# Patient Record
Sex: MALE | Race: WHITE | NOT HISPANIC OR LATINO | Employment: OTHER | URBAN - NONMETROPOLITAN AREA
[De-identification: names, ages, dates, MRNs, and addresses within clinical notes are randomized per-mention and may not be internally consistent; named-entity substitution may affect disease eponyms.]

---

## 2017-01-03 RX ORDER — CLOPIDOGREL BISULFATE 75 MG/1
75 TABLET ORAL DAILY
Qty: 90 TABLET | Refills: 3 | Status: SHIPPED | OUTPATIENT
Start: 2017-01-03 | End: 2017-10-23 | Stop reason: SDUPTHER

## 2017-01-03 RX ORDER — CLOPIDOGREL BISULFATE 75 MG/1
75 TABLET ORAL DAILY
Qty: 90 TABLET | Refills: 3 | Status: SHIPPED | OUTPATIENT
Start: 2017-01-03 | End: 2017-01-03 | Stop reason: SDUPTHER

## 2017-03-21 ENCOUNTER — HOSPITAL ENCOUNTER (OUTPATIENT)
Facility: HOSPITAL | Age: 72
Setting detail: OBSERVATION
Discharge: HOME | End: 2017-03-23
Attending: INTERNAL MEDICINE | Admitting: FAMILY MEDICINE

## 2017-03-21 DIAGNOSIS — N39.0 ACUTE UTI: Primary | ICD-10-CM

## 2017-03-21 DIAGNOSIS — G45.9 TRANSIENT CEREBRAL ISCHEMIA, UNSPECIFIED TYPE: ICD-10-CM

## 2017-03-21 LAB — GLUCOSE BLDC GLUCOMTR-MCNC: 294 MG/DL (ref 70–130)

## 2017-03-21 PROCEDURE — 82962 GLUCOSE BLOOD TEST: CPT

## 2017-03-21 PROCEDURE — 85730 THROMBOPLASTIN TIME PARTIAL: CPT | Performed by: INTERNAL MEDICINE

## 2017-03-21 PROCEDURE — 99285 EMERGENCY DEPT VISIT HI MDM: CPT

## 2017-03-21 PROCEDURE — 80053 COMPREHEN METABOLIC PANEL: CPT | Performed by: INTERNAL MEDICINE

## 2017-03-21 PROCEDURE — 85610 PROTHROMBIN TIME: CPT | Performed by: INTERNAL MEDICINE

## 2017-03-21 PROCEDURE — 85025 COMPLETE CBC W/AUTO DIFF WBC: CPT | Performed by: INTERNAL MEDICINE

## 2017-03-21 RX ORDER — INSULIN GLARGINE 100 [IU]/ML
100 INJECTION, SOLUTION SUBCUTANEOUS NIGHTLY
COMMUNITY
End: 2018-12-17 | Stop reason: SDUPTHER

## 2017-03-21 RX ORDER — CLOPIDOGREL BISULFATE 75 MG/1
75 TABLET ORAL DAILY
COMMUNITY
End: 2019-12-02 | Stop reason: SDUPTHER

## 2017-03-21 RX ORDER — METOPROLOL TARTRATE 50 MG/1
75 TABLET, FILM COATED ORAL 2 TIMES DAILY
COMMUNITY
End: 2019-05-01 | Stop reason: SDUPTHER

## 2017-03-21 RX ORDER — CILOSTAZOL 100 MG/1
100 TABLET ORAL 2 TIMES DAILY
COMMUNITY
End: 2019-12-02 | Stop reason: SDUPTHER

## 2017-03-21 RX ORDER — LANOLIN ALCOHOL/MO/W.PET/CERES
800 CREAM (GRAM) TOPICAL DAILY
COMMUNITY

## 2017-03-21 RX ORDER — ACETAMINOPHEN AND CODEINE PHOSPHATE 300; 30 MG/1; MG/1
1 TABLET ORAL EVERY 4 HOURS PRN
COMMUNITY
End: 2019-01-10

## 2017-03-21 RX ORDER — PENICILLIN V POTASSIUM 500 MG/1
500 TABLET ORAL 4 TIMES DAILY
COMMUNITY
End: 2018-10-24

## 2017-03-21 RX ORDER — ASPIRIN 81 MG/1
81 TABLET, CHEWABLE ORAL DAILY
COMMUNITY
End: 2022-09-22

## 2017-03-22 ENCOUNTER — APPOINTMENT (OUTPATIENT)
Dept: CT IMAGING | Facility: HOSPITAL | Age: 72
End: 2017-03-22

## 2017-03-22 PROBLEM — I10 HYPERTENSION: Status: ACTIVE | Noted: 2017-03-22

## 2017-03-22 PROBLEM — G93.41 METABOLIC ENCEPHALOPATHY: Status: ACTIVE | Noted: 2017-03-22

## 2017-03-22 PROBLEM — E11.9 DIABETES MELLITUS (HCC): Status: ACTIVE | Noted: 2017-03-22

## 2017-03-22 PROBLEM — I25.10 CORONARY ARTERY DISEASE: Status: ACTIVE | Noted: 2017-03-22

## 2017-03-22 PROBLEM — N39.0 UTI (URINARY TRACT INFECTION): Status: ACTIVE | Noted: 2017-03-22

## 2017-03-22 PROBLEM — N39.0 ACUTE UTI: Status: ACTIVE | Noted: 2017-03-22

## 2017-03-22 LAB
ALBUMIN SERPL-MCNC: 3.8 G/DL (ref 3.5–5)
ALBUMIN/GLOB SERPL: 1.1 G/DL (ref 1.1–2.5)
ALP SERPL-CCNC: 74 U/L (ref 24–120)
ALT SERPL W P-5'-P-CCNC: 45 U/L (ref 0–54)
ANION GAP SERPL CALCULATED.3IONS-SCNC: 10 MMOL/L (ref 4–13)
APTT PPP: 30.5 SECONDS (ref 24.1–34.8)
AST SERPL-CCNC: 42 U/L (ref 7–45)
BACTERIA UR QL AUTO: ABNORMAL /HPF
BASOPHILS # BLD AUTO: 0.02 10*3/MM3 (ref 0–0.2)
BASOPHILS NFR BLD AUTO: 0.2 % (ref 0–2)
BILIRUB SERPL-MCNC: 0.7 MG/DL (ref 0.1–1)
BILIRUB UR QL STRIP: NEGATIVE
BUN BLD-MCNC: 20 MG/DL (ref 5–21)
BUN/CREAT SERPL: 15.9 (ref 7–25)
CALCIUM SPEC-SCNC: 9.2 MG/DL (ref 8.4–10.4)
CHLORIDE SERPL-SCNC: 103 MMOL/L (ref 98–110)
CLARITY UR: CLEAR
CO2 SERPL-SCNC: 23 MMOL/L (ref 24–31)
COLOR UR: YELLOW
CREAT BLD-MCNC: 1.26 MG/DL (ref 0.5–1.4)
DEPRECATED RDW RBC AUTO: 46.8 FL (ref 40–54)
EOSINOPHIL # BLD AUTO: 0.03 10*3/MM3 (ref 0–0.7)
EOSINOPHIL NFR BLD AUTO: 0.2 % (ref 0–4)
ERYTHROCYTE [DISTWIDTH] IN BLOOD BY AUTOMATED COUNT: 13.5 % (ref 12–15)
FINE GRAN CASTS URNS QL MICRO: ABNORMAL /LPF
GFR SERPL CREATININE-BSD FRML MDRD: 56 ML/MIN/1.73
GLOBULIN UR ELPH-MCNC: 3.5 GM/DL
GLUCOSE BLD-MCNC: 299 MG/DL (ref 70–100)
GLUCOSE BLDC GLUCOMTR-MCNC: 262 MG/DL (ref 70–130)
GLUCOSE BLDC GLUCOMTR-MCNC: 266 MG/DL (ref 70–130)
GLUCOSE BLDC GLUCOMTR-MCNC: 283 MG/DL (ref 70–130)
GLUCOSE BLDC GLUCOMTR-MCNC: 292 MG/DL (ref 70–130)
GLUCOSE BLDC GLUCOMTR-MCNC: 315 MG/DL (ref 70–130)
GLUCOSE UR STRIP-MCNC: NEGATIVE MG/DL
HCT VFR BLD AUTO: 43.6 % (ref 40–52)
HGB BLD-MCNC: 15 G/DL (ref 14–18)
HGB UR QL STRIP.AUTO: NEGATIVE
HOLD SPECIMEN: NORMAL
HOLD SPECIMEN: NORMAL
HYALINE CASTS UR QL AUTO: ABNORMAL /LPF
IMM GRANULOCYTES # BLD: 0.05 10*3/MM3 (ref 0–0.03)
IMM GRANULOCYTES NFR BLD: 0.4 % (ref 0–5)
INR PPP: 1.04 (ref 0.91–1.09)
KETONES UR QL STRIP: NEGATIVE
LEUKOCYTE ESTERASE UR QL STRIP.AUTO: ABNORMAL
LYMPHOCYTES # BLD AUTO: 0.56 10*3/MM3 (ref 0.72–4.86)
LYMPHOCYTES NFR BLD AUTO: 4.3 % (ref 15–45)
MAGNESIUM SERPL-MCNC: 2 MG/DL (ref 1.4–2.2)
MCH RBC QN AUTO: 32.4 PG (ref 28–32)
MCHC RBC AUTO-ENTMCNC: 34.4 G/DL (ref 33–36)
MCV RBC AUTO: 94.2 FL (ref 82–95)
MONOCYTES # BLD AUTO: 1.36 10*3/MM3 (ref 0.19–1.3)
MONOCYTES NFR BLD AUTO: 10.4 % (ref 4–12)
NEUTROPHILS # BLD AUTO: 0 10*3/MM3 (ref 1.87–8.4)
NEUTROPHILS NFR BLD AUTO: 84.5 % (ref 39–78)
NITRITE UR QL STRIP: NEGATIVE
NRBC BLD MANUAL-RTO: 0 /100 WBC (ref 0–0)
PH UR STRIP.AUTO: 5 [PH] (ref 5–8)
PLATELET # BLD AUTO: 123 10*3/MM3 (ref 130–400)
PMV BLD AUTO: 12.3 FL (ref 6–12)
POTASSIUM BLD-SCNC: 5 MMOL/L (ref 3.5–5.3)
PROT SERPL-MCNC: 7.3 G/DL (ref 6.3–8.7)
PROT UR QL STRIP: NEGATIVE
PROTHROMBIN TIME: 13.9 SECONDS (ref 11.9–14.6)
RBC # BLD AUTO: 4.63 10*6/MM3 (ref 4.8–5.9)
RBC # UR: ABNORMAL /HPF
REF LAB TEST METHOD: ABNORMAL
SODIUM BLD-SCNC: 136 MMOL/L (ref 135–145)
SP GR UR STRIP: 1.02 (ref 1–1.03)
SQUAMOUS #/AREA URNS HPF: ABNORMAL /HPF
UROBILINOGEN UR QL STRIP: ABNORMAL
WBC NRBC COR # BLD: 13.02 10*3/MM3 (ref 4.8–10.8)
WBC UR QL AUTO: ABNORMAL /HPF
WHOLE BLOOD HOLD SPECIMEN: NORMAL
WHOLE BLOOD HOLD SPECIMEN: NORMAL

## 2017-03-22 PROCEDURE — 70450 CT HEAD/BRAIN W/O DYE: CPT

## 2017-03-22 PROCEDURE — 82962 GLUCOSE BLOOD TEST: CPT

## 2017-03-22 PROCEDURE — 94799 UNLISTED PULMONARY SVC/PX: CPT

## 2017-03-22 PROCEDURE — 81001 URINALYSIS AUTO W/SCOPE: CPT | Performed by: INTERNAL MEDICINE

## 2017-03-22 PROCEDURE — 36415 COLL VENOUS BLD VENIPUNCTURE: CPT | Performed by: INTERNAL MEDICINE

## 2017-03-22 PROCEDURE — 96360 HYDRATION IV INFUSION INIT: CPT

## 2017-03-22 PROCEDURE — 96361 HYDRATE IV INFUSION ADD-ON: CPT

## 2017-03-22 PROCEDURE — 83735 ASSAY OF MAGNESIUM: CPT | Performed by: INTERNAL MEDICINE

## 2017-03-22 PROCEDURE — 63710000001 INSULIN LISPRO (HUMAN) PER 5 UNITS: Performed by: INTERNAL MEDICINE

## 2017-03-22 PROCEDURE — G0378 HOSPITAL OBSERVATION PER HR: HCPCS

## 2017-03-22 PROCEDURE — 96372 THER/PROPH/DIAG INJ SC/IM: CPT

## 2017-03-22 PROCEDURE — 25010000002 ENOXAPARIN PER 10 MG: Performed by: FAMILY MEDICINE

## 2017-03-22 PROCEDURE — 94760 N-INVAS EAR/PLS OXIMETRY 1: CPT

## 2017-03-22 PROCEDURE — 87086 URINE CULTURE/COLONY COUNT: CPT | Performed by: INTERNAL MEDICINE

## 2017-03-22 RX ORDER — SODIUM CHLORIDE 9 MG/ML
75 INJECTION, SOLUTION INTRAVENOUS CONTINUOUS
Status: DISCONTINUED | OUTPATIENT
Start: 2017-03-22 | End: 2017-03-23 | Stop reason: HOSPADM

## 2017-03-22 RX ORDER — ACETAMINOPHEN 325 MG/1
650 TABLET ORAL EVERY 4 HOURS PRN
Status: DISCONTINUED | OUTPATIENT
Start: 2017-03-22 | End: 2017-03-22 | Stop reason: SDUPTHER

## 2017-03-22 RX ORDER — HYDROCODONE BITARTRATE AND ACETAMINOPHEN 5; 325 MG/1; MG/1
1 TABLET ORAL EVERY 4 HOURS PRN
Status: DISCONTINUED | OUTPATIENT
Start: 2017-03-22 | End: 2017-03-23 | Stop reason: HOSPADM

## 2017-03-22 RX ORDER — ACETAMINOPHEN 325 MG/1
650 TABLET ORAL EVERY 6 HOURS PRN
Status: DISCONTINUED | OUTPATIENT
Start: 2017-03-22 | End: 2017-03-23 | Stop reason: HOSPADM

## 2017-03-22 RX ORDER — CLOPIDOGREL BISULFATE 75 MG/1
75 TABLET ORAL DAILY
Status: DISCONTINUED | OUTPATIENT
Start: 2017-03-22 | End: 2017-03-23 | Stop reason: HOSPADM

## 2017-03-22 RX ORDER — LEVOFLOXACIN 5 MG/ML
500 INJECTION, SOLUTION INTRAVENOUS EVERY 24 HOURS
Status: DISCONTINUED | OUTPATIENT
Start: 2017-03-22 | End: 2017-03-23 | Stop reason: HOSPADM

## 2017-03-22 RX ORDER — NITROGLYCERIN 0.4 MG/1
0.4 TABLET SUBLINGUAL
Status: DISCONTINUED | OUTPATIENT
Start: 2017-03-22 | End: 2017-03-23 | Stop reason: HOSPADM

## 2017-03-22 RX ORDER — METOPROLOL TARTRATE 50 MG/1
50 TABLET, FILM COATED ORAL 2 TIMES DAILY
Status: DISCONTINUED | OUTPATIENT
Start: 2017-03-22 | End: 2017-03-23 | Stop reason: HOSPADM

## 2017-03-22 RX ORDER — IPRATROPIUM BROMIDE AND ALBUTEROL SULFATE 2.5; .5 MG/3ML; MG/3ML
3 SOLUTION RESPIRATORY (INHALATION) EVERY 4 HOURS PRN
Status: DISCONTINUED | OUTPATIENT
Start: 2017-03-22 | End: 2017-03-23 | Stop reason: HOSPADM

## 2017-03-22 RX ORDER — SODIUM CHLORIDE 0.9 % (FLUSH) 0.9 %
1-10 SYRINGE (ML) INJECTION AS NEEDED
Status: DISCONTINUED | OUTPATIENT
Start: 2017-03-22 | End: 2017-03-23 | Stop reason: HOSPADM

## 2017-03-22 RX ORDER — ONDANSETRON 2 MG/ML
4 INJECTION INTRAMUSCULAR; INTRAVENOUS EVERY 6 HOURS PRN
Status: DISCONTINUED | OUTPATIENT
Start: 2017-03-22 | End: 2017-03-23 | Stop reason: HOSPADM

## 2017-03-22 RX ORDER — ASPIRIN 81 MG/1
81 TABLET, CHEWABLE ORAL DAILY
Status: DISCONTINUED | OUTPATIENT
Start: 2017-03-22 | End: 2017-03-23 | Stop reason: HOSPADM

## 2017-03-22 RX ORDER — LANOLIN ALCOHOL/MO/W.PET/CERES
800 CREAM (GRAM) TOPICAL DAILY
Status: DISCONTINUED | OUTPATIENT
Start: 2017-03-22 | End: 2017-03-23 | Stop reason: HOSPADM

## 2017-03-22 RX ORDER — MAGNESIUM HYDROXIDE/ALUMINUM HYDROXICE/SIMETHICONE 120; 1200; 1200 MG/30ML; MG/30ML; MG/30ML
30 SUSPENSION ORAL EVERY 6 HOURS PRN
Status: DISCONTINUED | OUTPATIENT
Start: 2017-03-22 | End: 2017-03-23 | Stop reason: HOSPADM

## 2017-03-22 RX ORDER — NICOTINE POLACRILEX 4 MG
15 LOZENGE BUCCAL
Status: DISCONTINUED | OUTPATIENT
Start: 2017-03-22 | End: 2017-03-23 | Stop reason: HOSPADM

## 2017-03-22 RX ORDER — DEXTROSE MONOHYDRATE 25 G/50ML
25 INJECTION, SOLUTION INTRAVENOUS
Status: DISCONTINUED | OUTPATIENT
Start: 2017-03-22 | End: 2017-03-23 | Stop reason: HOSPADM

## 2017-03-22 RX ORDER — ONDANSETRON 2 MG/ML
4 INJECTION INTRAMUSCULAR; INTRAVENOUS EVERY 6 HOURS PRN
Status: DISCONTINUED | OUTPATIENT
Start: 2017-03-22 | End: 2017-03-22 | Stop reason: SDUPTHER

## 2017-03-22 RX ORDER — CILOSTAZOL 100 MG/1
100 TABLET ORAL 2 TIMES DAILY
Status: DISCONTINUED | OUTPATIENT
Start: 2017-03-22 | End: 2017-03-23 | Stop reason: HOSPADM

## 2017-03-22 RX ADMIN — ACETAMINOPHEN 800 MCG: 400 TABLET ORAL at 11:39

## 2017-03-22 RX ADMIN — CLOPIDOGREL BISULFATE 75 MG: 75 TABLET, FILM COATED ORAL at 11:39

## 2017-03-22 RX ADMIN — SODIUM CHLORIDE 75 ML/HR: 9 INJECTION, SOLUTION INTRAVENOUS at 11:15

## 2017-03-22 RX ADMIN — ASPIRIN 81 MG 81 MG: 81 TABLET ORAL at 11:44

## 2017-03-22 RX ADMIN — CILOSTAZOL 100 MG: 100 TABLET ORAL at 11:39

## 2017-03-22 RX ADMIN — INSULIN LISPRO 6 UNITS: 100 INJECTION, SOLUTION INTRAVENOUS; SUBCUTANEOUS at 17:23

## 2017-03-22 RX ADMIN — ENOXAPARIN SODIUM 40 MG: 40 INJECTION SUBCUTANEOUS at 11:44

## 2017-03-22 RX ADMIN — INSULIN LISPRO 7 UNITS: 100 INJECTION, SOLUTION INTRAVENOUS; SUBCUTANEOUS at 11:38

## 2017-03-22 RX ADMIN — CILOSTAZOL 100 MG: 100 TABLET ORAL at 17:25

## 2017-03-22 RX ADMIN — INSULIN LISPRO 6 UNITS: 100 INJECTION, SOLUTION INTRAVENOUS; SUBCUTANEOUS at 20:19

## 2017-03-22 RX ADMIN — METOPROLOL TARTRATE 50 MG: 50 TABLET ORAL at 17:25

## 2017-03-22 RX ADMIN — METOPROLOL TARTRATE 50 MG: 50 TABLET ORAL at 11:39

## 2017-03-22 NOTE — ED PROVIDER NOTES
"Subjective   HPI Comments: Patient is a 71-year-old male whose family reports the patient has had increased sleep since Monday (03/20/17) along with decreased intake of liquids and solids since that same time.  Patient reportedly was seen at his primary care provider's office on Tuesday (03/21/17) and was diagnosed with a sinus infection and started on an antibiotic (patient and family does not know the name of the antibiotic).  The patient has had extreme fatigue all day since the beginning of Tuesday (03/21/17) AM.  On Tuesday (03/21/17) night the patient reportedly went to get up out of his chair and fell in the floor at 8:45 PM and appeared \"lethargic\" while sitting in the floor and had extreme weakness to the point that he could not ambulate and also had marked difficulty with speech.  The family is concerned the patient had a TIA. The patient reportedly had a fingerstick blood sugar at that time of 180.      History provided by:  Patient and spouse (Brothers)  History limited by:  Acuity of condition      Review of Systems   Constitutional: Positive for activity change, appetite change and fatigue.   HENT: Negative.    Eyes: Negative.    Respiratory: Negative.    Cardiovascular: Negative.    Gastrointestinal: Negative.    Endocrine: Negative.    Genitourinary: Negative.    Musculoskeletal: Negative.    Skin: Negative.    Allergic/Immunologic: Negative.    Neurological: Positive for speech difficulty and weakness.   Hematological: Negative.    Psychiatric/Behavioral: Negative.    All other systems reviewed and are negative.      Past Medical History:   Diagnosis Date   • Colon cancer    • Coronary artery disease    • Diabetes mellitus    • Hypertension        Allergies   Allergen Reactions   • Cephalexin        Past Surgical History:   Procedure Laterality Date   • COLOSTOMY     • CORONARY ARTERY BYPASS GRAFT         Family History   Problem Relation Age of Onset   • Cancer Mother    • Heart disease Mother    • " "Diabetes Mother    • Cancer Father    • Diabetes Sister    • No Known Problems Brother    • Diabetes Sister    • No Known Problems Brother    • No Known Problems Brother    • No Known Problems Brother        Social History     Social History   • Marital status:      Spouse name: N/A   • Number of children: N/A   • Years of education: N/A     Social History Main Topics   • Smoking status: Current Every Day Smoker     Types: Pipe   • Smokeless tobacco: None   • Alcohol use No   • Drug use: No   • Sexual activity: No     Other Topics Concern   • None     Social History Narrative   • None           Objective   Physical Exam   Constitutional: He is oriented to person, place, and time. He appears well-developed and well-nourished.   HENT:   Head: Normocephalic and atraumatic.   Right Ear: External ear normal.   Left Ear: External ear normal.   Nose: Nose normal.   Mouth/Throat: Oropharynx is clear and moist.   Eyes: Conjunctivae and EOM are normal. Pupils are equal, round, and reactive to light. Right eye exhibits no discharge. Left eye exhibits no discharge.   Neck: Normal range of motion. Neck supple. No tracheal deviation present. No thyromegaly present.   Cardiovascular: Normal rate, regular rhythm, normal heart sounds and intact distal pulses.    No murmur heard.  Pulmonary/Chest: Effort normal and breath sounds normal. No respiratory distress. He exhibits no tenderness.   Abdominal: Soft. He exhibits no distension. There is no tenderness.   Musculoskeletal: Normal range of motion. He exhibits no edema, tenderness or deformity.   Neurological: He is alert and oriented to person, place, and time. No cranial nerve deficit.   Skin: Skin is warm and dry. No erythema. No pallor.   Psychiatric: He has a normal mood and affect. Judgment normal.   Nursing note and vitals reviewed.      Procedures         ED Course  ED Course   Comment By Time   CT head without intravenous contrast:\" No acute findings.\" Per Iesha " Whitney York MD--V rad Ellis Davis MD 03/22 0706      Patient's case was discussed with Dr Belle and he agrees for the hospitalist service to admit the patient.            MDM  Number of Diagnoses or Management Options  Acute UTI: minor  Transient cerebral ischemia, unspecified type: new and requires workup     Amount and/or Complexity of Data Reviewed  Clinical lab tests: ordered and reviewed  Tests in the radiology section of CPT®: ordered and reviewed  Discuss the patient with other providers: yes    Risk of Complications, Morbidity, and/or Mortality  Presenting problems: moderate  Diagnostic procedures: moderate  Management options: low    Patient Progress  Patient progress: stable      Final diagnoses:   Acute UTI   Transient cerebral ischemia, unspecified type            Ellis Davis MD  03/22/17 0180

## 2017-03-22 NOTE — H&P
[facility]  HISTORY AND PHYSICAL    Date of Admission: 3/21/2017  Primary Care Physician: Robin Freedman MD    Subjective     Chief Complaint:   Difficulty walking secondary to weakness, occasional disorientation and slurred speech    History of Present Illness  This 71-year-old white male is admitted with a chief complaint of difficulty walking, disorientation and slurred speech.  The patient states that his symptoms began roughly 3 days prior to arrival.  He saw his family physician yesterday and was treated for an apparent sinus infection.  Despite outpatient treatment the patient's symptoms continued to worsen.  He presented to Psychiatric emergency room for further evaluation.  Workup in the emergency room shows no white blood cell count at 13,000, glucose 299, moderate leukocyte esterase and his UA with 6-12 WBCs noted.  While in the emergency room the patient was given IV fluids and Levaquin IV.  At the time of my interview and examination the patient was back to his typical functional baseline both physically and mentally.  The patient does admit to 3-4 days of difficulty with urination and frequency as well as a feeling of being unable to empty his bladder.        Review of Systems   Constitutional: Positive for fatigue.   HENT: Negative.    Eyes: Negative.    Respiratory: Negative.    Cardiovascular: Negative.    Gastrointestinal: Negative.    Endocrine: Negative.    Genitourinary: Positive for difficulty urinating and frequency.   Musculoskeletal: Positive for gait problem.   Skin: Negative.    Allergic/Immunologic: Negative.    Neurological: Positive for speech difficulty and weakness.   Hematological: Negative.    Psychiatric/Behavioral: Negative.         Otherwise complete ROS reviewed and negative except as mentioned in the HPI.      Past Medical History:     Past Medical History:   Diagnosis Date   • Colon cancer    • Coronary  "artery disease    • Diabetes mellitus    • Hypertension        Past Surgical History:  Past Surgical History:   Procedure Laterality Date   • COLOSTOMY     • CORONARY ARTERY BYPASS GRAFT         Family History:   family history includes Cancer in his father and mother; Diabetes in his mother, sister, and sister; Heart disease in his mother; No Known Problems in his brother, brother, brother, and brother.    Social History:    reports that he has been smoking Pipe.  He does not have any smokeless tobacco history on file. He reports that he does not drink alcohol or use illicit drugs.    Medications:  Prior to Admission medications    Medication Sig Start Date End Date Taking? Authorizing Provider   acetaminophen-codeine (TYLENOL #3) 300-30 MG per tablet Take 1 tablet by mouth Every 4 (Four) Hours As Needed for Moderate Pain (4-6).   Yes Historical Provider, MD   aspirin 81 MG chewable tablet Chew 81 mg Daily.   Yes Historical Provider, MD   cilostazol (PLETAL) 100 MG tablet Take 100 mg by mouth 2 (Two) Times a Day.   Yes Historical Provider, MD   clopidogrel (PLAVIX) 75 MG tablet Take 75 mg by mouth Daily.   Yes Historical Provider, MD   folic acid (FOLVITE) 400 MCG tablet Take 800 mcg by mouth Daily.   Yes Historical Provider, MD   insulin glargine (LANTUS) 100 UNIT/ML injection Inject  under the skin Daily.   Yes Historical Provider, MD   insulin lispro (humaLOG) 100 UNIT/ML injection Inject  under the skin 3 (Three) Times a Day Before Meals.   Yes Historical Provider, MD   metoprolol tartrate (LOPRESSOR) 50 MG tablet Take 50 mg by mouth 2 (Two) Times a Day.   Yes Historical Provider, MD   penicillin v potassium (VEETID) 500 MG tablet Take 500 mg by mouth 4 (Four) Times a Day.   Yes Historical Provider, MD       Allergies:  Allergies   Allergen Reactions   • Cephalexin        Objective     Vital Signs:   /57  Pulse 92  Temp 97.2 °F (36.2 °C)  Resp 20  Ht 72\" (182.9 cm)  Wt 246 lb (112 kg)  SpO2 96%  " BMI 33.36 kg/m2    Physical Exam   Constitutional: He is oriented to person, place, and time. He appears well-developed and well-nourished. No distress.   HENT:   Head: Normocephalic and atraumatic.   Right Ear: External ear normal.   Left Ear: External ear normal.   Nose: Nose normal.   Mouth/Throat: Oropharynx is clear and moist.   Eyes: Conjunctivae and EOM are normal. Pupils are equal, round, and reactive to light. No scleral icterus.   Neck: Normal range of motion. Neck supple. No JVD present. No tracheal deviation present. No thyromegaly present.   Cardiovascular: Normal rate, regular rhythm, normal heart sounds and intact distal pulses.    No murmur heard.  Pulmonary/Chest: Effort normal and breath sounds normal. No respiratory distress. He has no wheezes.   Abdominal: Soft. Bowel sounds are normal. He exhibits no distension and no mass. There is no tenderness.   Musculoskeletal: Normal range of motion. He exhibits no edema or tenderness.   Neurological: He is alert and oriented to person, place, and time. He has normal reflexes. No cranial nerve deficit. Coordination normal.   Skin: Skin is warm and dry. No rash noted.   Psychiatric: He has a normal mood and affect. His behavior is normal. Judgment and thought content normal.           Results Reviewed:  Lab Results (last 24 hours)     Procedure Component Value Units Date/Time    POC Glucose Fingerstick [[id number]]  (Abnormal) Collected:  03/21/17 2227    Specimen:  Blood Updated:  03/21/17 2238     Glucose 294 (H) mg/dL       : [id number] [facility] ID: [id number]       POC Glucose Fingerstick [[id number]]  (Abnormal) Collected:  03/22/17 0136    Specimen:  Blood Updated:  03/22/17 0217     Glucose 266 (H) mg/dL       : [id number] [facility] ID: [id number]       Comprehensive Metabolic Panel [[id number]]  (Abnormal) Collected:  03/21/17 2314    Specimen:  Blood from Arm, Left Updated:  03/22/17 0305     Glucose 299 (H) mg/dL      BUN 20  mg/dL      Creatinine 1.26 mg/dL      Sodium 136 mmol/L      Potassium 5.0 mmol/L      Chloride 103 mmol/L      CO2 23.0 (L) mmol/L      Calcium 9.2 mg/dL      Total Protein 7.3 g/dL      Albumin 3.80 g/dL      ALT (SGPT) 45 U/L      AST (SGOT) 42 U/L      Alkaline Phosphatase 74 U/L      Total Bilirubin 0.7 mg/dL      eGFR Non African Amer 56 (L) mL/min/1.73      Globulin 3.5 gm/dL      A/G Ratio 1.1 g/dL      BUN/Creatinine Ratio 15.9     Anion Gap 10.0 mmol/L     CBC Auto Differential [[id number]]  (Abnormal) Collected:  03/21/17 2314    Specimen:  Blood from Arm, Left Updated:  03/22/17 0332     WBC 13.02 (H) 10*3/mm3      RBC 4.63 (L) 10*6/mm3      Hemoglobin 15.0 g/dL      Hematocrit 43.6 %      MCV 94.2 fL      MCH 32.4 (H) pg      MCHC 34.4 g/dL      RDW 13.5 %      RDW-SD 46.8 fl      MPV 12.3 (H) fL      Platelets 123 (L) 10*3/mm3      Neutrophil % 84.5 (H) %      Lymphocyte % 4.3 (L) %      Monocyte % 10.4 %      Eosinophil % 0.2 %      Basophil % 0.2 %      Immature Grans % 0.4 %      Neutrophils, Absolute 0.00 (L) 10*3/mm3      Lymphocytes, Absolute 0.56 (L) 10*3/mm3      Monocytes, Absolute 1.36 (H) 10*3/mm3      Eosinophils, Absolute 0.03 10*3/mm3      Basophils, Absolute 0.02 10*3/mm3      Immature Grans, Absolute 0.05 (H) 10*3/mm3      nRBC 0.0 /100 WBC     aPTT [[id number]]  (Normal) Collected:  03/21/17 2314    Specimen:  Blood from Arm, Left Updated:  03/22/17 0350     PTT 30.5 seconds     Urine Culture [[id number]] Collected:  03/22/17 0147    Specimen:  Urine from Urine, Clean Catch Updated:  03/22/17 0355    Urinalysis With / Culture If Indicated [[id number]]  (Abnormal) Collected:  03/22/17 0147    Specimen:  Urine from Urine, Clean Catch Updated:  03/22/17 0357     Color, UA Yellow     Appearance, UA Clear     pH, UA 5.0     Specific Gravity, UA 1.018     Glucose, UA Negative     Ketones, UA Negative     Bilirubin, UA Negative     Blood, UA Negative     Protein, UA Negative     Leuk Esterase, UA  Moderate (2+) (A)     Nitrite, UA Negative     Urobilinogen, UA 0.2 E.U./dL    Urinalysis, Microscopic Only [[id number]]  (Abnormal) Collected:  03/22/17 0147    Specimen:  Urine from Urine, Clean Catch Updated:  03/22/17 0357     RBC, UA 0-2 (A) /HPF      WBC, UA 6-12 (A) /HPF      Bacteria, UA Trace (A) /HPF      Squamous Epithelial Cells, UA 0-2 /HPF      Hyaline Casts, UA 7-12 /LPF      Fine Granular Casts, UA 3-6 /LPF      Methodology Automated Microscopy    Protime-INR [[id number]]  (Normal) Collected:  03/21/17 2314    Specimen:  Blood from Arm, Left Updated:  03/22/17 0444     Protime 13.9 Seconds      INR 1.04    POC Glucose Fingerstick [[id number]]  (Abnormal) Collected:  03/22/17 0755    Specimen:  Blood Updated:  03/22/17 0832     Glucose 262 (H) mg/dL       : [id number] Alpesh KimMeter ID: [id number]       Magnesium [[id number]]  (Normal) Collected:  03/22/17 0848    Specimen:  Blood Updated:  03/22/17 0935     Magnesium 2.0 mg/dL           Ct Head Without Contrast    Result Date: 3/22/2017  Narrative: CT HEAD WO CONTRAST- 3/22/2017 5:35 AM CDT  HISTORY: Weakness and fatigue  COMPARISON: None  DOSE LENGTH PRODUCT: 750 mGy cm. Automated exposure control was also utilized to decrease patient radiation dose.  TECHNIQUE: Helical tomographic images of the brain were obtained without the use of contrast. Multiplanar reformatted images were provided for review.  FINDINGS: The midline structures are nondisplaced. There is mild cerebral and cerebellar atrophy, with an associated increase in the prominence of the ventricles and sulci. The basilar cisterns are normal in size and configuration. There is no evidence of intracranial hemorrhage or mass-effect. There is low attenuation in the periventricular white matter, consistent with chronic ischemic change. There are no abnormal extra-axial fluid collections. There is no evidence of tonsillar herniation.  The included orbits and their contents are unremarkable. The  visualized paranasal sinuses, mastoid air cells and middle ear cavities are clear. The visualized osseous structures and overlying soft tissues of the skull and face are intact.      Impression: Mild cerebral and cerebellar atrophy with chronic microvascular disease but no evidence of acute intracranial process.  A preliminary report was provided by [facility]. I agree with the preliminary interpretation.   This report was finalized on 03/22/2017 07:20 by Dr. Yasir Goldberg MD.     I have personally reviewed and interpreted the radiology studies and ECG obtained at time of admission.     Assessment / Plan      Assessment & Plan  Hospital Problem List     * (Principal)UTI (urinary tract infection)    Metabolic encephalopathy    Hypertension    Diabetes mellitus    Coronary artery disease          PLAN:   Admit to the medical surgical floor  Levaquin 500 milligrams IV every 24 hours  Gentle IV fluid hydration with normal saline at 75 mL per hour  Await urine culture results for disposition      Code Status:   DO NOT RESUSCITATE aggressive  Surrogate decision-maker is the patient's wife         I discussed the patients findings and my recommendations with: Patient and his wife who is at bedside    Estimated length of stay: One to 2 days    Abbe Castro DO   03/22/17   10:21 AM

## 2017-03-22 NOTE — PROGRESS NOTES
Continued Stay Note  JODI Frances     Patient Name: Darren Shay  MRN: [id number]  Today's Date: 3/22/2017    Admit Date: 3/21/2017          Discharge Plan       03/22/17 1630    Case Management/Social Work Plan    Additional Comments Attempted to screen pt but was unable. Will try again tomorrow.               Discharge Codes     None            VENTURA Gilbert

## 2017-03-23 VITALS
DIASTOLIC BLOOD PRESSURE: 52 MMHG | OXYGEN SATURATION: 95 % | HEIGHT: 72 IN | TEMPERATURE: 97.1 F | RESPIRATION RATE: 20 BRPM | WEIGHT: 246 LBS | BODY MASS INDEX: 33.32 KG/M2 | SYSTOLIC BLOOD PRESSURE: 157 MMHG | HEART RATE: 90 BPM

## 2017-03-23 LAB
GLUCOSE BLDC GLUCOMTR-MCNC: 262 MG/DL (ref 70–130)
HBA1C MFR BLD: 6.5 %
TSH SERPL DL<=0.05 MIU/L-ACNC: 0.95 MIU/ML (ref 0.47–4.68)

## 2017-03-23 PROCEDURE — 83036 HEMOGLOBIN GLYCOSYLATED A1C: CPT | Performed by: INTERNAL MEDICINE

## 2017-03-23 PROCEDURE — 25010000002 LEVOFLOXACIN PER 250 MG: Performed by: INTERNAL MEDICINE

## 2017-03-23 PROCEDURE — 82962 GLUCOSE BLOOD TEST: CPT

## 2017-03-23 PROCEDURE — G0378 HOSPITAL OBSERVATION PER HR: HCPCS

## 2017-03-23 PROCEDURE — 84443 ASSAY THYROID STIM HORMONE: CPT | Performed by: INTERNAL MEDICINE

## 2017-03-23 RX ORDER — LEVOFLOXACIN 250 MG/1
250 TABLET ORAL DAILY
Qty: 7 TABLET | Refills: 0 | Status: SHIPPED | OUTPATIENT
Start: 2017-03-23 | End: 2018-10-24

## 2017-03-23 RX ADMIN — CILOSTAZOL 100 MG: 100 TABLET ORAL at 08:52

## 2017-03-23 RX ADMIN — INSULIN LISPRO 6 UNITS: 100 INJECTION, SOLUTION INTRAVENOUS; SUBCUTANEOUS at 08:51

## 2017-03-23 RX ADMIN — METOPROLOL TARTRATE 50 MG: 50 TABLET ORAL at 08:51

## 2017-03-23 RX ADMIN — LEVOFLOXACIN 500 MG: 500 INJECTION, SOLUTION INTRAVENOUS at 06:52

## 2017-03-23 RX ADMIN — SODIUM CHLORIDE 75 ML/HR: 9 INJECTION, SOLUTION INTRAVENOUS at 00:07

## 2017-03-23 RX ADMIN — ACETAMINOPHEN 800 MCG: 400 TABLET ORAL at 08:52

## 2017-03-23 RX ADMIN — CLOPIDOGREL BISULFATE 75 MG: 75 TABLET, FILM COATED ORAL at 08:52

## 2017-03-23 RX ADMIN — ASPIRIN 81 MG 81 MG: 81 TABLET ORAL at 08:52

## 2017-03-23 NOTE — PLAN OF CARE
Problem: Patient Care Overview (Adult)  Goal: Plan of Care Review  Outcome: Ongoing (interventions implemented as appropriate)    03/23/17 0328   Coping/Psychosocial Response Interventions   Plan Of Care Reviewed With patient   Patient Care Overview   Progress improving   Outcome Evaluation   Outcome Summary/Follow up Plan pt slep most of the night. no c/o pain or discomfort. Pt. amb to bathroom twice with min assist.         Problem: Diabetes, Type 2 (Adult)  Goal: Signs and Symptoms of Listed Potential Problems Will be Absent or Manageable (Diabetes, Type 2)  Outcome: Ongoing (interventions implemented as appropriate)

## 2017-03-23 NOTE — PROGRESS NOTES
Discharge Planning Assessment  [facility]     Patient Name: Darren Shay  MRN: [id number]  Today's Date: 3/23/2017    Admit Date: 3/21/2017          Discharge Needs Assessment       03/23/17 1010    Living Environment    Lives With (P)  spouse    Provides Primary Care For (P)  no one    Quality Of Family Relationships (P)  supportive    Able to Return to Prior Living Arrangements (P)  yes    Discharge Needs Assessment    Concerns To Be Addressed (P)  denies needs/concerns at this time    Readmission Within The Last 30 Days (P)  no previous admission in last 30 days    Anticipated Changes Related to Illness (P)  none    Equipment Currently Used at Home (P)  none    Equipment Needed After Discharge (P)  none    Transportation Available (P)  car    Discharge Planning Comments (P)  Spoke with patient, his spouse and daughter at bedside. Patient stated that he was independent prior to admission. Patient still drives. Patient denied the need for medical equipment. Patient denied the need for home health. Patient's wife is in good health and is able to help with needs. Patient also has three supportive children. Patient denied discharge needs.             Discharge Plan     None        Discharge Placement     No information found                Demographic Summary     None            Functional Status     None            Psychosocial     None            Abuse/Neglect     None            Legal     None            Substance Abuse     None            Patient Forms     None          Sara Hung

## 2017-03-23 NOTE — DISCHARGE SUMMARY
[facility] Medicine Services  DISCHARGE SUMMARY       Date of Admission: 3/21/2017  Date of Discharge:  3/23/2017  Primary Care Physician: Robin Freedman MD    Discharge Diagnoses:  Patient Active Problem List   Diagnosis   • UTI (urinary tract infection)   • Hypertension   • Diabetes mellitus   • Coronary artery disease   • Metabolic encephalopathy         Presenting Problem/History of Present Illness:  UTI (urinary tract infection) [N39.0]  Acute UTI [N39.0]     Chief Complaint on Day of Discharge:   None    History of Present Illness on Day of Discharge:   The patient is back to his baseline functions.  He has had no difficulty walking and no speech difficulties.    Hospital Course  This 71-year-old white male is admitted with a chief complaint of difficulty walking, disorientation and slurred speech.  The patient states that his symptoms began roughly 3 days prior to arrival.  He saw his family physician yesterday and was treated for an apparent sinus infection.  Despite outpatient treatment the patient's symptoms continued to worsen. He presented to [facility] emergency room for further evaluation. Workup in the emergency room shows no white blood cell count at 13,000, glucose 299, moderate leukocyte esterase and his UA with 6-12 WBCs noted. While in the emergency room the patient was given IV fluids and Levaquin IV.  At the time of my interview and examination the patient was back to his typical functional baseline both physically and mentally. The patient does admit to 3-4 days of difficulty with urination and frequency as well as a feeling of being unable to empty his bladder.    PLAN:   Admit to the medical surgical floor  Levaquin 500 milligrams IV every 24 hours  Gentle IV fluid hydration with normal saline at 75 mL per hour  Await urine culture results for disposition    The patient's symptoms resolved quickly back to baseline once IV fluids and IV  antibiotics were initiated.  Urine culture shows no growth at 24 hours, likely because urine was obtained after antibiotics were initiated in the emergency room.  The patient had no evidence of focal deficit at the time of arrival and takes dual antiplatelet therapy so the likelihood of transient ischemia is extremely low.  No further workup is required.  The patient will follow-up with his family physician next week for reassessment.  He is appropriate for discharge today      Pertinent Test Results:    Specimen:  Blood from Arm, Left Updated:  03/22/17 0305     Glucose 299 (H) mg/dL      BUN 20 mg/dL      Creatinine 1.26 mg/dL      Sodium 136 mmol/L      Potassium 5.0 mmol/L      Chloride 103 mmol/L      CO2 23.0 (L) mmol/L      Calcium 9.2 mg/dL      Total Protein 7.3 g/dL      Albumin 3.80 g/dL      ALT (SGPT) 45 U/L      AST (SGOT) 42 U/L      Alkaline Phosphatase 74 U/L      Total Bilirubin 0.7 mg/dL      eGFR Non African Amer 56 (L) mL/min/1.73      Globulin 3.5 gm/dL      A/G Ratio 1.1 g/dL      BUN/Creatinine Ratio 15.9     Anion Gap 10.0 mmol/L     CBC Auto Differential [[id number]]  (Abnormal) Collected:  03/21/17 2314    Specimen:  Blood from Arm, Left Updated:  03/22/17 0332     WBC 13.02 (H) 10*3/mm3      RBC 4.63 (L) 10*6/mm3      Hemoglobin 15.0 g/dL      Hematocrit 43.6 %      MCV 94.2 fL      MCH 32.4 (H) pg      MCHC 34.4 g/dL      RDW 13.5 %      RDW-SD 46.8 fl      MPV 12.3 (H) fL      Platelets 123 (L) 10*3/mm3      Neutrophil % 84.5 (H) %      Lymphocyte % 4.3 (L) %      Monocyte % 10.4 %      Eosinophil % 0.2 %      Basophil % 0.2 %      Immature Grans % 0.4 %      Neutrophils, Absolute 0.00 (L) 10*3/mm3      Lymphocytes, Absolute 0.56 (L) 10*3/mm3      Monocytes, Absolute 1.36 (H) 10*3/mm3      Eosinophils, Absolute 0.03 10*3/mm3      Basophils, Absolute 0.02 10*3/mm3      Immature Grans, Absolute 0.05 (H) 10*3/mm3      nRBC 0.0 /100 WBC     aPTT [[id number]]  (Normal) Collected:  03/21/17 3568     Specimen:  Blood from Arm, Left Updated:  03/22/17 0350     PTT 30.5 seconds     Urinalysis With / Culture If Indicated [[id number]]  (Abnormal) Collected:  03/22/17 0147    Specimen:  Urine from Urine, Clean Catch Updated:  03/22/17 0357     Color, UA Yellow     Appearance, UA Clear     pH, UA 5.0     Specific Gravity, UA 1.018     Glucose, UA Negative     Ketones, UA Negative     Bilirubin, UA Negative     Blood, UA Negative     Protein, UA Negative     Leuk Esterase, UA Moderate (2+) (A)     Nitrite, UA Negative     Urobilinogen, UA 0.2 E.U./dL    Urinalysis, Microscopic Only [[id number]]  (Abnormal) Collected:  03/22/17 0147    Specimen:  Urine from Urine, Clean Catch Updated:  03/22/17 0357     RBC, UA 0-2 (A) /HPF      WBC, UA 6-12 (A) /HPF      Bacteria, UA Trace (A) /HPF      Squamous Epithelial Cells, UA 0-2 /HPF      Hyaline Casts, UA 7-12 /LPF      Fine Granular Casts, UA 3-6 /LPF      Methodology Automated Microscopy    Protime-INR [[id number]]  (Normal) Collected:  03/21/17 2314    Specimen:  Blood from Arm, Left Updated:  03/22/17 0444     Protime 13.9 Seconds      INR 1.04    POC Glucose Fingerstick [[id number]]  (Abnormal) Collected:  03/22/17 0755    Specimen:  Blood Updated:  03/22/17 0832     Glucose 262 (H) mg/dL       : [id number] Alpesh KimMeter ID: [id number]       Magnesium [[id number]]  (Normal) Collected:  03/22/17 0848    Specimen:  Blood Updated:  03/22/17 0935     Magnesium 2.0 mg/dL     TSH [[id number]]  (Normal) Collected:  03/23/17 0454    Specimen:  Blood Updated:  03/23/17 0611     TSH 0.949 mIU/mL     Urine Culture [[id number]]  (Normal) Collected:  03/22/17 0147    Specimen:  Urine from Urine, Clean Catch Updated:  03/23/17 0645     Urine Culture No growth at 24 hours    Hemoglobin A1c [[id number]] Collected:  03/23/17 0454    Specimen:  Blood Updated:  03/23/17 0817     Hemoglobin A1C 6.5 %          Condition on Discharge:    Stable and back to baseline    Physical Exam on  "Discharge:  /52  Pulse 90  Temp 97.1 °F (36.2 °C)  Resp 20  Ht 72\" (182.9 cm)  Wt 246 lb (112 kg)  SpO2 95%  BMI 33.36 kg/m2  Physical Exam  Constitutional: He is oriented to person, place, and time. He appears well-developed and well-nourished. No distress.   HENT:   Head: Normocephalic and atraumatic.   Right Ear: External ear normal.   Left Ear: External ear normal.   Nose: Nose normal.   Mouth/Throat: Oropharynx is clear and moist.   Eyes: Conjunctivae and EOM are normal. Pupils are equal, round, and reactive to light. No scleral icterus.   Neck: Normal range of motion. Neck supple. No JVD present. No tracheal deviation present. No thyromegaly present.   Cardiovascular: Normal rate, regular rhythm, normal heart sounds and intact distal pulses.   No murmur heard.  Pulmonary/Chest: Effort normal and breath sounds normal. No respiratory distress. He has no wheezes.   Abdominal: Soft. Bowel sounds are normal. He exhibits no distension and no mass. There is no tenderness.   Musculoskeletal: Normal range of motion. He exhibits no edema or tenderness.   Neurological: He is alert and oriented to person, place, and time. He has normal reflexes. No cranial nerve deficit. Coordination normal.   Skin: Skin is warm and dry. No rash noted.   Psychiatric: He has a normal mood and affect. His behavior is normal. Judgment and thought content normal.     Discharge Disposition:  Home or Self Care    Discharge Medications:   Darren Shay   Home Medication Instructions BUCK:[id number]    Printed on:03/23/17 1030   Medication Information                      acetaminophen-codeine (TYLENOL #3) 300-30 MG per tablet  Take 1 tablet by mouth Every 4 (Four) Hours As Needed for Moderate Pain (4-6).             aspirin 81 MG chewable tablet  Chew 81 mg Daily.             cilostazol (PLETAL) 100 MG tablet  Take 100 mg by mouth 2 (Two) Times a Day.             clopidogrel (PLAVIX) 75 MG tablet  Take 75 mg by mouth Daily.       "       folic acid (FOLVITE) 400 MCG tablet  Take 800 mcg by mouth Daily.             insulin glargine (LANTUS) 100 UNIT/ML injection  Inject  under the skin Daily.             insulin lispro (humaLOG) 100 UNIT/ML injection  Inject  under the skin 3 (Three) Times a Day Before Meals.             levoFLOXacin (LEVAQUIN) 250 MG tablet  Take 1 tablet by mouth Daily.             metoprolol tartrate (LOPRESSOR) 50 MG tablet  Take 50 mg by mouth 2 (Two) Times a Day.             penicillin v potassium (VEETID) 500 MG tablet  Take 500 mg by mouth 4 (Four) Times a Day.                 Discharge Diet:   Diet Instructions     Diet: Consistent Carbohydrate; Thin Liquids, No Restrictions       Discharge Diet:  Consistent Carbohydrate   Fluid Consistency:  Thin Liquids, No Restrictions                 Discharge Care Plan / Instructions:   Discharged to home on antibiotics    Activity at Discharge:   Activity Instructions     Activity as Tolerated                     Follow-up Appointments:  Primary care physician in one week       Abbe Castro DO  03/23/17  10:32 AM    Time: Discharge Less than 30 min    Please note that portions of this note may have been completed with a voice recognition program. Efforts were made to edit the dictations, but occasionally words are mistranscribed.

## 2017-03-24 LAB — BACTERIA SPEC AEROBE CULT: NORMAL

## 2017-06-07 ENCOUNTER — APPOINTMENT (OUTPATIENT)
Dept: GENERAL RADIOLOGY | Age: 72
DRG: 271 | End: 2017-06-07
Payer: MEDICARE

## 2017-06-07 ENCOUNTER — HOSPITAL ENCOUNTER (INPATIENT)
Age: 72
LOS: 9 days | Discharge: HOME HEALTH | DRG: 271 | End: 2017-06-16
Attending: EMERGENCY MEDICINE | Admitting: HOSPITALIST
Payer: MEDICARE

## 2017-06-07 DIAGNOSIS — I73.9 PAD (PERIPHERAL ARTERY DISEASE) (HCC): ICD-10-CM

## 2017-06-07 DIAGNOSIS — L03.119 CELLULITIS OF FOOT: ICD-10-CM

## 2017-06-07 DIAGNOSIS — I70.25 ATHEROSCLEROSIS OF NATIVE ARTERIES OF THE EXTREMITIES WITH ULCERATION (HCC): Primary | ICD-10-CM

## 2017-06-07 DIAGNOSIS — I96 GANGRENE OF TOE (HCC): ICD-10-CM

## 2017-06-07 LAB
ALBUMIN SERPL-MCNC: 3.7 G/DL (ref 3.5–5.2)
ALP BLD-CCNC: 59 U/L (ref 40–130)
ALT SERPL-CCNC: 29 U/L (ref 5–41)
ANION GAP SERPL CALCULATED.3IONS-SCNC: 16 MMOL/L (ref 7–19)
AST SERPL-CCNC: 28 U/L (ref 5–40)
BASOPHILS ABSOLUTE: 0.1 K/UL (ref 0–0.2)
BASOPHILS RELATIVE PERCENT: 0.7 % (ref 0–1)
BILIRUB SERPL-MCNC: 0.9 MG/DL (ref 0.2–1.2)
BUN BLDV-MCNC: 14 MG/DL (ref 8–23)
CALCIUM SERPL-MCNC: 9.1 MG/DL (ref 8.8–10.2)
CHLORIDE BLD-SCNC: 96 MMOL/L (ref 98–111)
CO2: 21 MMOL/L (ref 22–29)
CREAT SERPL-MCNC: 1.1 MG/DL (ref 0.5–1.2)
EOSINOPHILS ABSOLUTE: 0.1 K/UL (ref 0–0.6)
EOSINOPHILS RELATIVE PERCENT: 0.6 % (ref 0–5)
GFR NON-AFRICAN AMERICAN: >60
GLUCOSE BLD-MCNC: 162 MG/DL (ref 70–99)
GLUCOSE BLD-MCNC: 193 MG/DL (ref 74–109)
HCT VFR BLD CALC: 42.8 % (ref 42–52)
HEMOGLOBIN: 14.1 G/DL (ref 14–18)
LACTIC ACID: 2 MG/DL (ref 0.5–1.9)
LACTIC ACID: 2.4 MG/DL (ref 0.5–1.9)
LYMPHOCYTES ABSOLUTE: 1.1 K/UL (ref 1.1–4.5)
LYMPHOCYTES RELATIVE PERCENT: 10.1 % (ref 20–40)
MAGNESIUM: 2.1 MG/DL (ref 1.6–2.4)
MCH RBC QN AUTO: 32.7 PG (ref 27–31)
MCHC RBC AUTO-ENTMCNC: 32.9 G/DL (ref 33–37)
MCV RBC AUTO: 99.3 FL (ref 80–94)
MONOCYTES ABSOLUTE: 1.2 K/UL (ref 0–0.9)
MONOCYTES RELATIVE PERCENT: 11.6 % (ref 0–10)
NEUTROPHILS ABSOLUTE: 8.2 K/UL (ref 1.5–7.5)
NEUTROPHILS RELATIVE PERCENT: 76.4 % (ref 50–65)
PDW BLD-RTO: 13 % (ref 11.5–14.5)
PERFORMED ON: ABNORMAL
PLATELET # BLD: 180 K/UL (ref 130–400)
PMV BLD AUTO: 11.2 FL (ref 9.4–12.4)
POTASSIUM SERPL-SCNC: 5 MMOL/L (ref 3.5–5)
RBC # BLD: 4.31 M/UL (ref 4.7–6.1)
SODIUM BLD-SCNC: 133 MMOL/L (ref 136–145)
TOTAL PROTEIN: 7.6 G/DL (ref 6.6–8.7)
WBC # BLD: 10.7 K/UL (ref 4.8–10.8)

## 2017-06-07 PROCEDURE — 83735 ASSAY OF MAGNESIUM: CPT

## 2017-06-07 PROCEDURE — 6360000002 HC RX W HCPCS: Performed by: EMERGENCY MEDICINE

## 2017-06-07 PROCEDURE — 93926 LOWER EXTREMITY STUDY: CPT

## 2017-06-07 PROCEDURE — 1210000000 HC MED SURG R&B

## 2017-06-07 PROCEDURE — 80053 COMPREHEN METABOLIC PANEL: CPT

## 2017-06-07 PROCEDURE — 87040 BLOOD CULTURE FOR BACTERIA: CPT

## 2017-06-07 PROCEDURE — 73660 X-RAY EXAM OF TOE(S): CPT

## 2017-06-07 PROCEDURE — 85025 COMPLETE CBC W/AUTO DIFF WBC: CPT

## 2017-06-07 PROCEDURE — 82948 REAGENT STRIP/BLOOD GLUCOSE: CPT

## 2017-06-07 PROCEDURE — 99285 EMERGENCY DEPT VISIT HI MDM: CPT

## 2017-06-07 PROCEDURE — 99284 EMERGENCY DEPT VISIT MOD MDM: CPT | Performed by: EMERGENCY MEDICINE

## 2017-06-07 PROCEDURE — 36415 COLL VENOUS BLD VENIPUNCTURE: CPT

## 2017-06-07 PROCEDURE — 6370000000 HC RX 637 (ALT 250 FOR IP): Performed by: CLINICAL NURSE SPECIALIST

## 2017-06-07 PROCEDURE — 83605 ASSAY OF LACTIC ACID: CPT

## 2017-06-07 PROCEDURE — 93005 ELECTROCARDIOGRAM TRACING: CPT

## 2017-06-07 PROCEDURE — 93922 UPR/L XTREMITY ART 2 LEVELS: CPT

## 2017-06-07 RX ORDER — DEXTROSE MONOHYDRATE 50 MG/ML
100 INJECTION, SOLUTION INTRAVENOUS PRN
Status: DISCONTINUED | OUTPATIENT
Start: 2017-06-07 | End: 2017-06-16

## 2017-06-07 RX ORDER — INSULIN GLARGINE 100 [IU]/ML
80 INJECTION, SOLUTION SUBCUTANEOUS NIGHTLY
Status: DISCONTINUED | OUTPATIENT
Start: 2017-06-07 | End: 2017-06-16 | Stop reason: HOSPADM

## 2017-06-07 RX ORDER — PANTOPRAZOLE SODIUM 40 MG/10ML
40 INJECTION, POWDER, LYOPHILIZED, FOR SOLUTION INTRAVENOUS DAILY
Status: DISCONTINUED | OUTPATIENT
Start: 2017-06-08 | End: 2017-06-13

## 2017-06-07 RX ORDER — ACETAMINOPHEN 325 MG/1
650 TABLET ORAL EVERY 4 HOURS PRN
Status: DISCONTINUED | OUTPATIENT
Start: 2017-06-07 | End: 2017-06-16 | Stop reason: HOSPADM

## 2017-06-07 RX ORDER — ASPIRIN 81 MG/1
81 TABLET, CHEWABLE ORAL DAILY
Status: DISCONTINUED | OUTPATIENT
Start: 2017-06-08 | End: 2017-06-16 | Stop reason: HOSPADM

## 2017-06-07 RX ORDER — SIMVASTATIN 20 MG
20 TABLET ORAL NIGHTLY
Status: DISCONTINUED | OUTPATIENT
Start: 2017-06-07 | End: 2017-06-16 | Stop reason: HOSPADM

## 2017-06-07 RX ORDER — ONDANSETRON 2 MG/ML
4 INJECTION INTRAMUSCULAR; INTRAVENOUS EVERY 4 HOURS PRN
Status: DISCONTINUED | OUTPATIENT
Start: 2017-06-07 | End: 2017-06-16

## 2017-06-07 RX ORDER — HYDROCODONE BITARTRATE AND ACETAMINOPHEN 5; 325 MG/1; MG/1
1 TABLET ORAL EVERY 4 HOURS PRN
Status: DISCONTINUED | OUTPATIENT
Start: 2017-06-07 | End: 2017-06-16

## 2017-06-07 RX ORDER — SULFAMETHOXAZOLE AND TRIMETHOPRIM 800; 160 MG/1; MG/1
1 TABLET ORAL 2 TIMES DAILY
Status: ON HOLD | COMMUNITY
Start: 2017-06-06 | End: 2017-06-16 | Stop reason: HOSPADM

## 2017-06-07 RX ORDER — CLOPIDOGREL BISULFATE 75 MG/1
75 TABLET ORAL DAILY
Status: DISCONTINUED | OUTPATIENT
Start: 2017-06-08 | End: 2017-06-16 | Stop reason: HOSPADM

## 2017-06-07 RX ORDER — LANOLIN ALCOHOL/MO/W.PET/CERES
800 CREAM (GRAM) TOPICAL DAILY
Status: DISCONTINUED | OUTPATIENT
Start: 2017-06-08 | End: 2017-06-16 | Stop reason: HOSPADM

## 2017-06-07 RX ORDER — CILOSTAZOL 100 MG/1
100 TABLET ORAL
Status: DISCONTINUED | OUTPATIENT
Start: 2017-06-08 | End: 2017-06-16 | Stop reason: HOSPADM

## 2017-06-07 RX ORDER — NICOTINE POLACRILEX 4 MG
15 LOZENGE BUCCAL PRN
Status: DISCONTINUED | OUTPATIENT
Start: 2017-06-07 | End: 2017-06-16

## 2017-06-07 RX ORDER — METOPROLOL TARTRATE 50 MG/1
50 TABLET, FILM COATED ORAL 2 TIMES DAILY
Status: DISCONTINUED | OUTPATIENT
Start: 2017-06-07 | End: 2017-06-10

## 2017-06-07 RX ORDER — DEXTROSE MONOHYDRATE 25 G/50ML
12.5 INJECTION, SOLUTION INTRAVENOUS PRN
Status: DISCONTINUED | OUTPATIENT
Start: 2017-06-07 | End: 2017-06-16

## 2017-06-07 RX ORDER — HYDROCODONE BITARTRATE AND ACETAMINOPHEN 5; 325 MG/1; MG/1
2 TABLET ORAL EVERY 4 HOURS PRN
Status: DISCONTINUED | OUTPATIENT
Start: 2017-06-07 | End: 2017-06-16

## 2017-06-07 RX ADMIN — TAZOBACTAM SODIUM AND PIPERACILLIN SODIUM 3.38 G: 375; 3 INJECTION, SOLUTION INTRAVENOUS at 19:18

## 2017-06-07 RX ADMIN — METOPROLOL TARTRATE 50 MG: 50 TABLET, FILM COATED ORAL at 22:29

## 2017-06-07 RX ADMIN — SIMVASTATIN 20 MG: 20 TABLET, FILM COATED ORAL at 22:29

## 2017-06-07 RX ADMIN — INSULIN GLARGINE 40 UNITS: 100 INJECTION, SOLUTION SUBCUTANEOUS at 22:29

## 2017-06-07 ASSESSMENT — ENCOUNTER SYMPTOMS
EYES NEGATIVE: 1
GASTROINTESTINAL NEGATIVE: 1
RESPIRATORY NEGATIVE: 1

## 2017-06-07 ASSESSMENT — PAIN DESCRIPTION - LOCATION: LOCATION: TOE (COMMENT WHICH ONE)

## 2017-06-07 ASSESSMENT — PAIN SCALES - GENERAL: PAINLEVEL_OUTOF10: 4

## 2017-06-07 ASSESSMENT — PAIN DESCRIPTION - PAIN TYPE: TYPE: ACUTE PAIN

## 2017-06-08 ENCOUNTER — APPOINTMENT (OUTPATIENT)
Dept: GENERAL RADIOLOGY | Age: 72
DRG: 271 | End: 2017-06-08
Payer: MEDICARE

## 2017-06-08 PROBLEM — I96 GANGRENOUS TOE (HCC): Status: ACTIVE | Noted: 2017-06-08

## 2017-06-08 LAB
ANION GAP SERPL CALCULATED.3IONS-SCNC: 14 MMOL/L (ref 7–19)
APTT: 30.2 SEC (ref 26–36.2)
APTT: 48.5 SEC (ref 26–36.2)
APTT: 79.6 SEC (ref 26–36.2)
APTT: 91.5 SEC (ref 26–36.2)
BILIRUBIN URINE: NEGATIVE
BLOOD, URINE: NEGATIVE
BUN BLDV-MCNC: 14 MG/DL (ref 8–23)
CALCIUM SERPL-MCNC: 8.7 MG/DL (ref 8.8–10.2)
CHLORIDE BLD-SCNC: 96 MMOL/L (ref 98–111)
CLARITY: CLEAR
CO2: 21 MMOL/L (ref 22–29)
COLOR: YELLOW
CREAT SERPL-MCNC: 1.1 MG/DL (ref 0.5–1.2)
GFR NON-AFRICAN AMERICAN: >60
GLUCOSE BLD-MCNC: 131 MG/DL (ref 70–99)
GLUCOSE BLD-MCNC: 140 MG/DL (ref 70–99)
GLUCOSE BLD-MCNC: 166 MG/DL (ref 70–99)
GLUCOSE BLD-MCNC: 169 MG/DL (ref 74–109)
GLUCOSE BLD-MCNC: 224 MG/DL (ref 70–99)
GLUCOSE BLD-MCNC: 230 MG/DL (ref 70–99)
GLUCOSE URINE: NEGATIVE MG/DL
HBA1C MFR BLD: 6.4 %
HCT VFR BLD CALC: 40.7 % (ref 42–52)
HEMOGLOBIN: 13.6 G/DL (ref 14–18)
KETONES, URINE: NEGATIVE MG/DL
LEUKOCYTE ESTERASE, URINE: NEGATIVE
MCH RBC QN AUTO: 33.3 PG (ref 27–31)
MCHC RBC AUTO-ENTMCNC: 33.4 G/DL (ref 33–37)
MCV RBC AUTO: 99.5 FL (ref 80–94)
NITRITE, URINE: NEGATIVE
PDW BLD-RTO: 13.1 % (ref 11.5–14.5)
PERFORMED ON: ABNORMAL
PH UA: 6
PHOSPHORUS: 2.8 MG/DL (ref 2.5–4.5)
PLATELET # BLD: 179 K/UL (ref 130–400)
PMV BLD AUTO: 11.2 FL (ref 9.4–12.4)
POTASSIUM SERPL-SCNC: 4.5 MMOL/L (ref 3.5–5)
PROTEIN UA: NEGATIVE MG/DL
RBC # BLD: 4.09 M/UL (ref 4.7–6.1)
SODIUM BLD-SCNC: 131 MMOL/L (ref 136–145)
SPECIFIC GRAVITY UA: 1.02
UROBILINOGEN, URINE: 0.2 E.U./DL
WBC # BLD: 10.6 K/UL (ref 4.8–10.8)

## 2017-06-08 PROCEDURE — 83036 HEMOGLOBIN GLYCOSYLATED A1C: CPT

## 2017-06-08 PROCEDURE — 2580000003 HC RX 258: Performed by: SURGERY

## 2017-06-08 PROCEDURE — 6370000000 HC RX 637 (ALT 250 FOR IP): Performed by: NURSE PRACTITIONER

## 2017-06-08 PROCEDURE — 6370000000 HC RX 637 (ALT 250 FOR IP): Performed by: CLINICAL NURSE SPECIALIST

## 2017-06-08 PROCEDURE — 6360000002 HC RX W HCPCS: Performed by: SURGERY

## 2017-06-08 PROCEDURE — 99233 SBSQ HOSP IP/OBS HIGH 50: CPT | Performed by: HOSPITALIST

## 2017-06-08 PROCEDURE — 99222 1ST HOSP IP/OBS MODERATE 55: CPT | Performed by: SURGERY

## 2017-06-08 PROCEDURE — 82948 REAGENT STRIP/BLOOD GLUCOSE: CPT

## 2017-06-08 PROCEDURE — 80048 BASIC METABOLIC PNL TOTAL CA: CPT

## 2017-06-08 PROCEDURE — C9113 INJ PANTOPRAZOLE SODIUM, VIA: HCPCS | Performed by: CLINICAL NURSE SPECIALIST

## 2017-06-08 PROCEDURE — 1210000000 HC MED SURG R&B

## 2017-06-08 PROCEDURE — 85027 COMPLETE CBC AUTOMATED: CPT

## 2017-06-08 PROCEDURE — 85730 THROMBOPLASTIN TIME PARTIAL: CPT

## 2017-06-08 PROCEDURE — 36415 COLL VENOUS BLD VENIPUNCTURE: CPT

## 2017-06-08 PROCEDURE — 81003 URINALYSIS AUTO W/O SCOPE: CPT

## 2017-06-08 PROCEDURE — 84100 ASSAY OF PHOSPHORUS: CPT

## 2017-06-08 PROCEDURE — 71020 XR CHEST STANDARD TWO VW: CPT

## 2017-06-08 PROCEDURE — 6360000002 HC RX W HCPCS: Performed by: CLINICAL NURSE SPECIALIST

## 2017-06-08 RX ORDER — LEVOFLOXACIN 5 MG/ML
500 INJECTION, SOLUTION INTRAVENOUS EVERY 24 HOURS
Status: DISCONTINUED | OUTPATIENT
Start: 2017-06-08 | End: 2017-06-13

## 2017-06-08 RX ORDER — CHLORHEXIDINE GLUCONATE 4 G/100ML
SOLUTION TOPICAL ONCE
Status: DISCONTINUED | OUTPATIENT
Start: 2017-06-08 | End: 2017-06-08 | Stop reason: DRUGHIGH

## 2017-06-08 RX ORDER — HEPARIN SODIUM 1000 [USP'U]/ML
30 INJECTION, SOLUTION INTRAVENOUS; SUBCUTANEOUS PRN
Status: DISCONTINUED | OUTPATIENT
Start: 2017-06-08 | End: 2017-06-10

## 2017-06-08 RX ORDER — HEPARIN SODIUM 1000 [USP'U]/ML
60 INJECTION, SOLUTION INTRAVENOUS; SUBCUTANEOUS PRN
Status: DISCONTINUED | OUTPATIENT
Start: 2017-06-08 | End: 2017-06-10

## 2017-06-08 RX ORDER — HEPARIN SODIUM 10000 [USP'U]/100ML
12 INJECTION, SOLUTION INTRAVENOUS CONTINUOUS
Status: DISCONTINUED | OUTPATIENT
Start: 2017-06-08 | End: 2017-06-10

## 2017-06-08 RX ORDER — CHLORHEXIDINE GLUCONATE 4 G/100ML
SOLUTION TOPICAL DAILY PRN
Status: DISCONTINUED | OUTPATIENT
Start: 2017-06-08 | End: 2017-06-16

## 2017-06-08 RX ORDER — HEPARIN SODIUM 1000 [USP'U]/ML
60 INJECTION, SOLUTION INTRAVENOUS; SUBCUTANEOUS ONCE
Status: COMPLETED | OUTPATIENT
Start: 2017-06-08 | End: 2017-06-08

## 2017-06-08 RX ADMIN — INSULIN GLARGINE 40 UNITS: 100 INJECTION, SOLUTION SUBCUTANEOUS at 20:50

## 2017-06-08 RX ADMIN — FOLIC ACID TAB 400 MCG 800 MCG: 400 TAB at 09:00

## 2017-06-08 RX ADMIN — SIMVASTATIN 20 MG: 20 TABLET, FILM COATED ORAL at 20:50

## 2017-06-08 RX ADMIN — VANCOMYCIN HYDROCHLORIDE 1500 MG: 1 INJECTION, POWDER, LYOPHILIZED, FOR SOLUTION INTRAVENOUS at 22:12

## 2017-06-08 RX ADMIN — CILOSTAZOL 100 MG: 100 TABLET ORAL at 17:10

## 2017-06-08 RX ADMIN — INSULIN LISPRO 20 UNITS: 100 INJECTION, SOLUTION INTRAVENOUS; SUBCUTANEOUS at 17:10

## 2017-06-08 RX ADMIN — HEPARIN SODIUM AND DEXTROSE 13 UNITS/KG/HR: 10000; 5 INJECTION INTRAVENOUS at 17:12

## 2017-06-08 RX ADMIN — CLOPIDOGREL BISULFATE 75 MG: 75 TABLET ORAL at 09:00

## 2017-06-08 RX ADMIN — HEPARIN SODIUM 3000 UNITS: 1000 INJECTION, SOLUTION INTRAVENOUS; SUBCUTANEOUS at 17:14

## 2017-06-08 RX ADMIN — CILOSTAZOL 100 MG: 100 TABLET ORAL at 06:10

## 2017-06-08 RX ADMIN — METOPROLOL TARTRATE 50 MG: 50 TABLET, FILM COATED ORAL at 20:50

## 2017-06-08 RX ADMIN — HEPARIN SODIUM 5900 UNITS: 1000 INJECTION, SOLUTION INTRAVENOUS; SUBCUTANEOUS at 09:50

## 2017-06-08 RX ADMIN — INSULIN LISPRO 4 UNITS: 100 INJECTION, SOLUTION INTRAVENOUS; SUBCUTANEOUS at 17:15

## 2017-06-08 RX ADMIN — ACETAMINOPHEN 650 MG: 325 TABLET, FILM COATED ORAL at 17:47

## 2017-06-08 RX ADMIN — HEPARIN SODIUM AND DEXTROSE 12 UNITS/KG/HR: 10000; 5 INJECTION INTRAVENOUS at 09:51

## 2017-06-08 RX ADMIN — LEVOFLOXACIN 500 MG: 5 INJECTION, SOLUTION INTRAVENOUS at 09:46

## 2017-06-08 RX ADMIN — ASPIRIN 81 MG CHEWABLE TABLET 81 MG: 81 TABLET CHEWABLE at 09:00

## 2017-06-08 RX ADMIN — PANTOPRAZOLE SODIUM 40 MG: 40 INJECTION, POWDER, FOR SOLUTION INTRAVENOUS at 09:00

## 2017-06-08 RX ADMIN — VANCOMYCIN HYDROCHLORIDE 1500 MG: 1 INJECTION, POWDER, LYOPHILIZED, FOR SOLUTION INTRAVENOUS at 11:39

## 2017-06-08 RX ADMIN — METOPROLOL TARTRATE 50 MG: 50 TABLET, FILM COATED ORAL at 09:47

## 2017-06-08 ASSESSMENT — PAIN SCALES - GENERAL: PAINLEVEL_OUTOF10: 0

## 2017-06-09 ENCOUNTER — APPOINTMENT (OUTPATIENT)
Dept: INTERVENTIONAL RADIOLOGY/VASCULAR | Age: 72
DRG: 271 | End: 2017-06-09
Payer: MEDICARE

## 2017-06-09 LAB
ANION GAP SERPL CALCULATED.3IONS-SCNC: 14 MMOL/L (ref 7–19)
APTT: 70 SEC (ref 26–36.2)
BUN BLDV-MCNC: 16 MG/DL (ref 8–23)
CALCIUM SERPL-MCNC: 8.3 MG/DL (ref 8.8–10.2)
CHLORIDE BLD-SCNC: 103 MMOL/L (ref 98–111)
CO2: 18 MMOL/L (ref 22–29)
CREAT SERPL-MCNC: 1.1 MG/DL (ref 0.5–1.2)
GFR NON-AFRICAN AMERICAN: >60
GLUCOSE BLD-MCNC: 190 MG/DL (ref 70–99)
GLUCOSE BLD-MCNC: 210 MG/DL (ref 70–99)
GLUCOSE BLD-MCNC: 223 MG/DL (ref 70–99)
GLUCOSE BLD-MCNC: 226 MG/DL (ref 74–109)
HCT VFR BLD CALC: 41.8 % (ref 42–52)
HEMOGLOBIN: 13.9 G/DL (ref 14–18)
MCH RBC QN AUTO: 32.9 PG (ref 27–31)
MCHC RBC AUTO-ENTMCNC: 33.3 G/DL (ref 33–37)
MCV RBC AUTO: 99.1 FL (ref 80–94)
PDW BLD-RTO: 12.8 % (ref 11.5–14.5)
PERFORMED ON: ABNORMAL
PLATELET # BLD: 185 K/UL (ref 130–400)
PMV BLD AUTO: 11.3 FL (ref 9.4–12.4)
POTASSIUM SERPL-SCNC: 4.7 MMOL/L (ref 3.5–5)
RBC # BLD: 4.22 M/UL (ref 4.7–6.1)
SODIUM BLD-SCNC: 135 MMOL/L (ref 136–145)
VANCOMYCIN TROUGH: 12.4 UG/ML (ref 10–20)
WBC # BLD: 11.3 K/UL (ref 4.8–10.8)

## 2017-06-09 PROCEDURE — 6370000000 HC RX 637 (ALT 250 FOR IP): Performed by: CLINICAL NURSE SPECIALIST

## 2017-06-09 PROCEDURE — B41DYZZ FLUOROSCOPY OF AORTA AND BILATERAL LOWER EXTREMITY ARTERIES USING OTHER CONTRAST: ICD-10-PCS | Performed by: SURGERY

## 2017-06-09 PROCEDURE — 75716 ARTERY X-RAYS ARMS/LEGS: CPT | Performed by: SURGERY

## 2017-06-09 PROCEDURE — 6360000004 HC RX CONTRAST MEDICATION: Performed by: SURGERY

## 2017-06-09 PROCEDURE — 85027 COMPLETE CBC AUTOMATED: CPT

## 2017-06-09 PROCEDURE — 04CN3ZZ EXTIRPATION OF MATTER FROM LEFT POPLITEAL ARTERY, PERCUTANEOUS APPROACH: ICD-10-PCS | Performed by: SURGERY

## 2017-06-09 PROCEDURE — C9113 INJ PANTOPRAZOLE SODIUM, VIA: HCPCS | Performed by: CLINICAL NURSE SPECIALIST

## 2017-06-09 PROCEDURE — 2500000003 HC RX 250 WO HCPCS: Performed by: SURGERY

## 2017-06-09 PROCEDURE — 75625 CONTRAST EXAM ABDOMINL AORTA: CPT | Performed by: SURGERY

## 2017-06-09 PROCEDURE — 80048 BASIC METABOLIC PNL TOTAL CA: CPT

## 2017-06-09 PROCEDURE — 85730 THROMBOPLASTIN TIME PARTIAL: CPT

## 2017-06-09 PROCEDURE — 36415 COLL VENOUS BLD VENIPUNCTURE: CPT

## 2017-06-09 PROCEDURE — 6360000002 HC RX W HCPCS: Performed by: SURGERY

## 2017-06-09 PROCEDURE — 37229 PR REVSC OPN/PRQ TIB/PERO W/ATHRC/ANGIOP SM VSL: CPT | Performed by: SURGERY

## 2017-06-09 PROCEDURE — 04CL3ZZ EXTIRPATION OF MATTER FROM LEFT FEMORAL ARTERY, PERCUTANEOUS APPROACH: ICD-10-PCS | Performed by: SURGERY

## 2017-06-09 PROCEDURE — 047N3Z1 DILATION OF LEFT POPLITEAL ARTERY USING DRUG-COATED BALLOON, PERCUTANEOUS APPROACH: ICD-10-PCS | Performed by: SURGERY

## 2017-06-09 PROCEDURE — 1210000000 HC MED SURG R&B

## 2017-06-09 PROCEDURE — 37225 PR REVSC OPN/PRQ FEM/POP W/ATHRC/ANGIOP SM VSL: CPT | Performed by: SURGERY

## 2017-06-09 PROCEDURE — 37224 HC PLASTY UNI FEMPOP: CPT | Performed by: SURGERY

## 2017-06-09 PROCEDURE — 047L3Z1 DILATION OF LEFT FEMORAL ARTERY USING DRUG-COATED BALLOON, PERCUTANEOUS APPROACH: ICD-10-PCS | Performed by: SURGERY

## 2017-06-09 PROCEDURE — 2580000003 HC RX 258: Performed by: SURGERY

## 2017-06-09 PROCEDURE — 82948 REAGENT STRIP/BLOOD GLUCOSE: CPT

## 2017-06-09 PROCEDURE — 37228 HC PLASTY UNI TIB/PER INITIAL: CPT | Performed by: SURGERY

## 2017-06-09 PROCEDURE — 99232 SBSQ HOSP IP/OBS MODERATE 35: CPT | Performed by: NURSE PRACTITIONER

## 2017-06-09 PROCEDURE — 80202 ASSAY OF VANCOMYCIN: CPT

## 2017-06-09 PROCEDURE — 6360000002 HC RX W HCPCS: Performed by: CLINICAL NURSE SPECIALIST

## 2017-06-09 PROCEDURE — C1769 GUIDE WIRE: HCPCS

## 2017-06-09 PROCEDURE — 37225 HC ATHERECTOMY FEM/POP: CPT | Performed by: SURGERY

## 2017-06-09 PROCEDURE — 047S3ZZ DILATION OF LEFT POSTERIOR TIBIAL ARTERY, PERCUTANEOUS APPROACH: ICD-10-PCS | Performed by: SURGERY

## 2017-06-09 PROCEDURE — 047U3ZZ DILATION OF LEFT PERONEAL ARTERY, PERCUTANEOUS APPROACH: ICD-10-PCS | Performed by: SURGERY

## 2017-06-09 PROCEDURE — 37229 HC ATHERECTOMY TIB/PER: CPT | Performed by: SURGERY

## 2017-06-09 PROCEDURE — 04CS3ZZ EXTIRPATION OF MATTER FROM LEFT POSTERIOR TIBIAL ARTERY, PERCUTANEOUS APPROACH: ICD-10-PCS | Performed by: SURGERY

## 2017-06-09 PROCEDURE — 6370000000 HC RX 637 (ALT 250 FOR IP): Performed by: NURSE PRACTITIONER

## 2017-06-09 RX ORDER — FENTANYL CITRATE 50 UG/ML
INJECTION, SOLUTION INTRAMUSCULAR; INTRAVENOUS
Status: COMPLETED | OUTPATIENT
Start: 2017-06-09 | End: 2017-06-09

## 2017-06-09 RX ORDER — HYDROCODONE BITARTRATE AND ACETAMINOPHEN 5; 325 MG/1; MG/1
1 TABLET ORAL EVERY 4 HOURS PRN
Status: DISCONTINUED | OUTPATIENT
Start: 2017-06-09 | End: 2017-06-16 | Stop reason: HOSPADM

## 2017-06-09 RX ORDER — MIDAZOLAM HYDROCHLORIDE 1 MG/ML
INJECTION INTRAMUSCULAR; INTRAVENOUS
Status: COMPLETED | OUTPATIENT
Start: 2017-06-09 | End: 2017-06-09

## 2017-06-09 RX ORDER — LIDOCAINE HYDROCHLORIDE 20 MG/ML
INJECTION, SOLUTION INFILTRATION; PERINEURAL
Status: COMPLETED | OUTPATIENT
Start: 2017-06-09 | End: 2017-06-09

## 2017-06-09 RX ORDER — HEPARIN SODIUM 5000 [USP'U]/ML
INJECTION, SOLUTION INTRAVENOUS; SUBCUTANEOUS
Status: COMPLETED | OUTPATIENT
Start: 2017-06-09 | End: 2017-06-09

## 2017-06-09 RX ORDER — HYDROCODONE BITARTRATE AND ACETAMINOPHEN 5; 325 MG/1; MG/1
2 TABLET ORAL EVERY 4 HOURS PRN
Status: DISCONTINUED | OUTPATIENT
Start: 2017-06-09 | End: 2017-06-16 | Stop reason: HOSPADM

## 2017-06-09 RX ORDER — NITROGLYCERIN 20 MG/100ML
INJECTION INTRAVENOUS CONTINUOUS PRN
Status: COMPLETED | OUTPATIENT
Start: 2017-06-09 | End: 2017-06-09

## 2017-06-09 RX ORDER — IODIXANOL 320 MG/ML
INJECTION, SOLUTION INTRAVASCULAR
Status: COMPLETED | OUTPATIENT
Start: 2017-06-09 | End: 2017-06-09

## 2017-06-09 RX ORDER — SODIUM CHLORIDE 9 MG/ML
INJECTION, SOLUTION INTRAVENOUS CONTINUOUS
Status: DISCONTINUED | OUTPATIENT
Start: 2017-06-09 | End: 2017-06-16

## 2017-06-09 RX ORDER — VERAPAMIL HYDROCHLORIDE 2.5 MG/ML
INJECTION, SOLUTION INTRAVENOUS
Status: COMPLETED | OUTPATIENT
Start: 2017-06-09 | End: 2017-06-09

## 2017-06-09 RX ADMIN — SIMVASTATIN 20 MG: 20 TABLET, FILM COATED ORAL at 20:15

## 2017-06-09 RX ADMIN — INSULIN GLARGINE 80 UNITS: 100 INJECTION, SOLUTION SUBCUTANEOUS at 20:15

## 2017-06-09 RX ADMIN — MIDAZOLAM HYDROCHLORIDE 0.5 MG: 1 INJECTION, SOLUTION INTRAMUSCULAR; INTRAVENOUS at 13:50

## 2017-06-09 RX ADMIN — METOPROLOL TARTRATE 50 MG: 50 TABLET, FILM COATED ORAL at 10:15

## 2017-06-09 RX ADMIN — LEVOFLOXACIN 500 MG: 5 INJECTION, SOLUTION INTRAVENOUS at 10:16

## 2017-06-09 RX ADMIN — NITROGLYCERIN 5000 MCG: 20 INJECTION INTRAVENOUS at 13:18

## 2017-06-09 RX ADMIN — MIDAZOLAM HYDROCHLORIDE 1 MG: 1 INJECTION, SOLUTION INTRAMUSCULAR; INTRAVENOUS at 14:31

## 2017-06-09 RX ADMIN — INSULIN LISPRO 4 UNITS: 100 INJECTION, SOLUTION INTRAVENOUS; SUBCUTANEOUS at 17:40

## 2017-06-09 RX ADMIN — FENTANYL CITRATE 25 MCG: 50 INJECTION INTRAMUSCULAR; INTRAVENOUS at 13:52

## 2017-06-09 RX ADMIN — INSULIN LISPRO 20 UNITS: 100 INJECTION, SOLUTION INTRAVENOUS; SUBCUTANEOUS at 11:49

## 2017-06-09 RX ADMIN — HEPARIN SODIUM 2000 UNITS: 5000 INJECTION, SOLUTION INTRAVENOUS; SUBCUTANEOUS at 13:52

## 2017-06-09 RX ADMIN — VANCOMYCIN HYDROCHLORIDE 1500 MG: 1 INJECTION, POWDER, LYOPHILIZED, FOR SOLUTION INTRAVENOUS at 11:48

## 2017-06-09 RX ADMIN — VERAPAMIL HYDROCHLORIDE 5 MG: 2.5 INJECTION, SOLUTION INTRAVENOUS at 13:18

## 2017-06-09 RX ADMIN — IODIXANOL 165 ML: 320 INJECTION, SOLUTION INTRAVASCULAR at 14:53

## 2017-06-09 RX ADMIN — CILOSTAZOL 100 MG: 100 TABLET ORAL at 05:31

## 2017-06-09 RX ADMIN — FENTANYL CITRATE 25 MCG: 50 INJECTION INTRAMUSCULAR; INTRAVENOUS at 12:46

## 2017-06-09 RX ADMIN — CLOPIDOGREL BISULFATE 75 MG: 75 TABLET ORAL at 10:15

## 2017-06-09 RX ADMIN — VANCOMYCIN HYDROCHLORIDE 1500 MG: 1 INJECTION, POWDER, LYOPHILIZED, FOR SOLUTION INTRAVENOUS at 23:17

## 2017-06-09 RX ADMIN — FENTANYL CITRATE 25 MCG: 50 INJECTION INTRAMUSCULAR; INTRAVENOUS at 13:19

## 2017-06-09 RX ADMIN — ASPIRIN 81 MG CHEWABLE TABLET 81 MG: 81 TABLET CHEWABLE at 10:15

## 2017-06-09 RX ADMIN — PANTOPRAZOLE SODIUM 40 MG: 40 INJECTION, POWDER, FOR SOLUTION INTRAVENOUS at 10:15

## 2017-06-09 RX ADMIN — SODIUM CHLORIDE: 9 INJECTION, SOLUTION INTRAVENOUS at 17:04

## 2017-06-09 RX ADMIN — INSULIN LISPRO 20 UNITS: 100 INJECTION, SOLUTION INTRAVENOUS; SUBCUTANEOUS at 17:40

## 2017-06-09 RX ADMIN — HEPARIN SODIUM 3000 UNITS: 5000 INJECTION, SOLUTION INTRAVENOUS; SUBCUTANEOUS at 12:57

## 2017-06-09 RX ADMIN — HEPARIN SODIUM 5000 UNITS: 5000 INJECTION, SOLUTION INTRAVENOUS; SUBCUTANEOUS at 12:47

## 2017-06-09 RX ADMIN — MIDAZOLAM HYDROCHLORIDE 0.5 MG: 1 INJECTION, SOLUTION INTRAMUSCULAR; INTRAVENOUS at 13:19

## 2017-06-09 RX ADMIN — FENTANYL CITRATE 25 MCG: 50 INJECTION INTRAMUSCULAR; INTRAVENOUS at 13:50

## 2017-06-09 RX ADMIN — FENTANYL CITRATE 50 MCG: 50 INJECTION INTRAMUSCULAR; INTRAVENOUS at 14:26

## 2017-06-09 RX ADMIN — CILOSTAZOL 100 MG: 100 TABLET ORAL at 17:40

## 2017-06-09 RX ADMIN — MIDAZOLAM HYDROCHLORIDE 1 MG: 1 INJECTION, SOLUTION INTRAMUSCULAR; INTRAVENOUS at 12:46

## 2017-06-09 RX ADMIN — METOPROLOL TARTRATE 50 MG: 50 TABLET, FILM COATED ORAL at 20:15

## 2017-06-09 RX ADMIN — HEPARIN SODIUM AND DEXTROSE 12.97 UNITS/KG/HR: 10000; 5 INJECTION INTRAVENOUS at 05:31

## 2017-06-09 RX ADMIN — FENTANYL CITRATE 50 MCG: 50 INJECTION INTRAMUSCULAR; INTRAVENOUS at 14:29

## 2017-06-09 RX ADMIN — FOLIC ACID TAB 400 MCG 800 MCG: 400 TAB at 10:15

## 2017-06-09 RX ADMIN — LIDOCAINE HYDROCHLORIDE 10 ML: 20 INJECTION, SOLUTION INFILTRATION; PERINEURAL at 12:47

## 2017-06-10 LAB
ANION GAP SERPL CALCULATED.3IONS-SCNC: 13 MMOL/L (ref 7–19)
APTT: 55.1 SEC (ref 26–36.2)
APTT: 60.2 SEC (ref 26–36.2)
BUN BLDV-MCNC: 15 MG/DL (ref 8–23)
CALCIUM SERPL-MCNC: 8.2 MG/DL (ref 8.8–10.2)
CHLORIDE BLD-SCNC: 99 MMOL/L (ref 98–111)
CO2: 20 MMOL/L (ref 22–29)
CREAT SERPL-MCNC: 1.2 MG/DL (ref 0.5–1.2)
GFR NON-AFRICAN AMERICAN: 59
GLUCOSE BLD-MCNC: 147 MG/DL (ref 70–99)
GLUCOSE BLD-MCNC: 184 MG/DL (ref 70–99)
GLUCOSE BLD-MCNC: 210 MG/DL (ref 70–99)
GLUCOSE BLD-MCNC: 221 MG/DL (ref 70–99)
GLUCOSE BLD-MCNC: 312 MG/DL (ref 74–109)
PERFORMED ON: ABNORMAL
POTASSIUM SERPL-SCNC: 4.6 MMOL/L (ref 3.5–5)
SODIUM BLD-SCNC: 132 MMOL/L (ref 136–145)

## 2017-06-10 PROCEDURE — 6370000000 HC RX 637 (ALT 250 FOR IP): Performed by: HOSPITALIST

## 2017-06-10 PROCEDURE — 93922 UPR/L XTREMITY ART 2 LEVELS: CPT

## 2017-06-10 PROCEDURE — 6370000000 HC RX 637 (ALT 250 FOR IP): Performed by: NURSE PRACTITIONER

## 2017-06-10 PROCEDURE — 6370000000 HC RX 637 (ALT 250 FOR IP): Performed by: CLINICAL NURSE SPECIALIST

## 2017-06-10 PROCEDURE — 82948 REAGENT STRIP/BLOOD GLUCOSE: CPT

## 2017-06-10 PROCEDURE — 2580000003 HC RX 258: Performed by: SURGERY

## 2017-06-10 PROCEDURE — 1210000000 HC MED SURG R&B

## 2017-06-10 PROCEDURE — 6360000002 HC RX W HCPCS: Performed by: SURGERY

## 2017-06-10 PROCEDURE — 99231 SBSQ HOSP IP/OBS SF/LOW 25: CPT | Performed by: SURGERY

## 2017-06-10 PROCEDURE — 99232 SBSQ HOSP IP/OBS MODERATE 35: CPT | Performed by: HOSPITALIST

## 2017-06-10 PROCEDURE — 36415 COLL VENOUS BLD VENIPUNCTURE: CPT

## 2017-06-10 PROCEDURE — 6370000000 HC RX 637 (ALT 250 FOR IP): Performed by: SURGERY

## 2017-06-10 PROCEDURE — 6360000002 HC RX W HCPCS: Performed by: CLINICAL NURSE SPECIALIST

## 2017-06-10 PROCEDURE — 80048 BASIC METABOLIC PNL TOTAL CA: CPT

## 2017-06-10 PROCEDURE — C9113 INJ PANTOPRAZOLE SODIUM, VIA: HCPCS | Performed by: CLINICAL NURSE SPECIALIST

## 2017-06-10 PROCEDURE — 85730 THROMBOPLASTIN TIME PARTIAL: CPT

## 2017-06-10 RX ADMIN — SIMVASTATIN 20 MG: 20 TABLET, FILM COATED ORAL at 20:59

## 2017-06-10 RX ADMIN — SODIUM CHLORIDE: 9 INJECTION, SOLUTION INTRAVENOUS at 20:59

## 2017-06-10 RX ADMIN — INSULIN LISPRO 20 UNITS: 100 INJECTION, SOLUTION INTRAVENOUS; SUBCUTANEOUS at 08:30

## 2017-06-10 RX ADMIN — CLOPIDOGREL BISULFATE 75 MG: 75 TABLET ORAL at 08:40

## 2017-06-10 RX ADMIN — SODIUM CHLORIDE: 9 INJECTION, SOLUTION INTRAVENOUS at 11:30

## 2017-06-10 RX ADMIN — HEPARIN SODIUM AND DEXTROSE 12 UNITS/KG/HR: 10000; 5 INJECTION INTRAVENOUS at 05:04

## 2017-06-10 RX ADMIN — VANCOMYCIN HYDROCHLORIDE 1500 MG: 1 INJECTION, POWDER, LYOPHILIZED, FOR SOLUTION INTRAVENOUS at 14:30

## 2017-06-10 RX ADMIN — FOLIC ACID TAB 400 MCG 800 MCG: 400 TAB at 08:39

## 2017-06-10 RX ADMIN — METOPROLOL TARTRATE 75 MG: 50 TABLET ORAL at 20:59

## 2017-06-10 RX ADMIN — ASPIRIN 81 MG CHEWABLE TABLET 81 MG: 81 TABLET CHEWABLE at 08:39

## 2017-06-10 RX ADMIN — CILOSTAZOL 100 MG: 100 TABLET ORAL at 05:52

## 2017-06-10 RX ADMIN — INSULIN GLARGINE 80 UNITS: 100 INJECTION, SOLUTION SUBCUTANEOUS at 20:59

## 2017-06-10 RX ADMIN — INSULIN LISPRO 4 UNITS: 100 INJECTION, SOLUTION INTRAVENOUS; SUBCUTANEOUS at 12:30

## 2017-06-10 RX ADMIN — INSULIN LISPRO 20 UNITS: 100 INJECTION, SOLUTION INTRAVENOUS; SUBCUTANEOUS at 17:30

## 2017-06-10 RX ADMIN — INSULIN LISPRO 2 UNITS: 100 INJECTION, SOLUTION INTRAVENOUS; SUBCUTANEOUS at 17:30

## 2017-06-10 RX ADMIN — METOPROLOL TARTRATE 75 MG: 50 TABLET ORAL at 08:52

## 2017-06-10 RX ADMIN — LEVOFLOXACIN 500 MG: 5 INJECTION, SOLUTION INTRAVENOUS at 09:39

## 2017-06-10 RX ADMIN — INSULIN LISPRO 20 UNITS: 100 INJECTION, SOLUTION INTRAVENOUS; SUBCUTANEOUS at 12:30

## 2017-06-10 RX ADMIN — ENOXAPARIN SODIUM 40 MG: 40 INJECTION SUBCUTANEOUS at 08:40

## 2017-06-10 RX ADMIN — PANTOPRAZOLE SODIUM 40 MG: 40 INJECTION, POWDER, FOR SOLUTION INTRAVENOUS at 08:39

## 2017-06-10 RX ADMIN — INSULIN LISPRO 4 UNITS: 100 INJECTION, SOLUTION INTRAVENOUS; SUBCUTANEOUS at 08:30

## 2017-06-10 RX ADMIN — HYDROCODONE BITARTRATE AND ACETAMINOPHEN 2 TABLET: 5; 325 TABLET ORAL at 21:06

## 2017-06-10 RX ADMIN — CILOSTAZOL 100 MG: 100 TABLET ORAL at 16:25

## 2017-06-10 ASSESSMENT — PAIN SCALES - GENERAL
PAINLEVEL_OUTOF10: 9
PAINLEVEL_OUTOF10: 1

## 2017-06-11 LAB
ANION GAP SERPL CALCULATED.3IONS-SCNC: 14 MMOL/L (ref 7–19)
BUN BLDV-MCNC: 14 MG/DL (ref 8–23)
CALCIUM SERPL-MCNC: 8.1 MG/DL (ref 8.8–10.2)
CHLORIDE BLD-SCNC: 101 MMOL/L (ref 98–111)
CO2: 18 MMOL/L (ref 22–29)
CREAT SERPL-MCNC: 1.2 MG/DL (ref 0.5–1.2)
GFR NON-AFRICAN AMERICAN: 59
GLUCOSE BLD-MCNC: 204 MG/DL (ref 70–99)
GLUCOSE BLD-MCNC: 242 MG/DL (ref 74–109)
GLUCOSE BLD-MCNC: 256 MG/DL (ref 70–99)
GLUCOSE BLD-MCNC: 270 MG/DL (ref 70–99)
GLUCOSE BLD-MCNC: 281 MG/DL (ref 70–99)
PERFORMED ON: ABNORMAL
POTASSIUM SERPL-SCNC: 4.3 MMOL/L (ref 3.5–5)
SODIUM BLD-SCNC: 133 MMOL/L (ref 136–145)

## 2017-06-11 PROCEDURE — 36415 COLL VENOUS BLD VENIPUNCTURE: CPT

## 2017-06-11 PROCEDURE — 6360000002 HC RX W HCPCS: Performed by: SURGERY

## 2017-06-11 PROCEDURE — 6360000002 HC RX W HCPCS: Performed by: CLINICAL NURSE SPECIALIST

## 2017-06-11 PROCEDURE — 80048 BASIC METABOLIC PNL TOTAL CA: CPT

## 2017-06-11 PROCEDURE — 99232 SBSQ HOSP IP/OBS MODERATE 35: CPT | Performed by: HOSPITALIST

## 2017-06-11 PROCEDURE — 1210000000 HC MED SURG R&B

## 2017-06-11 PROCEDURE — 6370000000 HC RX 637 (ALT 250 FOR IP): Performed by: HOSPITALIST

## 2017-06-11 PROCEDURE — 82948 REAGENT STRIP/BLOOD GLUCOSE: CPT

## 2017-06-11 PROCEDURE — C9113 INJ PANTOPRAZOLE SODIUM, VIA: HCPCS | Performed by: CLINICAL NURSE SPECIALIST

## 2017-06-11 PROCEDURE — 2580000003 HC RX 258: Performed by: SURGERY

## 2017-06-11 PROCEDURE — 6370000000 HC RX 637 (ALT 250 FOR IP): Performed by: CLINICAL NURSE SPECIALIST

## 2017-06-11 PROCEDURE — 6370000000 HC RX 637 (ALT 250 FOR IP): Performed by: NURSE PRACTITIONER

## 2017-06-11 RX ADMIN — ENOXAPARIN SODIUM 40 MG: 40 INJECTION SUBCUTANEOUS at 08:30

## 2017-06-11 RX ADMIN — INSULIN GLARGINE 80 UNITS: 100 INJECTION, SOLUTION SUBCUTANEOUS at 21:47

## 2017-06-11 RX ADMIN — INSULIN LISPRO 4 UNITS: 100 INJECTION, SOLUTION INTRAVENOUS; SUBCUTANEOUS at 08:16

## 2017-06-11 RX ADMIN — PANTOPRAZOLE SODIUM 40 MG: 40 INJECTION, POWDER, FOR SOLUTION INTRAVENOUS at 08:14

## 2017-06-11 RX ADMIN — INSULIN LISPRO 6 UNITS: 100 INJECTION, SOLUTION INTRAVENOUS; SUBCUTANEOUS at 17:35

## 2017-06-11 RX ADMIN — CLOPIDOGREL BISULFATE 75 MG: 75 TABLET ORAL at 08:15

## 2017-06-11 RX ADMIN — FOLIC ACID TAB 400 MCG 800 MCG: 400 TAB at 08:14

## 2017-06-11 RX ADMIN — METOPROLOL TARTRATE 75 MG: 50 TABLET ORAL at 21:46

## 2017-06-11 RX ADMIN — INSULIN LISPRO 20 UNITS: 100 INJECTION, SOLUTION INTRAVENOUS; SUBCUTANEOUS at 08:24

## 2017-06-11 RX ADMIN — CILOSTAZOL 100 MG: 100 TABLET ORAL at 15:53

## 2017-06-11 RX ADMIN — INSULIN LISPRO 3 UNITS: 100 INJECTION, SOLUTION INTRAVENOUS; SUBCUTANEOUS at 21:47

## 2017-06-11 RX ADMIN — METOPROLOL TARTRATE 75 MG: 50 TABLET ORAL at 08:15

## 2017-06-11 RX ADMIN — VANCOMYCIN HYDROCHLORIDE 1500 MG: 1 INJECTION, POWDER, LYOPHILIZED, FOR SOLUTION INTRAVENOUS at 13:50

## 2017-06-11 RX ADMIN — CILOSTAZOL 100 MG: 100 TABLET ORAL at 05:35

## 2017-06-11 RX ADMIN — INSULIN LISPRO 20 UNITS: 100 INJECTION, SOLUTION INTRAVENOUS; SUBCUTANEOUS at 12:30

## 2017-06-11 RX ADMIN — LEVOFLOXACIN 500 MG: 5 INJECTION, SOLUTION INTRAVENOUS at 09:44

## 2017-06-11 RX ADMIN — INSULIN LISPRO 20 UNITS: 100 INJECTION, SOLUTION INTRAVENOUS; SUBCUTANEOUS at 17:35

## 2017-06-11 RX ADMIN — ASPIRIN 81 MG CHEWABLE TABLET 81 MG: 81 TABLET CHEWABLE at 08:15

## 2017-06-11 RX ADMIN — SIMVASTATIN 20 MG: 20 TABLET, FILM COATED ORAL at 21:46

## 2017-06-11 RX ADMIN — HYDROCODONE BITARTRATE AND ACETAMINOPHEN 2 TABLET: 5; 325 TABLET ORAL at 21:45

## 2017-06-11 RX ADMIN — INSULIN LISPRO 6 UNITS: 100 INJECTION, SOLUTION INTRAVENOUS; SUBCUTANEOUS at 12:30

## 2017-06-11 RX ADMIN — VANCOMYCIN HYDROCHLORIDE 1500 MG: 1 INJECTION, POWDER, LYOPHILIZED, FOR SOLUTION INTRAVENOUS at 02:00

## 2017-06-11 ASSESSMENT — PAIN SCALES - GENERAL: PAINLEVEL_OUTOF10: 8

## 2017-06-12 ENCOUNTER — ANESTHESIA EVENT (OUTPATIENT)
Dept: OPERATING ROOM | Age: 72
DRG: 271 | End: 2017-06-12
Payer: MEDICARE

## 2017-06-12 ENCOUNTER — ANESTHESIA (OUTPATIENT)
Dept: OPERATING ROOM | Age: 72
DRG: 271 | End: 2017-06-12
Payer: MEDICARE

## 2017-06-12 VITALS
DIASTOLIC BLOOD PRESSURE: 36 MMHG | SYSTOLIC BLOOD PRESSURE: 88 MMHG | OXYGEN SATURATION: 99 % | RESPIRATION RATE: 7 BRPM

## 2017-06-12 LAB
ANION GAP SERPL CALCULATED.3IONS-SCNC: 13 MMOL/L (ref 7–19)
BLOOD CULTURE, ROUTINE: NORMAL
BUN BLDV-MCNC: 13 MG/DL (ref 8–23)
CALCIUM SERPL-MCNC: 8.3 MG/DL (ref 8.8–10.2)
CHLORIDE BLD-SCNC: 100 MMOL/L (ref 98–111)
CO2: 20 MMOL/L (ref 22–29)
CREAT SERPL-MCNC: 1.2 MG/DL (ref 0.5–1.2)
CULTURE, BLOOD 2: NORMAL
GFR NON-AFRICAN AMERICAN: 59
GLUCOSE BLD-MCNC: 202 MG/DL (ref 70–99)
GLUCOSE BLD-MCNC: 263 MG/DL (ref 74–109)
GLUCOSE BLD-MCNC: 268 MG/DL (ref 70–99)
GLUCOSE BLD-MCNC: 276 MG/DL (ref 70–99)
PERFORMED ON: ABNORMAL
POTASSIUM SERPL-SCNC: 4.1 MMOL/L (ref 3.5–5)
SODIUM BLD-SCNC: 133 MMOL/L (ref 136–145)

## 2017-06-12 PROCEDURE — 6360000002 HC RX W HCPCS: Performed by: NURSE ANESTHETIST, CERTIFIED REGISTERED

## 2017-06-12 PROCEDURE — 36415 COLL VENOUS BLD VENIPUNCTURE: CPT

## 2017-06-12 PROCEDURE — 2580000003 HC RX 258: Performed by: ANESTHESIOLOGY

## 2017-06-12 PROCEDURE — 7100000000 HC PACU RECOVERY - FIRST 15 MIN: Performed by: SURGERY

## 2017-06-12 PROCEDURE — 6360000002 HC RX W HCPCS: Performed by: SURGERY

## 2017-06-12 PROCEDURE — 7100000001 HC PACU RECOVERY - ADDTL 15 MIN: Performed by: SURGERY

## 2017-06-12 PROCEDURE — 99232 SBSQ HOSP IP/OBS MODERATE 35: CPT | Performed by: HOSPITALIST

## 2017-06-12 PROCEDURE — 6360000002 HC RX W HCPCS: Performed by: CLINICAL NURSE SPECIALIST

## 2017-06-12 PROCEDURE — 82948 REAGENT STRIP/BLOOD GLUCOSE: CPT

## 2017-06-12 PROCEDURE — 28810 AMPUTATION TOE & METATARSAL: CPT | Performed by: SURGERY

## 2017-06-12 PROCEDURE — 3700000001 HC ADD 15 MINUTES (ANESTHESIA): Performed by: SURGERY

## 2017-06-12 PROCEDURE — 87205 SMEAR GRAM STAIN: CPT

## 2017-06-12 PROCEDURE — 6360000002 HC RX W HCPCS: Performed by: ANESTHESIOLOGY

## 2017-06-12 PROCEDURE — 88305 TISSUE EXAM BY PATHOLOGIST: CPT

## 2017-06-12 PROCEDURE — 3700000000 HC ANESTHESIA ATTENDED CARE: Performed by: SURGERY

## 2017-06-12 PROCEDURE — 2580000003 HC RX 258: Performed by: SURGERY

## 2017-06-12 PROCEDURE — 80048 BASIC METABOLIC PNL TOTAL CA: CPT

## 2017-06-12 PROCEDURE — 0Y6Q0Z0 DETACHMENT AT LEFT 1ST TOE, COMPLETE, OPEN APPROACH: ICD-10-PCS | Performed by: SURGERY

## 2017-06-12 PROCEDURE — 6370000000 HC RX 637 (ALT 250 FOR IP): Performed by: SURGERY

## 2017-06-12 PROCEDURE — 2500000003 HC RX 250 WO HCPCS: Performed by: NURSE ANESTHETIST, CERTIFIED REGISTERED

## 2017-06-12 PROCEDURE — 6370000000 HC RX 637 (ALT 250 FOR IP): Performed by: CLINICAL NURSE SPECIALIST

## 2017-06-12 PROCEDURE — 6370000000 HC RX 637 (ALT 250 FOR IP): Performed by: HOSPITALIST

## 2017-06-12 PROCEDURE — C9113 INJ PANTOPRAZOLE SODIUM, VIA: HCPCS | Performed by: CLINICAL NURSE SPECIALIST

## 2017-06-12 PROCEDURE — 2720000001 HC MISC SURG SUPPLY STERILE $51-500: Performed by: SURGERY

## 2017-06-12 PROCEDURE — 3600000002 HC SURGERY LEVEL 2 BASE: Performed by: SURGERY

## 2017-06-12 PROCEDURE — 1210000000 HC MED SURG R&B

## 2017-06-12 PROCEDURE — 3600000012 HC SURGERY LEVEL 2 ADDTL 15MIN: Performed by: SURGERY

## 2017-06-12 PROCEDURE — 87070 CULTURE OTHR SPECIMN AEROBIC: CPT

## 2017-06-12 RX ORDER — LIDOCAINE HYDROCHLORIDE 10 MG/ML
1 INJECTION, SOLUTION EPIDURAL; INFILTRATION; INTRACAUDAL; PERINEURAL
Status: DISCONTINUED | OUTPATIENT
Start: 2017-06-12 | End: 2017-06-12 | Stop reason: HOSPADM

## 2017-06-12 RX ORDER — ONDANSETRON 2 MG/ML
4 INJECTION INTRAMUSCULAR; INTRAVENOUS
Status: DISCONTINUED | OUTPATIENT
Start: 2017-06-12 | End: 2017-06-12 | Stop reason: HOSPADM

## 2017-06-12 RX ORDER — SODIUM CHLORIDE 0.9 % (FLUSH) 0.9 %
10 SYRINGE (ML) INJECTION EVERY 12 HOURS SCHEDULED
Status: DISCONTINUED | OUTPATIENT
Start: 2017-06-12 | End: 2017-06-12 | Stop reason: HOSPADM

## 2017-06-12 RX ORDER — PROMETHAZINE HYDROCHLORIDE 25 MG/ML
6.25 INJECTION, SOLUTION INTRAMUSCULAR; INTRAVENOUS
Status: DISCONTINUED | OUTPATIENT
Start: 2017-06-12 | End: 2017-06-12 | Stop reason: HOSPADM

## 2017-06-12 RX ORDER — MORPHINE SULFATE 4 MG/ML
2 INJECTION, SOLUTION INTRAMUSCULAR; INTRAVENOUS EVERY 5 MIN PRN
Status: DISCONTINUED | OUTPATIENT
Start: 2017-06-12 | End: 2017-06-12 | Stop reason: HOSPADM

## 2017-06-12 RX ORDER — HYDRALAZINE HYDROCHLORIDE 20 MG/ML
5 INJECTION INTRAMUSCULAR; INTRAVENOUS EVERY 10 MIN PRN
Status: DISCONTINUED | OUTPATIENT
Start: 2017-06-12 | End: 2017-06-12 | Stop reason: HOSPADM

## 2017-06-12 RX ORDER — LABETALOL HYDROCHLORIDE 5 MG/ML
5 INJECTION, SOLUTION INTRAVENOUS EVERY 10 MIN PRN
Status: DISCONTINUED | OUTPATIENT
Start: 2017-06-12 | End: 2017-06-12 | Stop reason: HOSPADM

## 2017-06-12 RX ORDER — SODIUM CHLORIDE 0.9 % (FLUSH) 0.9 %
10 SYRINGE (ML) INJECTION PRN
Status: DISCONTINUED | OUTPATIENT
Start: 2017-06-12 | End: 2017-06-12 | Stop reason: HOSPADM

## 2017-06-12 RX ORDER — DIPHENHYDRAMINE HYDROCHLORIDE 50 MG/ML
12.5 INJECTION INTRAMUSCULAR; INTRAVENOUS
Status: DISCONTINUED | OUTPATIENT
Start: 2017-06-12 | End: 2017-06-12 | Stop reason: HOSPADM

## 2017-06-12 RX ORDER — SODIUM HYPOCHLORITE 1.25 MG/ML
SOLUTION TOPICAL DAILY
Status: DISCONTINUED | OUTPATIENT
Start: 2017-06-12 | End: 2017-06-16 | Stop reason: HOSPADM

## 2017-06-12 RX ORDER — PROPOFOL 10 MG/ML
INJECTION, EMULSION INTRAVENOUS PRN
Status: DISCONTINUED | OUTPATIENT
Start: 2017-06-12 | End: 2017-06-12 | Stop reason: SDUPTHER

## 2017-06-12 RX ORDER — FENTANYL CITRATE 50 UG/ML
INJECTION, SOLUTION INTRAMUSCULAR; INTRAVENOUS PRN
Status: DISCONTINUED | OUTPATIENT
Start: 2017-06-12 | End: 2017-06-12 | Stop reason: SDUPTHER

## 2017-06-12 RX ORDER — MEPERIDINE HYDROCHLORIDE 50 MG/ML
12.5 INJECTION INTRAMUSCULAR; INTRAVENOUS; SUBCUTANEOUS EVERY 5 MIN PRN
Status: DISCONTINUED | OUTPATIENT
Start: 2017-06-12 | End: 2017-06-12 | Stop reason: HOSPADM

## 2017-06-12 RX ORDER — ONDANSETRON 2 MG/ML
INJECTION INTRAMUSCULAR; INTRAVENOUS PRN
Status: DISCONTINUED | OUTPATIENT
Start: 2017-06-12 | End: 2017-06-12 | Stop reason: SDUPTHER

## 2017-06-12 RX ORDER — MIDAZOLAM HYDROCHLORIDE 1 MG/ML
2 INJECTION INTRAMUSCULAR; INTRAVENOUS
Status: COMPLETED | OUTPATIENT
Start: 2017-06-12 | End: 2017-06-12

## 2017-06-12 RX ORDER — MORPHINE SULFATE 4 MG/ML
1 INJECTION, SOLUTION INTRAMUSCULAR; INTRAVENOUS EVERY 5 MIN PRN
Status: DISCONTINUED | OUTPATIENT
Start: 2017-06-12 | End: 2017-06-12 | Stop reason: HOSPADM

## 2017-06-12 RX ORDER — LIDOCAINE HYDROCHLORIDE 10 MG/ML
INJECTION, SOLUTION EPIDURAL; INFILTRATION; INTRACAUDAL; PERINEURAL PRN
Status: DISCONTINUED | OUTPATIENT
Start: 2017-06-12 | End: 2017-06-12 | Stop reason: SDUPTHER

## 2017-06-12 RX ORDER — FENTANYL CITRATE 50 UG/ML
25 INJECTION, SOLUTION INTRAMUSCULAR; INTRAVENOUS
Status: DISCONTINUED | OUTPATIENT
Start: 2017-06-12 | End: 2017-06-12 | Stop reason: HOSPADM

## 2017-06-12 RX ORDER — SODIUM CHLORIDE, SODIUM LACTATE, POTASSIUM CHLORIDE, CALCIUM CHLORIDE 600; 310; 30; 20 MG/100ML; MG/100ML; MG/100ML; MG/100ML
INJECTION, SOLUTION INTRAVENOUS CONTINUOUS
Status: DISCONTINUED | OUTPATIENT
Start: 2017-06-12 | End: 2017-06-12

## 2017-06-12 RX ORDER — ENALAPRILAT 2.5 MG/2ML
1.25 INJECTION INTRAVENOUS
Status: DISCONTINUED | OUTPATIENT
Start: 2017-06-12 | End: 2017-06-12 | Stop reason: HOSPADM

## 2017-06-12 RX ORDER — FENTANYL CITRATE 50 UG/ML
50 INJECTION, SOLUTION INTRAMUSCULAR; INTRAVENOUS
Status: DISCONTINUED | OUTPATIENT
Start: 2017-06-12 | End: 2017-06-12 | Stop reason: HOSPADM

## 2017-06-12 RX ADMIN — LEVOFLOXACIN 500 MG: 5 INJECTION, SOLUTION INTRAVENOUS at 09:24

## 2017-06-12 RX ADMIN — FOLIC ACID TAB 400 MCG 800 MCG: 400 TAB at 09:10

## 2017-06-12 RX ADMIN — ASPIRIN 81 MG CHEWABLE TABLET 81 MG: 81 TABLET CHEWABLE at 09:10

## 2017-06-12 RX ADMIN — PANTOPRAZOLE SODIUM 40 MG: 40 INJECTION, POWDER, FOR SOLUTION INTRAVENOUS at 09:10

## 2017-06-12 RX ADMIN — METOPROLOL TARTRATE 75 MG: 50 TABLET ORAL at 09:10

## 2017-06-12 RX ADMIN — ONDANSETRON HYDROCHLORIDE 4 MG: 2 INJECTION, SOLUTION INTRAVENOUS at 15:00

## 2017-06-12 RX ADMIN — SODIUM CHLORIDE: 9 INJECTION, SOLUTION INTRAVENOUS at 01:18

## 2017-06-12 RX ADMIN — SIMVASTATIN 20 MG: 20 TABLET, FILM COATED ORAL at 21:56

## 2017-06-12 RX ADMIN — SODIUM CHLORIDE, SODIUM LACTATE, POTASSIUM CHLORIDE, AND CALCIUM CHLORIDE: 600; 310; 30; 20 INJECTION, SOLUTION INTRAVENOUS at 14:48

## 2017-06-12 RX ADMIN — VANCOMYCIN HYDROCHLORIDE 1500 MG: 1 INJECTION, POWDER, LYOPHILIZED, FOR SOLUTION INTRAVENOUS at 14:08

## 2017-06-12 RX ADMIN — INSULIN LISPRO 4 UNITS: 100 INJECTION, SOLUTION INTRAVENOUS; SUBCUTANEOUS at 09:11

## 2017-06-12 RX ADMIN — MIDAZOLAM HYDROCHLORIDE 2 MG: 1 INJECTION, SOLUTION INTRAMUSCULAR; INTRAVENOUS at 14:48

## 2017-06-12 RX ADMIN — FENTANYL CITRATE 50 MCG: 50 INJECTION INTRAMUSCULAR; INTRAVENOUS at 14:53

## 2017-06-12 RX ADMIN — PROPOFOL 200 MG: 10 INJECTION, EMULSION INTRAVENOUS at 14:53

## 2017-06-12 RX ADMIN — LIDOCAINE HYDROCHLORIDE 5 ML: 10 INJECTION, SOLUTION EPIDURAL; INFILTRATION; INTRACAUDAL; PERINEURAL at 14:53

## 2017-06-12 RX ADMIN — CILOSTAZOL 100 MG: 100 TABLET ORAL at 06:11

## 2017-06-12 RX ADMIN — INSULIN LISPRO 3 UNITS: 100 INJECTION, SOLUTION INTRAVENOUS; SUBCUTANEOUS at 21:59

## 2017-06-12 RX ADMIN — INSULIN GLARGINE 80 UNITS: 100 INJECTION, SOLUTION SUBCUTANEOUS at 22:01

## 2017-06-12 RX ADMIN — CLOPIDOGREL BISULFATE 75 MG: 75 TABLET ORAL at 09:10

## 2017-06-12 RX ADMIN — FENTANYL CITRATE 50 MCG: 50 INJECTION INTRAMUSCULAR; INTRAVENOUS at 15:15

## 2017-06-12 RX ADMIN — VANCOMYCIN HYDROCHLORIDE 1500 MG: 1 INJECTION, POWDER, LYOPHILIZED, FOR SOLUTION INTRAVENOUS at 15:08

## 2017-06-12 RX ADMIN — METOPROLOL TARTRATE 75 MG: 50 TABLET ORAL at 21:56

## 2017-06-12 RX ADMIN — HYDROCODONE BITARTRATE AND ACETAMINOPHEN 2 TABLET: 5; 325 TABLET ORAL at 21:56

## 2017-06-12 RX ADMIN — VANCOMYCIN HYDROCHLORIDE 1500 MG: 1 INJECTION, POWDER, LYOPHILIZED, FOR SOLUTION INTRAVENOUS at 01:55

## 2017-06-12 ASSESSMENT — PAIN SCALES - GENERAL
PAINLEVEL_OUTOF10: 0
PAINLEVEL_OUTOF10: 0
PAINLEVEL_OUTOF10: 2
PAINLEVEL_OUTOF10: 0

## 2017-06-13 LAB
ANION GAP SERPL CALCULATED.3IONS-SCNC: 13 MMOL/L (ref 7–19)
BUN BLDV-MCNC: 17 MG/DL (ref 8–23)
CALCIUM SERPL-MCNC: 8.4 MG/DL (ref 8.8–10.2)
CHLORIDE BLD-SCNC: 100 MMOL/L (ref 98–111)
CO2: 22 MMOL/L (ref 22–29)
CREAT SERPL-MCNC: 1.8 MG/DL (ref 0.5–1.2)
GFR NON-AFRICAN AMERICAN: 37
GLUCOSE BLD-MCNC: 198 MG/DL (ref 70–99)
GLUCOSE BLD-MCNC: 205 MG/DL (ref 70–99)
GLUCOSE BLD-MCNC: 225 MG/DL (ref 70–99)
GLUCOSE BLD-MCNC: 263 MG/DL (ref 74–109)
GLUCOSE BLD-MCNC: 279 MG/DL (ref 70–99)
PERFORMED ON: ABNORMAL
POTASSIUM SERPL-SCNC: 4.3 MMOL/L (ref 3.5–5)
SODIUM BLD-SCNC: 135 MMOL/L (ref 136–145)
VANCOMYCIN TROUGH: 26.9 UG/ML (ref 10–20)

## 2017-06-13 PROCEDURE — 2580000003 HC RX 258: Performed by: SURGERY

## 2017-06-13 PROCEDURE — 99024 POSTOP FOLLOW-UP VISIT: CPT | Performed by: SURGERY

## 2017-06-13 PROCEDURE — 80202 ASSAY OF VANCOMYCIN: CPT

## 2017-06-13 PROCEDURE — 6360000002 HC RX W HCPCS: Performed by: SURGERY

## 2017-06-13 PROCEDURE — 82948 REAGENT STRIP/BLOOD GLUCOSE: CPT

## 2017-06-13 PROCEDURE — 80048 BASIC METABOLIC PNL TOTAL CA: CPT

## 2017-06-13 PROCEDURE — 6370000000 HC RX 637 (ALT 250 FOR IP): Performed by: HOSPITALIST

## 2017-06-13 PROCEDURE — 99232 SBSQ HOSP IP/OBS MODERATE 35: CPT | Performed by: HOSPITALIST

## 2017-06-13 PROCEDURE — 36415 COLL VENOUS BLD VENIPUNCTURE: CPT

## 2017-06-13 PROCEDURE — 1210000000 HC MED SURG R&B

## 2017-06-13 PROCEDURE — 6370000000 HC RX 637 (ALT 250 FOR IP): Performed by: CLINICAL NURSE SPECIALIST

## 2017-06-13 PROCEDURE — 6370000000 HC RX 637 (ALT 250 FOR IP): Performed by: SURGERY

## 2017-06-13 RX ORDER — LEVOFLOXACIN 5 MG/ML
250 INJECTION, SOLUTION INTRAVENOUS EVERY 24 HOURS
Status: DISCONTINUED | OUTPATIENT
Start: 2017-06-14 | End: 2017-06-16

## 2017-06-13 RX ORDER — PANTOPRAZOLE SODIUM 40 MG/1
40 TABLET, DELAYED RELEASE ORAL
Status: DISCONTINUED | OUTPATIENT
Start: 2017-06-13 | End: 2017-06-16 | Stop reason: HOSPADM

## 2017-06-13 RX ORDER — PROPOFOL 10 MG/ML
INJECTION, EMULSION INTRAVENOUS PRN
Status: DISCONTINUED | OUTPATIENT
Start: 2017-06-12 | End: 2017-06-13 | Stop reason: SDUPTHER

## 2017-06-13 RX ADMIN — CLOPIDOGREL BISULFATE 75 MG: 75 TABLET ORAL at 09:27

## 2017-06-13 RX ADMIN — INSULIN LISPRO 4 UNITS: 100 INJECTION, SOLUTION INTRAVENOUS; SUBCUTANEOUS at 17:07

## 2017-06-13 RX ADMIN — FOLIC ACID TAB 400 MCG 800 MCG: 400 TAB at 09:40

## 2017-06-13 RX ADMIN — ASPIRIN 81 MG CHEWABLE TABLET 81 MG: 81 TABLET CHEWABLE at 09:27

## 2017-06-13 RX ADMIN — INSULIN GLARGINE 80 UNITS: 100 INJECTION, SOLUTION SUBCUTANEOUS at 21:42

## 2017-06-13 RX ADMIN — SODIUM CHLORIDE: 9 INJECTION, SOLUTION INTRAVENOUS at 01:57

## 2017-06-13 RX ADMIN — SIMVASTATIN 20 MG: 20 TABLET, FILM COATED ORAL at 21:26

## 2017-06-13 RX ADMIN — INSULIN LISPRO 22 UNITS: 100 INJECTION, SOLUTION INTRAVENOUS; SUBCUTANEOUS at 17:06

## 2017-06-13 RX ADMIN — INSULIN LISPRO 22 UNITS: 100 INJECTION, SOLUTION INTRAVENOUS; SUBCUTANEOUS at 12:28

## 2017-06-13 RX ADMIN — VANCOMYCIN HYDROCHLORIDE 1500 MG: 1 INJECTION, POWDER, LYOPHILIZED, FOR SOLUTION INTRAVENOUS at 01:57

## 2017-06-13 RX ADMIN — HYDROCODONE BITARTRATE AND ACETAMINOPHEN 2 TABLET: 5; 325 TABLET ORAL at 21:26

## 2017-06-13 RX ADMIN — ENOXAPARIN SODIUM 40 MG: 40 INJECTION SUBCUTANEOUS at 09:27

## 2017-06-13 RX ADMIN — PANTOPRAZOLE SODIUM 40 MG: 40 TABLET, DELAYED RELEASE ORAL at 06:02

## 2017-06-13 RX ADMIN — METOPROLOL TARTRATE 75 MG: 50 TABLET ORAL at 21:40

## 2017-06-13 RX ADMIN — CILOSTAZOL 100 MG: 100 TABLET ORAL at 17:06

## 2017-06-13 RX ADMIN — CILOSTAZOL 100 MG: 100 TABLET ORAL at 06:02

## 2017-06-13 RX ADMIN — INSULIN LISPRO 22 UNITS: 100 INJECTION, SOLUTION INTRAVENOUS; SUBCUTANEOUS at 07:10

## 2017-06-13 RX ADMIN — LEVOFLOXACIN 500 MG: 5 INJECTION, SOLUTION INTRAVENOUS at 09:27

## 2017-06-13 RX ADMIN — HYDROCODONE BITARTRATE AND ACETAMINOPHEN 2 TABLET: 5; 325 TABLET ORAL at 01:59

## 2017-06-13 RX ADMIN — INSULIN LISPRO 6 UNITS: 100 INJECTION, SOLUTION INTRAVENOUS; SUBCUTANEOUS at 12:29

## 2017-06-13 ASSESSMENT — PAIN SCALES - GENERAL
PAINLEVEL_OUTOF10: 2
PAINLEVEL_OUTOF10: 2

## 2017-06-14 LAB
ANION GAP SERPL CALCULATED.3IONS-SCNC: 15 MMOL/L (ref 7–19)
BUN BLDV-MCNC: 19 MG/DL (ref 8–23)
CALCIUM SERPL-MCNC: 8.3 MG/DL (ref 8.8–10.2)
CHLORIDE BLD-SCNC: 102 MMOL/L (ref 98–111)
CO2: 20 MMOL/L (ref 22–29)
CREAT SERPL-MCNC: 2 MG/DL (ref 0.5–1.2)
GFR NON-AFRICAN AMERICAN: 33
GLUCOSE BLD-MCNC: 137 MG/DL (ref 70–99)
GLUCOSE BLD-MCNC: 197 MG/DL (ref 74–109)
GLUCOSE BLD-MCNC: 207 MG/DL (ref 70–99)
GLUCOSE BLD-MCNC: 262 MG/DL (ref 70–99)
GLUCOSE BLD-MCNC: 311 MG/DL (ref 70–99)
PERFORMED ON: ABNORMAL
POTASSIUM SERPL-SCNC: 4.4 MMOL/L (ref 3.5–5)
SODIUM BLD-SCNC: 137 MMOL/L (ref 136–145)
VANCOMYCIN RANDOM: 22.1 UG/ML

## 2017-06-14 PROCEDURE — 6370000000 HC RX 637 (ALT 250 FOR IP): Performed by: CLINICAL NURSE SPECIALIST

## 2017-06-14 PROCEDURE — 6370000000 HC RX 637 (ALT 250 FOR IP): Performed by: SURGERY

## 2017-06-14 PROCEDURE — 36415 COLL VENOUS BLD VENIPUNCTURE: CPT

## 2017-06-14 PROCEDURE — 80048 BASIC METABOLIC PNL TOTAL CA: CPT

## 2017-06-14 PROCEDURE — 82948 REAGENT STRIP/BLOOD GLUCOSE: CPT

## 2017-06-14 PROCEDURE — 99232 SBSQ HOSP IP/OBS MODERATE 35: CPT | Performed by: INTERNAL MEDICINE

## 2017-06-14 PROCEDURE — 6360000002 HC RX W HCPCS: Performed by: SURGERY

## 2017-06-14 PROCEDURE — 1210000000 HC MED SURG R&B

## 2017-06-14 PROCEDURE — 6370000000 HC RX 637 (ALT 250 FOR IP): Performed by: HOSPITALIST

## 2017-06-14 PROCEDURE — 2580000003 HC RX 258: Performed by: SURGERY

## 2017-06-14 PROCEDURE — 80202 ASSAY OF VANCOMYCIN: CPT

## 2017-06-14 RX ADMIN — ENOXAPARIN SODIUM 40 MG: 40 INJECTION SUBCUTANEOUS at 10:40

## 2017-06-14 RX ADMIN — CLOPIDOGREL BISULFATE 75 MG: 75 TABLET ORAL at 10:40

## 2017-06-14 RX ADMIN — HYDROCODONE BITARTRATE AND ACETAMINOPHEN 2 TABLET: 5; 325 TABLET ORAL at 20:34

## 2017-06-14 RX ADMIN — INSULIN LISPRO 8 UNITS: 100 INJECTION, SOLUTION INTRAVENOUS; SUBCUTANEOUS at 12:31

## 2017-06-14 RX ADMIN — CILOSTAZOL 100 MG: 100 TABLET ORAL at 17:11

## 2017-06-14 RX ADMIN — INSULIN LISPRO 22 UNITS: 100 INJECTION, SOLUTION INTRAVENOUS; SUBCUTANEOUS at 06:11

## 2017-06-14 RX ADMIN — METOPROLOL TARTRATE 75 MG: 50 TABLET ORAL at 10:40

## 2017-06-14 RX ADMIN — ASPIRIN 81 MG CHEWABLE TABLET 81 MG: 81 TABLET CHEWABLE at 10:40

## 2017-06-14 RX ADMIN — PANTOPRAZOLE SODIUM 40 MG: 40 TABLET, DELAYED RELEASE ORAL at 06:09

## 2017-06-14 RX ADMIN — INSULIN LISPRO 22 UNITS: 100 INJECTION, SOLUTION INTRAVENOUS; SUBCUTANEOUS at 17:11

## 2017-06-14 RX ADMIN — FOLIC ACID TAB 400 MCG 800 MCG: 400 TAB at 10:40

## 2017-06-14 RX ADMIN — SIMVASTATIN 20 MG: 20 TABLET, FILM COATED ORAL at 20:34

## 2017-06-14 RX ADMIN — INSULIN LISPRO 22 UNITS: 100 INJECTION, SOLUTION INTRAVENOUS; SUBCUTANEOUS at 12:30

## 2017-06-14 RX ADMIN — SODIUM CHLORIDE: 9 INJECTION, SOLUTION INTRAVENOUS at 22:15

## 2017-06-14 RX ADMIN — INSULIN GLARGINE 80 UNITS: 100 INJECTION, SOLUTION SUBCUTANEOUS at 20:33

## 2017-06-14 RX ADMIN — LEVOFLOXACIN 250 MG: 5 INJECTION, SOLUTION INTRAVENOUS at 10:44

## 2017-06-14 RX ADMIN — CILOSTAZOL 100 MG: 100 TABLET ORAL at 06:09

## 2017-06-14 RX ADMIN — INSULIN LISPRO 6 UNITS: 100 INJECTION, SOLUTION INTRAVENOUS; SUBCUTANEOUS at 17:11

## 2017-06-14 RX ADMIN — METOPROLOL TARTRATE 75 MG: 50 TABLET ORAL at 20:34

## 2017-06-14 ASSESSMENT — PAIN SCALES - GENERAL: PAINLEVEL_OUTOF10: 2

## 2017-06-15 LAB
ANION GAP SERPL CALCULATED.3IONS-SCNC: 15 MMOL/L (ref 7–19)
BUN BLDV-MCNC: 20 MG/DL (ref 8–23)
CALCIUM SERPL-MCNC: 8.7 MG/DL (ref 8.8–10.2)
CHLORIDE BLD-SCNC: 105 MMOL/L (ref 98–111)
CO2: 21 MMOL/L (ref 22–29)
CREAT SERPL-MCNC: 1.9 MG/DL (ref 0.5–1.2)
GFR NON-AFRICAN AMERICAN: 35
GLUCOSE BLD-MCNC: 117 MG/DL (ref 70–99)
GLUCOSE BLD-MCNC: 128 MG/DL (ref 70–99)
GLUCOSE BLD-MCNC: 201 MG/DL (ref 70–99)
GLUCOSE BLD-MCNC: 263 MG/DL (ref 70–99)
GLUCOSE BLD-MCNC: 75 MG/DL (ref 70–99)
GLUCOSE BLD-MCNC: 85 MG/DL (ref 74–109)
PERFORMED ON: ABNORMAL
PERFORMED ON: NORMAL
POTASSIUM SERPL-SCNC: 4.1 MMOL/L (ref 3.5–5)
SODIUM BLD-SCNC: 141 MMOL/L (ref 136–145)
VANCOMYCIN RANDOM: 13.2 UG/ML

## 2017-06-15 PROCEDURE — 6360000002 HC RX W HCPCS: Performed by: SURGERY

## 2017-06-15 PROCEDURE — 99232 SBSQ HOSP IP/OBS MODERATE 35: CPT | Performed by: INTERNAL MEDICINE

## 2017-06-15 PROCEDURE — 6370000000 HC RX 637 (ALT 250 FOR IP): Performed by: HOSPITALIST

## 2017-06-15 PROCEDURE — 1210000000 HC MED SURG R&B

## 2017-06-15 PROCEDURE — 82948 REAGENT STRIP/BLOOD GLUCOSE: CPT

## 2017-06-15 PROCEDURE — 36415 COLL VENOUS BLD VENIPUNCTURE: CPT

## 2017-06-15 PROCEDURE — 6370000000 HC RX 637 (ALT 250 FOR IP): Performed by: CLINICAL NURSE SPECIALIST

## 2017-06-15 PROCEDURE — 80048 BASIC METABOLIC PNL TOTAL CA: CPT

## 2017-06-15 PROCEDURE — 80202 ASSAY OF VANCOMYCIN: CPT

## 2017-06-15 RX ORDER — VANCOMYCIN HYDROCHLORIDE 1 G/200ML
1000 INJECTION, SOLUTION INTRAVENOUS ONCE
Status: COMPLETED | OUTPATIENT
Start: 2017-06-15 | End: 2017-06-15

## 2017-06-15 RX ADMIN — VANCOMYCIN HYDROCHLORIDE 1000 MG: 1 INJECTION, SOLUTION INTRAVENOUS at 09:20

## 2017-06-15 RX ADMIN — CILOSTAZOL 100 MG: 100 TABLET ORAL at 06:16

## 2017-06-15 RX ADMIN — INSULIN LISPRO 22 UNITS: 100 INJECTION, SOLUTION INTRAVENOUS; SUBCUTANEOUS at 12:25

## 2017-06-15 RX ADMIN — PANTOPRAZOLE SODIUM 40 MG: 40 TABLET, DELAYED RELEASE ORAL at 06:16

## 2017-06-15 RX ADMIN — ENOXAPARIN SODIUM 40 MG: 40 INJECTION SUBCUTANEOUS at 09:20

## 2017-06-15 RX ADMIN — FOLIC ACID TAB 400 MCG 800 MCG: 400 TAB at 09:20

## 2017-06-15 RX ADMIN — LEVOFLOXACIN 250 MG: 5 INJECTION, SOLUTION INTRAVENOUS at 10:48

## 2017-06-15 RX ADMIN — METOPROLOL TARTRATE 75 MG: 50 TABLET ORAL at 09:25

## 2017-06-15 RX ADMIN — ASPIRIN 81 MG CHEWABLE TABLET 81 MG: 81 TABLET CHEWABLE at 09:20

## 2017-06-15 RX ADMIN — CILOSTAZOL 100 MG: 100 TABLET ORAL at 17:36

## 2017-06-15 RX ADMIN — CLOPIDOGREL BISULFATE 75 MG: 75 TABLET ORAL at 09:20

## 2017-06-15 RX ADMIN — HYDROCODONE BITARTRATE AND ACETAMINOPHEN 2 TABLET: 5; 325 TABLET ORAL at 22:23

## 2017-06-15 RX ADMIN — INSULIN GLARGINE 80 UNITS: 100 INJECTION, SOLUTION SUBCUTANEOUS at 22:25

## 2017-06-15 RX ADMIN — SIMVASTATIN 20 MG: 20 TABLET, FILM COATED ORAL at 22:23

## 2017-06-15 RX ADMIN — METOPROLOL TARTRATE 75 MG: 50 TABLET ORAL at 22:23

## 2017-06-15 ASSESSMENT — PAIN SCALES - GENERAL: PAINLEVEL_OUTOF10: 8

## 2017-06-16 VITALS
HEIGHT: 72 IN | TEMPERATURE: 98.1 F | WEIGHT: 217.6 LBS | RESPIRATION RATE: 16 BRPM | BODY MASS INDEX: 29.47 KG/M2 | HEART RATE: 78 BPM | DIASTOLIC BLOOD PRESSURE: 63 MMHG | OXYGEN SATURATION: 95 % | SYSTOLIC BLOOD PRESSURE: 152 MMHG

## 2017-06-16 LAB
ANAEROBIC CULTURE: NORMAL
ANION GAP SERPL CALCULATED.3IONS-SCNC: 14 MMOL/L (ref 7–19)
BUN BLDV-MCNC: 22 MG/DL (ref 8–23)
CALCIUM SERPL-MCNC: 9.3 MG/DL (ref 8.8–10.2)
CHLORIDE BLD-SCNC: 103 MMOL/L (ref 98–111)
CO2: 24 MMOL/L (ref 22–29)
CREAT SERPL-MCNC: 1.9 MG/DL (ref 0.5–1.2)
CULTURE SURGICAL: NORMAL
GFR NON-AFRICAN AMERICAN: 35
GLUCOSE BLD-MCNC: 147 MG/DL (ref 74–109)
GLUCOSE BLD-MCNC: 333 MG/DL (ref 70–99)
GLUCOSE BLD-MCNC: 81 MG/DL (ref 70–99)
GRAM STAIN RESULT: NORMAL
PERFORMED ON: ABNORMAL
PERFORMED ON: NORMAL
POTASSIUM SERPL-SCNC: 3.9 MMOL/L (ref 3.5–5)
SODIUM BLD-SCNC: 141 MMOL/L (ref 136–145)
VANCOMYCIN RANDOM: 13.2 UG/ML

## 2017-06-16 PROCEDURE — 82948 REAGENT STRIP/BLOOD GLUCOSE: CPT

## 2017-06-16 PROCEDURE — 80202 ASSAY OF VANCOMYCIN: CPT

## 2017-06-16 PROCEDURE — 6370000000 HC RX 637 (ALT 250 FOR IP): Performed by: HOSPITALIST

## 2017-06-16 PROCEDURE — 6360000002 HC RX W HCPCS: Performed by: SURGERY

## 2017-06-16 PROCEDURE — 99238 HOSP IP/OBS DSCHRG MGMT 30/<: CPT | Performed by: INTERNAL MEDICINE

## 2017-06-16 PROCEDURE — 36415 COLL VENOUS BLD VENIPUNCTURE: CPT

## 2017-06-16 PROCEDURE — 6370000000 HC RX 637 (ALT 250 FOR IP): Performed by: CLINICAL NURSE SPECIALIST

## 2017-06-16 PROCEDURE — 2580000003 HC RX 258: Performed by: SURGERY

## 2017-06-16 PROCEDURE — 80048 BASIC METABOLIC PNL TOTAL CA: CPT

## 2017-06-16 RX ORDER — METOPROLOL TARTRATE 75 MG/1
75 TABLET, FILM COATED ORAL 2 TIMES DAILY
Qty: 60 TABLET | Refills: 1 | Status: ON HOLD | OUTPATIENT
Start: 2017-06-16 | End: 2022-01-01 | Stop reason: HOSPADM

## 2017-06-16 RX ORDER — AMOXICILLIN AND CLAVULANATE POTASSIUM 875; 125 MG/1; MG/1
1 TABLET, FILM COATED ORAL 2 TIMES DAILY
Qty: 20 TABLET | Refills: 0 | Status: SHIPPED | OUTPATIENT
Start: 2017-06-16 | End: 2017-06-26

## 2017-06-16 RX ORDER — HYDROCODONE BITARTRATE AND ACETAMINOPHEN 5; 325 MG/1; MG/1
1 TABLET ORAL EVERY 6 HOURS PRN
Qty: 20 TABLET | Refills: 0 | Status: SHIPPED | OUTPATIENT
Start: 2017-06-16 | End: 2017-06-21

## 2017-06-16 RX ORDER — AMOXICILLIN AND CLAVULANATE POTASSIUM 875; 125 MG/1; MG/1
1 TABLET, FILM COATED ORAL 2 TIMES DAILY
Qty: 20 TABLET | Refills: 0 | Status: SHIPPED | OUTPATIENT
Start: 2017-06-16 | End: 2017-06-16

## 2017-06-16 RX ADMIN — ENOXAPARIN SODIUM 40 MG: 40 INJECTION SUBCUTANEOUS at 10:34

## 2017-06-16 RX ADMIN — METOPROLOL TARTRATE 75 MG: 50 TABLET ORAL at 10:34

## 2017-06-16 RX ADMIN — CILOSTAZOL 100 MG: 100 TABLET ORAL at 06:07

## 2017-06-16 RX ADMIN — INSULIN LISPRO 22 UNITS: 100 INJECTION, SOLUTION INTRAVENOUS; SUBCUTANEOUS at 11:07

## 2017-06-16 RX ADMIN — FOLIC ACID TAB 400 MCG 800 MCG: 400 TAB at 10:34

## 2017-06-16 RX ADMIN — PANTOPRAZOLE SODIUM 40 MG: 40 TABLET, DELAYED RELEASE ORAL at 06:07

## 2017-06-16 RX ADMIN — CLOPIDOGREL BISULFATE 75 MG: 75 TABLET ORAL at 10:34

## 2017-06-16 RX ADMIN — ASPIRIN 81 MG CHEWABLE TABLET 81 MG: 81 TABLET CHEWABLE at 10:34

## 2017-06-16 RX ADMIN — VANCOMYCIN HYDROCHLORIDE 1250 MG: 1 INJECTION, POWDER, LYOPHILIZED, FOR SOLUTION INTRAVENOUS at 06:26

## 2017-06-19 LAB
EKG P AXIS: 34 DEGREES
EKG P-R INTERVAL: 198 MS
EKG Q-T INTERVAL: 316 MS
EKG QRS DURATION: 82 MS
EKG QTC CALCULATION (BAZETT): 391 MS
EKG T AXIS: 81 DEGREES

## 2017-07-06 ENCOUNTER — OFFICE VISIT (OUTPATIENT)
Dept: VASCULAR SURGERY | Age: 72
End: 2017-07-06

## 2017-07-06 VITALS
OXYGEN SATURATION: 93 % | HEART RATE: 94 BPM | TEMPERATURE: 97.8 F | RESPIRATION RATE: 18 BRPM | DIASTOLIC BLOOD PRESSURE: 78 MMHG | SYSTOLIC BLOOD PRESSURE: 132 MMHG

## 2017-07-06 DIAGNOSIS — I96 GANGRENE OF TOE (HCC): Primary | ICD-10-CM

## 2017-07-06 DIAGNOSIS — I70.25 ATHEROSCLEROSIS OF NATIVE ARTERIES OF THE EXTREMITIES WITH ULCERATION (HCC): Chronic | ICD-10-CM

## 2017-07-06 PROCEDURE — 99024 POSTOP FOLLOW-UP VISIT: CPT | Performed by: NURSE PRACTITIONER

## 2017-07-06 RX ORDER — HYDROCODONE BITARTRATE AND ACETAMINOPHEN 5; 325 MG/1; MG/1
1 TABLET ORAL EVERY 6 HOURS PRN
COMMUNITY
End: 2017-10-24

## 2017-08-07 ENCOUNTER — TELEPHONE (OUTPATIENT)
Dept: VASCULAR SURGERY | Age: 72
End: 2017-08-07

## 2017-10-24 ENCOUNTER — OFFICE VISIT (OUTPATIENT)
Dept: VASCULAR SURGERY | Age: 72
End: 2017-10-24
Payer: MEDICARE

## 2017-10-24 ENCOUNTER — HOSPITAL ENCOUNTER (OUTPATIENT)
Dept: VASCULAR LAB | Age: 72
Discharge: HOME | End: 2017-10-24
Payer: MEDICARE

## 2017-10-24 VITALS
RESPIRATION RATE: 18 BRPM | TEMPERATURE: 97.2 F | HEART RATE: 90 BPM | SYSTOLIC BLOOD PRESSURE: 146 MMHG | DIASTOLIC BLOOD PRESSURE: 68 MMHG

## 2017-10-24 DIAGNOSIS — I70.25 ATHEROSCLEROSIS OF NATIVE ARTERIES OF THE EXTREMITIES WITH ULCERATION (HCC): Chronic | ICD-10-CM

## 2017-10-24 DIAGNOSIS — I73.9 PVD (PERIPHERAL VASCULAR DISEASE) (HCC): Primary | ICD-10-CM

## 2017-10-24 PROCEDURE — 99213 OFFICE O/P EST LOW 20 MIN: CPT | Performed by: PHYSICIAN ASSISTANT

## 2017-10-24 PROCEDURE — G8419 CALC BMI OUT NRM PARAM NOF/U: HCPCS | Performed by: PHYSICIAN ASSISTANT

## 2017-10-24 PROCEDURE — 4040F PNEUMOC VAC/ADMIN/RCVD: CPT | Performed by: PHYSICIAN ASSISTANT

## 2017-10-24 PROCEDURE — G8427 DOCREV CUR MEDS BY ELIG CLIN: HCPCS | Performed by: PHYSICIAN ASSISTANT

## 2017-10-24 PROCEDURE — 93922 UPR/L XTREMITY ART 2 LEVELS: CPT

## 2017-10-24 PROCEDURE — G8598 ASA/ANTIPLAT THER USED: HCPCS | Performed by: PHYSICIAN ASSISTANT

## 2017-10-24 PROCEDURE — 3017F COLORECTAL CA SCREEN DOC REV: CPT | Performed by: PHYSICIAN ASSISTANT

## 2017-10-24 PROCEDURE — 1123F ACP DISCUSS/DSCN MKR DOCD: CPT | Performed by: PHYSICIAN ASSISTANT

## 2017-10-24 PROCEDURE — G8484 FLU IMMUNIZE NO ADMIN: HCPCS | Performed by: PHYSICIAN ASSISTANT

## 2017-10-24 PROCEDURE — 1036F TOBACCO NON-USER: CPT | Performed by: PHYSICIAN ASSISTANT

## 2017-10-24 PROCEDURE — 93926 LOWER EXTREMITY STUDY: CPT

## 2017-10-24 NOTE — PROGRESS NOTES
Patient Care Team:  Carli Woo MD as PCP - General (Family Medicine)  Erika Troy MD (Family Medicine)      History and Physical    Mr. Marylee Georgia has prior history of peripheral vascular disease of the lower extremities and DM. He has had this for 1 - 5 years. Current medication include pletal, statin, asa, Plavix daily. Gucci Heredia reports that he was just release from [facility] for diabetic  Wound right foot and left great toe amp site and small ulcer on dorsal left 2nd toe. He denies any IRP or  claudication at present. Shabnam Lunsford is a 74 y.o. 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, with long-term current use of insulin ([address]     Gangrene of toe ([facility] 06/08/2017    Atherosclerosis of native arteries of the extremities with ulceration ([facility] 08/25/2014    Diabetic polyneuropathy associated with type 2 diabetes mellitus ([facility]     Coronary artery disease involving native coronary artery without angina pectoris     Erectile dysfunction     Hypercholesterolemia     Essential hypertension      Current Outpatient Prescriptions   Medication Sig Dispense Refill    clopidogrel (PLAVIX) 75 MG tablet TAKE 1 TABLET EVERY DAY 90 tablet 3    Metoprolol Tartrate 75 MG TABS Take 75 mg by mouth 2 times daily 60 tablet 1    cilostazol (PLETAL) 100 MG tablet TAKE 1 TABLET TWICE DAILY 180 tablet 5    aspirin 81 MG tablet Take 81 mg by mouth daily.  simvastatin (ZOCOR) 20 MG tablet Take 20 mg by mouth nightly.  folic acid (FOLVITE) 250 MCG tablet Take 800 mcg by mouth daily.       insulin lispro (HUMALOG) 100 UNIT/ML injection vial Inject into the skin 3 times daily (before meals) 30 units before each meal      insulin glargine (LANTUS) 100 UNIT/ML injection vial Inject 80 Units into the skin nightly        No current facility-administered medications for this visit. Allergies: Keflex [cephalexin]  Past Medical History:   Diagnosis Date    Atherosclerosis of native arteries of the extremities with ulceration(440[address]23) 8/25/2014    CAD (coronary artery disease)     Cataracts, bilateral     Diabetes mellitus ([address]     Diabetic neuropathy ([facility]     ED (erectile dysfunction)     HTN (hypertension)     Hypercholesterolemia     Ulcerative colitis ([facility]      Past Surgical History:   Procedure Laterality Date    CORONARY ARTERY BYPASS GRAFT      ILEOSTOMY OR JEJUNOSTOMY      TOE AMPUTATION Left 6/12/2017    SJS. Left great toe ray amputation through the metatarsal level.  VASCULAR SURGERY  8/26/14 S    Percutaneous cannulation, left common femoral artery, with 5-[place] glidesheath. Suprarenal abdominal aortogram with bilateral iliofemoral arteriogra. Bilateral lower extremity arteriogram.    VASCULAR SURGERY  09/08/14 S    Right femoral to tibioperoneal trunk bypass with in situ right greater saphenous vein. Right common femoral endarterectomy with vascular patch repair. Right tibioperoneal trunk vein patch and endarterectomy. Completion right lower exteremity arteriograms    VASCULAR SURGERY  1/12/2015 [facility]    Percutaneous cannulation left common femoral artery with 5-[place] and later 6-[place] pinnacle [place] sheath. Suprarenal abdominal aortogram with bilateral iliofemoral arteriograms. Bilateral lower extremity arteriograms. Coil embolization right greater saphenous vein bypass branch with 8 mm x 5 cm coil and seven 5 mm x 5 cm coils.  VASCULAR SURGERY  1/12/2015 S     Right posterior tibial artery balloon angioplasty 2.5 mm x 100 mm vascutrak balloon. Right peroneal artery balloon angioplasty with 2.5 x 100 mm vascutrak balloon. Completion right lower extremity arteriograms.  VASCULAR SURGERY  06/17/2017    S. Percutaneous cannulation right common femoral artery with ultrasound guidance and 5 [place] and have been reviewed with the patient including:  Family history, tobacco abuse in all forms, elevated cholesterol, hyperlipidemia, and diabetes. Lower extremity arterial study:   Right fem - pop bypass patient  Right CHELSEA 1.11, Left CHELSEA 1.14. Individual films reviewed: Yes. Test results were reviewed with the patient. Assessment    1. PVD (peripheral vascular disease) (HCC)          Plan    Continue ASA EC 81 mg daily  Continue Plavix 75 mg daily  Continue Pletal - risks factors associated with drug discussed including black box warning for possible sudden cardiac death  Strongly encourage he  continue statin therapy  Good skin care/checks daily. Apply Silvadene daily to left 2nd toe.  I instructed him to call [facility] if any deterioration in wounds  Recommend smoking cessation  Discussed walking plan    Orders Placed This Encounter   Procedures    VL CHELSEA BILATERAL LIMITED 1-2 LEVELS     Standing Status:   Future     Standing Expiration Date:   10/24/2018     Scheduling Instructions:      6 months *      30 minute time slot     Order Specific Question:   Reason for exam:     Answer:   pvd    VL DUP LOWER EXTREMITY ARTERIES RIGHT     For velocity with chelsea's     Standing Status:   Future     Standing Expiration Date:   10/24/2018     Scheduling Instructions:      6 months *      30 minute time slot     Order Specific Question:   Reason for exam:     Answer:   right fem pop bypass insitu     Return in about 6 months (around 4/24/2018) for [address] [place] [address] with CHELSEA's. or sooner if claudication worsens, patient develops wound or IRP

## 2018-01-18 ENCOUNTER — HOSPITAL ENCOUNTER (OUTPATIENT)
Dept: VASCULAR LAB | Age: 73
Discharge: HOME | End: 2018-01-18
Payer: MEDICARE

## 2018-01-18 ENCOUNTER — OFFICE VISIT (OUTPATIENT)
Dept: VASCULAR SURGERY | Age: 73
End: 2018-01-18
Payer: MEDICARE

## 2018-01-18 VITALS
TEMPERATURE: 96.6 F | HEART RATE: 92 BPM | RESPIRATION RATE: 18 BRPM | DIASTOLIC BLOOD PRESSURE: 69 MMHG | SYSTOLIC BLOOD PRESSURE: 142 MMHG

## 2018-01-18 DIAGNOSIS — I73.9 PVD (PERIPHERAL VASCULAR DISEASE) (HCC): ICD-10-CM

## 2018-01-18 DIAGNOSIS — S91.109A OPEN WOUND OF TOE WITH COMPLICATION, INITIAL ENCOUNTER: Primary | ICD-10-CM

## 2018-01-18 PROCEDURE — G8421 BMI NOT CALCULATED: HCPCS | Performed by: PHYSICIAN ASSISTANT

## 2018-01-18 PROCEDURE — 4040F PNEUMOC VAC/ADMIN/RCVD: CPT | Performed by: PHYSICIAN ASSISTANT

## 2018-01-18 PROCEDURE — G8598 ASA/ANTIPLAT THER USED: HCPCS | Performed by: PHYSICIAN ASSISTANT

## 2018-01-18 PROCEDURE — 3017F COLORECTAL CA SCREEN DOC REV: CPT | Performed by: PHYSICIAN ASSISTANT

## 2018-01-18 PROCEDURE — 99213 OFFICE O/P EST LOW 20 MIN: CPT | Performed by: PHYSICIAN ASSISTANT

## 2018-01-18 PROCEDURE — 1123F ACP DISCUSS/DSCN MKR DOCD: CPT | Performed by: PHYSICIAN ASSISTANT

## 2018-01-18 PROCEDURE — 93922 UPR/L XTREMITY ART 2 LEVELS: CPT

## 2018-01-18 PROCEDURE — 93926 LOWER EXTREMITY STUDY: CPT

## 2018-01-18 PROCEDURE — 1036F TOBACCO NON-USER: CPT | Performed by: PHYSICIAN ASSISTANT

## 2018-01-18 PROCEDURE — G8484 FLU IMMUNIZE NO ADMIN: HCPCS | Performed by: PHYSICIAN ASSISTANT

## 2018-01-18 PROCEDURE — G8427 DOCREV CUR MEDS BY ELIG CLIN: HCPCS | Performed by: PHYSICIAN ASSISTANT

## 2018-01-19 ENCOUNTER — PREP FOR PROCEDURE (OUTPATIENT)
Dept: VASCULAR SURGERY | Age: 73
End: 2018-01-19

## 2018-01-19 DIAGNOSIS — L08.9 WOUND INFECTION: Primary | ICD-10-CM

## 2018-01-19 DIAGNOSIS — L03.818 CELLULITIS OF OTHER SPECIFIED SITE: ICD-10-CM

## 2018-01-19 DIAGNOSIS — T14.8XXA WOUND INFECTION: Primary | ICD-10-CM

## 2018-01-19 RX ORDER — SODIUM CHLORIDE 0.9 % (FLUSH) 0.9 %
10 SYRINGE (ML) INJECTION PRN
Status: CANCELLED | OUTPATIENT
Start: 2018-01-19

## 2018-01-19 RX ORDER — SODIUM CHLORIDE 0.9 % (FLUSH) 0.9 %
10 SYRINGE (ML) INJECTION EVERY 12 HOURS SCHEDULED
Status: CANCELLED | OUTPATIENT
Start: 2018-01-19

## 2018-01-19 NOTE — PROGRESS NOTES
Patient Care Team:  Dany Edwards MD as PCP - General (Family Medicine)  Chidi Chaparro MD (Family Medicine)      History and Physical    Mr. Mitch Martínez is a 75 yo male with a history of PVD, with non healing wounds of bilateral feet requiring a RLE revascularization and multiple LE endovascular interventions. He underwent a left great toe ray amputation on 6/12/17 for a non healing gangrenous wound despite all efforts. He presents today for evaluation of non healing wounds on the left 3rd toe. He has been going to [facility] s/p left great toe amp for local wound care with vac therapy. The left great toe amp site healed. He reports that since he was last seen in our office he has developed 2 wounds on the left 3rd toe. He is currently on ASA, Pletal, Plavix and a statin daily. He denies any fever/chills. He reports that he finished up an antibiotic approximately 1 week ago.      Linda Dumont is a 74 y.o. 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, with long-term current use of insulin ([facility])     Gangrene of toe ([facility] 06/08/2017    Atherosclerosis of native arteries of the extremities with ulceration ([facility] 08/25/2014    Diabetic polyneuropathy associated with type 2 diabetes mellitus ([facility]     Coronary artery disease involving native coronary artery without angina pectoris     Erectile dysfunction     Hypercholesterolemia     Essential hypertension      Current Outpatient Prescriptions   Medication Sig Dispense Refill    clopidogrel (PLAVIX) 75 MG tablet TAKE 1 TABLET EVERY DAY 90 tablet 3    Metoprolol Tartrate 75 MG TABS Take 75 mg by mouth 2 times daily 60 tablet 1    cilostazol (PLETAL) 100 MG tablet TAKE 1 TABLET TWICE DAILY 180 tablet 5    aspirin 81 MG tablet palpable mass. Genitourinary  deferred. Musculoskeletal  ROM appears normal.  No significant edema. Neurologic  alert and oriented X 3. Physiologic. Skin  warm, dry, and intact. No rash, erythema, or pallor. Psychiatric  mood, affect, and behavior appear normal.  Judgment and thought processes appear normal.    Risk factors for atherosclerosis of all vascular beds have been reviewed with the patient including:  Family history, tobacco abuse in all forms, elevated cholesterol, hyperlipidemia, and diabetes. Assessment    1. Open wound of toe with complication, initial encounter          Plan    Continue  ASA EC 81 mg daily  Continue  Plavix 75 mg daily  Continue Pletal 100 mg BID  Recommend continue statin therapy   We will send him today for a A'scan with CHELSEA's to assess arterial blood flow. Discussed with Dr. Deatrice Lombard. Further recommendation following results of arterial A'scan     Addendum:   Impression        Left lower extremity arterial duplex exam shows diffuse atherosclerosis,    however, no focal stenosis are seen. LE Duplex Measurements       +---------------++-----+-----+----+-----------++----+-----+----+-----------+   !               ! ! Right!     !Left!           !!    !     !    !           !   +---------------++-----+-----+----+-----------++----+-----+----+-----------+   ! Location       !!PSV  !Ratio! EDV ! Wave Desc. !!PSV ! Ratio! EDV ! Wave Desc. !   +---------------++-----+-----+----+-----------++----+-----+----+-----------+   ! Common Femoral !!     !     !    !           !!255 !     !    !           !   +---------------++-----+-----+----+-----------++----+-----+----+-----------+   ! Prox PFA       !!     !     !    !           !!195 !     !    !           !   +---------------++-----+-----+----+-----------++----+-----+----+-----------+   ! Prox SFA       !!     !     !    !           !!225 !     !    !           ! +---------------++-----+-----+----+-----------++----+-----+----+-----------+   ! Mid SFA        !!     !     !    !           !!136 !0.6  !    !           !   +---------------++-----+-----+----+-----------++----+-----+----+-----------+   ! Dist SFA       !!     !     !    !           !!92  !0.68 !    !           !   +---------------++-----+-----+----+-----------++----+-----+----+-----------+   ! Prox Popliteal !!     !     !    !           !!134 !1.46 !12. 6!           !   +---------------++-----+-----+----+-----------++----+-----+----+-----------+   ! Dist Popliteal !!     !     !    !           !!163 !1.22 !    !           !   +---------------++-----+-----+----+-----------++----+-----+----+-----------+   ! Mid PTA        !!     !     !    !           !!92.6!0.57 !19. 3!           !   +---------------++-----+-----+----+-----------++----+-----+----+-----------+   ! Mid MELI        !!     !     !    !           !!38.9!0.24 !    !           !   +---------------++-----+-----+----+-----------++----+-----+----+-----------+   ! Mid Peroneal   !!     !     !    !           !!43.2!0.27 !8.25!           !   +---------------++-----+-----+----+-----------++----+-----+----+-----------+   CHELSEA:Right: 1.22. Left: 1.1. Options have been discussed with the patient including continued medical management vs. proceeding with a left TMA. Patient has opted to proceed with left TMA. Risks have been discussed with the patient including but not limited to MI, death, CVA, bleed, nerve injury, and infection.       Hold Plavix 5 days  Betadine and dry gauze to to open wounds left toes  Proceed with left TMA

## 2018-01-19 NOTE — H&P
Patient Care Team:  Keily Hummel MD as PCP - General (Family Medicine)  Rocael Richards MD (Family Medicine)      History and Physical    Mr. Isaac Lennox is a 75 yo male with a history of PVD and DM , with non healing wounds of bilateral feet requiring a RLE revascularization and multiple LE endovascular interventions in the past.  He underwent a left great toe ray amputation on 6/12/17 for a non healing gangrenous wound despite all efforts. He presents today for evaluation of non healing wounds on the left 3rd toe. He has been going to [facility] s/p left great toe amp for local wound care with vac therapy. The left great toe amp site healed. He reports that since he was last seen in our office he has developed 2 wounds on the left 3rd toe. He is currently on ASA, Pletal, Plavix and a statin daily. He denies any fever/chills. He reports that he finished up an antibiotic approximately 1 week ago.      Ivy Jackson is a 74 y.o. 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, with long-term current use of insulin ([facility])     Gangrene of toe ([facility] [address] 06/08/2017    Atherosclerosis of native arteries of the extremities with ulceration ([facility] [address] 08/25/2014    Diabetic polyneuropathy associated with type 2 diabetes mellitus ([facility] [address]     Coronary artery disease involving native coronary artery without angina pectoris     Erectile dysfunction     Hypercholesterolemia     Essential hypertension      Current Outpatient Prescriptions   Medication Sig Dispense Refill    clopidogrel (PLAVIX) 75 MG tablet TAKE 1 TABLET EVERY DAY 90 tablet 3    Metoprolol Tartrate 75 MG TABS Take 75 mg by mouth 2 times daily 60 tablet 1    cilostazol (PLETAL) 100 MG tablet TAKE 1 TABLET TWICE DAILY 180 tablet 5    +---------------++-----+-----+----+-----------++----+-----+----+-----------+   ! Mid SFA        !!     !     !    !           !!136 !0.6  !    !           !   +---------------++-----+-----+----+-----------++----+-----+----+-----------+   ! Dist SFA       !!     !     !    !           !!92  !0.68 !    !           !   +---------------++-----+-----+----+-----------++----+-----+----+-----------+   ! Prox Popliteal !!     !     !    !           !!134 !1.46 !12. 6!           !   +---------------++-----+-----+----+-----------++----+-----+----+-----------+   ! Dist Popliteal !!     !     !    !           !!163 !1.22 !    !           !   +---------------++-----+-----+----+-----------++----+-----+----+-----------+   ! Mid PTA        !!     !     !    !           !!92.6!0.57 !19. 3!           !   +---------------++-----+-----+----+-----------++----+-----+----+-----------+   ! Mid MELI        !!     !     !    !           !!38.9!0.24 !    !           !   +---------------++-----+-----+----+-----------++----+-----+----+-----------+   ! Mid Peroneal   !!     !     !    !           !!43.2!0.27 !8.25!           !   +---------------++-----+-----+----+-----------++----+-----+----+-----------+   CHELSEA:Right: 1.22. Left: 1.1. Options have been discussed with the patient including continued medical management vs. proceeding with a left TMA. Patient has opted to proceed with left TMA. Risks have been discussed with the patient including but not limited to MI, death, CVA, bleed, nerve injury, and infection.       Hold Plavix 5 days  Betadine and dry gauze to to open wounds left toes  Proceed with left TMA

## 2018-01-23 ENCOUNTER — HOSPITAL ENCOUNTER (OUTPATIENT)
Dept: PREADMISSION TESTING | Age: 73
Discharge: HOME | End: 2018-01-23
Payer: MEDICARE

## 2018-01-23 ENCOUNTER — HOSPITAL ENCOUNTER (OUTPATIENT)
Dept: GENERAL RADIOLOGY | Age: 73
Discharge: HOME | End: 2018-01-23
Payer: MEDICARE

## 2018-01-23 LAB
ANION GAP SERPL CALCULATED.3IONS-SCNC: 13 MMOL/L (ref 7–19)
APTT: 29.1 SEC (ref 26–36.2)
BASOPHILS ABSOLUTE: 0.1 K/UL (ref 0–0.2)
BASOPHILS RELATIVE PERCENT: 0.9 % (ref 0–1)
BUN BLDV-MCNC: 18 MG/DL (ref 8–23)
CALCIUM SERPL-MCNC: 10.2 MG/DL (ref 8.8–10.2)
CHLORIDE BLD-SCNC: 103 MMOL/L (ref 98–111)
CO2: 24 MMOL/L (ref 22–29)
CREAT SERPL-MCNC: 1.2 MG/DL (ref 0.5–1.2)
EKG P AXIS: -12 DEGREES
EKG P-R INTERVAL: 248 MS
EKG Q-T INTERVAL: 398 MS
EKG QRS DURATION: 78 MS
EKG QTC CALCULATION (BAZETT): 408 MS
EKG T AXIS: 87 DEGREES
EOSINOPHILS ABSOLUTE: 0.2 K/UL (ref 0–0.6)
EOSINOPHILS RELATIVE PERCENT: 1.4 % (ref 0–5)
GFR NON-AFRICAN AMERICAN: 59
GLUCOSE BLD-MCNC: 129 MG/DL (ref 74–109)
HCT VFR BLD CALC: 48.8 % (ref 42–52)
HEMOGLOBIN: 15.5 G/DL (ref 14–18)
INR BLD: 1.06 (ref 0.88–1.18)
LYMPHOCYTES ABSOLUTE: 2.5 K/UL (ref 1.1–4.5)
LYMPHOCYTES RELATIVE PERCENT: 19.3 % (ref 20–40)
MCH RBC QN AUTO: 32.2 PG (ref 27–31)
MCHC RBC AUTO-ENTMCNC: 31.8 G/DL (ref 33–37)
MCV RBC AUTO: 101.2 FL (ref 80–94)
MONOCYTES ABSOLUTE: 1 K/UL (ref 0–0.9)
MONOCYTES RELATIVE PERCENT: 7.9 % (ref 0–10)
NEUTROPHILS ABSOLUTE: 8.8 K/UL (ref 1.5–7.5)
NEUTROPHILS RELATIVE PERCENT: 69.2 % (ref 50–65)
PDW BLD-RTO: 12.8 % (ref 11.5–14.5)
PLATELET # BLD: 236 K/UL (ref 130–400)
PMV BLD AUTO: 11.2 FL (ref 9.4–12.4)
POTASSIUM SERPL-SCNC: 4.9 MMOL/L (ref 3.5–5)
PROTHROMBIN TIME: 13.7 SEC (ref 12–14.6)
RBC # BLD: 4.82 M/UL (ref 4.7–6.1)
SODIUM BLD-SCNC: 140 MMOL/L (ref 136–145)
WBC # BLD: 12.7 K/UL (ref 4.8–10.8)

## 2018-01-23 PROCEDURE — 85610 PROTHROMBIN TIME: CPT

## 2018-01-23 PROCEDURE — 85730 THROMBOPLASTIN TIME PARTIAL: CPT

## 2018-01-23 PROCEDURE — 71046 X-RAY EXAM CHEST 2 VIEWS: CPT

## 2018-01-23 PROCEDURE — 85025 COMPLETE CBC W/AUTO DIFF WBC: CPT

## 2018-01-23 PROCEDURE — 80048 BASIC METABOLIC PNL TOTAL CA: CPT

## 2018-01-23 PROCEDURE — 87070 CULTURE OTHR SPECIMN AEROBIC: CPT

## 2018-01-23 PROCEDURE — 93005 ELECTROCARDIOGRAM TRACING: CPT

## 2018-01-24 LAB — MRSA CULTURE ONLY: NORMAL

## 2018-01-30 ENCOUNTER — ANESTHESIA EVENT (OUTPATIENT)
Dept: OPERATING ROOM | Age: 73
End: 2018-01-30
Payer: MEDICARE

## 2018-01-30 ENCOUNTER — HOSPITAL ENCOUNTER (OUTPATIENT)
Age: 73
Setting detail: OUTPATIENT SURGERY
Discharge: HOME | End: 2018-01-30
Attending: SURGERY | Admitting: SURGERY
Payer: MEDICARE

## 2018-01-30 ENCOUNTER — ANESTHESIA (OUTPATIENT)
Dept: OPERATING ROOM | Age: 73
End: 2018-01-30
Payer: MEDICARE

## 2018-01-30 VITALS
RESPIRATION RATE: 13 BRPM | OXYGEN SATURATION: 98 % | DIASTOLIC BLOOD PRESSURE: 48 MMHG | TEMPERATURE: 97.2 F | SYSTOLIC BLOOD PRESSURE: 120 MMHG

## 2018-01-30 VITALS
HEART RATE: 48 BPM | HEIGHT: 72 IN | RESPIRATION RATE: 18 BRPM | DIASTOLIC BLOOD PRESSURE: 55 MMHG | WEIGHT: 215 LBS | BODY MASS INDEX: 29.12 KG/M2 | OXYGEN SATURATION: 93 % | SYSTOLIC BLOOD PRESSURE: 155 MMHG | TEMPERATURE: 97.2 F

## 2018-01-30 LAB
GLUCOSE BLD-MCNC: 103 MG/DL (ref 70–99)
PERFORMED ON: ABNORMAL

## 2018-01-30 PROCEDURE — 3700000001 HC ADD 15 MINUTES (ANESTHESIA): Performed by: SURGERY

## 2018-01-30 PROCEDURE — 3700000000 HC ANESTHESIA ATTENDED CARE: Performed by: SURGERY

## 2018-01-30 PROCEDURE — 82948 REAGENT STRIP/BLOOD GLUCOSE: CPT

## 2018-01-30 PROCEDURE — 7100000011 HC PHASE II RECOVERY - ADDTL 15 MIN: Performed by: SURGERY

## 2018-01-30 PROCEDURE — 6360000002 HC RX W HCPCS: Performed by: NURSE ANESTHETIST, CERTIFIED REGISTERED

## 2018-01-30 PROCEDURE — 6360000002 HC RX W HCPCS: Performed by: PHYSICIAN ASSISTANT

## 2018-01-30 PROCEDURE — 7100000000 HC PACU RECOVERY - FIRST 15 MIN: Performed by: SURGERY

## 2018-01-30 PROCEDURE — 2500000003 HC RX 250 WO HCPCS: Performed by: NURSE ANESTHETIST, CERTIFIED REGISTERED

## 2018-01-30 PROCEDURE — 7100000001 HC PACU RECOVERY - ADDTL 15 MIN: Performed by: SURGERY

## 2018-01-30 PROCEDURE — 88305 TISSUE EXAM BY PATHOLOGIST: CPT

## 2018-01-30 PROCEDURE — 2720000001 HC MISC SURG SUPPLY STERILE $51-500: Performed by: SURGERY

## 2018-01-30 PROCEDURE — 2580000003 HC RX 258: Performed by: PHYSICIAN ASSISTANT

## 2018-01-30 PROCEDURE — 28805 AMPUTATION THRU METATARSAL: CPT | Performed by: SURGERY

## 2018-01-30 PROCEDURE — 3600000003 HC SURGERY LEVEL 3 BASE: Performed by: SURGERY

## 2018-01-30 PROCEDURE — 3600000013 HC SURGERY LEVEL 3 ADDTL 15MIN: Performed by: SURGERY

## 2018-01-30 PROCEDURE — 7100000010 HC PHASE II RECOVERY - FIRST 15 MIN: Performed by: SURGERY

## 2018-01-30 PROCEDURE — 2580000003 HC RX 258: Performed by: NURSE ANESTHETIST, CERTIFIED REGISTERED

## 2018-01-30 RX ORDER — HYDROCODONE BITARTRATE AND ACETAMINOPHEN 5; 325 MG/1; MG/1
2 TABLET ORAL EVERY 4 HOURS PRN
Status: DISCONTINUED | OUTPATIENT
Start: 2018-01-30 | End: 2018-01-30 | Stop reason: HOSPADM

## 2018-01-30 RX ORDER — MEPERIDINE HYDROCHLORIDE 50 MG/ML
12.5 INJECTION INTRAMUSCULAR; INTRAVENOUS; SUBCUTANEOUS EVERY 5 MIN PRN
Status: DISCONTINUED | OUTPATIENT
Start: 2018-01-30 | End: 2018-01-30 | Stop reason: HOSPADM

## 2018-01-30 RX ORDER — PROMETHAZINE HYDROCHLORIDE 25 MG/ML
6.25 INJECTION, SOLUTION INTRAMUSCULAR; INTRAVENOUS
Status: DISCONTINUED | OUTPATIENT
Start: 2018-01-30 | End: 2018-01-30 | Stop reason: HOSPADM

## 2018-01-30 RX ORDER — ACETAMINOPHEN 325 MG/1
650 TABLET ORAL EVERY 4 HOURS PRN
Status: DISCONTINUED | OUTPATIENT
Start: 2018-01-30 | End: 2018-01-30 | Stop reason: HOSPADM

## 2018-01-30 RX ORDER — HYDROCODONE BITARTRATE AND ACETAMINOPHEN 5; 325 MG/1; MG/1
1 TABLET ORAL EVERY 6 HOURS PRN
Qty: 20 TABLET | Refills: 0 | Status: SHIPPED | OUTPATIENT
Start: 2018-01-30 | End: 2018-02-04

## 2018-01-30 RX ORDER — ONDANSETRON 2 MG/ML
4 INJECTION INTRAMUSCULAR; INTRAVENOUS EVERY 8 HOURS PRN
Status: DISCONTINUED | OUTPATIENT
Start: 2018-01-30 | End: 2018-01-30 | Stop reason: HOSPADM

## 2018-01-30 RX ORDER — LIDOCAINE HYDROCHLORIDE 10 MG/ML
INJECTION, SOLUTION INFILTRATION; PERINEURAL PRN
Status: DISCONTINUED | OUTPATIENT
Start: 2018-01-30 | End: 2018-01-30 | Stop reason: SDUPTHER

## 2018-01-30 RX ORDER — DIPHENHYDRAMINE HYDROCHLORIDE 50 MG/ML
12.5 INJECTION INTRAMUSCULAR; INTRAVENOUS
Status: DISCONTINUED | OUTPATIENT
Start: 2018-01-30 | End: 2018-01-30 | Stop reason: HOSPADM

## 2018-01-30 RX ORDER — MORPHINE SULFATE 4 MG/ML
2 INJECTION, SOLUTION INTRAMUSCULAR; INTRAVENOUS EVERY 5 MIN PRN
Status: DISCONTINUED | OUTPATIENT
Start: 2018-01-30 | End: 2018-01-30 | Stop reason: HOSPADM

## 2018-01-30 RX ORDER — ENALAPRILAT 2.5 MG/2ML
1.25 INJECTION INTRAVENOUS
Status: DISCONTINUED | OUTPATIENT
Start: 2018-01-30 | End: 2018-01-30 | Stop reason: HOSPADM

## 2018-01-30 RX ORDER — METOCLOPRAMIDE HYDROCHLORIDE 5 MG/ML
10 INJECTION INTRAMUSCULAR; INTRAVENOUS
Status: DISCONTINUED | OUTPATIENT
Start: 2018-01-30 | End: 2018-01-30 | Stop reason: HOSPADM

## 2018-01-30 RX ORDER — SODIUM CHLORIDE, SODIUM LACTATE, POTASSIUM CHLORIDE, CALCIUM CHLORIDE 600; 310; 30; 20 MG/100ML; MG/100ML; MG/100ML; MG/100ML
INJECTION, SOLUTION INTRAVENOUS CONTINUOUS PRN
Status: DISCONTINUED | OUTPATIENT
Start: 2018-01-30 | End: 2018-01-30 | Stop reason: SDUPTHER

## 2018-01-30 RX ORDER — HYDROMORPHONE HCL 110MG/55ML
0.5 PATIENT CONTROLLED ANALGESIA SYRINGE INTRAVENOUS EVERY 5 MIN PRN
Status: DISCONTINUED | OUTPATIENT
Start: 2018-01-30 | End: 2018-01-30 | Stop reason: HOSPADM

## 2018-01-30 RX ORDER — SODIUM CHLORIDE 0.9 % (FLUSH) 0.9 %
10 SYRINGE (ML) INJECTION EVERY 12 HOURS SCHEDULED
Status: DISCONTINUED | OUTPATIENT
Start: 2018-01-30 | End: 2018-01-30 | Stop reason: HOSPADM

## 2018-01-30 RX ORDER — HYDRALAZINE HYDROCHLORIDE 20 MG/ML
5 INJECTION INTRAMUSCULAR; INTRAVENOUS EVERY 10 MIN PRN
Status: DISCONTINUED | OUTPATIENT
Start: 2018-01-30 | End: 2018-01-30 | Stop reason: HOSPADM

## 2018-01-30 RX ORDER — HYDROCODONE BITARTRATE AND ACETAMINOPHEN 5; 325 MG/1; MG/1
1 TABLET ORAL EVERY 4 HOURS PRN
Status: DISCONTINUED | OUTPATIENT
Start: 2018-01-30 | End: 2018-01-30 | Stop reason: HOSPADM

## 2018-01-30 RX ORDER — FENTANYL CITRATE 50 UG/ML
INJECTION, SOLUTION INTRAMUSCULAR; INTRAVENOUS PRN
Status: DISCONTINUED | OUTPATIENT
Start: 2018-01-30 | End: 2018-01-30 | Stop reason: SDUPTHER

## 2018-01-30 RX ORDER — DEXAMETHASONE SODIUM PHOSPHATE 10 MG/ML
INJECTION INTRAMUSCULAR; INTRAVENOUS PRN
Status: DISCONTINUED | OUTPATIENT
Start: 2018-01-30 | End: 2018-01-30 | Stop reason: SDUPTHER

## 2018-01-30 RX ORDER — LABETALOL HYDROCHLORIDE 5 MG/ML
5 INJECTION, SOLUTION INTRAVENOUS EVERY 10 MIN PRN
Status: DISCONTINUED | OUTPATIENT
Start: 2018-01-30 | End: 2018-01-30 | Stop reason: HOSPADM

## 2018-01-30 RX ORDER — HYDROMORPHONE HCL 110MG/55ML
0.25 PATIENT CONTROLLED ANALGESIA SYRINGE INTRAVENOUS EVERY 5 MIN PRN
Status: DISCONTINUED | OUTPATIENT
Start: 2018-01-30 | End: 2018-01-30 | Stop reason: HOSPADM

## 2018-01-30 RX ORDER — MORPHINE SULFATE 4 MG/ML
4 INJECTION, SOLUTION INTRAMUSCULAR; INTRAVENOUS EVERY 5 MIN PRN
Status: DISCONTINUED | OUTPATIENT
Start: 2018-01-30 | End: 2018-01-30 | Stop reason: HOSPADM

## 2018-01-30 RX ORDER — ONDANSETRON 2 MG/ML
INJECTION INTRAMUSCULAR; INTRAVENOUS PRN
Status: DISCONTINUED | OUTPATIENT
Start: 2018-01-30 | End: 2018-01-30

## 2018-01-30 RX ORDER — MIDAZOLAM HYDROCHLORIDE 1 MG/ML
INJECTION INTRAMUSCULAR; INTRAVENOUS PRN
Status: DISCONTINUED | OUTPATIENT
Start: 2018-01-30 | End: 2018-01-30 | Stop reason: SDUPTHER

## 2018-01-30 RX ORDER — PROPOFOL 10 MG/ML
INJECTION, EMULSION INTRAVENOUS PRN
Status: DISCONTINUED | OUTPATIENT
Start: 2018-01-30 | End: 2018-01-30 | Stop reason: SDUPTHER

## 2018-01-30 RX ORDER — SODIUM CHLORIDE 0.9 % (FLUSH) 0.9 %
10 SYRINGE (ML) INJECTION PRN
Status: DISCONTINUED | OUTPATIENT
Start: 2018-01-30 | End: 2018-01-30 | Stop reason: HOSPADM

## 2018-01-30 RX ADMIN — VANCOMYCIN HYDROCHLORIDE 1500 MG: 10 INJECTION, POWDER, LYOPHILIZED, FOR SOLUTION INTRAVENOUS at 12:11

## 2018-01-30 RX ADMIN — PROPOFOL 150 MG: 10 INJECTION, EMULSION INTRAVENOUS at 13:00

## 2018-01-30 RX ADMIN — DEXAMETHASONE SODIUM PHOSPHATE 5 MG: 10 INJECTION INTRAMUSCULAR; INTRAVENOUS at 13:08

## 2018-01-30 RX ADMIN — MIDAZOLAM HYDROCHLORIDE 2 MG: 1 INJECTION, SOLUTION INTRAMUSCULAR; INTRAVENOUS at 12:51

## 2018-01-30 RX ADMIN — ONDANSETRON HYDROCHLORIDE 4 MG: 2 SOLUTION INTRAMUSCULAR; INTRAVENOUS at 13:08

## 2018-01-30 RX ADMIN — LIDOCAINE HYDROCHLORIDE 50 MG: 10 INJECTION, SOLUTION INFILTRATION; PERINEURAL at 13:00

## 2018-01-30 RX ADMIN — FENTANYL CITRATE 25 MCG: 50 INJECTION, SOLUTION INTRAMUSCULAR; INTRAVENOUS at 13:14

## 2018-01-30 RX ADMIN — SODIUM CHLORIDE, SODIUM LACTATE, POTASSIUM CHLORIDE, AND CALCIUM CHLORIDE: 600; 310; 30; 20 INJECTION, SOLUTION INTRAVENOUS at 12:35

## 2018-01-30 ASSESSMENT — PAIN - FUNCTIONAL ASSESSMENT: PAIN_FUNCTIONAL_ASSESSMENT: 0-10

## 2018-01-30 ASSESSMENT — PAIN SCALES - GENERAL
PAINLEVEL_OUTOF10: 0
PAINLEVEL_OUTOF10: 0

## 2018-01-30 ASSESSMENT — PAIN DESCRIPTION - PAIN TYPE: TYPE: SURGICAL PAIN

## 2018-01-30 NOTE — PROGRESS NOTES
Problem: Venous Thromboembolism (VTW)/Deep Vein Thrombosis (DVT) Prevention:  Goal: Patient will participate in Venous Thrombosis (VTE)/Deep Vein Thrombosis (DVT)Prevention Measures  Lovenox given as a preventative measure.     Problem: Mobility  Goal: Risk for activity intolerance will decrease  Pt frequently wheelchair and staying mobile.        Spoke with Misti Lozano requesting vanco start time.

## 2018-01-30 NOTE — H&P (VIEW-ONLY)
Patient Care Team:  Keily Hummel MD as PCP - General (Family Medicine)  Rocael Richards MD (Family Medicine)      History and Physical    Mr. Isaac Lennox is a 73 yo male with a history of PVD and DM , with non healing wounds of bilateral feet requiring a RLE revascularization and multiple LE endovascular interventions in the past.  He underwent a left great toe ray amputation on 6/12/17 for a non healing gangrenous wound despite all efforts. He presents today for evaluation of non healing wounds on the left 3rd toe. He has been going to [facility] s/p left great toe amp for local wound care with vac therapy. The left great toe amp site healed. He reports that since he was last seen in our office he has developed 2 wounds on the left 3rd toe. He is currently on ASA, Pletal, Plavix and a statin daily. He denies any fever/chills. He reports that he finished up an antibiotic approximately 1 week ago.      Ivy Jackson is a 74 y.o. male with the following history reviewed and recorded in [facility]:  Patient Active Problem List    Diagnosis Date Noted    Open wound of toe with complication 37/51/4275    Cellulitis of foot     Type 2 diabetes mellitus with diabetic peripheral angiopathy and gangrene, with long-term current use of insulin ([facility])     Gangrene of toe ([facility] [address] 06/08/2017    Atherosclerosis of native arteries of the extremities with ulceration ([facility] [address] 08/25/2014    Diabetic polyneuropathy associated with type 2 diabetes mellitus ([facility] [address]     Coronary artery disease involving native coronary artery without angina pectoris     Erectile dysfunction     Hypercholesterolemia     Essential hypertension      Current Outpatient Prescriptions   Medication Sig Dispense Refill    clopidogrel (PLAVIX) 75 MG tablet TAKE 1 TABLET EVERY DAY 90 tablet 3    Metoprolol Tartrate 75 MG TABS Take 75 mg by mouth 2 times daily 60 tablet 1    cilostazol (PLETAL) 100 MG tablet TAKE 1 TABLET TWICE DAILY 180 tablet 5    with 8 mm x 5 cm coil and seven 5 mm x 5 cm coils.  VASCULAR SURGERY  1/12/2015 [facility]     Right posterior tibial artery balloon angioplasty 2.5 mm x 100 mm vascutrak balloon. Right peroneal artery balloon angioplasty with 2.5 x 100 mm vascutrak balloon. Completion right lower extremity arteriograms.  VASCULAR SURGERY  06/17/2017    [facility]. Percutaneous cannulation right common femoral artery with ultrasound guidance and 5 Burkinan and later 6 Burkinan sheath placement. Suprarenal abdominal aorta gram with bilateral lower extremity arteriogram.Left superficial femoral/popliteal/tibial peroneal trunk/posterior tibial artery atherectomy with csi 1.25 solid crown and 2.0 solid crown. Left tibial peroneal trunk and posterior tibial artery     VASCULAR SURGERY  06/17/2017    CONT.balloon angioplasty with 3mm x 100 mmand 3.5mm x 300mm vascutrak balloon. Left popliteal artery balloon angioplasty with 5 mmx 100mm lutonix drug coated balloon. #6 left superficial femoral artery balloon angioplasty with 3.6 [id number] mm lutonix grug coated balloons. Completion left lower extremity arteriograms. Mynx closure right common femoral artery puncture site. Family History   Problem Relation Age of Onset    Cancer Mother     Cancer Father      Social History   Substance Use Topics    Smoking status: Former Smoker     Types: Pipe    Smokeless tobacco: Never Used      Comment: pipe on occassion    Alcohol use No         Review of Systems    Constitutional  no significant activity change, appetite change, or unexpected weight change. No fever or chills. No diaphoresis or significant fatigue. HENT  no significant rhinorrhea or epistaxis. No tinnitus or significant hearing loss. Eyes  no sudden vision change or amaurosis. Respiratory  no significant shortness of breath, wheezing, or stridor. No apnea, cough, or chest tightness associated with shortness of breath. Cardiovascular  no chest pain, syncope, or significant dizziness.   No distension or palpable mass. Genitourinary  deferred. Musculoskeletal  ROM appears normal.  No significant edema. Neurologic  alert and oriented X 3. Physiologic. Skin  warm, dry, and intact. No rash, erythema, or pallor. Psychiatric  mood, affect, and behavior appear normal.  Judgment and thought processes appear normal.    Risk factors for atherosclerosis of all vascular beds have been reviewed with the patient including:  Family history, tobacco abuse in all forms, elevated cholesterol, hyperlipidemia, and diabetes. Assessment    1. Open wound of toe with complication, initial encounter    2. Wound infection  3. Cellulitis      Plan    Continue  ASA EC 81 mg daily  Continue  Plavix 75 mg daily  Continue Pletal 100 mg BID  Recommend continue statin therapy   We will send him today for a A'scan with CHELSEA's to assess arterial blood flow. Discussed with Dr. Vani Barnett. Further recommendation following results of arterial A'scan     Addendum:   Impression        Left lower extremity arterial duplex exam shows diffuse atherosclerosis,    however, no focal stenosis are seen. LE Duplex Measurements       +---------------++-----+-----+----+-----------++----+-----+----+-----------+   !               ! ! Right!     !Left!           !!    !     !    !           !   +---------------++-----+-----+----+-----------++----+-----+----+-----------+   ! Location       !!PSV  !Ratio! EDV ! Wave Desc. !!PSV ! Ratio! EDV ! Wave Desc. !   +---------------++-----+-----+----+-----------++----+-----+----+-----------+   ! Common Femoral !!     !     !    !           !!255 !     !    !           !   +---------------++-----+-----+----+-----------++----+-----+----+-----------+   ! Prox PFA       !!     !     !    !           !!195 !     !    !           !   +---------------++-----+-----+----+-----------++----+-----+----+-----------+   ! Prox SFA       !!     !     !    !           !!225 !     !    !           !

## 2018-01-31 ENCOUNTER — TELEPHONE (OUTPATIENT)
Dept: VASCULAR SURGERY | Age: 73
End: 2018-01-31

## 2018-01-31 NOTE — TELEPHONE ENCOUNTER
Patient called in to schedule his post op appt and stated that it needed to be on 02/02/2018. This is on a Friday and [facility] can not schedule on this date. Please call the patient with the date and time of his post op appt.   Thank you

## 2018-02-02 ENCOUNTER — OFFICE VISIT (OUTPATIENT)
Dept: VASCULAR SURGERY | Age: 73
End: 2018-02-02

## 2018-02-02 VITALS
OXYGEN SATURATION: 92 % | RESPIRATION RATE: 18 BRPM | TEMPERATURE: 98.3 F | SYSTOLIC BLOOD PRESSURE: 120 MMHG | HEART RATE: 53 BPM | DIASTOLIC BLOOD PRESSURE: 68 MMHG

## 2018-02-02 DIAGNOSIS — Z09 FOLLOW UP: Primary | ICD-10-CM

## 2018-02-02 PROCEDURE — 99024 POSTOP FOLLOW-UP VISIT: CPT | Performed by: PHYSICIAN ASSISTANT

## 2018-02-02 NOTE — PROGRESS NOTES
Patient Care Team:  Leelee Guzman MD as PCP - General (Family Medicine)  Sanchez Aparicio MD (Family Medicine)    Mr. Aruna Roland is a 74 y.o. male who presents for post op evaluation. Patient had a left TMA on 1/30/18 {VASCULAR OR This was performed by Dr. Gerard Mariee. Post op symptoms reported increasing swelling. Post op pain is minimal.  He denies any fever/chills. On evaluation today, incision skin edges are slightly , sutures intact. No discoloration of skin edges. There is swelling noted left foot. No bleeding noted at this time. Today we left all sutures intact. Foot warm and well perfused. Today we replaced the unna boot. We have recommended continued clopidogrel 75 mg po qd daily. He is to continue wearing his offload sandal and elevating his left leg above the level of his heart as much as possible. We will follow up with him in 3 days for a unna boot change or sooner if he develops fever/chills or wound deterioration. This should bring you up to date on [facility] [place]  As always we want to thank you for allowing us to participate in the care of your patients.     Sincerely,    Bobby Souza PA-C

## 2018-02-05 ENCOUNTER — OFFICE VISIT (OUTPATIENT)
Dept: VASCULAR SURGERY | Age: 73
End: 2018-02-05

## 2018-02-05 VITALS
HEART RATE: 64 BPM | SYSTOLIC BLOOD PRESSURE: 132 MMHG | DIASTOLIC BLOOD PRESSURE: 65 MMHG | RESPIRATION RATE: 18 BRPM | TEMPERATURE: 96 F

## 2018-02-05 DIAGNOSIS — I70.25 ATHEROSCLEROSIS OF NATIVE ARTERIES OF THE EXTREMITIES WITH ULCERATION (HCC): Primary | Chronic | ICD-10-CM

## 2018-02-05 PROCEDURE — 99024 POSTOP FOLLOW-UP VISIT: CPT | Performed by: NURSE PRACTITIONER

## 2018-02-06 NOTE — PROGRESS NOTES
Patient Care Team:  Lance Mcarthur MD as PCP - General (Family Medicine)  Winston Carbajal MD (Family Medicine)    Mr. Carla Padilla is a 74 y.o. male who presents for post op evaluation. Patient had a left TMA on 1/30/18. This was performed by Dr. Twyla Malhotra. Post op symptoms reported increasing swelling. Post op pain is minimal.  He denies any fever/chills. On evaluation today, incision skin edges are still slightly , sutures intact. No discoloration of skin edges. There is swelling noted left foot. Scant  bleeding when dressing removed. Today we left all sutures intact. Foot warm and well perfused. Today we replaced the unna boot. We have recommended continued clopidogrel 75 mg po qd daily. He is to continue wearing his offload sandal and elevating his left leg above the level of his heart as much as possible. We will follow up with him in 4 days for a unna boot change or sooner if he develops fever/chills or wound deterioration. This should bring you up to date on Francois Pace  As always we want to thank you for allowing us to participate in the care of your patients.     Sincerely,    SHAHNAZ Jacques

## 2018-02-09 ENCOUNTER — OFFICE VISIT (OUTPATIENT)
Dept: VASCULAR SURGERY | Age: 73
End: 2018-02-09

## 2018-02-09 VITALS
DIASTOLIC BLOOD PRESSURE: 64 MMHG | TEMPERATURE: 96.4 F | RESPIRATION RATE: 18 BRPM | HEART RATE: 69 BPM | SYSTOLIC BLOOD PRESSURE: 132 MMHG

## 2018-02-09 DIAGNOSIS — Z09 FOLLOW UP: Primary | ICD-10-CM

## 2018-02-09 PROCEDURE — 99024 POSTOP FOLLOW-UP VISIT: CPT | Performed by: PHYSICIAN ASSISTANT

## 2018-02-12 ENCOUNTER — OFFICE VISIT (OUTPATIENT)
Dept: VASCULAR SURGERY | Age: 73
End: 2018-02-12

## 2018-02-12 VITALS
SYSTOLIC BLOOD PRESSURE: 126 MMHG | DIASTOLIC BLOOD PRESSURE: 64 MMHG | HEART RATE: 60 BPM | TEMPERATURE: 96.7 F | RESPIRATION RATE: 18 BRPM

## 2018-02-12 DIAGNOSIS — I70.25 ATHEROSCLEROSIS OF NATIVE ARTERIES OF THE EXTREMITIES WITH ULCERATION (HCC): Primary | Chronic | ICD-10-CM

## 2018-02-12 PROCEDURE — 99024 POSTOP FOLLOW-UP VISIT: CPT | Performed by: NURSE PRACTITIONER

## 2018-02-12 RX ORDER — BACITRACIN ZINC AND POLYMYXIN B SULFATE 500; 1000 [USP'U]/G; [USP'U]/G
OINTMENT TOPICAL
Qty: 1 TUBE | Refills: 1 | Status: SHIPPED | OUTPATIENT
Start: 2018-02-12 | End: 2018-02-19

## 2018-02-19 ENCOUNTER — OFFICE VISIT (OUTPATIENT)
Dept: VASCULAR SURGERY | Age: 73
End: 2018-02-19

## 2018-02-19 VITALS
RESPIRATION RATE: 18 BRPM | SYSTOLIC BLOOD PRESSURE: 130 MMHG | TEMPERATURE: 96 F | DIASTOLIC BLOOD PRESSURE: 76 MMHG | HEART RATE: 68 BPM

## 2018-02-19 DIAGNOSIS — Z09 FOLLOW UP: Primary | ICD-10-CM

## 2018-02-19 PROCEDURE — 99024 POSTOP FOLLOW-UP VISIT: CPT | Performed by: PHYSICIAN ASSISTANT

## 2018-02-19 RX ORDER — GABAPENTIN 300 MG/1
300 CAPSULE ORAL 2 TIMES DAILY
Status: ON HOLD | COMMUNITY
End: 2022-01-01

## 2018-02-19 NOTE — PROGRESS NOTES
Patient Care Team:  Elaina Owens MD as PCP - General (Family Medicine)  Isidra Fisher MD (Family Medicine)    Mr. Bob Tarango is a 74 y.o. male who presents for post op evaluation. Patient had a left TMA on 1/30/18. This was performed by Dr. Jairo Haile. He reports decreased beeding and swelling. He is having neuropathy type pain intermittently. He denies any fever/chills. He reports that he is using the santly BID. On evaluation today, incision skin edges are still , sutures intact. Lateral edge is macerated Today we removed all  Sutures. Foot warm and well perfused. DP pulse palpable. Swelling has greatly decreased. Today we recommended soap and water wash twice daily. He is to apply Santyl to two different areas along the incision line. (lateral edges). We have recommended he continue Plavix every other day. He is to continue wearing his offload sandal and elevating his left leg above the level of his heart as much as possible. We will follow up with him in 1 week or sooner if he develops fever/chills or wound deterioration. This should bring you up to date on [place]  As always we want to thank you for allowing us to participate in the care of your patients.     Sincerely,    Vani Teran PA-C

## 2018-02-27 ENCOUNTER — OFFICE VISIT (OUTPATIENT)
Dept: VASCULAR SURGERY | Age: 73
End: 2018-02-27

## 2018-02-27 VITALS
SYSTOLIC BLOOD PRESSURE: 123 MMHG | RESPIRATION RATE: 18 BRPM | DIASTOLIC BLOOD PRESSURE: 60 MMHG | HEART RATE: 66 BPM | TEMPERATURE: 97.3 F

## 2018-02-27 DIAGNOSIS — Z09 FOLLOW UP: Primary | ICD-10-CM

## 2018-02-27 PROCEDURE — 99024 POSTOP FOLLOW-UP VISIT: CPT | Performed by: PHYSICIAN ASSISTANT

## 2018-02-27 RX ORDER — DOXYCYCLINE 100 MG/1
100 TABLET ORAL 2 TIMES DAILY
Qty: 20 TABLET | Refills: 0 | Status: SHIPPED | OUTPATIENT
Start: 2018-02-27 | End: 2018-03-09

## 2018-02-27 NOTE — PROGRESS NOTES
Patient Care Team:  Tierra Benito MD as PCP - General (Family Medicine)  Randy Jc MD (Family Medicine)    Mr. Gisele Tam is a 74 y.o. male who presents for post op evaluation. Patient had a left TMA on 1/30/18. This was performed by Dr. Casper Bravo. He reports increased  Swelling since his last appt. He reports a low grade fever yesterday and did not feel goo. He reports no other fever and feels a little better today. He reports that he is using the santly BID. On evaluation today, incision skin edges are still . Wound bed pink with scattered slough noted. Lateral edge appear less macerated. He has moderate swelling of left foot. He has some chronic appearing erythema noted on the left TMA  Site. He has was appears to be a purpuric area on the dorsal foot underlying where the strap of the off load sandal lies. Foot warm and well perfused. DP pulse palpable. We will reapply the unna boot. We have recommended he continue Plavix every other day. He is to continue wearing his offload sandal and elevating his left leg above the level of his heart as much as possible. We will follow up with him  On Friday for a wound check and possible unna boot change. sooner if he develops fever/chills or wound deterioration. This should bring you up to date on [facility]  As always we want to thank you for allowing us to participate in the care of your patients.     Sincerely,    Nydia Pitt PA-C

## 2018-03-02 ENCOUNTER — OFFICE VISIT (OUTPATIENT)
Dept: VASCULAR SURGERY | Age: 73
End: 2018-03-02

## 2018-03-02 VITALS
DIASTOLIC BLOOD PRESSURE: 65 MMHG | RESPIRATION RATE: 18 BRPM | SYSTOLIC BLOOD PRESSURE: 138 MMHG | HEART RATE: 64 BPM | TEMPERATURE: 96.8 F

## 2018-03-02 DIAGNOSIS — Z09 FOLLOW UP: Primary | ICD-10-CM

## 2018-03-02 PROCEDURE — 99024 POSTOP FOLLOW-UP VISIT: CPT | Performed by: PHYSICIAN ASSISTANT

## 2018-03-02 NOTE — PROGRESS NOTES
Patient Care Team:  Ave Griffith MD as PCP - General (Family Medicine)  Jj Martinez MD (Family Medicine)    Mr. Fly Fajardo is a 74 y.o. male who presents for post op evaluation. Patient had a left TMA on 1/30/18. This was performed by Dr. Maggie Rutherford. He comes in today for a wound check and unna boot change. On evaluation today, incision skin edges are still . Wound bed pink with scattered slough noted. Lateral edge appear less macerated. He has less swelling of left foot. He has some chronic appearing erythema noted on the left TMA  Site. He has was appears to be a purpuric area on the dorsal foot underlying where the strap of the off load sandal lies. Foot warm and well perfused. DP and PT pulses with 2+ doppler signals. We will reapply the unna boot. We have recommended he continue Plavix every other day. He is to continue the Doxycycline 100 mg BID until finished. He is to continue wearing his offload sandal and elevating his left leg above the level of his heart as much as possible. We will follow up with him on Monday for a wound check and possible unna boot change. sooner if he develops fever/chills or wound deterioration. This should bring you up to date on Domenico Resendiz  As always we want to thank you for allowing us to participate in the care of your patients.     Sincerely,    Otf Ritter PA-C

## 2018-03-05 ENCOUNTER — OFFICE VISIT (OUTPATIENT)
Dept: VASCULAR SURGERY | Age: 73
End: 2018-03-05

## 2018-03-05 VITALS
TEMPERATURE: 96.6 F | RESPIRATION RATE: 18 BRPM | DIASTOLIC BLOOD PRESSURE: 70 MMHG | OXYGEN SATURATION: 95 % | SYSTOLIC BLOOD PRESSURE: 112 MMHG | HEART RATE: 52 BPM

## 2018-03-05 DIAGNOSIS — Z09 FOLLOW UP: Primary | ICD-10-CM

## 2018-03-05 PROCEDURE — 99024 POSTOP FOLLOW-UP VISIT: CPT | Performed by: PHYSICIAN ASSISTANT

## 2018-03-05 RX ORDER — LEVOFLOXACIN 500 MG/1
500 TABLET, FILM COATED ORAL DAILY
Qty: 10 TABLET | Refills: 0 | Status: SHIPPED | OUTPATIENT
Start: 2018-03-05 | End: 2018-03-15

## 2018-03-05 RX ORDER — LEVOFLOXACIN 500 MG/1
500 TABLET, FILM COATED ORAL DAILY
Qty: 10 TABLET | Refills: 0 | Status: SHIPPED | OUTPATIENT
Start: 2018-03-05 | End: 2018-03-05 | Stop reason: CLARIF

## 2018-03-12 ENCOUNTER — OFFICE VISIT (OUTPATIENT)
Dept: VASCULAR SURGERY | Age: 73
End: 2018-03-12

## 2018-03-12 VITALS
SYSTOLIC BLOOD PRESSURE: 125 MMHG | TEMPERATURE: 96.3 F | RESPIRATION RATE: 18 BRPM | HEART RATE: 64 BPM | DIASTOLIC BLOOD PRESSURE: 66 MMHG

## 2018-03-12 DIAGNOSIS — Z09 FOLLOW UP: Primary | ICD-10-CM

## 2018-03-12 PROCEDURE — 99024 POSTOP FOLLOW-UP VISIT: CPT | Performed by: PHYSICIAN ASSISTANT

## 2018-03-12 NOTE — PROGRESS NOTES
Patient Care Team:  Delia Gutierrez MD as PCP - General (Family Medicine)  Leopold Knoll, MD (Family Medicine)    Mr. Jyoti Huang is a 74 y.o. male who presents for post op evaluation. Patient had a left TMA on 1/30/18. This was performed by Dr. Christiane Agee. He comes in today for a wound check and unna boot change. On evaluation today, incision skin edges are still . Wound bed pink with scattered slough noted. Lateral edge appear less macerated. He has some thick callus skin along the plantar aspect of the incision line. He has less swelling of left foot. He has some chronic appearing erythema noted on the left TMA Site. Foot warm and well perfused. DP and PT pulses with 2+ doppler signals. (evaluated by Dr. Christiane Agee)    He is to continue wearing his offload sandal and elevating his left leg above the level of his heart as much as possible. We will reapply unna boot. He is to follow up in 1 week for a wound check or sooner if he develops fever/chills or wound deterioration. This should bring you up to date on Jo-Ann Rexdennis  As always we want to thank you for allowing us to participate in the care of your patients.     Sincerely,    Marisol Arthur PA-C

## 2018-03-19 ENCOUNTER — OFFICE VISIT (OUTPATIENT)
Dept: VASCULAR SURGERY | Age: 73
End: 2018-03-19

## 2018-03-19 VITALS
RESPIRATION RATE: 18 BRPM | HEART RATE: 66 BPM | SYSTOLIC BLOOD PRESSURE: 102 MMHG | BODY MASS INDEX: 28.85 KG/M2 | TEMPERATURE: 97.7 F | DIASTOLIC BLOOD PRESSURE: 64 MMHG | HEIGHT: 72 IN | WEIGHT: 213 LBS

## 2018-03-19 DIAGNOSIS — I70.25 ATHEROSCLEROSIS OF NATIVE ARTERIES OF THE EXTREMITIES WITH ULCERATION (HCC): Primary | Chronic | ICD-10-CM

## 2018-03-19 PROCEDURE — 99024 POSTOP FOLLOW-UP VISIT: CPT | Performed by: NURSE PRACTITIONER

## 2018-03-26 ENCOUNTER — OFFICE VISIT (OUTPATIENT)
Dept: VASCULAR SURGERY | Age: 73
End: 2018-03-26

## 2018-03-26 VITALS
SYSTOLIC BLOOD PRESSURE: 141 MMHG | TEMPERATURE: 96.5 F | RESPIRATION RATE: 18 BRPM | HEART RATE: 81 BPM | DIASTOLIC BLOOD PRESSURE: 73 MMHG

## 2018-03-26 DIAGNOSIS — S91.109D OPEN WOUND OF TOE WITH COMPLICATION, SUBSEQUENT ENCOUNTER: Primary | ICD-10-CM

## 2018-03-26 PROCEDURE — 99024 POSTOP FOLLOW-UP VISIT: CPT | Performed by: NURSE PRACTITIONER

## 2018-04-09 ENCOUNTER — OFFICE VISIT (OUTPATIENT)
Dept: VASCULAR SURGERY | Age: 73
End: 2018-04-09

## 2018-04-09 VITALS
RESPIRATION RATE: 18 BRPM | TEMPERATURE: 97.1 F | HEART RATE: 92 BPM | SYSTOLIC BLOOD PRESSURE: 142 MMHG | DIASTOLIC BLOOD PRESSURE: 61 MMHG

## 2018-04-09 DIAGNOSIS — I70.25 ATHEROSCLEROSIS OF NATIVE ARTERIES OF THE EXTREMITIES WITH ULCERATION (HCC): Primary | Chronic | ICD-10-CM

## 2018-04-09 PROCEDURE — 99024 POSTOP FOLLOW-UP VISIT: CPT | Performed by: NURSE PRACTITIONER

## 2018-04-23 ENCOUNTER — OFFICE VISIT (OUTPATIENT)
Dept: VASCULAR SURGERY | Age: 73
End: 2018-04-23

## 2018-04-23 VITALS
SYSTOLIC BLOOD PRESSURE: 124 MMHG | WEIGHT: 215 LBS | TEMPERATURE: 97 F | HEART RATE: 76 BPM | HEIGHT: 72 IN | BODY MASS INDEX: 29.12 KG/M2 | DIASTOLIC BLOOD PRESSURE: 72 MMHG

## 2018-04-23 DIAGNOSIS — I70.25 ATHEROSCLEROSIS OF NATIVE ARTERIES OF THE EXTREMITIES WITH ULCERATION (HCC): Primary | Chronic | ICD-10-CM

## 2018-04-23 PROCEDURE — 99024 POSTOP FOLLOW-UP VISIT: CPT | Performed by: NURSE PRACTITIONER

## 2018-05-14 ENCOUNTER — OFFICE VISIT (OUTPATIENT)
Dept: VASCULAR SURGERY | Age: 73
End: 2018-05-14
Payer: MEDICARE

## 2018-05-14 VITALS
DIASTOLIC BLOOD PRESSURE: 67 MMHG | RESPIRATION RATE: 18 BRPM | TEMPERATURE: 96.6 F | SYSTOLIC BLOOD PRESSURE: 116 MMHG | HEART RATE: 85 BPM

## 2018-05-14 DIAGNOSIS — Z09 FOLLOW UP: Primary | ICD-10-CM

## 2018-05-14 PROCEDURE — 1123F ACP DISCUSS/DSCN MKR DOCD: CPT | Performed by: PHYSICIAN ASSISTANT

## 2018-05-14 PROCEDURE — 4040F PNEUMOC VAC/ADMIN/RCVD: CPT | Performed by: PHYSICIAN ASSISTANT

## 2018-05-14 PROCEDURE — 1036F TOBACCO NON-USER: CPT | Performed by: PHYSICIAN ASSISTANT

## 2018-05-14 PROCEDURE — G8427 DOCREV CUR MEDS BY ELIG CLIN: HCPCS | Performed by: PHYSICIAN ASSISTANT

## 2018-05-14 PROCEDURE — G8419 CALC BMI OUT NRM PARAM NOF/U: HCPCS | Performed by: PHYSICIAN ASSISTANT

## 2018-05-14 PROCEDURE — 3017F COLORECTAL CA SCREEN DOC REV: CPT | Performed by: PHYSICIAN ASSISTANT

## 2018-05-14 PROCEDURE — G8598 ASA/ANTIPLAT THER USED: HCPCS | Performed by: PHYSICIAN ASSISTANT

## 2018-05-14 PROCEDURE — 99213 OFFICE O/P EST LOW 20 MIN: CPT | Performed by: PHYSICIAN ASSISTANT

## 2018-06-11 ENCOUNTER — OFFICE VISIT (OUTPATIENT)
Dept: VASCULAR SURGERY | Age: 73
End: 2018-06-11

## 2018-06-11 VITALS
RESPIRATION RATE: 18 BRPM | DIASTOLIC BLOOD PRESSURE: 76 MMHG | TEMPERATURE: 97.5 F | HEART RATE: 88 BPM | SYSTOLIC BLOOD PRESSURE: 118 MMHG | OXYGEN SATURATION: 96 %

## 2018-06-11 DIAGNOSIS — I70.213 ATHEROSCLEROSIS OF NATIVE ARTERY OF BOTH LOWER EXTREMITIES WITH INTERMITTENT CLAUDICATION (HCC): Primary | ICD-10-CM

## 2018-06-11 PROCEDURE — 99024 POSTOP FOLLOW-UP VISIT: CPT | Performed by: NURSE PRACTITIONER

## 2018-08-06 ENCOUNTER — HOSPITAL ENCOUNTER (OUTPATIENT)
Dept: VASCULAR LAB | Age: 73
Discharge: HOME | End: 2018-08-06
Payer: MEDICARE

## 2018-08-06 ENCOUNTER — OFFICE VISIT (OUTPATIENT)
Dept: VASCULAR SURGERY | Age: 73
End: 2018-08-06
Payer: MEDICARE

## 2018-08-06 VITALS — SYSTOLIC BLOOD PRESSURE: 136 MMHG | TEMPERATURE: 95.4 F | DIASTOLIC BLOOD PRESSURE: 65 MMHG | HEART RATE: 73 BPM

## 2018-08-06 DIAGNOSIS — I70.25 ATHEROSCLEROSIS OF NATIVE ARTERIES OF THE EXTREMITIES WITH ULCERATION (HCC): Primary | Chronic | ICD-10-CM

## 2018-08-06 DIAGNOSIS — I70.213 ATHEROSCLEROSIS OF NATIVE ARTERY OF BOTH LOWER EXTREMITIES WITH INTERMITTENT CLAUDICATION (HCC): ICD-10-CM

## 2018-08-06 PROCEDURE — G8427 DOCREV CUR MEDS BY ELIG CLIN: HCPCS | Performed by: NURSE PRACTITIONER

## 2018-08-06 PROCEDURE — 1101F PT FALLS ASSESS-DOCD LE1/YR: CPT | Performed by: NURSE PRACTITIONER

## 2018-08-06 PROCEDURE — G8419 CALC BMI OUT NRM PARAM NOF/U: HCPCS | Performed by: NURSE PRACTITIONER

## 2018-08-06 PROCEDURE — 1123F ACP DISCUSS/DSCN MKR DOCD: CPT | Performed by: NURSE PRACTITIONER

## 2018-08-06 PROCEDURE — 4040F PNEUMOC VAC/ADMIN/RCVD: CPT | Performed by: NURSE PRACTITIONER

## 2018-08-06 PROCEDURE — 93922 UPR/L XTREMITY ART 2 LEVELS: CPT

## 2018-08-06 PROCEDURE — 93926 LOWER EXTREMITY STUDY: CPT

## 2018-08-06 PROCEDURE — 3017F COLORECTAL CA SCREEN DOC REV: CPT | Performed by: NURSE PRACTITIONER

## 2018-08-06 PROCEDURE — 99212 OFFICE O/P EST SF 10 MIN: CPT | Performed by: NURSE PRACTITIONER

## 2018-08-06 PROCEDURE — G8598 ASA/ANTIPLAT THER USED: HCPCS | Performed by: NURSE PRACTITIONER

## 2018-08-06 PROCEDURE — 1036F TOBACCO NON-USER: CPT | Performed by: NURSE PRACTITIONER

## 2018-08-06 NOTE — PROGRESS NOTES
Patient Care Team:  Hanny Eng MD as PCP - General (Family Medicine)  Nahid Jensen MD (Family Medicine)      Subjective    Constantino Nick has a history of peripheral vascular disease of the lower extremities. He has had this for 1 - 5 years. Current treatment includes clopidogrel 75 mg po qd, ASA EC daily. Constantino Nick has chronic wound of his right foot. His left transmet is healed. Recently, he reports no claudication unless significant distance. Samy Rey is a 74 y.o. 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)     Gangrene of toe ([facility] [address] 06/08/2017    Atherosclerosis of native arteries of the extremities with ulceration ([facility] 08/25/2014    Diabetic polyneuropathy associated with type 2 diabetes mellitus ([facility] [address]     Coronary artery disease involving native coronary artery without angina pectoris     Erectile dysfunction     Hypercholesterolemia     Essential hypertension      Current Outpatient Prescriptions   Medication Sig Dispense Refill    gabapentin (NEURONTIN) 300 MG capsule Take 300 mg by mouth 2 times daily.  clopidogrel (PLAVIX) 75 MG tablet TAKE 1 TABLET EVERY DAY 90 tablet 3    Metoprolol Tartrate 75 MG TABS Take 75 mg by mouth 2 times daily 60 tablet 1    cilostazol (PLETAL) 100 MG tablet TAKE 1 TABLET TWICE DAILY 180 tablet 5    aspirin 81 MG tablet Take 81 mg by mouth daily.  simvastatin (ZOCOR) 20 MG tablet Take 20 mg by mouth nightly.  folic acid (FOLVITE) 451 MCG tablet Take 800 mcg by mouth daily.       insulin lispro (HUMALOG) 100 UNIT/ML injection vial Inject into the skin 3 times daily (before meals) 30 units before each meal      insulin glargine (LANTUS) 100 UNIT/ML injection vial Inject 80 Units into the skin nightly        No current facility-administered medications for this visit. Allergies: Keflex [cephalexin]  Past Medical History:   Diagnosis Date    Atherosclerosis of native arteries of the extremities with ulceration(440.23) 8/25/2014    CAD (coronary artery disease)     sees dr at [facility] (dr. Amelia Sánchez)    Cataracts, bilateral     Diabetes mellitus ([address]     Diabetic neuropathy ([facility] [address]     ED (erectile dysfunction)     HTN (hypertension)     Hypercholesterolemia     Open wound of right foot     states this is not the operative foot    Ulcerative colitis ([address]      Past Surgical History:   Procedure Laterality Date    CHOLECYSTECTOMY      CORONARY ARTERY BYPASS GRAFT  2003    EYE SURGERY      [email or web address]. cat    ILEOSTOMY OR JEJUNOSTOMY  1995    due to surgery from ulcerative colitis    [place] AMPUTATION FOOT,TRANSMETATARSAL Left 1/30/2018    TRANSMET AMPUTATION performed by Anh North MD at [address] [facility] Left 6/12/2017    [facility]. Left great toe ray amputation through the metatarsal level.  VASCULAR SURGERY  8/26/14 [facility]    Percutaneous cannulation, left common femoral artery, with 5-Tristanian glidesheath. Suprarenal abdominal aortogram with bilateral iliofemoral arteriogra. Bilateral lower extremity arteriogram.    VASCULAR SURGERY  09/08/14 [facility]    Right femoral to tibioperoneal trunk bypass with in situ right greater saphenous vein. Right common femoral endarterectomy with vascular patch repair. Right tibioperoneal trunk vein patch and endarterectomy. Completion right lower exteremity arteriograms    VASCULAR SURGERY  1/12/2015 [facility]    Percutaneous cannulation left common femoral artery with 5-Tristanian and later 6-Tristanian pinnacle destination sheath. Suprarenal abdominal aortogram with bilateral iliofemoral arteriograms. Bilateral lower extremity arteriograms. Coil embolization right greater saphenous vein bypass branch with 8 mm x 5 cm coil deferred. Musculoskeletal - ROM appears normal.  No significant edema. Neurologic - alert and oriented X 3. Physiologic. Skin - warm, dry, and intact. No rash, erythema, or pallor. Psychiatric - mood, affect, and behavior appear normal.  Judgment and thought processes appear normal.    Risk factors for atherosclerosis of all vascular beds have been reviewed with the patient including:  Family history, tobacco abuse in all forms, elevated cholesterol, hyperlipidemia, and diabetes. Lower extremity arterial study: Right CHELSEA 1.17, Left CHELSEA 1.29. Individual films reviewed: Yes. Test results were reviewed with the patient. Disease process is stable      Risk factors for atherosclerosis of all vascular beds have been reviewed with the patient including:  Family history, tobacco abuse in all forms, elevated cholesterol, hyperlipidemia, and diabetes. Options have been discussed with the patient including continued medical management. Patient has opted to proceed with continued medical management. Assessment    1.  Atherosclerosis of native arteries of the extremities with ulceration (HCC)          Plan    Start/Continue ASA EC 81 mg daily  Plavix 75 mg po daily  Education about caludication causes and treatment discussed  Call with any new wound development or progressive claudication  Walking program  Leg elevation  Good moisturization  Good skin care  Follow up in 6 months with arterial study  Recommend no smoking  Strongly encourage statin therapy

## 2018-10-24 ENCOUNTER — OFFICE VISIT (OUTPATIENT)
Dept: FAMILY MEDICINE CLINIC | Facility: CLINIC | Age: 73
End: 2018-10-24

## 2018-10-24 VITALS
OXYGEN SATURATION: 96 % | HEART RATE: 88 BPM | TEMPERATURE: 97.8 F | HEIGHT: 72 IN | SYSTOLIC BLOOD PRESSURE: 158 MMHG | DIASTOLIC BLOOD PRESSURE: 80 MMHG | WEIGHT: 215 LBS | RESPIRATION RATE: 18 BRPM | BODY MASS INDEX: 29.12 KG/M2

## 2018-10-24 DIAGNOSIS — I10 ESSENTIAL HYPERTENSION: ICD-10-CM

## 2018-10-24 DIAGNOSIS — Z79.4 TYPE 2 DIABETES MELLITUS WITH MICROALBUMINURIA, WITH LONG-TERM CURRENT USE OF INSULIN (HCC): ICD-10-CM

## 2018-10-24 DIAGNOSIS — R80.9 TYPE 2 DIABETES MELLITUS WITH MICROALBUMINURIA, WITH LONG-TERM CURRENT USE OF INSULIN (HCC): ICD-10-CM

## 2018-10-24 DIAGNOSIS — N49.2 SCROTAL ABSCESS: Primary | ICD-10-CM

## 2018-10-24 DIAGNOSIS — E11.29 TYPE 2 DIABETES MELLITUS WITH MICROALBUMINURIA, WITH LONG-TERM CURRENT USE OF INSULIN (HCC): ICD-10-CM

## 2018-10-24 PROCEDURE — 99214 OFFICE O/P EST MOD 30 MIN: CPT | Performed by: FAMILY MEDICINE

## 2018-10-24 RX ORDER — GABAPENTIN 300 MG/1
300 CAPSULE ORAL AS NEEDED
COMMUNITY
End: 2020-08-31 | Stop reason: SDUPTHER

## 2018-10-24 RX ORDER — SIMVASTATIN 20 MG
20 TABLET ORAL NIGHTLY
COMMUNITY
End: 2019-05-01 | Stop reason: SDUPTHER

## 2018-10-24 RX ORDER — AMOXICILLIN AND CLAVULANATE POTASSIUM 250; 125 MG/1; MG/1
2 TABLET, FILM COATED ORAL 3 TIMES DAILY
Qty: 42 TABLET | Refills: 0 | Status: SHIPPED | OUTPATIENT
Start: 2018-10-24 | End: 2019-01-04

## 2018-10-24 NOTE — PATIENT INSTRUCTIONS
Skin Abscess  A skin abscess is an infected area on or under your skin that contains pus and other material. An abscess can happen almost anywhere on your body. Some abscesses break open (rupture) on their own. Most continue to get worse unless they are treated. The infection can spread deeper into the body and into your blood, which can make you feel sick. Treatment usually involves draining the abscess.  Follow these instructions at home:  Abscess Care  · If you have an abscess that has not drained, place a warm, clean, wet washcloth over the abscess several times a day. Do this as told by your doctor.  · Follow instructions from your doctor about how to take care of your abscess. Make sure you:  ? Cover the abscess with a bandage (dressing).  ? Change your bandage or gauze as told by your doctor.  ? Wash your hands with soap and water before you change the bandage or gauze. If you cannot use soap and water, use hand .  · Check your abscess every day for signs that the infection is getting worse. Check for:  ? More redness, swelling, or pain.  ? More fluid or blood.  ? Warmth.  ? More pus or a bad smell.  Medicines    · Take over-the-counter and prescription medicines only as told by your doctor.  · If you were prescribed an antibiotic medicine, take it as told by your doctor. Do not stop taking the antibiotic even if you start to feel better.  General instructions  · To avoid spreading the infection:  ? Do not share personal care items, towels, or hot tubs with others.  ? Avoid making skin-to-skin contact with other people.  · Keep all follow-up visits as told by your doctor. This is important.  Contact a doctor if:  · You have more redness, swelling, or pain around your abscess.  · You have more fluid or blood coming from your abscess.  · Your abscess feels warm when you touch it.  · You have more pus or a bad smell coming from your abscess.  · You have a fever.  · Your muscles ache.  · You have  chills.  · You feel sick.  Get help right away if:  · You have very bad (severe) pain.  · You see red streaks on your skin spreading away from the abscess.  This information is not intended to replace advice given to you by your health care provider. Make sure you discuss any questions you have with your health care provider.  Document Released: 06/05/2009 Document Revised: 08/13/2017 Document Reviewed: 10/26/2016  [employer] Interactive Patient Education © 2018 [employer]

## 2018-10-26 NOTE — PROGRESS NOTES
Subjective   Darren Shay is a 73 y.o. male.     Hypertension   This is a chronic problem. The current episode started more than 1 year ago. The problem is unchanged. The problem is controlled. Associated symptoms include peripheral edema. Pertinent negatives include no blurred vision, chest pain, orthopnea or shortness of breath. There are no associated agents to hypertension. Risk factors for coronary artery disease include diabetes mellitus, dyslipidemia, family history, male gender, obesity and sedentary lifestyle. Prescription medication: current treatments reviewed. The current treatment provides significant improvement. There are no compliance problems.  Hypertensive end-organ damage includes kidney disease and PVD. Identifiable causes of hypertension include chronic renal disease.   Diabetes   He presents for his follow-up diabetic visit. He has type 2 diabetes mellitus. MedicAlert identification noted. The initial diagnosis of diabetes was made 10 years ago. His disease course has been stable. There are no hypoglycemic associated symptoms. Associated symptoms include foot ulcerations. Pertinent negatives for diabetes include no blurred vision and no chest pain. There are no hypoglycemic complications. Symptoms are stable. Diabetic complications include nephropathy, peripheral neuropathy and PVD. Risk factors for coronary artery disease include diabetes mellitus, dyslipidemia, male sex, obesity, sedentary lifestyle and family history. Current diabetic treatment includes insulin injections. He is compliant with treatment all of the time. His weight is stable. He is following a diabetic and low fat/cholesterol diet. Meal planning includes calorie counting. He participates in exercise three times a week. There is no change in his home blood glucose trend. An ACE inhibitor/angiotensin II receptor blocker is being taken. He sees a podiatrist.Eye exam is current.   Cyst   This is a new problem. The current  episode started in the past 7 days. The problem occurs constantly. The problem has been gradually worsening. Pertinent negatives include no chest pain or fever. Nothing aggravates the symptoms. He has tried heat, acetaminophen and position changes for the symptoms. The treatment provided mild relief.        The following portions of the patient's history were reviewed and updated as appropriate: allergies, current medications, past family history, past medical history, past social history, past surgical history and problem list.    Review of Systems   Constitutional: Positive for activity change. Negative for fever.   Eyes: Negative for blurred vision.   Respiratory: Negative for shortness of breath.    Cardiovascular: Negative for chest pain and orthopnea.   Genitourinary: Positive for scrotal swelling. Negative for dysuria and frequency.   Skin: Negative for dry skin.       Objective   Physical Exam   Constitutional: He is oriented to person, place, and time. He appears well-developed and well-nourished.   HENT:   Head: Normocephalic and atraumatic.   Nose: Nose normal.   Mouth/Throat: Oropharynx is clear and moist.   Neck: Normal range of motion. Neck supple.   Cardiovascular: Normal rate, regular rhythm, normal heart sounds and intact distal pulses.    Pulmonary/Chest: Effort normal and breath sounds normal.   Abdominal: Soft.   Genitourinary: Cremasteric reflex is present. Right testis shows swelling. Left testis shows swelling.   Genitourinary Comments: Scrotal abscess posterior, less than 1 cm diameter, with surrounding erythema and fluctuance   Musculoskeletal: Normal range of motion.   Neurological: He is alert and oriented to person, place, and time.   Skin: Skin is warm and dry. Capillary refill takes less than 2 seconds.   Psychiatric: He has a normal mood and affect.   Nursing note and vitals reviewed.      Procedures    Patient's Body mass index is 29.16 kg/m². BMI is within normal parameters. No  follow-up required.      Assessment/Plan   Darren was seen today for hypertension, diabetes and cyst.    Diagnoses and all orders for this visit:    Scrotal abscess    Type 2 diabetes mellitus with microalbuminuria, with long-term current use of insulin (CMS/[facility])    Essential hypertension    Other orders  -     amoxicillin-clavulanate (AUGMENTIN) 250-125 MG per tablet; Take 2 tablets by mouth 3 (Three) Times a Day.

## 2018-11-05 ENCOUNTER — OFFICE VISIT (OUTPATIENT)
Dept: FAMILY MEDICINE CLINIC | Facility: CLINIC | Age: 73
End: 2018-11-05

## 2018-11-05 VITALS
SYSTOLIC BLOOD PRESSURE: 122 MMHG | DIASTOLIC BLOOD PRESSURE: 70 MMHG | BODY MASS INDEX: 29.53 KG/M2 | OXYGEN SATURATION: 96 % | TEMPERATURE: 98 F | HEART RATE: 87 BPM | WEIGHT: 218 LBS | HEIGHT: 72 IN

## 2018-11-05 DIAGNOSIS — N49.2 SCROTAL INFECTION: Primary | ICD-10-CM

## 2018-11-05 DIAGNOSIS — E11.8 DIABETIC FOOT (HCC): ICD-10-CM

## 2018-11-05 PROCEDURE — 99214 OFFICE O/P EST MOD 30 MIN: CPT | Performed by: FAMILY MEDICINE

## 2018-11-05 NOTE — PATIENT INSTRUCTIONS
Scrotal Swelling  Scrotal swelling may occur on one or both sides of the scrotum. Pain may also occur with swelling. Possible causes of scrotal swelling include:  · Injury.  · Infection.  · An ingrown hair or abrasion in the area.  · Repeated rubbing from tight-fitting underwear.  · Poor hygiene.  · A weakened area in the muscles around the groin (hernia). A hernia can allow abdominal contents to push into the scrotum.  · Fluid around the testicle (hydrocele).  · Enlarged vein around the testicle (varicocele).  · Certain medical treatments or existing conditions.  · A recent genital surgery or procedure.  · The spermatic cord becomes twisted in the scrotum, which cuts off blood supply (testicular torsion).  · Testicular cancer.    Follow these instructions at home:  Once the cause of your scrotal swelling has been determined, you may be asked to monitor your scrotum for any changes. The following actions may help to alleviate any discomfort you are experiencing:  · Rest and limit activity until the swelling goes away. Lying down is the preferred position.  · Put ice on the scrotum:  ? Put ice in a plastic bag.  ? Place a towel between your skin and the bag.  ? Leave the ice on for 20 minutes, 2-3 times a day for 1-2 days.  · Place a rolled towel under the testicles for support.  · Wear loose-fitting clothing or an athletic support cup for comfort.  · Take all medicines as directed by your health care provider.  · Perform a monthly self-exam of the scrotum and penis. Feel for changes. Ask your health care provider how to perform a monthly self-exam if you are unsure.    Contact a health care provider if:  · You have a sudden (acute) onset of pain that is persistent and not improving.  · You notice a heavy feeling or fluid in the scrotum.  · You have pain or burning while urinating.  · You have blood in the urine or semen.  · You feel a lump around the testicle.  · You notice that one testicle is larger than the other  (slight variation is normal).  · You have a persistent dull ache or pain in the groin or scrotum.  Get help right away if:  · The pain does not go away or becomes severe.  · You have a fever or shaking chills.  · You have pain or vomiting that cannot be controlled.  · You notice significant redness or swelling of one or both sides of the scrotum.  · You experience redness spreading upward from your scrotum to your abdomen or downward from your scrotum to your thighs.  This information is not intended to replace advice given to you by your health care provider. Make sure you discuss any questions you have with your health care provider.  Document Released: 01/20/2012 Document Revised: 07/07/2017 Document Reviewed: 05/22/2014  [employer]vier Interactive Patient Education © 2018 [employer]

## 2018-11-06 PROBLEM — E11.8 DIABETIC FOOT (HCC): Status: ACTIVE | Noted: 2018-11-06

## 2018-11-06 PROBLEM — N49.2 SCROTAL INFECTION: Status: ACTIVE | Noted: 2018-11-06

## 2018-11-07 NOTE — PROGRESS NOTES
Subjective   Darren Shay is a 73 y.o. male here today for recheck testicular nodule (10 day f/u).     Testicle Pain   The patient's primary symptoms include scrotal swelling and testicular pain. This is a new problem. The current episode started 1 to 4 weeks ago. The problem occurs constantly. The problem has been rapidly improving. The patient is experiencing no pain. Pertinent negatives include no abdominal pain, chest pain, coughing, diarrhea, dysuria, fever, frequency, rash or shortness of breath. Affected testicle: right scrotal area  The color of the testicles is normal. Nothing aggravates the symptoms. He has tried antibiotics for the symptoms. The treatment provided significant relief. His sexual activity is non-contributory to the current illness.        The following portions of the patient's history were reviewed and updated as appropriate: history reviewed: Social history , Past Medical History, Allergies, Current Medications, Active Problem List and Health Maintenance.    Review of Systems   Constitutional: Negative for activity change, appetite change, fatigue and fever.   Respiratory: Negative for cough and shortness of breath.    Cardiovascular: Negative for chest pain.   Gastrointestinal: Negative for abdominal pain and diarrhea.   Genitourinary: Positive for scrotal swelling and testicular pain. Negative for dysuria and frequency.   Skin: Negative for rash.       Objective   Physical Exam   Constitutional: He is oriented to person, place, and time. He appears well-developed and well-nourished.   HENT:   Head: Normocephalic.   Neck: Normal range of motion. Neck supple.   Cardiovascular: Normal rate and regular rhythm.    Pulmonary/Chest: Effort normal and breath sounds normal. No respiratory distress.   Abdominal: Soft. There is no tenderness.   Genitourinary: Right testis shows swelling (posterior cyst structure very improved, almost resolved.).   Musculoskeletal: Normal range of motion.    Neurological: He is alert and oriented to person, place, and time.   Skin: Skin is warm and dry.   Nursing note and vitals reviewed.      Procedures    Assessment/Plan   Problem List Items Addressed This Visit     Scrotal infection - Primary    Diabetic foot (CMS/HCC)    Relevant Orders    Ambulatory Referral to Podiatry        Continue current treatment - monitor for diabetes problems.

## 2018-11-30 ENCOUNTER — APPOINTMENT (OUTPATIENT)
Dept: LAB | Facility: HOSPITAL | Age: 73
End: 2018-11-30
Attending: PODIATRIST

## 2018-11-30 ENCOUNTER — TRANSCRIBE ORDERS (OUTPATIENT)
Dept: LAB | Facility: HOSPITAL | Age: 73
End: 2018-11-30

## 2018-11-30 ENCOUNTER — TRANSCRIBE ORDERS (OUTPATIENT)
Dept: ADMINISTRATIVE | Facility: HOSPITAL | Age: 73
End: 2018-11-30

## 2018-11-30 DIAGNOSIS — L97.511: ICD-10-CM

## 2018-11-30 DIAGNOSIS — M79.671 RIGHT FOOT PAIN: Primary | ICD-10-CM

## 2018-11-30 DIAGNOSIS — E11.610 DIABETIC NEUROGENIC ARTHROPATHY (HCC): ICD-10-CM

## 2018-11-30 DIAGNOSIS — L97.511 RIGHT FOOT ULCER, LIMITED TO BREAKDOWN OF SKIN (HCC): ICD-10-CM

## 2018-11-30 DIAGNOSIS — E11.610 TYPE 2 DIABETES MELLITUS WITH DIABETIC NEUROPATHIC ARTHROPATHY, UNSPECIFIED WHETHER LONG TERM INSULIN USE (HCC): Primary | ICD-10-CM

## 2018-11-30 LAB
BASOPHILS # BLD AUTO: 0.08 10*3/MM3 (ref 0–0.2)
BASOPHILS NFR BLD AUTO: 0.9 % (ref 0–2)
CRP SERPL-MCNC: 1.09 MG/DL (ref 0–0.99)
DEPRECATED RDW RBC AUTO: 47 FL (ref 40–54)
EOSINOPHIL # BLD AUTO: 0.14 10*3/MM3 (ref 0–0.7)
EOSINOPHIL NFR BLD AUTO: 1.6 % (ref 0–4)
ERYTHROCYTE [DISTWIDTH] IN BLOOD BY AUTOMATED COUNT: 13.1 % (ref 12–15)
ERYTHROCYTE [SEDIMENTATION RATE] IN BLOOD: 7 MM/HR (ref 0–15)
HBA1C MFR BLD: 6.8 %
HCT VFR BLD AUTO: 45.3 % (ref 40–52)
HGB BLD-MCNC: 15 G/DL (ref 14–18)
IMM GRANULOCYTES # BLD: 0.06 10*3/MM3 (ref 0–0.03)
IMM GRANULOCYTES NFR BLD: 0.7 % (ref 0–5)
LYMPHOCYTES # BLD AUTO: 1.45 10*3/MM3 (ref 0.72–4.86)
LYMPHOCYTES NFR BLD AUTO: 16.7 % (ref 15–45)
MCH RBC QN AUTO: 32.4 PG (ref 28–32)
MCHC RBC AUTO-ENTMCNC: 33.1 G/DL (ref 33–36)
MCV RBC AUTO: 97.8 FL (ref 82–95)
MONOCYTES # BLD AUTO: 0.72 10*3/MM3 (ref 0.19–1.3)
MONOCYTES NFR BLD AUTO: 8.3 % (ref 4–12)
NEUTROPHILS # BLD AUTO: 6.25 10*3/MM3 (ref 1.87–8.4)
NEUTROPHILS NFR BLD AUTO: 71.8 % (ref 39–78)
NRBC BLD MANUAL-RTO: 0 /100 WBC (ref 0–0)
PLATELET # BLD AUTO: 145 10*3/MM3 (ref 130–400)
PMV BLD AUTO: 12.1 FL (ref 6–12)
RBC # BLD AUTO: 4.63 10*6/MM3 (ref 4.8–5.9)
WBC NRBC COR # BLD: 8.7 10*3/MM3 (ref 4.8–10.8)

## 2018-11-30 PROCEDURE — 86140 C-REACTIVE PROTEIN: CPT | Performed by: PODIATRIST

## 2018-11-30 PROCEDURE — 85025 COMPLETE CBC W/AUTO DIFF WBC: CPT | Performed by: PODIATRIST

## 2018-11-30 PROCEDURE — 85651 RBC SED RATE NONAUTOMATED: CPT | Performed by: PODIATRIST

## 2018-11-30 PROCEDURE — 83036 HEMOGLOBIN GLYCOSYLATED A1C: CPT | Performed by: PODIATRIST

## 2018-11-30 PROCEDURE — 36415 COLL VENOUS BLD VENIPUNCTURE: CPT

## 2018-12-05 ENCOUNTER — HOSPITAL ENCOUNTER (OUTPATIENT)
Dept: MRI IMAGING | Facility: HOSPITAL | Age: 73
Discharge: HOME | End: 2018-12-05
Attending: PODIATRIST | Admitting: PODIATRIST

## 2018-12-05 DIAGNOSIS — M79.671 RIGHT FOOT PAIN: ICD-10-CM

## 2018-12-05 DIAGNOSIS — L97.511 RIGHT FOOT ULCER, LIMITED TO BREAKDOWN OF SKIN (HCC): ICD-10-CM

## 2018-12-05 DIAGNOSIS — E11.610 DIABETIC NEUROGENIC ARTHROPATHY (HCC): ICD-10-CM

## 2018-12-05 LAB — CREAT BLDA-MCNC: 1.4 MG/DL (ref 0.6–1.3)

## 2018-12-05 PROCEDURE — 0 GADOBENATE DIMEGLUMINE 529 MG/ML SOLUTION: Performed by: PODIATRIST

## 2018-12-05 PROCEDURE — A9577 INJ MULTIHANCE: HCPCS | Performed by: PODIATRIST

## 2018-12-05 PROCEDURE — 73720 MRI LWR EXTREMITY W/O&W/DYE: CPT

## 2018-12-05 PROCEDURE — 82565 ASSAY OF CREATININE: CPT

## 2018-12-05 RX ADMIN — GADOBENATE DIMEGLUMINE 20 ML: 529 INJECTION, SOLUTION INTRAVENOUS at 12:05

## 2018-12-18 RX ORDER — INSULIN GLARGINE 100 [IU]/ML
INJECTION, SOLUTION SUBCUTANEOUS
Qty: 70 ML | Refills: 1 | Status: SHIPPED | OUTPATIENT
Start: 2018-12-18 | End: 2019-04-29 | Stop reason: SDUPTHER

## 2018-12-19 DIAGNOSIS — I70.25 ATHEROSCLEROSIS OF NATIVE ARTERIES OF THE EXTREMITIES WITH ULCERATION (HCC): ICD-10-CM

## 2018-12-19 RX ORDER — CILOSTAZOL 100 MG/1
TABLET ORAL
Qty: 180 TABLET | Refills: 5 | Status: ON HOLD | OUTPATIENT
Start: 2018-12-19 | End: 2022-01-01 | Stop reason: HOSPADM

## 2019-01-04 ENCOUNTER — OFFICE VISIT (OUTPATIENT)
Dept: FAMILY MEDICINE CLINIC | Facility: CLINIC | Age: 74
End: 2019-01-04

## 2019-01-04 VITALS
TEMPERATURE: 98.2 F | DIASTOLIC BLOOD PRESSURE: 80 MMHG | BODY MASS INDEX: 29.39 KG/M2 | WEIGHT: 217 LBS | OXYGEN SATURATION: 95 % | HEIGHT: 72 IN | SYSTOLIC BLOOD PRESSURE: 163 MMHG | RESPIRATION RATE: 20 BRPM | HEART RATE: 86 BPM

## 2019-01-04 DIAGNOSIS — H61.23 BILATERAL HEARING LOSS DUE TO CERUMEN IMPACTION: ICD-10-CM

## 2019-01-04 DIAGNOSIS — E11.59 TYPE 2 DIABETES MELLITUS WITH OTHER CIRCULATORY COMPLICATION, UNSPECIFIED WHETHER LONG TERM INSULIN USE (HCC): ICD-10-CM

## 2019-01-04 DIAGNOSIS — R05.9 COUGH: ICD-10-CM

## 2019-01-04 DIAGNOSIS — J02.9 ACUTE PHARYNGITIS, UNSPECIFIED ETIOLOGY: Primary | ICD-10-CM

## 2019-01-04 PROCEDURE — 99213 OFFICE O/P EST LOW 20 MIN: CPT | Performed by: NURSE PRACTITIONER

## 2019-01-04 RX ORDER — AMOXICILLIN 500 MG/1
500 CAPSULE ORAL 3 TIMES DAILY
Qty: 21 CAPSULE | Refills: 0 | Status: SHIPPED | OUTPATIENT
Start: 2019-01-04 | End: 2019-01-11

## 2019-01-04 NOTE — PROGRESS NOTES
Chief Complaint   Patient presents with   • Sore Throat     x2-3 days   • Ear Fullness     x1 week   • Cough     x2-3 days   • Fall     pt had a fall last night 1/3/19, called ambulance to help get him out of the floor        Subjective   Darren Shay is a 73 y.o.  male who presents today for complaints of not feeling well (URI symptoms) and a fall last night.    HPI:  He states that he has not been feeling well for the past 3 days.  He has had a sore throat, ear fullness and a cough.  He has a productive cough with thick yellow mucus.  Cough is worse when lying down.  He does remember getting up to go to the bathroom last night and he lost his balance vs tripped over a walker.  His wife had to call the ambulance as she was unable to get him up.  He has no complaints of injuries related to the fall.    Darren Shay  has a past medical history of Colon cancer (CMS/[facility]), Coronary artery disease, Diabetes mellitus (CMS/[facility]), Diabetic foot ulcers (CMS/[facility]), and Hypertension.    Allergies   Allergen Reactions   • Cephalexin Hives       Current Outpatient Medications:   •  acetaminophen-codeine (TYLENOL #3) 300-30 MG per tablet, Take 1 tablet by mouth Every 4 (Four) Hours As Needed for Moderate Pain (4-6)., Disp: , Rfl:   •  amoxicillin-clavulanate (AUGMENTIN) 250-125 MG per tablet, Take 2 tablets by mouth 3 (Three) Times a Day., Disp: 42 tablet, Rfl: 0  •  aspirin 81 MG chewable tablet, Chew 81 mg Daily., Disp: , Rfl:   •  cilostazol (PLETAL) 100 MG tablet, Take 100 mg by mouth 2 (Two) Times a Day., Disp: , Rfl:   •  clopidogrel (PLAVIX) 75 MG tablet, Take 75 mg by mouth Daily., Disp: , Rfl:   •  folic acid (FOLVITE) 400 MCG tablet, Take 800 mcg by mouth Daily., Disp: , Rfl:   •  gabapentin (NEURONTIN) 300 MG capsule, Take 300 mg by mouth., Disp: , Rfl:   •  LANTUS 100 UNIT/ML injection, INJECT 80 UNITS SUBCUTANEOUSLY AT BEDTIME, Disp: 70 mL, Rfl: 1  •  metoprolol tartrate (LOPRESSOR) 50 MG tablet, Take 50  mg by mouth 2 (Two) Times a Day., Disp: , Rfl:   •  NOVOLOG 100 UNIT/ML injection, Inject 4 Units under the skin into the appropriate area as directed 3 (Three) Times a Day With Meals., Disp: , Rfl:   •  simvastatin (ZOCOR) 20 MG tablet, Take 20 mg by mouth Every Night., Disp: , Rfl:   Past Medical History:   Diagnosis Date   • Colon cancer (CMS/HCC)    • Coronary artery disease    • Diabetes mellitus (CMS/HCC)    • Diabetic foot ulcers (CMS/HCC)    • Hypertension      Past Surgical History:   Procedure Laterality Date   • CHOLECYSTECTOMY     • COLOSTOMY     • CORONARY ARTERY BYPASS GRAFT  1993    x5   • TOE AMPUTATION       Social History     Socioeconomic History   • Marital status:      Spouse name: Not on file   • Number of children: Not on file   • Years of education: Not on file   • Highest education level: Not on file   Tobacco Use   • Smoking status: Current Some Day Smoker     Types: Pipe   • Smokeless tobacco: Never Used   Substance and Sexual Activity   • Alcohol use: No   • Drug use: No   • Sexual activity: No     Family History   Problem Relation Age of Onset   • Cancer Mother    • Heart disease Mother    • Diabetes Mother    • Cancer Father    • Diabetes Sister    • No Known Problems Brother    • Diabetes Sister    • No Known Problems Brother    • No Known Problems Brother    • No Known Problems Brother        Family history, surgical history, past medical history, Allergies and med's reviewed with patient today and updated in [employer] EMR.     ROS:  Review of Systems   Constitutional: Positive for chills.   HENT: Positive for congestion, ear pain, sinus pressure, sinus pain and sore throat.    Eyes: Negative.    Respiratory: Positive for cough.    Cardiovascular: Negative.    Gastrointestinal: Negative.    Endocrine: Negative.    Genitourinary: Negative.    Musculoskeletal: Negative.    Skin:        Has an ulcer, scheduled for debridement   Allergic/Immunologic: Negative.    Neurological: Positive  "for numbness.   Hematological: Negative.    Psychiatric/Behavioral: Negative.        OBJECTIVE:  Vitals:    01/04/19 1508   BP: 163/80   BP Location: Left arm   Patient Position: Sitting   Cuff Size: Large Adult   Pulse: 86   Resp: 20   Temp: 98.2 °F (36.8 °C)   TempSrc: Temporal   SpO2: 95%   Weight: 98.4 kg (217 lb)   Height: 182.9 cm (72\")     Physical Exam   Constitutional: He is oriented to person, place, and time. He appears well-developed and well-nourished.   HENT:   Head: Normocephalic and atraumatic.   Cerumen impaction bilateral ears  Pharynx erythematous and swollen   Eyes: Conjunctivae and EOM are normal. Pupils are equal, round, and reactive to light.   Neck: Normal range of motion. Neck supple.   Cardiovascular: Normal rate, regular rhythm and normal heart sounds.   Pulmonary/Chest: Effort normal and breath sounds normal.   Musculoskeletal: Normal range of motion.   Neurological: He is alert and oriented to person, place, and time.   Skin: Skin is warm and dry.   Psychiatric: He has a normal mood and affect. His behavior is normal. Judgment and thought content normal.   Nursing note and vitals reviewed.      ASSESSMENT/ PLAN:    Darren was seen today for sore throat, ear fullness, cough and fall.    Diagnoses and all orders for this visit:    Acute pharyngitis, unspecified etiology  -     amoxicillin (AMOXIL) 500 MG capsule; Take 1 capsule by mouth 3 (Three) Times a Day for 7 days.    Cough    Bilateral hearing loss due to cerumen impaction    Type 2 diabetes mellitus with other circulatory complication, unspecified whether long term insulin use (CMS/[facility])    Ear irrigation attempted with no return of cerumen.  OTC Debrox recommended with follow up as needed.    Orders Placed Today:     New Medications Ordered This Visit   Medications   • amoxicillin (AMOXIL) 500 MG capsule     Sig: Take 1 capsule by mouth 3 (Three) Times a Day for 7 days.     Dispense:  21 capsule     Refill:  0        Management Plan: "     An After Visit Summary was printed and given to the patient at discharge.    Follow-up: Return in about 3 months (around 4/4/2019) for Next scheduled follow up.    Pat Kearns, APRN 1/4/2019 3:36 PM  This note was electronically signed.

## 2019-01-07 ENCOUNTER — TELEPHONE (OUTPATIENT)
Dept: FAMILY MEDICINE CLINIC | Facility: CLINIC | Age: 74
End: 2019-01-07

## 2019-01-07 DIAGNOSIS — H65.23 BILATERAL CHRONIC SEROUS OTITIS MEDIA: Primary | ICD-10-CM

## 2019-01-07 DIAGNOSIS — I70.213 ATHEROSCLEROSIS OF NATIVE ARTERY OF BOTH LOWER EXTREMITIES WITH INTERMITTENT CLAUDICATION (HCC): ICD-10-CM

## 2019-01-07 NOTE — TELEPHONE ENCOUNTER
PT WIFE ( BERNICE )  IS CALLING TO SEE IF WE CAN GET BRYAN AND APPT REFERRAL WITH AN ENT, SOMEBODY WITHIIN THE [facility], [place], [facility].

## 2019-01-09 PROBLEM — I70.213 ATHEROSCLEROSIS OF NATIVE ARTERY OF BOTH LOWER EXTREMITIES WITH INTERMITTENT CLAUDICATION (HCC): Status: ACTIVE | Noted: 2019-01-09

## 2019-01-09 NOTE — TELEPHONE ENCOUNTER
Pt's wife called stating she has an office that she would like the referral to go to.    Please call her at [phone].    Thanks.

## 2019-01-10 ENCOUNTER — APPOINTMENT (OUTPATIENT)
Dept: PREADMISSION TESTING | Facility: HOSPITAL | Age: 74
End: 2019-01-10

## 2019-01-10 VITALS
DIASTOLIC BLOOD PRESSURE: 69 MMHG | BODY MASS INDEX: 29.63 KG/M2 | WEIGHT: 211.64 LBS | OXYGEN SATURATION: 95 % | SYSTOLIC BLOOD PRESSURE: 172 MMHG | HEIGHT: 71 IN | RESPIRATION RATE: 20 BRPM | HEART RATE: 97 BPM

## 2019-01-10 LAB
ANION GAP SERPL CALCULATED.3IONS-SCNC: 12 MMOL/L (ref 4–13)
BASOPHILS # BLD AUTO: 0.11 10*3/MM3 (ref 0–0.2)
BASOPHILS NFR BLD AUTO: 1.1 % (ref 0–2)
BUN BLD-MCNC: 22 MG/DL (ref 5–21)
BUN/CREAT SERPL: 14.2 (ref 7–25)
CALCIUM SPEC-SCNC: 9.6 MG/DL (ref 8.4–10.4)
CHLORIDE SERPL-SCNC: 103 MMOL/L (ref 98–110)
CO2 SERPL-SCNC: 26 MMOL/L (ref 24–31)
CREAT BLD-MCNC: 1.55 MG/DL (ref 0.5–1.4)
DEPRECATED RDW RBC AUTO: 43.8 FL (ref 40–54)
EOSINOPHIL # BLD AUTO: 0.16 10*3/MM3 (ref 0–0.7)
EOSINOPHIL NFR BLD AUTO: 1.6 % (ref 0–4)
ERYTHROCYTE [DISTWIDTH] IN BLOOD BY AUTOMATED COUNT: 12.6 % (ref 12–15)
GFR SERPL CREATININE-BSD FRML MDRD: 44 ML/MIN/1.73
GLUCOSE BLD-MCNC: 63 MG/DL (ref 70–100)
HCT VFR BLD AUTO: 45.6 % (ref 40–52)
HGB BLD-MCNC: 15.2 G/DL (ref 14–18)
IMM GRANULOCYTES # BLD AUTO: 0.32 10*3/MM3 (ref 0–0.03)
IMM GRANULOCYTES NFR BLD AUTO: 3.2 % (ref 0–5)
LYMPHOCYTES # BLD AUTO: 2.07 10*3/MM3 (ref 0.72–4.86)
LYMPHOCYTES NFR BLD AUTO: 20.4 % (ref 15–45)
MCH RBC QN AUTO: 31.5 PG (ref 28–32)
MCHC RBC AUTO-ENTMCNC: 33.3 G/DL (ref 33–36)
MCV RBC AUTO: 94.4 FL (ref 82–95)
MONOCYTES # BLD AUTO: 1.02 10*3/MM3 (ref 0.19–1.3)
MONOCYTES NFR BLD AUTO: 10 % (ref 4–12)
NEUTROPHILS # BLD AUTO: 6.47 10*3/MM3 (ref 1.87–8.4)
NEUTROPHILS NFR BLD AUTO: 63.7 % (ref 39–78)
NRBC BLD AUTO-RTO: 0 /100 WBC (ref 0–0)
PLATELET # BLD AUTO: 231 10*3/MM3 (ref 130–400)
PMV BLD AUTO: 11.5 FL (ref 6–12)
POTASSIUM BLD-SCNC: 4.3 MMOL/L (ref 3.5–5.3)
RBC # BLD AUTO: 4.83 10*6/MM3 (ref 4.8–5.9)
SODIUM BLD-SCNC: 141 MMOL/L (ref 135–145)
WBC NRBC COR # BLD: 10.15 10*3/MM3 (ref 4.8–10.8)

## 2019-01-10 PROCEDURE — 93010 ELECTROCARDIOGRAM REPORT: CPT | Performed by: INTERNAL MEDICINE

## 2019-01-10 PROCEDURE — 93005 ELECTROCARDIOGRAM TRACING: CPT

## 2019-01-10 PROCEDURE — 36415 COLL VENOUS BLD VENIPUNCTURE: CPT

## 2019-01-10 PROCEDURE — 85025 COMPLETE CBC W/AUTO DIFF WBC: CPT | Performed by: PODIATRIST

## 2019-01-10 PROCEDURE — 80048 BASIC METABOLIC PNL TOTAL CA: CPT | Performed by: PODIATRIST

## 2019-01-10 NOTE — DISCHARGE INSTRUCTIONS
DAY OF SURGERY INSTRUCTIONS        YOUR SURGEON: ***sofia drake     PROCEDURE: ***partail removal of bone   DATE OF SURGERY: ***1/17/2019    ARRIVAL TIME: AS DIRECTED BY OFFICE    YOU MAY TAKE THE FOLLOWING MEDICATION(S) THE MORNING OF SURGERY WITH A SIP OF WATER: ***lopressor                MANAGING PAIN AFTER SURGERY    We know you are probably wondering what your pain will be like after surgery.  Following surgery it is unrealistic to expect you will not have pain.   Pain is how our bodies let us know that something is wrong or cautions us to be careful.  That said, our goal is to make your pain tolerable.    Methods we may use to treat your pain include (oral or IV medications, PCAs, epidurals, nerve blocks, etc.)   While some procedures require IV pain medications for a short time after surgery, transitioning to pain medications by mouth allows for better management of pain.   Your nurse will encourage you to take oral pain medications whenever possible.  IV medications work almost immediately, but only last a short while.  Taking medications by mouth allows for a more constant level of medication in your blood stream for a longer period of time.      Once your pain is out of control it is harder to get back under control.  It is important you are aware when your next dose of pain medication is due.  If you are admitted, your nurse may write the time of your next dose on the white board in your room to help you remember.      We are interested in your pain and encourage you to inform us about aggravating factors during your visit.   Many times a simple repositioning every few hours can make a big difference.    If your physician says it is okay, do not let your pain prevent you from getting out of bed. Be sure to call your nurse for assistance prior to getting up so you do not fall.      Before surgery, please decide your tolerable pain goal.  These faces help describe the pain ratings we use on a 0-10  scale.   Be prepared to tell us your goal and whether or not you take pain or anxiety medications at home.          BEFORE YOU COME TO THE HOSPITAL  (Pre-op instructions)  • Do not eat, drink, smoke or chew gum after midnight the night before surgery.  This also includes no mints.  • Morning of surgery take only the medicines you have been instructed with a sip of water unless otherwise instructed  by your physician.  • Do not shave, wear makeup or dark nail polish.  • Remove all jewelry including rings.  • Leave anything you consider valuable at home.  • Leave your suitcase in the car until after your surgery.  • Bring the following with you if applicable:  o Picture ID and insurance, Medicare or Medicaid cards  o Co-pay/deductible required by insurance (cash, check, credit card)  o Copy of advance directive, living will or power-of- documents if not brought to PAT  o CPAP or BIPAP mask and tubing  o Relaxation aids (MP3 player, book, magazine)  • On the day of surgery check in at registration located at the main entrance of the hospital.       Outpatient Surgery Guidelines, Adult  Outpatient procedures are those for which the person having the procedure is allowed to go home the same day as the procedure. Various procedures are done on an outpatient basis. You should follow some general guidelines if you will be having an outpatient procedure.  LET YOUR HEALTH CARE PROVIDER KNOW ABOUT:  · Any allergies you have.  · All medicines you are taking, including vitamins, herbs, eye drops, creams, and over-the-counter medicines.  · Previous problems you or members of your family have had with the use of anesthetics.  · Any blood disorders you have.  · Previous surgeries you have had.  · Medical conditions you have.  RISKS AND COMPLICATIONS  Your health care provider will discuss possible risks and complications with you before surgery. Common risks and complications include:    · Problems due to the use of  anesthetics.  · Blood loss and replacement (does not apply to minor surgical procedures).  · Temporary increase in pain due to surgery.  · Uncorrected pain or problems that the surgery was meant to correct.  · Infection.  · New damage.  BEFORE THE PROCEDURE  · Ask your health care provider about changing or stopping your regular medicines. You may need to stop taking certain medicines in the days or weeks before the procedure.  · Stop smoking at least 2 weeks before surgery. This lowers your risk for complications during and after surgery. Ask your health care provider for help with this if needed.  · Eat your usual meals and a light supper the day before surgery. Continue fluid intake. Do not drink alcohol.  · Do not eat or drink after midnight the night before your surgery.   · Arrange for someone to take you home and to stay with you for 24 hours after the procedure. Medicine given for your procedure may affect your ability to drive or to care for yourself.  · Call your health care provider's office if you develop an illness or problem that may prevent you from safely having your procedure.  AFTER THE PROCEDURE  After surgery, you will be taken to a recovery area, where your progress will be monitored. If there are no complications, you will be allowed to go home when you are awake, stable, and taking fluids well. You may have numbness around the surgical site. Healing will take some time. You will have tenderness at the surgical site and may have some swelling and bruising. You may also have some nausea.  HOME CARE INSTRUCTIONS  · Do not drive for 24 hours, or as directed by your health care provider. Do not drive while taking prescription pain medicines.  · Do not drink alcohol for 24 hours.  · Do not make important decisions or sign legal documents for 24 hours.  · You may resume a normal diet and activities as directed.  · Do not lift anything heavier than 10 pounds (4.5 kg) or play contact sports until your  health care provider says it is okay.  · Change your bandages (dressings) as directed.  · Only take over-the-counter or prescription medicines as directed by your health care provider.  · Follow up with your health care provider as directed.  SEEK MEDICAL CARE IF:  · You have increased bleeding (more than a small spot) from the surgical site.  · You have redness, swelling, or increasing pain in the wound.  · You see pus coming from the wound.  · You have a fever.  · You notice a bad smell coming from the wound or dressing.  · You feel lightheaded or faint.  · You develop a rash.  · You have trouble breathing.  · You develop allergies.  MAKE SURE YOU:  · Understand these instructions.  · Will watch your condition.  · Will get help right away if you are not doing well or get worse.     This information is not intended to replace advice given to you by your health care provider. Make sure you discuss any questions you have with your health care provider.     Document Released: 09/12/2002 Document Revised: 05/03/2016 Document Reviewed: 05/22/2014  [employer] Interactive Patient Education ©2016 [employer]       Fall Prevention in Hospitals, Adult  As a hospital patient, your condition and the treatments you receive can increase your risk for falls. Some additional risk factors for falls in a hospital include:  · Being in an unfamiliar environment.  · Being on bed rest.  · Your surgery.  · Taking certain medicines.  · Your tubing requirements, such as intravenous (IV) therapy or catheters.  It is important that you learn how to decrease fall risks while at the hospital. Below are important tips that can help prevent falls.  SAFETY TIPS FOR PREVENTING FALLS  Talk about your risk of falling.  · Ask your health care provider why you are at risk for falling. Is it your medicine, illness, tubing placement, or something else?  · Make a plan with your health care provider to keep you safe from falls.  · Ask your health care  provider or pharmacist about side effects of your medicines. Some medicines can make you dizzy or affect your coordination.  Ask for help.  · Ask for help before getting out of bed. You may need to press your call button.  · Ask for assistance in getting safely to the toilet.  · Ask for a walker or cane to be put at your bedside. Ask that most of the side rails on your bed be placed up before your health care provider leaves the room.  · Ask family or friends to sit with you.  · Ask for things that are out of your reach, such as your glasses, hearing aids, telephone, bedside table, or call button.  Follow these tips to avoid falling:  · Stay lying or seated, rather than standing, while waiting for help.  · Wear rubber-soled slippers or shoes whenever you walk in the hospital.  · Avoid quick, sudden movements.  ¨ Change positions slowly.  ¨ Sit on the side of your bed before standing.  ¨ Stand up slowly and wait before you start to walk.  · Let your health care provider know if there is a spill on the floor.  · Pay careful attention to the medical equipment, electrical cords, and tubes around you.  · When you need help, use your call button by your bed or in the bathroom. Wait for one of your health care providers to help you.  · If you feel dizzy or unsure of your footing, return to bed and wait for assistance.  · Avoid being distracted by the TV, telephone, or another person in your room.  · Do not lean or support yourself on rolling objects, such as IV poles or bedside tables.     This information is not intended to replace advice given to you by your health care provider. Make sure you discuss any questions you have with your health care provider.     Document Released: 12/15/2001 Document Revised: 01/08/2016 Document Reviewed: 08/25/2013  [employer] Interactive Patient Education ©2016 [employer]       Surgical Site Infections FAQs  What is a Surgical Site Infection (SSI)?  A surgical site infection is an  infection that occurs after surgery in the part of the body where the surgery took place. Most patients who have surgery do not develop an infection. However, infections develop in about 1 to 3 out of every 100 patients who have surgery.  Some of the common symptoms of a surgical site infection are:  · Redness and pain around the area where you had surgery  · Drainage of cloudy fluid from your surgical wound  · Fever  Can SSIs be treated?  Yes. Most surgical site infections can be treated with antibiotics. The antibiotic given to you depends on the bacteria (germs) causing the infection. Sometimes patients with SSIs also need another surgery to treat the infection.  What are some of the things that hospitals are doing to prevent SSIs?  To prevent SSIs, doctors, nurses, and other healthcare providers:  · Clean their hands and arms up to their elbows with an antiseptic agent just before the surgery.  · Clean their hands with soap and water or an alcohol-based hand rub before and after caring for each patient.  · May remove some of your hair immediately before your surgery using electric clippers if the hair is in the same area where the procedure will occur. They should not shave you with a razor.  · Wear special hair covers, masks, gowns, and gloves during surgery to keep the surgery area clean.  · Give you antibiotics before your surgery starts. In most cases, you should get antibiotics within 60 minutes before the surgery starts and the antibiotics should be stopped within 24 hours after surgery.  · Clean the skin at the site of your surgery with a special soap that kills germs.  What can I do to help prevent SSIs?  Before your surgery:  · Tell your doctor about other medical problems you may have. Health problems such as allergies, diabetes, and obesity could affect your surgery and your treatment.  · Quit smoking. Patients who smoke get more infections. Talk to your doctor about how you can quit before your  surgery.  · Do not shave near where you will have surgery. Shaving with a razor can irritate your skin and make it easier to develop an infection.  At the time of your surgery:  · Speak up if someone tries to shave you with a razor before surgery. Ask why you need to be shaved and talk with your surgeon if you have any concerns.  · Ask if you will get antibiotics before surgery.  After your surgery:  · Make sure that your healthcare providers clean their hands before examining you, either with soap and water or an alcohol-based hand rub.  · If you do not see your providers clean their hands, please ask them to do so.  · Family and friends who visit you should not touch the surgical wound or dressings.  · Family and friends should clean their hands with soap and water or an alcohol-based hand rub before and after visiting you. If you do not see them clean their hands, ask them to clean their hands.  What do I need to do when I go home from the hospital?  · Before you go home, your doctor or nurse should explain everything you need to know about taking care of your wound. Make sure you understand how to care for your wound before you leave the hospital.  · Always clean your hands before and after caring for your wound.  · Before you go home, make sure you know who to contact if you have questions or problems after you get home.  · If you have any symptoms of an infection, such as redness and pain at the surgery site, drainage, or fever, call your doctor immediately.  If you have additional questions, please ask your doctor or nurse.  Developed and co-sponsored by The Society for Healthcare Epidemiology of [place] (SHEA); Infectious Diseases Society of [place] (IDSA); American Hospital Association; Association for Professionals in Infection Control and Epidemiology (APIC); Centers for Disease Control and Prevention (CDC); and The Joint Commission.     This information is not intended to replace advice given to you by  your health care provider. Make sure you discuss any questions you have with your health care provider.     Document Released: 12/23/2014 Document Revised: 01/08/2016 Document Reviewed: 03/02/2016  [employer] Interactive Patient Education ©2016 [employer]       [facility]  CHG 4% Patient Instruction Sheet    Preparing the Skin Before Surgery  Preparing or “prepping” skin before surgery can reduce the risk of infection at the surgical site. To make the process easier,[facility] has chosen 4% Chlorhexidine Gluconate (CHG) antiseptic solution.   The steps below outline the prepping process and should be carefully followed.                                                                                                                                                      Prep the skin at the following time(s):                                                      We recommend you shower the night before surgery, and again the morning of surgery with the 4% CHG antiseptic solution using half of the bottle and a cloth each time.  Dress in clean clothes/sleepwear after showering.  See instructions below for application.          Do not apply any lotions or moisturizers.       Do not shave the area to be prepped for at least 2 days prior to surgery.    Clipping the hair may be done immediately prior to your surgery at the hospital    if needed.    Directions:  Thoroughly rinse your body with water.  Apply 4% CHG to a cloth and wash skin gently, paying special attention to the operative site.  Rinse again thoroughly.  Once you have begun using this product do not apply anything else to your skin. If itching or redness persists, rinse affected areas and discontinue use.    When using this product:  • Keep out of eyes, ears, and mouth.  • If solution should contact these areas, rinse out promptly and thoroughly with water.  • For external use only.  • Do not use in genital area, on your face or  head.      PATIENT/FAMILY/RESPONSIBLE PARTY VERBALIZES UNDERSTANDING OF ABOVE EDUCATION.  COPY OF PAIN SCALE GIVEN AND REVIEWED WITH VERBALIZED UNDERSTANDING.

## 2019-01-17 ENCOUNTER — APPOINTMENT (OUTPATIENT)
Dept: GENERAL RADIOLOGY | Facility: HOSPITAL | Age: 74
End: 2019-01-17

## 2019-01-17 ENCOUNTER — ANESTHESIA (OUTPATIENT)
Dept: PERIOP | Facility: HOSPITAL | Age: 74
End: 2019-01-17

## 2019-01-17 ENCOUNTER — HOSPITAL ENCOUNTER (OUTPATIENT)
Facility: HOSPITAL | Age: 74
Setting detail: HOSPITAL OUTPATIENT SURGERY
Discharge: HOME | End: 2019-01-17
Attending: PODIATRIST | Admitting: PODIATRIST

## 2019-01-17 ENCOUNTER — ANESTHESIA EVENT (OUTPATIENT)
Dept: PERIOP | Facility: HOSPITAL | Age: 74
End: 2019-01-17

## 2019-01-17 VITALS
OXYGEN SATURATION: 97 % | HEART RATE: 69 BPM | DIASTOLIC BLOOD PRESSURE: 60 MMHG | SYSTOLIC BLOOD PRESSURE: 140 MMHG | TEMPERATURE: 97.6 F | RESPIRATION RATE: 16 BRPM

## 2019-01-17 DIAGNOSIS — E11.610 CHARCOT FOOT DUE TO DIABETES MELLITUS (HCC): ICD-10-CM

## 2019-01-17 LAB
GLUCOSE BLDC GLUCOMTR-MCNC: 115 MG/DL (ref 70–130)
GLUCOSE BLDC GLUCOMTR-MCNC: 130 MG/DL (ref 70–130)

## 2019-01-17 PROCEDURE — 82962 GLUCOSE BLOOD TEST: CPT

## 2019-01-17 PROCEDURE — 88304 TISSUE EXAM BY PATHOLOGIST: CPT | Performed by: PODIATRIST

## 2019-01-17 PROCEDURE — 25010000002 PROPOFOL 10 MG/ML EMULSION: Performed by: NURSE ANESTHETIST, CERTIFIED REGISTERED

## 2019-01-17 PROCEDURE — 25010000002 ROPIVACAINE PER 1 MG: Performed by: PODIATRIST

## 2019-01-17 PROCEDURE — 25010000002 FENTANYL CITRATE (PF) 100 MCG/2ML SOLUTION: Performed by: NURSE ANESTHETIST, CERTIFIED REGISTERED

## 2019-01-17 PROCEDURE — 25010000002 SUCCINYLCHOLINE PER 20 MG: Performed by: NURSE ANESTHETIST, CERTIFIED REGISTERED

## 2019-01-17 PROCEDURE — 25010000002 ONDANSETRON PER 1 MG: Performed by: NURSE ANESTHETIST, CERTIFIED REGISTERED

## 2019-01-17 PROCEDURE — 25010000002 DEXAMETHASONE PER 1 MG: Performed by: NURSE ANESTHETIST, CERTIFIED REGISTERED

## 2019-01-17 PROCEDURE — 25010000002 MIDAZOLAM PER 1 MG: Performed by: ANESTHESIOLOGY

## 2019-01-17 RX ORDER — MIDAZOLAM HYDROCHLORIDE 1 MG/ML
2 INJECTION INTRAMUSCULAR; INTRAVENOUS
Status: DISCONTINUED | OUTPATIENT
Start: 2019-01-17 | End: 2019-01-17 | Stop reason: HOSPADM

## 2019-01-17 RX ORDER — ROPIVACAINE HYDROCHLORIDE 5 MG/ML
INJECTION, SOLUTION EPIDURAL; INFILTRATION; PERINEURAL AS NEEDED
Status: DISCONTINUED | OUTPATIENT
Start: 2019-01-17 | End: 2019-01-17 | Stop reason: HOSPADM

## 2019-01-17 RX ORDER — DEXAMETHASONE SODIUM PHOSPHATE 4 MG/ML
INJECTION, SOLUTION INTRA-ARTICULAR; INTRALESIONAL; INTRAMUSCULAR; INTRAVENOUS; SOFT TISSUE AS NEEDED
Status: DISCONTINUED | OUTPATIENT
Start: 2019-01-17 | End: 2019-01-17 | Stop reason: SURG

## 2019-01-17 RX ORDER — ROCURONIUM BROMIDE 10 MG/ML
INJECTION, SOLUTION INTRAVENOUS AS NEEDED
Status: DISCONTINUED | OUTPATIENT
Start: 2019-01-17 | End: 2019-01-17 | Stop reason: SURG

## 2019-01-17 RX ORDER — LIDOCAINE HYDROCHLORIDE 20 MG/ML
INJECTION, SOLUTION INFILTRATION; PERINEURAL AS NEEDED
Status: DISCONTINUED | OUTPATIENT
Start: 2019-01-17 | End: 2019-01-17 | Stop reason: SURG

## 2019-01-17 RX ORDER — SODIUM CHLORIDE 0.9 % (FLUSH) 0.9 %
3 SYRINGE (ML) INJECTION EVERY 12 HOURS SCHEDULED
Status: DISCONTINUED | OUTPATIENT
Start: 2019-01-17 | End: 2019-01-17 | Stop reason: HOSPADM

## 2019-01-17 RX ORDER — FENTANYL CITRATE 50 UG/ML
25 INJECTION, SOLUTION INTRAMUSCULAR; INTRAVENOUS AS NEEDED
Status: CANCELLED | OUTPATIENT
Start: 2019-01-17

## 2019-01-17 RX ORDER — IPRATROPIUM BROMIDE AND ALBUTEROL SULFATE 2.5; .5 MG/3ML; MG/3ML
3 SOLUTION RESPIRATORY (INHALATION) ONCE AS NEEDED
Status: CANCELLED | OUTPATIENT
Start: 2019-01-17

## 2019-01-17 RX ORDER — FLUMAZENIL 0.1 MG/ML
0.2 INJECTION INTRAVENOUS AS NEEDED
Status: CANCELLED | OUTPATIENT
Start: 2019-01-17

## 2019-01-17 RX ORDER — METOCLOPRAMIDE HYDROCHLORIDE 5 MG/ML
5 INJECTION INTRAMUSCULAR; INTRAVENOUS
Status: CANCELLED | OUTPATIENT
Start: 2019-01-17

## 2019-01-17 RX ORDER — MORPHINE SULFATE 2 MG/ML
2 INJECTION, SOLUTION INTRAMUSCULAR; INTRAVENOUS
Status: CANCELLED | OUTPATIENT
Start: 2019-01-17 | End: 2019-01-18

## 2019-01-17 RX ORDER — SODIUM CHLORIDE, SODIUM LACTATE, POTASSIUM CHLORIDE, CALCIUM CHLORIDE 600; 310; 30; 20 MG/100ML; MG/100ML; MG/100ML; MG/100ML
1000 INJECTION, SOLUTION INTRAVENOUS CONTINUOUS
Status: DISCONTINUED | OUTPATIENT
Start: 2019-01-17 | End: 2019-01-17 | Stop reason: HOSPADM

## 2019-01-17 RX ORDER — MIDAZOLAM HYDROCHLORIDE 1 MG/ML
1 INJECTION INTRAMUSCULAR; INTRAVENOUS
Status: DISCONTINUED | OUTPATIENT
Start: 2019-01-17 | End: 2019-01-17 | Stop reason: HOSPADM

## 2019-01-17 RX ORDER — SODIUM CHLORIDE 0.9 % (FLUSH) 0.9 %
3 SYRINGE (ML) INJECTION AS NEEDED
Status: DISCONTINUED | OUTPATIENT
Start: 2019-01-17 | End: 2019-01-17 | Stop reason: HOSPADM

## 2019-01-17 RX ORDER — LABETALOL HYDROCHLORIDE 5 MG/ML
5 INJECTION, SOLUTION INTRAVENOUS
Status: CANCELLED | OUTPATIENT
Start: 2019-01-17

## 2019-01-17 RX ORDER — FENTANYL CITRATE 50 UG/ML
INJECTION, SOLUTION INTRAMUSCULAR; INTRAVENOUS AS NEEDED
Status: DISCONTINUED | OUTPATIENT
Start: 2019-01-17 | End: 2019-01-17 | Stop reason: SURG

## 2019-01-17 RX ORDER — SODIUM CHLORIDE 0.9 % (FLUSH) 0.9 %
1-10 SYRINGE (ML) INJECTION AS NEEDED
Status: DISCONTINUED | OUTPATIENT
Start: 2019-01-17 | End: 2019-01-17 | Stop reason: HOSPADM

## 2019-01-17 RX ORDER — OXYCODONE AND ACETAMINOPHEN 10; 325 MG/1; MG/1
1 TABLET ORAL ONCE AS NEEDED
Status: CANCELLED | OUTPATIENT
Start: 2019-01-17

## 2019-01-17 RX ORDER — PROPOFOL 10 MG/ML
VIAL (ML) INTRAVENOUS AS NEEDED
Status: DISCONTINUED | OUTPATIENT
Start: 2019-01-17 | End: 2019-01-17 | Stop reason: SURG

## 2019-01-17 RX ORDER — HYDRALAZINE HYDROCHLORIDE 20 MG/ML
5 INJECTION INTRAMUSCULAR; INTRAVENOUS
Status: CANCELLED | OUTPATIENT
Start: 2019-01-17

## 2019-01-17 RX ORDER — SODIUM CHLORIDE, SODIUM LACTATE, POTASSIUM CHLORIDE, CALCIUM CHLORIDE 600; 310; 30; 20 MG/100ML; MG/100ML; MG/100ML; MG/100ML
100 INJECTION, SOLUTION INTRAVENOUS CONTINUOUS
Status: DISCONTINUED | OUTPATIENT
Start: 2019-01-17 | End: 2019-01-17 | Stop reason: HOSPADM

## 2019-01-17 RX ORDER — NALOXONE HCL 0.4 MG/ML
0.04 VIAL (ML) INJECTION AS NEEDED
Status: CANCELLED | OUTPATIENT
Start: 2019-01-17

## 2019-01-17 RX ORDER — CLINDAMYCIN PHOSPHATE 600 MG/50ML
INJECTION INTRAVENOUS AS NEEDED
Status: DISCONTINUED | OUTPATIENT
Start: 2019-01-17 | End: 2019-01-17 | Stop reason: SURG

## 2019-01-17 RX ORDER — ACETAMINOPHEN 500 MG
1000 TABLET ORAL ONCE
Status: COMPLETED | OUTPATIENT
Start: 2019-01-17 | End: 2019-01-17

## 2019-01-17 RX ORDER — ONDANSETRON 2 MG/ML
4 INJECTION INTRAMUSCULAR; INTRAVENOUS AS NEEDED
Status: CANCELLED | OUTPATIENT
Start: 2019-01-17 | End: 2019-01-17

## 2019-01-17 RX ORDER — SUCCINYLCHOLINE CHLORIDE 20 MG/ML
INJECTION INTRAMUSCULAR; INTRAVENOUS AS NEEDED
Status: DISCONTINUED | OUTPATIENT
Start: 2019-01-17 | End: 2019-01-17 | Stop reason: SURG

## 2019-01-17 RX ORDER — ONDANSETRON 2 MG/ML
INJECTION INTRAMUSCULAR; INTRAVENOUS AS NEEDED
Status: DISCONTINUED | OUTPATIENT
Start: 2019-01-17 | End: 2019-01-17 | Stop reason: SURG

## 2019-01-17 RX ORDER — HYDROCODONE BITARTRATE AND ACETAMINOPHEN 7.5; 325 MG/1; MG/1
1 TABLET ORAL EVERY 6 HOURS PRN
Qty: 30 TABLET | Refills: 0 | Status: SHIPPED | OUTPATIENT
Start: 2019-01-17 | End: 2019-03-06

## 2019-01-17 RX ORDER — MAGNESIUM HYDROXIDE 1200 MG/15ML
LIQUID ORAL AS NEEDED
Status: DISCONTINUED | OUTPATIENT
Start: 2019-01-17 | End: 2019-01-17 | Stop reason: HOSPADM

## 2019-01-17 RX ORDER — MEPERIDINE HYDROCHLORIDE 25 MG/ML
12.5 INJECTION INTRAMUSCULAR; INTRAVENOUS; SUBCUTANEOUS
Status: CANCELLED | OUTPATIENT
Start: 2019-01-17 | End: 2019-01-18

## 2019-01-17 RX ORDER — LIDOCAINE HYDROCHLORIDE 40 MG/ML
SOLUTION TOPICAL AS NEEDED
Status: DISCONTINUED | OUTPATIENT
Start: 2019-01-17 | End: 2019-01-17 | Stop reason: SURG

## 2019-01-17 RX ADMIN — DEXAMETHASONE SODIUM PHOSPHATE 4 MG: 4 INJECTION, SOLUTION INTRAMUSCULAR; INTRAVENOUS at 11:11

## 2019-01-17 RX ADMIN — SUCCINYLCHOLINE CHLORIDE 120 MG: 20 INJECTION, SOLUTION INTRAMUSCULAR; INTRAVENOUS at 10:40

## 2019-01-17 RX ADMIN — LIDOCAINE HYDROCHLORIDE 40 MG: 20 INJECTION, SOLUTION INFILTRATION; PERINEURAL at 10:40

## 2019-01-17 RX ADMIN — LIDOCAINE HYDROCHLORIDE 1 EACH: 40 SOLUTION TOPICAL at 10:40

## 2019-01-17 RX ADMIN — FENTANYL CITRATE 50 MCG: 50 INJECTION, SOLUTION INTRAMUSCULAR; INTRAVENOUS at 10:40

## 2019-01-17 RX ADMIN — PROPOFOL 200 MG: 10 INJECTION, EMULSION INTRAVENOUS at 10:40

## 2019-01-17 RX ADMIN — ACETAMINOPHEN 1000 MG: 500 TABLET, FILM COATED ORAL at 09:52

## 2019-01-17 RX ADMIN — FENTANYL CITRATE 50 MCG: 50 INJECTION, SOLUTION INTRAMUSCULAR; INTRAVENOUS at 10:38

## 2019-01-17 RX ADMIN — SODIUM CHLORIDE, POTASSIUM CHLORIDE, SODIUM LACTATE AND CALCIUM CHLORIDE 1000 ML: 600; 310; 30; 20 INJECTION, SOLUTION INTRAVENOUS at 08:15

## 2019-01-17 RX ADMIN — LIDOCAINE HYDROCHLORIDE 0.5 ML: 10 INJECTION, SOLUTION EPIDURAL; INFILTRATION; INTRACAUDAL; PERINEURAL at 08:15

## 2019-01-17 RX ADMIN — ROCURONIUM BROMIDE 5 MG: 10 INJECTION INTRAVENOUS at 10:40

## 2019-01-17 RX ADMIN — ONDANSETRON HYDROCHLORIDE 4 MG: 2 SOLUTION INTRAMUSCULAR; INTRAVENOUS at 11:11

## 2019-01-17 RX ADMIN — MIDAZOLAM HYDROCHLORIDE 2 MG: 1 INJECTION, SOLUTION INTRAMUSCULAR; INTRAVENOUS at 09:52

## 2019-01-17 RX ADMIN — CLINDAMYCIN PHOSPHATE 600 MG: 12 INJECTION, SOLUTION INTRAVENOUS at 11:05

## 2019-01-17 NOTE — ANESTHESIA PREPROCEDURE EVALUATION
Anesthesia Evaluation     Patient summary reviewed   no history of anesthetic complications:  NPO Solid Status: > 8 hours  NPO Liquid Status: > 8 hours           Airway   Mallampati: III  TM distance: >3 FB  Neck ROM: full  Dental      Comment: Molar crowns    Pulmonary    (+) a smoker (pipe) Current Abstained day of surgery,   (-) asthma, sleep apnea  Cardiovascular   Exercise tolerance: good (4-7 METS)    ECG reviewed  PT is on anticoagulation therapy  Patient on routine beta blocker and Beta blocker given within 24 hours of surgery    (+) hypertension, CAD, CABG (2004, 5V), dysrhythmias PAC, hyperlipidemia,     ROS comment: Last plavix 1/11    Neuro/Psych  (-) seizures, TIA, CVA  GI/Hepatic/Renal/Endo    (+)   renal disease CRI, diabetes mellitus using insulin,   (-) liver disease    ROS Comment: S/P ileostomy for ulcerative colitis    Musculoskeletal     Abdominal    Substance History      OB/GYN          Other                        Anesthesia Plan    ASA 3     general     intravenous induction   Anesthetic plan, all risks, benefits, and alternatives have been provided, discussed and informed consent has been obtained with: patient.

## 2019-01-17 NOTE — ANESTHESIA PROCEDURE NOTES
Airway  Urgency: elective    Airway not difficult    General Information and Staff    Patient location during procedure: OR  CRNA: Dylon Garcia CRNA    Indications and Patient Condition  Indications for airway management: airway protection    Preoxygenated: yes  MILS not maintained throughout  Mask difficulty assessment: 1 - vent by mask    Final Airway Details  Final airway type: endotracheal airway      Successful airway: ETT  Cuffed: yes   Successful intubation technique: direct laryngoscopy  Facilitating devices/methods: intubating stylet  Endotracheal tube insertion site: oral  Blade: [place]  Blade size: 2  ETT size (mm): 8.0  Cormack-Lehane Classification: grade I - full view of glottis  Placement verified by: chest auscultation and capnometry   Measured from: lips  ETT to lips (cm): 22  Number of attempts at approach: 1

## 2019-01-17 NOTE — H&P
Patient Care Team:  Robin Freedman MD as PCP - General  Robin Freedman MD as PCP - Family Medicine  Maty Toscano APRN as Nurse Practitioner (Hospitalist)  Robin Freedman MD as Referring Physician (Family Medicine)  Ash Quiroz MD as Consulting Physician (Otolaryngology)    Chief complaint 73-year-old male with Charcot neuroarthropathy right lower extremity.  Distal chronic ulceration plantar aspect right foot.  Has had workup for infection including MRI and does not have any evidence of bone infection.  Has been utilizing his Crow walker.  We have discussed Charcot reconstruction as well as just simple exostectomy and and removal of the ulceration she presents for today.    Subjective         Review of Systems   Constitutional: Negative for activity change, appetite change, chills, diaphoresis, fatigue, fever and unexpected weight change.   HENT: Negative for mouth sores and sore throat.    Eyes: Negative for visual disturbance.   Respiratory: Negative for apnea, cough, choking, chest tightness, shortness of breath, wheezing and stridor.    Cardiovascular: Negative for chest pain, palpitations and leg swelling.   Gastrointestinal: Negative for abdominal pain, blood in stool, constipation, diarrhea, nausea and vomiting.   Endocrine: Negative for cold intolerance, heat intolerance, polydipsia, polyphagia and polyuria.   Genitourinary: Negative for difficulty urinating.   Musculoskeletal: Negative for arthralgias, back pain, gait problem, joint swelling, myalgias, neck pain and neck stiffness.   Skin: Negative for color change, pallor, rash and wound.   Allergic/Immunologic: Negative for environmental allergies, food allergies and immunocompromised state.   Neurological: Negative for dizziness, weakness, light-headedness, numbness and headaches.   Hematological: Does not bruise/bleed easily.   Psychiatric/Behavioral: Negative for agitation, behavioral problems, confusion,  hallucinations, self-injury and sleep disturbance. The patient is not nervous/anxious.             Objective      Vital Signs  Temp:  [97.2 °F (36.2 °C)] 97.2 °F (36.2 °C)  Heart Rate:  [61-68] 68  Resp:  [16-18] 18  BP: (126)/(50) 126/50    Physical Exam   Constitutional: He appears well-developed and well-nourished.   HENT:   Head: Normocephalic and atraumatic.   Eyes: EOM are normal. Pupils are equal, round, and reactive to light.   Neck: Normal range of motion. Neck supple.   Cardiovascular: Intact distal pulses.   Pulmonary/Chest: Effort normal. No respiratory distress.   Abdominal: Soft. He exhibits no distension. There is no tenderness.   Neurological: A sensory deficit is present.   Skin: Capillary refill takes less than 2 seconds.   Psychiatric: He has a normal mood and affect. His behavior is normal. Judgment and thought content normal.     Ulcer plantar aspect right foot which is granular no evidence of infection does not probe to bone.  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  We did discuss a partial removal of bone with excision of the ulceration plantar aspect right foot.  He understands need to be completely nonweightbearing following this procedure.  I advised him that this type of procedure can lead to destabilization worsening deformity of his foot with time.  Seemed understand that completely.  Agree to proceed with surgical intervention.    Florin Kearns DPM  01/17/19  10:24 AM

## 2019-01-17 NOTE — OP NOTE
FOOT OPEN REDUCTION INTERNAL FIXATION  Procedure Note    Darren Shay  1/17/2019    Pre-op Diagnosis:   CHARCOT, EXOSTOSIS, ULCER - RIGHT FOOT  * Charcot foot due to diabetes mellitus (CMS/HCC) [E11.610]     * Exostosis of right foot [M89.8X7]     * Ulcer of foot due to diabetes mellitus (CMS/HCC) [E11.621, L97.509]  Post-op Diagnosis:     Post-Op Diagnosis Codes:     * Charcot foot due to diabetes mellitus (CMS/HCC) [E11.610]     * Exostosis of right foot [M89.8X7]     * Ulcer of foot due to diabetes mellitus (CMS/HCC) [E11.621, L97.509]    Procedure/CPT® Codes:      Procedure(s):  1)  PARTIAL REMOVAL OF BONE MEDIAL CUNEIFORM AND 1ST METATARSAL - RIGHT   2)  Right FOOT EXCISION OF ULCERATION 10 by 12 mm    Surgeon(s):  Florin Kearns DPM    Anesthesia: General    Staff:   Circulator: Jayde Palencia RN; Ag Serrano RN  Scrub Person: Daniel Barrera; Jada Antunez; Vero Peter    Indications for procedure:  Charcot Deformity right foot with chronic ulceration    Procedure details:  The patient was brought into the operating room and placed under general anesthesia.  An ankle block was performed with 20 mL 0.5% Marcaine plain.  Right leg prepped and draped.    Procedure #1 excision of ulceration plantar aspect right foot 10 x 12 mm.    Was directed to the plantar aspect patient's right foot 10 x 12 granular ulceration was identified.  2 converging semi-elliptical incisions were placed surrounding this taking a wedge excision of skin measuring approximately 40 x 14 mm.  This was deepened to the level of fascia and excised.  It was sent to pathology for gross histologic examination.  The ulceration did not probe to bone.    Procedure #2 partial removal of bone first metatarsal and medial cuneiform right foot    A linear periosteal and capsular incision was made.  Plantar aspect of the first metatarsophalangeal joint was exposed.  A sagittal saw and curved osteotome and mallet were utilized to  resect bone from the base of the first metatarsal and medial cuneiform.  It was removed and the operative site in total.  It was further remodel utilizing hand rasp.  Wound was then irrigated.  Capsular and fascial tissues plantarly closed with 0 Vicryl.  Subcutaneous case tissues closed with 2-0 Vicryl.  Skin is reported utilizing 3-0 nylon.  A well-padded compression bandage and posterior splint was applied.    Estimated Blood Loss: none    Specimens:                ID Type Source Tests Collected by Time   A : RIGHT FOOT ULCER Tissue Foot, Right TISSUE PATHOLOGY EXAM Florin Kearns DPM 1/17/2019 1059         Drains:   Ileostomy Continent (Abdominal pouch) RUQ (Active)   Stomal Appliance 2 piece 1/17/2019  9:46 AM   Peristomal Assessment Clean 1/17/2019  9:46 AM       Implants: Nothing was implanted during the procedure     Complications: none    Follow up:   Nonweightbearing.  Return to clinic in 1 week for follow-up.    Florin Kearns DPM     Date: 1/17/2019  Time: 11:15 AM

## 2019-01-17 NOTE — DISCHARGE INSTRUCTIONS
YOUR NEXT PAIN MEDICATION IS DUE AT______________         General Anesthesia, Adult, Care After  Refer to this sheet in the next few weeks. These instructions provide you with information on caring for yourself after your procedure. Your health care provider may also give you more specific instructions. Your treatment has been planned according to current medical practices, but problems sometimes occur. Call your health care provider if you have any problems or questions after your procedure.  WHAT TO EXPECT AFTER THE PROCEDURE  After the procedure, it is typical to experience:  · Sleepiness.  · Nausea and vomiting.  HOME CARE INSTRUCTIONS  · For the first 24 hours after general anesthesia:  ¨ Have a responsible person with you.  ¨ Do not drive a car. If you are alone, do not take public transportation.  ¨ Do not drink alcohol.  ¨ Do not take medicine that has not been prescribed by your health care provider.  ¨ Do not sign important papers or make important decisions.  ¨ You may resume a normal diet and activities as directed by your health care provider.  · Change bandages (dressings) as directed.  · If you have questions or problems that seem related to general anesthesia, call the hospital and ask for the anesthetist or anesthesiologist on call.  SEEK MEDICAL CARE IF:  · You have nausea and vomiting that continue the day after anesthesia.  · You develop a rash.  SEEK IMMEDIATE MEDICAL CARE IF:    · You have difficulty breathing.  · You have chest pain.  · You have any allergic problems.     This information is not intended to replace advice given to you by your health care provider. Make sure you discuss any questions you have with your health care provider.     Document Released: 03/26/2002 Document Revised: 01/08/2016 Document Reviewed: 07/03/2014  [employer] Interactive Patient Education ©2016 [employer]    CALL YOUR PHYSICIAN IF YOU EXPERIENCE  INCREASED PAIN NOT HELPED BY YOUR PAIN MEDICATION.    .                                               Fall Prevention in the Home      Falls can cause injuries. They can happen to people of all ages. There are many things you can do to make your home safe and to help prevent falls.    WHAT CAN I DO ON THE OUTSIDE OF MY HOME?  · Regularly fix the edges of walkways and driveways and fix any cracks.  · Remove anything that might make you trip as you walk through a door, such as a raised step or threshold.  · Trim any bushes or trees on the path to your home.  · Use bright outdoor lighting.  · Clear any walking paths of anything that might make someone trip, such as rocks or tools.  · Regularly check to see if handrails are loose or broken. Make sure that both sides of any steps have handrails.  · Any raised decks and porches should have guardrails on the edges.  · Have any leaves, snow, or ice cleared regularly.  · Use sand or salt on walking paths during winter.  · Clean up any spills in your garage right away. This includes oil or grease spills.  WHAT CAN I DO IN THE BATHROOM?    · Use night lights.  · Install grab bars by the toilet and in the tub and shower. Do not use towel bars as grab bars.  · Use non-skid mats or decals in the tub or shower.  · If you need to sit down in the shower, use a plastic, non-slip stool.  · Keep the floor dry. Clean up any water that spills on the floor as soon as it happens.  · Remove soap buildup in the tub or shower regularly.  · Attach bath mats securely with double-sided non-slip rug tape.  · Do not have throw rugs and other things on the floor that can make you trip.  WHAT CAN I DO IN THE BEDROOM?  · Use night lights.  · Make sure that you have a light by your bed that is easy to reach.  · Do not use any sheets or blankets that are too big for your bed. They should not hang down onto the floor.  · Have a firm chair that has side arms. You can use this for support while you get dressed.  · Do not have throw rugs and other things on the floor  that can make you trip.  WHAT CAN I DO IN THE KITCHEN?  · Clean up any spills right away.  · Avoid walking on wet floors.  · Keep items that you use a lot in easy-to-reach places.  · If you need to reach something above you, use a strong step stool that has a grab bar.  · Keep electrical cords out of the way.  · Do not use floor polish or wax that makes floors slippery. If you must use wax, use non-skid floor wax.  · Do not have throw rugs and other things on the floor that can make you trip.  WHAT CAN I DO WITH MY STAIRS?  · Do not leave any items on the stairs.  · Make sure that there are handrails on both sides of the stairs and use them. Fix handrails that are broken or loose. Make sure that handrails are as long as the stairways.  · Check any carpeting to make sure that it is firmly attached to the stairs. Fix any carpet that is loose or worn.  · Avoid having throw rugs at the top or bottom of the stairs. If you do have throw rugs, attach them to the floor with carpet tape.  · Make sure that you have a light switch at the top of the stairs and the bottom of the stairs. If you do not have them, ask someone to add them for you.  WHAT ELSE CAN I DO TO HELP PREVENT FALLS?  · Wear shoes that:  ¨ Do not have high heels.  ¨ Have rubber bottoms.  ¨ Are comfortable and fit you well.  ¨ Are closed at the toe. Do not wear sandals.  · If you use a stepladder:  ¨ Make sure that it is fully opened. Do not climb a closed stepladder.  ¨ Make sure that both sides of the stepladder are locked into place.  ¨ Ask someone to hold it for you, if possible.  · Clearly abigail and make sure that you can see:  ¨ Any grab bars or handrails.  ¨ First and last steps.  ¨ Where the edge of each step is.  · Use tools that help you move around (mobility aids) if they are needed. These include:  ¨ Canes.  ¨ Walkers.  ¨ Scooters.  ¨ Crutches.  · Turn on the lights when you go into a dark area. Replace any light bulbs as soon as they burn  out.  · Set up your furniture so you have a clear path. Avoid moving your furniture around.  · If any of your floors are uneven, fix them.  · If there are any pets around you, be aware of where they are.  · Review your medicines with your doctor. Some medicines can make you feel dizzy. This can increase your chance of falling.  Ask your doctor what other things that you can do to help prevent falls.     This information is not intended to replace advice given to you by your health care provider. Make sure you discuss any questions you have with your health care provider.     Document Released: 10/14/2010 Document Revised: 05/03/2016 Document Reviewed: 01/22/2016  [employer] Interactive Patient Education ©2016 [employer]     PATIENT/FAMILY/RESPONSIBLE PARTY VERBALIZES UNDERSTANDING OF ABOVE EDUCATION.  COPY OF PAIN SCALE GIVEN AND REVIEWED WITH VERBALIZED UNDERSTANDING.What to expect after a Nerve Block  Nerve blocks administered to block pain affect many types of nerves, including those nerves that control movement, pain, and normal sensation.  Following a nerve block, you may notice some bruising at the site where the block was given.  You may experience sensations such as:  numbness of the affected area or limb, tingling, heaviness (that is the limb feels heavy to you), weakness or inability to move the affected arm or leg, or a feeling as if your arm or leg has “fallen asleep”.    A nerve block can last from 9-18 hours depending on the medications used.  Usually the weakness wears off first followed by the tingling and heaviness.  As the block wears off, you may begin to notice pain; however, this sequence of events may occur in any order.  Typically, you will be able to move your limb before you will feel it.  Once a nerve block begins to wear off, the effects are usually completely gone within 60 minutes.    If you experience continued side effects that you believe are block related for longer than 48 hours,  please call your healthcare provider.  Please see block-specific instructions below.    Instructions for any Block involving the leg/foot:  If you have had a leg/foot block, you should not bear weight on the affected leg until the block has worn off.  After the block has worn off, weight bearing should be as directed by your surgeon.  You may be sent home with crutches.  You are at high risk for falling because of the anesthetic effects on your leg.  Please use caution when standing or trying to move or walk.  Have someone assist you until your leg and foot function have returned to normal.    Protection of a “blocked” limb  • After a nerve block, you cannot feel pain, pressure, or extremes of temperature in the affected limb.  And because of this, your blocked limb is at more risk for injury.  For example, it is possible to burn your limb on an extremely hot surface without feeling it.  • When resting, it is important to reposition your limb periodically to avoid prolonged pressure on it.  This may require the use of pillows and padding.  • While sleeping, you should avoid rolling onto the affected limb or putting too much pressure on it.  • If you have a cast or tight dressing, check the color of your toes of the affected limb.  Call your surgeon if they look discolored (that is, dusky, dark colored)  • Use caution in cold weather.  Cover your limb appropriately to protect it from the cold.  Pain Management  Your surgeon will give you a prescription for pain medication.  Begin taking this before the nerve block wears off.  Bear in mind that sometimes the block can wear off in the middle of the night.

## 2019-01-17 NOTE — ANESTHESIA POSTPROCEDURE EVALUATION
Patient: Darren Shay    Procedure Summary     Date:  01/17/19 Room / Location:  [facility] OR [address] [place] [address] [facility]    Anesthesia Start:  1037 Anesthesia Stop:  1125    Procedure:  PARTIAL REMOVAL OF BONE MEDIAL CUNEIFORM AND 1ST METATARSAL - RIGHT FOOT EXCISION OF ULCERATION (Right Foot) Diagnosis:       Charcot foot due to diabetes mellitus (CMS/HCC)      Exostosis of right foot      Ulcer of foot due to diabetes mellitus (CMS/HCC)      (CHARCOT, EXOSTOSIS, ULCER - RIGHT FOOT)    Surgeon:  Florin Kearns DPM Provider:  Dylon Garcia CRNA    Anesthesia Type:  general ASA Status:  3          Anesthesia Type: general  Last vitals  BP   117/57 (01/17/19 1155)   Temp   97.6 °F (36.4 °C) (01/17/19 1155)   Pulse   72 (01/17/19 1155)   Resp   15 (01/17/19 1155)     SpO2   96 % (01/17/19 1155)     Post Anesthesia Care and Evaluation    Patient location during evaluation: PACU  Patient participation: complete - patient participated  Level of consciousness: awake and alert  Pain management: adequate  Airway patency: patent  Anesthetic complications: No anesthetic complications  PONV Status: none  Cardiovascular status: acceptable and hemodynamically stable  Respiratory status: acceptable  Hydration status: acceptable    Comments: Blood pressure 117/57, pulse 72, temperature 97.6 °F (36.4 °C), temperature source Tympanic, resp. rate 15, SpO2 96 %.    Patient discharged from PACU based upon Remigio score. Please see RN notes for further details

## 2019-01-18 LAB
CYTO UR: NORMAL
LAB AP CASE REPORT: NORMAL
PATH REPORT.FINAL DX SPEC: NORMAL
PATH REPORT.GROSS SPEC: NORMAL

## 2019-02-12 ENCOUNTER — PROCEDURE VISIT (OUTPATIENT)
Dept: OTOLARYNGOLOGY | Facility: CLINIC | Age: 74
End: 2019-02-12

## 2019-02-12 ENCOUNTER — OFFICE VISIT (OUTPATIENT)
Dept: OTOLARYNGOLOGY | Facility: CLINIC | Age: 74
End: 2019-02-12

## 2019-02-12 VITALS
RESPIRATION RATE: 20 BRPM | TEMPERATURE: 98 F | HEIGHT: 72 IN | DIASTOLIC BLOOD PRESSURE: 76 MMHG | WEIGHT: 220 LBS | BODY MASS INDEX: 29.8 KG/M2 | HEART RATE: 78 BPM | SYSTOLIC BLOOD PRESSURE: 135 MMHG

## 2019-02-12 DIAGNOSIS — H90.3 SNHL (SENSORY-NEURAL HEARING LOSS), ASYMMETRICAL: Primary | ICD-10-CM

## 2019-02-12 DIAGNOSIS — H90.3 SNHL (SENSORY-NEURAL HEARING LOSS), ASYMMETRICAL: ICD-10-CM

## 2019-02-12 DIAGNOSIS — H69.82 DYSFUNCTION OF LEFT EUSTACHIAN TUBE: ICD-10-CM

## 2019-02-12 DIAGNOSIS — H69.82 DYSFUNCTION OF LEFT EUSTACHIAN TUBE: Primary | ICD-10-CM

## 2019-02-12 PROCEDURE — 99214 OFFICE O/P EST MOD 30 MIN: CPT | Performed by: NURSE PRACTITIONER

## 2019-02-12 RX ORDER — FLUTICASONE PROPIONATE 50 MCG
2 SPRAY, SUSPENSION (ML) NASAL DAILY
Qty: 1 BOTTLE | Refills: 6 | Status: SHIPPED | OUTPATIENT
Start: 2019-02-12 | End: 2019-03-14

## 2019-02-12 RX ORDER — AZELASTINE 1 MG/ML
2 SPRAY, METERED NASAL 2 TIMES DAILY
Qty: 30 ML | Refills: 6 | Status: SHIPPED | OUTPATIENT
Start: 2019-02-12 | End: 2019-03-14

## 2019-02-12 NOTE — PROGRESS NOTES
PRIMARY CARE PROVIDER: Robin Freedman MD  REFERRING PROVIDER: No ref. provider found    Chief Complaint   Patient presents with   • Ear Problem     Hearing Loss        Subjective   History of Present Illness:  Darren Shay is a  73 y.o. male who complains of decreased hearing. The symptoms are localized to the left> right  ears. The patient has had worsening symptoms. The symptoms have been present for the last several years, but have worsened within the last 1 month.  There have been no identified factors that aggravate the symptoms. There have been no factors that have improved the symptoms. He states he has always had better hearing in his right ear compared to his left. 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 He denies otalgia, otorrhea, dizziness, or vertigo.       Review of Systems:  Review of Systems   Constitutional: Positive for appetite change. Negative for chills and fever.   HENT: Positive for hearing loss. Negative for congestion, ear discharge, ear pain, rhinorrhea, sinus pressure, sinus pain, tinnitus, trouble swallowing and voice change.    Respiratory: Negative for cough and shortness of breath.    Cardiovascular: Negative for chest pain.   Gastrointestinal: Negative for diarrhea, nausea and vomiting.   Hematological: Bruises/bleeds easily.       Past History:  Past Medical History:   Diagnosis Date   • Cataracts, bilateral    • Colon cancer (CMS/HCC)    • Coronary artery disease    • Diabetes mellitus (CMS/HCC)    • Diabetic foot ulcers (CMS/HCC)    • Elevated cholesterol    • Hypertension    • Ileostomy present (CMS/HCC)    • Neuropathy    • UC (ulcerative colitis confined to rectum) (CMS/HCC)      Past Surgical History:   Procedure Laterality Date   • CHOLECYSTECTOMY     • COLOSTOMY     • CORONARY ARTERY BYPASS GRAFT  1993    x5   • ORIF FOOT FRACTURE Right 1/17/2019    Procedure: PARTIAL REMOVAL OF BONE MEDIAL CUNEIFORM AND 1ST METATARSAL - RIGHT  FOOT EXCISION OF ULCERATION;  Surgeon: Florin Kearns DPM;  Location:  [facility] OR;  Service: Podiatry   • TOE AMPUTATION      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 at      Family History   Problem Relation Age of Onset   • Cancer Mother    • Heart disease Mother    • Diabetes Mother    • Cancer Father    • Diabetes Sister    • No Known Problems Brother    • Diabetes Sister    • No Known Problems Brother    • No Known Problems Brother    • No Known Problems Brother      Social History     Tobacco Use   • Smoking status: Current Some Day Smoker     Types: Pipe   • Smokeless tobacco: Never Used   • Tobacco comment: quit smoking cigarettes 15 years ago smokes a pipe ocassionally    Substance Use Topics   • Alcohol use: No   • Drug use: No     Allergies:  Cephalexin    Current Outpatient Medications:   •  aspirin 81 MG chewable tablet, Chew 81 mg Daily., Disp: , Rfl:   •  cilostazol (PLETAL) 100 MG tablet, Take 100 mg by mouth 2 (Two) Times a Day., Disp: , Rfl:   •  clopidogrel (PLAVIX) 75 MG tablet, Take 75 mg by mouth Daily., Disp: , Rfl:   •  folic acid (FOLVITE) 400 MCG tablet, Take 800 mcg by mouth Daily., Disp: , Rfl:   •  gabapentin (NEURONTIN) 300 MG capsule, Take 300 mg by mouth As Needed., Disp: , Rfl:   •  HYDROcodone-acetaminophen (NORCO) 7.5-325 MG per tablet, Take 1 tablet by mouth Every 6 (Six) Hours As Needed for Moderate Pain ., Disp: 30 tablet, Rfl: 0  •  LANTUS 100 UNIT/ML injection, INJECT 80 UNITS SUBCUTANEOUSLY AT BEDTIME, Disp: 70 mL, Rfl: 1  •  metoprolol tartrate (LOPRESSOR) 50 MG tablet, Take 75 mg by mouth 2 (Two) Times a Day., Disp: , Rfl:   •  NOVOLOG 100 UNIT/ML injection, Inject 4 Units under the skin into the appropriate area as directed 3 (Three) Times a Day With Meals., Disp: , Rfl:   •  simvastatin (ZOCOR) 20 MG tablet, Take 20 mg by mouth Every Night., Disp: , Rfl:   •  azelastine (ASTELIN) 0.1 % nasal spray, 2 sprays into the nostril(s) as directed by provider 2 (Two) Times a Day for 30 days. Use in  "each nostril as directed, Disp: 30 mL, Rfl: 6  •  fluticasone (FLONASE) 50 MCG/ACT nasal spray, 2 sprays into the nostril(s) as directed by provider Daily for 30 days. Administer 2 sprays in each nostril for each dose., Disp: 1 bottle, Rfl: 6      Objective     Vital Signs:    /76   Pulse 78   Temp 98 °F (36.7 °C)   Resp 20   Ht 182.9 cm (72\")   Wt 99.8 kg (220 lb)   BMI 29.84 kg/m²     Physical Exam:  Physical Exam  CONSTITUTIONAL: well nourished, well-developed, alert, oriented, in no acute distress   COMMUNICATION AND VOICE: able to communicate normally, normal voice quality  HEAD: normocephalic, no lesions, atraumatic, no tenderness, no masses   FACE: appearance normal, no lesions, no tenderness, no deformities, facial motion symmetric  SALIVARY GLANDS: parotid glands with no tenderness, no swelling, no masses, submandibular glands with normal size, nontender  EYES: ocular motility normal, eyelids normal, orbits normal, no proptosis, conjunctiva normal , pupils equal, round   EARS:  Hearing: response to conversational voice normal bilaterally   External Ears: auricles without lesions  Otoscopic: right tympanic membrane appearance normal, no lesions, no perforation, normal mobility, no fluid; left tympanic membrane is intact but dull in appearance; minimal cerumen to bilateral EACs  NOSE:  External Nose: structure normal, no tenderness on palpation, no nasal discharge, no lesions, no evidence of trauma, nostrils patent   Intranasal Exam: nasal mucosa normal, vestibule within normal limits, inferior turbinate normal, nasal septum midline   ORAL:  Lips: upper and lower lips without lesion   Teeth: dentition within normal limits for age   Gums: gingivae healthy   Oral Mucosa: oral mucosa normal, no mucosal lesions   Floor of Mouth: Warthin’s duct patent, mucosa normal  Tongue: lingual mucosa normal without lesions, normal tongue mobility   Palate: soft and hard palates with normal mucosa and " structure  Oropharynx: oropharyngeal mucosa normal  NECK: neck appearance normal, no masses or tenderness  LYMPH NODES: no lymphadenopathy  CHEST/RESPIRATORY: respiratory effort normal, normal breath sounds   CARDIOVASCULAR: rate and rhythm normal, extremities without cyanosis or edema    NEUROLOGIC/PSYCHIATRIC: oriented to time, place and person, mood normal, affect appropriate, CN II-XII intact grossly    Results Review:       Assessment   Assessment:  1. Dysfunction of left eustachian tube    2. SNHL (sensory-neural hearing loss), asymmetrical    3. BMI 29.0-29.9,adult        Plan   Plan:    New Medications Ordered This Visit   Medications   • fluticasone (FLONASE) 50 MCG/ACT nasal spray     Si sprays into the nostril(s) as directed by provider Daily for 30 days. Administer 2 sprays in each nostril for each dose.     Dispense:  1 bottle     Refill:  6   • azelastine (ASTELIN) 0.1 % nasal spray     Si sprays into the nostril(s) as directed by provider 2 (Two) Times a Day for 30 days. Use in each nostril as directed     Dispense:  30 mL     Refill:  6     Start nasal sprays. The proper use of nasal inhalers was discussed including the need for regular use and build up of drug levels before full effects.     Discussed imaging due to asymmetry of SNHL- we will hold off on this for now, but will closely monitor.     QUALITY MEASURES    Body Mass Index Screening and Follow-Up Plan  Body mass index is 29.84 kg/m².  Patient's Body mass index is 29.84 kg/m². BMI is above normal parameters. Recommendations include: referral to primary care.    Tobacco Use: Screening and Cessation Intervention  Social History    Tobacco Use      Smoking status: Current Some Day Smoker        Types: Pipe      Smokeless tobacco: Never Used      Tobacco comment: quit smoking cigarettes 15 years ago smokes a pipe ocassionally     Smoking cessation information given in after visit summary.      Return in about 6 weeks (around 3/26/2019)  for When Dr. Quiroz is in office.    My findings and recommendations were discussed and questions were answered.     Evelyn Marshall APRN

## 2019-02-22 ENCOUNTER — TELEPHONE (OUTPATIENT)
Dept: FAMILY MEDICINE CLINIC | Facility: CLINIC | Age: 74
End: 2019-02-22

## 2019-02-22 NOTE — TELEPHONE ENCOUNTER
PT called regarding a medication question. PT had a dentist visit yesterday and had a tooth extracted. Dentist advised that he disc Plavix for 4-5 days. PT wanted to be sure this was ok with his physician. Please call to discuss.

## 2019-02-25 NOTE — TELEPHONE ENCOUNTER
Left message for patient to call the office for MD's instructions if no one has previously contacted him.

## 2019-03-06 ENCOUNTER — OFFICE VISIT (OUTPATIENT)
Dept: FAMILY MEDICINE CLINIC | Facility: CLINIC | Age: 74
End: 2019-03-06

## 2019-03-06 VITALS
WEIGHT: 218 LBS | SYSTOLIC BLOOD PRESSURE: 130 MMHG | HEART RATE: 81 BPM | OXYGEN SATURATION: 95 % | TEMPERATURE: 95.1 F | RESPIRATION RATE: 18 BRPM | DIASTOLIC BLOOD PRESSURE: 90 MMHG | BODY MASS INDEX: 29.53 KG/M2 | HEIGHT: 72 IN

## 2019-03-06 DIAGNOSIS — E11.8 DIABETIC FOOT (HCC): Primary | ICD-10-CM

## 2019-03-06 DIAGNOSIS — E78.2 MIXED HYPERLIPIDEMIA: ICD-10-CM

## 2019-03-06 DIAGNOSIS — I25.10 CORONARY ARTERY DISEASE INVOLVING NATIVE CORONARY ARTERY OF NATIVE HEART WITHOUT ANGINA PECTORIS: ICD-10-CM

## 2019-03-06 DIAGNOSIS — I70.213 ATHEROSCLEROSIS OF NATIVE ARTERY OF BOTH LOWER EXTREMITIES WITH INTERMITTENT CLAUDICATION (HCC): ICD-10-CM

## 2019-03-06 PROBLEM — E78.00 ELEVATED CHOLESTEROL: Status: ACTIVE | Noted: 2019-03-06

## 2019-03-06 PROCEDURE — 99214 OFFICE O/P EST MOD 30 MIN: CPT | Performed by: FAMILY MEDICINE

## 2019-03-06 NOTE — PROGRESS NOTES
OFFICE VISIT NOTE:    Darren Shay is a 73 y.o. male who presents today for Diabetic foot exam (Foot exam for diabetic shoes.).     Diabetes   He presents for his follow-up diabetic visit. He has type 2 diabetes mellitus. MedicAlert identification noted. His disease course has been stable. There are no hypoglycemic associated symptoms. There are no diabetic associated symptoms. Pertinent negatives for diabetes include no chest pain, no fatigue and no weight loss. There are no hypoglycemic complications. Symptoms are stable. Diabetic complications include nephropathy, peripheral neuropathy and PVD. Risk factors for coronary artery disease include diabetes mellitus, dyslipidemia, family history, male sex, obesity, sedentary lifestyle and stress. Current diabetic treatment includes diet and insulin injections. He is compliant with treatment most of the time. His weight is stable. He is following a diabetic and low fat/cholesterol diet. Meal planning includes avoidance of concentrated sweets. He has had a previous visit with a dietitian. He participates in exercise every other day. There is no change in his home blood glucose trend. An ACE inhibitor/angiotensin II receptor blocker is not being taken. He sees a podiatrist (exam done here).Eye exam is current.        Past medical/surgical history, Family history, Social history, Allergies and Medications have been reviewed with the patient today and are updated in [facility] EMR. See below.    Past Medical History:   Diagnosis Date   • Cataracts, bilateral    • Colon cancer (CMS/HCC)    • Coronary artery disease    • Diabetes mellitus (CMS/HCC)    • Diabetic foot ulcers (CMS/HCC)    • Hypertension    • Ileostomy present (CMS/HCC)    • Neuropathy    • UC (ulcerative colitis confined to rectum) (CMS/HCC)      Past Surgical History:   Procedure Laterality Date   • CHOLECYSTECTOMY     • COLOSTOMY     • CORONARY ARTERY BYPASS GRAFT  1993    x5   • ORIF FOOT FRACTURE Right  "1/17/2019    Procedure: PARTIAL REMOVAL OF BONE MEDIAL CUNEIFORM AND 1ST METATARSAL - RIGHT FOOT EXCISION OF ULCERATION;  Surgeon: Florin Kearsn DPM;  Location: [facility] OR;  Service: Podiatry   • TOE AMPUTATION      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 at      Family History   Problem Relation Age of Onset   • Cancer Mother    • Heart disease Mother    • Diabetes Mother    • Cancer Father    • Diabetes Sister    • No Known Problems Brother    • Diabetes Sister    • No Known Problems Brother    • No Known Problems Brother    • No Known Problems Brother      Social History     Tobacco Use   • Smoking status: Current Some Day Smoker     Types: Pipe   • Smokeless tobacco: Never Used   • Tobacco comment: quit smoking cigarettes 15 years ago smokes a pipe ocassionally    Substance Use Topics   • Alcohol use: No   • Drug use: No       Allergies:  Cephalexin      Review of Systems:    Review of Systems   Constitutional: Negative for activity change, appetite change, fatigue, fever, unexpected weight gain and unexpected weight loss.   Respiratory: Negative for shortness of breath.    Cardiovascular: Negative for chest pain.   Gastrointestinal: Negative for abdominal pain.   Genitourinary: Negative for difficulty urinating.   Skin: Negative for rash.   Neurological: Negative for syncope and headache.         Physical Examination:  Vital Signs:  /90 (BP Location: Left arm, Patient Position: Sitting, Cuff Size: Adult)   Pulse 81   Temp 95.1 °F (35.1 °C) (Tympanic)   Resp 18   Ht 182.9 cm (72\")   Wt 98.9 kg (218 lb)   SpO2 95%   BMI 29.57 kg/m²   Physical Exam   Constitutional: He is oriented to person, place, and time. He appears well-developed and well-nourished. No distress.   HENT:   Head: Normocephalic and atraumatic.   Mouth/Throat: Oropharynx is clear and moist.   Eyes: Conjunctivae and EOM are normal.   Neck: Normal range of motion. Neck supple. No JVD present.   Cardiovascular: Normal rate, regular rhythm, normal heart " sounds and intact distal pulses.   Pulmonary/Chest: Effort normal and breath sounds normal. No respiratory distress.   Abdominal: Soft. He exhibits no distension.   Musculoskeletal: Normal range of motion. He exhibits deformity (Toes removed from left foot. DM foot exam revealed decreased pulses in B feet, but barely palpable. Mildly cool feet bilaterally - sensation almost absent in both feet to above ankles. No ulcerations or significant callouses). He exhibits no edema.   Neurological: He is alert and oriented to person, place, and time. No cranial nerve deficit.   Skin: Skin is warm and dry. Capillary refill takes less than 2 seconds. No rash noted.   Psychiatric: He has a normal mood and affect. His behavior is normal.   Nursing note and vitals reviewed.      Procedures      ASSESSMENT/ PLAN:    Darren was seen today for diabetic foot exam.    Diagnoses and all orders for this visit:    Diabetic foot (CMS/[facility])  -     Miscellaneous DME  -     CBC (No Diff); Future  -     Comprehensive Metabolic Panel; Future  -     Hemoglobin A1c; Future  -     T4; Future  -     TSH; Future  -     Lipid Panel; Future    Atherosclerosis of native artery of both lower extremities with intermittent claudication (CMS/[facility])  -     CBC (No Diff); Future  -     Comprehensive Metabolic Panel; Future  -     Hemoglobin A1c; Future  -     T4; Future  -     TSH; Future  -     Lipid Panel; Future    Coronary artery disease involving native coronary artery of native heart without angina pectoris  -     CBC (No Diff); Future  -     Comprehensive Metabolic Panel; Future  -     Hemoglobin A1c; Future  -     T4; Future  -     TSH; Future  -     Lipid Panel; Future    Mixed hyperlipidemia  -     CBC (No Diff); Future  -     Comprehensive Metabolic Panel; Future  -     Hemoglobin A1c; Future  -     T4; Future  -     TSH; Future  -     Lipid Panel; Future        Specific Patient Instructions:    MEDICATION Instructions: Encouraged patient to continue  routine medicines as prescribed and maintain compliance. Patient instructed to report any adverse side effects or reactions to medicines promptly to the office. Patient instructed to make us aware of any OTC or herbal meds or supplement use.  DIET Recommendations: Patient instructed and provided information on the following nutrition and DIET(s): Diabetic (NCS, low carb, diet exchanges). Low Fat, low Cholesterol. Necessity for adequate daily intake of fluids/water.  EXERCISE Instructions: Discussed with patient the need for routine aerobic activity for cardiovascular fitness, 3 times a week for about 30 minutes.    SMOKING Recommendations: N/A  HEALTH MAINTENANCE:  Counseling provided to patient/family about routine health maintenance and ANNUAL physicals/labs.  MISCELLANEOUS Instructions: N/A      Medications at completion of visit:      Current Outpatient Medications:   •  aspirin 81 MG chewable tablet, Chew 81 mg Daily., Disp: , Rfl:   •  azelastine (ASTELIN) 0.1 % nasal spray, 2 sprays into the nostril(s) as directed by provider 2 (Two) Times a Day for 30 days. Use in each nostril as directed, Disp: 30 mL, Rfl: 6  •  cilostazol (PLETAL) 100 MG tablet, Take 100 mg by mouth 2 (Two) Times a Day., Disp: , Rfl:   •  clopidogrel (PLAVIX) 75 MG tablet, Take 75 mg by mouth Daily., Disp: , Rfl:   •  fluticasone (FLONASE) 50 MCG/ACT nasal spray, 2 sprays into the nostril(s) as directed by provider Daily for 30 days. Administer 2 sprays in each nostril for each dose., Disp: 1 bottle, Rfl: 6  •  folic acid (FOLVITE) 400 MCG tablet, Take 800 mcg by mouth Daily., Disp: , Rfl:   •  gabapentin (NEURONTIN) 300 MG capsule, Take 300 mg by mouth As Needed., Disp: , Rfl:   •  LANTUS 100 UNIT/ML injection, INJECT 80 UNITS SUBCUTANEOUSLY AT BEDTIME, Disp: 70 mL, Rfl: 1  •  metoprolol tartrate (LOPRESSOR) 50 MG tablet, Take 75 mg by mouth 2 (Two) Times a Day., Disp: , Rfl:   •  NOVOLOG 100 UNIT/ML injection, Inject 4 Units under the  skin into the appropriate area as directed 3 (Three) Times a Day With Meals., Disp: , Rfl:   •  simvastatin (ZOCOR) 20 MG tablet, Take 20 mg by mouth Every Night., Disp: , Rfl:     FOLLOW-UP:    Return in about 3 months (around 6/6/2019) for Recheck.    I discussed the patients findings and my recommendations with patient.  An After Visit Summary (AVS) was printed and given to the patient at discharge.    Robin Freedman MD, FAAFP  3/6/2019

## 2019-03-12 ENCOUNTER — RESULTS ENCOUNTER (OUTPATIENT)
Dept: FAMILY MEDICINE CLINIC | Facility: CLINIC | Age: 74
End: 2019-03-12

## 2019-03-12 DIAGNOSIS — E78.2 MIXED HYPERLIPIDEMIA: ICD-10-CM

## 2019-03-12 DIAGNOSIS — E11.8 DIABETIC FOOT (HCC): ICD-10-CM

## 2019-03-12 DIAGNOSIS — I25.10 CORONARY ARTERY DISEASE INVOLVING NATIVE CORONARY ARTERY OF NATIVE HEART WITHOUT ANGINA PECTORIS: ICD-10-CM

## 2019-03-12 DIAGNOSIS — I70.213 ATHEROSCLEROSIS OF NATIVE ARTERY OF BOTH LOWER EXTREMITIES WITH INTERMITTENT CLAUDICATION (HCC): ICD-10-CM

## 2019-03-14 ENCOUNTER — CLINICAL SUPPORT (OUTPATIENT)
Dept: FAMILY MEDICINE CLINIC | Facility: CLINIC | Age: 74
End: 2019-03-14

## 2019-03-15 NOTE — PROGRESS NOTES
Jim Garcia MD       Audiologic Testing    Audiologic testing performed was reviewed with the following results:  There is a type a tympanogram on the right and a type B normal volume on the left.  There is a moderate to severe sensorineural loss on the right and a moderate to profound primarily sensorineural loss on the left    Impression  Bilateral hearing loss with findings suggestive of middle ear dysfunction on the left    Clinical correlation necessary    Jim Garcia MD  3/14/2019  9:23 PM

## 2019-03-21 ENCOUNTER — OFFICE VISIT (OUTPATIENT)
Dept: OTOLARYNGOLOGY | Facility: CLINIC | Age: 74
End: 2019-03-21

## 2019-03-21 ENCOUNTER — PROCEDURE VISIT (OUTPATIENT)
Dept: OTOLARYNGOLOGY | Facility: CLINIC | Age: 74
End: 2019-03-21

## 2019-03-21 VITALS
HEIGHT: 72 IN | WEIGHT: 219 LBS | TEMPERATURE: 97.9 F | BODY MASS INDEX: 29.66 KG/M2 | HEART RATE: 84 BPM | DIASTOLIC BLOOD PRESSURE: 61 MMHG | SYSTOLIC BLOOD PRESSURE: 144 MMHG

## 2019-03-21 DIAGNOSIS — H90.3 SNHL (SENSORY-NEURAL HEARING LOSS), ASYMMETRICAL: ICD-10-CM

## 2019-03-21 DIAGNOSIS — H69.82 DYSFUNCTION OF LEFT EUSTACHIAN TUBE: Primary | ICD-10-CM

## 2019-03-21 DIAGNOSIS — R42 DIZZINESS: Primary | ICD-10-CM

## 2019-03-21 DIAGNOSIS — H61.23 EXCESSIVE CERUMEN IN BOTH EAR CANALS: ICD-10-CM

## 2019-03-21 PROCEDURE — 99213 OFFICE O/P EST LOW 20 MIN: CPT | Performed by: NURSE PRACTITIONER

## 2019-03-21 NOTE — PROGRESS NOTES
PRIMARY CARE PROVIDER: Robin Freedman MD  REFERRING PROVIDER: No ref. provider found    No chief complaint on file.      Subjective   History of Present Illness:  Darren Shay is a  73 y.o. male who complains of decreased hearing. The symptoms are localized to the left> right  ears. The patient has had worsening symptoms. The symptoms have been present for the last several years, but have worsened within the last 1 month.  There have been no identified factors that aggravate the symptoms. There have been no factors that have improved the symptoms. He states he has always had better hearing in his right ear compared to his left. 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 He denies otalgia, otorrhea, dizziness, or vertigo.       Review of Systems:  Review of Systems   Constitutional: Positive for appetite change. Negative for chills and fever.   HENT: Positive for hearing loss. Negative for congestion, ear discharge, ear pain, rhinorrhea, sinus pressure, sinus pain, tinnitus, trouble swallowing and voice change.    Respiratory: Negative for cough and shortness of breath.    Cardiovascular: Negative for chest pain.   Gastrointestinal: Negative for diarrhea, nausea and vomiting.   Hematological: Bruises/bleeds easily.       Past History:  Past Medical History:   Diagnosis Date   • Cataracts, bilateral    • Colon cancer (CMS/HCC)    • Coronary artery disease    • Diabetes mellitus (CMS/HCC)    • Diabetic foot ulcers (CMS/HCC)    • Hypertension    • Ileostomy present (CMS/HCC)    • Neuropathy    • UC (ulcerative colitis confined to rectum) (CMS/HCC)      Past Surgical History:   Procedure Laterality Date   • CHOLECYSTECTOMY     • COLOSTOMY     • CORONARY ARTERY BYPASS GRAFT  1993    x5   • ORIF FOOT FRACTURE Right 1/17/2019    Procedure: PARTIAL REMOVAL OF BONE MEDIAL CUNEIFORM AND 1ST METATARSAL - RIGHT FOOT EXCISION OF ULCERATION;  Surgeon: Florin Kearns DPM;  Location:   [facility] OR;  Service: Podiatry   • TOE AMPUTATION      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 at      Family History   Problem Relation Age of Onset   • Cancer Mother    • Heart disease Mother    • Diabetes Mother    • Cancer Father    • Diabetes Sister    • No Known Problems Brother    • Diabetes Sister    • No Known Problems Brother    • No Known Problems Brother    • No Known Problems Brother      Social History     Tobacco Use   • Smoking status: Current Some Day Smoker     Types: Pipe   • Smokeless tobacco: Never Used   • Tobacco comment: quit smoking cigarettes 15 years ago smokes a pipe ocassionally    Substance Use Topics   • Alcohol use: No   • Drug use: No     Allergies:  Cephalexin    Current Outpatient Medications:   •  aspirin 81 MG chewable tablet, Chew 81 mg Daily., Disp: , Rfl:   •  cilostazol (PLETAL) 100 MG tablet, Take 100 mg by mouth 2 (Two) Times a Day., Disp: , Rfl:   •  clopidogrel (PLAVIX) 75 MG tablet, Take 75 mg by mouth Daily., Disp: , Rfl:   •  folic acid (FOLVITE) 400 MCG tablet, Take 800 mcg by mouth Daily., Disp: , Rfl:   •  gabapentin (NEURONTIN) 300 MG capsule, Take 300 mg by mouth As Needed., Disp: , Rfl:   •  LANTUS 100 UNIT/ML injection, INJECT 80 UNITS SUBCUTANEOUSLY AT BEDTIME, Disp: 70 mL, Rfl: 1  •  metoprolol tartrate (LOPRESSOR) 50 MG tablet, Take 75 mg by mouth 2 (Two) Times a Day., Disp: , Rfl:   •  NOVOLOG 100 UNIT/ML injection, Inject 4 Units under the skin into the appropriate area as directed 3 (Three) Times a Day With Meals., Disp: , Rfl:   •  simvastatin (ZOCOR) 20 MG tablet, Take 20 mg by mouth Every Night., Disp: , Rfl:       Objective     Vital Signs:    There were no vitals taken for this visit.    Physical Exam:  Physical Exam  CONSTITUTIONAL: well nourished, well-developed, alert, oriented, in no acute distress   COMMUNICATION AND VOICE: able to communicate normally, normal voice quality  HEAD: normocephalic, no lesions, atraumatic, no tenderness, no masses   FACE: appearance  normal, no lesions, no tenderness, no deformities, facial motion symmetric  SALIVARY GLANDS: parotid glands with no tenderness, no swelling, no masses, submandibular glands with normal size, nontender  EYES: ocular motility normal, eyelids normal, orbits normal, no proptosis, conjunctiva normal , pupils equal, round   EARS:  Hearing: response to conversational voice normal bilaterally   External Ears: auricles without lesions  Otoscopic: right tympanic membrane appearance normal, no lesions, no perforation, normal mobility, no fluid; left tympanic membrane is intact but dull in appearance; minimal cerumen to bilateral EACs  NOSE:  External Nose: structure normal, no tenderness on palpation, no nasal discharge, no lesions, no evidence of trauma, nostrils patent   Intranasal Exam: nasal mucosa normal, vestibule within normal limits, inferior turbinate normal, nasal septum midline   ORAL:  Lips: upper and lower lips without lesion   Teeth: dentition within normal limits for age   Gums: gingivae healthy   Oral Mucosa: oral mucosa normal, no mucosal lesions   Floor of Mouth: Warthin’s duct patent, mucosa normal  Tongue: lingual mucosa normal without lesions, normal tongue mobility   Palate: soft and hard palates with normal mucosa and structure  Oropharynx: oropharyngeal mucosa normal  NECK: neck appearance normal, no masses or tenderness  LYMPH NODES: no lymphadenopathy  CHEST/RESPIRATORY: respiratory effort normal, normal breath sounds   CARDIOVASCULAR: rate and rhythm normal, extremities without cyanosis or edema    NEUROLOGIC/PSYCHIATRIC: oriented to time, place and person, mood normal, affect appropriate, CN II-XII intact grossly    Results Review:       Assessment   Assessment:  No diagnosis found.    Plan   Plan:    No orders of the defined types were placed in this encounter.    Start nasal sprays. The proper use of nasal inhalers was discussed including the need for regular use and build up of drug levels before  full effects.     Discussed imaging due to asymmetry of SNHL- we will hold off on this for now, but will closely monitor.     QUALITY MEASURES    Body Mass Index Screening and Follow-Up Plan  There is no height or weight on file to calculate BMI.  Patient's There is no height or weight on file to calculate BMI. BMI is above normal parameters. Recommendations include: referral to primary care.    Tobacco Use: Screening and Cessation Intervention  Social History    Tobacco Use      Smoking status: Current Some Day Smoker        Types: Pipe      Smokeless tobacco: Never Used      Tobacco comment: quit smoking cigarettes 15 years ago smokes a pipe ocassionally     Smoking cessation information given in after visit summary.      No Follow-up on file.    My findings and recommendations were discussed and questions were answered.     Evelyn Marshall, APRN

## 2019-04-08 PROBLEM — H90.3 SNHL (SENSORY-NEURAL HEARING LOSS), ASYMMETRICAL: Status: ACTIVE | Noted: 2019-04-08

## 2019-04-30 RX ORDER — INSULIN GLARGINE 100 [IU]/ML
INJECTION, SOLUTION SUBCUTANEOUS
Qty: 70 ML | Refills: 1 | Status: SHIPPED | OUTPATIENT
Start: 2019-04-30 | End: 2019-09-13 | Stop reason: SDUPTHER

## 2019-05-01 RX ORDER — SIMVASTATIN 20 MG
TABLET ORAL
Qty: 90 TABLET | Refills: 3 | Status: SHIPPED | OUTPATIENT
Start: 2019-05-01 | End: 2020-02-11

## 2019-05-01 RX ORDER — METOPROLOL TARTRATE 50 MG/1
TABLET, FILM COATED ORAL
Qty: 180 TABLET | Refills: 3 | Status: SHIPPED | OUTPATIENT
Start: 2019-05-01 | End: 2020-02-11

## 2019-05-06 DIAGNOSIS — E11.9 TYPE 2 DIABETES MELLITUS WITHOUT COMPLICATION, UNSPECIFIED WHETHER LONG TERM INSULIN USE (HCC): Primary | ICD-10-CM

## 2019-05-06 RX ORDER — SYRINGE-NEEDLE,INSULIN,0.5 ML 27GX1/2"
1 SYRINGE, EMPTY DISPOSABLE MISCELLANEOUS 3 TIMES DAILY
Qty: 100 EACH | Refills: 5 | Status: SHIPPED | OUTPATIENT
Start: 2019-05-06 | End: 2019-09-18 | Stop reason: SDUPTHER

## 2019-05-06 RX ORDER — SYRINGE-NEEDLE,INSULIN,0.5 ML 27GX1/2"
1 SYRINGE, EMPTY DISPOSABLE MISCELLANEOUS 3 TIMES DAILY
Qty: 100 EACH | Refills: 0 | Status: SHIPPED | OUTPATIENT
Start: 2019-05-06 | End: 2019-05-06 | Stop reason: SDUPTHER

## 2019-05-06 NOTE — TELEPHONE ENCOUNTER
"Pt was in office. Requested 90 day supply of syringes. Box Pt brought to counter stated \"BP SYR 1 ML 31G X 5/6, 90 count. He would like these sent to [facility]. Pt can be reached at [phone]  "

## 2019-06-24 ENCOUNTER — HOSPITAL ENCOUNTER (EMERGENCY)
Facility: HOSPITAL | Age: 74
Discharge: HOME | End: 2019-06-24
Attending: EMERGENCY MEDICINE | Admitting: EMERGENCY MEDICINE

## 2019-06-24 ENCOUNTER — OFFICE VISIT (OUTPATIENT)
Dept: FAMILY MEDICINE CLINIC | Facility: CLINIC | Age: 74
End: 2019-06-24

## 2019-06-24 VITALS
WEIGHT: 208.9 LBS | HEART RATE: 56 BPM | TEMPERATURE: 98.5 F | DIASTOLIC BLOOD PRESSURE: 56 MMHG | HEIGHT: 72 IN | OXYGEN SATURATION: 94 % | BODY MASS INDEX: 28.3 KG/M2 | SYSTOLIC BLOOD PRESSURE: 124 MMHG

## 2019-06-24 VITALS
DIASTOLIC BLOOD PRESSURE: 78 MMHG | SYSTOLIC BLOOD PRESSURE: 142 MMHG | RESPIRATION RATE: 18 BRPM | BODY MASS INDEX: 28.44 KG/M2 | HEIGHT: 72 IN | TEMPERATURE: 97.7 F | HEART RATE: 70 BPM | WEIGHT: 210 LBS | OXYGEN SATURATION: 100 %

## 2019-06-24 DIAGNOSIS — E86.0 DEHYDRATION: Primary | ICD-10-CM

## 2019-06-24 DIAGNOSIS — E11.42 DIABETIC POLYNEUROPATHY ASSOCIATED WITH TYPE 2 DIABETES MELLITUS (HCC): ICD-10-CM

## 2019-06-24 DIAGNOSIS — I70.213 ATHEROSCLEROSIS OF NATIVE ARTERY OF BOTH LOWER EXTREMITIES WITH INTERMITTENT CLAUDICATION (HCC): ICD-10-CM

## 2019-06-24 DIAGNOSIS — I10 ESSENTIAL HYPERTENSION: ICD-10-CM

## 2019-06-24 DIAGNOSIS — R25.2 MUSCLE CRAMPS: Primary | ICD-10-CM

## 2019-06-24 DIAGNOSIS — Z79.899 LONG-TERM USE OF HIGH-RISK MEDICATION: ICD-10-CM

## 2019-06-24 DIAGNOSIS — E11.52 TYPE 2 DIABETES MELLITUS WITH DIABETIC PERIPHERAL ANGIOPATHY AND GANGRENE, WITH LONG-TERM CURRENT USE OF INSULIN (HCC): ICD-10-CM

## 2019-06-24 DIAGNOSIS — R25.2 MUSCLE CRAMPS: ICD-10-CM

## 2019-06-24 DIAGNOSIS — Z79.4 TYPE 2 DIABETES MELLITUS WITH DIABETIC PERIPHERAL ANGIOPATHY AND GANGRENE, WITH LONG-TERM CURRENT USE OF INSULIN (HCC): ICD-10-CM

## 2019-06-24 PROBLEM — S91.109A OPEN WOUND OF TOE WITH COMPLICATION: Status: ACTIVE | Noted: 2018-01-18

## 2019-06-24 PROBLEM — N52.9 ERECTILE DYSFUNCTION: Status: ACTIVE | Noted: 2019-06-24

## 2019-06-24 PROBLEM — I96 GANGRENE OF TOE (HCC): Status: ACTIVE | Noted: 2017-06-08

## 2019-06-24 PROBLEM — L08.9 WOUND INFECTION: Status: ACTIVE | Noted: 2018-01-19

## 2019-06-24 PROBLEM — T14.8XXA WOUND INFECTION: Status: ACTIVE | Noted: 2018-01-19

## 2019-06-24 LAB
ALBUMIN SERPL-MCNC: 4.1 G/DL (ref 3.5–5)
ALBUMIN/GLOB SERPL: 1.1 G/DL (ref 1.1–2.5)
ALP SERPL-CCNC: 94 U/L (ref 24–120)
ALT SERPL W P-5'-P-CCNC: 39 U/L (ref 0–54)
ANION GAP SERPL CALCULATED.3IONS-SCNC: 10 MMOL/L (ref 4–13)
AST SERPL-CCNC: 39 U/L (ref 7–45)
BASOPHILS # BLD AUTO: 0.08 10*3/MM3 (ref 0–0.2)
BASOPHILS NFR BLD AUTO: 0.6 % (ref 0–2)
BILIRUB SERPL-MCNC: 1 MG/DL (ref 0.1–1)
BUN BLD-MCNC: 25 MG/DL (ref 5–21)
BUN/CREAT SERPL: 18.5 (ref 7–25)
CALCIUM SPEC-SCNC: 9.3 MG/DL (ref 8.4–10.4)
CHLORIDE SERPL-SCNC: 103 MMOL/L (ref 98–110)
CK SERPL-CCNC: 129 U/L (ref 0–203)
CO2 SERPL-SCNC: 21 MMOL/L (ref 24–31)
CREAT BLD-MCNC: 1.35 MG/DL (ref 0.5–1.4)
DEPRECATED RDW RBC AUTO: 44 FL (ref 40–54)
EOSINOPHIL # BLD AUTO: 0.1 10*3/MM3 (ref 0–0.7)
EOSINOPHIL NFR BLD AUTO: 0.8 % (ref 0–4)
ERYTHROCYTE [DISTWIDTH] IN BLOOD BY AUTOMATED COUNT: 13.1 % (ref 12–15)
GFR SERPL CREATININE-BSD FRML MDRD: 52 ML/MIN/1.73
GLOBULIN UR ELPH-MCNC: 3.6 GM/DL
GLUCOSE BLD-MCNC: 181 MG/DL (ref 70–100)
HCT VFR BLD AUTO: 45.9 % (ref 40–52)
HGB BLD-MCNC: 16.2 G/DL (ref 14–18)
HOLD SPECIMEN: NORMAL
HOLD SPECIMEN: NORMAL
IMM GRANULOCYTES # BLD AUTO: 0.11 10*3/MM3 (ref 0–0.05)
IMM GRANULOCYTES NFR BLD AUTO: 0.8 % (ref 0–5)
LYMPHOCYTES # BLD AUTO: 1.95 10*3/MM3 (ref 0.72–4.86)
LYMPHOCYTES NFR BLD AUTO: 15 % (ref 15–45)
MAGNESIUM SERPL-MCNC: 2 MG/DL (ref 1.4–2.2)
MCH RBC QN AUTO: 32.5 PG (ref 28–32)
MCHC RBC AUTO-ENTMCNC: 35.3 G/DL (ref 33–36)
MCV RBC AUTO: 92 FL (ref 82–95)
MONOCYTES # BLD AUTO: 1.66 10*3/MM3 (ref 0.19–1.3)
MONOCYTES NFR BLD AUTO: 12.7 % (ref 4–12)
NEUTROPHILS # BLD AUTO: 9.12 10*3/MM3 (ref 1.87–8.4)
NEUTROPHILS NFR BLD AUTO: 70.1 % (ref 39–78)
NRBC BLD AUTO-RTO: 0 /100 WBC (ref 0–0.2)
PLATELET # BLD AUTO: 187 10*3/MM3 (ref 130–400)
PMV BLD AUTO: 12.6 FL (ref 6–12)
POTASSIUM BLD-SCNC: 4.5 MMOL/L (ref 3.5–5.3)
PROT SERPL-MCNC: 7.7 G/DL (ref 6.3–8.7)
RBC # BLD AUTO: 4.99 10*6/MM3 (ref 4.8–5.9)
SODIUM BLD-SCNC: 134 MMOL/L (ref 135–145)
WBC NRBC COR # BLD: 13.02 10*3/MM3 (ref 4.8–10.8)
WHOLE BLOOD HOLD SPECIMEN: NORMAL
WHOLE BLOOD HOLD SPECIMEN: NORMAL

## 2019-06-24 PROCEDURE — 85025 COMPLETE CBC W/AUTO DIFF WBC: CPT | Performed by: EMERGENCY MEDICINE

## 2019-06-24 PROCEDURE — 82550 ASSAY OF CK (CPK): CPT | Performed by: EMERGENCY MEDICINE

## 2019-06-24 PROCEDURE — 83735 ASSAY OF MAGNESIUM: CPT | Performed by: EMERGENCY MEDICINE

## 2019-06-24 PROCEDURE — 80053 COMPREHEN METABOLIC PANEL: CPT | Performed by: EMERGENCY MEDICINE

## 2019-06-24 PROCEDURE — 99283 EMERGENCY DEPT VISIT LOW MDM: CPT

## 2019-06-24 PROCEDURE — 99214 OFFICE O/P EST MOD 30 MIN: CPT | Performed by: FAMILY MEDICINE

## 2019-06-24 RX ORDER — SODIUM CHLORIDE 0.9 % (FLUSH) 0.9 %
10 SYRINGE (ML) INJECTION AS NEEDED
Status: DISCONTINUED | OUTPATIENT
Start: 2019-06-24 | End: 2019-06-24 | Stop reason: HOSPADM

## 2019-06-24 RX ADMIN — SODIUM CHLORIDE 1000 ML: 9 INJECTION, SOLUTION INTRAVENOUS at 18:04

## 2019-06-24 NOTE — PATIENT INSTRUCTIONS
Suspect Essential HTN.Good BP control is encouraged with Goal BP based on JNC 8 guidelines 2014 <140/90 for patients with known cardiac disease and diabetes. (FRANCINE. 2014:322 (5):507-520. doi:10.1001/francine.2013.64615): general population <60 yr old goal BP <140/90 and for those >60 <150/90.  For patients of all ages with Diabetes, CKD, Known CAD <140/90. Recommended to the patient to obtain electronic home BP machine with upper arm blood pressure cuff and to check regularly as instructed.  Keep BP log and bring to subsequent visits. Stable, at goal.  a. LABS: routine for hypertension recommended and ordered if necessary.  b. Recommend if you do not have a home BP machine to obtain an electronic machine with arm blood pressure cuff.      c. Monitor BP over the next week and keep log to bring back to office. Discussed medication therapy however pt wants to try to control with diet exercise. .  Your provider  has recommended self-monitoring of your blood pressure.  If you do not have a blood pressure cuff you may purchase one from the local pharmacy.  You may ask the pharmacist which brand and model they recommend.  Obtain your blood pressure measurement at least 2x per week.  You should also check your blood pressure if you experience any symptoms of blurred visit, dizziness or headache.  Please record all blood pressure measurements and bring them to next office visit.  If you have any questions about the accuracy of your blood pressure machine please bring it in to the office and our staff will be happy to check accuracy.   d. Encouraged to eat a low sodium heart healthy diet  e. Offered handout on HTN educational topics.  These were provided if patient requested these today.  f. MEDS: as listed in today's visit.  g. Risks/benefits of current and new medications discussed with the patient and or family today.  The patient/family are aware and accept that if there any side effects they should call or return to clinic  as soon as possible.  Appropriate F/U discussed for topics addressed today. All questions were answered to the  satisfactory state of patient/family.  Should symptoms fail to improve or worsen they agree to call or return to clinic or to go to the ER. Education handouts were offered on any new Rx if requested.  Discussed the importance of following up with any needed screening tests/labs/specialist appointments and any requested follow-up recommended by me today.  Importance of maintaining follow-up discussed and patient accepts that missed appointments can delay diagnosis and potentially lead to worsening of conditions.    Diabetes: Insulin non dependent. Nephropathy, Retinopathy, Neuropahty status discussed.  Discussed goals of Diabetes today.  Goal Hgb A1C <7.0 for most patient.  Good BP control is encouraged with Goal BP based on JNC 8 guidelines 2014 <140/90.  Discussed role of Ace-I and ARB with DM.  DM imparts risk equivalence for CAD based on ATP III.  Current guidelines support moderate intensity statin with goal of 30-50% reduction in LDL unless 10 yr risk ASCVD >7.5 then high intensity should be used. Close monitoring of Lipid levels encouraged. Recommend once yearly eye evaluation by optometry or ophthalmology.  Good foot health discussed and foot exam completed.  Recommended toe health, wear good shoes, cut nails straight across and tend calluses if present. Take medications as encouraged.  Monitor blood sugars as encouraged and bring log to future meetings. Weight needs to be monitored. Monitor portions and caloric intake.  Pneumovax frequency discussed.   a. Labs: CMP, Microalbumin, A1C  b. Encouraged pt to bring glucose logs to each appointment  c. Encouraged self foot exams, and yearly eye exams.  d. Encouraged to lose weight/please see information provided  e. Recommend regular exercise   f. Medications as listed in today's visit        Dehydration, Adult  Dehydration is a condition in which there  is not enough fluid or water in the body. This happens when you lose more fluids than you take in. Important organs, such as the kidneys, brain, and heart, cannot function without a proper amount of fluids. Any loss of fluids from the body can lead to dehydration.  Dehydration can range from mild to severe. This condition should be treated right away to prevent it from becoming severe.  What are the causes?  This condition may be caused by:  · Vomiting.  · Diarrhea.  · Excessive sweating, such as from heat exposure or exercise.  · Not drinking enough fluid, especially:  ? When ill.  ? While doing activity that requires a lot of energy.  · Excessive urination.  · Fever.  · Infection.  · Certain medicines, such as medicines that cause the body to lose excess fluid (diuretics).  · Inability to access safe drinking water.  · Reduced physical ability to get adequate water and food.    What increases the risk?  This condition is more likely to develop in people:  · Who have a poorly controlled long-term (chronic) illness, such as diabetes, heart disease, or kidney disease.  · Who are age 65 or older.  · Who are disabled.  · Who live in a place with high altitude.  · Who play endurance sports.    What are the signs or symptoms?  Symptoms of mild dehydration may include:  · Thirst.  · Dry lips.  · Slightly dry mouth.  · Dry, warm skin.  · Dizziness.  Symptoms of moderate dehydration may include:  · Very dry mouth.  · Muscle cramps.  · Dark urine. Urine may be the color of tea.  · Decreased urine production.  · Decreased tear production.  · Heartbeat that is irregular or faster than normal (palpitations).  · Headache.  · Light-headedness, especially when you stand up from a sitting position.  · Fainting (syncope).  Symptoms of severe dehydration may include:  · Changes in skin, such as:  ? Cold and clammy skin.  ? Blotchy (mottled) or pale skin.  ? Skin that does not quickly return to normal after being lightly pinched and  released (poor skin turgor).  · Changes in body fluids, such as:  ? Extreme thirst.  ? No tear production.  ? Inability to sweat when body temperature is high, such as in hot weather.  ? Very little urine production.  · Changes in vital signs, such as:  ? Weak pulse.  ? Pulse that is more than 100 beats a minute when sitting still.  ? Rapid breathing.  ? Low blood pressure.  · Other changes, such as:  ? Sunken eyes.  ? Cold hands and feet.  ? Confusion.  ? Lack of energy (lethargy).  ? Difficulty waking up from sleep.  ? Short-term weight loss.  ? Unconsciousness.  How is this diagnosed?  This condition is diagnosed based on your symptoms and a physical exam. Blood and urine tests may be done to help confirm the diagnosis.  How is this treated?  Treatment for this condition depends on the severity. Mild or moderate dehydration can often be treated at home. Treatment should be started right away. Do not wait until dehydration becomes severe. Severe dehydration is an emergency and it needs to be treated in a hospital.  Treatment for mild dehydration may include:  · Drinking more fluids.  · Replacing salts and minerals in your blood (electrolytes) that you may have lost.  Treatment for moderate dehydration may include:  · Drinking an oral rehydration solution (ORS). This is a drink that helps you replace fluids and electrolytes (rehydrate). It can be found at pharmacies and retail stores.  Treatment for severe dehydration may include:  · Receiving fluids through an IV tube.  · Receiving an electrolyte solution through a feeding tube that is passed through your nose and into your stomach (nasogastric tube, or NG tube).  · Correcting any abnormalities in electrolytes.  · Treating the underlying cause of dehydration.  Follow these instructions at home:  · If directed by your health care provider, drink an ORS:  ? Make an ORS by following instructions on the package.  ? Start by drinking small amounts, about ½ cup (120 mL)  every 5-10 minutes.  ? Slowly increase how much you drink until you have taken the amount recommended by your health care provider.  · Drink enough clear fluid to keep your urine clear or pale yellow. If you were told to drink an ORS, finish the ORS first, then start slowly drinking other clear fluids. Drink fluids such as:  ? Water. Do not drink only water. Doing that can lead to having too little salt (sodium) in the body (hyponatremia).  ? Ice chips.  ? Fruit juice that you have added water to (diluted fruit juice).  ? Low-calorie sports drinks.  · Avoid:  ? Alcohol.  ? Drinks that contain a lot of sugar. These include high-calorie sports drinks, fruit juice that is not diluted, and soda.  ? Caffeine.  ? Foods that are greasy or contain a lot of fat or sugar.  · Take over-the-counter and prescription medicines only as told by your health care provider.  · Do not take sodium tablets. This can lead to having too much sodium in the body (hypernatremia).  · Eat foods that contain a healthy balance of electrolytes, such as bananas, oranges, potatoes, tomatoes, and spinach.  · Keep all follow-up visits as told by your health care provider. This is important.  Contact a health care provider if:  · You have abdominal pain that:  ? Gets worse.  ? Stays in one area (localizes).  · You have a rash.  · You have a stiff neck.  · You are more irritable than usual.  · You are sleepier or more difficult to wake up than usual.  · You feel weak or dizzy.  · You feel very thirsty.  · You have urinated only a small amount of very dark urine over 6-8 hours.  Get help right away if:  · You have symptoms of severe dehydration.  · You cannot drink fluids without vomiting.  · Your symptoms get worse with treatment.  · You have a fever.  · You have a severe headache.  · You have vomiting or diarrhea that:  ? Gets worse.  ? Does not go away.  · You have blood or green matter (bile) in your vomit.  · You have blood in your stool. This may  cause stool to look black and tarry.  · You have not urinated in 6-8 hours.  · You faint.  · Your heart rate while sitting still is over 100 beats a minute.  · You have trouble breathing.  This information is not intended to replace advice given to you by your health care provider. Make sure you discuss any questions you have with your health care provider.  Document Released: 12/18/2006 Document Revised: 07/14/2017 Document Reviewed: 02/10/2017  [employer] Interactive Patient Education © 2019 [employer]

## 2019-06-24 NOTE — PROGRESS NOTES
OFFICE VISIT NOTE:    Darren Shay is a 73 y.o. male who presents today for Muscle Pain (generalized) and Back Pain (low back x 3 mths ).     Weight is down about 11 pounds since March 2019. Not drinking much. Fatigued and decreased endurance.     Offered IV fluids or observation admission - he declined for now.      Muscle Pain   This is a chronic problem. The current episode started more than 1 month ago. The problem occurs constantly. The problem has been waxing and waning since onset. The context of the pain is unknown. The pain is present in the lower back, left lower leg and right lower leg. The pain is medium. The symptoms are aggravated by any movement and exercise. Associated symptoms include stiffness. Pertinent negatives include no abdominal pain, chest pain, constipation, diarrhea, fatigue, fever, rash or shortness of breath. Past treatments include acetaminophen and rest. The treatment provided moderate relief. There is no swelling present. He has been behaving normally.   Back Pain   This is a chronic problem. The current episode started more than 1 year ago. The problem occurs constantly. The problem is unchanged. The pain is present in the lumbar spine and sacro-iliac. The quality of the pain is described as cramping and aching. The pain does not radiate. The pain is moderate. The pain is the same all the time. The symptoms are aggravated by twisting, standing and position. Stiffness is present all day. Pertinent negatives include no abdominal pain, chest pain, fever or weight loss. He has tried analgesics, bed rest and home exercises for the symptoms. The treatment provided moderate relief.        Past medical/surgical history, Family history, Social history, Allergies and Medications have been reviewed with the patient today and are updated in [facility] EMR. See below.    Past Medical History:   Diagnosis Date   • Cataracts, bilateral    • Colon cancer (CMS/HCC)    • Coronary artery disease    •  "Diabetes mellitus (CMS/HCC)    • Diabetic foot ulcers (CMS/HCC)    • Hypertension    • Ileostomy present (CMS/HCC)    • Neuropathy    • UC (ulcerative colitis confined to rectum) (CMS/HCC)      Past Surgical History:   Procedure Laterality Date   • CHOLECYSTECTOMY     • COLOSTOMY     • CORONARY ARTERY BYPASS GRAFT  1993    x5   • ORIF FOOT FRACTURE Right 1/17/2019    Procedure: PARTIAL REMOVAL OF BONE MEDIAL CUNEIFORM AND 1ST METATARSAL - RIGHT FOOT EXCISION OF ULCERATION;  Surgeon: Florin Kearns DPM;  Location: [facility] OR;  Service: Podiatry   • TOE AMPUTATION      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 at      Family History   Problem Relation Age of Onset   • Cancer Mother    • Heart disease Mother    • Diabetes Mother    • Cancer Father    • Diabetes Sister    • No Known Problems Brother    • Diabetes Sister    • No Known Problems Brother    • No Known Problems Brother    • No Known Problems Brother      Social History     Tobacco Use   • Smoking status: Current Some Day Smoker     Types: Pipe   • Smokeless tobacco: Never Used   • Tobacco comment: quit smoking cigarettes 15 years ago smokes a pipe ocassionally    Substance Use Topics   • Alcohol use: No   • Drug use: No       Allergies:  Cephalexin    Current Meds:    Current Outpatient Medications:   •  aspirin 81 MG chewable tablet, Chew 81 mg Daily., Disp: , Rfl:   •  cilostazol (PLETAL) 100 MG tablet, Take 100 mg by mouth 2 (Two) Times a Day., Disp: , Rfl:   •  clopidogrel (PLAVIX) 75 MG tablet, Take 75 mg by mouth Daily., Disp: , Rfl:   •  folic acid (FOLVITE) 400 MCG tablet, Take 800 mcg by mouth Daily., Disp: , Rfl:   •  gabapentin (NEURONTIN) 300 MG capsule, Take 300 mg by mouth As Needed., Disp: , Rfl:   •  Insulin Syringe-Needle U-100 (ADVOCATE INSULIN SYRINGE) 31G X 5/16\" 1 ML misc, 1 each 3 (Three) Times a Day., Disp: 100 each, Rfl: 5  •  LANTUS 100 UNIT/ML injection, INJECT 80 UNITS SUBCUTANEOUSLY AT BEDTIME, Disp: 70 mL, Rfl: 1  •  metoprolol tartrate (LOPRESSOR) " "50 MG tablet, TAKE 1 TABLET TWICE DAILY, Disp: 180 tablet, Rfl: 3  •  NOVOLOG 100 UNIT/ML injection, Inject 4 Units under the skin into the appropriate area as directed 3 (Three) Times a Day With Meals., Disp: , Rfl:   •  simvastatin (ZOCOR) 20 MG tablet, TAKE 1 TABLET AT BEDTIME, Disp: 90 tablet, Rfl: 3    Review of Systems:  Review of Systems   Constitutional: Negative for activity change, appetite change, fatigue, fever, unexpected weight gain and unexpected weight loss.   Respiratory: Negative for shortness of breath.    Cardiovascular: Negative for chest pain.   Gastrointestinal: Negative for abdominal pain, constipation and diarrhea.   Genitourinary: Negative for difficulty urinating.   Musculoskeletal: Positive for back pain and stiffness.   Skin: Negative for rash.   Neurological: Negative for syncope and headache.       Physical Examination:  Vital Signs:  /56 (BP Location: Left arm, Patient Position: Sitting, Cuff Size: Adult)   Pulse 56   Temp 98.5 °F (36.9 °C) (Tympanic)   Ht 182.9 cm (72\")   Wt 94.8 kg (208 lb 14.4 oz)   SpO2 94%   BMI 28.33 kg/m²   Physical Exam   Constitutional: He is oriented to person, place, and time. He appears well-developed and well-nourished. No distress.   HENT:   Head: Normocephalic and atraumatic.   Mouth/Throat: Oropharynx is clear and moist.   Neck: Normal range of motion. Neck supple. No JVD present.   Cardiovascular: Normal rate, regular rhythm, normal heart sounds and intact distal pulses.   Pulmonary/Chest: Effort normal and breath sounds normal. No respiratory distress.   Abdominal: Soft. He exhibits no distension. There is no tenderness.   Musculoskeletal: Normal range of motion. He exhibits deformity (foot amputations as before). He exhibits no edema.   Neurological: He is alert and oriented to person, place, and time. No cranial nerve deficit.   Skin: Skin is warm and dry. Capillary refill takes less than 2 seconds. No rash noted.   Psychiatric: He has " a normal mood and affect. His behavior is normal.   Nursing note and vitals reviewed.      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 Patient instructed to report any adverse side effects or reactions to medicines promptly to the office. Patient instructed to make us aware of any OTC or herbal meds or supplement use.  DIET Recommendations: Patient instructed and provided information on the following nutrition and DIET(s): Diabetic (NCS, low carb, diet exchanges). Necessity for adequate daily intake of fluids/water.  EXERCISE Instructions: Discussed with patient the need for routine aerobic activity for cardiovascular fitness, 3 times a week for about 30 minutes.    SMOKING Recommendations: N/A  HEALTH MAINTENANCE:  N/A  MISCELLANEOUS Instructions: N/A      Medications ordered or changed this visit:  No orders of the defined types were placed in this encounter.       FOLLOW-UP:  Return in about 3 months (around 9/24/2019) for Recheck.    I discussed the patients findings and my recommendations with patient.  An After Visit Summary (AVS) was printed and given to the patient at discharge.      Robin Freedman MD,  FAA  6/26/2019

## 2019-06-25 ENCOUNTER — TELEPHONE (OUTPATIENT)
Dept: FAMILY MEDICINE CLINIC | Facility: CLINIC | Age: 74
End: 2019-06-25

## 2019-07-01 ENCOUNTER — OFFICE VISIT (OUTPATIENT)
Dept: FAMILY MEDICINE CLINIC | Facility: CLINIC | Age: 74
End: 2019-07-01

## 2019-07-01 ENCOUNTER — TELEPHONE (OUTPATIENT)
Dept: FAMILY MEDICINE CLINIC | Facility: CLINIC | Age: 74
End: 2019-07-01

## 2019-07-01 VITALS
WEIGHT: 214 LBS | SYSTOLIC BLOOD PRESSURE: 128 MMHG | DIASTOLIC BLOOD PRESSURE: 68 MMHG | OXYGEN SATURATION: 95 % | TEMPERATURE: 98.3 F | BODY MASS INDEX: 28.99 KG/M2 | HEART RATE: 85 BPM | HEIGHT: 72 IN

## 2019-07-01 DIAGNOSIS — E11.52 TYPE 2 DIABETES MELLITUS WITH DIABETIC PERIPHERAL ANGIOPATHY AND GANGRENE, WITH LONG-TERM CURRENT USE OF INSULIN (HCC): ICD-10-CM

## 2019-07-01 DIAGNOSIS — Z79.4 TYPE 2 DIABETES MELLITUS WITH DIABETIC PERIPHERAL ANGIOPATHY AND GANGRENE, WITH LONG-TERM CURRENT USE OF INSULIN (HCC): Primary | ICD-10-CM

## 2019-07-01 DIAGNOSIS — Z79.4 TYPE 2 DIABETES MELLITUS WITH DIABETIC PERIPHERAL ANGIOPATHY AND GANGRENE, WITH LONG-TERM CURRENT USE OF INSULIN (HCC): ICD-10-CM

## 2019-07-01 DIAGNOSIS — N50.89 SCROTAL MASS: Primary | ICD-10-CM

## 2019-07-01 DIAGNOSIS — E11.52 TYPE 2 DIABETES MELLITUS WITH DIABETIC PERIPHERAL ANGIOPATHY AND GANGRENE, WITH LONG-TERM CURRENT USE OF INSULIN (HCC): Primary | ICD-10-CM

## 2019-07-01 PROCEDURE — 99213 OFFICE O/P EST LOW 20 MIN: CPT | Performed by: FAMILY MEDICINE

## 2019-07-01 RX ORDER — DOXYCYCLINE HYCLATE 100 MG/1
100 TABLET, DELAYED RELEASE ORAL 2 TIMES DAILY
Qty: 20 TABLET | Refills: 0 | Status: SHIPPED | OUTPATIENT
Start: 2019-07-01 | End: 2019-07-10 | Stop reason: SDUPTHER

## 2019-07-01 NOTE — TELEPHONE ENCOUNTER
Pt was here this am 9oclock appt , he is at [facility]'s pharmacy to  antibiotic Rx, they do not have anything on file as of yet today.   He will  Rx this afternoon, He is wanting to place a reminder call for it to be sent in today.

## 2019-07-01 NOTE — PROGRESS NOTES
OFFICE VISIT NOTE:    Darren Shay is a 73 y.o. male who presents today for scrotal mass.     Has had this months before and had antibiotics and it went way down - no recent trauma to it - noted to be much worse over the weekend and tender when sitting - more swollen now.     Testicle Pain   The patient's primary symptoms include scrotal swelling. The patient's pertinent negatives include no testicular pain. This is a recurrent problem. The current episode started in the past 7 days. The problem occurs constantly. The problem has been gradually worsening. The pain is medium. Pertinent negatives include no abdominal pain, chest pain, dysuria, fever, hematuria, rash, shortness of breath, urgency or urinary retention. The symptoms are aggravated by tactile pressure and activity. He has tried rest for the symptoms. The treatment provided mild relief. He is not sexually active. Partner with STD symptoms: N/A.        Past medical/surgical history, Family history, Social history, Allergies and Medications have been reviewed with the patient today and are updated in [facility] EMR. See below.    Past Medical History:   Diagnosis Date   • Cataracts, bilateral    • Colon cancer (CMS/HCC)    • Coronary artery disease    • Diabetes mellitus (CMS/HCC)    • Diabetic foot ulcers (CMS/HCC)    • Hypertension    • Ileostomy present (CMS/HCC)    • Neuropathy    • UC (ulcerative colitis confined to rectum) (CMS/HCC)      Past Surgical History:   Procedure Laterality Date   • CHOLECYSTECTOMY     • COLOSTOMY     • CORONARY ARTERY BYPASS GRAFT  1993    x5   • ORIF FOOT FRACTURE Right 1/17/2019    Procedure: PARTIAL REMOVAL OF BONE MEDIAL CUNEIFORM AND 1ST METATARSAL - RIGHT FOOT EXCISION OF ULCERATION;  Surgeon: Florin Kearns DPM;  Location: [facility] OR;  Service: Podiatry   • TOE AMPUTATION      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 at      Family History   Problem Relation Age of Onset   • Cancer Mother    • Heart disease Mother    • Diabetes Mother    •  "Cancer Father    • Diabetes Sister    • No Known Problems Brother    • Diabetes Sister    • No Known Problems Brother    • No Known Problems Brother    • No Known Problems Brother      Social History     Tobacco Use   • Smoking status: Current Some Day Smoker     Types: Pipe   • Smokeless tobacco: Never Used   Substance Use Topics   • Alcohol use: No     Frequency: Never     Binge frequency: Never   • Drug use: No       Allergies:  Cephalexin    Current Meds:    Current Outpatient Medications:   •  aspirin 81 MG chewable tablet, Chew 81 mg Daily., Disp: , Rfl:   •  cilostazol (PLETAL) 100 MG tablet, Take 100 mg by mouth 2 (Two) Times a Day., Disp: , Rfl:   •  clopidogrel (PLAVIX) 75 MG tablet, Take 75 mg by mouth Daily., Disp: , Rfl:   •  folic acid (FOLVITE) 400 MCG tablet, Take 800 mcg by mouth Daily., Disp: , Rfl:   •  gabapentin (NEURONTIN) 300 MG capsule, Take 300 mg by mouth As Needed., Disp: , Rfl:   •  Insulin Syringe-Needle U-100 (ADVOCATE INSULIN SYRINGE) 31G X 5/16\" 1 ML misc, 1 each 3 (Three) Times a Day., Disp: 100 each, Rfl: 5  •  LANTUS 100 UNIT/ML injection, INJECT 80 UNITS SUBCUTANEOUSLY AT BEDTIME, Disp: 70 mL, Rfl: 1  •  metoprolol tartrate (LOPRESSOR) 50 MG tablet, TAKE 1 TABLET TWICE DAILY, Disp: 180 tablet, Rfl: 3  •  NOVOLOG 100 UNIT/ML injection, Inject 4 Units under the skin into the appropriate area as directed 3 (Three) Times a Day With Meals., Disp: , Rfl:   •  simvastatin (ZOCOR) 20 MG tablet, TAKE 1 TABLET AT BEDTIME, Disp: 90 tablet, Rfl: 3  •  doxycycline (DORYX) 100 MG enteric coated tablet, Take 1 tablet by mouth 2 (Two) Times a Day for 10 days., Disp: 20 tablet, Rfl: 0    Review of Systems:  Review of Systems   Constitutional: Negative for activity change, appetite change, fatigue, fever, unexpected weight gain and unexpected weight loss.   Respiratory: Negative for shortness of breath.    Cardiovascular: Negative for chest pain.   Gastrointestinal: Negative for abdominal pain. " "  Genitourinary: Positive for scrotal swelling. Negative for difficulty urinating, dysuria, testicular pain and urgency.   Skin: Negative for rash.   Neurological: Negative for syncope and headache.       Physical Examination:  Vital Signs:  /68 (BP Location: Left arm, Patient Position: Sitting, Cuff Size: Adult)   Pulse 85   Temp 98.3 °F (36.8 °C) (Tympanic)   Ht 182.9 cm (72\")   Wt 97.1 kg (214 lb)   SpO2 95%   BMI 29.02 kg/m²   Physical Exam   Constitutional: He is oriented to person, place, and time. He appears well-developed and well-nourished. No distress.   HENT:   Head: Normocephalic and atraumatic.   Mouth/Throat: Oropharynx is clear and moist.   Neck: Normal range of motion. Neck supple. No JVD present.   Genitourinary: Cremasteric reflex is present. Right testis shows mass (posterior), swelling and tenderness.   Musculoskeletal: Normal range of motion. He exhibits no edema.   Neurological: He is alert and oriented to person, place, and time. No cranial nerve deficit.   Skin: Skin is warm and dry. Capillary refill takes less than 2 seconds. No rash noted.   Psychiatric: He has a normal mood and affect. His behavior is normal.   Nursing note and vitals reviewed.      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           Specific Patient Instructions:  MEDICATION Instructions: Encouraged patient to continue routine medicines as prescribed and maintain compliance. Patient instructed to report any adverse side " effects or reactions to medicines promptly to the office. Patient instructed to make us aware of any OTC or herbal meds or supplement use.  DIET Recommendations: No new recommendations regarding diet/restrictions.  EXERCISE Instructions: No new recommendations.    SMOKING Recommendations: N/A  HEALTH MAINTENANCE:  N/A  MISCELLANEOUS Instructions: N/A      Medications ordered or changed this visit:  No orders of the defined types were placed in this encounter.       FOLLOW-UP:  Return if symptoms worsen or fail to improve, for Recheck.    I discussed the patients findings and my recommendations with patient.  An After Visit Summary (AVS) was printed and given to the patient at discharge.      Robin Freedman MD, FAAFP  7/1/2019

## 2019-07-01 NOTE — PATIENT INSTRUCTIONS
Scrotal Swelling  Scrotal swelling refers to a condition in which the sac of skin that contains the testes (scrotum) is enlarged or swollen. Many things can cause the scrotum to enlarge or swell, including:  · Fluid around the testicle (hydrocele).  · A weakened area in the muscles around the groin (hernia).  · An enlarged vein around the testicle (varicocele).  · An injury.  · An infection.  · Certain medical treatments.  · Certain medical conditions, such as congestive heart failure.  · A recent genital surgery or procedure.  · A twisting of the spermatic cord that cuts off blood supply (testicular torsion).  · Testicular cancer.    Scrotal swelling can happen along with scrotal pain.  Follow these instructions at home:  · Until the swelling goes away:  ? Rest. The best position to rest in is to lie down.  ? Limit activity.  · Put ice on the scrotum:  ? Put ice in a plastic bag.  ? Place a towel between your skin and the bag.  ? Leave the ice on for 20 minutes, 2-3 times a day for 1-2 days.  · Place a rolled towel under your testicles for support.  · Wear loose-fitting clothing or an athletic support cup for comfort.  · Take over-the-counter and prescription medicines only as told by your health care provider.  · Perform a monthly self-exam of the scrotum and penis. Feel for changes. Ask your health care provider how to perform a monthly self-exam if you are unsure.  Contact a health care provider if:  · You have a sudden pain that is persistent and does not improve.  · You have a heavy feeling or notice fluid in the scrotum.  · You have pain or burning while urinating.  · You have blood in your urine or semen.  · You feel a lump around the testicle.  · You notice that one testicle is larger than the other. Keep in mind that a small difference in size is normal.  · You have a persistent dull ache or pain in your groin or scrotum.  Get help right away if:  · The pain does not go away.  · The pain becomes  severe.  · You have a fever or chills.  · You have pain or vomiting that cannot be controlled.  · One or both sides of the scrotum are very red and swollen.  · There is redness spreading upward from your scrotum to your abdomen or downward from your scrotum to your thighs.  Summary  · Scrotal swelling refers to a condition in which the sac of skin that contains the testes (scrotum) is enlarged.  · Many things can cause the scrotum to swell, including hydrocele, a hernia, and a varicocele.  · Limiting activity and icing the scrotum may help reduce swelling and pain.  · Contact your health care provider if you develop scrotal pain that is sudden and persistent, or if you have pain while urinating. Do this also if you feel a lump around the testicle or notice blood in your urine or semen.  · Get help right away for uncontrolled pain or vomiting, for very red and swollen scrotum, or for fever or chills.  This information is not intended to replace advice given to you by your health care provider. Make sure you discuss any questions you have with your health care provider.  Document Released: 01/20/2012 Document Revised: 03/05/2018 Document Reviewed: 03/05/2018  [employer] Interactive Patient Education © 2019 [employer]

## 2019-07-02 RX ORDER — LANCETS
EACH MISCELLANEOUS
Qty: 200 EACH | Refills: 5 | Status: SHIPPED | OUTPATIENT
Start: 2019-07-02 | End: 2020-01-29

## 2019-07-03 NOTE — TELEPHONE ENCOUNTER
Disregard. Pt daughter is on verbal release, however no items were checked at top of form as to what we can discuss.

## 2019-07-03 NOTE — TELEPHONE ENCOUNTER
Unless patients daughter has Power of  designated as a health care surrogate or the patient has specified what information can be communication on a verbal release then we can only release information to the patient at this time.

## 2019-07-10 ENCOUNTER — OFFICE VISIT (OUTPATIENT)
Dept: FAMILY MEDICINE CLINIC | Facility: CLINIC | Age: 74
End: 2019-07-10

## 2019-07-10 VITALS
TEMPERATURE: 97.8 F | BODY MASS INDEX: 28.53 KG/M2 | WEIGHT: 210.6 LBS | HEIGHT: 72 IN | DIASTOLIC BLOOD PRESSURE: 62 MMHG | HEART RATE: 80 BPM | SYSTOLIC BLOOD PRESSURE: 125 MMHG | OXYGEN SATURATION: 98 %

## 2019-07-10 DIAGNOSIS — N50.89 SCROTAL MASS: ICD-10-CM

## 2019-07-10 DIAGNOSIS — N49.2 SCROTAL INFECTION: Primary | ICD-10-CM

## 2019-07-10 DIAGNOSIS — E11.52 TYPE 2 DIABETES MELLITUS WITH DIABETIC PERIPHERAL ANGIOPATHY AND GANGRENE, WITH LONG-TERM CURRENT USE OF INSULIN (HCC): ICD-10-CM

## 2019-07-10 DIAGNOSIS — Z79.4 TYPE 2 DIABETES MELLITUS WITH DIABETIC PERIPHERAL ANGIOPATHY AND GANGRENE, WITH LONG-TERM CURRENT USE OF INSULIN (HCC): ICD-10-CM

## 2019-07-10 PROCEDURE — 99213 OFFICE O/P EST LOW 20 MIN: CPT | Performed by: FAMILY MEDICINE

## 2019-07-10 RX ORDER — DOXYCYCLINE HYCLATE 100 MG/1
100 TABLET, DELAYED RELEASE ORAL 2 TIMES DAILY
Qty: 20 TABLET | Refills: 0 | Status: SHIPPED | OUTPATIENT
Start: 2019-07-10 | End: 2019-07-20

## 2019-07-10 NOTE — PATIENT INSTRUCTIONS
Skin Abscess  A skin abscess is an infected area on or under your skin that contains a collection of pus and other material. An abscess may also be called a furuncle, carbuncle, or boil. An abscess can occur in or on almost any part of your body.  Some abscesses break open (rupture) on their own. Most continue to get worse unless they are treated. The infection can spread deeper into the body and eventually into your blood, which can make you feel ill. Treatment usually involves draining the abscess.  What are the causes?  An abscess occurs when germs, often bacteria, pass through your skin and cause an infection. This may be caused by:  · A scrape or cut on your skin.  · A puncture wound through your skin, including a needle injection.  · Blocked oil or sweat glands.  · Blocked and infected hair follicles.  · A cyst that forms beneath your skin (sebaceous cyst) and becomes infected.    What increases the risk?  This condition is more likely to develop in people who:  · Have a weak body defense system (immune system).  · Have diabetes.  · Have dry and irritated skin.  · Get frequent injections or use illegal IV drugs.  · Have a foreign body in a wound, such as a splinter.  · Have problems with their lymph system or veins.    What are the signs or symptoms?  An abscess may start as a painful, firm bump under the skin. Over time, the abscess may get larger or become softer. Pus may appear at the top of the abscess, causing pressure and pain. It may eventually break through the skin and drain. Other symptoms include:  · Redness.  · Warmth.  · Swelling.  · Tenderness.  · A sore on the skin.    How is this diagnosed?  This condition is diagnosed based on your medical history and a physical exam. A sample of pus may be taken from the abscess to find out what is causing the infection and what antibiotics can be used to treat it. You also may have:  · Blood tests to look for signs of infection or spread of an infection to  your blood.  · Imaging studies such as ultrasound, CT scan, or MRI if the abscess is deep.    How is this treated?  Small abscesses that drain on their own may not need treatment. Treatment for an abscess that does not rupture on its own may include:  · Warm compresses applied to the area several times per day.  · Incision and drainage. Your health care provider will make an incision to open the abscess and will remove pus and any foreign body or dead tissue. The incision area may be packed with gauze to keep it open for a few days while it heals.  · Antibiotic medicines to treat infection. For a severe abscess, you may first get antibiotics through an IV and then change to oral antibiotics.    Follow these instructions at home:  Abscess Care  · If you have an abscess that has not drained, place a warm, clean, wet washcloth over the abscess several times a day. Do this as told by your health care provider.  · Follow instructions from your health care provider about how to take care of your abscess. Make sure you:  ? Cover the abscess with a bandage (dressing).  ? Change your dressing or gauze as told by your health care provider.  ? Wash your hands with soap and water before you change the dressing or gauze. If soap and water are not available, use hand .  · Check your abscess every day for signs of a worsening infection. Check for:  ? More redness, swelling, or pain.  ? More fluid or blood.  ? Warmth.  ? More pus or a bad smell.  Medicines  · Take over-the-counter and prescription medicines only as told by your health care provider.  · If you were prescribed an antibiotic medicine, take it as told by your health care provider. Do not stop taking the antibiotic even if you start to feel better.  General instructions  · To avoid spreading the infection:  ? Do not share personal care items, towels, or hot tubs with others.  ? Avoid making skin contact with other people.  · Keep all follow-up visits as told by  your health care provider. This is important.  Contact a health care provider if:  · You have more redness, swelling, or pain around your abscess.  · You have more fluid or blood coming from your abscess.  · Your abscess feels warm to the touch.  · You have more pus or a bad smell coming from your abscess.  · You have a fever.  · You have muscle aches.  · You have chills or a general ill feeling.  Get help right away if:  · You have severe pain.  · You see red streaks on your skin spreading away from the abscess.  This information is not intended to replace advice given to you by your health care provider. Make sure you discuss any questions you have with your health care provider.  Document Released: 09/27/2006 Document Revised: 08/13/2017 Document Reviewed: 10/26/2016  [employer] Interactive Patient Education © 2019 [employer]

## 2019-07-10 NOTE — PROGRESS NOTES
OFFICE VISIT NOTE:    Darren Shay is a 73 y.o. male who presents today for scrotal mass (recheck, bloody drainage).     Scrotal area did open up over the weekend and drained. Needs a general surgeon to finish the drainage and I&D of the area.          Past medical/surgical history, Family history, Social history, Allergies and Medications have been reviewed with the patient today and are updated in [facility] EMR. See below.    Past Medical History:   Diagnosis Date   • Cataracts, bilateral    • Colon cancer (CMS/HCC)    • Coronary artery disease    • Diabetes mellitus (CMS/HCC)    • Diabetic foot ulcers (CMS/HCC)    • Hypertension    • Ileostomy present (CMS/HCC)    • Neuropathy    • UC (ulcerative colitis confined to rectum) (CMS/HCC)      Past Surgical History:   Procedure Laterality Date   • CHOLECYSTECTOMY     • COLOSTOMY     • CORONARY ARTERY BYPASS GRAFT  1993    x5   • ORIF FOOT FRACTURE Right 1/17/2019    Procedure: PARTIAL REMOVAL OF BONE MEDIAL CUNEIFORM AND 1ST METATARSAL - RIGHT FOOT EXCISION OF ULCERATION;  Surgeon: Florin Kearns DPM;  Location: [facility] OR;  Service: Podiatry   • TOE AMPUTATION      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 at      Family History   Problem Relation Age of Onset   • Cancer Mother    • Heart disease Mother    • Diabetes Mother    • Cancer Father    • Diabetes Sister    • No Known Problems Brother    • Diabetes Sister    • No Known Problems Brother    • No Known Problems Brother    • No Known Problems Brother      Social History     Tobacco Use   • Smoking status: Current Some Day Smoker     Types: Pipe   • Smokeless tobacco: Never Used   Substance Use Topics   • Alcohol use: No     Frequency: Never     Binge frequency: Never   • Drug use: No       Allergies:  Cephalexin    Current Meds:    Current Outpatient Medications:   •  ACCU-CHEK SOFTCLIX LANCETS lancets, TEST FOUR TIMES DAILY, Disp: 200 each, Rfl: 5  •  aspirin 81 MG chewable tablet, Chew 81 mg Daily., Disp: , Rfl:   •  cilostazol  "(PLETAL) 100 MG tablet, Take 100 mg by mouth 2 (Two) Times a Day., Disp: , Rfl:   •  clopidogrel (PLAVIX) 75 MG tablet, Take 75 mg by mouth Daily., Disp: , Rfl:   •  doxycycline (DORYX) 100 MG enteric coated tablet, Take 1 tablet by mouth 2 (Two) Times a Day for 10 days., Disp: 20 tablet, Rfl: 0  •  folic acid (FOLVITE) 400 MCG tablet, Take 800 mcg by mouth Daily., Disp: , Rfl:   •  gabapentin (NEURONTIN) 300 MG capsule, Take 300 mg by mouth As Needed., Disp: , Rfl:   •  Insulin Syringe-Needle U-100 (ADVOCATE INSULIN SYRINGE) 31G X 5/16\" 1 ML misc, 1 each 3 (Three) Times a Day., Disp: 100 each, Rfl: 5  •  LANTUS 100 UNIT/ML injection, INJECT 80 UNITS SUBCUTANEOUSLY AT BEDTIME, Disp: 70 mL, Rfl: 1  •  metoprolol tartrate (LOPRESSOR) 50 MG tablet, TAKE 1 TABLET TWICE DAILY, Disp: 180 tablet, Rfl: 3  •  NOVOLOG 100 UNIT/ML injection, Inject 4 Units under the skin into the appropriate area as directed 3 (Three) Times a Day With Meals., Disp: , Rfl:   •  simvastatin (ZOCOR) 20 MG tablet, TAKE 1 TABLET AT BEDTIME, Disp: 90 tablet, Rfl: 3    Review of Systems:  Review of Systems   Constitutional: Negative for activity change, appetite change, fatigue, fever, unexpected weight gain and unexpected weight loss.   Respiratory: Negative for shortness of breath.    Cardiovascular: Negative for chest pain.   Gastrointestinal: Negative for abdominal pain.   Genitourinary: Negative for difficulty urinating.   Skin: Negative for rash.   Neurological: Negative for syncope and headache.       Physical Examination:  Vital Signs:  /62 (BP Location: Left arm, Patient Position: Sitting, Cuff Size: Adult)   Pulse 80   Temp 97.8 °F (36.6 °C) (Tympanic)   Ht 182.9 cm (72\")   Wt 95.5 kg (210 lb 9.6 oz)   SpO2 98%   BMI 28.56 kg/m²   Physical Exam   Constitutional: He is oriented to person, place, and time. He appears well-developed and well-nourished. No distress.   HENT:   Head: Normocephalic and atraumatic.   Mouth/Throat: " Oropharynx is clear and moist.   Cardiovascular: Normal rate, regular rhythm and normal heart sounds.   Pulmonary/Chest: Effort normal and breath sounds normal. No respiratory distress.   Genitourinary:   Genitourinary Comments: Scrotal area superior posterior right of midline with organized ?hematoma and small opening to the external region with serous drainage today, but less swollen and less indurated than last visit   Musculoskeletal: Normal range of motion. He exhibits no edema.   Neurological: He is alert and oriented to person, place, and time. No cranial nerve deficit.   Skin: Skin is warm and dry. Capillary refill takes less than 2 seconds. No rash noted.   Psychiatric: He has a normal mood and affect. His behavior is normal.   Nursing note and vitals reviewed.      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 Patient instructed to report any adverse side effects or reactions to medicines promptly to the office. Patient instructed to make us aware of any OTC or herbal meds or supplement use.  DIET Recommendations: No new recommendations regarding diet/restrictions.  EXERCISE Instructions: No new recommendations.    SMOKING Recommendations: N/A  HEALTH MAINTENANCE:  N/A  MISCELLANEOUS Instructions: N/A      Medications ordered or changed this visit:  New Medications Ordered This Visit   Medications   • doxycycline (DORYX) 100 MG enteric coated tablet     Sig: Take 1 tablet by mouth 2 (Two) Times a Day for 10 days.     Dispense:   20 tablet     Refill:  0        FOLLOW-UP:  Return if symptoms worsen or fail to improve, for Recheck.    I discussed the patients findings and my recommendations with patient.  An After Visit Summary (AVS) was printed and given to the patient at discharge.      Robin Freedman MD, FAAFP  7/10/2019

## 2019-07-12 ENCOUNTER — TELEPHONE (OUTPATIENT)
Dept: FAMILY MEDICINE CLINIC | Facility: CLINIC | Age: 74
End: 2019-07-12

## 2019-07-12 NOTE — TELEPHONE ENCOUNTER
Pt wife called regarding referral. Pt had appt in office on 7/10/19 and was under the impression a referral was to be placed after visit. She requests phone call.

## 2019-07-17 DIAGNOSIS — N49.2 SCROTAL INFECTION: Primary | ICD-10-CM

## 2019-07-18 ENCOUNTER — OFFICE VISIT (OUTPATIENT)
Dept: UROLOGY | Facility: CLINIC | Age: 74
End: 2019-07-18

## 2019-07-18 ENCOUNTER — HOSPITAL ENCOUNTER (OUTPATIENT)
Dept: CT IMAGING | Facility: HOSPITAL | Age: 74
Discharge: HOME | End: 2019-07-18
Admitting: UROLOGY

## 2019-07-18 VITALS — BODY MASS INDEX: 28.44 KG/M2 | TEMPERATURE: 98 F | HEIGHT: 72 IN | WEIGHT: 210 LBS

## 2019-07-18 DIAGNOSIS — N50.89 SCROTAL MASS: Primary | ICD-10-CM

## 2019-07-18 DIAGNOSIS — N49.2 SCROTAL INFECTION: ICD-10-CM

## 2019-07-18 LAB
BILIRUB BLD-MCNC: NEGATIVE MG/DL
CLARITY, POC: CLEAR
COLOR UR: YELLOW
CREAT BLDA-MCNC: 1.2 MG/DL (ref 0.6–1.3)
GLUCOSE UR STRIP-MCNC: NEGATIVE MG/DL
KETONES UR QL: NEGATIVE
LEUKOCYTE EST, POC: ABNORMAL
NITRITE UR-MCNC: NEGATIVE MG/ML
PH UR: 5.5 [PH] (ref 5–8)
PROT UR STRIP-MCNC: NEGATIVE MG/DL
RBC # UR STRIP: ABNORMAL /UL
SP GR UR: 1.01 (ref 1–1.03)
UROBILINOGEN UR QL: NORMAL

## 2019-07-18 PROCEDURE — 25010000002 IOPAMIDOL 61 % SOLUTION: Performed by: UROLOGY

## 2019-07-18 PROCEDURE — 99204 OFFICE O/P NEW MOD 45 MIN: CPT | Performed by: UROLOGY

## 2019-07-18 PROCEDURE — 72193 CT PELVIS W/DYE: CPT

## 2019-07-18 PROCEDURE — 81003 URINALYSIS AUTO W/O SCOPE: CPT | Performed by: UROLOGY

## 2019-07-18 PROCEDURE — 82565 ASSAY OF CREATININE: CPT

## 2019-07-18 RX ORDER — AMOXICILLIN 500 MG/1
CAPSULE ORAL
COMMUNITY
Start: 2019-07-15 | End: 2019-07-18

## 2019-07-18 RX ORDER — SULFAMETHOXAZOLE AND TRIMETHOPRIM 800; 160 MG/1; MG/1
1 TABLET ORAL 2 TIMES DAILY
Qty: 14 TABLET | Refills: 0 | Status: SHIPPED | OUTPATIENT
Start: 2019-07-18 | End: 2019-07-25

## 2019-07-18 RX ADMIN — IOPAMIDOL 150 ML: 612 INJECTION, SOLUTION INTRAVENOUS at 13:41

## 2019-07-18 NOTE — PROGRESS NOTES
Mr. Shay is 73 y.o. male    Chief Complaint   Patient presents with   • sctotal pain       History of Present Illness  Patient is here today for evaluation of scrotal pain and scrotal mass.  This happened approximately several weeks ago.  Described as being located on the right aspect of his scrotum underneath.  He describes this as approximately golf ball size mass in his right scrotum which was painful.  No fevers chills nausea vomiting.  He was seen by his primary care physician, and concern for abscesses.  Patient states that this ruptured about a week ago and since then his pain has resolved, and he is noticed that the mass is no longer present.      The following portions of the patient's history were reviewed and updated as appropriate: allergies, current medications, past family history, past medical history, past social history, past surgical history and problem list.    Review of Systems   Constitutional: Negative for appetite change, chills, fever and unexpected weight change.   HENT: Negative for congestion, ear pain, facial swelling, hearing loss, nosebleeds, trouble swallowing and voice change.    Eyes: Negative for photophobia, pain, discharge and visual disturbance.   Respiratory: Negative for cough, choking, chest tightness and shortness of breath.    Cardiovascular: Negative for chest pain and palpitations.   Gastrointestinal: Negative for abdominal distention, abdominal pain, blood in stool, constipation, diarrhea, nausea and vomiting.   Endocrine: Negative for cold intolerance, heat intolerance and polydipsia.   Genitourinary: Positive for scrotal swelling (right) and testicular pain (right). Negative for decreased urine volume, difficulty urinating, discharge, dysuria, enuresis, flank pain, frequency, genital sores, hematuria, penile pain, penile swelling and urgency.   Musculoskeletal: Negative for arthralgias, joint swelling, neck pain and neck stiffness.   Skin: Negative for pallor and  "rash.   Allergic/Immunologic: Negative for immunocompromised state.   Neurological: Negative for dizziness, tremors, seizures, syncope, light-headedness and headaches.   Hematological: Negative for adenopathy. Does not bruise/bleed easily.   Psychiatric/Behavioral: Negative for agitation, confusion, dysphoric mood, hallucinations, self-injury and suicidal ideas.       I have reviewed the review of systems      Current Outpatient Medications:   •  ACCU-CHEK SOFTCLIX LANCETS lancets, TEST FOUR TIMES DAILY, Disp: 200 each, Rfl: 5  •  aspirin 81 MG chewable tablet, Chew 81 mg Daily., Disp: , Rfl:   •  cilostazol (PLETAL) 100 MG tablet, Take 100 mg by mouth 2 (Two) Times a Day., Disp: , Rfl:   •  clopidogrel (PLAVIX) 75 MG tablet, Take 75 mg by mouth Daily., Disp: , Rfl:   •  doxycycline (DORYX) 100 MG enteric coated tablet, Take 1 tablet by mouth 2 (Two) Times a Day for 10 days., Disp: 20 tablet, Rfl: 0  •  folic acid (FOLVITE) 400 MCG tablet, Take 800 mcg by mouth Daily., Disp: , Rfl:   •  gabapentin (NEURONTIN) 300 MG capsule, Take 300 mg by mouth As Needed., Disp: , Rfl:   •  Insulin Syringe-Needle U-100 (ADVOCATE INSULIN SYRINGE) 31G X 5/16\" 1 ML misc, 1 each 3 (Three) Times a Day., Disp: 100 each, Rfl: 5  •  LANTUS 100 UNIT/ML injection, INJECT 80 UNITS SUBCUTANEOUSLY AT BEDTIME, Disp: 70 mL, Rfl: 1  •  metoprolol tartrate (LOPRESSOR) 50 MG tablet, TAKE 1 TABLET TWICE DAILY, Disp: 180 tablet, Rfl: 3  •  NOVOLOG 100 UNIT/ML injection, Inject 4 Units under the skin into the appropriate area as directed 3 (Three) Times a Day With Meals., Disp: , Rfl:   •  simvastatin (ZOCOR) 20 MG tablet, TAKE 1 TABLET AT BEDTIME, Disp: 90 tablet, Rfl: 3  •  sulfamethoxazole-trimethoprim (BACTRIM DS) 800-160 MG per tablet, Take 1 tablet by mouth 2 (Two) Times a Day for 7 days., Disp: 14 tablet, Rfl: 0  No current facility-administered medications for this visit.     Past Medical History:   Diagnosis Date   • Cataracts, bilateral    • " "Colon cancer (CMS/HCC)    • Coronary artery disease    • Diabetes mellitus (CMS/HCC)    • Diabetic foot ulcers (CMS/HCC)    • Hypertension    • Ileostomy present (CMS/HCC)    • Neuropathy    • UC (ulcerative colitis confined to rectum) (CMS/[facility])        Past Surgical History:   Procedure Laterality Date   • CHOLECYSTECTOMY     • COLOSTOMY     • CORONARY ARTERY BYPASS GRAFT  1993    x5   • ORIF FOOT FRACTURE Right 1/17/2019    Procedure: PARTIAL REMOVAL OF BONE MEDIAL CUNEIFORM AND 1ST METATARSAL - RIGHT FOOT EXCISION OF ULCERATION;  Surgeon: Florin Kearns DPM;  Location: [facility] OR;  Service: Podiatry   • TOE AMPUTATION      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 at        Social History     Socioeconomic History   • Marital status:      Spouse name: Not on file   • Number of children: Not on file   • Years of education: Not on file   • Highest education level: Not on file   Tobacco Use   • Smoking status: Current Some Day Smoker     Types: Pipe   • Smokeless tobacco: Never Used   Substance and Sexual Activity   • Alcohol use: No     Frequency: Never     Binge frequency: Never   • Drug use: No   • Sexual activity: No       Family History   Problem Relation Age of Onset   • Cancer Mother    • Heart disease Mother    • Diabetes Mother    • Cancer Father    • Diabetes Sister    • No Known Problems Brother    • Diabetes Sister    • No Known Problems Brother    • No Known Problems Brother    • No Known Problems Brother        Objective    Temp 98 °F (36.7 °C)   Ht 182.9 cm (72\")   Wt 95.3 kg (210 lb)   BMI 28.48 kg/m²     Physical Exam  Constitutional: Well nourished, Well developed; No apparent distress.  His vital signs are reviewed  Psychiatric: Appropriate affect; Alert and oriented  Eyes: Unremarkable  Musculoskeletal: Normal gait and station  GI: Abdomen is soft, non-tender  Respiratory: No distress; Unlabored movement; No accessory musculature needed with symmetric movements  Skin: No pallor or diaphoresis  ; Penis and " testicles are normal; there is induration of the right aspect of the hemiscrotum in the posterior aspect.  No fluctuance erythema or crepitus.        Hospital Outpatient Visit on 07/18/2019   Component Date Value Ref Range Status   • Creatinine 07/18/2019 1.20  0.60 - 1.30 mg/dL Final    Serial Number: [id number]:  [id number]       Results for orders placed or performed in visit on 07/18/19   POC Urinalysis Dipstick, Multipro   Result Value Ref Range    Color Yellow Yellow, Straw, Dark Yellow, Suzan    Clarity, UA Clear Clear    Glucose, UA Negative Negative, 1000 mg/dL (3+) mg/dL    Bilirubin Negative Negative    Ketones, UA Negative Negative    Specific Gravity  1.015 1.005 - 1.030    Blood, UA Trace (A) Negative    pH, Urine 5.5 5.0 - 8.0    Protein, POC Negative Negative mg/dL    Urobilinogen, UA Normal Normal    Nitrite, UA Negative Negative    Leukocytes Trace (A) Negative     Patient's Body mass index is 28.48 kg/m². BMI is above normal parameters. Recommendations include: educational material.      Assessment and Plan    Darren was seen today for sctotal pain.    Diagnoses and all orders for this visit:    Scrotal mass  -     POC Urinalysis Dipstick, Multipro  -     sulfamethoxazole-trimethoprim (BACTRIM DS) 800-160 MG per tablet; Take 1 tablet by mouth 2 (Two) Times a Day for 7 days.    I have independently reviewed his CT scan.  This shows induration of the right sided aspect of the hemiscrotum with no fluid pockets.    I think this is likely a scrotal abscess which has ruptured on its own.  At this point there is no fluctuance or drainable fluid collection.  He is having no fevers there is no induration or erythema.  I will start him on Bactrim for 7 days.  I will see him back next week.  He understands that if this increases in size or begins to have fevers he will call me or go to the emergency room.  At this point, I think monitoring will be the best option

## 2019-07-24 ENCOUNTER — OFFICE VISIT (OUTPATIENT)
Dept: UROLOGY | Facility: CLINIC | Age: 74
End: 2019-07-24

## 2019-07-24 VITALS — TEMPERATURE: 98 F | HEIGHT: 72 IN | BODY MASS INDEX: 28.44 KG/M2 | WEIGHT: 210 LBS

## 2019-07-24 DIAGNOSIS — N50.89 SCROTAL MASS: Primary | ICD-10-CM

## 2019-07-24 PROCEDURE — 99212 OFFICE O/P EST SF 10 MIN: CPT | Performed by: UROLOGY

## 2019-07-24 NOTE — PATIENT INSTRUCTIONS

## 2019-07-24 NOTE — PROGRESS NOTES
"Mr. Shay is 73 y.o. male    Chief Complaint   Patient presents with   • Scrotal mass       History of Present Illness  Patient is here for follow-up for a right-sided scrotal mass.  This appeared to be a likely abscess which spontaneously ruptured.  He denies any fevers chills nausea vomiting.  Denies any induration.  Feels like this is improving.      The following portions of the patient's history were reviewed and updated as appropriate: allergies, current medications, past family history, past medical history, past social history, past surgical history and problem list.    Review of Systems   Constitutional: Negative for chills and fever.   Gastrointestinal: Negative for abdominal pain, anal bleeding and blood in stool.   Genitourinary: Negative for decreased urine volume, difficulty urinating, discharge, dysuria, enuresis, flank pain, frequency, genital sores, hematuria, penile pain, penile swelling, scrotal swelling, testicular pain and urgency.       I have reviewed the review of systems      Current Outpatient Medications:   •  ACCU-CHEK SOFTCLIX LANCETS lancets, TEST FOUR TIMES DAILY, Disp: 200 each, Rfl: 5  •  aspirin 81 MG chewable tablet, Chew 81 mg Daily., Disp: , Rfl:   •  cilostazol (PLETAL) 100 MG tablet, Take 100 mg by mouth 2 (Two) Times a Day., Disp: , Rfl:   •  clopidogrel (PLAVIX) 75 MG tablet, Take 75 mg by mouth Daily., Disp: , Rfl:   •  folic acid (FOLVITE) 400 MCG tablet, Take 800 mcg by mouth Daily., Disp: , Rfl:   •  gabapentin (NEURONTIN) 300 MG capsule, Take 300 mg by mouth As Needed., Disp: , Rfl:   •  Insulin Syringe-Needle U-100 (ADVOCATE INSULIN SYRINGE) 31G X 5/16\" 1 ML misc, 1 each 3 (Three) Times a Day., Disp: 100 each, Rfl: 5  •  LANTUS 100 UNIT/ML injection, INJECT 80 UNITS SUBCUTANEOUSLY AT BEDTIME, Disp: 70 mL, Rfl: 1  •  metoprolol tartrate (LOPRESSOR) 50 MG tablet, TAKE 1 TABLET TWICE DAILY, Disp: 180 tablet, Rfl: 3  •  NOVOLOG 100 UNIT/ML injection, Inject 4 Units under " "the skin into the appropriate area as directed 3 (Three) Times a Day With Meals., Disp: , Rfl:   •  simvastatin (ZOCOR) 20 MG tablet, TAKE 1 TABLET AT BEDTIME, Disp: 90 tablet, Rfl: 3  •  sulfamethoxazole-trimethoprim (BACTRIM DS) 800-160 MG per tablet, Take 1 tablet by mouth 2 (Two) Times a Day for 7 days., Disp: 14 tablet, Rfl: 0    Past Medical History:   Diagnosis Date   • Cataracts, bilateral    • Colon cancer (CMS/HCC)    • Coronary artery disease    • Diabetes mellitus (CMS/HCC)    • Diabetic foot ulcers (CMS/HCC)    • Hypertension    • Ileostomy present (CMS/HCC)    • Neuropathy    • UC (ulcerative colitis confined to rectum) (CMS/HCC)        Past Surgical History:   Procedure Laterality Date   • CHOLECYSTECTOMY     • COLOSTOMY     • CORONARY ARTERY BYPASS GRAFT  1993    x5   • ORIF FOOT FRACTURE Right 1/17/2019    Procedure: PARTIAL REMOVAL OF BONE MEDIAL CUNEIFORM AND 1ST METATARSAL - RIGHT FOOT EXCISION OF ULCERATION;  Surgeon: Florin Kearns DPM;  Location: [facility] OR;  Service: Podiatry   • TOE AMPUTATION      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 at        Social History     Socioeconomic History   • Marital status:      Spouse name: Not on file   • Number of children: Not on file   • Years of education: Not on file   • Highest education level: Not on file   Tobacco Use   • Smoking status: Current Some Day Smoker     Types: Pipe   • Smokeless tobacco: Never Used   Substance and Sexual Activity   • Alcohol use: No     Frequency: Never     Binge frequency: Never   • Drug use: No   • Sexual activity: No       Family History   Problem Relation Age of Onset   • Cancer Mother    • Heart disease Mother    • Diabetes Mother    • Cancer Father    • Diabetes Sister    • No Known Problems Brother    • Diabetes Sister    • No Known Problems Brother    • No Known Problems Brother    • No Known Problems Brother        Objective    Temp 98 °F (36.7 °C)   Ht 182.9 cm (72\")   Wt 95.3 kg (210 lb)   BMI 28.48 kg/m²     Physical " Exam  Very mild induration in the right aspect of the scrotum without erythema.  This is improved.  No fluctuance  Hospital Outpatient Visit on 07/18/2019   Component Date Value Ref Range Status   • Creatinine 07/18/2019 1.20  0.60 - 1.30 mg/dL Final    Serial Number: [id number]:  [id number]       Results for orders placed or performed during the hospital encounter of 07/18/19   POC Creatinine   Result Value Ref Range    Creatinine 1.20 0.60 - 1.30 mg/dL     Patient's Body mass index is 28.48 kg/m². BMI is above normal parameters. Recommendations include: educational material.    Assessment and Plan    Darren was seen today for scrotal mass.    Diagnoses and all orders for this visit:    Scrotal mass    On exam today, the induration in the right side of the scrotum has improved.  There is no fluctuance.  I think he likely had a superficial scrotal abscess which spontaneously ruptured and is healing on its own.  I do not think this needs intervention at this point.  I would like to follow-up in the office with a nurse practitioner in 4 weeks for an exam.  He does understand that if he notices that this area is getting bigger or recurring or if he is having fevers or chills he needs to come back to our office immediately.

## 2019-08-21 ENCOUNTER — OFFICE VISIT (OUTPATIENT)
Dept: UROLOGY | Facility: CLINIC | Age: 74
End: 2019-08-21

## 2019-08-21 VITALS
HEIGHT: 72 IN | HEART RATE: 85 BPM | OXYGEN SATURATION: 100 % | BODY MASS INDEX: 28.44 KG/M2 | RESPIRATION RATE: 16 BRPM | TEMPERATURE: 98.6 F | WEIGHT: 210 LBS

## 2019-08-21 DIAGNOSIS — N49.2 SCROTAL INFECTION: Primary | ICD-10-CM

## 2019-08-21 LAB
BILIRUB BLD-MCNC: NEGATIVE MG/DL
CLARITY, POC: CLEAR
COLOR UR: YELLOW
GLUCOSE UR STRIP-MCNC: NEGATIVE MG/DL
KETONES UR QL: NEGATIVE
LEUKOCYTE EST, POC: ABNORMAL
NITRITE UR-MCNC: NEGATIVE MG/ML
PH UR: 6 [PH] (ref 5–8)
PROT UR STRIP-MCNC: NEGATIVE MG/DL
RBC # UR STRIP: ABNORMAL /UL
SP GR UR: 1.02 (ref 1–1.03)
UROBILINOGEN UR QL: NORMAL

## 2019-08-21 PROCEDURE — 81003 URINALYSIS AUTO W/O SCOPE: CPT | Performed by: NURSE PRACTITIONER

## 2019-08-21 PROCEDURE — 99024 POSTOP FOLLOW-UP VISIT: CPT | Performed by: NURSE PRACTITIONER

## 2019-08-21 NOTE — PROGRESS NOTES
"Mr. Shay is 74 y.o. male    Chief Complaint   Patient presents with   • Scrotal Mass     4 wk follow up.  State he believes it to be healed.       History of Present Illness  Patient presents for follow-up of right sided scrotal mass.  On previous imaging this appeared to be an abscess which had spontaneously ruptured.  No intervention was needed at this time.  Patient is currently asymptomatic.  Denies any testicular pain or swelling, fevers, chills, nausea, vomiting, hematuria, acute urinary symptoms.    The following portions of the patient's history were reviewed and updated as appropriate: allergies, current medications, past family history, past medical history, past social history, past surgical history and problem list.    Review of Systems   Constitutional: Negative for chills and fever.   Gastrointestinal: Negative for abdominal distention, abdominal pain, nausea and vomiting.   Genitourinary: Negative for difficulty urinating, flank pain, frequency, hematuria and urgency.         Current Outpatient Medications:   •  ACCU-CHEK SOFTCLIX LANCETS lancets, TEST FOUR TIMES DAILY, Disp: 200 each, Rfl: 5  •  aspirin 81 MG chewable tablet, Chew 81 mg Daily., Disp: , Rfl:   •  cilostazol (PLETAL) 100 MG tablet, Take 100 mg by mouth 2 (Two) Times a Day., Disp: , Rfl:   •  clopidogrel (PLAVIX) 75 MG tablet, Take 75 mg by mouth Daily., Disp: , Rfl:   •  folic acid (FOLVITE) 400 MCG tablet, Take 800 mcg by mouth Daily., Disp: , Rfl:   •  gabapentin (NEURONTIN) 300 MG capsule, Take 300 mg by mouth As Needed., Disp: , Rfl:   •  Insulin Syringe-Needle U-100 (ADVOCATE INSULIN SYRINGE) 31G X 5/16\" 1 ML misc, 1 each 3 (Three) Times a Day., Disp: 100 each, Rfl: 5  •  LANTUS 100 UNIT/ML injection, INJECT 80 UNITS SUBCUTANEOUSLY AT BEDTIME, Disp: 70 mL, Rfl: 1  •  metoprolol tartrate (LOPRESSOR) 50 MG tablet, TAKE 1 TABLET TWICE DAILY, Disp: 180 tablet, Rfl: 3  •  NOVOLOG 100 UNIT/ML injection, Inject 4 Units under the skin " "into the appropriate area as directed 3 (Three) Times a Day With Meals., Disp: , Rfl:   •  simvastatin (ZOCOR) 20 MG tablet, TAKE 1 TABLET AT BEDTIME, Disp: 90 tablet, Rfl: 3    Past Medical History:   Diagnosis Date   • Cataracts, bilateral    • Colon cancer (CMS/HCC)    • Coronary artery disease    • Diabetes mellitus (CMS/HCC)    • Diabetic foot ulcers (CMS/HCC)    • Hypertension    • Ileostomy present (CMS/HCC)    • Neuropathy    • UC (ulcerative colitis confined to rectum) (CMS/HCC)        Past Surgical History:   Procedure Laterality Date   • CHOLECYSTECTOMY     • COLOSTOMY     • CORONARY ARTERY BYPASS GRAFT  1993    x5   • ORIF FOOT FRACTURE Right 1/17/2019    Procedure: PARTIAL REMOVAL OF BONE MEDIAL CUNEIFORM AND 1ST METATARSAL - RIGHT FOOT EXCISION OF ULCERATION;  Surgeon: Florin Kearns DPM;  Location: [facility] OR;  Service: Podiatry   • TOE AMPUTATION      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 at        Social History     Socioeconomic History   • Marital status:      Spouse name: Not on file   • Number of children: Not on file   • Years of education: Not on file   • Highest education level: Not on file   Tobacco Use   • Smoking status: Current Some Day Smoker     Types: Pipe   • Smokeless tobacco: Never Used   Substance and Sexual Activity   • Alcohol use: No     Frequency: Never     Binge frequency: Never   • Drug use: No   • Sexual activity: No       Family History   Problem Relation Age of Onset   • Cancer Mother    • Heart disease Mother    • Diabetes Mother    • Cancer Father    • Diabetes Sister    • No Known Problems Brother    • Diabetes Sister    • No Known Problems Brother    • No Known Problems Brother    • No Known Problems Brother        Objective    Pulse 85   Temp 98.6 °F (37 °C)   Resp 16   Ht 182.9 cm (72\")   Wt 95.3 kg (210 lb)   SpO2 100%   BMI 28.48 kg/m²     Physical Exam   Constitutional: He is oriented to person, place, and time. He appears well-developed and well-nourished.   HENT: "   Head: Normocephalic.   Pulmonary/Chest: Effort normal. No respiratory distress.   Abdominal: He exhibits no distension.   Genitourinary: Right testis shows no mass, no swelling and no tenderness. Left testis shows no mass, no swelling and no tenderness. No penile tenderness.   Genitourinary Comments: No swelling or induration noted on scrotum.  No erythema.  Healing ecchymosis on right suprapubic area.  Patient reports this has been present for at least 1 week.   Musculoskeletal: He exhibits no edema.   Neurological: He is alert and oriented to person, place, and time.   Skin: Skin is warm and dry.   Psychiatric: He has a normal mood and affect. His behavior is normal.   Vitals reviewed.    Patient's Body mass index is 28.48 kg/m². BMI is above normal parameters. Recommendations include: educational material.      Hospital Outpatient Visit on 07/18/2019   Component Date Value Ref Range Status   • Creatinine 07/18/2019 1.20  0.60 - 1.30 mg/dL Final    Serial Number: [id number]:  [id number]       Results for orders placed or performed in visit on 08/21/19   POC Urinalysis Dipstick, Multipro   Result Value Ref Range    Color Yellow Yellow, Straw, Dark Yellow, Suzan    Clarity, UA Clear Clear    Glucose, UA Negative Negative, 1000 mg/dL (3+) mg/dL    Bilirubin Negative Negative    Ketones, UA Negative Negative    Specific Gravity  1.020 1.005 - 1.030    Blood, UA Trace (A) Negative    pH, Urine 6.0 5.0 - 8.0    Protein, POC Negative Negative mg/dL    Urobilinogen, UA Normal Normal    Nitrite, UA Negative Negative    Leukocytes Small (1+) (A) Negative        Assessment/Plan   Assessment and Plan    Darren was seen today for scrotal mass.    Diagnoses and all orders for this visit:    Scrotal infection  -     POC Urinalysis Dipstick, Multipro      Patient presents for follow-up of right-sided scrotal abscess.  He is currently asymptomatic and his physical exam is negative.  He will return as needed.

## 2019-08-21 NOTE — PATIENT INSTRUCTIONS
"BMI for Adults    Body mass index (BMI) is a number that is calculated from a person's weight and height. BMI may help to estimate how much of a person's weight is composed of fat. BMI can help identify those who may be at higher risk for certain medical problems.  How is BMI used with adults?  BMI is used as a screening tool to identify possible weight problems. It is used to check whether a person is obese, overweight, healthy weight, or underweight.  How is BMI calculated?  BMI measures your weight and compares it to your height. This can be done either in English (U[place] or metric measurements. Note that charts are available to help you find your BMI quickly and easily without having to do these calculations yourself.  To calculate your BMI in English (U.S[place] measurements, your health care provider will:  1. Measure your weight in pounds (lb).  2. Multiply the number of pounds by 703.  ? For example, for a person who weighs 180 lb, multiply that number by 703, which equals 126,540.  3. Measure your height in inches (in). Then multiply that number by itself to get a measurement called \"inches squared.\"  ? For example, for a person who is 70 in tall, the \"inches squared\" measurement is 70 in x 70 in, which equals 4900 inches squared.  4. Divide the total from Step 2 (number of lb x 703) by the total from Step 3 (inches squared): 126,540 ÷ 4900 = 25.8. This is your BMI.  To calculate your BMI in metric measurements, your health care provider will:  1. Measure your weight in kilograms (kg).  2. Measure your height in meters (m). Then multiply that number by itself to get a measurement called \"meters squared.\"  ? For example, for a person who is 1.75 m tall, the \"meters squared\" measurement is 1.75 m x 1.75 m, which is equal to 3.1 meters squared.  3. Divide the number of kilograms (your weight) by the meters squared number. In this example: 70 ÷ 3.1 = 22.6. This is your BMI.  How is BMI interpreted?  To interpret your " results, your health care provider will use BMI charts to identify whether you are underweight, normal weight, overweight, or obese. The following guidelines will be used:  · Underweight: BMI less than 18.5.  · Normal weight: BMI between 18.5 and 24.9.  · Overweight: BMI between 25 and 29.9.  · Obese: BMI of 30 and above.  Please note:  · Weight includes both fat and muscle, so someone with a muscular build, such as an athlete, may have a BMI that is higher than 24.9. In cases like these, BMI is not an accurate measure of body fat.  · To determine if excess body fat is the cause of a BMI of 25 or higher, further assessments may need to be done by a health care provider.  · BMI is usually interpreted in the same way for men and women.  Why is BMI a useful tool?  BMI is useful in two ways:  · Identifying a weight problem that may be related to a medical condition, or that may increase the risk for medical problems.  · Promoting lifestyle and diet changes in order to reach a healthy weight.  Summary  · Body mass index (BMI) is a number that is calculated from a person's weight and height.  · BMI may help to estimate how much of a person's weight is composed of fat. BMI can help identify those who may be at higher risk for certain medical problems.  · BMI can be measured using English measurements or metric measurements.  · To interpret your results, your health care provider will use BMI charts to identify whether you are underweight, normal weight, overweight, or obese.  This information is not intended to replace advice given to you by your health care provider. Make sure you discuss any questions you have with your health care provider.  Document Released: 08/29/2005 Document Revised: 10/31/2018 Document Reviewed: 10/31/2018  [employer] Interactive Patient Education © 2019 [employer]  Health Risks of Smoking  Smoking cigarettes is very bad for your health. Tobacco smoke has over 200 known poisons in it. It contains  the poisonous gases nitrogen oxide and carbon monoxide. There are over 60 chemicals in tobacco smoke that cause cancer.  Smoking is difficult to quit because a chemical in tobacco, called nicotine, causes addiction or dependence. When you smoke and inhale, nicotine is absorbed rapidly into the bloodstream through your lungs. Both inhaled and non-inhaled nicotine may be addictive.  What are the risks of cigarette smoke?  Cigarette smokers have an increased risk of many serious medical problems, including:  · Lung cancer.  · Lung disease, such as pneumonia, bronchitis, and emphysema.  · Chest pain (angina) and heart attack because the heart is not getting enough oxygen.  · Heart disease and peripheral blood vessel disease.  · High blood pressure (hypertension).  · Stroke.  · Oral cancer, including cancer of the lip, mouth, or voice box.  · Bladder cancer.  · Pancreatic cancer.  · Cervical cancer.  · Pregnancy complications, including premature birth.  · Stillbirths and smaller  babies, birth defects, and genetic damage to sperm.  · Early menopause.  · Lower estrogen level for women.  · Infertility.  · Facial wrinkles.  · Blindness.  · Increased risk of broken bones (fractures).  · Senile dementia.  · Stomach ulcers and internal bleeding.  · Delayed wound healing and increased risk of complications during surgery.  · Even smoking lightly shortens your life expectancy by several years.    Because of secondhand smoke exposure, children of smokers have an increased risk of the following:  · Sudden infant death syndrome (SIDS).  · Respiratory infections.  · Lung cancer.  · Heart disease.  · Ear infections.    What are the benefits of quitting?  There are many health benefits of quitting smoking. Here are some of them:  · Within days of quitting smoking, your risk of having a heart attack decreases, your blood flow improves, and your lung capacity improves. Blood pressure, pulse rate, and breathing patterns start  returning to normal soon after quitting.  · Within months, your lungs may clear up completely.  · Quitting for 10 years reduces your risk of developing lung cancer and heart disease to almost that of a nonsmoker.  · People who quit may see an improvement in their overall quality of life.    How do I quit smoking?  Smoking is an addiction with both physical and psychological effects, and longtime habits can be hard to change. Your health care provider can recommend:  · Programs and community resources, which may include group support, education, or talk therapy.  · Prescription medicines to help reduce cravings.  · Nicotine replacement products, such as patches, gum, and nasal sprays. Use these products only as directed. Do not replace cigarette smoking with electronic cigarettes, which are commonly called e-cigarettes. The safety of e-cigarettes is not known, and some may contain harmful chemicals.  · A combination of two or more of these methods.    Where to find more information  · American Lung Association: www.lung.org  · American Cancer Society: www.cancer.org  Summary  · Smoking cigarettes is very bad for your health. Cigarette smokers have an increased risk of many serious medical problems, including several cancers, heart disease, and stroke.  · Smoking is an addiction with both physical and psychological effects, and longtime habits can be hard to change.  · By stopping right away, you can greatly reduce the risk of medical problems for you and your family.  · To help you quit smoking, your health care provider can recommend programs, community resources, prescription medicines, and nicotine replacement products such as patches, gum, and nasal sprays.  This information is not intended to replace advice given to you by your health care provider. Make sure you discuss any questions you have with your health care provider.  Document Released: 01/25/2006 Document Revised: 12/22/2017 Document Reviewed:  12/22/2017  [employer] Interactive Patient Education © 2019 [employer]

## 2019-09-03 RX ORDER — BLOOD SUGAR DIAGNOSTIC
STRIP MISCELLANEOUS
Qty: 200 EACH | Refills: 3 | Status: SHIPPED | OUTPATIENT
Start: 2019-09-03 | End: 2020-01-29

## 2019-09-13 DIAGNOSIS — Z79.4 TYPE 2 DIABETES MELLITUS WITH DIABETIC PERIPHERAL ANGIOPATHY AND GANGRENE, WITH LONG-TERM CURRENT USE OF INSULIN (HCC): Primary | ICD-10-CM

## 2019-09-13 DIAGNOSIS — E11.52 TYPE 2 DIABETES MELLITUS WITH DIABETIC PERIPHERAL ANGIOPATHY AND GANGRENE, WITH LONG-TERM CURRENT USE OF INSULIN (HCC): Primary | ICD-10-CM

## 2019-09-13 RX ORDER — INSULIN GLARGINE 100 [IU]/ML
INJECTION, SOLUTION SUBCUTANEOUS
Qty: 7200 UNITS | Refills: 1 | Status: SHIPPED | OUTPATIENT
Start: 2019-09-13 | End: 2020-03-02 | Stop reason: SDUPTHER

## 2019-09-16 RX ORDER — INSULIN GLARGINE 100 [IU]/ML
INJECTION, SOLUTION SUBCUTANEOUS
Qty: 70 ML | Refills: 1 | Status: SHIPPED | OUTPATIENT
Start: 2019-09-16 | End: 2020-01-27

## 2019-09-18 DIAGNOSIS — E11.9 TYPE 2 DIABETES MELLITUS WITHOUT COMPLICATION, UNSPECIFIED WHETHER LONG TERM INSULIN USE (HCC): ICD-10-CM

## 2019-09-19 RX ORDER — PEN NEEDLE, DIABETIC 29 G X1/2"
NEEDLE, DISPOSABLE MISCELLANEOUS
Qty: 270 EACH | Refills: 5 | Status: SHIPPED | OUTPATIENT
Start: 2019-09-19 | End: 2020-09-24

## 2019-11-25 ENCOUNTER — CLINICAL SUPPORT (OUTPATIENT)
Dept: FAMILY MEDICINE CLINIC | Facility: CLINIC | Age: 74
End: 2019-11-25

## 2019-11-25 DIAGNOSIS — R53.83 FATIGUE, UNSPECIFIED TYPE: ICD-10-CM

## 2019-11-25 DIAGNOSIS — I10 ESSENTIAL HYPERTENSION: ICD-10-CM

## 2019-11-25 DIAGNOSIS — E11.9 TYPE 2 DIABETES MELLITUS WITHOUT COMPLICATION, UNSPECIFIED WHETHER LONG TERM INSULIN USE (HCC): ICD-10-CM

## 2019-11-25 DIAGNOSIS — Z12.5 SPECIAL SCREENING FOR MALIGNANT NEOPLASM OF PROSTATE: Primary | ICD-10-CM

## 2019-11-25 DIAGNOSIS — Z00.00 ENCOUNTER FOR MEDICARE ANNUAL WELLNESS EXAM: ICD-10-CM

## 2019-11-25 DIAGNOSIS — E78.2 MIXED HYPERLIPIDEMIA: ICD-10-CM

## 2019-11-26 LAB
ALBUMIN SERPL-MCNC: 4.2 G/DL (ref 3.5–4.8)
ALBUMIN/GLOB SERPL: 1.6 {RATIO} (ref 1.2–2.2)
ALP SERPL-CCNC: 76 IU/L (ref 39–117)
ALT SERPL-CCNC: 39 IU/L (ref 0–44)
AST SERPL-CCNC: 28 IU/L (ref 0–40)
BASOPHILS # BLD AUTO: 0.1 X10E3/UL (ref 0–0.2)
BASOPHILS NFR BLD AUTO: 1 %
BILIRUB SERPL-MCNC: 0.6 MG/DL (ref 0–1.2)
BUN SERPL-MCNC: 15 MG/DL (ref 8–27)
BUN/CREAT SERPL: 14 (ref 10–24)
CALCIUM SERPL-MCNC: 9.3 MG/DL (ref 8.6–10.2)
CHLORIDE SERPL-SCNC: 106 MMOL/L (ref 96–106)
CHOLEST SERPL-MCNC: 92 MG/DL (ref 100–199)
CO2 SERPL-SCNC: 17 MMOL/L (ref 20–29)
CREAT SERPL-MCNC: 1.11 MG/DL (ref 0.76–1.27)
EOSINOPHIL # BLD AUTO: 0.2 X10E3/UL (ref 0–0.4)
EOSINOPHIL NFR BLD AUTO: 2 %
ERYTHROCYTE [DISTWIDTH] IN BLOOD BY AUTOMATED COUNT: 13.7 % (ref 12.3–15.4)
GLOBULIN SER CALC-MCNC: 2.6 G/DL (ref 1.5–4.5)
GLUCOSE SERPL-MCNC: 136 MG/DL (ref 65–99)
HBA1C MFR BLD: 6.9 % (ref 4.8–5.6)
HCT VFR BLD AUTO: 45 % (ref 37.5–51)
HDLC SERPL-MCNC: 24 MG/DL
HGB BLD-MCNC: 15.2 G/DL (ref 13–17.7)
IMM GRANULOCYTES # BLD AUTO: 0.1 X10E3/UL (ref 0–0.1)
IMM GRANULOCYTES NFR BLD AUTO: 1 %
LDLC SERPL CALC-MCNC: 48 MG/DL (ref 0–99)
LDLC/HDLC SERPL: 2 RATIO (ref 0–3.6)
LYMPHOCYTES # BLD AUTO: 2 X10E3/UL (ref 0.7–3.1)
LYMPHOCYTES NFR BLD AUTO: 22 %
MCH RBC QN AUTO: 32.8 PG (ref 26.6–33)
MCHC RBC AUTO-ENTMCNC: 33.8 G/DL (ref 31.5–35.7)
MCV RBC AUTO: 97 FL (ref 79–97)
MONOCYTES # BLD AUTO: 0.9 X10E3/UL (ref 0.1–0.9)
MONOCYTES NFR BLD AUTO: 10 %
NEUTROPHILS # BLD AUTO: 5.7 X10E3/UL (ref 1.4–7)
NEUTROPHILS NFR BLD AUTO: 64 %
PLATELET # BLD AUTO: 158 X10E3/UL (ref 150–450)
POTASSIUM SERPL-SCNC: 4.9 MMOL/L (ref 3.5–5.2)
PROT SERPL-MCNC: 6.8 G/DL (ref 6–8.5)
PSA SERPL-MCNC: 0.7 NG/ML (ref 0–4)
RBC # BLD AUTO: 4.64 X10E6/UL (ref 4.14–5.8)
SODIUM SERPL-SCNC: 140 MMOL/L (ref 134–144)
T4 SERPL-MCNC: 7.8 UG/DL (ref 4.5–12)
TRIGL SERPL-MCNC: 98 MG/DL (ref 0–149)
TSH SERPL DL<=0.005 MIU/L-ACNC: 0.93 UIU/ML (ref 0.45–4.5)
VLDLC SERPL CALC-MCNC: 20 MG/DL (ref 5–40)
WBC # BLD AUTO: 8.9 X10E3/UL (ref 3.4–10.8)

## 2019-12-02 ENCOUNTER — OFFICE VISIT (OUTPATIENT)
Dept: FAMILY MEDICINE CLINIC | Facility: CLINIC | Age: 74
End: 2019-12-02

## 2019-12-02 VITALS
OXYGEN SATURATION: 95 % | HEART RATE: 69 BPM | BODY MASS INDEX: 30.6 KG/M2 | DIASTOLIC BLOOD PRESSURE: 60 MMHG | WEIGHT: 218.6 LBS | HEIGHT: 71 IN | SYSTOLIC BLOOD PRESSURE: 132 MMHG

## 2019-12-02 DIAGNOSIS — Z00.00 MEDICARE ANNUAL WELLNESS VISIT, SUBSEQUENT: Primary | ICD-10-CM

## 2019-12-02 DIAGNOSIS — E11.52 TYPE 2 DIABETES MELLITUS WITH DIABETIC PERIPHERAL ANGIOPATHY AND GANGRENE, WITH LONG-TERM CURRENT USE OF INSULIN (HCC): ICD-10-CM

## 2019-12-02 DIAGNOSIS — E11.42 DIABETIC POLYNEUROPATHY ASSOCIATED WITH TYPE 2 DIABETES MELLITUS (HCC): ICD-10-CM

## 2019-12-02 DIAGNOSIS — I25.10 CORONARY ARTERY DISEASE INVOLVING NATIVE CORONARY ARTERY OF NATIVE HEART WITHOUT ANGINA PECTORIS: ICD-10-CM

## 2019-12-02 DIAGNOSIS — I70.213 ATHEROSCLEROSIS OF NATIVE ARTERY OF BOTH LOWER EXTREMITIES WITH INTERMITTENT CLAUDICATION (HCC): ICD-10-CM

## 2019-12-02 DIAGNOSIS — I73.9 PAD (PERIPHERAL ARTERY DISEASE) (HCC): ICD-10-CM

## 2019-12-02 DIAGNOSIS — I10 ESSENTIAL HYPERTENSION: ICD-10-CM

## 2019-12-02 DIAGNOSIS — H90.3 SNHL (SENSORY-NEURAL HEARING LOSS), ASYMMETRICAL: ICD-10-CM

## 2019-12-02 DIAGNOSIS — E78.2 MIXED HYPERLIPIDEMIA: ICD-10-CM

## 2019-12-02 DIAGNOSIS — Z79.4 TYPE 2 DIABETES MELLITUS WITH DIABETIC PERIPHERAL ANGIOPATHY AND GANGRENE, WITH LONG-TERM CURRENT USE OF INSULIN (HCC): ICD-10-CM

## 2019-12-02 PROCEDURE — G0439 PPPS, SUBSEQ VISIT: HCPCS | Performed by: FAMILY MEDICINE

## 2019-12-02 PROCEDURE — 96160 PT-FOCUSED HLTH RISK ASSMT: CPT | Performed by: FAMILY MEDICINE

## 2019-12-02 RX ORDER — CILOSTAZOL 100 MG/1
100 TABLET ORAL 2 TIMES DAILY
Qty: 180 TABLET | Refills: 3 | Status: SHIPPED | OUTPATIENT
Start: 2019-12-02 | End: 2019-12-23 | Stop reason: SDUPTHER

## 2019-12-02 RX ORDER — CLOPIDOGREL BISULFATE 75 MG/1
75 TABLET ORAL DAILY
Qty: 90 TABLET | Refills: 3 | Status: SHIPPED | OUTPATIENT
Start: 2019-12-02 | End: 2020-08-28

## 2019-12-02 NOTE — PROGRESS NOTES
The ABCs of the Annual Wellness Visit  Subsequent Medicare Wellness Visit    Chief Complaint   Patient presents with   • Medicare Wellness-subsequent       Subjective   History of Present Illness:  Darren Shay is a 74 y.o. male who presents for a Subsequent Medicare Wellness Visit.    HEALTH RISK ASSESSMENT    Recent Hospitalizations:  No hospitalization(s) within the last year.   Labs reviewed - all good - A1c 6.9%.  Gets winded with some exertion, but no chest pain - also with some left flank pain.   May want to go back to cardiologist in [facility] for follow-up but may consider a cardiologist closer to home if more symptoms develop.     Current Medical Providers:  Patient Care Team:  Robin Freedman MD as PCP - General  Robin Freedman MD as PCP - Family Medicine  Maty Toscano APRN as Nurse Practitioner (Hospitalist)  Robin Freedman MD as Referring Physician (Family Medicine)  Ash Quiroz MD as Consulting Physician (Otolaryngology)    Smoking Status:  Social History     Tobacco Use   Smoking Status Current Some Day Smoker   • Types: Pipe   Smokeless Tobacco Never Used       Alcohol Consumption:  Social History     Substance and Sexual Activity   Alcohol Use No   • Frequency: Never   • Binge frequency: Never       Depression Screen:   PHQ-2/PHQ-9 Depression Screening 12/2/2019   Little interest or pleasure in doing things 1   Feeling down, depressed, or hopeless 1   Trouble falling or staying asleep, or sleeping too much 0   Feeling tired or having little energy 0   Poor appetite or overeating 0   Feeling bad about yourself - or that you are a failure or have let yourself or your family down 0   Trouble concentrating on things, such as reading the newspaper or watching television 0   Moving or speaking so slowly that other people could have noticed. Or the opposite - being so fidgety or restless that you have been moving around a lot more than usual 0   Thoughts that you would be  better off dead, or of hurting yourself in some way 0   Total Score 2   If you checked off any problems, how difficult have these problems made it for you to do your work, take care of things at home, or get along with other people? Not difficult at all       Fall Risk Screen:  ARELI Fall Risk Assessment has not been completed.    Health Habits and Functional and Cognitive Screening:  Functional & Cognitive Status 12/2/2019   Do you have difficulty preparing food and eating? No   Do you have difficulty bathing yourself, getting dressed or grooming yourself? No   Do you have difficulty using the toilet? No   Do you have difficulty moving around from place to place? No   Do you have trouble with steps or getting out of a bed or a chair? No   Current Diet Well Balanced Diet   Dental Exam Up to date   Eye Exam Up to date   Exercise (times per week) 0 times per week   Current Exercise Activities Include None   Do you need help using the phone?  No   Are you deaf or do you have serious difficulty hearing?  No   Do you need help with transportation? No   Do you need help shopping? No   Do you need help preparing meals?  No   Do you need help with housework?  No   Do you need help with laundry? No   Do you need help taking your medications? No   Do you need help managing money? No   Do you ever drive or ride in a car without wearing a seat belt? No   Have you felt unusual stress, anger or loneliness in the last month? No   Who do you live with? Spouse   If you need help, do you have trouble finding someone available to you? No   Have you been bothered in the last four weeks by sexual problems? No   Do you have difficulty concentrating, remembering or making decisions? No         Does the patient have evidence of cognitive impairment? No    Asprin use counseling:Taking ASA appropriately as indicated    Age-appropriate Screening Schedule:  Refer to the list below for future screening recommendations based on patient's age,  "sex and/or medical conditions. Orders for these recommended tests are listed in the plan section. The patient has been provided with a written plan.    Health Maintenance   Topic Date Due   • PNEUMOCOCCAL VACCINES (65+ LOW/MEDIUM RISK) (1 of 2 - PCV13) 12/20/2019 (Originally 8/3/2008)   • URINE MICROALBUMIN  12/20/2019 (Originally 1945)   • DIABETIC EYE EXAM  01/20/2020 (Originally 10/24/2018)   • TDAP/TD VACCINES (1 - Tdap) 06/24/2020 (Originally 8/3/1964)   • ZOSTER VACCINE (1 of 2) 07/01/2020 (Originally 8/3/1993)   • HEMOGLOBIN A1C  05/25/2020   • LIPID PANEL  11/25/2020   • INFLUENZA VACCINE  Completed          The following portions of the patient's history were reviewed and updated as appropriate: allergies, current medications, past family history, past medical history, past social history, past surgical history and problem list.    Outpatient Medications Prior to Visit   Medication Sig Dispense Refill   • ACCU-CHEK SHAYY PLUS test strip TEST THREE TIMES DAILY 200 each 3   • ACCU-CHEK SOFTCLIX LANCETS lancets TEST FOUR TIMES DAILY 200 each 5   • aspirin 81 MG chewable tablet Chew 81 mg Daily.     • BD INSULIN SYRINGE U/F 31G X 5/16\" 1 ML misc USE THREE TIMES DAILY AS DIRECTED  270 each 5   • folic acid (FOLVITE) 400 MCG tablet Take 800 mcg by mouth Daily.     • gabapentin (NEURONTIN) 300 MG capsule Take 300 mg by mouth As Needed.     • insulin glargine (LANTUS) 100 UNIT/ML injection Inject 80 units subcutaneously at bedtime 7200 Units 1   • LANTUS 100 UNIT/ML injection INJECT 80 UNITS SUBCUTANEOUSLY AT BEDTIME 70 mL 1   • metoprolol tartrate (LOPRESSOR) 50 MG tablet TAKE 1 TABLET TWICE DAILY 180 tablet 3   • NOVOLOG 100 UNIT/ML injection INJECT BELOW THE SKIN FOUR TIMES A DAY PER SLIDING SCALE 100 mL 5   • simvastatin (ZOCOR) 20 MG tablet TAKE 1 TABLET AT BEDTIME 90 tablet 3   • cilostazol (PLETAL) 100 MG tablet Take 100 mg by mouth 2 (Two) Times a Day.     • clopidogrel (PLAVIX) 75 MG tablet Take 75 mg " "by mouth Daily.       No facility-administered medications prior to visit.        Patient Active Problem List   Diagnosis   • UTI (urinary tract infection)   • Hypertension   • Diabetes mellitus (CMS/HCC)   • Coronary artery disease   • Metabolic encephalopathy   • Scrotal infection   • Diabetic foot (CMS/HCC)   • Atherosclerosis of native artery of both lower extremities with intermittent claudication (CMS/HCC)   • BMI 29.0-29.9,adult   • Mixed hyperlipidemia   • SNHL (sensory-neural hearing loss), asymmetrical   • Diabetic polyneuropathy associated with type 2 diabetes mellitus (CMS/HCC)   • Erectile dysfunction   • Gangrene of toe (CMS/HCC)   • Open wound of toe with complication   • 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   Respiratory: Negative for cough and shortness of breath.    Cardiovascular: Negative for chest pain.   Gastrointestinal: Negative for abdominal pain and blood in stool.   Genitourinary: Negative for difficulty urinating.   Skin: Negative for rash.   Neurological: Negative for syncope and headaches.       Compared to one year ago, the patient feels his physical health is the same.  Compared to one year ago, the patient feels his mental health is the same.    Reviewed chart for potential of high risk medication in the elderly: yes  Reviewed chart for potential of harmful drug interactions in the elderly:yes    Objective         Vitals:    12/02/19 0925   BP: 132/60   BP Location: Left arm   Patient Position: Sitting   Cuff Size: Adult   Pulse: 69   SpO2: 95%   Weight: 99.2 kg (218 lb 9.6 oz)   Height: 180.3 cm (71\")   PainSc: 0-No pain       Body mass index " is 30.49 kg/m².  Discussed the patient's BMI with him. The BMI is above average; BMI management plan is completed.    Physical Exam   Constitutional: He is oriented to person, place, and time. He appears well-developed and well-nourished. No distress.   HENT:   Head: Normocephalic and atraumatic.   Mouth/Throat: Oropharynx is clear and moist.   Eyes: Conjunctivae and EOM are normal.   Neck: Normal range of motion. Neck supple. No JVD present.   Cardiovascular: Normal rate, regular rhythm, normal heart sounds and intact distal pulses.   Pulmonary/Chest: Effort normal and breath sounds normal. No respiratory distress.   Abdominal: Soft. Bowel sounds are normal. He exhibits no distension. There is no tenderness.   Musculoskeletal: Normal range of motion. He exhibits no edema.   Neurological: He is alert and oriented to person, place, and time. No cranial nerve deficit.   Skin: Skin is warm and dry. Capillary refill takes less than 2 seconds. No rash noted.   Psychiatric: He has a normal mood and affect. His behavior is normal.   Nursing note and vitals reviewed.      Lab Results   Component Value Date     (H) 11/25/2019    CHLPL 92 (L) 11/25/2019    TRIG 98 11/25/2019    HDL 24 (L) 11/25/2019    LDL 48 11/25/2019    VLDL 20 11/25/2019    HGBA1C 6.9 (H) 11/25/2019        Assessment/Plan   Medicare Risks and Personalized Health Plan  CMS Preventative Services Quick Reference  Cardiovascular risk  Diabetic Lab Screening   Obesity/Overweight     The above risks/problems have been discussed with the patient.  Pertinent information has been shared with the patient in the After Visit Summary.  Follow up plans and orders are seen below in the Assessment/Plan Section.    Diagnoses and all orders for this visit:    1. Medicare annual wellness visit, subsequent (Primary)    2. 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 Mixed  hyperlipidemia    4. Atherosclerosis of native artery of both lower extremities with intermittent claudication (CMS/HCC)    5. Diabetic polyneuropathy associated with type 2 diabetes mellitus (CMS/[facility])    6. Coronary artery disease involving native coronary artery of native heart without angina pectoris  -     clopidogrel (PLAVIX) 75 MG tablet; Take 1 tablet by mouth Daily.  Dispense: 90 tablet; Refill: 3    7. Essential hypertension    8. SNHL (sensory-neural hearing loss), asymmetrical    9. PAD (peripheral artery disease) (CMS/[facility])  -     clopidogrel (PLAVIX) 75 MG tablet; Take 1 tablet by mouth Daily.  Dispense: 90 tablet; Refill: 3  -     cilostazol (PLETAL) 100 MG tablet; Take 1 tablet by mouth 2 (Two) Times a Day.  Dispense: 180 tablet; Refill: 3      Follow Up:  Return in about 3 months (around 3/2/2020) for Recheck.     An After Visit Summary and PPPS were given to the patient.

## 2019-12-02 NOTE — PATIENT INSTRUCTIONS
Diabetes: Insulin non dependent. Nephropathy, Retinopathy, Neuropahty status discussed.  Discussed goals of Diabetes today.  Goal Hgb A1C <7.0 for most patient.  Good BP control is encouraged with Goal BP based on JNC 8 guidelines 2014 <140/90.  Discussed role of Ace-I and ARB with DM.  DM imparts risk equivalence for CAD based on ATP III.  Current guidelines support moderate intensity statin with goal of 30-50% reduction in LDL unless 10 yr risk ASCVD >7.5 then high intensity should be used. Close monitoring of Lipid levels encouraged. Recommend once yearly eye evaluation by optometry or ophthalmology.  Good foot health discussed and foot exam completed.  Recommended toe health, wear good shoes, cut nails straight across and tend calluses if present. Take medications as encouraged.  Monitor blood sugars as encouraged and bring log to future meetings. Weight needs to be monitored. Monitor portions and caloric intake.  Pneumovax frequency discussed.   a. Labs: CMP, Microalbumin, A1C  b. Encouraged pt to bring glucose logs to each appointment  c. Encouraged self foot exams, and yearly eye exams.  d. Encouraged to lose weight/please see information provided  e. Recommend regular exercise   f. Medications as listed in today's visit      Suspect Essential HTN.Good BP control is encouraged with Goal BP based on JNC 8 guidelines 2014 <140/90 for patients with known cardiac disease and diabetes. (FRANCINE. 2014:322 (5):507-520. doi:10.1001/francine.2013.56512): general population <60 yr old goal BP <140/90 and for those >60 <150/90.  For patients of all ages with Diabetes, CKD, Known CAD <140/90. Recommended to the patient to obtain electronic home BP machine with upper arm blood pressure cuff and to check regularly as instructed.  Keep BP log and bring to subsequent visits. Stable, at goal.  a. LABS: routine for hypertension recommended and ordered if necessary.  b. Recommend if you do not have a home BP machine to obtain an  electronic machine with arm blood pressure cuff.      c. Monitor BP over the next week and keep log to bring back to office. Discussed medication therapy however pt wants to try to control with diet exercise. .  Your provider  has recommended self-monitoring of your blood pressure.  If you do not have a blood pressure cuff you may purchase one from the local pharmacy.  You may ask the pharmacist which brand and model they recommend.  Obtain your blood pressure measurement at least 2x per week.  You should also check your blood pressure if you experience any symptoms of blurred visit, dizziness or headache.  Please record all blood pressure measurements and bring them to next office visit.  If you have any questions about the accuracy of your blood pressure machine please bring it in to the office and our staff will be happy to check accuracy.   d. Encouraged to eat a low sodium heart healthy diet  e. Offered handout on HTN educational topics.  These were provided if patient requested these today.  f. MEDS: as listed in today's visit.  g. Risks/benefits of current and new medications discussed with the patient and or family today.  The patient/family are aware and accept that if there any side effects they should call or return to clinic as soon as possible.  Appropriate F/U discussed for topics addressed today. All questions were answered to the  satisfactory state of patient/family.  Should symptoms fail to improve or worsen they agree to call or return to clinic or to go to the ER. Education handouts were offered on any new Rx if requested.  Discussed the importance of following up with any needed screening tests/labs/specialist appointments and any requested follow-up recommended by me today.  Importance of maintaining follow-up discussed and patient accepts that missed appointments can delay diagnosis and potentially lead to worsening of conditions.

## 2019-12-23 ENCOUNTER — TELEPHONE (OUTPATIENT)
Dept: FAMILY MEDICINE CLINIC | Facility: CLINIC | Age: 74
End: 2019-12-23

## 2019-12-23 DIAGNOSIS — I73.9 PAD (PERIPHERAL ARTERY DISEASE) (HCC): ICD-10-CM

## 2019-12-23 RX ORDER — CILOSTAZOL 100 MG/1
100 TABLET ORAL 2 TIMES DAILY
Qty: 180 TABLET | Refills: 3 | Status: SHIPPED | OUTPATIENT
Start: 2019-12-23 | End: 2020-08-13 | Stop reason: SDUPTHER

## 2019-12-23 NOTE — TELEPHONE ENCOUNTER
Patient called and stated pharmacy called and stated they have not received script for Cilostazol 100mg BID

## 2020-01-27 DIAGNOSIS — Z79.4 TYPE 2 DIABETES MELLITUS WITH DIABETIC PERIPHERAL ANGIOPATHY AND GANGRENE, WITH LONG-TERM CURRENT USE OF INSULIN (HCC): ICD-10-CM

## 2020-01-27 DIAGNOSIS — E11.52 TYPE 2 DIABETES MELLITUS WITH DIABETIC PERIPHERAL ANGIOPATHY AND GANGRENE, WITH LONG-TERM CURRENT USE OF INSULIN (HCC): ICD-10-CM

## 2020-01-27 RX ORDER — INSULIN GLARGINE 100 [IU]/ML
INJECTION, SOLUTION SUBCUTANEOUS
Qty: 70 ML | Refills: 1 | Status: SHIPPED | OUTPATIENT
Start: 2020-01-27 | End: 2020-06-01

## 2020-01-29 RX ORDER — LANCETS
EACH MISCELLANEOUS
Qty: 400 EACH | Refills: 2 | Status: SHIPPED | OUTPATIENT
Start: 2020-01-29 | End: 2020-02-05 | Stop reason: SDUPTHER

## 2020-01-29 RX ORDER — BLOOD SUGAR DIAGNOSTIC
STRIP MISCELLANEOUS
Qty: 300 EACH | Refills: 1 | Status: SHIPPED | OUTPATIENT
Start: 2020-01-29 | End: 2020-02-05 | Stop reason: SDUPTHER

## 2020-02-05 DIAGNOSIS — E11.52 TYPE 2 DIABETES MELLITUS WITH DIABETIC PERIPHERAL ANGIOPATHY AND GANGRENE, WITH LONG-TERM CURRENT USE OF INSULIN (HCC): Primary | ICD-10-CM

## 2020-02-05 DIAGNOSIS — Z79.4 TYPE 2 DIABETES MELLITUS WITH DIABETIC PERIPHERAL ANGIOPATHY AND GANGRENE, WITH LONG-TERM CURRENT USE OF INSULIN (HCC): Primary | ICD-10-CM

## 2020-02-11 RX ORDER — METOPROLOL TARTRATE 50 MG/1
TABLET, FILM COATED ORAL
Qty: 180 TABLET | Refills: 1 | Status: SHIPPED | OUTPATIENT
Start: 2020-02-11 | End: 2020-06-19

## 2020-02-11 RX ORDER — SIMVASTATIN 20 MG
TABLET ORAL
Qty: 90 TABLET | Refills: 1 | Status: SHIPPED | OUTPATIENT
Start: 2020-02-11 | End: 2020-06-19

## 2020-02-17 ENCOUNTER — CLINICAL SUPPORT (OUTPATIENT)
Dept: FAMILY MEDICINE CLINIC | Facility: CLINIC | Age: 75
End: 2020-02-17

## 2020-02-17 DIAGNOSIS — Z79.4 TYPE 2 DIABETES MELLITUS WITH DIABETIC PERIPHERAL ANGIOPATHY AND GANGRENE, WITH LONG-TERM CURRENT USE OF INSULIN (HCC): Primary | ICD-10-CM

## 2020-02-17 DIAGNOSIS — E11.52 TYPE 2 DIABETES MELLITUS WITH DIABETIC PERIPHERAL ANGIOPATHY AND GANGRENE, WITH LONG-TERM CURRENT USE OF INSULIN (HCC): Primary | ICD-10-CM

## 2020-02-17 DIAGNOSIS — E78.2 MIXED HYPERLIPIDEMIA: ICD-10-CM

## 2020-02-17 DIAGNOSIS — I10 ESSENTIAL HYPERTENSION: ICD-10-CM

## 2020-02-18 LAB
ALBUMIN SERPL-MCNC: 4 G/DL (ref 3.7–4.7)
ALBUMIN/GLOB SERPL: 1.5 {RATIO} (ref 1.2–2.2)
ALP SERPL-CCNC: 81 IU/L (ref 39–117)
ALT SERPL-CCNC: 41 IU/L (ref 0–44)
AST SERPL-CCNC: 33 IU/L (ref 0–40)
BILIRUB SERPL-MCNC: 0.7 MG/DL (ref 0–1.2)
BUN SERPL-MCNC: 14 MG/DL (ref 8–27)
BUN/CREAT SERPL: 12 (ref 10–24)
CALCIUM SERPL-MCNC: 10.6 MG/DL (ref 8.6–10.2)
CHLORIDE SERPL-SCNC: 106 MMOL/L (ref 96–106)
CHOLEST SERPL-MCNC: 100 MG/DL (ref 100–199)
CO2 SERPL-SCNC: 20 MMOL/L (ref 20–29)
CREAT SERPL-MCNC: 1.2 MG/DL (ref 0.76–1.27)
GLOBULIN SER CALC-MCNC: 2.7 G/DL (ref 1.5–4.5)
GLUCOSE SERPL-MCNC: 149 MG/DL (ref 65–99)
HBA1C MFR BLD: 7 % (ref 4.8–5.6)
HDLC SERPL-MCNC: 25 MG/DL
LDLC SERPL CALC-MCNC: 58 MG/DL (ref 0–99)
LDLC/HDLC SERPL: 2.3 RATIO (ref 0–3.6)
POTASSIUM SERPL-SCNC: 4.2 MMOL/L (ref 3.5–5.2)
PROT SERPL-MCNC: 6.7 G/DL (ref 6–8.5)
SODIUM SERPL-SCNC: 140 MMOL/L (ref 134–144)
TRIGL SERPL-MCNC: 87 MG/DL (ref 0–149)
VLDLC SERPL CALC-MCNC: 17 MG/DL (ref 5–40)

## 2020-03-02 ENCOUNTER — OFFICE VISIT (OUTPATIENT)
Dept: FAMILY MEDICINE CLINIC | Facility: CLINIC | Age: 75
End: 2020-03-02

## 2020-03-02 VITALS
OXYGEN SATURATION: 98 % | TEMPERATURE: 98.2 F | WEIGHT: 218 LBS | DIASTOLIC BLOOD PRESSURE: 54 MMHG | HEIGHT: 71 IN | HEART RATE: 85 BPM | SYSTOLIC BLOOD PRESSURE: 122 MMHG | BODY MASS INDEX: 30.52 KG/M2

## 2020-03-02 DIAGNOSIS — Z79.4 TYPE 2 DIABETES MELLITUS WITH DIABETIC PERIPHERAL ANGIOPATHY AND GANGRENE, WITH LONG-TERM CURRENT USE OF INSULIN (HCC): ICD-10-CM

## 2020-03-02 DIAGNOSIS — J98.01 BRONCHOSPASM: Primary | ICD-10-CM

## 2020-03-02 DIAGNOSIS — E11.42 DIABETIC POLYNEUROPATHY ASSOCIATED WITH TYPE 2 DIABETES MELLITUS (HCC): ICD-10-CM

## 2020-03-02 DIAGNOSIS — E11.52 TYPE 2 DIABETES MELLITUS WITH DIABETIC PERIPHERAL ANGIOPATHY AND GANGRENE, WITH LONG-TERM CURRENT USE OF INSULIN (HCC): ICD-10-CM

## 2020-03-02 DIAGNOSIS — I25.10 CORONARY ARTERY DISEASE INVOLVING NATIVE CORONARY ARTERY OF NATIVE HEART WITHOUT ANGINA PECTORIS: ICD-10-CM

## 2020-03-02 DIAGNOSIS — E78.2 MIXED HYPERLIPIDEMIA: ICD-10-CM

## 2020-03-02 PROCEDURE — 99214 OFFICE O/P EST MOD 30 MIN: CPT | Performed by: FAMILY MEDICINE

## 2020-03-02 RX ORDER — ALBUTEROL SULFATE 90 UG/1
2 AEROSOL, METERED RESPIRATORY (INHALATION) EVERY 4 HOURS PRN
Qty: 18 G | Refills: 5 | Status: SHIPPED | OUTPATIENT
Start: 2020-03-02 | End: 2021-09-03

## 2020-03-02 NOTE — PATIENT INSTRUCTIONS
Advance Directive    Advance directives are legal documents that let you make choices ahead of time about your health care and medical treatment in case you become unable to communicate for yourself. Advance directives are a way for you to communicate your wishes to family, friends, and health care providers. This can help convey your decisions about end-of-life care if you become unable to communicate.  Discussing and writing advance directives should happen over time rather than all at once. Advance directives can be changed depending on your situation and what you want, even after you have signed the advance directives.  If you do not have an advance directive, some states assign family decision makers to act on your behalf based on how closely you are related to them. Each state has its own laws regarding advance directives. You may want to check with your health care provider, , or state representative about the laws in your state. There are different types of advance directives, such as:  · Medical power of .  · Living will.  · Do not resuscitate (DNR) or do not attempt resuscitation (DNAR) order.  Health care proxy and medical power of   A health care proxy, also called a health care agent, is a person who is appointed to make medical decisions for you in cases in which you are unable to make the decisions yourself. Generally, people choose someone they know well and trust to represent their preferences. Make sure to ask this person for an agreement to act as your proxy. A proxy may have to exercise judgment in the event of a medical decision for which your wishes are not known.  A medical power of  is a legal document that names your health care proxy. Depending on the laws in your state, after the document is written, it may also need to be:  · Signed.  · Notarized.  · Dated.  · Copied.  · Witnessed.  · Incorporated into your medical record.  You may also want to appoint  someone to manage your financial affairs in a situation in which you are unable to do so. This is called a durable power of  for finances. It is a separate legal document from the durable power of  for health care. You may choose the same person or someone different from your health care proxy to act as your agent in financial matters.  If you do not appoint a proxy, or if there is a concern that the proxy is not acting in your best interests, a court-appointed guardian may be designated to act on your behalf.  Living will  A living will is a set of instructions documenting your wishes about medical care when you cannot express them yourself. Health care providers should keep a copy of your living will in your medical record. You may want to give a copy to family members or friends. To alert caregivers in case of an emergency, you can place a card in your wallet to let them know that you have a living will and where they can find it. A living will is used if you become:  · Terminally ill.  · Incapacitated.  · Unable to communicate or make decisions.  Items to consider in your living will include:  · The use or non-use of life-sustaining equipment, such as dialysis machines and breathing machines (ventilators).  · A DNR or DNAR order, which is the instruction not to use cardiopulmonary resuscitation (CPR) if breathing or heartbeat stops.  · The use or non-use of tube feeding.  · Withholding of food and fluids.  · Comfort (palliative) care when the goal becomes comfort rather than a cure.  · Organ and tissue donation.  A living will does not give instructions for distributing your money and property if you should pass away. It is recommended that you seek the advice of a  when writing a will. Decisions about taxes, beneficiaries, and asset distribution will be legally binding. This process can relieve your family and friends of any concerns surrounding disputes or questions that may come up about  the distribution of your assets.  DNR or DNAR  A DNR or DNAR order is a request not to have CPR in the event that your heart stops beating or you stop breathing. If a DNR or DNAR order has not been made and shared, a health care provider will try to help any patient whose heart has stopped or who has stopped breathing. If you plan to have surgery, talk with your health care provider about how your DNR or DNAR order will be followed if problems occur.  Summary  · Advance directives are the legal documents that allow you to make choices ahead of time about your health care and medical treatment in case you become unable to communicate for yourself.  · The process of discussing and writing advance directives should happen over time. You can change the advance directives, even after you have signed them.  · Advance directives include DNR or DNAR orders, living stone, and designating an agent as your medical power of .  This information is not intended to replace advice given to you by your health care provider. Make sure you discuss any questions you have with your health care provider.  Document Released: 03/26/2009 Document Revised: 11/06/2017 Document Reviewed: 11/06/2017  [employer] Interactive Patient Education © 2020 [employer]    Diabetes: Insulin non dependent. Nephropathy, Retinopathy, Neuropahty status discussed.  Discussed goals of Diabetes today.  Goal Hgb A1C <7.0 for most patient.  Good BP control is encouraged with Goal BP based on JNC 8 guidelines 2014 <140/90.  Discussed role of Ace-I and ARB with DM.  DM imparts risk equivalence for CAD based on ATP III.  Current guidelines support moderate intensity statin with goal of 30-50% reduction in LDL unless 10 yr risk ASCVD >7.5 then high intensity should be used. Close monitoring of Lipid levels encouraged. Recommend once yearly eye evaluation by optometry or ophthalmology.  Good foot health discussed and foot exam completed.  Recommended toe  health, wear good shoes, cut nails straight across and tend calluses if present. Take medications as encouraged.  Monitor blood sugars as encouraged and bring log to future meetings. Weight needs to be monitored. Monitor portions and caloric intake.  Pneumovax frequency discussed.   a. Labs: CMP, Microalbumin, A1C  b. Encouraged pt to bring glucose logs to each appointment  c. Encouraged self foot exams, and yearly eye exams.  d. Encouraged to lose weight/please see information provided  e. Recommend regular exercise   f. Medications as listed in today's visit        Shortness of Breath, Adult  Shortness of breath means you have trouble breathing. Shortness of breath could be a sign of a medical problem.  Follow these instructions at home:    · Watch for any changes in your symptoms.  · Do not use any products that contain nicotine or tobacco, such as cigarettes, e-cigarettes, and chewing tobacco.  · Do not smoke. Smoking can cause shortness of breath. If you need help to quit smoking, ask your doctor.  · Avoid things that can make it harder to breathe, such as:  ? Mold.  ? Dust.  ? Air pollution.  ? Chemical smells.  ? Things that can cause allergy symptoms (allergens), if you have allergies.  · Keep your living space clean. Use products that help remove mold and dust.  · Rest as needed. Slowly return to your normal activities.  · Take over-the-counter and prescription medicines only as told by your doctor. This includes oxygen therapy and inhaled medicines.  · Keep all follow-up visits as told by your doctor. This is important.  Contact a doctor if:  · Your condition does not get better as soon as expected.  · You have a hard time doing your normal activities, even after you rest.  · You have new symptoms.  Get help right away if:  · Your shortness of breath gets worse.  · You have trouble breathing when you are resting.  · You feel light-headed or you pass out (faint).  · You have a cough that is not helped by  medicines.  · You cough up blood.  · You have pain with breathing.  · You have pain in your chest, arms, shoulders, or belly (abdomen).  · You have a fever.  · You cannot walk up stairs.  · You cannot exercise the way you normally do.  These symptoms may represent a serious problem that is an emergency. Do not wait to see if the symptoms will go away. Get medical help right away. Call your local emergency services (911 in the [place] Do not drive yourself to the hospital.  Summary  · Shortness of breath is when you have trouble breathing enough air. It can be a sign of a medical problem.  · Avoid things that make it hard for you to breathe, such as smoking, pollution, mold, and dust.  · Watch for any changes in your symptoms. Contact your doctor if you do not get better or you get worse.  This information is not intended to replace advice given to you by your health care provider. Make sure you discuss any questions you have with your health care provider.  Document Released: 06/05/2009 Document Revised: 05/20/2019 Document Reviewed: 05/20/2019  [employer] Interactive Patient Education © 2020 [employer]

## 2020-03-02 NOTE — PROGRESS NOTES
OFFICE VISIT NOTE:    Darren Shay is a 74 y.o. male who presents today for Shortness of Breath (f/u).     Occasionally some dyspnea on exertion - mainly to mailbox and back - saw Dr Ortiz in [facility] in Dec 2019 and felt not related to the heart! Ria deconditioning. He feels like he is wheezing some.     Having some increased dyspnea lately, and some left flank pain with it. Went to [facility] recently with cardiologist, and felt the heart is fine after workup.       Shortness of Breath   This is a chronic problem. The current episode started more than 1 month ago. The problem occurs daily. The problem has been waxing and waning. Pertinent negatives include no abdominal pain, chest pain, fever, hemoptysis, rash or sputum production. The symptoms are aggravated by any activity, lying flat, URIs and weather changes. He has tried body position changes, cool air, OTC cough suppressants and rest for the symptoms. The treatment provided moderate relief.        Past medical/surgical history, Family history, Social history, Allergies and Medications have been reviewed with the patient today and are updated in [facility] EMR. See below.    Past Medical History:   Diagnosis Date   • Cataracts, bilateral    • Colon cancer (CMS/HCC)    • Coronary artery disease    • Diabetes mellitus (CMS/HCC)    • Diabetic foot ulcers (CMS/HCC)    • Hypertension    • Ileostomy present (CMS/HCC)    • Neuropathy    • UC (ulcerative colitis confined to rectum) (CMS/HCC)      Past Surgical History:   Procedure Laterality Date   • CHOLECYSTECTOMY     • COLOSTOMY     • CORONARY ARTERY BYPASS GRAFT  1993    x5   • ORIF FOOT FRACTURE Right 1/17/2019    Procedure: PARTIAL REMOVAL OF BONE MEDIAL CUNEIFORM AND 1ST METATARSAL - RIGHT FOOT EXCISION OF ULCERATION;  Surgeon: Florin Kearns DPM;  Location: [facility] OR;  Service: Podiatry   • TOE AMPUTATION      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 at      Family History   Problem Relation Age of Onset   • Cancer Mother    •  "Heart disease Mother    • Diabetes Mother    • Cancer Father    • Diabetes Sister    • No Known Problems Brother    • Diabetes Sister    • No Known Problems Brother    • No Known Problems Brother    • No Known Problems Brother      Social History     Tobacco Use   • Smoking status: Current Some Day Smoker     Types: Pipe   • Smokeless tobacco: Never Used   Substance Use Topics   • Alcohol use: No     Frequency: Never     Binge frequency: Never   • Drug use: No       ALLERGIES:  Cephalexin    CURRENT MEDS:    Current Outpatient Medications:   •  aspirin 81 MG chewable tablet, Chew 81 mg Daily., Disp: , Rfl:   •  BD INSULIN SYRINGE U/F 31G X 5/16\" 1 ML misc, USE THREE TIMES DAILY AS DIRECTED , Disp: 270 each, Rfl: 5  •  cilostazol (PLETAL) 100 MG tablet, Take 1 tablet by mouth 2 (Two) Times a Day., Disp: 180 tablet, Rfl: 3  •  clopidogrel (PLAVIX) 75 MG tablet, Take 1 tablet by mouth Daily., Disp: 90 tablet, Rfl: 3  •  folic acid (FOLVITE) 400 MCG tablet, Take 800 mcg by mouth Daily., Disp: , Rfl:   •  gabapentin (NEURONTIN) 300 MG capsule, Take 300 mg by mouth As Needed., Disp: , Rfl:   •  glucose blood (ACCU-CHEK SHAYY PLUS) test strip, 1 each by Other route 3 (Three) Times a Day. Test sugar THREE times daily, Disp: 300 each, Rfl: 1  •  Insulin Glargine, 2 Unit Dial, (TOUJEO MAX SOLOSTAR) 300 UNIT/ML solution pen-injector injection, Inject 80 Units under the skin into the appropriate area as directed every night at bedtime., Disp: 3 pen, Rfl: 0  •  LANTUS 100 UNIT/ML injection, INJECT 80 UNITS SUBCUTANEOUSLY AT BEDTIME, Disp: 70 mL, Rfl: 1  •  metoprolol tartrate (LOPRESSOR) 50 MG tablet, TAKE 1 TABLET TWICE DAILY, Disp: 180 tablet, Rfl: 1  •  NOVOLOG 100 UNIT/ML injection, INJECT BELOW THE SKIN FOUR TIMES A DAY PER SLIDING SCALE, Disp: 100 mL, Rfl: 5  •  simvastatin (ZOCOR) 20 MG tablet, TAKE 1 TABLET AT BEDTIME, Disp: 90 tablet, Rfl: 1  •  albuterol sulfate  (90 Base) MCG/ACT inhaler, Inhale 2 puffs Every 4 " "(Four) Hours As Needed for Wheezing or Shortness of Air., Disp: 18 g, Rfl: 5    REVIEW OF SYSTEMS:  Review of Systems   Constitutional: Negative for activity change, appetite change, fatigue, fever, unexpected weight gain and unexpected weight loss.   Respiratory: Positive for shortness of breath. Negative for hemoptysis and sputum production.    Cardiovascular: Negative for chest pain.   Gastrointestinal: Negative for abdominal pain.   Genitourinary: Negative for difficulty urinating.   Skin: Negative for rash.   Neurological: Negative for syncope and headache.       PHYSICAL EXAMINATION:  Vital Signs:  /54 (BP Location: Left arm, Patient Position: Sitting, Cuff Size: Adult)   Pulse 85   Temp 98.2 °F (36.8 °C) (Temporal)   Ht 180.3 cm (71\")   Wt 98.9 kg (218 lb)   SpO2 98%   BMI 30.40 kg/m²   Vitals:    03/02/20 0939   Patient Position: Sitting       Physical Exam   Constitutional: He is oriented to person, place, and time. He appears well-developed and well-nourished. No distress.   HENT:   Head: Normocephalic and atraumatic.   Mouth/Throat: Oropharynx is clear and moist.   Neck: Normal range of motion. Neck supple. No JVD present.   Cardiovascular: Normal rate, regular rhythm, normal heart sounds and intact distal pulses.   Pulmonary/Chest: Effort normal. No respiratory distress. He has wheezes (rare bilateral).   Abdominal: Soft. He exhibits no distension. There is no tenderness.   Musculoskeletal: Normal range of motion. He exhibits no edema.   Neurological: He is alert and oriented to person, place, and time. No cranial nerve deficit.   Skin: Skin is warm and dry. Capillary refill takes less than 2 seconds. No rash noted.   Psychiatric: He has a normal mood and affect. His behavior is normal.   Nursing note and vitals reviewed.      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 Patient instructed to report any adverse side effects or reactions to medicines promptly to the office. Patient instructed to make us aware of any OTC or herbal meds or supplement use.    DIET Recommendations: Patient instructed and provided information on the following nutrition and diet recommendations: Calorie restriction for weight reduction and maintenance. Necessity for adequate daily intake of fluids/water. Also, diet information provided in AVS for specifics.    EXERCISE Instructions: Discussed with patient the need for routine aerobic activity for cardiovascular fitness, 3 times a week for about 30 minutes. Daily exercise for increased fitness and weight reduction goals.    SMOKING Recommendations: Counseled patient and encouraged them on smoking and tobacco cessation if applicable. Discussed the benefits to all body systems with smoking/tobacco cessation, including decreased cardiac/lung/stroke/cancer risk. Encouraged no vaping use.    HEALTH MAINTENANCE:  Counseling provided to patient/family about routine health maintenance and ANNUAL physicals/labs. Counseling on recommended Vaccinations appropriate for age needed.        Patient's Body mass index is 30.4 kg/m². BMI is above normal parameters. Recommendations include: exercise counseling and nutrition counseling.      Medications or Orders placed this visit:  No orders of the defined types were placed in this  encounter.      Medications DISCONTINUED this visit:  Medications Discontinued During This Encounter   Medication Reason   • insulin glargine (LANTUS) 100 UNIT/ML injection Duplicate order       FOLLOW-UP:  Return in about 3 months (around 6/2/2020) for Recheck.    I discussed the patients findings and my recommendations with patient.  An After Visit Summary (AVS) was printed and given to the patient at discharge.      Robin Freedman MD, FAAFP  3/3/2020

## 2020-05-26 ENCOUNTER — CLINICAL SUPPORT (OUTPATIENT)
Dept: FAMILY MEDICINE CLINIC | Facility: CLINIC | Age: 75
End: 2020-05-26

## 2020-05-26 DIAGNOSIS — E78.2 MIXED HYPERLIPIDEMIA: ICD-10-CM

## 2020-05-26 DIAGNOSIS — E11.52 TYPE 2 DIABETES MELLITUS WITH DIABETIC PERIPHERAL ANGIOPATHY AND GANGRENE, WITH LONG-TERM CURRENT USE OF INSULIN (HCC): ICD-10-CM

## 2020-05-26 DIAGNOSIS — I10 ESSENTIAL HYPERTENSION: Primary | ICD-10-CM

## 2020-05-26 DIAGNOSIS — Z79.4 TYPE 2 DIABETES MELLITUS WITH DIABETIC PERIPHERAL ANGIOPATHY AND GANGRENE, WITH LONG-TERM CURRENT USE OF INSULIN (HCC): ICD-10-CM

## 2020-05-27 LAB
ALBUMIN SERPL-MCNC: 3.7 G/DL (ref 3.7–4.7)
ALBUMIN/GLOB SERPL: 1.2 {RATIO} (ref 1.2–2.2)
ALP SERPL-CCNC: 108 IU/L (ref 39–117)
ALT SERPL-CCNC: 29 IU/L (ref 0–44)
AST SERPL-CCNC: 24 IU/L (ref 0–40)
BILIRUB SERPL-MCNC: 0.4 MG/DL (ref 0–1.2)
BUN SERPL-MCNC: 18 MG/DL (ref 8–27)
BUN/CREAT SERPL: 16 (ref 10–24)
CALCIUM SERPL-MCNC: 9.2 MG/DL (ref 8.6–10.2)
CHLORIDE SERPL-SCNC: 108 MMOL/L (ref 96–106)
CHOLEST SERPL-MCNC: 81 MG/DL (ref 100–199)
CO2 SERPL-SCNC: 17 MMOL/L (ref 20–29)
CREAT SERPL-MCNC: 1.16 MG/DL (ref 0.76–1.27)
GLOBULIN SER CALC-MCNC: 3.1 G/DL (ref 1.5–4.5)
GLUCOSE SERPL-MCNC: 240 MG/DL (ref 65–99)
HBA1C MFR BLD: 6.6 % (ref 4.8–5.6)
HDLC SERPL-MCNC: 26 MG/DL
LDLC SERPL CALC-MCNC: 40 MG/DL (ref 0–99)
LDLC/HDLC SERPL: 1.5 RATIO (ref 0–3.6)
POTASSIUM SERPL-SCNC: 4.7 MMOL/L (ref 3.5–5.2)
PROT SERPL-MCNC: 6.8 G/DL (ref 6–8.5)
SODIUM SERPL-SCNC: 138 MMOL/L (ref 134–144)
TRIGL SERPL-MCNC: 77 MG/DL (ref 0–149)
VLDLC SERPL CALC-MCNC: 15 MG/DL (ref 5–40)

## 2020-06-01 ENCOUNTER — OFFICE VISIT (OUTPATIENT)
Dept: FAMILY MEDICINE CLINIC | Facility: CLINIC | Age: 75
End: 2020-06-01

## 2020-06-01 VITALS
DIASTOLIC BLOOD PRESSURE: 74 MMHG | HEART RATE: 96 BPM | SYSTOLIC BLOOD PRESSURE: 148 MMHG | TEMPERATURE: 96.4 F | OXYGEN SATURATION: 95 % | BODY MASS INDEX: 30.21 KG/M2 | WEIGHT: 215.8 LBS | HEIGHT: 71 IN

## 2020-06-01 DIAGNOSIS — E11.42 DIABETIC POLYNEUROPATHY ASSOCIATED WITH TYPE 2 DIABETES MELLITUS (HCC): Primary | ICD-10-CM

## 2020-06-01 DIAGNOSIS — I25.10 CORONARY ARTERY DISEASE INVOLVING NATIVE CORONARY ARTERY OF NATIVE HEART WITHOUT ANGINA PECTORIS: ICD-10-CM

## 2020-06-01 DIAGNOSIS — Z79.4 TYPE 2 DIABETES MELLITUS WITH MICROALBUMINURIA, WITH LONG-TERM CURRENT USE OF INSULIN (HCC): ICD-10-CM

## 2020-06-01 DIAGNOSIS — I10 ESSENTIAL HYPERTENSION: ICD-10-CM

## 2020-06-01 DIAGNOSIS — E11.29 TYPE 2 DIABETES MELLITUS WITH MICROALBUMINURIA, WITH LONG-TERM CURRENT USE OF INSULIN (HCC): ICD-10-CM

## 2020-06-01 DIAGNOSIS — R80.9 TYPE 2 DIABETES MELLITUS WITH MICROALBUMINURIA, WITH LONG-TERM CURRENT USE OF INSULIN (HCC): ICD-10-CM

## 2020-06-01 DIAGNOSIS — E78.2 MIXED HYPERLIPIDEMIA: ICD-10-CM

## 2020-06-01 PROCEDURE — 99214 OFFICE O/P EST MOD 30 MIN: CPT | Performed by: FAMILY MEDICINE

## 2020-06-01 RX ORDER — INSULIN GLARGINE 100 [IU]/ML
INJECTION, SOLUTION SUBCUTANEOUS
Qty: 70 ML | Refills: 1 | Status: SHIPPED | OUTPATIENT
Start: 2020-06-01 | End: 2020-12-04 | Stop reason: SDUPTHER

## 2020-06-01 NOTE — PATIENT INSTRUCTIONS
Diabetes: Insulin non dependent. Nephropathy, Retinopathy, Neuropahty status discussed.  Discussed goals of Diabetes today.  Goal Hgb A1C <7.0 for most patient.  Good BP control is encouraged with Goal BP based on JNC 8 guidelines 2014 <140/90.  Discussed role of Ace-I and ARB with DM.  DM imparts risk equivalence for CAD based on ATP III.  Current guidelines support moderate intensity statin with goal of 30-50% reduction in LDL unless 10 yr risk ASCVD >7.5 then high intensity should be used. Close monitoring of Lipid levels encouraged. Recommend once yearly eye evaluation by optometry or ophthalmology.  Good foot health discussed and foot exam completed.  Recommended toe health, wear good shoes, cut nails straight across and tend calluses if present. Take medications as encouraged.  Monitor blood sugars as encouraged and bring log to future meetings. Weight needs to be monitored. Monitor portions and caloric intake.  Pneumovax frequency discussed.   a. Labs: CMP, Microalbumin, A1C  b. Encouraged pt to bring glucose logs to each appointment  c. Encouraged self foot exams, and yearly eye exams.  d. Encouraged to lose weight/please see information provided  e. Recommend regular exercise   f. Medications as listed in today's visit      Suspect Essential HTN.Good BP control is encouraged with Goal BP based on JNC 8 guidelines 2014 <140/90 for patients with known cardiac disease and diabetes. (FRANCINE. 2014:322 (5):507-520. doi:10.1001/francine.2013.59786): general population <60 yr old goal BP <140/90 and for those >60 <150/90.  For patients of all ages with Diabetes, CKD, Known CAD <140/90. Recommended to the patient to obtain electronic home BP machine with upper arm blood pressure cuff and to check regularly as instructed.  Keep BP log and bring to subsequent visits. Stable, at goal.  a. LABS: routine for hypertension recommended and ordered if necessary.  b. Recommend if you do not have a home BP machine to obtain an  electronic machine with arm blood pressure cuff.      c. Monitor BP over the next week and keep log to bring back to office. Discussed medication therapy however pt wants to try to control with diet exercise. .  Your provider  has recommended self-monitoring of your blood pressure.  If you do not have a blood pressure cuff you may purchase one from the local pharmacy.  You may ask the pharmacist which brand and model they recommend.  Obtain your blood pressure measurement at least 2x per week.  You should also check your blood pressure if you experience any symptoms of blurred visit, dizziness or headache.  Please record all blood pressure measurements and bring them to next office visit.  If you have any questions about the accuracy of your blood pressure machine please bring it in to the office and our staff will be happy to check accuracy.   d. Encouraged to eat a low sodium heart healthy diet  e. Offered handout on HTN educational topics.  These were provided if patient requested these today.  f. MEDS: as listed in today's visit.  g. Risks/benefits of current and new medications discussed with the patient and or family today.  The patient/family are aware and accept that if there any side effects they should call or return to clinic as soon as possible.  Appropriate F/U discussed for topics addressed today. All questions were answered to the  satisfactory state of patient/family.  Should symptoms fail to improve or worsen they agree to call or return to clinic or to go to the ER. Education handouts were offered on any new Rx if requested.  Discussed the importance of following up with any needed screening tests/labs/specialist appointments and any requested follow-up recommended by me today.  Importance of maintaining follow-up discussed and patient accepts that missed appointments can delay diagnosis and potentially lead to worsening of conditions.      Fat and Cholesterol Restricted Eating Plan  Getting too  "much fat and cholesterol in your diet may cause health problems. Choosing the right foods helps keep your fat and cholesterol at normal levels. This can keep you from getting certain diseases.  Your doctor may recommend an eating plan that includes:  · Total fat: ______% or less of total calories a day.  · Saturated fat: ______% or less of total calories a day.  · Cholesterol: less than _________mg a day.  · Fiber: ______g a day.  What are tips for following this plan?  Meal planning  · At meals, divide your plate into four equal parts:  ? Fill one-half of your plate with vegetables and green salads.  ? Fill one-fourth of your plate with whole grains.  ? Fill one-fourth of your plate with low-fat (lean) protein foods.  · Eat fish that is high in omega-3 fats at least two times a week. This includes mackerel, tuna, sardines, and salmon.  · Eat foods that are high in fiber, such as whole grains, beans, apples, broccoli, carrots, peas, and barley.  General tips    · Work with your doctor to lose weight if you need to.  · Avoid:  ? Foods with added sugar.  ? Fried foods.  ? Foods with partially hydrogenated oils.  · Limit alcohol intake to no more than 1 drink a day for nonpregnant women and 2 drinks a day for men. One drink equals 12 oz of beer, 5 oz of wine, or 1½ oz of hard liquor.  Reading food labels  · Check food labels for:  ? Trans fats.  ? Partially hydrogenated oils.  ? Saturated fat (g) in each serving.  ? Cholesterol (mg) in each serving.  ? Fiber (g) in each serving.  · Choose foods with healthy fats, such as:  ? Monounsaturated fats.  ? Polyunsaturated fats.  ? Omega-3 fats.  · Choose grain products that have whole grains. Look for the word \"whole\" as the first word in the ingredient list.  Cooking  · Cook foods using low-fat methods. These include baking, boiling, grilling, and broiling.  · Eat more home-cooked foods. Eat at restaurants and buffets less often.  · Avoid cooking using saturated fats, such " as butter, cream, palm oil, palm kernel oil, and coconut oil.  Recommended foods    Fruits  · All fresh, canned (in natural juice), or frozen fruits.  Vegetables  · Fresh or frozen vegetables (raw, steamed, roasted, or grilled). Green salads.  Grains  · Whole grains, such as whole wheat or whole grain breads, crackers, cereals, and pasta. Unsweetened oatmeal, bulgur, barley, quinoa, or brown rice. Corn or whole wheat flour tortillas.  Meats and other protein foods  · Ground beef (85% or leaner), grass-fed beef, or beef trimmed of fat. Skinless chicken or turkey. Ground chicken or turkey. Pork trimmed of fat. All fish and seafood. Egg whites. Dried beans, peas, or lentils. Unsalted nuts or seeds. Unsalted canned beans. Nut butters without added sugar or oil.  Dairy  · Low-fat or nonfat dairy products, such as skim or 1% milk, 2% or reduced-fat cheeses, low-fat and fat-free ricotta or cottage cheese, or plain low-fat and nonfat yogurt.  Fats and oils  · Tub margarine without trans fats. Light or reduced-fat mayonnaise and salad dressings. Avocado. Olive, canola, sesame, or safflower oils.  The items listed above may not be a complete list of foods and beverages you can eat. Contact a dietitian for more information.  Foods to avoid  Fruits  · Canned fruit in heavy syrup. Fruit in cream or butter sauce. Fried fruit.  Vegetables  · Vegetables cooked in cheese, cream, or butter sauce. Fried vegetables.  Grains  · White bread. White pasta. White rice. Cornbread. Bagels, pastries, and croissants. Crackers and snack foods that contain trans fat and hydrogenated oils.  Meats and other protein foods  · Fatty cuts of meat. Ribs, chicken wings, malin, sausage, bologna, salami, chitterlings, fatback, hot dogs, bratwurst, and packaged lunch meats. Liver and organ meats. Whole eggs and egg yolks. Chicken and turkey with skin. Fried meat.  Dairy  · Whole or 2% milk, cream, half-and-half, and cream cheese. Whole milk cheeses.  Whole-fat or sweetened yogurt. Full-fat cheeses. Nondairy creamers and whipped toppings. Processed cheese, cheese spreads, and cheese curds.  Beverages  · Alcohol. Sugar-sweetened drinks such as sodas, lemonade, and fruit drinks.  Fats and oils  · Butter, stick margarine, lard, shortening, ghee, or malin fat. Coconut, palm kernel, and palm oils.  Sweets and desserts  · Corn syrup, sugars, honey, and molasses. Candy. Jam and jelly. Syrup. Sweetened cereals. Cookies, pies, cakes, donuts, muffins, and ice cream.  The items listed above may not be a complete list of foods and beverages you should avoid. Contact a dietitian for more information.  Summary  · Choosing the right foods helps keep your fat and cholesterol at normal levels. This can keep you from getting certain diseases.  · At meals, fill one-half of your plate with vegetables and green salads.  · Eat high-fiber foods, like whole grains, beans, apples, carrots, peas, and barley.  · Limit added sugar, saturated fats, alcohol, and fried foods.  This information is not intended to replace advice given to you by your health care provider. Make sure you discuss any questions you have with your health care provider.  Document Released: 06/18/2013 Document Revised: 08/21/2019 Document Reviewed: 09/04/2018  Elsevier Patient Education © 2020 Elsevier Inc.      Abdominal Pain, Adult  Abdominal pain can be caused by many things. Often, abdominal pain is not serious and it gets better with no treatment or by being treated at home. However, sometimes abdominal pain is serious. Your health care provider will do a medical history and a physical exam to try to determine the cause of your abdominal pain.  Follow these instructions at home:  · Take over-the-counter and prescription medicines only as told by your health care provider. Do not take a laxative unless told by your health care provider.  · Drink enough fluid to keep your urine clear or pale yellow.  · Watch your  condition for any changes.  · Keep all follow-up visits as told by your health care provider. This is important.  Contact a health care provider if:  · Your abdominal pain changes or gets worse.  · You are not hungry or you lose weight without trying.  · You are constipated or have diarrhea for more than 2-3 days.  · You have pain when you urinate or have a bowel movement.  · Your abdominal pain wakes you up at night.  · Your pain gets worse with meals, after eating, or with certain foods.  · You are throwing up and cannot keep anything down.  · You have a fever.  Get help right away if:  · Your pain does not go away as soon as your health care provider told you to expect.  · You cannot stop throwing up.  · Your pain is only in areas of the abdomen, such as the right side or the left lower portion of the abdomen.  · You have bloody or black stools, or stools that look like tar.  · You have severe pain, cramping, or bloating in your abdomen.  · You have signs of dehydration, such as:  ? Dark urine, very little urine, or no urine.  ? Cracked lips.  ? Dry mouth.  ? Sunken eyes.  ? Sleepiness.  ? Weakness.  This information is not intended to replace advice given to you by your health care provider. Make sure you discuss any questions you have with your health care provider.  Document Released: 09/27/2006 Document Revised: 07/07/2017 Document Reviewed: 05/31/2017  [employer] Interactive Patient Education © 2020 [employer]

## 2020-06-12 DIAGNOSIS — E11.52 TYPE 2 DIABETES MELLITUS WITH DIABETIC PERIPHERAL ANGIOPATHY AND GANGRENE, WITH LONG-TERM CURRENT USE OF INSULIN (HCC): ICD-10-CM

## 2020-06-12 DIAGNOSIS — Z79.4 TYPE 2 DIABETES MELLITUS WITH DIABETIC PERIPHERAL ANGIOPATHY AND GANGRENE, WITH LONG-TERM CURRENT USE OF INSULIN (HCC): ICD-10-CM

## 2020-06-15 RX ORDER — BLOOD SUGAR DIAGNOSTIC
STRIP MISCELLANEOUS
Qty: 100 EACH | Refills: 6 | Status: SHIPPED | OUTPATIENT
Start: 2020-06-15 | End: 2020-11-05

## 2020-06-19 RX ORDER — METOPROLOL TARTRATE 50 MG/1
TABLET, FILM COATED ORAL
Qty: 180 TABLET | Refills: 1 | Status: SHIPPED | OUTPATIENT
Start: 2020-06-19 | End: 2021-02-03

## 2020-06-19 RX ORDER — SIMVASTATIN 20 MG
TABLET ORAL
Qty: 90 TABLET | Refills: 1 | Status: SHIPPED | OUTPATIENT
Start: 2020-06-19 | End: 2021-03-09

## 2020-08-12 ENCOUNTER — TELEPHONE (OUTPATIENT)
Dept: FAMILY MEDICINE CLINIC | Facility: CLINIC | Age: 75
End: 2020-08-12

## 2020-08-12 DIAGNOSIS — Z79.4 TYPE 2 DIABETES MELLITUS WITH DIABETIC PERIPHERAL ANGIOPATHY AND GANGRENE, WITH LONG-TERM CURRENT USE OF INSULIN (HCC): ICD-10-CM

## 2020-08-12 DIAGNOSIS — E78.2 MIXED HYPERLIPIDEMIA: Primary | ICD-10-CM

## 2020-08-12 DIAGNOSIS — E11.52 TYPE 2 DIABETES MELLITUS WITH DIABETIC PERIPHERAL ANGIOPATHY AND GANGRENE, WITH LONG-TERM CURRENT USE OF INSULIN (HCC): ICD-10-CM

## 2020-08-12 NOTE — TELEPHONE ENCOUNTER
Called and advised patient he would need labs prior to appt on 8/31/20. He voiced understanding and stated he would be in around 8/17/20 to complete.     CMP, A1C and LIPID have been ordered.

## 2020-08-12 NOTE — TELEPHONE ENCOUNTER
Patient called and stated that he has an appointment with Dr. Freedman on 8/31/2020. He is wondering if he needs to come in for lab work or not.    He can be contacted best at [phone] to clarify and confirm.

## 2020-08-13 DIAGNOSIS — I73.9 PAD (PERIPHERAL ARTERY DISEASE) (HCC): ICD-10-CM

## 2020-08-13 DIAGNOSIS — I25.10 CORONARY ARTERY DISEASE INVOLVING NATIVE CORONARY ARTERY OF NATIVE HEART WITHOUT ANGINA PECTORIS: ICD-10-CM

## 2020-08-13 RX ORDER — CILOSTAZOL 100 MG/1
100 TABLET ORAL 2 TIMES DAILY
Qty: 180 TABLET | Refills: 3 | Status: SHIPPED | OUTPATIENT
Start: 2020-08-13 | End: 2021-05-27

## 2020-08-13 NOTE — TELEPHONE ENCOUNTER
Caller: Darren Shay    Relationship: Self    Best call back number: [phone]    Medication needed:   Requested Prescriptions     Pending Prescriptions Disp Refills   • cilostazol (PLETAL) 100 MG tablet 180 tablet 3     Sig: Take 1 tablet by mouth 2 (Two) Times a Day.       What is the patient's preferred pharmacy: [facility] - [facility] [address] [address] - [phone]  - [phone] FX

## 2020-08-17 ENCOUNTER — RESULTS ENCOUNTER (OUTPATIENT)
Dept: FAMILY MEDICINE CLINIC | Facility: CLINIC | Age: 75
End: 2020-08-17

## 2020-08-17 DIAGNOSIS — Z79.4 TYPE 2 DIABETES MELLITUS WITH DIABETIC PERIPHERAL ANGIOPATHY AND GANGRENE, WITH LONG-TERM CURRENT USE OF INSULIN (HCC): ICD-10-CM

## 2020-08-17 DIAGNOSIS — E78.2 MIXED HYPERLIPIDEMIA: ICD-10-CM

## 2020-08-17 DIAGNOSIS — E11.52 TYPE 2 DIABETES MELLITUS WITH DIABETIC PERIPHERAL ANGIOPATHY AND GANGRENE, WITH LONG-TERM CURRENT USE OF INSULIN (HCC): ICD-10-CM

## 2020-08-20 ENCOUNTER — CLINICAL SUPPORT (OUTPATIENT)
Dept: FAMILY MEDICINE CLINIC | Facility: CLINIC | Age: 75
End: 2020-08-20

## 2020-08-21 LAB
ALBUMIN SERPL-MCNC: 4 G/DL (ref 3.7–4.7)
ALBUMIN/GLOB SERPL: 1.5 {RATIO} (ref 1.2–2.2)
ALP SERPL-CCNC: 66 IU/L (ref 39–117)
ALT SERPL-CCNC: 36 IU/L (ref 0–44)
AST SERPL-CCNC: 24 IU/L (ref 0–40)
BILIRUB SERPL-MCNC: 0.7 MG/DL (ref 0–1.2)
BUN SERPL-MCNC: 14 MG/DL (ref 8–27)
BUN/CREAT SERPL: 12 (ref 10–24)
CALCIUM SERPL-MCNC: 9.3 MG/DL (ref 8.6–10.2)
CHLORIDE SERPL-SCNC: 106 MMOL/L (ref 96–106)
CHOLEST SERPL-MCNC: 102 MG/DL (ref 100–199)
CO2 SERPL-SCNC: 23 MMOL/L (ref 20–29)
CREAT SERPL-MCNC: 1.17 MG/DL (ref 0.76–1.27)
GLOBULIN SER CALC-MCNC: 2.7 G/DL (ref 1.5–4.5)
GLUCOSE SERPL-MCNC: 111 MG/DL (ref 65–99)
HBA1C MFR BLD: 6.9 % (ref 4.8–5.6)
HDLC SERPL-MCNC: 25 MG/DL
LDLC SERPL CALC-MCNC: 58 MG/DL (ref 0–99)
LDLC/HDLC SERPL: 2.3 RATIO (ref 0–3.6)
POTASSIUM SERPL-SCNC: 4.2 MMOL/L (ref 3.5–5.2)
PROT SERPL-MCNC: 6.7 G/DL (ref 6–8.5)
SODIUM SERPL-SCNC: 142 MMOL/L (ref 134–144)
TRIGL SERPL-MCNC: 97 MG/DL (ref 0–149)
VLDLC SERPL CALC-MCNC: 19 MG/DL (ref 5–40)

## 2020-08-28 DIAGNOSIS — I73.9 PAD (PERIPHERAL ARTERY DISEASE) (HCC): ICD-10-CM

## 2020-08-28 DIAGNOSIS — I25.10 CORONARY ARTERY DISEASE INVOLVING NATIVE CORONARY ARTERY OF NATIVE HEART WITHOUT ANGINA PECTORIS: ICD-10-CM

## 2020-08-30 RX ORDER — CLOPIDOGREL BISULFATE 75 MG/1
TABLET ORAL
Qty: 30 TABLET | Refills: 0 | Status: SHIPPED | OUTPATIENT
Start: 2020-08-30 | End: 2022-09-22

## 2020-08-31 ENCOUNTER — OFFICE VISIT (OUTPATIENT)
Dept: FAMILY MEDICINE CLINIC | Facility: CLINIC | Age: 75
End: 2020-08-31

## 2020-08-31 VITALS
WEIGHT: 210.6 LBS | BODY MASS INDEX: 29.48 KG/M2 | SYSTOLIC BLOOD PRESSURE: 128 MMHG | TEMPERATURE: 97.6 F | DIASTOLIC BLOOD PRESSURE: 72 MMHG | OXYGEN SATURATION: 96 % | HEIGHT: 71 IN | HEART RATE: 77 BPM

## 2020-08-31 DIAGNOSIS — Z79.4 TYPE 2 DIABETES MELLITUS WITH DIABETIC PERIPHERAL ANGIOPATHY AND GANGRENE, WITH LONG-TERM CURRENT USE OF INSULIN (HCC): Primary | ICD-10-CM

## 2020-08-31 DIAGNOSIS — E11.52 TYPE 2 DIABETES MELLITUS WITH DIABETIC PERIPHERAL ANGIOPATHY AND GANGRENE, WITH LONG-TERM CURRENT USE OF INSULIN (HCC): Primary | ICD-10-CM

## 2020-08-31 DIAGNOSIS — I25.10 CORONARY ARTERY DISEASE INVOLVING NATIVE CORONARY ARTERY OF NATIVE HEART WITHOUT ANGINA PECTORIS: ICD-10-CM

## 2020-08-31 DIAGNOSIS — E11.42 DIABETIC POLYNEUROPATHY ASSOCIATED WITH TYPE 2 DIABETES MELLITUS (HCC): ICD-10-CM

## 2020-08-31 DIAGNOSIS — I10 ESSENTIAL HYPERTENSION: ICD-10-CM

## 2020-08-31 PROCEDURE — 99214 OFFICE O/P EST MOD 30 MIN: CPT | Performed by: FAMILY MEDICINE

## 2020-08-31 RX ORDER — GABAPENTIN 300 MG/1
300 CAPSULE ORAL 2 TIMES DAILY
Qty: 180 CAPSULE | Refills: 1 | Status: SHIPPED | OUTPATIENT
Start: 2020-08-31 | End: 2021-03-03 | Stop reason: SDUPTHER

## 2020-08-31 NOTE — PROGRESS NOTES
OFFICE VISIT NOTE:    Darren Shay is a 75 y.o. male who presents today for Diabetes (f/u) and Peripheral Neuropathy (3 mth recheck).     Labs reviewed - A1c 6.9% - a bit higher but still controlled. Takes the gabapentin (mostly once a day, but does need an additional one sometimes.     Patient presents for follow-up of long term use of high risk medication (narcotics, sedatives, stimulants or other controlled medications). The patient has read and signed the [facility] Controlled Substance Contract - copy in chart and updated annually. A recent Chuck was reviewed and is present in the chart, updated annually and as needed. UDS has been reviewed and is up to date, and performed at least annually and PRN discretion of provider. No aberrant behavioral issues are noted, and no significant side effects/complications are present. The patient is aware of the potential for addiction and dependence of these medications and accepts responsibility for proper med use. Patient is aware of the PRN use of these medications (unless otherwise prescribed), and not to be used routinely.     Diabetes   He presents for his follow-up diabetic visit. He has type 2 diabetes mellitus. His disease course has been stable. There are no hypoglycemic associated symptoms. There are no diabetic associated symptoms. Pertinent negatives for diabetes include no chest pain, no fatigue and no weight loss. There are no hypoglycemic complications. Symptoms are stable. Diabetic complications include peripheral neuropathy. Current diabetic treatment includes diet. He is compliant with treatment all of the time. His weight is stable. He is following a diabetic and low fat/cholesterol diet. Meal planning includes avoidance of concentrated sweets. He participates in exercise three times a week. There is no change in his home blood glucose trend. He does not see a podiatrist.Eye exam is current.   Peripheral Neuropathy   This is a chronic problem. The  current episode started more than 1 year ago. The problem occurs constantly. The problem has been unchanged. Associated symptoms include coughing (rarely some white phlegm). Pertinent negatives include no abdominal pain, chest pain, fatigue, fever or rash. The symptoms are aggravated by stress, standing and walking. He has tried position changes and rest for the symptoms. The treatment provided moderate relief.        Past medical/surgical history, Family history, Social history, Allergies and Medications have been reviewed with the patient today and are updated in [facility] EMR. See below.  Past Medical History:   Diagnosis Date   • Cataracts, bilateral    • Colon cancer (CMS/HCC)    • Coronary artery disease    • Diabetes mellitus (CMS/HCC)    • Diabetic foot ulcers (CMS/HCC)    • Hypertension    • Ileostomy present (CMS/HCC)    • Neuropathy    • UC (ulcerative colitis confined to rectum) (CMS/[facility])      Past Surgical History:   Procedure Laterality Date   • CHOLECYSTECTOMY     • COLOSTOMY     • CORONARY ARTERY BYPASS GRAFT  1993    x5   • ORIF FOOT FRACTURE Right 1/17/2019    Procedure: PARTIAL REMOVAL OF BONE MEDIAL CUNEIFORM AND 1ST METATARSAL - RIGHT FOOT EXCISION OF ULCERATION;  Surgeon: Florin Kearns DPM;  Location: [facility] OR;  Service: Podiatry   • TOE AMPUTATION      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 at      Family History   Problem Relation Age of Onset   • Cancer Mother    • Heart disease Mother    • Diabetes Mother    • Cancer Father    • Diabetes Sister    • No Known Problems Brother    • Diabetes Sister    • No Known Problems Brother    • No Known Problems Brother    • No Known Problems Brother      Social History     Tobacco Use   • Smoking status: Current Some Day Smoker     Types: Pipe   • Smokeless tobacco: Never Used   Substance Use Topics   • Alcohol use: No     Frequency: Never     Binge frequency: Never   • Drug use: No       ALLERGIES:  Cephalexin    CURRENT MEDS:    Current Outpatient Medications:   •   "ACCU-CHEK SHAYY PLUS test strip, TEST BLOOD SUGAR THREE TIMES DAILY, Disp: 100 each, Rfl: 6  •  albuterol sulfate  (90 Base) MCG/ACT inhaler, Inhale 2 puffs Every 4 (Four) Hours As Needed for Wheezing or Shortness of Air., Disp: 18 g, Rfl: 5  •  aspirin 81 MG chewable tablet, Chew 81 mg Daily., Disp: , Rfl:   •  BD INSULIN SYRINGE U/F 31G X 5/16\" 1 ML misc, USE THREE TIMES DAILY AS DIRECTED , Disp: 270 each, Rfl: 5  •  cilostazol (PLETAL) 100 MG tablet, Take 1 tablet by mouth 2 (Two) Times a Day., Disp: 180 tablet, Rfl: 3  •  clopidogrel (PLAVIX) 75 MG tablet, TAKE 1 TABLET EVERY DAY, Disp: 30 tablet, Rfl: 0  •  folic acid (FOLVITE) 400 MCG tablet, Take 800 mcg by mouth Daily., Disp: , Rfl:   •  gabapentin (NEURONTIN) 300 MG capsule, Take 1 capsule by mouth 2 (two) times a day., Disp: 180 capsule, Rfl: 1  •  Insulin Glargine, 2 Unit Dial, (TOUJEO MAX SOLOSTAR) 300 UNIT/ML solution pen-injector injection, Inject 80 Units under the skin into the appropriate area as directed every night at bedtime., Disp: 3 pen, Rfl: 0  •  LANTUS 100 UNIT/ML injection, INJECT 80 UNITS SUBCUTANEOUSLY AT BEDTIME, Disp: 70 mL, Rfl: 1  •  metoprolol tartrate (LOPRESSOR) 50 MG tablet, TAKE 1 TABLET TWICE DAILY, Disp: 180 tablet, Rfl: 1  •  NOVOLOG 100 UNIT/ML injection, INJECT BELOW THE SKIN FOUR TIMES A DAY PER SLIDING SCALE, Disp: 100 mL, Rfl: 5  •  simvastatin (ZOCOR) 20 MG tablet, TAKE 1 TABLET AT BEDTIME, Disp: 90 tablet, Rfl: 1    REVIEW OF SYSTEMS:  Review of Systems   Constitutional: Negative for activity change, appetite change, fatigue, fever, unexpected weight gain and unexpected weight loss.   Respiratory: Positive for cough (rarely some white phlegm). Negative for shortness of breath.    Cardiovascular: Negative for chest pain.   Gastrointestinal: Negative for abdominal pain.   Genitourinary: Negative for difficulty urinating.   Skin: Negative for rash.   Neurological: Negative for syncope and headache.       PHYSICAL " "EXAMINATION:  Vital Signs:  /72 (BP Location: Left arm, Patient Position: Sitting, Cuff Size: Adult)   Pulse 77   Temp 97.6 °F (36.4 °C) (Infrared)   Ht 180.3 cm (71\")   Wt 95.5 kg (210 lb 9.6 oz)   SpO2 96%   BMI 29.37 kg/m²   Vitals:    08/31/20 1019   Patient Position: Sitting       Physical Exam   Constitutional: He is oriented to person, place, and time. He appears well-developed and well-nourished. No distress.   HENT:   Head: Normocephalic and atraumatic.   Mouth/Throat: Oropharynx is clear and moist.   Neck: Normal range of motion. Neck supple. No JVD present.   Cardiovascular: Normal rate, regular rhythm, normal heart sounds and intact distal pulses.   Pulmonary/Chest: Effort normal and breath sounds normal. No respiratory distress.   Abdominal: Soft. He exhibits no distension. There is no tenderness.   Musculoskeletal: Normal range of motion. He exhibits no edema.   Neurological: He is alert and oriented to person, place, and time. A sensory deficit (DPN as before) is present. No cranial nerve deficit.   Skin: Skin is warm and dry. Capillary refill takes less than 2 seconds. No rash noted.   Psychiatric: He has a normal mood and affect. His behavior is normal.   Nursing note and vitals reviewed.      Procedures    ASSESSMENT/ PLAN:  Problem List Items Addressed This Visit        Cardiovascular and Mediastinum    Hypertension    Coronary artery disease    Type 2 diabetes mellitus with diabetic peripheral angiopathy and gangrene, with long-term current use of insulin (CMS/[facility]) - Primary    Relevant Medications    gabapentin (NEURONTIN) 300 MG capsule       Endocrine    Diabetic polyneuropathy associated with type 2 diabetes mellitus (CMS/HCC)    Relevant Medications    gabapentin (NEURONTIN) 300 MG capsule       Other    BMI 29.0-29.9,adult            Specific Patient Instructions:  MEDICATION Instructions: Encouraged patient to continue routine medicines as prescribed and maintain compliance. " Patient instructed to report any adverse side effects or reactions to medicines promptly to the office. Patient instructed to make us aware of any OTC or herbal meds or supplement use.    DIET Recommendations: Patient instructed and provided information on the following nutrition and diet recommendations: Calorie restriction for weight reduction and maintenance. Necessity for adequate daily intake of fluids/water. Also, diet information provided in AVS for specifics.    EXERCISE Instructions: Discussed with patient the need for routine aerobic activity for cardiovascular fitness, 3 times a week for about 30 minutes. Daily exercise for increased fitness and weight reduction goals.    SMOKING Recommendations: Counseled patient and encouraged them on smoking and tobacco cessation if applicable. Discussed the benefits to all body systems with smoking/tobacco cessation, including decreased cardiac/lung/stroke/cancer risk. Encouraged no vaping use.    HEALTH MAINTENANCE:  Counseling provided to patient/family about routine health maintenance and ANNUAL physicals/labs. Counseling on recommended Vaccinations appropriate for age needed.        Patient's Body mass index is 29.37 kg/m². BMI is above normal parameters. Recommendations include: exercise counseling and nutrition counseling.      Medications or Orders placed this visit:  No orders of the defined types were placed in this encounter.      Medications DISCONTINUED this visit:  Medications Discontinued During This Encounter   Medication Reason   • gabapentin (NEURONTIN) 300 MG capsule Reorder       FOLLOW-UP:  Return in about 3 months (around 11/30/2020) for Recheck, Next scheduled follow up.    I discussed the patients findings and my recommendations with patient.  An After Visit Summary (AVS) was printed and given to the patient at discharge.        Robin Freedman MD, FAAFP  8/31/2020

## 2020-09-24 DIAGNOSIS — E11.9 TYPE 2 DIABETES MELLITUS WITHOUT COMPLICATION, UNSPECIFIED WHETHER LONG TERM INSULIN USE (HCC): ICD-10-CM

## 2020-09-24 RX ORDER — SYRINGE AND NEEDLE,INSULIN,1ML 31 GX5/16"
SYRINGE, EMPTY DISPOSABLE MISCELLANEOUS
Qty: 300 EACH | Refills: 5 | Status: ON HOLD | OUTPATIENT
Start: 2020-09-24 | End: 2022-09-22

## 2020-11-05 DIAGNOSIS — E11.52 TYPE 2 DIABETES MELLITUS WITH DIABETIC PERIPHERAL ANGIOPATHY AND GANGRENE, WITH LONG-TERM CURRENT USE OF INSULIN (HCC): ICD-10-CM

## 2020-11-05 DIAGNOSIS — Z79.4 TYPE 2 DIABETES MELLITUS WITH DIABETIC PERIPHERAL ANGIOPATHY AND GANGRENE, WITH LONG-TERM CURRENT USE OF INSULIN (HCC): ICD-10-CM

## 2020-11-05 RX ORDER — BLOOD SUGAR DIAGNOSTIC
STRIP MISCELLANEOUS
Qty: 400 EACH | Refills: 3 | Status: SHIPPED | OUTPATIENT
Start: 2020-11-05 | End: 2021-10-25

## 2020-11-18 DIAGNOSIS — Z79.4 TYPE 2 DIABETES MELLITUS WITH DIABETIC PERIPHERAL ANGIOPATHY AND GANGRENE, WITH LONG-TERM CURRENT USE OF INSULIN (HCC): ICD-10-CM

## 2020-11-18 DIAGNOSIS — E11.52 TYPE 2 DIABETES MELLITUS WITH DIABETIC PERIPHERAL ANGIOPATHY AND GANGRENE, WITH LONG-TERM CURRENT USE OF INSULIN (HCC): ICD-10-CM

## 2020-11-18 RX ORDER — INSULIN GLARGINE 300 U/ML
80 INJECTION, SOLUTION SUBCUTANEOUS
Qty: 3 ML | Refills: 3 | Status: SHIPPED | OUTPATIENT
Start: 2020-11-18 | End: 2020-12-04 | Stop reason: ALTCHOICE

## 2020-11-18 NOTE — TELEPHONE ENCOUNTER
Needs refill on    LANTUS 100 UNIT/ML injection [[id number]] (Order [id number]    [facility] - [place], [place] - [address] - [phone]  - [phone] FX

## 2020-11-19 RX ORDER — INSULIN GLARGINE 100 [IU]/ML
INJECTION, SOLUTION SUBCUTANEOUS
Qty: 70 ML | Refills: 1 | OUTPATIENT
Start: 2020-11-19

## 2020-11-30 ENCOUNTER — CLINICAL SUPPORT (OUTPATIENT)
Dept: FAMILY MEDICINE CLINIC | Facility: CLINIC | Age: 75
End: 2020-11-30

## 2020-11-30 DIAGNOSIS — Z12.5 SPECIAL SCREENING FOR MALIGNANT NEOPLASM OF PROSTATE: ICD-10-CM

## 2020-11-30 DIAGNOSIS — Z79.4 TYPE 2 DIABETES MELLITUS WITH DIABETIC PERIPHERAL ANGIOPATHY AND GANGRENE, WITH LONG-TERM CURRENT USE OF INSULIN (HCC): Primary | ICD-10-CM

## 2020-11-30 DIAGNOSIS — R53.83 FATIGUE, UNSPECIFIED TYPE: ICD-10-CM

## 2020-11-30 DIAGNOSIS — Z00.00 ENCOUNTER FOR MEDICARE ANNUAL WELLNESS EXAM: ICD-10-CM

## 2020-11-30 DIAGNOSIS — E11.52 TYPE 2 DIABETES MELLITUS WITH DIABETIC PERIPHERAL ANGIOPATHY AND GANGRENE, WITH LONG-TERM CURRENT USE OF INSULIN (HCC): Primary | ICD-10-CM

## 2020-11-30 DIAGNOSIS — E78.2 MIXED HYPERLIPIDEMIA: ICD-10-CM

## 2020-11-30 DIAGNOSIS — I10 ESSENTIAL HYPERTENSION: ICD-10-CM

## 2020-12-01 LAB
ALBUMIN SERPL-MCNC: 3.8 G/DL (ref 3.7–4.7)
ALBUMIN/GLOB SERPL: 1.2 {RATIO} (ref 1.2–2.2)
ALP SERPL-CCNC: 83 IU/L (ref 39–117)
ALT SERPL-CCNC: 28 IU/L (ref 0–44)
AST SERPL-CCNC: 19 IU/L (ref 0–40)
BASOPHILS # BLD AUTO: 0.1 X10E3/UL (ref 0–0.2)
BASOPHILS NFR BLD AUTO: 1 %
BILIRUB SERPL-MCNC: 0.9 MG/DL (ref 0–1.2)
BUN SERPL-MCNC: 15 MG/DL (ref 8–27)
BUN/CREAT SERPL: 13 (ref 10–24)
CALCIUM SERPL-MCNC: 9.3 MG/DL (ref 8.6–10.2)
CHLORIDE SERPL-SCNC: 104 MMOL/L (ref 96–106)
CHOLEST SERPL-MCNC: 101 MG/DL (ref 100–199)
CO2 SERPL-SCNC: 20 MMOL/L (ref 20–29)
CREAT SERPL-MCNC: 1.13 MG/DL (ref 0.76–1.27)
EOSINOPHIL # BLD AUTO: 0.2 X10E3/UL (ref 0–0.4)
EOSINOPHIL NFR BLD AUTO: 2 %
ERYTHROCYTE [DISTWIDTH] IN BLOOD BY AUTOMATED COUNT: 12.1 % (ref 11.6–15.4)
GLOBULIN SER CALC-MCNC: 3.1 G/DL (ref 1.5–4.5)
GLUCOSE SERPL-MCNC: 178 MG/DL (ref 65–99)
HBA1C MFR BLD: 6.8 % (ref 4.8–5.6)
HCT VFR BLD AUTO: 46.2 % (ref 37.5–51)
HCV AB S/CO SERPL IA: 0.3 S/CO RATIO (ref 0–0.9)
HDLC SERPL-MCNC: 28 MG/DL
HGB BLD-MCNC: 15.6 G/DL (ref 13–17.7)
IMM GRANULOCYTES # BLD AUTO: 0.1 X10E3/UL (ref 0–0.1)
IMM GRANULOCYTES NFR BLD AUTO: 1 %
LDLC SERPL CALC-MCNC: 56 MG/DL (ref 0–99)
LDLC/HDLC SERPL: 2 RATIO (ref 0–3.6)
LYMPHOCYTES # BLD AUTO: 1.5 X10E3/UL (ref 0.7–3.1)
LYMPHOCYTES NFR BLD AUTO: 17 %
MCH RBC QN AUTO: 32.6 PG (ref 26.6–33)
MCHC RBC AUTO-ENTMCNC: 33.8 G/DL (ref 31.5–35.7)
MCV RBC AUTO: 97 FL (ref 79–97)
MICROALBUMIN UR-MCNC: 26.7 UG/ML
MONOCYTES # BLD AUTO: 0.7 X10E3/UL (ref 0.1–0.9)
MONOCYTES NFR BLD AUTO: 8 %
NEUTROPHILS # BLD AUTO: 6.2 X10E3/UL (ref 1.4–7)
NEUTROPHILS NFR BLD AUTO: 71 %
PLATELET # BLD AUTO: 187 X10E3/UL (ref 150–450)
POTASSIUM SERPL-SCNC: 4.4 MMOL/L (ref 3.5–5.2)
PROT SERPL-MCNC: 6.9 G/DL (ref 6–8.5)
PSA SERPL-MCNC: 0.7 NG/ML (ref 0–4)
RBC # BLD AUTO: 4.79 X10E6/UL (ref 4.14–5.8)
SODIUM SERPL-SCNC: 140 MMOL/L (ref 134–144)
T4 SERPL-MCNC: 8.3 UG/DL (ref 4.5–12)
TRIGL SERPL-MCNC: 85 MG/DL (ref 0–149)
TSH SERPL DL<=0.005 MIU/L-ACNC: 1.12 UIU/ML (ref 0.45–4.5)
VLDLC SERPL CALC-MCNC: 17 MG/DL (ref 5–40)
WBC # BLD AUTO: 8.8 X10E3/UL (ref 3.4–10.8)

## 2020-12-04 ENCOUNTER — OFFICE VISIT (OUTPATIENT)
Dept: FAMILY MEDICINE CLINIC | Facility: CLINIC | Age: 75
End: 2020-12-04

## 2020-12-04 VITALS
HEART RATE: 73 BPM | OXYGEN SATURATION: 99 % | TEMPERATURE: 97.7 F | DIASTOLIC BLOOD PRESSURE: 69 MMHG | WEIGHT: 205.6 LBS | BODY MASS INDEX: 28.78 KG/M2 | HEIGHT: 71 IN | SYSTOLIC BLOOD PRESSURE: 156 MMHG

## 2020-12-04 DIAGNOSIS — Z79.4 TYPE 2 DIABETES MELLITUS WITH DIABETIC PERIPHERAL ANGIOPATHY AND GANGRENE, WITH LONG-TERM CURRENT USE OF INSULIN (HCC): ICD-10-CM

## 2020-12-04 DIAGNOSIS — E11.52 TYPE 2 DIABETES MELLITUS WITH DIABETIC PERIPHERAL ANGIOPATHY AND GANGRENE, WITH LONG-TERM CURRENT USE OF INSULIN (HCC): ICD-10-CM

## 2020-12-04 DIAGNOSIS — K29.00 ACUTE SUPERFICIAL GASTRITIS WITHOUT HEMORRHAGE: ICD-10-CM

## 2020-12-04 DIAGNOSIS — I10 ESSENTIAL HYPERTENSION: ICD-10-CM

## 2020-12-04 DIAGNOSIS — Z00.00 MEDICARE ANNUAL WELLNESS VISIT, SUBSEQUENT: Primary | ICD-10-CM

## 2020-12-04 DIAGNOSIS — E11.42 DIABETIC POLYNEUROPATHY ASSOCIATED WITH TYPE 2 DIABETES MELLITUS (HCC): ICD-10-CM

## 2020-12-04 DIAGNOSIS — I25.10 CORONARY ARTERY DISEASE INVOLVING NATIVE CORONARY ARTERY OF NATIVE HEART WITHOUT ANGINA PECTORIS: ICD-10-CM

## 2020-12-04 PROCEDURE — 96160 PT-FOCUSED HLTH RISK ASSMT: CPT | Performed by: FAMILY MEDICINE

## 2020-12-04 PROCEDURE — 1170F FXNL STATUS ASSESSED: CPT | Performed by: FAMILY MEDICINE

## 2020-12-04 PROCEDURE — 1159F MED LIST DOCD IN RCRD: CPT | Performed by: FAMILY MEDICINE

## 2020-12-04 PROCEDURE — G0439 PPPS, SUBSEQ VISIT: HCPCS | Performed by: FAMILY MEDICINE

## 2020-12-04 RX ORDER — INSULIN GLARGINE 100 [IU]/ML
82 INJECTION, SOLUTION SUBCUTANEOUS NIGHTLY
Qty: 100 ML | Refills: 2 | Status: SHIPPED | OUTPATIENT
Start: 2020-12-04 | End: 2021-08-20

## 2020-12-04 RX ORDER — PANTOPRAZOLE SODIUM 40 MG/1
40 TABLET, DELAYED RELEASE ORAL DAILY
Qty: 30 TABLET | Refills: 1 | Status: SHIPPED | OUTPATIENT
Start: 2020-12-04 | End: 2021-02-02 | Stop reason: SDUPTHER

## 2020-12-04 NOTE — PATIENT INSTRUCTIONS
Diabetes: Insulin non dependent. Nephropathy, Retinopathy, Neuropahty status discussed.  Discussed goals of Diabetes today.  Goal Hgb A1C <7.0 for most patient.  Good BP control is encouraged with Goal BP based on JNC 8 guidelines 2014 <140/90.  Discussed role of Ace-I and ARB with DM.  DM imparts risk equivalence for CAD based on ATP III.  Current guidelines support moderate intensity statin with goal of 30-50% reduction in LDL unless 10 yr risk ASCVD >7.5 then high intensity should be used. Close monitoring of Lipid levels encouraged. Recommend once yearly eye evaluation by optometry or ophthalmology.  Good foot health discussed and foot exam completed.  Recommended toe health, wear good shoes, cut nails straight across and tend calluses if present. Take medications as encouraged.  Monitor blood sugars as encouraged and bring log to future meetings. Weight needs to be monitored. Monitor portions and caloric intake.  Pneumovax frequency discussed.   a. Labs: CMP, Microalbumin, A1C  b. Encouraged pt to bring glucose logs to each appointment  c. Encouraged self foot exams, and yearly eye exams.  d. Encouraged to lose weight/please see information provided  e. Recommend regular exercise   f. Medications as listed in today's visit      Suspect Essential HTN.Good BP control is encouraged with Goal BP based on JNC 8 guidelines 2014 <140/90 for patients with known cardiac disease and diabetes. (FRANCINE. 2014:322 (5):507-520. doi:10.1001/francine.2013.69483): general population <60 yr old goal BP <140/90 and for those >60 <150/90.  For patients of all ages with Diabetes, CKD, Known CAD <140/90. Recommended to the patient to obtain electronic home BP machine with upper arm blood pressure cuff and to check regularly as instructed.  Keep BP log and bring to subsequent visits. Stable, at goal.  a. LABS: routine for hypertension recommended and ordered if necessary.  b. Recommend if you do not have a home BP machine to obtain an  electronic machine with arm blood pressure cuff.      c. Monitor BP over the next week and keep log to bring back to office. Discussed medication therapy however pt wants to try to control with diet exercise. .  Your provider  has recommended self-monitoring of your blood pressure.  If you do not have a blood pressure cuff you may purchase one from the local pharmacy.  You may ask the pharmacist which brand and model they recommend.  Obtain your blood pressure measurement at least 2x per week.  You should also check your blood pressure if you experience any symptoms of blurred visit, dizziness or headache.  Please record all blood pressure measurements and bring them to next office visit.  If you have any questions about the accuracy of your blood pressure machine please bring it in to the office and our staff will be happy to check accuracy.   d. Encouraged to eat a low sodium heart healthy diet  e. Offered handout on HTN educational topics.  These were provided if patient requested these today.  f. MEDS: as listed in today's visit.  g. Risks/benefits of current and new medications discussed with the patient and or family today.  The patient/family are aware and accept that if there any side effects they should call or return to clinic as soon as possible.  Appropriate F/U discussed for topics addressed today. All questions were answered to the  satisfactory state of patient/family.  Should symptoms fail to improve or worsen they agree to call or return to clinic or to go to the ER. Education handouts were offered on any new Rx if requested.  Discussed the importance of following up with any needed screening tests/labs/specialist appointments and any requested follow-up recommended by me today.  Importance of maintaining follow-up discussed and patient accepts that missed appointments can delay diagnosis and potentially lead to worsening of conditions.

## 2020-12-04 NOTE — PROGRESS NOTES
The ABCs of the Annual Wellness Visit  Subsequent Medicare Wellness Visit    Chief Complaint   Patient presents with   • Medicare Wellness-subsequent     3 mo fu       Subjective   History of Present Illness:  Darren Shay is a 75 y.o. male who presents for a Subsequent Medicare Wellness Visit.    HEALTH RISK ASSESSMENT    Recent Hospitalizations:  No hospitalization(s) within the last year.  Labs great - A1c 6.8% 4 days ago. Insurance changed his long-acting insulin to Tuojeo instead of Lantus. Got the pens without needles.     Current Medical Providers:  Patient Care Team:  Robin Freedman MD as PCP - General  Robin Freedman MD as PCP - Family Medicine  Maty Toscano APRN as Nurse Practitioner (Hospitalist)  Robin Freedman MD as Referring Physician (Family Medicine)  Ash Quiroz MD as Consulting Physician (Otolaryngology)    Smoking Status:  Social History     Tobacco Use   Smoking Status Current Some Day Smoker   • Years: 10.00   • Types: Pipe   Smokeless Tobacco Never Used       Alcohol Consumption:  Social History     Substance and Sexual Activity   Alcohol Use No   • Frequency: Never   • Binge frequency: Never       Depression Screen:   PHQ-2/PHQ-9 Depression Screening 12/4/2020   Little interest or pleasure in doing things 1   Feeling down, depressed, or hopeless 1   Trouble falling or staying asleep, or sleeping too much 1   Feeling tired or having little energy 3   Poor appetite or overeating 0   Feeling bad about yourself - or that you are a failure or have let yourself or your family down 0   Trouble concentrating on things, such as reading the newspaper or watching television 0   Moving or speaking so slowly that other people could have noticed. Or the opposite - being so fidgety or restless that you have been moving around a lot more than usual 0   Thoughts that you would be better off dead, or of hurting yourself in some way 1   Total Score 7   If you checked off any  problems, how difficult have these problems made it for you to do your work, take care of things at home, or get along with other people? Not difficult at all       Fall Risk Screen:  ARELI Fall Risk Assessment was completed, and patient is at HIGH risk for falls. Assessment completed on:12/4/2020    Health Habits and Functional and Cognitive Screening:  Functional & Cognitive Status 12/2/2019   Do you have difficulty preparing food and eating? No   Do you have difficulty bathing yourself, getting dressed or grooming yourself? No   Do you have difficulty using the toilet? No   Do you have difficulty moving around from place to place? No   Do you have trouble with steps or getting out of a bed or a chair? No   Current Diet Well Balanced Diet   Dental Exam Up to date   Eye Exam Up to date   Exercise (times per week) 0 times per week   Current Exercise Activities Include None   Do you need help using the phone?  No   Are you deaf or do you have serious difficulty hearing?  No   Do you need help with transportation? No   Do you need help shopping? No   Do you need help preparing meals?  No   Do you need help with housework?  No   Do you need help with laundry? No   Do you need help taking your medications? No   Do you need help managing money? No   Do you ever drive or ride in a car without wearing a seat belt? No   Have you felt unusual stress, anger or loneliness in the last month? No   Who do you live with? Spouse   If you need help, do you have trouble finding someone available to you? No   Have you been bothered in the last four weeks by sexual problems? No   Do you have difficulty concentrating, remembering or making decisions? No         Does the patient have evidence of cognitive impairment? No    Asprin use counseling:Taking ASA appropriately as indicated    Age-appropriate Screening Schedule:  Refer to the list below for future screening recommendations based on patient's age, sex and/or medical conditions.  "Orders for these recommended tests are listed in the plan section. The patient has been provided with a written plan.    Health Maintenance   Topic Date Due   • TDAP/TD VACCINES (1 - Tdap) [birth year]/1962   • DIABETIC EYE EXAM  01/22/2021 (Originally 10/24/2018)   • ZOSTER VACCINE (1 of 2) 08/31/2021 (Originally 8/3/1993)   • HEMOGLOBIN A1C  05/30/2021   • LIPID PANEL  11/30/2021   • URINE MICROALBUMIN  11/30/2021   • INFLUENZA VACCINE  Completed          The following portions of the patient's history were reviewed and updated as appropriate: allergies, current medications, past family history, past medical history, past social history, past surgical history and problem list.    Outpatient Medications Prior to Visit   Medication Sig Dispense Refill   • Accu-Chek Jessica Plus test strip TEST BLOOD SUGAR THREE TIMES DAILY 400 each 3   • albuterol sulfate  (90 Base) MCG/ACT inhaler Inhale 2 puffs Every 4 (Four) Hours As Needed for Wheezing or Shortness of Air. 18 g 5   • aspirin 81 MG chewable tablet Chew 81 mg Daily.     • cilostazol (PLETAL) 100 MG tablet Take 1 tablet by mouth 2 (Two) Times a Day. 180 tablet 3   • clopidogrel (PLAVIX) 75 MG tablet TAKE 1 TABLET EVERY DAY 30 tablet 0   • Droplet Insulin Syringe 31G X 5/16\" 1 ML misc USE THREE TIMES DAILY AS DIRECTED 300 each 5   • folic acid (FOLVITE) 400 MCG tablet Take 800 mcg by mouth Daily.     • gabapentin (NEURONTIN) 300 MG capsule Take 1 capsule by mouth 2 (two) times a day. 180 capsule 1   • insulin aspart (novoLOG) 100 UNIT/ML injection INJECT BELOW THE SKIN FOUR TIMES A DAY PER SLIDING SCALE. DISCARD OPENED VIAL AFTER 28 DAYS 100 mL 5   • metoprolol tartrate (LOPRESSOR) 50 MG tablet TAKE 1 TABLET TWICE DAILY 180 tablet 1   • simvastatin (ZOCOR) 20 MG tablet TAKE 1 TABLET AT BEDTIME 90 tablet 1   • Insulin Glargine, 2 Unit Dial, (Toujeo Max SoloStar) 300 UNIT/ML solution pen-injector injection Inject 80 Units under the skin into the appropriate area as " directed every night at bedtime. 3 mL 3   • LANTUS 100 UNIT/ML injection INJECT 80 UNITS SUBCUTANEOUSLY AT BEDTIME 70 mL 1     No facility-administered medications prior to visit.        Patient Active Problem List   Diagnosis   • UTI (urinary tract infection)   • Hypertension   • Diabetes mellitus (CMS/HCC)   • Coronary artery disease   • Metabolic encephalopathy   • Scrotal infection   • Diabetic foot (CMS/HCC)   • Atherosclerosis of native artery of both lower extremities with intermittent claudication (CMS/HCC)   • BMI 29.0-29.9,adult   • Mixed hyperlipidemia   • SNHL (sensory-neural hearing loss), asymmetrical   • Diabetic polyneuropathy associated with type 2 diabetes mellitus (CMS/HCC)   • Erectile dysfunction   • Gangrene of toe (CMS/HCC)   • Open wound of toe with complication   • 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 Patient does not have an advance directive, information provided.    Review of Systems   Constitutional: Negative for activity change, appetite change, fatigue and unexpected weight change.   Respiratory: Negative for cough and shortness of breath.    Cardiovascular: Negative for chest pain.   Gastrointestinal: Negative for abdominal pain and blood in stool.   Genitourinary: Negative for difficulty urinating.   Skin: Negative for rash.   Neurological: Negative for syncope and headaches.       Compared to one year ago, the patient feels his physical health is the same.  Compared to one year ago, the patient feels his mental health is the same.    Reviewed chart for potential of high risk medication in the elderly: yes  Reviewed chart for potential of harmful drug interactions in the elderly:yes    Objective         Vitals:    12/04/20 0948   BP: 156/69   BP Location: Left arm   Patient Position:  "Sitting   Cuff Size: Adult   Pulse: 73   Temp: 97.7 °F (36.5 °C)   TempSrc: Infrared   SpO2: 99%   Weight: 93.3 kg (205 lb 9.6 oz)   Height: 180.3 cm (71\")       Body mass index is 28.68 kg/m².  Discussed the patient's BMI with him. The BMI is above average; BMI management plan is completed.    Physical Exam  Vitals signs and nursing note reviewed.   Constitutional:       General: He is not in acute distress.     Appearance: He is well-developed.   HENT:      Head: Normocephalic and atraumatic.      Mouth/Throat:      Mouth: Mucous membranes are moist.      Pharynx: Oropharynx is clear.   Eyes:      Extraocular Movements: Extraocular movements intact.      Pupils: Pupils are equal, round, and reactive to light.   Neck:      Musculoskeletal: Normal range of motion and neck supple.      Vascular: No JVD.   Cardiovascular:      Rate and Rhythm: Normal rate and regular rhythm.      Pulses: Normal pulses.      Heart sounds: Normal heart sounds.   Pulmonary:      Effort: Pulmonary effort is normal. No respiratory distress.      Breath sounds: Normal breath sounds.   Abdominal:      General: Bowel sounds are normal. There is no distension.      Palpations: Abdomen is soft.      Tenderness: There is no abdominal tenderness.   Musculoskeletal: Normal range of motion.   Skin:     General: Skin is warm and dry.      Capillary Refill: Capillary refill takes less than 2 seconds.      Findings: No rash.   Neurological:      General: No focal deficit present.      Mental Status: He is alert and oriented to person, place, and time.      Cranial Nerves: No cranial nerve deficit.   Psychiatric:         Mood and Affect: Mood normal.         Behavior: Behavior normal.         Lab Results   Component Value Date     (H) 11/30/2020    CHLPL 101 11/30/2020    TRIG 85 11/30/2020    HDL 28 (L) 11/30/2020    LDL 56 11/30/2020    VLDL 17 11/30/2020    HGBA1C 6.8 (H) 11/30/2020        Assessment/Plan   Medicare Risks and Personalized " Health Plan  CMS Preventative Services Quick Reference  Advance Directive Discussion  Cardiovascular risk  Chronic Pain   Colon Cancer Screening  Diabetic Lab Screening   Inactivity/Sedentary  Obesity/Overweight   Polypharmacy  Prostate Cancer Screening     The above risks/problems have been discussed with the patient.  Pertinent information has been shared with the patient in the After Visit Summary.  Follow up plans and orders are seen below in the Assessment/Plan Section.    Diagnoses and all orders for this visit:    1. Medicare annual wellness visit, subsequent (Primary)    2. 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  Dispense: 100 mL; Refill: 2    3. Essential hypertension    4. Coronary artery disease involving native coronary artery of native heart without angina pectoris    5. Diabetic polyneuropathy associated with type 2 diabetes mellitus (CMS/[facility])    6. Acute superficial gastritis without hemorrhage  -     pantoprazole (Protonix) 40 MG EC tablet; Take 1 tablet by mouth Daily.  Dispense: 30 tablet; Refill: 1      Follow Up:  Return in about 3 months (around 3/4/2021) for Recheck, Next scheduled follow up.     An After Visit Summary and PPPS were given to the patient.

## 2021-02-02 DIAGNOSIS — K29.00 ACUTE SUPERFICIAL GASTRITIS WITHOUT HEMORRHAGE: ICD-10-CM

## 2021-02-02 RX ORDER — PANTOPRAZOLE SODIUM 40 MG/1
40 TABLET, DELAYED RELEASE ORAL DAILY
Qty: 30 TABLET | Refills: 2 | Status: SHIPPED | OUTPATIENT
Start: 2021-02-02 | End: 2021-05-03 | Stop reason: SDUPTHER

## 2021-02-02 NOTE — TELEPHONE ENCOUNTER
Caller: Darren Shay    Relationship: Self    Best call back number:[phone]    Medication needed:   Requested Prescriptions     Pending Prescriptions Disp Refills   • pantoprazole (Protonix) 40 MG EC tablet 30 tablet 1     Sig: Take 1 tablet by mouth Daily.       When do you need the refill by: TODAY      Does the patient have less than a 3 day supply:  [x] Yes  [] No    What is the patient's preferred pharmacy: [facility] [address] [phone] [facility] [phone]

## 2021-02-03 RX ORDER — METOPROLOL TARTRATE 50 MG/1
TABLET, FILM COATED ORAL
Qty: 180 TABLET | Refills: 1 | Status: SHIPPED | OUTPATIENT
Start: 2021-02-03 | End: 2021-06-10

## 2021-02-22 ENCOUNTER — CLINICAL SUPPORT (OUTPATIENT)
Dept: FAMILY MEDICINE CLINIC | Facility: CLINIC | Age: 76
End: 2021-02-22

## 2021-02-22 DIAGNOSIS — Z79.4 TYPE 2 DIABETES MELLITUS WITH DIABETIC PERIPHERAL ANGIOPATHY AND GANGRENE, WITH LONG-TERM CURRENT USE OF INSULIN (HCC): Primary | ICD-10-CM

## 2021-02-22 DIAGNOSIS — E11.52 TYPE 2 DIABETES MELLITUS WITH DIABETIC PERIPHERAL ANGIOPATHY AND GANGRENE, WITH LONG-TERM CURRENT USE OF INSULIN (HCC): Primary | ICD-10-CM

## 2021-02-23 LAB
ALBUMIN SERPL-MCNC: 3.9 G/DL (ref 3.7–4.7)
ALBUMIN/GLOB SERPL: 1.3 {RATIO} (ref 1.2–2.2)
ALP SERPL-CCNC: 76 IU/L (ref 39–117)
ALT SERPL-CCNC: 29 IU/L (ref 0–44)
AST SERPL-CCNC: 23 IU/L (ref 0–40)
BILIRUB SERPL-MCNC: 0.8 MG/DL (ref 0–1.2)
BUN SERPL-MCNC: 12 MG/DL (ref 8–27)
BUN/CREAT SERPL: 11 (ref 10–24)
CALCIUM SERPL-MCNC: 9.4 MG/DL (ref 8.6–10.2)
CHLORIDE SERPL-SCNC: 107 MMOL/L (ref 96–106)
CO2 SERPL-SCNC: 22 MMOL/L (ref 20–29)
CREAT SERPL-MCNC: 1.08 MG/DL (ref 0.76–1.27)
GLOBULIN SER CALC-MCNC: 2.9 G/DL (ref 1.5–4.5)
GLUCOSE SERPL-MCNC: 100 MG/DL (ref 65–99)
HBA1C MFR BLD: 6.8 % (ref 4.8–5.6)
POTASSIUM SERPL-SCNC: 4.5 MMOL/L (ref 3.5–5.2)
PROT SERPL-MCNC: 6.8 G/DL (ref 6–8.5)
SODIUM SERPL-SCNC: 141 MMOL/L (ref 134–144)

## 2021-03-03 ENCOUNTER — OFFICE VISIT (OUTPATIENT)
Dept: FAMILY MEDICINE CLINIC | Facility: CLINIC | Age: 76
End: 2021-03-03

## 2021-03-03 VITALS
HEIGHT: 71 IN | DIASTOLIC BLOOD PRESSURE: 69 MMHG | OXYGEN SATURATION: 96 % | WEIGHT: 210.8 LBS | TEMPERATURE: 98 F | SYSTOLIC BLOOD PRESSURE: 143 MMHG | HEART RATE: 76 BPM | BODY MASS INDEX: 29.51 KG/M2

## 2021-03-03 DIAGNOSIS — E78.2 MIXED HYPERLIPIDEMIA: ICD-10-CM

## 2021-03-03 DIAGNOSIS — I10 ESSENTIAL HYPERTENSION: ICD-10-CM

## 2021-03-03 DIAGNOSIS — Z79.4 TYPE 2 DIABETES MELLITUS WITH DIABETIC PERIPHERAL ANGIOPATHY AND GANGRENE, WITH LONG-TERM CURRENT USE OF INSULIN (HCC): Primary | ICD-10-CM

## 2021-03-03 DIAGNOSIS — E11.52 TYPE 2 DIABETES MELLITUS WITH DIABETIC PERIPHERAL ANGIOPATHY AND GANGRENE, WITH LONG-TERM CURRENT USE OF INSULIN (HCC): Primary | ICD-10-CM

## 2021-03-03 DIAGNOSIS — Z79.899 ENCOUNTER FOR LONG-TERM (CURRENT) USE OF HIGH-RISK MEDICATION: ICD-10-CM

## 2021-03-03 DIAGNOSIS — E11.42 DIABETIC POLYNEUROPATHY ASSOCIATED WITH TYPE 2 DIABETES MELLITUS (HCC): ICD-10-CM

## 2021-03-03 PROCEDURE — 99214 OFFICE O/P EST MOD 30 MIN: CPT | Performed by: FAMILY MEDICINE

## 2021-03-03 RX ORDER — GABAPENTIN 300 MG/1
300 CAPSULE ORAL 2 TIMES DAILY
Qty: 180 CAPSULE | Refills: 1 | Status: SHIPPED | OUTPATIENT
Start: 2021-03-03 | End: 2022-09-27 | Stop reason: HOSPADM

## 2021-03-03 NOTE — PATIENT INSTRUCTIONS
Diabetes: Insulin non dependent. Nephropathy, Retinopathy, Neuropahty status discussed.  Discussed goals of Diabetes today.  Goal Hgb A1C <7.0 for most patient.  Good BP control is encouraged with Goal BP based on JNC 8 guidelines 2014 <140/90.  Discussed role of Ace-I and ARB with DM.  DM imparts risk equivalence for CAD based on ATP III.  Current guidelines support moderate intensity statin with goal of 30-50% reduction in LDL unless 10 yr risk ASCVD >7.5 then high intensity should be used. Close monitoring of Lipid levels encouraged. Recommend once yearly eye evaluation by optometry or ophthalmology.  Good foot health discussed and foot exam completed.  Recommended toe health, wear good shoes, cut nails straight across and tend calluses if present. Take medications as encouraged.  Monitor blood sugars as encouraged and bring log to future meetings. Weight needs to be monitored. Monitor portions and caloric intake.  Pneumovax frequency discussed.   a. Labs: CMP, Microalbumin, A1C  b. Encouraged pt to bring glucose logs to each appointment  c. Encouraged self foot exams, and yearly eye exams.  d. Encouraged to lose weight/please see information provided  e. Recommend regular exercise   f. Medications as listed in today's visit        Peripheral Neuropathy  Peripheral neuropathy is a type of nerve damage. It affects nerves that carry signals between the spinal cord and the arms, legs, and the rest of the body (peripheral nerves). It does not affect nerves in the spinal cord or brain. In peripheral neuropathy, one nerve or a group of nerves may be damaged. Peripheral neuropathy is a broad category that includes many specific nerve disorders, like diabetic neuropathy, hereditary neuropathy, and carpal tunnel syndrome.  What are the causes?  This condition may be caused by:  · Diabetes. This is the most common cause of peripheral neuropathy.  · Nerve injury.  · Pressure or stress on a nerve that lasts a long  time.  · Lack (deficiency) of B vitamins. This can result from alcoholism, poor diet, or a restricted diet.  · Infections.  · Autoimmune diseases, such as rheumatoid arthritis and systemic lupus erythematosus.  · Nerve diseases that are passed from parent to child (inherited).  · Some medicines, such as cancer medicines (chemotherapy).  · Poisonous (toxic) substances, such as lead and mercury.  · Too little blood flowing to the legs.  · Kidney disease.  · Thyroid disease.  In some cases, the cause of this condition is not known.  What are the signs or symptoms?  Symptoms of this condition depend on which of your nerves is damaged. Common symptoms include:  · Loss of feeling (numbness) in the feet, hands, or both.  · Tingling in the feet, hands, or both.  · Burning pain.  · Very sensitive skin.  · Weakness.  · Not being able to move a part of the body (paralysis).  · Muscle twitching.  · Clumsiness or poor coordination.  · Loss of balance.  · Not being able to control your bladder.  · Feeling dizzy.  · Sexual problems.  How is this diagnosed?  Diagnosing and finding the cause of peripheral neuropathy can be difficult. Your health care provider will take your medical history and do a physical exam. A neurological exam will also be done. This involves checking things that are affected by your brain, spinal cord, and nerves (nervous system). For example, your health care provider will check your reflexes, how you move, and what you can feel.  You may have other tests, such as:  · Blood tests.  · Electromyogram (EMG) and nerve conduction tests. These tests check nerve function and how well the nerves are controlling the muscles.  · Imaging tests, such as CT scans or MRI to rule out other causes of your symptoms.  · Removing a small piece of nerve to be examined in a lab (nerve biopsy). This is rare.  · Removing and examining a small amount of the fluid that surrounds the brain and spinal cord (lumbar puncture). This is  rare.  How is this treated?  Treatment for this condition may involve:  · Treating the underlying cause of the neuropathy, such as diabetes, kidney disease, or vitamin deficiencies.  · Stopping medicines that can cause neuropathy, such as chemotherapy.  · Medicine to relieve pain. Medicines may include:  ? Prescription or over-the-counter pain medicine.  ? Antiseizure medicine.  ? Antidepressants.  ? Pain-relieving patches that are applied to painful areas of skin.  · Surgery to relieve pressure on a nerve or to destroy a nerve that is causing pain.  · Physical therapy to help improve movement and balance.  · Devices to help you move around (assistive devices).  Follow these instructions at home:  Medicines  · Take over-the-counter and prescription medicines only as told by your health care provider. Do not take any other medicines without first asking your health care provider.  · Do not drive or use heavy machinery while taking prescription pain medicine.  Lifestyle    · Do not use any products that contain nicotine or tobacco, such as cigarettes and e-cigarettes. Smoking keeps blood from reaching damaged nerves. If you need help quitting, ask your health care provider.  · Avoid or limit alcohol. Too much alcohol can cause a vitamin B deficiency, and vitamin B is needed for healthy nerves.  · Eat a healthy diet. This includes:  ? Eating foods that are high in fiber, such as fresh fruits and vegetables, whole grains, and beans.  ? Limiting foods that are high in fat and processed sugars, such as fried or sweet foods.  General instructions    · If you have diabetes, work closely with your health care provider to keep your blood sugar under control.  · If you have numbness in your feet:  ? Check every day for signs of injury or infection. Watch for redness, warmth, and swelling.  ? Wear padded socks and comfortable shoes. These help protect your feet.  · Develop a good support system. Living with peripheral  neuropathy can be stressful. Consider talking with a mental health specialist or joining a support group.  · Use assistive devices and attend physical therapy as told by your health care provider. This may include using a walker or a cane.  · Keep all follow-up visits as told by your health care provider. This is important.  Contact a health care provider if:  · You have new signs or symptoms of peripheral neuropathy.  · You are struggling emotionally from dealing with peripheral neuropathy.  · Your pain is not well-controlled.  Get help right away if:  · You have an injury or infection that is not healing normally.  · You develop new weakness in an arm or leg.  · You fall frequently.  Summary  · Peripheral neuropathy is when the nerves in the arms, or legs are damaged, resulting in numbness, weakness, or pain.  · There are many causes of peripheral neuropathy, including diabetes, pinched nerves, vitamin deficiencies, autoimmune disease, and hereditary conditions.  · Diagnosing and finding the cause of peripheral neuropathy can be difficult. Your health care provider will take your medical history, do a physical exam, and do tests, including blood tests and nerve function tests.  · Treatment involves treating the underlying cause of the neuropathy and taking medicines to help control pain. Physical therapy and assistive devices may also help.  This information is not intended to replace advice given to you by your health care provider. Make sure you discuss any questions you have with your health care provider.  Document Revised: 11/30/2018 Document Reviewed: 02/26/2018  Elsevier Patient Education © 2020 [employer]

## 2021-03-03 NOTE — PROGRESS NOTES
OFFICE VISIT NOTE:    Darren Shay is a 75 y.o. male who presents today for Diabetes (3 month diabetic check up) and Peripheral Neuropathy (3 month med ov for Gabapentin).     Reports sugar this am was 239. Labs on 2/22/21 were great! A1c 6.8%!    Also, has some wax in both ears, and is having some trouble now with being able to hear now.     Diabetes  He presents for his follow-up diabetic visit. He has type 2 diabetes mellitus. His disease course has been stable. There are no hypoglycemic associated symptoms. There are no diabetic associated symptoms. Pertinent negatives for diabetes include no chest pain, no fatigue and no weight loss. There are no hypoglycemic complications. Symptoms are stable. Diabetic complications include peripheral neuropathy. Current diabetic treatment includes diet. He is compliant with treatment all of the time. His weight is stable. He is following a diabetic and low fat/cholesterol diet. Meal planning includes avoidance of concentrated sweets. He participates in exercise three times a week. There is no change in his home blood glucose trend. He does not see a podiatrist.Eye exam is current.   Peripheral Neuropathy  This is a chronic problem. The current episode started more than 1 year ago. The problem occurs intermittently. The problem has been waxing and waning. Pertinent negatives include no abdominal pain, chest pain, fatigue, fever or rash. The symptoms are aggravated by stress. He has tried rest for the symptoms. The treatment provided significant relief.        Past medical/surgical history, Family history, Social history, Allergies and Medications have been reviewed with the patient today and are updated in [facility] EMR. See below.  Past Medical History:   Diagnosis Date   • Cataracts, bilateral    • Colon cancer (CMS/HCC)    • Coronary artery disease    • Diabetes mellitus (CMS/HCC)    • Diabetic foot ulcers (CMS/HCC)    • Hypertension    • Ileostomy present (CMS/HCC)    •  "Neuropathy    • UC (ulcerative colitis confined to rectum) (CMS/HCC)      Past Surgical History:   Procedure Laterality Date   • CHOLECYSTECTOMY     • COLOSTOMY     • CORONARY ARTERY BYPASS GRAFT  1993    x5   • ORIF FOOT FRACTURE Right 1/17/2019    Procedure: PARTIAL REMOVAL OF BONE MEDIAL CUNEIFORM AND 1ST METATARSAL - RIGHT FOOT EXCISION OF ULCERATION;  Surgeon: Florin Kearns DPM;  Location: [facility] OR;  Service: Podiatry   • TOE AMPUTATION      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 at      Family History   Problem Relation Age of Onset   • Cancer Mother    • Heart disease Mother    • Diabetes Mother    • Cancer Father    • Diabetes Sister    • No Known Problems Brother    • Diabetes Sister    • No Known Problems Brother    • No Known Problems Brother    • No Known Problems Brother      Social History     Tobacco Use   • Smoking status: Current Some Day Smoker     Years: 10.00     Types: Pipe   • Smokeless tobacco: Never Used   Substance Use Topics   • Alcohol use: No     Frequency: Never     Binge frequency: Never   • Drug use: No       ALLERGIES:  Cephalexin    CURRENT MEDS:    Current Outpatient Medications:   •  Accu-Chek Jessica Plus test strip, TEST BLOOD SUGAR THREE TIMES DAILY, Disp: 400 each, Rfl: 3  •  albuterol sulfate  (90 Base) MCG/ACT inhaler, Inhale 2 puffs Every 4 (Four) Hours As Needed for Wheezing or Shortness of Air., Disp: 18 g, Rfl: 5  •  aspirin 81 MG chewable tablet, Chew 81 mg Daily., Disp: , Rfl:   •  cilostazol (PLETAL) 100 MG tablet, Take 1 tablet by mouth 2 (Two) Times a Day., Disp: 180 tablet, Rfl: 3  •  clopidogrel (PLAVIX) 75 MG tablet, TAKE 1 TABLET EVERY DAY, Disp: 30 tablet, Rfl: 0  •  Droplet Insulin Syringe 31G X 5/16\" 1 ML misc, USE THREE TIMES DAILY AS DIRECTED, Disp: 300 each, Rfl: 5  •  folic acid (FOLVITE) 400 MCG tablet, Take 800 mcg by mouth Daily., Disp: , Rfl:   •  gabapentin (NEURONTIN) 300 MG capsule, Take 1 capsule by mouth 2 (two) times a day., Disp: 180 capsule, Rfl: 1  •  " "insulin aspart (novoLOG) 100 UNIT/ML injection, Inject 4 times a day under skin per sliding scale orders., Disp: 150 mL, Rfl: 5  •  insulin glargine (Lantus) 100 UNIT/ML injection, Inject 82 Units under the skin into the appropriate area as directed Every Night., Disp: 100 mL, Rfl: 2  •  metoprolol tartrate (LOPRESSOR) 50 MG tablet, TAKE 1 TABLET TWICE DAILY, Disp: 180 tablet, Rfl: 1  •  pantoprazole (Protonix) 40 MG EC tablet, Take 1 tablet by mouth Daily., Disp: 30 tablet, Rfl: 2  •  simvastatin (ZOCOR) 20 MG tablet, TAKE 1 TABLET AT BEDTIME, Disp: 90 tablet, Rfl: 1    REVIEW OF SYSTEMS:  Review of Systems   Constitutional: Negative for activity change, appetite change, fatigue, fever, unexpected weight gain and unexpected weight loss.   Respiratory: Negative for shortness of breath.    Cardiovascular: Negative for chest pain.   Gastrointestinal: Negative for abdominal pain.   Genitourinary: Negative for difficulty urinating.   Skin: Negative for rash.   Neurological: Negative for syncope and headache.       PHYSICAL EXAMINATION:  Vital Signs:  /69 (BP Location: Right arm, Patient Position: Sitting, Cuff Size: Adult)   Pulse 76   Temp 98 °F (36.7 °C) (Infrared)   Ht 180.3 cm (70.98\")   Wt 95.6 kg (210 lb 12.8 oz)   SpO2 96%   BMI 29.41 kg/m²   Vitals:    03/03/21 1006   Patient Position: Sitting       Physical Exam  Vitals signs and nursing note reviewed.   Constitutional:       General: He is not in acute distress.     Appearance: He is well-developed.   HENT:      Head: Normocephalic and atraumatic.      Right Ear: There is impacted cerumen (more here than left).      Left Ear: There is no impacted cerumen (mild wax on inferior aspect).      Mouth/Throat:      Mouth: Mucous membranes are moist.      Pharynx: Oropharynx is clear.   Eyes:      Extraocular Movements: Extraocular movements intact.      Pupils: Pupils are equal, round, and reactive to light.   Neck:      Musculoskeletal: Normal range of " motion and neck supple.      Vascular: No JVD.   Cardiovascular:      Rate and Rhythm: Normal rate and regular rhythm.      Pulses: Normal pulses.      Heart sounds: Normal heart sounds.   Pulmonary:      Effort: Pulmonary effort is normal. No respiratory distress.      Breath sounds: Normal breath sounds.   Abdominal:      General: Bowel sounds are normal. There is no distension.      Palpations: Abdomen is soft.      Tenderness: There is no abdominal tenderness.   Musculoskeletal: Normal range of motion.   Skin:     General: Skin is warm and dry.      Capillary Refill: Capillary refill takes less than 2 seconds.      Findings: No rash.   Neurological:      General: No focal deficit present.      Mental Status: He is alert and oriented to person, place, and time.      Cranial Nerves: No cranial nerve deficit.   Psychiatric:         Mood and Affect: Mood normal.         Behavior: Behavior normal.         Procedures    ASSESSMENT/ PLAN:  Problem List Items Addressed This Visit        Cardiac and Vasculature    Hypertension    Mixed hyperlipidemia       Endocrine and Metabolic    Diabetes mellitus (CMS/HCC) - Primary    Relevant Medications    insulin aspart (novoLOG) 100 UNIT/ML injection    gabapentin (NEURONTIN) 300 MG capsule       Neuro    Diabetic polyneuropathy associated with type 2 diabetes mellitus (CMS/HCC)    Relevant Medications    insulin aspart (novoLOG) 100 UNIT/ML injection    gabapentin (NEURONTIN) 300 MG capsule      Other Visit Diagnoses     Encounter for long-term (current) use of high-risk medication        Relevant Orders    Compliance Drug Analysis, Ur - Urine, Clean Catch            Specific Patient Instructions:  MEDICATION Instructions: Encouraged patient to continue routine medicines as prescribed and maintain compliance. Patient instructed to report any adverse side effects or reactions to medicines promptly to the office. Patient instructed to make us aware of any OTC or herbal meds or  supplement use.    DIET Recommendations: Patient instructed and provided information on the following nutrition and diet recommendations: Calorie restriction for weight reduction and maintenance. Necessity for adequate daily intake of fluids/water. Also, diet information provided in AVS for specifics.    EXERCISE Instructions: Discussed with patient the need for routine aerobic activity for cardiovascular fitness, 3 times a week for about 30 minutes. Daily exercise for increased fitness and weight reduction goals.    SMOKING Recommendations: Counseled patient and encouraged them on smoking and tobacco cessation if applicable. Discussed the benefits to all body systems with smoking/tobacco cessation, including decreased cardiac/lung/stroke/cancer risk. Encouraged no vaping use.    HEALTH MAINTENANCE:  Counseling provided to patient/family about routine health maintenance and ANNUAL physicals/labs. Counseling on recommended Vaccinations appropriate for age needed.        Patient's Body mass index is 29.41 kg/m². BMI is above normal parameters. Recommendations include: exercise counseling and nutrition counseling.      Medications or Orders placed this visit:  Orders Placed This Encounter   Procedures   • Compliance Drug Analysis, Ur - Urine, Clean Catch       Medications DISCONTINUED this visit:  Medications Discontinued During This Encounter   Medication Reason   • insulin aspart (novoLOG) 100 UNIT/ML injection Reorder   • gabapentin (NEURONTIN) 300 MG capsule Reorder       FOLLOW-UP:  Return in about 3 months (around 6/3/2021) for Recheck, Next scheduled follow up.    I discussed the patients findings and my recommendations with patient.  An After Visit Summary (AVS) was printed and given to the patient at discharge.        Robin Freedman MD, FAAFP  3/3/2021

## 2021-03-08 ENCOUNTER — TELEPHONE (OUTPATIENT)
Dept: FAMILY MEDICINE CLINIC | Facility: CLINIC | Age: 76
End: 2021-03-08

## 2021-03-08 NOTE — TELEPHONE ENCOUNTER
PATIENT CALLING IN STATES INSURANCE CALLED AND SAID THEY NEEDED A PRIOR AUTHORIZATION FOR MEDICATION AND NEEDS TO HAVE FORM THAT WAS SENT TO OFFICE FAXED TO INSURANCE     PROVIDED fax [phone]

## 2021-03-09 RX ORDER — SIMVASTATIN 20 MG
TABLET ORAL
Qty: 90 TABLET | Refills: 1 | Status: SHIPPED | OUTPATIENT
Start: 2021-03-09 | End: 2021-07-15

## 2021-03-10 LAB — DRUGS UR: NORMAL

## 2021-03-11 ENCOUNTER — TELEPHONE (OUTPATIENT)
Dept: FAMILY MEDICINE CLINIC | Facility: CLINIC | Age: 76
End: 2021-03-11

## 2021-03-11 NOTE — TELEPHONE ENCOUNTER
Spoke with patient, he was trying to refill insulin to soon. He stated he just picked up script this am. No further action needed at this time.

## 2021-03-11 NOTE — TELEPHONE ENCOUNTER
PATIENTS PHARMACY CALLS       REQUESTING CALL BACK FOR CLARIFICATION ABOUT HIS INSULIN VIALS       GO[employer] CONTACT NUMBER   [phone]

## 2021-05-03 DIAGNOSIS — K29.00 ACUTE SUPERFICIAL GASTRITIS WITHOUT HEMORRHAGE: ICD-10-CM

## 2021-05-03 RX ORDER — PANTOPRAZOLE SODIUM 40 MG/1
40 TABLET, DELAYED RELEASE ORAL DAILY
Qty: 90 TABLET | Refills: 0 | Status: SHIPPED | OUTPATIENT
Start: 2021-05-03 | End: 2021-06-29

## 2021-05-03 NOTE — TELEPHONE ENCOUNTER
Caller: Darren Shay    Relationship: Self    Best call back number: [phone]    Medication needed:   Requested Prescriptions     Pending Prescriptions Disp Refills   • pantoprazole (Protonix) 40 MG EC tablet 30 tablet 2     Sig: Take 1 tablet by mouth Daily.       When do you need the refill by:5/3/21        Does the patient have less than a 3 day supply:  [] Yes  [x] No    What is the patient's preferred pharmacy: [facility] - [facility] [address] - [phone]  - [phone]

## 2021-05-14 ENCOUNTER — HOSPITAL ENCOUNTER (EMERGENCY)
Facility: HOSPITAL | Age: 76
Discharge: HOME | End: 2021-05-14
Admitting: EMERGENCY MEDICINE

## 2021-05-14 ENCOUNTER — APPOINTMENT (OUTPATIENT)
Dept: CT IMAGING | Facility: HOSPITAL | Age: 76
End: 2021-05-14

## 2021-05-14 ENCOUNTER — APPOINTMENT (OUTPATIENT)
Dept: GENERAL RADIOLOGY | Facility: HOSPITAL | Age: 76
End: 2021-05-14

## 2021-05-14 VITALS
SYSTOLIC BLOOD PRESSURE: 129 MMHG | WEIGHT: 215 LBS | BODY MASS INDEX: 30.1 KG/M2 | HEIGHT: 71 IN | TEMPERATURE: 98.3 F | HEART RATE: 77 BPM | RESPIRATION RATE: 18 BRPM | OXYGEN SATURATION: 94 % | DIASTOLIC BLOOD PRESSURE: 58 MMHG

## 2021-05-14 DIAGNOSIS — S09.90XA CLOSED HEAD INJURY, INITIAL ENCOUNTER: ICD-10-CM
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
## 2021-05-14 DIAGNOSIS — S33.5XXA LUMBAR SPRAIN, INITIAL ENCOUNTER: ICD-10-CM

## 2021-05-14 PROCEDURE — 96372 THER/PROPH/DIAG INJ SC/IM: CPT

## 2021-05-14 PROCEDURE — 72110 X-RAY EXAM L-2 SPINE 4/>VWS: CPT

## 2021-05-14 PROCEDURE — 99284 EMERGENCY DEPT VISIT MOD MDM: CPT

## 2021-05-14 PROCEDURE — 25010000002 MORPHINE PER 10 MG: Performed by: NURSE PRACTITIONER

## 2021-05-14 PROCEDURE — 72125 CT NECK SPINE W/O DYE: CPT

## 2021-05-14 PROCEDURE — 70450 CT HEAD/BRAIN W/O DYE: CPT

## 2021-05-14 PROCEDURE — 63710000001 ONDANSETRON ODT 4 MG TABLET DISPERSIBLE: Performed by: NURSE PRACTITIONER

## 2021-05-14 PROCEDURE — 72072 X-RAY EXAM THORAC SPINE 3VWS: CPT

## 2021-05-14 PROCEDURE — 71045 X-RAY EXAM CHEST 1 VIEW: CPT

## 2021-05-14 PROCEDURE — 25010000003 HYDROMORPHONE 1 MG/ML SOLUTION: Performed by: NURSE PRACTITIONER

## 2021-05-14 RX ORDER — ONDANSETRON 4 MG/1
4 TABLET, ORALLY DISINTEGRATING ORAL ONCE
Status: COMPLETED | OUTPATIENT
Start: 2021-05-14 | End: 2021-05-14

## 2021-05-14 RX ORDER — HYDROCODONE BITARTRATE AND ACETAMINOPHEN 5; 325 MG/1; MG/1
1 TABLET ORAL EVERY 4 HOURS PRN
Qty: 15 TABLET | Refills: 0 | Status: SHIPPED | OUTPATIENT
Start: 2021-05-14 | End: 2021-06-04

## 2021-05-14 RX ADMIN — MORPHINE SULFATE 4 MG: 4 INJECTION, SOLUTION INTRAMUSCULAR; INTRAVENOUS at 21:19

## 2021-05-14 RX ADMIN — ONDANSETRON 4 MG: 4 TABLET, ORALLY DISINTEGRATING ORAL at 22:55

## 2021-05-14 RX ADMIN — ONDANSETRON 4 MG: 4 TABLET, ORALLY DISINTEGRATING ORAL at 21:18

## 2021-05-14 RX ADMIN — HYDROMORPHONE HYDROCHLORIDE 1 MG: 1 INJECTION, SOLUTION INTRAMUSCULAR; INTRAVENOUS; SUBCUTANEOUS at 22:55

## 2021-05-15 NOTE — DISCHARGE INSTRUCTIONS
Warm moist heat to the area; avoid any heavy lifting; f/u with pcp for re-evaluation    Medications sent to the [facility]

## 2021-05-15 NOTE — ED PROVIDER NOTES
Subjective   Patient is a 75-year-old male presents emergency department with chief complaints of low back pain secondary to a fall. He states he was working outside in his garden and as he was walking up the incline he had stopped and bent over to  a basket. He states when he bent over he lost his footing and caused him to fall backwards. He reports hitting his back on the ground. He presents with middle and low back pain secondary to a fall. He has had no loss of bladder control. Patient has an ileostomy. He denies any saddle anesthesia. He admits to hitting his head without any loss of consciousness or vomiting. However patient is on a blood thinner therefore this will need to be further evaluated. Due to symptoms described he came to the ER for evaluation and treatment.          Review of Systems   Constitutional: Negative.  Negative for fever.   HENT: Negative.  Negative for congestion.    Eyes: Negative.    Respiratory: Negative.  Negative for cough and shortness of breath.    Cardiovascular: Negative.  Negative for chest pain.   Gastrointestinal: Negative.  Negative for abdominal pain and vomiting.   Genitourinary: Negative.    Musculoskeletal: Positive for back pain.   Skin: Negative.  Negative for wound.   All other systems reviewed and are negative.      Past Medical History:   Diagnosis Date   • Cataracts, bilateral    • Colon cancer (CMS/HCC)    • Coronary artery disease    • Diabetes mellitus (CMS/HCC)    • Diabetic foot ulcers (CMS/HCC)    • Hypertension    • Ileostomy present (CMS/HCC)    • Neuropathy    • UC (ulcerative colitis confined to rectum) (CMS/HCC)        Allergies   Allergen Reactions   • Cephalexin Hives       Past Surgical History:   Procedure Laterality Date   • CHOLECYSTECTOMY     • COLOSTOMY     • CORONARY ARTERY BYPASS GRAFT  1993    x5   • ORIF FOOT FRACTURE Right 1/17/2019    Procedure: PARTIAL REMOVAL OF BONE MEDIAL CUNEIFORM AND 1ST METATARSAL - RIGHT FOOT EXCISION OF  ULCERATION;  Surgeon: Florin Kearns DPM;  Location:  [facility] OR;  Service: Podiatry   • TOE AMPUTATION      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 at        Family History   Problem Relation Age of Onset   • Cancer Mother    • Heart disease Mother    • Diabetes Mother    • Cancer Father    • Diabetes Sister    • No Known Problems Brother    • Diabetes Sister    • No Known Problems Brother    • No Known Problems Brother    • No Known Problems Brother        Social History     Socioeconomic History   • Marital status:      Spouse name: Not on file   • Number of children: Not on file   • Years of education: Not on file   • Highest education level: Not on file   Tobacco Use   • Smoking status: Current Some Day Smoker     Years: 10.00     Types: Pipe   • Smokeless tobacco: Never Used   Substance and Sexual Activity   • Alcohol use: No   • Drug use: No   • Sexual activity: Not Currently     Partners: Female           Objective   Physical Exam  Vitals and nursing note reviewed.   Constitutional:       General: He is not in acute distress.     Appearance: He is well-developed. He is not diaphoretic.   HENT:      Head: Atraumatic.      Right Ear: External ear normal.      Left Ear: External ear normal.      Nose: Nose normal.      Mouth/Throat:      Mouth: Mucous membranes are moist.      Pharynx: Oropharynx is clear.   Eyes:      General: No scleral icterus.     Conjunctiva/sclera: Conjunctivae normal.      Pupils: Pupils are equal, round, and reactive to light.   Neck:      Thyroid: No thyromegaly.      Vascular: No JVD.   Cardiovascular:      Rate and Rhythm: Normal rate and regular rhythm.      Heart sounds: Normal heart sounds. No murmur heard.     Pulmonary:      Effort: Pulmonary effort is normal. No respiratory distress.      Breath sounds: Normal breath sounds. No wheezing or rales.   Chest:      Chest wall: No tenderness.   Abdominal:      General: Bowel sounds are normal. There is no distension.      Palpations: Abdomen is  soft. There is no mass.      Tenderness: There is no abdominal tenderness. There is no guarding or rebound.   Musculoskeletal:         General: Normal range of motion.      Cervical back: Normal range of motion and neck supple.      Comments: Pain to palpation to the thoracic and lumbar spine without step-off or vertebral point tenderness identified, patient is neurovascularly intact, range of motion intact, sensory intact   Lymphadenopathy:      Cervical: No cervical adenopathy.   Skin:     General: Skin is warm and dry.      Coloration: Skin is not pale.      Findings: No erythema or rash.   Neurological:      Mental Status: He is alert and oriented to person, place, and time.      Cranial Nerves: No cranial nerve deficit.      Coordination: Coordination normal.      Deep Tendon Reflexes: Reflexes are normal and symmetric.   Psychiatric:         Mood and Affect: Mood normal.         Behavior: Behavior normal.         Thought Content: Thought content normal.         Judgment: Judgment normal.         Procedures           ED Course  ED Course as of May 14 2333   Fri May 14, 2021   2233 Patient reports feeling better on reexam however he is questioning whether or not he had a punctured lung. We will go ahead and order a chest x-ray to ascertain that there is no abnormality. We will go ahead and order another pain shot as he continues to have some discomfort. Otherwise x-rays are negative for acute finding.    [TW]      ED Course User Index  [TW] Yaa Kearney APRN                                           MDM  Number of Diagnoses or Management Options  Closed head injury, initial encounter: new and requires workup  Fall, initial encounter: new and requires workup  Lumbar sprain, initial encounter: new and requires workup     Amount and/or Complexity of Data Reviewed  Tests in the radiology section of CPT®: ordered and reviewed  Discuss the patient with other providers: yes  Independent visualization of images,  tracings, or specimens: yes    Risk of Complications, Morbidity, and/or Mortality  Presenting problems: moderate  Diagnostic procedures: moderate  Management options: moderate    Patient Progress  Patient progress: improved      Final diagnoses:   Fall, initial encounter   Lumbar sprain, initial encounter   Closed head injury, initial encounter       ED Disposition  ED Disposition     ED Disposition Condition Comment    Discharge Good           No follow-up provider specified.       Medication List      New Prescriptions    HYDROcodone-acetaminophen 5-325 MG per tablet  Commonly known as: NORCO  Take 1 tablet by mouth Every 4 (Four) Hours As Needed for Moderate Pain .           Where to Get Your Medications      These medications were sent to [email or web address] - [place], [place] - [address] - [phone]  - [phone]   [address] [facility] [address]    Phone: [phone]   · HYDROcodone-acetaminophen 5-325 MG per tablet          Yaa Kearney, APRN  05/14/21 4717

## 2021-05-17 ENCOUNTER — EPISODE CHANGES (OUTPATIENT)
Dept: CASE MANAGEMENT | Facility: OTHER | Age: 76
End: 2021-05-17

## 2021-05-18 ENCOUNTER — PATIENT OUTREACH (OUTPATIENT)
Dept: CASE MANAGEMENT | Facility: OTHER | Age: 76
End: 2021-05-18

## 2021-05-18 NOTE — OUTREACH NOTE
"Patient Outreach Note    Carmen MEJIASO patient seen at [facility] ED 5-14-21 after a fall. ACM spoke briefly with patients spouse. He is c/o some pain but spouse believes it is related to hoeing the garden yesterday;no bowel or bladder issues. He was prescribed Norco but does not like to take \"them make him feel drunk\". Spouse reports he is using Tylenol and heating pad for symptoms. Discussed PCP follow up scheduled for 6-4-21. Spouse denied food, medication, or transportation insecurities. Discussed contacting [employer] customer service to see if life alert is a covered benefit on his plan. Spouse appreciative of the outreach and information.  AC provided contact information for questions/needs.     Radha Alexandra RN  Ambulatory     5/18/2021, 12:05 CDT      "

## 2021-05-26 DIAGNOSIS — I73.9 PAD (PERIPHERAL ARTERY DISEASE) (HCC): ICD-10-CM

## 2021-05-27 RX ORDER — CILOSTAZOL 100 MG/1
TABLET ORAL
Qty: 180 TABLET | Refills: 3 | Status: SHIPPED | OUTPATIENT
Start: 2021-05-27 | End: 2022-03-21

## 2021-05-27 NOTE — TELEPHONE ENCOUNTER
Patient last seen 3/3/21 for DM with Dr. Freedman. Follow up 6/4/21.  
Benefits, risks, and possible complications of procedure explained to patient/caregiver who verbalized understanding and gave verbal consent.

## 2021-06-04 ENCOUNTER — OFFICE VISIT (OUTPATIENT)
Dept: FAMILY MEDICINE CLINIC | Facility: CLINIC | Age: 76
End: 2021-06-04

## 2021-06-04 VITALS
DIASTOLIC BLOOD PRESSURE: 68 MMHG | TEMPERATURE: 98 F | OXYGEN SATURATION: 95 % | HEIGHT: 72 IN | WEIGHT: 216 LBS | HEART RATE: 76 BPM | BODY MASS INDEX: 29.26 KG/M2 | SYSTOLIC BLOOD PRESSURE: 118 MMHG

## 2021-06-04 DIAGNOSIS — Z79.4 TYPE 2 DIABETES MELLITUS WITH DIABETIC PERIPHERAL ANGIOPATHY AND GANGRENE, WITH LONG-TERM CURRENT USE OF INSULIN (HCC): Primary | ICD-10-CM

## 2021-06-04 DIAGNOSIS — E11.42 DIABETIC POLYNEUROPATHY ASSOCIATED WITH TYPE 2 DIABETES MELLITUS (HCC): ICD-10-CM

## 2021-06-04 DIAGNOSIS — R80.9 TYPE 2 DIABETES MELLITUS WITH MICROALBUMINURIA, WITH LONG-TERM CURRENT USE OF INSULIN (HCC): ICD-10-CM

## 2021-06-04 DIAGNOSIS — I10 ESSENTIAL HYPERTENSION: ICD-10-CM

## 2021-06-04 DIAGNOSIS — Z79.4 TYPE 2 DIABETES MELLITUS WITH MICROALBUMINURIA, WITH LONG-TERM CURRENT USE OF INSULIN (HCC): ICD-10-CM

## 2021-06-04 DIAGNOSIS — E11.29 TYPE 2 DIABETES MELLITUS WITH MICROALBUMINURIA, WITH LONG-TERM CURRENT USE OF INSULIN (HCC): ICD-10-CM

## 2021-06-04 DIAGNOSIS — E11.52 TYPE 2 DIABETES MELLITUS WITH DIABETIC PERIPHERAL ANGIOPATHY AND GANGRENE, WITH LONG-TERM CURRENT USE OF INSULIN (HCC): Primary | ICD-10-CM

## 2021-06-04 DIAGNOSIS — I25.10 CORONARY ARTERY DISEASE INVOLVING NATIVE CORONARY ARTERY OF NATIVE HEART WITHOUT ANGINA PECTORIS: ICD-10-CM

## 2021-06-04 LAB — HBA1C MFR BLD: 6.4 %

## 2021-06-04 PROCEDURE — 99214 OFFICE O/P EST MOD 30 MIN: CPT | Performed by: FAMILY MEDICINE

## 2021-06-04 PROCEDURE — 3044F HG A1C LEVEL LT 7.0%: CPT | Performed by: FAMILY MEDICINE

## 2021-06-04 PROCEDURE — 83036 HEMOGLOBIN GLYCOSYLATED A1C: CPT | Performed by: FAMILY MEDICINE

## 2021-06-04 NOTE — PROGRESS NOTES
OFFICE VISIT NOTE:    Darren Shay is a 75 y.o. male who presents today for Diabetes, Peripheral Neuropathy (SARIKA gabapentin), and Hypertension.     A1C 6.4% TODAY - BEST HE HAS BEEN.     Needs letter for jury duty.     Patient presents for follow-up of long term use of high risk medication (narcotics, sedatives, stimulants or other controlled medications). The patient has read and signed the [facility] Controlled Substance Contract - copy in chart and updated annually. A recent Chuck was reviewed and is present in the chart, updated annually and as needed. UDS has been reviewed and is up to date, and performed at least annually and PRN discretion of provider. No aberrant behavioral issues are noted, and no significant side effects/complications are present. The patient is aware of the potential for addiction and dependence of these medications and accepts responsibility for proper med use. Patient is aware of the PRN use of these medications (unless otherwise prescribed), and not to be used routinely.     Diabetes  He presents for his follow-up diabetic visit. He has type 2 diabetes mellitus. His disease course has been stable. There are no hypoglycemic associated symptoms. Pertinent negatives for hypoglycemia include no headaches. There are no diabetic associated symptoms. Pertinent negatives for diabetes include no chest pain, no fatigue and no weight loss. There are no hypoglycemic complications. Symptoms are stable. Diabetic complications include peripheral neuropathy. Current diabetic treatment includes diet. He is compliant with treatment all of the time. His weight is stable. He is following a diabetic and low fat/cholesterol diet. Meal planning includes avoidance of concentrated sweets. He participates in exercise intermittently. There is no change in his home blood glucose trend. He does not see a podiatrist.Eye exam is current.   Peripheral Neuropathy  This is a chronic problem. The current episode  started more than 1 year ago. The problem occurs constantly. The problem has been unchanged. Pertinent negatives include no abdominal pain, chest pain, fatigue, fever, headaches or rash. The symptoms are aggravated by standing and walking. He has tried rest for the symptoms. The treatment provided significant relief.   Hypertension  This is a chronic problem. The current episode started more than 1 year ago. The problem is unchanged. The problem is controlled. Pertinent negatives include no chest pain, headaches, orthopnea, palpitations, peripheral edema, PND or shortness of breath. There are no associated agents to hypertension. The current treatment provides significant improvement. There are no compliance problems.         Past medical/surgical history, Family history, Social history, Allergies and Medications have been reviewed with the patient today and are updated in [facility]. See below.  Past Medical History:   Diagnosis Date   • Cataracts, bilateral    • Colon cancer (CMS/HCC)    • Coronary artery disease    • Diabetes mellitus (CMS/HCC)    • Diabetic foot ulcers (CMS/HCC)    • Hypertension    • Ileostomy present (CMS/HCC)    • Neuropathy    • UC (ulcerative colitis confined to rectum) (CMS/HCC)      Past Surgical History:   Procedure Laterality Date   • CHOLECYSTECTOMY     • COLOSTOMY     • CORONARY ARTERY BYPASS GRAFT  1993    x5   • ORIF FOOT FRACTURE Right 1/17/2019    Procedure: PARTIAL REMOVAL OF BONE MEDIAL CUNEIFORM AND 1ST METATARSAL - RIGHT FOOT EXCISION OF ULCERATION;  Surgeon: Florin Kearns DPM;  Location: [facility] OR;  Service: Podiatry   • TOE AMPUTATION      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 at      Family History   Problem Relation Age of Onset   • Cancer Mother    • Heart disease Mother    • Diabetes Mother    • Cancer Father    • Diabetes Sister    • No Known Problems Brother    • Diabetes Sister    • No Known Problems Brother    • No Known Problems Brother    • No Known Problems Brother      Social  "History     Tobacco Use   • Smoking status: Current Some Day Smoker     Years: 10.00     Types: Pipe   • Smokeless tobacco: Never Used   Substance Use Topics   • Alcohol use: No   • Drug use: No       ALLERGIES:  Cephalexin    CURRENT MEDS:    Current Outpatient Medications:   •  Accu-Chek Jessica Plus test strip, TEST BLOOD SUGAR THREE TIMES DAILY, Disp: 400 each, Rfl: 3  •  albuterol sulfate  (90 Base) MCG/ACT inhaler, Inhale 2 puffs Every 4 (Four) Hours As Needed for Wheezing or Shortness of Air., Disp: 18 g, Rfl: 5  •  aspirin 81 MG chewable tablet, Chew 81 mg Daily., Disp: , Rfl:   •  cilostazol (PLETAL) 100 MG tablet, TAKE 1 TABLET TWICE DAILY, Disp: 180 tablet, Rfl: 3  •  clopidogrel (PLAVIX) 75 MG tablet, TAKE 1 TABLET EVERY DAY, Disp: 30 tablet, Rfl: 0  •  Droplet Insulin Syringe 31G X 5/16\" 1 ML misc, USE THREE TIMES DAILY AS DIRECTED, Disp: 300 each, Rfl: 5  •  folic acid (FOLVITE) 400 MCG tablet, Take 800 mcg by mouth Daily., Disp: , Rfl:   •  gabapentin (NEURONTIN) 300 MG capsule, Take 1 capsule by mouth 2 (two) times a day., Disp: 180 capsule, Rfl: 1  •  insulin glargine (Lantus) 100 UNIT/ML injection, Inject 82 Units under the skin into the appropriate area as directed Every Night., Disp: 100 mL, Rfl: 2  •  pantoprazole (Protonix) 40 MG EC tablet, Take 1 tablet by mouth Daily., Disp: 90 tablet, Rfl: 0  •  simvastatin (ZOCOR) 20 MG tablet, TAKE 1 TABLET AT BEDTIME, Disp: 90 tablet, Rfl: 1  •  insulin aspart (novoLOG) 100 UNIT/ML injection, Inject 30 units 4 times daily plus additional units per sliding scale., Disp: 300 mL, Rfl: 2  •  metoprolol tartrate (LOPRESSOR) 50 MG tablet, TAKE 1 TABLET TWICE DAILY, Disp: 180 tablet, Rfl: 1    REVIEW OF SYSTEMS:  Review of Systems   Constitutional: Negative for activity change, appetite change, fatigue, fever, unexpected weight gain and unexpected weight loss.   Respiratory: Negative for shortness of breath.    Cardiovascular: Negative for chest pain, " "palpitations, orthopnea and PND.   Gastrointestinal: Negative for abdominal pain.   Genitourinary: Negative for difficulty urinating.   Skin: Negative for rash.   Neurological: Negative for syncope and headache.       PHYSICAL EXAMINATION:  Vital Signs:  /68 (BP Location: Left arm, Patient Position: Sitting, Cuff Size: Large Adult)   Pulse 76   Temp 98 °F (36.7 °C)   Ht 182.9 cm (72\") Comment: PT REPORTED  Wt 98 kg (216 lb)   SpO2 95%   BMI 29.29 kg/m²   Vitals:    06/04/21 0936   Patient Position: Sitting       Physical Exam  Vitals and nursing note reviewed.   Constitutional:       General: He is not in acute distress.     Appearance: He is well-developed.   HENT:      Head: Normocephalic and atraumatic.      Mouth/Throat:      Mouth: Mucous membranes are moist.      Pharynx: Oropharynx is clear.   Eyes:      Extraocular Movements: Extraocular movements intact.      Pupils: Pupils are equal, round, and reactive to light.   Neck:      Vascular: No JVD.   Cardiovascular:      Rate and Rhythm: Normal rate and regular rhythm.      Pulses: Normal pulses.      Heart sounds: Normal heart sounds.   Pulmonary:      Effort: Pulmonary effort is normal. No respiratory distress.      Breath sounds: Normal breath sounds.   Abdominal:      General: Bowel sounds are normal. There is no distension.      Palpations: Abdomen is soft.      Tenderness: There is no abdominal tenderness.   Musculoskeletal:         General: Normal range of motion.      Cervical back: Normal range of motion and neck supple.   Skin:     General: Skin is warm and dry.      Capillary Refill: Capillary refill takes less than 2 seconds.      Findings: No rash.   Neurological:      General: No focal deficit present.      Mental Status: He is alert and oriented to person, place, and time.      Cranial Nerves: No cranial nerve deficit.   Psychiatric:         Mood and Affect: Mood normal.         Behavior: Behavior normal. "         Procedures    ASSESSMENT/ PLAN:  Problem List Items Addressed This Visit        Cardiac and Vasculature    Hypertension    Coronary artery disease       Endocrine and Metabolic    Diabetes mellitus (CMS/[facility])    Relevant Orders    POC Glycosylated Hemoglobin (Hb A1C) (Completed)    BMI 29.0-29.9,adult    Type 2 diabetes mellitus with diabetic peripheral angiopathy and gangrene, with long-term current use of insulin (CMS/[facility]) - Primary    Relevant Orders    POC Glycosylated Hemoglobin (Hb A1C) (Completed)       Neuro    Diabetic polyneuropathy associated with type 2 diabetes mellitus (CMS/[facility])            Specific Patient Instructions:  MEDICATION Instructions: Encouraged patient to continue routine medicines as prescribed and maintain compliance. Patient instructed to report any adverse side effects or reactions to medicines promptly to the office. Patient instructed to make us aware of any OTC or herbal meds or supplement use.    DIET Recommendations: Patient instructed and provided information on the following nutrition and diet recommendations: Calorie restriction for weight reduction and maintenance. Necessity for adequate daily intake of fluids/water. Also, diet information provided in AVS for specifics.    EXERCISE Instructions: Discussed with patient the need for routine aerobic activity for cardiovascular fitness, 3 times a week for about 30 minutes. Daily exercise for increased fitness and weight reduction goals.    SMOKING Recommendations: Counseled patient and encouraged them on smoking and tobacco cessation if applicable. Discussed the benefits to all body systems with smoking/tobacco cessation, including decreased cardiac/lung/stroke/cancer risk. Encouraged no vaping use.    HEALTH MAINTENANCE:  Counseling provided to patient/family about routine health maintenance and ANNUAL physicals/labs. Counseling on recommended Vaccinations appropriate for age needed.        Patient's Body mass index is  29.29 kg/m². indicating that he is overweight (BMI 25-29.9). Obesity-related health conditions include the following: hypertension, diabetes mellitus and dyslipidemias. Obesity is unchanged. BMI is is above average; BMI management plan is completed. We discussed portion control and increasing exercise..      Medications or Orders placed this visit:  Orders Placed This Encounter   Procedures   • POC Glycosylated Hemoglobin (Hb A1C)     Order Specific Question:   Release to patient     Answer:   Immediate       Medications DISCONTINUED this visit:  Medications Discontinued During This Encounter   Medication Reason   • HYDROcodone-acetaminophen (NORCO) 5-325 MG per tablet *Therapy completed   • insulin aspart (novoLOG) 100 UNIT/ML injection Reorder       FOLLOW-UP:  Return in about 3 months (around 9/4/2021) for Recheck, Next scheduled follow up.    I discussed the patients findings and my recommendations with patient.  An After Visit Summary (AVS) was printed and given to the patient at discharge.        Robin Freedman MD, FAAFP  6/4/2021

## 2021-06-04 NOTE — PATIENT INSTRUCTIONS
Diabetes: Insulin non dependent. Nephropathy, Retinopathy, Neuropahty status discussed.  Discussed goals of Diabetes today.  Goal Hgb A1C <7.0 for most patient.  Good BP control is encouraged with Goal BP based on JNC 8 guidelines 2014 <140/90.  Discussed role of Ace-I and ARB with DM.  DM imparts risk equivalence for CAD based on ATP III.  Current guidelines support moderate intensity statin with goal of 30-50% reduction in LDL unless 10 yr risk ASCVD >7.5 then high intensity should be used. Close monitoring of Lipid levels encouraged. Recommend once yearly eye evaluation by optometry or ophthalmology.  Good foot health discussed and foot exam completed.  Recommended toe health, wear good shoes, cut nails straight across and tend calluses if present. Take medications as encouraged.  Monitor blood sugars as encouraged and bring log to future meetings. Weight needs to be monitored. Monitor portions and caloric intake.  Pneumovax frequency discussed.   a. Labs: CMP, Microalbumin, A1C  b. Encouraged pt to bring glucose logs to each appointment  c. Encouraged self foot exams, and yearly eye exams.  d. Encouraged to lose weight/please see information provided  e. Recommend regular exercise   f. Medications as listed in today's visit        Peripheral Neuropathy  Peripheral neuropathy is a type of nerve damage. It affects nerves that carry signals between the spinal cord and the arms, legs, and the rest of the body (peripheral nerves). It does not affect nerves in the spinal cord or brain. In peripheral neuropathy, one nerve or a group of nerves may be damaged. Peripheral neuropathy is a broad category that includes many specific nerve disorders, like diabetic neuropathy, hereditary neuropathy, and carpal tunnel syndrome.  What are the causes?  This condition may be caused by:  · Diabetes. This is the most common cause of peripheral neuropathy.  · Nerve injury.  · Pressure or stress on a nerve that lasts a long  time.  · Lack (deficiency) of B vitamins. This can result from alcoholism, poor diet, or a restricted diet.  · Infections.  · Autoimmune diseases, such as rheumatoid arthritis and systemic lupus erythematosus.  · Nerve diseases that are passed from parent to child (inherited).  · Some medicines, such as cancer medicines (chemotherapy).  · Poisonous (toxic) substances, such as lead and mercury.  · Too little blood flowing to the legs.  · Kidney disease.  · Thyroid disease.  In some cases, the cause of this condition is not known.  What are the signs or symptoms?  Symptoms of this condition depend on which of your nerves is damaged. Common symptoms include:  · Loss of feeling (numbness) in the feet, hands, or both.  · Tingling in the feet, hands, or both.  · Burning pain.  · Very sensitive skin.  · Weakness.  · Not being able to move a part of the body (paralysis).  · Muscle twitching.  · Clumsiness or poor coordination.  · Loss of balance.  · Not being able to control your bladder.  · Feeling dizzy.  · Sexual problems.  How is this diagnosed?  Diagnosing and finding the cause of peripheral neuropathy can be difficult. Your health care provider will take your medical history and do a physical exam. A neurological exam will also be done. This involves checking things that are affected by your brain, spinal cord, and nerves (nervous system). For example, your health care provider will check your reflexes, how you move, and what you can feel.  You may have other tests, such as:  · Blood tests.  · Electromyogram (EMG) and nerve conduction tests. These tests check nerve function and how well the nerves are controlling the muscles.  · Imaging tests, such as CT scans or MRI to rule out other causes of your symptoms.  · Removing a small piece of nerve to be examined in a lab (nerve biopsy). This is rare.  · Removing and examining a small amount of the fluid that surrounds the brain and spinal cord (lumbar puncture). This is  rare.  How is this treated?  Treatment for this condition may involve:  · Treating the underlying cause of the neuropathy, such as diabetes, kidney disease, or vitamin deficiencies.  · Stopping medicines that can cause neuropathy, such as chemotherapy.  · Medicine to relieve pain. Medicines may include:  ? Prescription or over-the-counter pain medicine.  ? Antiseizure medicine.  ? Antidepressants.  ? Pain-relieving patches that are applied to painful areas of skin.  · Surgery to relieve pressure on a nerve or to destroy a nerve that is causing pain.  · Physical therapy to help improve movement and balance.  · Devices to help you move around (assistive devices).  Follow these instructions at home:  Medicines  · Take over-the-counter and prescription medicines only as told by your health care provider. Do not take any other medicines without first asking your health care provider.  · Do not drive or use heavy machinery while taking prescription pain medicine.  Lifestyle    · Do not use any products that contain nicotine or tobacco, such as cigarettes and e-cigarettes. Smoking keeps blood from reaching damaged nerves. If you need help quitting, ask your health care provider.  · Avoid or limit alcohol. Too much alcohol can cause a vitamin B deficiency, and vitamin B is needed for healthy nerves.  · Eat a healthy diet. This includes:  ? Eating foods that are high in fiber, such as fresh fruits and vegetables, whole grains, and beans.  ? Limiting foods that are high in fat and processed sugars, such as fried or sweet foods.  General instructions    · If you have diabetes, work closely with your health care provider to keep your blood sugar under control.  · If you have numbness in your feet:  ? Check every day for signs of injury or infection. Watch for redness, warmth, and swelling.  ? Wear padded socks and comfortable shoes. These help protect your feet.  · Develop a good support system. Living with peripheral  neuropathy can be stressful. Consider talking with a mental health specialist or joining a support group.  · Use assistive devices and attend physical therapy as told by your health care provider. This may include using a walker or a cane.  · Keep all follow-up visits as told by your health care provider. This is important.  Contact a health care provider if:  · You have new signs or symptoms of peripheral neuropathy.  · You are struggling emotionally from dealing with peripheral neuropathy.  · Your pain is not well-controlled.  Get help right away if:  · You have an injury or infection that is not healing normally.  · You develop new weakness in an arm or leg.  · You fall frequently.  Summary  · Peripheral neuropathy is when the nerves in the arms, or legs are damaged, resulting in numbness, weakness, or pain.  · There are many causes of peripheral neuropathy, including diabetes, pinched nerves, vitamin deficiencies, autoimmune disease, and hereditary conditions.  · Diagnosing and finding the cause of peripheral neuropathy can be difficult. Your health care provider will take your medical history, do a physical exam, and do tests, including blood tests and nerve function tests.  · Treatment involves treating the underlying cause of the neuropathy and taking medicines to help control pain. Physical therapy and assistive devices may also help.  This information is not intended to replace advice given to you by your health care provider. Make sure you discuss any questions you have with your health care provider.  Document Revised: 11/30/2018 Document Reviewed: 02/26/2018  [employer] Patient Education © 2021 [employer]    Suspect Essential HTN.Good BP control is encouraged with Goal BP based on JNC 8 guidelines 2014 <140/90 for patients with known cardiac disease and diabetes. (FRANCINE. 2014:322 (5):507-520. doi:10.1001/francine.2013.54998): general population <60 yr old goal BP <140/90 and for those >60 <150/90.  For  patients of all ages with Diabetes, CKD, Known CAD <140/90. Recommended to the patient to obtain electronic home BP machine with upper arm blood pressure cuff and to check regularly as instructed.  Keep BP log and bring to subsequent visits. Stable, at goal.  a. LABS: routine for hypertension recommended and ordered if necessary.  b. Recommend if you do not have a home BP machine to obtain an electronic machine with arm blood pressure cuff.      c. Monitor BP over the next week and keep log to bring back to office. Discussed medication therapy however pt wants to try to control with diet exercise. .  Your provider  has recommended self-monitoring of your blood pressure.  If you do not have a blood pressure cuff you may purchase one from the local pharmacy.  You may ask the pharmacist which brand and model they recommend.  Obtain your blood pressure measurement at least 2x per week.  You should also check your blood pressure if you experience any symptoms of blurred visit, dizziness or headache.  Please record all blood pressure measurements and bring them to next office visit.  If you have any questions about the accuracy of your blood pressure machine please bring it in to the office and our staff will be happy to check accuracy.   d. Encouraged to eat a low sodium heart healthy diet  e. Offered handout on HTN educational topics.  These were provided if patient requested these today.  f. MEDS: as listed in today's visit.  g. Risks/benefits of current and new medications discussed with the patient and or family today.  The patient/family are aware and accept that if there any side effects they should call or return to clinic as soon as possible.  Appropriate F/U discussed for topics addressed today. All questions were answered to the  satisfactory state of patient/family.  Should symptoms fail to improve or worsen they agree to call or return to clinic or to go to the ER. Education handouts were offered on any new  Rx if requested.  Discussed the importance of following up with any needed screening tests/labs/specialist appointments and any requested follow-up recommended by me today.  Importance of maintaining follow-up discussed and patient accepts that missed appointments can delay diagnosis and potentially lead to worsening of conditions.

## 2021-06-09 ENCOUNTER — TELEPHONE (OUTPATIENT)
Dept: FAMILY MEDICINE CLINIC | Facility: CLINIC | Age: 76
End: 2021-06-09

## 2021-06-09 NOTE — TELEPHONE ENCOUNTER
Pt called, stated that [facility] did receive script for insulin, however they are requesting a new script for this due to the increase. He asks that this new script be re-sent to reflect dosage change.

## 2021-06-09 NOTE — TELEPHONE ENCOUNTER
Please send new script that reflects the dosage change to [facility].

## 2021-06-10 DIAGNOSIS — E11.52 TYPE 2 DIABETES MELLITUS WITH DIABETIC PERIPHERAL ANGIOPATHY AND GANGRENE, WITH LONG-TERM CURRENT USE OF INSULIN (HCC): ICD-10-CM

## 2021-06-10 DIAGNOSIS — Z79.4 TYPE 2 DIABETES MELLITUS WITH DIABETIC PERIPHERAL ANGIOPATHY AND GANGRENE, WITH LONG-TERM CURRENT USE OF INSULIN (HCC): ICD-10-CM

## 2021-06-10 RX ORDER — METOPROLOL TARTRATE 50 MG/1
TABLET, FILM COATED ORAL
Qty: 180 TABLET | Refills: 1 | Status: SHIPPED | OUTPATIENT
Start: 2021-06-10 | End: 2021-10-25

## 2021-06-10 NOTE — TELEPHONE ENCOUNTER
Verified with patient on Novolog dosage. Patient stated Dr. Freedman has him on a sliding scale of at least 30 units and at times more according to his readings. Patient takes four times daily. Please send in new script to reflect this to [facility]

## 2021-06-28 DIAGNOSIS — K29.00 ACUTE SUPERFICIAL GASTRITIS WITHOUT HEMORRHAGE: ICD-10-CM

## 2021-06-29 RX ORDER — PANTOPRAZOLE SODIUM 40 MG/1
TABLET, DELAYED RELEASE ORAL
Qty: 90 TABLET | Refills: 0 | Status: SHIPPED | OUTPATIENT
Start: 2021-06-29 | End: 2021-09-07

## 2021-07-12 ENCOUNTER — TELEPHONE (OUTPATIENT)
Dept: FAMILY MEDICINE CLINIC | Facility: CLINIC | Age: 76
End: 2021-07-12

## 2021-07-12 DIAGNOSIS — E11.42 DIABETIC POLYNEUROPATHY ASSOCIATED WITH TYPE 2 DIABETES MELLITUS (HCC): Primary | ICD-10-CM

## 2021-07-12 DIAGNOSIS — I73.9 PAD (PERIPHERAL ARTERY DISEASE) (HCC): ICD-10-CM

## 2021-07-12 NOTE — TELEPHONE ENCOUNTER
Caller: Darren Shay    Relationship: Self    Best call back number: [phone]    What is the medical concern/diagnosis:   TOENAIL ISSUE     What specialty or service is being requested:   PODIATRIST    What is the provider, practice or medical service name:   N/A    What is the office location:     [address] [place] [address]    What is the office phone number:   N/A    Any additional details:   N/A

## 2021-07-15 RX ORDER — SIMVASTATIN 20 MG
TABLET ORAL
Qty: 90 TABLET | Refills: 1 | Status: SHIPPED | OUTPATIENT
Start: 2021-07-15 | End: 2021-12-02

## 2021-08-03 NOTE — PROGRESS NOTES
[facility] - PODIATRY    Today's Date: 08/12/21    Patient Name: Darren Shay  MRN: [id number]  CSN: [id number]  PCP: Robin Freedman MD  Referring Provider: Pat Kearns AP*    SUBJECTIVE     Chief Complaint   Patient presents with   • Establish Care     pcp06/04/2021  Diabetic polyneuropathy/ PAD- pt states he has a very overgrown and thickened toenail on right great toe for the last 4-5 years and he cannot clip them himself. He states that he lost his toes on left foot after infection from bad clipping his toes- pt denies pain- pt presents with overgrown yellowed toenail on right great toe TMA on left foot   • Diabetes     221mg/dl last BG     HPI: Darren Shay, a 76 y.o.male, comes to clinic as a(n) new patient presenting for diabetic foot exam and complaining of thickened, irregular toenail growth of right hallux. Patient has h/o cataracts, colon CA, CAD, DM, TMA, neuropathy, ileostomy, UC, tobacco use. Patient is IDDM with last stated BG level of 221mg/dl. Patient presents with for thickened, irregular growth of right hallux nail. Has history of TMA of left foot that he relates happened after nail were cut too short and resulted in infection. States that he has been experiencing falls and has had to go to the ED on several occasions. Uses cane for support but admits that he does not use it routinely. Also has toe  shoe. Takes Plavix daily. Smokes a pipe daily. Relates previous treatment(s) including care by vascular surgery, toe filler, care by podiatrist. Denies any constitutional symptoms. No other pedal complaints at this time.    Past Medical History:   Diagnosis Date   • Cataracts, bilateral    • Colon cancer (CMS/HCC)    • Coronary artery disease    • Diabetes mellitus (CMS/HCC)    • Diabetic foot ulcers (CMS/HCC)    • Hypertension    • Ileostomy present (CMS/HCC)    • Neuropathy    • UC (ulcerative colitis confined to rectum) (CMS/HCC)      Past Surgical  "History:   Procedure Laterality Date   • CHOLECYSTECTOMY     • COLOSTOMY     • CORONARY ARTERY BYPASS GRAFT  1993    x5   • ORIF FOOT FRACTURE Right 1/17/2019    Procedure: PARTIAL REMOVAL OF BONE MEDIAL CUNEIFORM AND 1ST METATARSAL - RIGHT FOOT EXCISION OF ULCERATION;  Surgeon: Florin Kearns DPM;  Location: [facility] OR;  Service: Podiatry   • TOE AMPUTATION      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 at      Family History   Problem Relation Age of Onset   • Cancer Mother    • Heart disease Mother    • Diabetes Mother    • Cancer Father    • Diabetes Sister    • No Known Problems Brother    • Diabetes Sister    • No Known Problems Brother    • No Known Problems Brother    • No Known Problems Brother      Social History     Socioeconomic History   • Marital status:      Spouse name: Not on file   • Number of children: Not on file   • Years of education: Not on file   • Highest education level: Not on file   Tobacco Use   • Smoking status: Current Every Day Smoker     Years: 10.00     Types: Pipe   • Smokeless tobacco: Never Used   Vaping Use   • Vaping Use: Never used   Substance and Sexual Activity   • Alcohol use: No   • Drug use: No   • Sexual activity: Not Currently     Partners: Female     Allergies   Allergen Reactions   • Cephalexin Hives     Current Outpatient Medications   Medication Sig Dispense Refill   • Accu-Chek Jessica Plus test strip TEST BLOOD SUGAR THREE TIMES DAILY 400 each 3   • albuterol sulfate  (90 Base) MCG/ACT inhaler Inhale 2 puffs Every 4 (Four) Hours As Needed for Wheezing or Shortness of Air. 18 g 5   • aspirin 81 MG chewable tablet Chew 81 mg Daily.     • cilostazol (PLETAL) 100 MG tablet TAKE 1 TABLET TWICE DAILY 180 tablet 3   • clopidogrel (PLAVIX) 75 MG tablet TAKE 1 TABLET EVERY DAY 30 tablet 0   • Droplet Insulin Syringe 31G X 5/16\" 1 ML misc USE THREE TIMES DAILY AS DIRECTED 300 each 5   • folic acid (FOLVITE) 400 MCG tablet Take 800 mcg by mouth Daily.     • gabapentin (NEURONTIN) 300 " MG capsule Take 1 capsule by mouth 2 (two) times a day. 180 capsule 1   • insulin aspart (novoLOG) 100 UNIT/ML injection Inject 30 units 4 times daily plus additional units per sliding scale. 300 mL 2   • insulin glargine (Lantus) 100 UNIT/ML injection Inject 82 Units under the skin into the appropriate area as directed Every Night. 100 mL 2   • metoprolol tartrate (LOPRESSOR) 50 MG tablet TAKE 1 TABLET TWICE DAILY 180 tablet 1   • pantoprazole (PROTONIX) 40 MG EC tablet TAKE 1 TABLET EVERY DAY 90 tablet 0   • simvastatin (ZOCOR) 20 MG tablet TAKE 1 TABLET AT BEDTIME 90 tablet 1     No current facility-administered medications for this visit.     Review of Systems    OBJECTIVE     Vitals:    08/12/21 0917   BP: 118/52   Pulse: 78   SpO2: 95%       PHYSICAL EXAM  GEN:   Accompanied by spouse.     Foot/Ankle Exam:       General:   Appearance: appears stated age and healthy    Orientation: AAOx3    Affect: appropriate    Gait: unimpaired    Assistance: independent    Shoe Gear:  Diabetic shoes (left toe filler)    VASCULAR      Right Foot Vascularity   Dorsalis pedis:  2+  Posterior tibial:  1+  Skin Temperature: warm    Edema Grading:  None  CFT:  3  Pedal Hair Growth:  Absent  Varicosities: none       Left Foot Vascularity   Dorsalis pedis:  2+  Posterior tibial:  1+  Skin Temperature: warm    Edema Grading:  None  CFT:  3  Pedal Hair Growth:  Absent  Varicosities: none        NEUROLOGIC     Right Foot Neurologic   Light touch sensation:  Absent  Vibratory sensation:  Absent  Hot/Cold sensation: absent    Protective Sensation using [place] Monofilament:  0     Left Foot Neurologic   Light touch sensation:  Absent  Vibratory sensation:  Absent  Hot/cold sensation: absent    Protective Sensation using [place] Monofilament:  0     MUSCULOSKELETAL      Right Foot Musculoskeletal   Ecchymosis:  None  Tenderness: none    Arch:  Normal  Mallet toe:  First toe, second toe and third toe     Left Foot  Musculoskeletal    Amputation   Trans metatarsal left foot: Yes    Ecchymosis:  None  Tenderness: none    Arch:  Normal     MUSCLE STRENGTH     Right Foot Muscle Strength   Foot dorsiflexion:  5  Foot plantar flexion:  5  Foot inversion:  5  Foot eversion:  5     Left Foot Muscle Strength   Foot dorsiflexion:  5  Foot plantar flexion:  5  Foot inversion:  5  Foot eversion:  5     RANGE OF MOTION      Right Foot Range of Motion   Foot and ankle ROM within normal limits       Left Foot Range of Motion   Foot and ankle ROM within normal limits       DERMATOLOGIC     Right Foot Dermatologic   Skin: skin intact    Skin comment:  Stucco keratoses  Nails: onychomycosis, abnormally thick, subungual debris and dystrophic nails       Left Foot Dermatologic   Skin: skin intact    Skin comment:  Stucco keratoses      RADIOLOGY/NUCLEAR:  No results found.    LABORATORY/CULTURE RESULTS:      PATHOLOGY RESULTS:       ASSESSMENT/PLAN     Diagnoses and all orders for this visit:    1. Onychomycosis (Primary)    2. Controlled type 2 diabetes mellitus with diabetic polyneuropathy, with long-term current use of insulin (CMS/[facility])    3. History of transmetatarsal amputation of left foot (CMS/[facility])    4. Encounter for current long term use of antiplatelet drug    5. Tobacco use    6. Falls frequently      Comprehensive lower extremity examination and evaluation was performed.  Discussed findings and treatment plan including risks, benefits, and treatment options with patient in detail. Patient agreed with treatment plan.  After verbal consent obtained, nail(s) x5 debrided of length and thickness with nail nipper without incidence  Patient may maintain nails and calluses at home utilizing emery board or pumice stone between visits as needed  Reviewed at home diabetic foot care including daily foot checks   Tobacco cessation needed.   Tighter diabetic control needed.   Advised to utilize cane or walker routinely with ambulation to assist in  fall prevention. Also discussed prescription for brace to assist with mechanics and help fall prevention.  An After Visit Summary was printed and given to the patient at discharge, including (if requested) any available informative/educational handouts regarding diagnosis, treatment, or medications. All questions were answered to patient/family satisfaction. Should symptoms fail to improve or worsen they agree to call or return to clinic or to go to the Emergency Department. Discussed the importance of following up with any needed screening tests/labs/specialist appointments and any requested follow-up recommended by me today. Importance of maintaining follow-up discussed and patient accepts that missed appointments can delay diagnosis and potentially lead to worsening of conditions.  Return in about 3 months (around 11/12/2021)., or sooner if acute issues arise.        This document has been electronically signed by Manas Claire DPM on August 12, 2021 09:49 CDT

## 2021-08-12 ENCOUNTER — OFFICE VISIT (OUTPATIENT)
Dept: PODIATRY | Facility: CLINIC | Age: 76
End: 2021-08-12

## 2021-08-12 VITALS
HEART RATE: 78 BPM | SYSTOLIC BLOOD PRESSURE: 118 MMHG | HEIGHT: 72 IN | BODY MASS INDEX: 28.85 KG/M2 | OXYGEN SATURATION: 95 % | DIASTOLIC BLOOD PRESSURE: 52 MMHG | WEIGHT: 213 LBS

## 2021-08-12 DIAGNOSIS — Z89.432 HISTORY OF TRANSMETATARSAL AMPUTATION OF LEFT FOOT (HCC): ICD-10-CM

## 2021-08-12 DIAGNOSIS — Z72.0 TOBACCO USE: ICD-10-CM

## 2021-08-12 DIAGNOSIS — B35.1 ONYCHOMYCOSIS: Primary | ICD-10-CM

## 2021-08-12 DIAGNOSIS — E11.42 CONTROLLED TYPE 2 DIABETES MELLITUS WITH DIABETIC POLYNEUROPATHY, WITH LONG-TERM CURRENT USE OF INSULIN (HCC): ICD-10-CM

## 2021-08-12 DIAGNOSIS — Z79.02 ENCOUNTER FOR CURRENT LONG TERM USE OF ANTIPLATELET DRUG: ICD-10-CM

## 2021-08-12 DIAGNOSIS — Z79.4 CONTROLLED TYPE 2 DIABETES MELLITUS WITH DIABETIC POLYNEUROPATHY, WITH LONG-TERM CURRENT USE OF INSULIN (HCC): ICD-10-CM

## 2021-08-12 DIAGNOSIS — R29.6 FALLS FREQUENTLY: ICD-10-CM

## 2021-08-12 PROCEDURE — 99203 OFFICE O/P NEW LOW 30 MIN: CPT | Performed by: PODIATRIST

## 2021-08-12 PROCEDURE — 11720 DEBRIDE NAIL 1-5: CPT | Performed by: PODIATRIST

## 2021-08-20 DIAGNOSIS — Z79.4 TYPE 2 DIABETES MELLITUS WITH DIABETIC PERIPHERAL ANGIOPATHY AND GANGRENE, WITH LONG-TERM CURRENT USE OF INSULIN (HCC): ICD-10-CM

## 2021-08-20 DIAGNOSIS — E11.52 TYPE 2 DIABETES MELLITUS WITH DIABETIC PERIPHERAL ANGIOPATHY AND GANGRENE, WITH LONG-TERM CURRENT USE OF INSULIN (HCC): ICD-10-CM

## 2021-08-20 RX ORDER — INSULIN GLARGINE 100 [IU]/ML
INJECTION, SOLUTION SUBCUTANEOUS
Qty: 70 ML | Refills: 1 | Status: SHIPPED | OUTPATIENT
Start: 2021-08-20 | End: 2022-01-03

## 2021-09-03 ENCOUNTER — OFFICE VISIT (OUTPATIENT)
Dept: FAMILY MEDICINE CLINIC | Facility: CLINIC | Age: 76
End: 2021-09-03

## 2021-09-03 VITALS
HEART RATE: 83 BPM | BODY MASS INDEX: 29.09 KG/M2 | SYSTOLIC BLOOD PRESSURE: 145 MMHG | WEIGHT: 214.8 LBS | OXYGEN SATURATION: 99 % | DIASTOLIC BLOOD PRESSURE: 67 MMHG | HEIGHT: 72 IN | TEMPERATURE: 98.1 F

## 2021-09-03 DIAGNOSIS — L08.9 INFECTED SEBACEOUS CYST: ICD-10-CM

## 2021-09-03 DIAGNOSIS — Z79.4 TYPE 2 DIABETES MELLITUS WITH MICROALBUMINURIA, WITH LONG-TERM CURRENT USE OF INSULIN (HCC): Primary | ICD-10-CM

## 2021-09-03 DIAGNOSIS — L72.3 INFECTED SEBACEOUS CYST: ICD-10-CM

## 2021-09-03 DIAGNOSIS — R80.9 TYPE 2 DIABETES MELLITUS WITH MICROALBUMINURIA, WITH LONG-TERM CURRENT USE OF INSULIN (HCC): Primary | ICD-10-CM

## 2021-09-03 DIAGNOSIS — E11.29 TYPE 2 DIABETES MELLITUS WITH MICROALBUMINURIA, WITH LONG-TERM CURRENT USE OF INSULIN (HCC): Primary | ICD-10-CM

## 2021-09-03 DIAGNOSIS — L03.031 CELLULITIS OF TOE OF RIGHT FOOT: ICD-10-CM

## 2021-09-03 LAB — HBA1C MFR BLD: 6.6 %

## 2021-09-03 PROCEDURE — 99213 OFFICE O/P EST LOW 20 MIN: CPT | Performed by: FAMILY MEDICINE

## 2021-09-03 RX ORDER — SULFAMETHOXAZOLE AND TRIMETHOPRIM 800; 160 MG/1; MG/1
1 TABLET ORAL 2 TIMES DAILY
Qty: 20 TABLET | Refills: 0 | Status: SHIPPED | OUTPATIENT
Start: 2021-09-03 | End: 2022-05-16

## 2021-09-06 DIAGNOSIS — K29.00 ACUTE SUPERFICIAL GASTRITIS WITHOUT HEMORRHAGE: ICD-10-CM

## 2021-09-07 RX ORDER — PANTOPRAZOLE SODIUM 40 MG/1
TABLET, DELAYED RELEASE ORAL
Qty: 90 TABLET | Refills: 0 | Status: SHIPPED | OUTPATIENT
Start: 2021-09-07 | End: 2021-11-12

## 2021-10-23 DIAGNOSIS — E11.52 TYPE 2 DIABETES MELLITUS WITH DIABETIC PERIPHERAL ANGIOPATHY AND GANGRENE, WITH LONG-TERM CURRENT USE OF INSULIN (HCC): ICD-10-CM

## 2021-10-23 DIAGNOSIS — Z79.4 TYPE 2 DIABETES MELLITUS WITH DIABETIC PERIPHERAL ANGIOPATHY AND GANGRENE, WITH LONG-TERM CURRENT USE OF INSULIN (HCC): ICD-10-CM

## 2021-10-25 RX ORDER — METOPROLOL TARTRATE 50 MG/1
TABLET, FILM COATED ORAL
Qty: 180 TABLET | Refills: 1 | Status: SHIPPED | OUTPATIENT
Start: 2021-10-25 | End: 2022-03-28

## 2021-10-25 RX ORDER — BLOOD SUGAR DIAGNOSTIC
STRIP MISCELLANEOUS
Qty: 300 EACH | Refills: 1 | Status: SHIPPED | OUTPATIENT
Start: 2021-10-25 | End: 2022-03-28

## 2021-11-10 NOTE — PROGRESS NOTES
[facility] - PODIATRY    Today's Date: 11/11/21    Patient Name: Darren Shay  MRN: [id number]  CSN: [id number]  PCP: Robin Freedman MD  Referring Provider: No ref. provider found    SUBJECTIVE     Chief Complaint   Patient presents with   • Follow-up     Pt is here for a 3 month f/u diabetic foot/nail care.  Pt states he is doing about the same.  Last saw his PCP on 6/4/21.   • Diabetes     Pt's last blood sugar reading was 133 mg/dl.     HPI: Darren Shay, a 76 y.o.male, comes to clinic as a(n) new patient presenting for diabetic foot exam and complaining of thickened, irregular toenail growth of right hallux. Patient has h/o cataracts, colon CA, CAD, DM, TMA, neuropathy, ileostomy, UC, tobacco use. Patient is IDDM with last stated BG level of 133mg/dl. Patient presents with for thickened, irregular growth of right hallux nail. Has history of TMA of left foot. Uses cane for support.  Also has toe  shoe but does not always use it . Takes Plavix daily. Smokes a pipe daily. Relates previous treatment(s) including care by vascular surgery, toe filler, care by podiatrist. Denies any constitutional symptoms. No other pedal complaints at this time.    Past Medical History:   Diagnosis Date   • Cataracts, bilateral    • Colon cancer (HCC)    • Coronary artery disease    • Diabetes mellitus (HCC)    • Diabetic foot ulcers (HCC)    • Hypertension    • Ileostomy present (HCC)    • Neuropathy    • UC (ulcerative colitis confined to rectum) (HCC)      Past Surgical History:   Procedure Laterality Date   • CHOLECYSTECTOMY     • COLOSTOMY     • CORONARY ARTERY BYPASS GRAFT  1993    x5   • ORIF FOOT FRACTURE Right 1/17/2019    Procedure: PARTIAL REMOVAL OF BONE MEDIAL CUNEIFORM AND 1ST METATARSAL - RIGHT FOOT EXCISION OF ULCERATION;  Surgeon: Florin Kearns DPM;  Location: [facility] OR;  Service: Podiatry   • TOE AMPUTATION      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 at      Family History   Problem Relation Age  "of Onset   • Cancer Mother    • Heart disease Mother    • Diabetes Mother    • Cancer Father    • Diabetes Sister    • No Known Problems Brother    • Diabetes Sister    • No Known Problems Brother    • No Known Problems Brother    • No Known Problems Brother      Social History     Socioeconomic History   • Marital status:    Tobacco Use   • Smoking status: Current Every Day Smoker     Years: 10.00     Types: Pipe   • Smokeless tobacco: Never Used   Vaping Use   • Vaping Use: Never used   Substance and Sexual Activity   • Alcohol use: No   • Drug use: No   • Sexual activity: Not Currently     Partners: Female     Allergies   Allergen Reactions   • Cephalexin Hives     Current Outpatient Medications   Medication Sig Dispense Refill   • Accu-Chek Jessica Plus test strip TEST BLOOD SUGAR THREE TIMES DAILY 300 each 1   • aspirin 81 MG chewable tablet Chew 81 mg Daily.     • cilostazol (PLETAL) 100 MG tablet TAKE 1 TABLET TWICE DAILY 180 tablet 3   • clopidogrel (PLAVIX) 75 MG tablet TAKE 1 TABLET EVERY DAY 30 tablet 0   • Droplet Insulin Syringe 31G X 5/16\" 1 ML misc USE THREE TIMES DAILY AS DIRECTED 300 each 5   • folic acid (FOLVITE) 400 MCG tablet Take 800 mcg by mouth Daily.     • gabapentin (NEURONTIN) 300 MG capsule Take 1 capsule by mouth 2 (two) times a day. 180 capsule 1   • insulin aspart (novoLOG) 100 UNIT/ML injection Inject 30 units 4 times daily plus additional units per sliding scale. (Patient taking differently: Inject 40 units 4 times daily plus additional units per sliding scale.) 300 mL 2   • Lantus 100 UNIT/ML injection INJECT 82 UNITS UNDER THE SKIN IN THE APPROPRIATE AREA AS DIRECTED EVERY NIGHT. 70 mL 1   • metoprolol tartrate (LOPRESSOR) 50 MG tablet TAKE 1 TABLET TWICE DAILY 180 tablet 1   • pantoprazole (PROTONIX) 40 MG EC tablet TAKE 1 TABLET EVERY DAY 90 tablet 0   • simvastatin (ZOCOR) 20 MG tablet TAKE 1 TABLET AT BEDTIME 90 tablet 1   • sulfamethoxazole-trimethoprim (BACTRIM DS,SEPTRA " DS) 800-160 MG per tablet Take 1 tablet by mouth 2 (Two) Times a Day. 20 tablet 0     No current facility-administered medications for this visit.     Review of Systems   Constitutional: Negative for chills and fever.   HENT: Negative for congestion.    Respiratory: Negative for shortness of breath.    Cardiovascular: Negative for chest pain and leg swelling.   Gastrointestinal: Negative for constipation, diarrhea, nausea and vomiting.   Musculoskeletal: Positive for arthralgias and gait problem.        Foot pain   Skin: Negative for wound.   Neurological: Positive for numbness.   Psychiatric/Behavioral: Negative for agitation.       OBJECTIVE     Vitals:    11/11/21 0929   BP: 118/74   Pulse: 79   SpO2: 97%       PHYSICAL EXAM  GEN:   Accompanied by spouse.     Foot/Ankle Exam:       General:   Appearance: appears stated age and healthy    Orientation: AAOx3    Affect: appropriate    Gait: shuffling    Assistance: cane    Shoe Gear:  Casual shoes    VASCULAR      Right Foot Vascularity   Dorsalis pedis:  2+  Posterior tibial:  1+  Skin Temperature: warm    Edema Grading:  None  CFT:  3  Pedal Hair Growth:  Absent  Varicosities: none       Left Foot Vascularity   Dorsalis pedis:  2+  Posterior tibial:  1+  Skin Temperature: warm    Edema Grading:  None  CFT:  3  Pedal Hair Growth:  Absent  Varicosities: none        NEUROLOGIC     Right Foot Neurologic   Light touch sensation:  Absent  Vibratory sensation:  Absent  Hot/Cold sensation: absent    Protective Sensation using [place] Monofilament:  0     Left Foot Neurologic   Light touch sensation:  Absent  Vibratory sensation:  Absent  Hot/cold sensation: absent    Protective Sensation using [place] Monofilament:  0     MUSCULOSKELETAL      Right Foot Musculoskeletal   Ecchymosis:  None  Tenderness: none    Arch:  Normal  Mallet toe:  First toe, second toe and third toe     Left Foot Musculoskeletal    Amputation   Trans metatarsal left foot: Yes     Ecchymosis:  None  Tenderness: none    Arch:  Normal     MUSCLE STRENGTH     Right Foot Muscle Strength   Foot dorsiflexion:  5  Foot plantar flexion:  5  Foot inversion:  5  Foot eversion:  5     Left Foot Muscle Strength   Foot dorsiflexion:  5  Foot plantar flexion:  5  Foot inversion:  5  Foot eversion:  5     RANGE OF MOTION      Right Foot Range of Motion   Foot and ankle ROM within normal limits       Left Foot Range of Motion   Foot and ankle ROM within normal limits       DERMATOLOGIC     Right Foot Dermatologic   Skin: corn    Skin comment:  Stucco keratoses  Nails: onychomycosis, abnormally thick, subungual debris and dystrophic nails       Left Foot Dermatologic   Skin: skin intact    Skin comment:  Stucco keratoses     Image:       RADIOLOGY/NUCLEAR:  No results found.    LABORATORY/CULTURE RESULTS:      PATHOLOGY RESULTS:       ASSESSMENT/PLAN     Diagnoses and all orders for this visit:    1. Onychomycosis (Primary)    2. Foot callus    3. Controlled type 2 diabetes mellitus with diabetic polyneuropathy, with long-term current use of insulin ([facility])    4. Encounter for current long term use of antiplatelet drug    5. History of transmetatarsal amputation of left foot ([facility])    6. Tobacco use      Comprehensive lower extremity examination and evaluation was performed.  Discussed findings and treatment plan including risks, benefits, and treatment options with patient in detail. Patient agreed with treatment plan.  After verbal consent obtained, nail(s) x5 debrided of length and thickness with nail nipper without incidence  After verbal consent obtained, calluses x2 pared utilizing dermal curette and/or scalpel without incidence  Patient may maintain nails and calluses at home utilizing emery board or pumice stone between visits as needed  Reviewed at home diabetic foot care including daily foot checks   Tobacco cessation needed.   Continue BG control  Advised to utilize cane or walker routinely with  ambulation to assist in fall prevention.   An After Visit Summary was printed and given to the patient at discharge, including (if requested) any available informative/educational handouts regarding diagnosis, treatment, or medications. All questions were answered to patient/family satisfaction. Should symptoms fail to improve or worsen they agree to call or return to clinic or to go to the Emergency Department. Discussed the importance of following up with any needed screening tests/labs/specialist appointments and any requested follow-up recommended by me today. Importance of maintaining follow-up discussed and patient accepts that missed appointments can delay diagnosis and potentially lead to worsening of conditions.  Return in about 3 months (around 2/11/2022) for Follow-up with Dr. Claire., or sooner if acute issues arise.        This document has been electronically signed by Manas Claire DPM on November 11, 2021 09:50 CST

## 2021-11-11 ENCOUNTER — OFFICE VISIT (OUTPATIENT)
Dept: PODIATRY | Facility: CLINIC | Age: 76
End: 2021-11-11

## 2021-11-11 VITALS
HEART RATE: 79 BPM | SYSTOLIC BLOOD PRESSURE: 118 MMHG | BODY MASS INDEX: 29.12 KG/M2 | DIASTOLIC BLOOD PRESSURE: 74 MMHG | WEIGHT: 215 LBS | HEIGHT: 72 IN | OXYGEN SATURATION: 97 %

## 2021-11-11 DIAGNOSIS — B35.1 ONYCHOMYCOSIS: Primary | ICD-10-CM

## 2021-11-11 DIAGNOSIS — Z89.432 HISTORY OF TRANSMETATARSAL AMPUTATION OF LEFT FOOT (HCC): ICD-10-CM

## 2021-11-11 DIAGNOSIS — E11.42 CONTROLLED TYPE 2 DIABETES MELLITUS WITH DIABETIC POLYNEUROPATHY, WITH LONG-TERM CURRENT USE OF INSULIN (HCC): ICD-10-CM

## 2021-11-11 DIAGNOSIS — L84 FOOT CALLUS: ICD-10-CM

## 2021-11-11 DIAGNOSIS — Z79.4 CONTROLLED TYPE 2 DIABETES MELLITUS WITH DIABETIC POLYNEUROPATHY, WITH LONG-TERM CURRENT USE OF INSULIN (HCC): ICD-10-CM

## 2021-11-11 DIAGNOSIS — Z72.0 TOBACCO USE: ICD-10-CM

## 2021-11-11 DIAGNOSIS — Z79.02 ENCOUNTER FOR CURRENT LONG TERM USE OF ANTIPLATELET DRUG: ICD-10-CM

## 2021-11-11 PROCEDURE — 11720 DEBRIDE NAIL 1-5: CPT | Performed by: PODIATRIST

## 2021-11-11 PROCEDURE — 11056 PARNG/CUTG B9 HYPRKR LES 2-4: CPT | Performed by: PODIATRIST

## 2021-11-12 DIAGNOSIS — K29.00 ACUTE SUPERFICIAL GASTRITIS WITHOUT HEMORRHAGE: ICD-10-CM

## 2021-11-12 RX ORDER — PANTOPRAZOLE SODIUM 40 MG/1
TABLET, DELAYED RELEASE ORAL
Qty: 90 TABLET | Refills: 0 | Status: SHIPPED | OUTPATIENT
Start: 2021-11-12 | End: 2022-01-24

## 2021-12-02 RX ORDER — SIMVASTATIN 20 MG
TABLET ORAL
Qty: 90 TABLET | Refills: 1 | Status: SHIPPED | OUTPATIENT
Start: 2021-12-02 | End: 2022-05-02 | Stop reason: SDUPTHER

## 2021-12-31 DIAGNOSIS — E11.52 TYPE 2 DIABETES MELLITUS WITH DIABETIC PERIPHERAL ANGIOPATHY AND GANGRENE, WITH LONG-TERM CURRENT USE OF INSULIN: ICD-10-CM

## 2021-12-31 DIAGNOSIS — Z79.4 TYPE 2 DIABETES MELLITUS WITH DIABETIC PERIPHERAL ANGIOPATHY AND GANGRENE, WITH LONG-TERM CURRENT USE OF INSULIN: ICD-10-CM

## 2022-01-01 ENCOUNTER — APPOINTMENT (OUTPATIENT)
Dept: GENERAL RADIOLOGY | Age: 77
DRG: 280 | End: 2022-01-01
Payer: MEDICARE

## 2022-01-01 ENCOUNTER — APPOINTMENT (OUTPATIENT)
Dept: CT IMAGING | Age: 77
DRG: 641 | End: 2022-01-01
Attending: HOSPITALIST
Payer: MEDICARE

## 2022-01-01 ENCOUNTER — APPOINTMENT (OUTPATIENT)
Dept: CT IMAGING | Age: 77
DRG: 280 | End: 2022-01-01
Payer: MEDICARE

## 2022-01-01 ENCOUNTER — HOSPITAL ENCOUNTER (INPATIENT)
Age: 77
LOS: 5 days | Discharge: HOSPICE | DRG: 280 | End: 2022-10-21
Attending: EMERGENCY MEDICINE | Admitting: INTERNAL MEDICINE
Payer: MEDICARE

## 2022-01-01 ENCOUNTER — APPOINTMENT (OUTPATIENT)
Dept: NUCLEAR MEDICINE | Age: 77
DRG: 641 | End: 2022-01-01
Attending: HOSPITALIST
Payer: MEDICARE

## 2022-01-01 ENCOUNTER — HOSPITAL ENCOUNTER (INPATIENT)
Age: 77
LOS: 1 days | Discharge: NURSING, REHAB OR LONG-TERM CARE | DRG: 641 | End: 2022-09-30
Attending: HOSPITALIST | Admitting: HOSPITALIST
Payer: MEDICARE

## 2022-01-01 ENCOUNTER — APPOINTMENT (OUTPATIENT)
Dept: GENERAL RADIOLOGY | Age: 77
DRG: 947 | End: 2022-01-01
Attending: PSYCHIATRY & NEUROLOGY
Payer: MEDICARE

## 2022-01-01 ENCOUNTER — TELEPHONE (OUTPATIENT)
Dept: HEMATOLOGY | Age: 77
End: 2022-01-01

## 2022-01-01 ENCOUNTER — APPOINTMENT (OUTPATIENT)
Dept: GENERAL RADIOLOGY | Age: 77
DRG: 641 | End: 2022-01-01
Attending: HOSPITALIST
Payer: MEDICARE

## 2022-01-01 ENCOUNTER — TELEPHONE (OUTPATIENT)
Dept: INTERNAL MEDICINE CLINIC | Age: 77
End: 2022-01-01

## 2022-01-01 ENCOUNTER — HOSPITAL ENCOUNTER (INPATIENT)
Age: 77
LOS: 2 days | Discharge: STILL ADMITTED OR ADMINISTRATIVE | DRG: 947 | End: 2022-09-29
Attending: PSYCHIATRY & NEUROLOGY | Admitting: PSYCHIATRY & NEUROLOGY
Payer: MEDICARE

## 2022-01-01 ENCOUNTER — HOSPITAL ENCOUNTER (INPATIENT)
Age: 77
LOS: 15 days | Discharge: NURSING, REHAB OR LONG-TERM CARE | DRG: 947 | End: 2022-10-15
Attending: PSYCHIATRY & NEUROLOGY | Admitting: PSYCHIATRY & NEUROLOGY
Payer: MEDICARE

## 2022-01-01 VITALS
HEART RATE: 74 BPM | BODY MASS INDEX: 23.96 KG/M2 | TEMPERATURE: 96.4 F | OXYGEN SATURATION: 96 % | HEIGHT: 72 IN | DIASTOLIC BLOOD PRESSURE: 42 MMHG | SYSTOLIC BLOOD PRESSURE: 88 MMHG | RESPIRATION RATE: 22 BRPM | WEIGHT: 176.9 LBS

## 2022-01-01 VITALS
OXYGEN SATURATION: 91 % | HEIGHT: 72 IN | TEMPERATURE: 95.5 F | BODY MASS INDEX: 26.07 KG/M2 | HEART RATE: 103 BPM | DIASTOLIC BLOOD PRESSURE: 50 MMHG | WEIGHT: 192.5 LBS | RESPIRATION RATE: 24 BRPM | SYSTOLIC BLOOD PRESSURE: 144 MMHG

## 2022-01-01 VITALS
TEMPERATURE: 97.5 F | HEART RATE: 88 BPM | DIASTOLIC BLOOD PRESSURE: 55 MMHG | SYSTOLIC BLOOD PRESSURE: 134 MMHG | OXYGEN SATURATION: 96 % | RESPIRATION RATE: 16 BRPM

## 2022-01-01 VITALS
SYSTOLIC BLOOD PRESSURE: 135 MMHG | OXYGEN SATURATION: 99 % | BODY MASS INDEX: 27.77 KG/M2 | DIASTOLIC BLOOD PRESSURE: 58 MMHG | HEART RATE: 103 BPM | RESPIRATION RATE: 16 BRPM | WEIGHT: 205 LBS | TEMPERATURE: 96.4 F | HEIGHT: 72 IN

## 2022-01-01 DIAGNOSIS — R41.82 ALTERED MENTAL STATUS, UNSPECIFIED ALTERED MENTAL STATUS TYPE: ICD-10-CM

## 2022-01-01 DIAGNOSIS — I50.9 CONGESTIVE HEART FAILURE, UNSPECIFIED HF CHRONICITY, UNSPECIFIED HEART FAILURE TYPE (HCC): ICD-10-CM

## 2022-01-01 DIAGNOSIS — J96.01 ACUTE RESPIRATORY FAILURE WITH HYPOXIA (HCC): Primary | ICD-10-CM

## 2022-01-01 DIAGNOSIS — I21.4 NSTEMI (NON-ST ELEVATED MYOCARDIAL INFARCTION) (HCC): ICD-10-CM

## 2022-01-01 LAB
ADENOVIRUS BY PCR: NOT DETECTED
ALBUMIN SERPL-MCNC: 1.1 G/DL (ref 3.5–5.2)
ALBUMIN SERPL-MCNC: 1.3 G/DL (ref 3.5–5.2)
ALBUMIN SERPL-MCNC: 1.8 G/DL (ref 3.5–5.2)
ALBUMIN SERPL-MCNC: 2.6 G/DL (ref 3.5–5.2)
ALBUMIN SERPL-MCNC: 2.7 G/DL (ref 3.5–5.2)
ALBUMIN SERPL-MCNC: 2.8 G/DL (ref 3.5–5.2)
ALBUMIN SERPL-MCNC: 2.9 G/DL (ref 3.5–5.2)
ALBUMIN SERPL-MCNC: 2.9 G/DL (ref 3.5–5.2)
ALBUMIN SERPL-MCNC: 3 G/DL (ref 3.5–5.2)
ALBUMIN SERPL-MCNC: 3 G/DL (ref 3.5–5.2)
ALBUMIN SERPL-MCNC: 3.1 G/DL (ref 3.5–5.2)
ALLENS TEST: ABNORMAL
ALP BLD-CCNC: 171 U/L (ref 40–130)
ALP BLD-CCNC: 176 U/L (ref 40–130)
ALP BLD-CCNC: 178 U/L (ref 40–130)
ALP BLD-CCNC: 189 U/L (ref 40–130)
ALP BLD-CCNC: 191 U/L (ref 40–130)
ALP BLD-CCNC: 193 U/L (ref 40–130)
ALP BLD-CCNC: 194 U/L (ref 40–130)
ALP BLD-CCNC: 198 U/L (ref 40–130)
ALP BLD-CCNC: 198 U/L (ref 40–130)
ALP BLD-CCNC: 206 U/L (ref 40–130)
ALP BLD-CCNC: 270 U/L (ref 40–130)
ALP BLD-CCNC: 285 U/L (ref 40–130)
ALP BLD-CCNC: 334 U/L (ref 40–130)
ALP BLD-CCNC: 378 U/L (ref 40–130)
ALT SERPL-CCNC: 424 U/L (ref 5–41)
ALT SERPL-CCNC: 63 U/L (ref 5–41)
ALT SERPL-CCNC: 66 U/L (ref 5–41)
ALT SERPL-CCNC: 67 U/L (ref 5–41)
ALT SERPL-CCNC: 68 U/L (ref 5–41)
ALT SERPL-CCNC: 69 U/L (ref 5–41)
ALT SERPL-CCNC: 71 U/L (ref 5–41)
ALT SERPL-CCNC: 73 U/L (ref 5–41)
ALT SERPL-CCNC: 76 U/L (ref 5–41)
ALT SERPL-CCNC: 839 U/L (ref 5–41)
ALT SERPL-CCNC: 91 U/L (ref 5–41)
ALT SERPL-CCNC: 94 U/L (ref 5–41)
AMMONIA: 34 UMOL/L (ref 16–60)
ANION GAP SERPL CALCULATED.3IONS-SCNC: 1 MMOL/L (ref 7–19)
ANION GAP SERPL CALCULATED.3IONS-SCNC: 10 MMOL/L (ref 7–19)
ANION GAP SERPL CALCULATED.3IONS-SCNC: 10 MMOL/L (ref 7–19)
ANION GAP SERPL CALCULATED.3IONS-SCNC: 11 MMOL/L (ref 7–19)
ANION GAP SERPL CALCULATED.3IONS-SCNC: 12 MMOL/L (ref 7–19)
ANION GAP SERPL CALCULATED.3IONS-SCNC: 5 MMOL/L (ref 7–19)
ANION GAP SERPL CALCULATED.3IONS-SCNC: 6 MMOL/L (ref 7–19)
ANION GAP SERPL CALCULATED.3IONS-SCNC: 7 MMOL/L (ref 7–19)
ANION GAP SERPL CALCULATED.3IONS-SCNC: 8 MMOL/L (ref 7–19)
ANION GAP SERPL CALCULATED.3IONS-SCNC: 9 MMOL/L (ref 7–19)
APTT: 40.1 SEC (ref 26–36.2)
AST SERPL-CCNC: 125 U/L (ref 5–40)
AST SERPL-CCNC: 136 U/L (ref 5–40)
AST SERPL-CCNC: 2331 U/L (ref 5–40)
AST SERPL-CCNC: 79 U/L (ref 5–40)
AST SERPL-CCNC: 82 U/L (ref 5–40)
AST SERPL-CCNC: 82 U/L (ref 5–40)
AST SERPL-CCNC: 83 U/L (ref 5–40)
AST SERPL-CCNC: 83 U/L (ref 5–40)
AST SERPL-CCNC: 86 U/L (ref 5–40)
AST SERPL-CCNC: 86 U/L (ref 5–40)
AST SERPL-CCNC: 89 U/L (ref 5–40)
AST SERPL-CCNC: 94 U/L (ref 5–40)
AST SERPL-CCNC: 958 U/L (ref 5–40)
AST SERPL-CCNC: 96 U/L (ref 5–40)
BACTERIA: ABNORMAL /HPF
BACTERIA: NEGATIVE /HPF
BANDED NEUTROPHILS RELATIVE PERCENT: 1 % (ref 0–5)
BASE EXCESS ARTERIAL: -2.5 MMOL/L (ref -2–2)
BASE EXCESS ARTERIAL: -2.9 MMOL/L (ref -2–2)
BASE EXCESS ARTERIAL: -3.2 MMOL/L (ref -2–2)
BASE EXCESS ARTERIAL: -3.7 MMOL/L (ref -2–2)
BASE EXCESS ARTERIAL: 4 MMOL/L (ref -2–2)
BASE EXCESS ARTERIAL: 4.1 MMOL/L (ref -2–2)
BASE EXCESS ARTERIAL: 4.2 MMOL/L (ref -2–2)
BASE EXCESS ARTERIAL: 8.4 MMOL/L (ref -2–2)
BASE EXCESS ARTERIAL: 8.8 MMOL/L (ref -2–2)
BASE EXCESS ARTERIAL: 9.2 MMOL/L (ref -2–2)
BASE EXCESS ARTERIAL: ABNORMAL MMOL/L (ref -2–2)
BASOPHILS ABSOLUTE: 0 K/UL (ref 0–0.2)
BASOPHILS ABSOLUTE: 0.1 K/UL (ref 0–0.2)
BASOPHILS ABSOLUTE: 0.2 K/UL (ref 0–0.2)
BASOPHILS RELATIVE PERCENT: 0 % (ref 0–1)
BASOPHILS RELATIVE PERCENT: 0 % (ref 0–1)
BASOPHILS RELATIVE PERCENT: 0.1 % (ref 0–1)
BASOPHILS RELATIVE PERCENT: 0.2 % (ref 0–1)
BASOPHILS RELATIVE PERCENT: 0.5 % (ref 0–1)
BASOPHILS RELATIVE PERCENT: 0.6 % (ref 0–1)
BASOPHILS RELATIVE PERCENT: 0.7 % (ref 0–1)
BASOPHILS RELATIVE PERCENT: 0.8 % (ref 0–1)
BASOPHILS RELATIVE PERCENT: 0.9 % (ref 0–1)
BASOPHILS RELATIVE PERCENT: 1 % (ref 0–1)
BASOPHILS RELATIVE PERCENT: 1 % (ref 0–1)
BASOPHILS RELATIVE PERCENT: 1.1 % (ref 0–1)
BETA-HYDROXYBUTYRATE: 3.9 MG/DL (ref 0–3)
BILIRUB SERPL-MCNC: 1.1 MG/DL (ref 0.2–1.2)
BILIRUB SERPL-MCNC: 1.1 MG/DL (ref 0.2–1.2)
BILIRUB SERPL-MCNC: 1.3 MG/DL (ref 0.2–1.2)
BILIRUB SERPL-MCNC: 1.4 MG/DL (ref 0.2–1.2)
BILIRUB SERPL-MCNC: 1.6 MG/DL (ref 0.2–1.2)
BILIRUB SERPL-MCNC: 1.8 MG/DL (ref 0.2–1.2)
BILIRUB SERPL-MCNC: 1.9 MG/DL (ref 0.2–1.2)
BILIRUB SERPL-MCNC: 2 MG/DL (ref 0.2–1.2)
BILIRUB SERPL-MCNC: 2.2 MG/DL (ref 0.2–1.2)
BILIRUB SERPL-MCNC: 2.6 MG/DL (ref 0.2–1.2)
BILIRUBIN DIRECT: 2.1 MG/DL (ref 0–0.3)
BILIRUBIN URINE: ABNORMAL
BILIRUBIN URINE: ABNORMAL
BILIRUBIN URINE: NEGATIVE
BILIRUBIN, INDIRECT: 0.5 MG/DL (ref 0.1–1)
BLOOD CULTURE, ROUTINE: NORMAL
BLOOD CULTURE, ROUTINE: NORMAL
BLOOD, URINE: ABNORMAL
BLOOD, URINE: ABNORMAL
BLOOD, URINE: NEGATIVE
BORDETELLA PARAPERTUSSIS BY PCR: NOT DETECTED
BORDETELLA PERTUSSIS BY PCR: NOT DETECTED
BUN BLDV-MCNC: 12 MG/DL (ref 8–23)
BUN BLDV-MCNC: 13 MG/DL (ref 8–23)
BUN BLDV-MCNC: 13 MG/DL (ref 8–23)
BUN BLDV-MCNC: 14 MG/DL (ref 8–23)
BUN BLDV-MCNC: 14 MG/DL (ref 8–23)
BUN BLDV-MCNC: 15 MG/DL (ref 8–23)
BUN BLDV-MCNC: 17 MG/DL (ref 8–23)
BUN BLDV-MCNC: 20 MG/DL (ref 8–23)
BUN BLDV-MCNC: 20 MG/DL (ref 8–23)
BUN BLDV-MCNC: 22 MG/DL (ref 8–23)
BUN BLDV-MCNC: 23 MG/DL (ref 8–23)
BUN BLDV-MCNC: 24 MG/DL (ref 8–23)
BUN BLDV-MCNC: 26 MG/DL (ref 8–23)
BUN BLDV-MCNC: 30 MG/DL (ref 8–23)
BUN BLDV-MCNC: 32 MG/DL (ref 8–23)
BUN BLDV-MCNC: 36 MG/DL (ref 8–23)
BUN BLDV-MCNC: 39 MG/DL (ref 8–23)
BUN BLDV-MCNC: 41 MG/DL (ref 8–23)
BUN BLDV-MCNC: 41 MG/DL (ref 8–23)
BUN BLDV-MCNC: 43 MG/DL (ref 8–23)
BUN BLDV-MCNC: 45 MG/DL (ref 8–23)
BUN BLDV-MCNC: 46 MG/DL (ref 8–23)
BUN BLDV-MCNC: 47 MG/DL (ref 8–23)
BUN BLDV-MCNC: 50 MG/DL (ref 8–23)
BUN BLDV-MCNC: 61 MG/DL (ref 8–23)
BUN BLDV-MCNC: 70 MG/DL (ref 8–23)
CALCIUM SERPL-MCNC: 7 MG/DL (ref 8.8–10.2)
CALCIUM SERPL-MCNC: 7.1 MG/DL (ref 8.8–10.2)
CALCIUM SERPL-MCNC: 7.5 MG/DL (ref 8.8–10.2)
CALCIUM SERPL-MCNC: 7.5 MG/DL (ref 8.8–10.2)
CALCIUM SERPL-MCNC: 7.6 MG/DL (ref 8.8–10.2)
CALCIUM SERPL-MCNC: 7.8 MG/DL (ref 8.8–10.2)
CALCIUM SERPL-MCNC: 7.9 MG/DL (ref 8.8–10.2)
CALCIUM SERPL-MCNC: 7.9 MG/DL (ref 8.8–10.2)
CALCIUM SERPL-MCNC: 8.1 MG/DL (ref 8.8–10.2)
CALCIUM SERPL-MCNC: 8.1 MG/DL (ref 8.8–10.2)
CALCIUM SERPL-MCNC: 8.2 MG/DL (ref 8.8–10.2)
CALCIUM SERPL-MCNC: 8.2 MG/DL (ref 8.8–10.2)
CALCIUM SERPL-MCNC: 8.3 MG/DL (ref 8.8–10.2)
CALCIUM SERPL-MCNC: 8.6 MG/DL (ref 8.8–10.2)
CALCIUM SERPL-MCNC: 8.7 MG/DL (ref 8.8–10.2)
CALCIUM SERPL-MCNC: 8.8 MG/DL (ref 8.8–10.2)
CALCIUM SERPL-MCNC: 8.8 MG/DL (ref 8.8–10.2)
CALCIUM SERPL-MCNC: 8.9 MG/DL (ref 8.8–10.2)
CALCIUM SERPL-MCNC: 9 MG/DL (ref 8.8–10.2)
CALCIUM SERPL-MCNC: 9.5 MG/DL (ref 8.8–10.2)
CALCIUM SERPL-MCNC: 9.6 MG/DL (ref 8.8–10.2)
CALCIUM SERPL-MCNC: 9.8 MG/DL (ref 8.8–10.2)
CALCIUM SERPL-MCNC: 9.8 MG/DL (ref 8.8–10.2)
CARBOXYHEMOGLOBIN ARTERIAL: 1.8 % (ref 0–5)
CARBOXYHEMOGLOBIN ARTERIAL: 2 % (ref 0–5)
CARBOXYHEMOGLOBIN ARTERIAL: 2.2 % (ref 0–5)
CARBOXYHEMOGLOBIN ARTERIAL: 2.2 % (ref 0–5)
CARBOXYHEMOGLOBIN ARTERIAL: 2.3 % (ref 0–5)
CARBOXYHEMOGLOBIN ARTERIAL: 2.4 % (ref 0–5)
CARBOXYHEMOGLOBIN ARTERIAL: 2.5 % (ref 0–5)
CARBOXYHEMOGLOBIN ARTERIAL: 2.6 % (ref 0–5)
CARBOXYHEMOGLOBIN ARTERIAL: 3.4 % (ref 0–5)
CELLULAR CASTS: ABNORMAL /LPF
CHLAMYDOPHILIA PNEUMONIAE BY PCR: NOT DETECTED
CHLORIDE BLD-SCNC: 100 MMOL/L (ref 98–111)
CHLORIDE BLD-SCNC: 101 MMOL/L (ref 98–111)
CHLORIDE BLD-SCNC: 102 MMOL/L (ref 98–111)
CHLORIDE BLD-SCNC: 103 MMOL/L (ref 98–111)
CHLORIDE BLD-SCNC: 103 MMOL/L (ref 98–111)
CHLORIDE BLD-SCNC: 104 MMOL/L (ref 98–111)
CHLORIDE BLD-SCNC: 104 MMOL/L (ref 98–111)
CHLORIDE BLD-SCNC: 106 MMOL/L (ref 98–111)
CHLORIDE BLD-SCNC: 91 MMOL/L (ref 98–111)
CHLORIDE BLD-SCNC: 94 MMOL/L (ref 98–111)
CHLORIDE BLD-SCNC: 96 MMOL/L (ref 98–111)
CHLORIDE BLD-SCNC: 97 MMOL/L (ref 98–111)
CHLORIDE BLD-SCNC: 98 MMOL/L (ref 98–111)
CHLORIDE BLD-SCNC: 99 MMOL/L (ref 98–111)
CLARITY: ABNORMAL
CLARITY: CLEAR
CLARITY: CLEAR
CO2: 17 MMOL/L (ref 22–29)
CO2: 20 MMOL/L (ref 22–29)
CO2: 20 MMOL/L (ref 22–29)
CO2: 21 MMOL/L (ref 22–29)
CO2: 22 MMOL/L (ref 22–29)
CO2: 23 MMOL/L (ref 22–29)
CO2: 23 MMOL/L (ref 22–29)
CO2: 24 MMOL/L (ref 22–29)
CO2: 25 MMOL/L (ref 22–29)
CO2: 26 MMOL/L (ref 22–29)
CO2: 27 MMOL/L (ref 22–29)
CO2: 28 MMOL/L (ref 22–29)
CO2: 28 MMOL/L (ref 22–29)
CO2: 29 MMOL/L (ref 22–29)
CO2: 31 MMOL/L (ref 22–29)
CO2: 32 MMOL/L (ref 22–29)
CO2: 35 MMOL/L (ref 22–29)
CO2: 36 MMOL/L (ref 22–29)
CO2: 38 MMOL/L (ref 22–29)
COARSE CASTS, UA: ABNORMAL /LPF (ref 0–5)
COARSE CASTS, UA: ABNORMAL /LPF (ref 0–5)
COLOR: ABNORMAL
CORONAVIRUS 229E BY PCR: NOT DETECTED
CORONAVIRUS HKU1 BY PCR: NOT DETECTED
CORONAVIRUS NL63 BY PCR: NOT DETECTED
CORONAVIRUS OC43 BY PCR: NOT DETECTED
CORTISOL TOTAL: 61.3 UG/DL
CREAT SERPL-MCNC: 0.5 MG/DL (ref 0.5–1.2)
CREAT SERPL-MCNC: 0.6 MG/DL (ref 0.5–1.2)
CREAT SERPL-MCNC: 0.6 MG/DL (ref 0.5–1.2)
CREAT SERPL-MCNC: 0.7 MG/DL (ref 0.5–1.2)
CREAT SERPL-MCNC: 0.8 MG/DL (ref 0.5–1.2)
CREAT SERPL-MCNC: 0.9 MG/DL (ref 0.5–1.2)
CREAT SERPL-MCNC: 0.9 MG/DL (ref 0.5–1.2)
CREAT SERPL-MCNC: 1 MG/DL (ref 0.5–1.2)
CREAT SERPL-MCNC: 1.1 MG/DL (ref 0.5–1.2)
CREAT SERPL-MCNC: 1.1 MG/DL (ref 0.5–1.2)
CREAT SERPL-MCNC: 1.2 MG/DL (ref 0.5–1.2)
CREAT SERPL-MCNC: 1.3 MG/DL (ref 0.5–1.2)
CREAT SERPL-MCNC: 1.3 MG/DL (ref 0.5–1.2)
CREAT SERPL-MCNC: 1.6 MG/DL (ref 0.5–1.2)
CREAT SERPL-MCNC: 2.3 MG/DL (ref 0.5–1.2)
CREAT SERPL-MCNC: 2.5 MG/DL (ref 0.5–1.2)
CRYSTALS, UA: ABNORMAL /HPF
CRYSTALS, UA: ABNORMAL /HPF
CULTURE, BLOOD 2: NORMAL
CULTURE, BLOOD 2: NORMAL
D DIMER: 1.68 UG/ML FEU (ref 0–0.48)
D DIMER: 1.72 UG/ML FEU (ref 0–0.48)
EKG P AXIS: -41 DEGREES
EKG P-R INTERVAL: 197 MS
EKG Q-T INTERVAL: 313 MS
EKG QRS DURATION: 83 MS
EKG QTC CALCULATION (BAZETT): 410 MS
EKG T AXIS: 108 DEGREES
EOSINOPHILS ABSOLUTE: 0 K/UL (ref 0–0.6)
EOSINOPHILS ABSOLUTE: 0.1 K/UL (ref 0–0.6)
EOSINOPHILS ABSOLUTE: 0.1 K/UL (ref 0–0.6)
EOSINOPHILS ABSOLUTE: 0.19 K/UL (ref 0–0.6)
EOSINOPHILS ABSOLUTE: 0.2 K/UL (ref 0–0.6)
EOSINOPHILS ABSOLUTE: 0.3 K/UL (ref 0–0.6)
EOSINOPHILS RELATIVE PERCENT: 0 % (ref 0–5)
EOSINOPHILS RELATIVE PERCENT: 0 % (ref 0–5)
EOSINOPHILS RELATIVE PERCENT: 0.1 % (ref 0–5)
EOSINOPHILS RELATIVE PERCENT: 0.4 % (ref 0–5)
EOSINOPHILS RELATIVE PERCENT: 0.7 % (ref 0–5)
EOSINOPHILS RELATIVE PERCENT: 1 % (ref 0–5)
EOSINOPHILS RELATIVE PERCENT: 1.4 % (ref 0–5)
EOSINOPHILS RELATIVE PERCENT: 1.6 % (ref 0–5)
EOSINOPHILS RELATIVE PERCENT: 1.7 % (ref 0–5)
EOSINOPHILS RELATIVE PERCENT: 1.7 % (ref 0–5)
EOSINOPHILS RELATIVE PERCENT: 1.8 % (ref 0–5)
EOSINOPHILS RELATIVE PERCENT: 1.8 % (ref 0–5)
EOSINOPHILS RELATIVE PERCENT: 2 % (ref 0–5)
EOSINOPHILS RELATIVE PERCENT: 2 % (ref 0–5)
EOSINOPHILS RELATIVE PERCENT: 2.2 % (ref 0–5)
EOSINOPHILS RELATIVE PERCENT: 2.2 % (ref 0–5)
EOSINOPHILS RELATIVE PERCENT: 2.4 % (ref 0–5)
EPITHELIAL CELLS, UA: 1 /HPF (ref 0–5)
EPITHELIAL CELLS, UA: ABNORMAL /HPF
FINE CASTS, UA: ABNORMAL /LPF (ref 0–2)
FIO2: 40 %
FIO2: 40 %
FIO2: 50 %
FIO2: 70 %
FIO2: 70 %
FIO2: 80 %
FIO2: 90 %
GFR AFRICAN AMERICAN: >59
GFR NON-AFRICAN AMERICAN: 53
GFR NON-AFRICAN AMERICAN: 53
GFR NON-AFRICAN AMERICAN: 58
GFR NON-AFRICAN AMERICAN: >60
GFR SERPL CREATININE-BSD FRML MDRD: 25 ML/MIN/{1.73_M2}
GFR SERPL CREATININE-BSD FRML MDRD: 28 ML/MIN/{1.73_M2}
GFR SERPL CREATININE-BSD FRML MDRD: 43 ML/MIN/{1.73_M2}
GFR SERPL CREATININE-BSD FRML MDRD: >60 ML/MIN/{1.73_M2}
GLUCOSE BLD-MCNC: 100 MG/DL (ref 70–99)
GLUCOSE BLD-MCNC: 100 MG/DL (ref 74–109)
GLUCOSE BLD-MCNC: 104 MG/DL (ref 70–99)
GLUCOSE BLD-MCNC: 106 MG/DL (ref 70–99)
GLUCOSE BLD-MCNC: 107 MG/DL (ref 70–99)
GLUCOSE BLD-MCNC: 111 MG/DL (ref 70–99)
GLUCOSE BLD-MCNC: 115 MG/DL (ref 70–99)
GLUCOSE BLD-MCNC: 117 MG/DL (ref 74–109)
GLUCOSE BLD-MCNC: 118 MG/DL (ref 70–99)
GLUCOSE BLD-MCNC: 120 MG/DL (ref 70–99)
GLUCOSE BLD-MCNC: 121 MG/DL (ref 70–99)
GLUCOSE BLD-MCNC: 122 MG/DL (ref 74–109)
GLUCOSE BLD-MCNC: 125 MG/DL (ref 70–99)
GLUCOSE BLD-MCNC: 126 MG/DL (ref 74–109)
GLUCOSE BLD-MCNC: 126 MG/DL (ref 74–109)
GLUCOSE BLD-MCNC: 133 MG/DL (ref 70–99)
GLUCOSE BLD-MCNC: 136 MG/DL (ref 70–99)
GLUCOSE BLD-MCNC: 138 MG/DL (ref 70–99)
GLUCOSE BLD-MCNC: 139 MG/DL (ref 70–99)
GLUCOSE BLD-MCNC: 140 MG/DL (ref 74–109)
GLUCOSE BLD-MCNC: 140 MG/DL (ref 74–109)
GLUCOSE BLD-MCNC: 141 MG/DL (ref 70–99)
GLUCOSE BLD-MCNC: 144 MG/DL (ref 74–109)
GLUCOSE BLD-MCNC: 150 MG/DL (ref 70–99)
GLUCOSE BLD-MCNC: 152 MG/DL (ref 74–109)
GLUCOSE BLD-MCNC: 154 MG/DL (ref 70–99)
GLUCOSE BLD-MCNC: 156 MG/DL (ref 74–109)
GLUCOSE BLD-MCNC: 162 MG/DL (ref 70–99)
GLUCOSE BLD-MCNC: 163 MG/DL (ref 70–99)
GLUCOSE BLD-MCNC: 172 MG/DL (ref 70–99)
GLUCOSE BLD-MCNC: 176 MG/DL (ref 70–99)
GLUCOSE BLD-MCNC: 178 MG/DL (ref 70–99)
GLUCOSE BLD-MCNC: 179 MG/DL (ref 70–99)
GLUCOSE BLD-MCNC: 180 MG/DL (ref 70–99)
GLUCOSE BLD-MCNC: 183 MG/DL (ref 70–99)
GLUCOSE BLD-MCNC: 186 MG/DL (ref 70–99)
GLUCOSE BLD-MCNC: 187 MG/DL (ref 70–99)
GLUCOSE BLD-MCNC: 190 MG/DL (ref 70–99)
GLUCOSE BLD-MCNC: 191 MG/DL (ref 70–99)
GLUCOSE BLD-MCNC: 197 MG/DL (ref 70–99)
GLUCOSE BLD-MCNC: 200 MG/DL (ref 74–109)
GLUCOSE BLD-MCNC: 201 MG/DL (ref 74–109)
GLUCOSE BLD-MCNC: 206 MG/DL (ref 70–99)
GLUCOSE BLD-MCNC: 209 MG/DL (ref 70–99)
GLUCOSE BLD-MCNC: 210 MG/DL (ref 70–99)
GLUCOSE BLD-MCNC: 211 MG/DL (ref 70–99)
GLUCOSE BLD-MCNC: 214 MG/DL (ref 70–99)
GLUCOSE BLD-MCNC: 215 MG/DL (ref 70–99)
GLUCOSE BLD-MCNC: 215 MG/DL (ref 70–99)
GLUCOSE BLD-MCNC: 216 MG/DL (ref 70–99)
GLUCOSE BLD-MCNC: 217 MG/DL (ref 70–99)
GLUCOSE BLD-MCNC: 219 MG/DL (ref 70–99)
GLUCOSE BLD-MCNC: 220 MG/DL (ref 70–99)
GLUCOSE BLD-MCNC: 220 MG/DL (ref 74–109)
GLUCOSE BLD-MCNC: 221 MG/DL (ref 70–99)
GLUCOSE BLD-MCNC: 221 MG/DL (ref 70–99)
GLUCOSE BLD-MCNC: 227 MG/DL (ref 70–99)
GLUCOSE BLD-MCNC: 230 MG/DL (ref 70–99)
GLUCOSE BLD-MCNC: 231 MG/DL (ref 70–99)
GLUCOSE BLD-MCNC: 231 MG/DL (ref 70–99)
GLUCOSE BLD-MCNC: 235 MG/DL (ref 74–109)
GLUCOSE BLD-MCNC: 237 MG/DL (ref 74–109)
GLUCOSE BLD-MCNC: 241 MG/DL (ref 74–109)
GLUCOSE BLD-MCNC: 244 MG/DL (ref 74–109)
GLUCOSE BLD-MCNC: 248 MG/DL (ref 70–99)
GLUCOSE BLD-MCNC: 248 MG/DL (ref 70–99)
GLUCOSE BLD-MCNC: 249 MG/DL (ref 70–99)
GLUCOSE BLD-MCNC: 250 MG/DL (ref 70–99)
GLUCOSE BLD-MCNC: 252 MG/DL (ref 74–109)
GLUCOSE BLD-MCNC: 257 MG/DL (ref 70–99)
GLUCOSE BLD-MCNC: 259 MG/DL (ref 70–99)
GLUCOSE BLD-MCNC: 261 MG/DL (ref 70–99)
GLUCOSE BLD-MCNC: 267 MG/DL (ref 70–99)
GLUCOSE BLD-MCNC: 269 MG/DL (ref 70–99)
GLUCOSE BLD-MCNC: 273 MG/DL (ref 74–109)
GLUCOSE BLD-MCNC: 275 MG/DL (ref 74–109)
GLUCOSE BLD-MCNC: 277 MG/DL (ref 70–99)
GLUCOSE BLD-MCNC: 278 MG/DL (ref 70–99)
GLUCOSE BLD-MCNC: 279 MG/DL (ref 70–99)
GLUCOSE BLD-MCNC: 287 MG/DL (ref 70–99)
GLUCOSE BLD-MCNC: 287 MG/DL (ref 70–99)
GLUCOSE BLD-MCNC: 292 MG/DL (ref 70–99)
GLUCOSE BLD-MCNC: 292 MG/DL (ref 70–99)
GLUCOSE BLD-MCNC: 294 MG/DL (ref 70–99)
GLUCOSE BLD-MCNC: 295 MG/DL (ref 70–99)
GLUCOSE BLD-MCNC: 296 MG/DL (ref 70–99)
GLUCOSE BLD-MCNC: 302 MG/DL (ref 70–99)
GLUCOSE BLD-MCNC: 306 MG/DL (ref 70–99)
GLUCOSE BLD-MCNC: 313 MG/DL (ref 70–99)
GLUCOSE BLD-MCNC: 314 MG/DL (ref 70–99)
GLUCOSE BLD-MCNC: 326 MG/DL (ref 70–99)
GLUCOSE BLD-MCNC: 326 MG/DL (ref 70–99)
GLUCOSE BLD-MCNC: 329 MG/DL (ref 70–99)
GLUCOSE BLD-MCNC: 330 MG/DL (ref 74–109)
GLUCOSE BLD-MCNC: 332 MG/DL (ref 74–109)
GLUCOSE BLD-MCNC: 334 MG/DL (ref 70–99)
GLUCOSE BLD-MCNC: 337 MG/DL (ref 70–99)
GLUCOSE BLD-MCNC: 337 MG/DL (ref 70–99)
GLUCOSE BLD-MCNC: 338 MG/DL (ref 70–99)
GLUCOSE BLD-MCNC: 342 MG/DL (ref 70–99)
GLUCOSE BLD-MCNC: 345 MG/DL (ref 70–99)
GLUCOSE BLD-MCNC: 347 MG/DL (ref 70–99)
GLUCOSE BLD-MCNC: 355 MG/DL (ref 70–99)
GLUCOSE BLD-MCNC: 359 MG/DL (ref 70–99)
GLUCOSE BLD-MCNC: 359 MG/DL (ref 74–109)
GLUCOSE BLD-MCNC: 369 MG/DL (ref 74–109)
GLUCOSE BLD-MCNC: 379 MG/DL (ref 74–109)
GLUCOSE BLD-MCNC: 381 MG/DL (ref 70–99)
GLUCOSE BLD-MCNC: 399 MG/DL (ref 70–99)
GLUCOSE BLD-MCNC: 407 MG/DL (ref 70–99)
GLUCOSE BLD-MCNC: 408 MG/DL (ref 74–109)
GLUCOSE BLD-MCNC: 415 MG/DL (ref 74–109)
GLUCOSE BLD-MCNC: 429 MG/DL (ref 74–109)
GLUCOSE BLD-MCNC: 434 MG/DL (ref 70–99)
GLUCOSE BLD-MCNC: 58 MG/DL (ref 70–99)
GLUCOSE BLD-MCNC: 59 MG/DL (ref 70–99)
GLUCOSE BLD-MCNC: 63 MG/DL (ref 70–99)
GLUCOSE BLD-MCNC: 71 MG/DL (ref 70–99)
GLUCOSE BLD-MCNC: 72 MG/DL (ref 70–99)
GLUCOSE BLD-MCNC: 77 MG/DL (ref 74–109)
GLUCOSE BLD-MCNC: 83 MG/DL (ref 70–99)
GLUCOSE BLD-MCNC: 83 MG/DL (ref 74–109)
GLUCOSE BLD-MCNC: 87 MG/DL (ref 70–99)
GLUCOSE BLD-MCNC: 87 MG/DL (ref 70–99)
GLUCOSE BLD-MCNC: 89 MG/DL (ref 70–99)
GLUCOSE BLD-MCNC: 92 MG/DL (ref 70–99)
GLUCOSE URINE: 100 MG/DL
GLUCOSE URINE: 250 MG/DL
GLUCOSE URINE: 500 MG/DL
GLUCOSE URINE: 500 MG/DL
GLUCOSE URINE: =>1000 MG/DL
HBA1C MFR BLD: 6.5 % (ref 4–6)
HCO3 ARTERIAL: 22 MMOL/L (ref 22–26)
HCO3 ARTERIAL: 22.5 MMOL/L (ref 22–26)
HCO3 ARTERIAL: 24 MMOL/L (ref 22–26)
HCO3 ARTERIAL: 25 MMOL/L (ref 22–26)
HCO3 ARTERIAL: 30.6 MMOL/L (ref 22–26)
HCO3 ARTERIAL: 31.6 MMOL/L (ref 22–26)
HCO3 ARTERIAL: 32.2 MMOL/L (ref 22–26)
HCO3 ARTERIAL: 34.6 MMOL/L (ref 22–26)
HCO3 ARTERIAL: 36.2 MMOL/L (ref 22–26)
HCO3 ARTERIAL: 38 MMOL/L (ref 22–26)
HCO3 ARTERIAL: ABNORMAL MMOL/L (ref 22–26)
HCT VFR BLD CALC: 32.2 % (ref 42–52)
HCT VFR BLD CALC: 35.2 % (ref 42–52)
HCT VFR BLD CALC: 40.1 % (ref 42–52)
HCT VFR BLD CALC: 40.5 % (ref 42–52)
HCT VFR BLD CALC: 41.4 % (ref 42–52)
HCT VFR BLD CALC: 42.2 % (ref 42–52)
HCT VFR BLD CALC: 42.2 % (ref 42–52)
HCT VFR BLD CALC: 42.4 % (ref 42–52)
HCT VFR BLD CALC: 42.4 % (ref 42–52)
HCT VFR BLD CALC: 42.5 % (ref 42–52)
HCT VFR BLD CALC: 42.5 % (ref 42–52)
HCT VFR BLD CALC: 42.7 % (ref 42–52)
HCT VFR BLD CALC: 43.2 % (ref 42–52)
HCT VFR BLD CALC: 43.2 % (ref 42–52)
HCT VFR BLD CALC: 44.2 % (ref 42–52)
HCT VFR BLD CALC: 44.7 % (ref 42–52)
HCT VFR BLD CALC: 45.1 % (ref 42–52)
HCT VFR BLD CALC: 46.8 % (ref 42–52)
HCT VFR BLD CALC: 47.1 % (ref 42–52)
HCT VFR BLD CALC: 47.2 % (ref 42–52)
HCT VFR BLD CALC: 48.1 % (ref 42–52)
HCT VFR BLD CALC: 50.4 % (ref 42–52)
HEMOGLOBIN, ART, EXTENDED: 10 G/DL (ref 14–18)
HEMOGLOBIN, ART, EXTENDED: 12.2 G/DL (ref 14–18)
HEMOGLOBIN, ART, EXTENDED: 12.3 G/DL (ref 14–18)
HEMOGLOBIN, ART, EXTENDED: 12.9 G/DL (ref 14–18)
HEMOGLOBIN, ART, EXTENDED: 13.1 G/DL (ref 14–18)
HEMOGLOBIN, ART, EXTENDED: 13.3 G/DL (ref 14–18)
HEMOGLOBIN, ART, EXTENDED: 13.6 G/DL (ref 14–18)
HEMOGLOBIN, ART, EXTENDED: 13.6 G/DL (ref 14–18)
HEMOGLOBIN, ART, EXTENDED: 14.3 G/DL (ref 14–18)
HEMOGLOBIN, ART, EXTENDED: 15.5 G/DL (ref 14–18)
HEMOGLOBIN, ART, EXTENDED: 9.9 G/DL (ref 14–18)
HEMOGLOBIN: 10.8 G/DL (ref 14–18)
HEMOGLOBIN: 12.9 G/DL (ref 14–18)
HEMOGLOBIN: 13.1 G/DL (ref 14–18)
HEMOGLOBIN: 13.2 G/DL (ref 14–18)
HEMOGLOBIN: 13.2 G/DL (ref 14–18)
HEMOGLOBIN: 13.3 G/DL (ref 14–18)
HEMOGLOBIN: 13.4 G/DL (ref 14–18)
HEMOGLOBIN: 13.6 G/DL (ref 14–18)
HEMOGLOBIN: 13.7 G/DL (ref 14–18)
HEMOGLOBIN: 13.7 G/DL (ref 14–18)
HEMOGLOBIN: 13.8 G/DL (ref 14–18)
HEMOGLOBIN: 13.9 G/DL (ref 14–18)
HEMOGLOBIN: 14.1 G/DL (ref 14–18)
HEMOGLOBIN: 14.1 G/DL (ref 14–18)
HEMOGLOBIN: 14.3 G/DL (ref 14–18)
HEMOGLOBIN: 14.5 G/DL (ref 14–18)
HEMOGLOBIN: 14.8 G/DL (ref 14–18)
HEMOGLOBIN: 15 G/DL (ref 14–18)
HEMOGLOBIN: 15 G/DL (ref 14–18)
HEMOGLOBIN: 15.2 G/DL (ref 14–18)
HEMOGLOBIN: 16.1 G/DL (ref 14–18)
HEMOGLOBIN: 9.8 G/DL (ref 14–18)
HUMAN METAPNEUMOVIRUS BY PCR: NOT DETECTED
HUMAN RHINOVIRUS/ENTEROVIRUS BY PCR: NOT DETECTED
HYALINE CASTS: 2 /HPF (ref 0–8)
HYALINE CASTS: ABNORMAL /LPF (ref 0–5)
HYPOCHROMIA: ABNORMAL
IMMATURE GRANULOCYTES #: 0.1 K/UL
IMMATURE GRANULOCYTES #: 0.2 K/UL
IMMATURE GRANULOCYTES #: 0.3 K/UL
INFLUENZA A BY PCR: NOT DETECTED
INFLUENZA B BY PCR: NOT DETECTED
INR BLD: 1.71 (ref 0.88–1.18)
KETONES, URINE: ABNORMAL MG/DL
KETONES, URINE: ABNORMAL MG/DL
KETONES, URINE: NEGATIVE MG/DL
LACTIC ACID: 1.2 MMOL/L (ref 0.5–1.9)
LACTIC ACID: 1.5 MMOL/L (ref 0.5–1.9)
LACTIC ACID: 2.2 MMOL/L (ref 0.5–1.9)
LACTIC ACID: 2.2 MMOL/L (ref 0.5–1.9)
LACTIC ACID: 4.3 MMOL/L (ref 0.5–1.9)
LEUKOCYTE ESTERASE, URINE: ABNORMAL
LEUKOCYTE ESTERASE, URINE: NEGATIVE
LIPASE: 7 U/L (ref 13–60)
LYMPHOCYTES ABSOLUTE: 0 K/UL (ref 1.1–4.5)
LYMPHOCYTES ABSOLUTE: 0.1 K/UL (ref 1.1–4.5)
LYMPHOCYTES ABSOLUTE: 0.1 K/UL (ref 1.1–4.5)
LYMPHOCYTES ABSOLUTE: 0.2 K/UL (ref 1.1–4.5)
LYMPHOCYTES ABSOLUTE: 0.3 K/UL (ref 1.1–4.5)
LYMPHOCYTES RELATIVE PERCENT: 0 % (ref 20–40)
LYMPHOCYTES RELATIVE PERCENT: 0.5 % (ref 20–40)
LYMPHOCYTES RELATIVE PERCENT: 0.7 % (ref 20–40)
LYMPHOCYTES RELATIVE PERCENT: 0.9 % (ref 20–40)
LYMPHOCYTES RELATIVE PERCENT: 1 % (ref 20–40)
LYMPHOCYTES RELATIVE PERCENT: 1.2 % (ref 20–40)
LYMPHOCYTES RELATIVE PERCENT: 1.3 % (ref 20–40)
LYMPHOCYTES RELATIVE PERCENT: 1.3 % (ref 20–40)
LYMPHOCYTES RELATIVE PERCENT: 1.4 % (ref 20–40)
LYMPHOCYTES RELATIVE PERCENT: 1.4 % (ref 20–40)
LYMPHOCYTES RELATIVE PERCENT: 1.5 % (ref 20–40)
LYMPHOCYTES RELATIVE PERCENT: 1.5 % (ref 20–40)
LYMPHOCYTES RELATIVE PERCENT: 1.7 % (ref 20–40)
LYMPHOCYTES RELATIVE PERCENT: 1.8 % (ref 20–40)
LYMPHOCYTES RELATIVE PERCENT: 1.8 % (ref 20–40)
LYMPHOCYTES RELATIVE PERCENT: 2 % (ref 20–40)
LYMPHOCYTES RELATIVE PERCENT: 2.4 % (ref 20–40)
MACROCYTES: ABNORMAL
MACROCYTES: ABNORMAL
MAGNESIUM: 1.8 MG/DL (ref 1.6–2.4)
MAGNESIUM: 1.9 MG/DL (ref 1.6–2.4)
MAGNESIUM: 2 MG/DL (ref 1.6–2.4)
MAGNESIUM: 2.2 MG/DL (ref 1.6–2.4)
MAGNESIUM: 2.3 MG/DL (ref 1.6–2.4)
MAGNESIUM: 2.3 MG/DL (ref 1.6–2.4)
MAGNESIUM: 2.4 MG/DL (ref 1.6–2.4)
MAGNESIUM: 2.4 MG/DL (ref 1.6–2.4)
MAGNESIUM: 2.9 MG/DL (ref 1.6–2.4)
MCH RBC QN AUTO: 32.6 PG (ref 27–31)
MCH RBC QN AUTO: 32.7 PG (ref 27–31)
MCH RBC QN AUTO: 32.7 PG (ref 27–31)
MCH RBC QN AUTO: 32.8 PG (ref 27–31)
MCH RBC QN AUTO: 32.8 PG (ref 27–31)
MCH RBC QN AUTO: 32.9 PG (ref 27–31)
MCH RBC QN AUTO: 32.9 PG (ref 27–31)
MCH RBC QN AUTO: 33 PG (ref 27–31)
MCH RBC QN AUTO: 33.1 PG (ref 27–31)
MCH RBC QN AUTO: 33.2 PG (ref 27–31)
MCH RBC QN AUTO: 33.2 PG (ref 27–31)
MCH RBC QN AUTO: 33.3 PG (ref 27–31)
MCH RBC QN AUTO: 33.3 PG (ref 27–31)
MCH RBC QN AUTO: 33.5 PG (ref 27–31)
MCH RBC QN AUTO: 33.5 PG (ref 27–31)
MCH RBC QN AUTO: 33.7 PG (ref 27–31)
MCH RBC QN AUTO: 33.7 PG (ref 27–31)
MCH RBC QN AUTO: 33.9 PG (ref 27–31)
MCHC RBC AUTO-ENTMCNC: 30.4 G/DL (ref 33–37)
MCHC RBC AUTO-ENTMCNC: 30.7 G/DL (ref 33–37)
MCHC RBC AUTO-ENTMCNC: 31.1 G/DL (ref 33–37)
MCHC RBC AUTO-ENTMCNC: 31.3 G/DL (ref 33–37)
MCHC RBC AUTO-ENTMCNC: 31.5 G/DL (ref 33–37)
MCHC RBC AUTO-ENTMCNC: 31.5 G/DL (ref 33–37)
MCHC RBC AUTO-ENTMCNC: 31.6 G/DL (ref 33–37)
MCHC RBC AUTO-ENTMCNC: 31.6 G/DL (ref 33–37)
MCHC RBC AUTO-ENTMCNC: 31.8 G/DL (ref 33–37)
MCHC RBC AUTO-ENTMCNC: 31.8 G/DL (ref 33–37)
MCHC RBC AUTO-ENTMCNC: 31.9 G/DL (ref 33–37)
MCHC RBC AUTO-ENTMCNC: 31.9 G/DL (ref 33–37)
MCHC RBC AUTO-ENTMCNC: 32 G/DL (ref 33–37)
MCHC RBC AUTO-ENTMCNC: 32.1 G/DL (ref 33–37)
MCHC RBC AUTO-ENTMCNC: 32.2 G/DL (ref 33–37)
MCHC RBC AUTO-ENTMCNC: 32.3 G/DL (ref 33–37)
MCHC RBC AUTO-ENTMCNC: 32.5 G/DL (ref 33–37)
MCHC RBC AUTO-ENTMCNC: 32.5 G/DL (ref 33–37)
MCHC RBC AUTO-ENTMCNC: 33 G/DL (ref 33–37)
MCV RBC AUTO: 101.4 FL (ref 80–94)
MCV RBC AUTO: 102.2 FL (ref 80–94)
MCV RBC AUTO: 102.6 FL (ref 80–94)
MCV RBC AUTO: 102.7 FL (ref 80–94)
MCV RBC AUTO: 103.1 FL (ref 80–94)
MCV RBC AUTO: 103.2 FL (ref 80–94)
MCV RBC AUTO: 103.6 FL (ref 80–94)
MCV RBC AUTO: 103.7 FL (ref 80–94)
MCV RBC AUTO: 103.7 FL (ref 80–94)
MCV RBC AUTO: 103.8 FL (ref 80–94)
MCV RBC AUTO: 103.9 FL (ref 80–94)
MCV RBC AUTO: 104.2 FL (ref 80–94)
MCV RBC AUTO: 104.3 FL (ref 80–94)
MCV RBC AUTO: 104.5 FL (ref 80–94)
MCV RBC AUTO: 104.7 FL (ref 80–94)
MCV RBC AUTO: 104.9 FL (ref 80–94)
MCV RBC AUTO: 107 FL (ref 80–94)
MCV RBC AUTO: 108.4 FL (ref 80–94)
MECHANICAL RATE IN BPM: 20
MECHANICAL RATE IN BPM: 22
METHEMOGLOBIN ARTERIAL: 1 %
METHEMOGLOBIN ARTERIAL: 1 %
METHEMOGLOBIN ARTERIAL: 1.1 %
METHEMOGLOBIN ARTERIAL: 1.2 %
METHEMOGLOBIN ARTERIAL: 1.2 %
METHEMOGLOBIN ARTERIAL: 1.3 %
METHEMOGLOBIN ARTERIAL: 1.4 %
MODE: ABNORMAL
MONOCYTES ABSOLUTE: 0.4 K/UL (ref 0–0.9)
MONOCYTES ABSOLUTE: 0.6 K/UL (ref 0–0.9)
MONOCYTES ABSOLUTE: 0.8 K/UL (ref 0–0.9)
MONOCYTES ABSOLUTE: 1 K/UL (ref 0–0.9)
MONOCYTES ABSOLUTE: 1.1 K/UL (ref 0–0.9)
MONOCYTES ABSOLUTE: 1.3 K/UL (ref 0–0.9)
MONOCYTES ABSOLUTE: 1.3 K/UL (ref 0–0.9)
MONOCYTES ABSOLUTE: 1.4 K/UL (ref 0–0.9)
MONOCYTES ABSOLUTE: 1.5 K/UL (ref 0–0.9)
MONOCYTES ABSOLUTE: 1.6 K/UL (ref 0–0.9)
MONOCYTES ABSOLUTE: 1.7 K/UL (ref 0–0.9)
MONOCYTES ABSOLUTE: 1.8 K/UL (ref 0–0.9)
MONOCYTES ABSOLUTE: 1.9 K/UL (ref 0–0.9)
MONOCYTES RELATIVE PERCENT: 10 % (ref 0–10)
MONOCYTES RELATIVE PERCENT: 10 % (ref 0–10)
MONOCYTES RELATIVE PERCENT: 10.1 % (ref 0–10)
MONOCYTES RELATIVE PERCENT: 10.2 % (ref 0–10)
MONOCYTES RELATIVE PERCENT: 10.4 % (ref 0–10)
MONOCYTES RELATIVE PERCENT: 10.9 % (ref 0–10)
MONOCYTES RELATIVE PERCENT: 11.7 % (ref 0–10)
MONOCYTES RELATIVE PERCENT: 12.3 % (ref 0–10)
MONOCYTES RELATIVE PERCENT: 12.4 % (ref 0–10)
MONOCYTES RELATIVE PERCENT: 12.6 % (ref 0–10)
MONOCYTES RELATIVE PERCENT: 2.1 % (ref 0–10)
MONOCYTES RELATIVE PERCENT: 3 % (ref 0–10)
MONOCYTES RELATIVE PERCENT: 3.7 % (ref 0–10)
MONOCYTES RELATIVE PERCENT: 4.1 % (ref 0–10)
MONOCYTES RELATIVE PERCENT: 8 % (ref 0–10)
MONOCYTES RELATIVE PERCENT: 8.4 % (ref 0–10)
MONOCYTES RELATIVE PERCENT: 8.5 % (ref 0–10)
MONOCYTES RELATIVE PERCENT: 8.6 % (ref 0–10)
MONOCYTES RELATIVE PERCENT: 8.6 % (ref 0–10)
MONOCYTES RELATIVE PERCENT: 9.1 % (ref 0–10)
MONOCYTES RELATIVE PERCENT: 9.3 % (ref 0–10)
MRSA SCREEN RT-PCR: NOT DETECTED
MUCUS: ABNORMAL /LPF
MUCUS: ABNORMAL /LPF
MYCOPLASMA PNEUMONIAE BY PCR: NOT DETECTED
NEUTROPHILS ABSOLUTE: 10.1 K/UL (ref 1.5–7.5)
NEUTROPHILS ABSOLUTE: 10.5 K/UL (ref 1.5–7.5)
NEUTROPHILS ABSOLUTE: 11.8 K/UL (ref 1.5–7.5)
NEUTROPHILS ABSOLUTE: 12.5 K/UL (ref 1.5–7.5)
NEUTROPHILS ABSOLUTE: 12.7 K/UL (ref 1.5–7.5)
NEUTROPHILS ABSOLUTE: 13 K/UL (ref 1.5–7.5)
NEUTROPHILS ABSOLUTE: 13.3 K/UL (ref 1.5–7.5)
NEUTROPHILS ABSOLUTE: 13.6 K/UL (ref 1.5–7.5)
NEUTROPHILS ABSOLUTE: 13.8 K/UL (ref 1.5–7.5)
NEUTROPHILS ABSOLUTE: 14.6 K/UL (ref 1.5–7.5)
NEUTROPHILS ABSOLUTE: 14.9 K/UL (ref 1.5–7.5)
NEUTROPHILS ABSOLUTE: 15 K/UL (ref 1.5–7.5)
NEUTROPHILS ABSOLUTE: 15.2 K/UL (ref 1.5–7.5)
NEUTROPHILS ABSOLUTE: 15.5 K/UL (ref 1.5–7.5)
NEUTROPHILS ABSOLUTE: 16.2 K/UL (ref 1.5–7.5)
NEUTROPHILS ABSOLUTE: 16.9 K/UL (ref 1.5–7.5)
NEUTROPHILS ABSOLUTE: 17 K/UL (ref 1.5–7.5)
NEUTROPHILS ABSOLUTE: 19.4 K/UL (ref 1.5–7.5)
NEUTROPHILS ABSOLUTE: 25.7 K/UL (ref 1.5–7.5)
NEUTROPHILS ABSOLUTE: 9.5 K/UL (ref 1.5–7.5)
NEUTROPHILS ABSOLUTE: 9.6 K/UL (ref 1.5–7.5)
NEUTROPHILS RELATIVE PERCENT: 80.6 % (ref 50–65)
NEUTROPHILS RELATIVE PERCENT: 81.8 % (ref 50–65)
NEUTROPHILS RELATIVE PERCENT: 82 % (ref 50–65)
NEUTROPHILS RELATIVE PERCENT: 82.6 % (ref 50–65)
NEUTROPHILS RELATIVE PERCENT: 82.9 % (ref 50–65)
NEUTROPHILS RELATIVE PERCENT: 83.9 % (ref 50–65)
NEUTROPHILS RELATIVE PERCENT: 84.4 % (ref 50–65)
NEUTROPHILS RELATIVE PERCENT: 84.6 % (ref 50–65)
NEUTROPHILS RELATIVE PERCENT: 85.6 % (ref 50–65)
NEUTROPHILS RELATIVE PERCENT: 86 % (ref 50–65)
NEUTROPHILS RELATIVE PERCENT: 86 % (ref 50–65)
NEUTROPHILS RELATIVE PERCENT: 86.1 % (ref 50–65)
NEUTROPHILS RELATIVE PERCENT: 86.3 % (ref 50–65)
NEUTROPHILS RELATIVE PERCENT: 86.6 % (ref 50–65)
NEUTROPHILS RELATIVE PERCENT: 87.2 % (ref 50–65)
NEUTROPHILS RELATIVE PERCENT: 87.4 % (ref 50–65)
NEUTROPHILS RELATIVE PERCENT: 88 % (ref 50–65)
NEUTROPHILS RELATIVE PERCENT: 92.5 % (ref 50–65)
NEUTROPHILS RELATIVE PERCENT: 94.8 % (ref 50–65)
NEUTROPHILS RELATIVE PERCENT: 95.2 % (ref 50–65)
NEUTROPHILS RELATIVE PERCENT: 96 % (ref 50–65)
NITRITE, URINE: NEGATIVE
NITRITE, URINE: POSITIVE
O2 CONTENT ARTERIAL: 12 ML/DL
O2 CONTENT ARTERIAL: 13.3 ML/DL
O2 CONTENT ARTERIAL: 13.5 ML/DL
O2 CONTENT ARTERIAL: 16.3 ML/DL
O2 CONTENT ARTERIAL: 16.5 ML/DL
O2 CONTENT ARTERIAL: 16.6 ML/DL
O2 CONTENT ARTERIAL: 17.3 ML/DL
O2 CONTENT ARTERIAL: 17.4 ML/DL
O2 CONTENT ARTERIAL: 17.6 ML/DL
O2 CONTENT ARTERIAL: 18.6 ML/DL
O2 CONTENT ARTERIAL: 20.5 ML/DL
O2 DELIVERY DEVICE: ABNORMAL
O2 DELIVERY DEVICE: ABNORMAL
O2 SAT, ARTERIAL: 70.1 %
O2 SAT, ARTERIAL: 89 %
O2 SAT, ARTERIAL: 89.6 %
O2 SAT, ARTERIAL: 90.8 %
O2 SAT, ARTERIAL: 90.8 %
O2 SAT, ARTERIAL: 92.5 %
O2 SAT, ARTERIAL: 94 %
O2 SAT, ARTERIAL: 94.1 %
O2 SAT, ARTERIAL: 94.8 %
O2 SAT, ARTERIAL: 95.3 %
O2 SAT, ARTERIAL: 96.3 %
O2 THERAPY: ABNORMAL
ORGANISM: ABNORMAL
OXYGEN FLOW: 15
OXYGEN FLOW: 4
OXYGEN FLOW: 6
OXYGEN FLOW: 6
PARAINFLUENZA VIRUS 1 BY PCR: NOT DETECTED
PARAINFLUENZA VIRUS 2 BY PCR: NOT DETECTED
PARAINFLUENZA VIRUS 3 BY PCR: NOT DETECTED
PARAINFLUENZA VIRUS 4 BY PCR: NOT DETECTED
PCO2 ARTERIAL: 150 MMHG (ref 35–45)
PCO2 ARTERIAL: 38 MMHG (ref 35–45)
PCO2 ARTERIAL: 39 MMHG (ref 35–45)
PCO2 ARTERIAL: 51 MMHG (ref 35–45)
PCO2 ARTERIAL: 53 MMHG (ref 35–45)
PCO2 ARTERIAL: 56 MMHG (ref 35–45)
PCO2 ARTERIAL: 60 MMHG (ref 35–45)
PCO2 ARTERIAL: 61 MMHG (ref 35–45)
PCO2 ARTERIAL: 64 MMHG (ref 35–45)
PCO2 ARTERIAL: 64 MMHG (ref 35–45)
PCO2 ARTERIAL: 72 MMHG (ref 35–45)
PDW BLD-RTO: 13.2 % (ref 11.5–14.5)
PDW BLD-RTO: 13.3 % (ref 11.5–14.5)
PDW BLD-RTO: 13.4 % (ref 11.5–14.5)
PDW BLD-RTO: 13.5 % (ref 11.5–14.5)
PDW BLD-RTO: 13.6 % (ref 11.5–14.5)
PDW BLD-RTO: 13.7 % (ref 11.5–14.5)
PDW BLD-RTO: 13.7 % (ref 11.5–14.5)
PDW BLD-RTO: 13.8 % (ref 11.5–14.5)
PDW BLD-RTO: 14.6 % (ref 11.5–14.5)
PDW BLD-RTO: 14.7 % (ref 11.5–14.5)
PERFORMED ON: ABNORMAL
PERFORMED ON: NORMAL
PH ARTERIAL: 7.04 (ref 7.35–7.45)
PH ARTERIAL: 7.22 (ref 7.35–7.45)
PH ARTERIAL: 7.24 (ref 7.35–7.45)
PH ARTERIAL: 7.31 (ref 7.35–7.45)
PH ARTERIAL: 7.33 (ref 7.35–7.45)
PH ARTERIAL: 7.33 (ref 7.35–7.45)
PH ARTERIAL: 7.36 (ref 7.35–7.45)
PH ARTERIAL: 7.37 (ref 7.35–7.45)
PH ARTERIAL: 7.44 (ref 7.35–7.45)
PH UA: 5 (ref 5–8)
PH UA: 5.5 (ref 5–8)
PH UA: 5.5 (ref 5–8)
PH UA: 6 (ref 5–8)
PH UA: 6 (ref 5–8)
PLATELET # BLD: 116 K/UL (ref 130–400)
PLATELET # BLD: 120 K/UL (ref 130–400)
PLATELET # BLD: 131 K/UL (ref 130–400)
PLATELET # BLD: 132 K/UL (ref 130–400)
PLATELET # BLD: 135 K/UL (ref 130–400)
PLATELET # BLD: 138 K/UL (ref 130–400)
PLATELET # BLD: 149 K/UL (ref 130–400)
PLATELET # BLD: 159 K/UL (ref 130–400)
PLATELET # BLD: 166 K/UL (ref 130–400)
PLATELET # BLD: 172 K/UL (ref 130–400)
PLATELET # BLD: 186 K/UL (ref 130–400)
PLATELET # BLD: 201 K/UL (ref 130–400)
PLATELET # BLD: 204 K/UL (ref 130–400)
PLATELET # BLD: 204 K/UL (ref 130–400)
PLATELET # BLD: 205 K/UL (ref 130–400)
PLATELET # BLD: 208 K/UL (ref 130–400)
PLATELET # BLD: 210 K/UL (ref 130–400)
PLATELET # BLD: 214 K/UL (ref 130–400)
PLATELET # BLD: 220 K/UL (ref 130–400)
PLATELET # BLD: 237 K/UL (ref 130–400)
PLATELET # BLD: 246 K/UL (ref 130–400)
PLATELET # BLD: 247 K/UL (ref 130–400)
PLATELET SLIDE REVIEW: ABNORMAL
PLATELET SLIDE REVIEW: ADEQUATE
PMV BLD AUTO: 10.9 FL (ref 9.4–12.4)
PMV BLD AUTO: 11 FL (ref 9.4–12.4)
PMV BLD AUTO: 11.1 FL (ref 9.4–12.4)
PMV BLD AUTO: 11.2 FL (ref 9.4–12.4)
PMV BLD AUTO: 11.4 FL (ref 9.4–12.4)
PMV BLD AUTO: 11.6 FL (ref 9.4–12.4)
PMV BLD AUTO: 11.7 FL (ref 9.4–12.4)
PMV BLD AUTO: 11.8 FL (ref 9.4–12.4)
PMV BLD AUTO: 11.9 FL (ref 9.4–12.4)
PMV BLD AUTO: 11.9 FL (ref 9.4–12.4)
PMV BLD AUTO: 12.4 FL (ref 9.4–12.4)
PO2 ARTERIAL: 101 MMHG (ref 80–100)
PO2 ARTERIAL: 47 MMHG (ref 80–100)
PO2 ARTERIAL: 57 MMHG (ref 80–100)
PO2 ARTERIAL: 66 MMHG (ref 80–100)
PO2 ARTERIAL: 72 MMHG (ref 80–100)
PO2 ARTERIAL: 78 MMHG (ref 80–100)
PO2 ARTERIAL: 83 MMHG (ref 80–100)
PO2 ARTERIAL: 83 MMHG (ref 80–100)
PO2 ARTERIAL: 89 MMHG (ref 80–100)
POSITIVE END EXP PRESS: 10
POSITIVE END EXP PRESS: 5
POSITIVE END EXP PRESS: 5
POSITIVE END EXP PRESS: 8
POTASSIUM REFLEX MAGNESIUM: 3.6 MMOL/L (ref 3.5–5)
POTASSIUM REFLEX MAGNESIUM: 4.8 MMOL/L (ref 3.5–5)
POTASSIUM REFLEX MAGNESIUM: 5.8 MMOL/L (ref 3.5–5)
POTASSIUM REFLEX MAGNESIUM: 6.8 MMOL/L (ref 3.5–5)
POTASSIUM SERPL-SCNC: 3.1 MMOL/L (ref 3.5–5)
POTASSIUM SERPL-SCNC: 3.4 MMOL/L (ref 3.5–5)
POTASSIUM SERPL-SCNC: 3.7 MMOL/L (ref 3.5–5)
POTASSIUM SERPL-SCNC: 3.9 MMOL/L (ref 3.5–5)
POTASSIUM SERPL-SCNC: 4 MMOL/L (ref 3.5–5)
POTASSIUM SERPL-SCNC: 4.3 MMOL/L (ref 3.5–5)
POTASSIUM SERPL-SCNC: 4.3 MMOL/L (ref 3.5–5)
POTASSIUM SERPL-SCNC: 4.4 MMOL/L (ref 3.5–5)
POTASSIUM SERPL-SCNC: 4.5 MMOL/L (ref 3.5–5)
POTASSIUM SERPL-SCNC: 4.6 MMOL/L (ref 3.5–5)
POTASSIUM SERPL-SCNC: 4.7 MMOL/L (ref 3.5–5)
POTASSIUM SERPL-SCNC: 4.9 MMOL/L (ref 3.5–5)
POTASSIUM SERPL-SCNC: 4.9 MMOL/L (ref 3.5–5)
POTASSIUM SERPL-SCNC: 5.1 MMOL/L (ref 3.5–5)
POTASSIUM SERPL-SCNC: 5.1 MMOL/L (ref 3.5–5)
POTASSIUM SERPL-SCNC: 5.2 MMOL/L (ref 3.5–5)
POTASSIUM SERPL-SCNC: 5.4 MMOL/L (ref 3.5–5)
POTASSIUM SERPL-SCNC: 5.4 MMOL/L (ref 3.5–5)
POTASSIUM SERPL-SCNC: 5.5 MMOL/L (ref 3.5–5)
POTASSIUM SERPL-SCNC: 5.6 MMOL/L (ref 3.5–5)
POTASSIUM SERPL-SCNC: 6.2 MMOL/L (ref 3.5–5)
POTASSIUM SERPL-SCNC: 6.7 MMOL/L (ref 3.5–5)
POTASSIUM SERPL-SCNC: 7.1 MMOL/L (ref 3.5–5)
POTASSIUM, WHOLE BLOOD: 3.2
POTASSIUM, WHOLE BLOOD: 3.3
POTASSIUM, WHOLE BLOOD: 3.3
POTASSIUM, WHOLE BLOOD: 3.5
POTASSIUM, WHOLE BLOOD: 3.8
POTASSIUM, WHOLE BLOOD: 3.8
POTASSIUM, WHOLE BLOOD: 4
POTASSIUM, WHOLE BLOOD: 4.2
POTASSIUM, WHOLE BLOOD: 4.3
POTASSIUM, WHOLE BLOOD: 5.5
POTASSIUM, WHOLE BLOOD: 5.9
PREALBUMIN: 10 MG/DL (ref 20–40)
PREALBUMIN: 8 MG/DL (ref 20–40)
PRO-BNP: 1448 PG/ML (ref 0–1800)
PRO-BNP: 168 PG/ML (ref 0–1800)
PRO-BNP: 3301 PG/ML (ref 0–1800)
PRO-BNP: 4673 PG/ML (ref 0–1800)
PRO-BNP: 7957 PG/ML (ref 0–1800)
PROCALCITONIN: 56.55 NG/ML (ref 0–0.09)
PROCALCITONIN: 63.12 NG/ML (ref 0–0.09)
PROCALCITONIN: >100 NG/ML (ref 0–0.09)
PROCALCITONIN: >100 NG/ML (ref 0–0.09)
PROTEIN UA: 100 MG/DL
PROTEIN UA: 100 MG/DL
PROTEIN UA: ABNORMAL MG/DL
PROTHROMBIN TIME: 20.3 SEC (ref 12–14.6)
RBC # BLD: 2.97 M/UL (ref 4.7–6.1)
RBC # BLD: 3.29 M/UL (ref 4.7–6.1)
RBC # BLD: 3.91 M/UL (ref 4.7–6.1)
RBC # BLD: 3.91 M/UL (ref 4.7–6.1)
RBC # BLD: 4.01 M/UL (ref 4.7–6.1)
RBC # BLD: 4.04 M/UL (ref 4.7–6.1)
RBC # BLD: 4.05 M/UL (ref 4.7–6.1)
RBC # BLD: 4.06 M/UL (ref 4.7–6.1)
RBC # BLD: 4.07 M/UL (ref 4.7–6.1)
RBC # BLD: 4.08 M/UL (ref 4.7–6.1)
RBC # BLD: 4.12 M/UL (ref 4.7–6.1)
RBC # BLD: 4.16 M/UL (ref 4.7–6.1)
RBC # BLD: 4.16 M/UL (ref 4.7–6.1)
RBC # BLD: 4.21 M/UL (ref 4.7–6.1)
RBC # BLD: 4.24 M/UL (ref 4.7–6.1)
RBC # BLD: 4.31 M/UL (ref 4.7–6.1)
RBC # BLD: 4.39 M/UL (ref 4.7–6.1)
RBC # BLD: 4.47 M/UL (ref 4.7–6.1)
RBC # BLD: 4.51 M/UL (ref 4.7–6.1)
RBC # BLD: 4.54 M/UL (ref 4.7–6.1)
RBC # BLD: 4.61 M/UL (ref 4.7–6.1)
RBC # BLD: 4.89 M/UL (ref 4.7–6.1)
RBC UA: 2 /HPF (ref 0–4)
RBC UA: ABNORMAL /HPF (ref 0–2)
RBC UA: ABNORMAL /HPF (ref 0–2)
REASON FOR REJECTION: NORMAL
REJECTED TEST: NORMAL
RESPIRATORY SYNCYTIAL VIRUS BY PCR: NOT DETECTED
SAMPLE SOURCE: ABNORMAL
SARS-COV-2, PCR: NOT DETECTED
SARS-COV-2, PCR: NOT DETECTED
SODIUM BLD-SCNC: 127 MMOL/L (ref 136–145)
SODIUM BLD-SCNC: 127 MMOL/L (ref 136–145)
SODIUM BLD-SCNC: 129 MMOL/L (ref 136–145)
SODIUM BLD-SCNC: 129 MMOL/L (ref 136–145)
SODIUM BLD-SCNC: 130 MMOL/L (ref 136–145)
SODIUM BLD-SCNC: 131 MMOL/L (ref 136–145)
SODIUM BLD-SCNC: 132 MMOL/L (ref 136–145)
SODIUM BLD-SCNC: 132 MMOL/L (ref 136–145)
SODIUM BLD-SCNC: 133 MMOL/L (ref 136–145)
SODIUM BLD-SCNC: 133 MMOL/L (ref 136–145)
SODIUM BLD-SCNC: 134 MMOL/L (ref 136–145)
SODIUM BLD-SCNC: 135 MMOL/L (ref 136–145)
SODIUM BLD-SCNC: 136 MMOL/L (ref 136–145)
SODIUM BLD-SCNC: 137 MMOL/L (ref 136–145)
SODIUM BLD-SCNC: 138 MMOL/L (ref 136–145)
SODIUM BLD-SCNC: 138 MMOL/L (ref 136–145)
SODIUM BLD-SCNC: 139 MMOL/L (ref 136–145)
SODIUM BLD-SCNC: 139 MMOL/L (ref 136–145)
SODIUM BLD-SCNC: 141 MMOL/L (ref 136–145)
SPECIFIC GRAVITY UA: 1.02 (ref 1–1.03)
SPECIFIC GRAVITY UA: 1.03 (ref 1–1.03)
SPECIFIC GRAVITY UA: 1.03 (ref 1–1.03)
TOTAL CK: 44 U/L (ref 39–308)
TOTAL PROTEIN: 4.8 G/DL (ref 6.6–8.7)
TOTAL PROTEIN: 5.1 G/DL (ref 6.6–8.7)
TOTAL PROTEIN: 5.3 G/DL (ref 6.6–8.7)
TOTAL PROTEIN: 6.1 G/DL (ref 6.6–8.7)
TOTAL PROTEIN: 6.4 G/DL (ref 6.6–8.7)
TOTAL PROTEIN: 6.7 G/DL (ref 6.6–8.7)
TOTAL PROTEIN: 6.9 G/DL (ref 6.6–8.7)
TOTAL PROTEIN: 6.9 G/DL (ref 6.6–8.7)
TOTAL PROTEIN: 7 G/DL (ref 6.6–8.7)
TOTAL PROTEIN: 7.4 G/DL (ref 6.6–8.7)
TOTAL PROTEIN: 7.6 G/DL (ref 6.6–8.7)
TOTAL PROTEIN: 7.9 G/DL (ref 6.6–8.7)
TROPONIN: 0.3 NG/ML (ref 0–0.03)
TROPONIN: 0.34 NG/ML (ref 0–0.03)
TROPONIN: 0.4 NG/ML (ref 0–0.03)
TROPONIN: <0.01 NG/ML (ref 0–0.03)
URINE CULTURE, ROUTINE: ABNORMAL
URINE CULTURE, ROUTINE: ABNORMAL
UROBILINOGEN, URINE: 0.2 E.U./DL
UROBILINOGEN, URINE: 1 E.U./DL
UROBILINOGEN, URINE: 1 E.U./DL
VITAMIN D 25-HYDROXY: 35.7 NG/ML
VT MECHANICAL: 450 %
VT MECHANICAL: 540 %
WBC # BLD: 11.3 K/UL (ref 4.8–10.8)
WBC # BLD: 11.6 K/UL (ref 4.8–10.8)
WBC # BLD: 12.5 K/UL (ref 4.8–10.8)
WBC # BLD: 12.8 K/UL (ref 4.8–10.8)
WBC # BLD: 14.2 K/UL (ref 4.8–10.8)
WBC # BLD: 14.8 K/UL (ref 4.8–10.8)
WBC # BLD: 14.9 K/UL (ref 4.8–10.8)
WBC # BLD: 15.3 K/UL (ref 4.8–10.8)
WBC # BLD: 15.4 K/UL (ref 4.8–10.8)
WBC # BLD: 15.8 K/UL (ref 4.8–10.8)
WBC # BLD: 16.4 K/UL (ref 4.8–10.8)
WBC # BLD: 17 K/UL (ref 4.8–10.8)
WBC # BLD: 17 K/UL (ref 4.8–10.8)
WBC # BLD: 17.3 K/UL (ref 4.8–10.8)
WBC # BLD: 17.3 K/UL (ref 4.8–10.8)
WBC # BLD: 17.8 K/UL (ref 4.8–10.8)
WBC # BLD: 18 K/UL (ref 4.8–10.8)
WBC # BLD: 18.4 K/UL (ref 4.8–10.8)
WBC # BLD: 19.2 K/UL (ref 4.8–10.8)
WBC # BLD: 20 K/UL (ref 4.8–10.8)
WBC # BLD: 25.1 K/UL (ref 4.8–10.8)
WBC # BLD: 27.1 K/UL (ref 4.8–10.8)
WBC UA: 6 /HPF (ref 0–5)
WBC UA: ABNORMAL /HPF (ref 0–5)

## 2022-01-01 PROCEDURE — 80048 BASIC METABOLIC PNL TOTAL CA: CPT

## 2022-01-01 PROCEDURE — 2580000003 HC RX 258: Performed by: HOSPITALIST

## 2022-01-01 PROCEDURE — 6370000000 HC RX 637 (ALT 250 FOR IP): Performed by: INTERNAL MEDICINE

## 2022-01-01 PROCEDURE — 6370000000 HC RX 637 (ALT 250 FOR IP): Performed by: NURSE PRACTITIONER

## 2022-01-01 PROCEDURE — 80053 COMPREHEN METABOLIC PANEL: CPT

## 2022-01-01 PROCEDURE — 87040 BLOOD CULTURE FOR BACTERIA: CPT

## 2022-01-01 PROCEDURE — 99232 SBSQ HOSP IP/OBS MODERATE 35: CPT | Performed by: PSYCHIATRY & NEUROLOGY

## 2022-01-01 PROCEDURE — 6360000002 HC RX W HCPCS: Performed by: HOSPITALIST

## 2022-01-01 PROCEDURE — 1180000000 HC REHAB R&B

## 2022-01-01 PROCEDURE — 6370000000 HC RX 637 (ALT 250 FOR IP): Performed by: PSYCHIATRY & NEUROLOGY

## 2022-01-01 PROCEDURE — 36415 COLL VENOUS BLD VENIPUNCTURE: CPT

## 2022-01-01 PROCEDURE — C8929 TTE W OR WO FOL WCON,DOPPLER: HCPCS

## 2022-01-01 PROCEDURE — 2580000003 HC RX 258: Performed by: NURSE PRACTITIONER

## 2022-01-01 PROCEDURE — 02HV33Z INSERTION OF INFUSION DEVICE INTO SUPERIOR VENA CAVA, PERCUTANEOUS APPROACH: ICD-10-PCS | Performed by: EMERGENCY MEDICINE

## 2022-01-01 PROCEDURE — 71045 X-RAY EXAM CHEST 1 VIEW: CPT

## 2022-01-01 PROCEDURE — 2700000000 HC OXYGEN THERAPY PER DAY

## 2022-01-01 PROCEDURE — 6360000004 HC RX CONTRAST MEDICATION: Performed by: INTERNAL MEDICINE

## 2022-01-01 PROCEDURE — 97129 THER IVNTJ 1ST 15 MIN: CPT

## 2022-01-01 PROCEDURE — 2140000000 HC CCU INTERMEDIATE R&B

## 2022-01-01 PROCEDURE — 82947 ASSAY GLUCOSE BLOOD QUANT: CPT

## 2022-01-01 PROCEDURE — 99232 SBSQ HOSP IP/OBS MODERATE 35: CPT | Performed by: INTERNAL MEDICINE

## 2022-01-01 PROCEDURE — 83735 ASSAY OF MAGNESIUM: CPT

## 2022-01-01 PROCEDURE — 6360000002 HC RX W HCPCS: Performed by: INTERNAL MEDICINE

## 2022-01-01 PROCEDURE — 85025 COMPLETE CBC W/AUTO DIFF WBC: CPT

## 2022-01-01 PROCEDURE — 36556 INSERT NON-TUNNEL CV CATH: CPT

## 2022-01-01 PROCEDURE — 97110 THERAPEUTIC EXERCISES: CPT

## 2022-01-01 PROCEDURE — 2500000003 HC RX 250 WO HCPCS

## 2022-01-01 PROCEDURE — 92610 EVALUATE SWALLOWING FUNCTION: CPT

## 2022-01-01 PROCEDURE — 2580000003 HC RX 258: Performed by: INTERNAL MEDICINE

## 2022-01-01 PROCEDURE — 84134 ASSAY OF PREALBUMIN: CPT

## 2022-01-01 PROCEDURE — 97130 THER IVNTJ EA ADDL 15 MIN: CPT

## 2022-01-01 PROCEDURE — 2000000000 HC ICU R&B

## 2022-01-01 PROCEDURE — 6360000002 HC RX W HCPCS: Performed by: NURSE PRACTITIONER

## 2022-01-01 PROCEDURE — 2500000003 HC RX 250 WO HCPCS: Performed by: HOSPITALIST

## 2022-01-01 PROCEDURE — 94660 CPAP INITIATION&MGMT: CPT

## 2022-01-01 PROCEDURE — 99238 HOSP IP/OBS DSCHRG MGMT 30/<: CPT | Performed by: PSYCHIATRY & NEUROLOGY

## 2022-01-01 PROCEDURE — P9047 ALBUMIN (HUMAN), 25%, 50ML: HCPCS | Performed by: INTERNAL MEDICINE

## 2022-01-01 PROCEDURE — 36600 WITHDRAWAL OF ARTERIAL BLOOD: CPT

## 2022-01-01 PROCEDURE — 97535 SELF CARE MNGMENT TRAINING: CPT

## 2022-01-01 PROCEDURE — 2500000003 HC RX 250 WO HCPCS: Performed by: INTERNAL MEDICINE

## 2022-01-01 PROCEDURE — 97165 OT EVAL LOW COMPLEX 30 MIN: CPT

## 2022-01-01 PROCEDURE — 99221 1ST HOSP IP/OBS SF/LOW 40: CPT | Performed by: INTERNAL MEDICINE

## 2022-01-01 PROCEDURE — 71045 X-RAY EXAM CHEST 1 VIEW: CPT | Performed by: RADIOLOGY

## 2022-01-01 PROCEDURE — 94003 VENT MGMT INPAT SUBQ DAY: CPT

## 2022-01-01 PROCEDURE — 97530 THERAPEUTIC ACTIVITIES: CPT

## 2022-01-01 PROCEDURE — 6360000004 HC RX CONTRAST MEDICATION: Performed by: EMERGENCY MEDICINE

## 2022-01-01 PROCEDURE — 87186 SC STD MICRODIL/AGAR DIL: CPT

## 2022-01-01 PROCEDURE — 82306 VITAMIN D 25 HYDROXY: CPT

## 2022-01-01 PROCEDURE — 87205 SMEAR GRAM STAIN: CPT

## 2022-01-01 PROCEDURE — 81003 URINALYSIS AUTO W/O SCOPE: CPT

## 2022-01-01 PROCEDURE — 92526 ORAL FUNCTION THERAPY: CPT

## 2022-01-01 PROCEDURE — 83880 ASSAY OF NATRIURETIC PEPTIDE: CPT

## 2022-01-01 PROCEDURE — 31500 INSERT EMERGENCY AIRWAY: CPT

## 2022-01-01 PROCEDURE — 84145 PROCALCITONIN (PCT): CPT

## 2022-01-01 PROCEDURE — 0202U NFCT DS 22 TRGT SARS-COV-2: CPT

## 2022-01-01 PROCEDURE — 92507 TX SP LANG VOICE COMM INDIV: CPT

## 2022-01-01 PROCEDURE — 71275 CT ANGIOGRAPHY CHEST: CPT

## 2022-01-01 PROCEDURE — 99223 1ST HOSP IP/OBS HIGH 75: CPT | Performed by: PHYSICIAN ASSISTANT

## 2022-01-01 PROCEDURE — 84484 ASSAY OF TROPONIN QUANT: CPT

## 2022-01-01 PROCEDURE — 85379 FIBRIN DEGRADATION QUANT: CPT

## 2022-01-01 PROCEDURE — 85027 COMPLETE CBC AUTOMATED: CPT

## 2022-01-01 PROCEDURE — 99233 SBSQ HOSP IP/OBS HIGH 50: CPT | Performed by: PSYCHIATRY & NEUROLOGY

## 2022-01-01 PROCEDURE — U0005 INFEC AGEN DETEC AMPLI PROBE: HCPCS

## 2022-01-01 PROCEDURE — 81001 URINALYSIS AUTO W/SCOPE: CPT

## 2022-01-01 PROCEDURE — 51798 US URINE CAPACITY MEASURE: CPT

## 2022-01-01 PROCEDURE — 99285 EMERGENCY DEPT VISIT HI MDM: CPT

## 2022-01-01 PROCEDURE — 6360000002 HC RX W HCPCS: Performed by: EMERGENCY MEDICINE

## 2022-01-01 PROCEDURE — U0003 INFECTIOUS AGENT DETECTION BY NUCLEIC ACID (DNA OR RNA); SEVERE ACUTE RESPIRATORY SYNDROME CORONAVIRUS 2 (SARS-COV-2) (CORONAVIRUS DISEASE [COVID-19]), AMPLIFIED PROBE TECHNIQUE, MAKING USE OF HIGH THROUGHPUT TECHNOLOGIES AS DESCRIBED BY CMS-2020-01-R: HCPCS

## 2022-01-01 PROCEDURE — 83605 ASSAY OF LACTIC ACID: CPT

## 2022-01-01 PROCEDURE — 92522 EVALUATE SPEECH PRODUCTION: CPT

## 2022-01-01 PROCEDURE — 97161 PT EVAL LOW COMPLEX 20 MIN: CPT

## 2022-01-01 PROCEDURE — 6370000000 HC RX 637 (ALT 250 FOR IP): Performed by: HOSPITALIST

## 2022-01-01 PROCEDURE — 99291 CRITICAL CARE FIRST HOUR: CPT | Performed by: INTERNAL MEDICINE

## 2022-01-01 PROCEDURE — 99233 SBSQ HOSP IP/OBS HIGH 50: CPT | Performed by: PHYSICIAN ASSISTANT

## 2022-01-01 PROCEDURE — 51701 INSERT BLADDER CATHETER: CPT

## 2022-01-01 PROCEDURE — 97116 GAIT TRAINING THERAPY: CPT

## 2022-01-01 PROCEDURE — 85610 PROTHROMBIN TIME: CPT

## 2022-01-01 PROCEDURE — 93923 UPR/LXTR ART STDY 3+ LVLS: CPT

## 2022-01-01 PROCEDURE — 70450 CT HEAD/BRAIN W/O DYE: CPT | Performed by: RADIOLOGY

## 2022-01-01 PROCEDURE — 2500000003 HC RX 250 WO HCPCS: Performed by: STUDENT IN AN ORGANIZED HEALTH CARE EDUCATION/TRAINING PROGRAM

## 2022-01-01 PROCEDURE — 87070 CULTURE OTHR SPECIMN AEROBIC: CPT

## 2022-01-01 PROCEDURE — 82803 BLOOD GASES ANY COMBINATION: CPT

## 2022-01-01 PROCEDURE — 70450 CT HEAD/BRAIN W/O DYE: CPT

## 2022-01-01 PROCEDURE — 78580 LUNG PERFUSION IMAGING: CPT

## 2022-01-01 PROCEDURE — 83036 HEMOGLOBIN GLYCOSYLATED A1C: CPT

## 2022-01-01 PROCEDURE — 6360000002 HC RX W HCPCS: Performed by: STUDENT IN AN ORGANIZED HEALTH CARE EDUCATION/TRAINING PROGRAM

## 2022-01-01 PROCEDURE — 93005 ELECTROCARDIOGRAM TRACING: CPT

## 2022-01-01 PROCEDURE — A9540 TC99M MAA: HCPCS | Performed by: HOSPITALIST

## 2022-01-01 PROCEDURE — 82010 KETONE BODYS QUAN: CPT

## 2022-01-01 PROCEDURE — 1800000000 HC LEAVE OF ABSENCE

## 2022-01-01 PROCEDURE — 82550 ASSAY OF CK (CPK): CPT

## 2022-01-01 PROCEDURE — 87086 URINE CULTURE/COLONY COUNT: CPT

## 2022-01-01 PROCEDURE — 84132 ASSAY OF SERUM POTASSIUM: CPT

## 2022-01-01 PROCEDURE — 0BH17EZ INSERTION OF ENDOTRACHEAL AIRWAY INTO TRACHEA, VIA NATURAL OR ARTIFICIAL OPENING: ICD-10-PCS | Performed by: INTERNAL MEDICINE

## 2022-01-01 PROCEDURE — 6370000000 HC RX 637 (ALT 250 FOR IP): Performed by: STUDENT IN AN ORGANIZED HEALTH CARE EDUCATION/TRAINING PROGRAM

## 2022-01-01 PROCEDURE — 85730 THROMBOPLASTIN TIME PARTIAL: CPT

## 2022-01-01 PROCEDURE — 3430000000 HC RX DIAGNOSTIC RADIOPHARMACEUTICAL: Performed by: HOSPITALIST

## 2022-01-01 PROCEDURE — 99223 1ST HOSP IP/OBS HIGH 75: CPT | Performed by: INTERNAL MEDICINE

## 2022-01-01 PROCEDURE — 82533 TOTAL CORTISOL: CPT

## 2022-01-01 PROCEDURE — 87641 MR-STAPH DNA AMP PROBE: CPT

## 2022-01-01 PROCEDURE — 83690 ASSAY OF LIPASE: CPT

## 2022-01-01 PROCEDURE — 96374 THER/PROPH/DIAG INJ IV PUSH: CPT

## 2022-01-01 PROCEDURE — 5A1935Z RESPIRATORY VENTILATION, LESS THAN 24 CONSECUTIVE HOURS: ICD-10-PCS | Performed by: INTERNAL MEDICINE

## 2022-01-01 PROCEDURE — 71275 CT ANGIOGRAPHY CHEST: CPT | Performed by: RADIOLOGY

## 2022-01-01 PROCEDURE — 99223 1ST HOSP IP/OBS HIGH 75: CPT | Performed by: PSYCHIATRY & NEUROLOGY

## 2022-01-01 PROCEDURE — 94002 VENT MGMT INPAT INIT DAY: CPT

## 2022-01-01 PROCEDURE — 82140 ASSAY OF AMMONIA: CPT

## 2022-01-01 PROCEDURE — 71046 X-RAY EXAM CHEST 2 VIEWS: CPT

## 2022-01-01 PROCEDURE — 80076 HEPATIC FUNCTION PANEL: CPT

## 2022-01-01 RX ORDER — BENZONATATE 100 MG/1
100 CAPSULE ORAL 3 TIMES DAILY PRN
Status: DISCONTINUED | OUTPATIENT
Start: 2022-01-01 | End: 2022-01-01 | Stop reason: HOSPADM

## 2022-01-01 RX ORDER — CILOSTAZOL 100 MG/1
50 TABLET ORAL
Status: DISCONTINUED | OUTPATIENT
Start: 2022-01-01 | End: 2022-01-01

## 2022-01-01 RX ORDER — INSULIN LISPRO 100 [IU]/ML
0-8 INJECTION, SOLUTION INTRAVENOUS; SUBCUTANEOUS
Status: DISCONTINUED | OUTPATIENT
Start: 2022-01-01 | End: 2022-01-01 | Stop reason: HOSPADM

## 2022-01-01 RX ORDER — SENNA AND DOCUSATE SODIUM 50; 8.6 MG/1; MG/1
1 TABLET, FILM COATED ORAL 2 TIMES DAILY
OUTPATIENT
Start: 2022-01-01

## 2022-01-01 RX ORDER — LANOLIN ALCOHOL/MO/W.PET/CERES
800 CREAM (GRAM) TOPICAL DAILY
Status: DISCONTINUED | OUTPATIENT
Start: 2022-01-01 | End: 2022-01-01 | Stop reason: HOSPADM

## 2022-01-01 RX ORDER — BENZONATATE 100 MG/1
100 CAPSULE ORAL 3 TIMES DAILY PRN
Qty: 30 CAPSULE | Refills: 0
Start: 2022-01-01 | End: 2022-01-01

## 2022-01-01 RX ORDER — DILTIAZEM HYDROCHLORIDE 5 MG/ML
10 INJECTION INTRAVENOUS ONCE
Status: COMPLETED | OUTPATIENT
Start: 2022-01-01 | End: 2022-01-01

## 2022-01-01 RX ORDER — INSULIN GLARGINE 100 [IU]/ML
15 INJECTION, SOLUTION SUBCUTANEOUS 2 TIMES DAILY
Status: DISCONTINUED | OUTPATIENT
Start: 2022-01-01 | End: 2022-01-01

## 2022-01-01 RX ORDER — LISINOPRIL 5 MG/1
5 TABLET ORAL DAILY
Status: DISCONTINUED | OUTPATIENT
Start: 2022-01-01 | End: 2022-01-01

## 2022-01-01 RX ORDER — INSULIN LISPRO 100 [IU]/ML
0.05 INJECTION, SOLUTION INTRAVENOUS; SUBCUTANEOUS
Status: DISCONTINUED | OUTPATIENT
Start: 2022-01-01 | End: 2022-01-01

## 2022-01-01 RX ORDER — BENZONATATE 100 MG/1
100 CAPSULE ORAL 3 TIMES DAILY PRN
OUTPATIENT
Start: 2022-01-01

## 2022-01-01 RX ORDER — INSULIN LISPRO 100 [IU]/ML
0-4 INJECTION, SOLUTION INTRAVENOUS; SUBCUTANEOUS
Status: DISCONTINUED | OUTPATIENT
Start: 2022-01-01 | End: 2022-01-01 | Stop reason: HOSPADM

## 2022-01-01 RX ORDER — SENNA AND DOCUSATE SODIUM 50; 8.6 MG/1; MG/1
1 TABLET, FILM COATED ORAL 2 TIMES DAILY
Status: DISCONTINUED | OUTPATIENT
Start: 2022-01-01 | End: 2022-01-01 | Stop reason: HOSPADM

## 2022-01-01 RX ORDER — ATORVASTATIN CALCIUM 40 MG/1
40 TABLET, FILM COATED ORAL NIGHTLY
Status: DISCONTINUED | OUTPATIENT
Start: 2022-01-01 | End: 2022-01-01 | Stop reason: HOSPADM

## 2022-01-01 RX ORDER — INSULIN LISPRO 100 [IU]/ML
0-4 INJECTION, SOLUTION INTRAVENOUS; SUBCUTANEOUS NIGHTLY
Status: DISCONTINUED | OUTPATIENT
Start: 2022-01-01 | End: 2022-01-01

## 2022-01-01 RX ORDER — ALBUMIN (HUMAN) 12.5 G/50ML
25 SOLUTION INTRAVENOUS EVERY 6 HOURS
OUTPATIENT
Start: 2022-01-01 | End: 2022-01-01

## 2022-01-01 RX ORDER — NOREPINEPHRINE BIT/0.9 % NACL 16MG/250ML
1-100 INFUSION BOTTLE (ML) INTRAVENOUS CONTINUOUS
Status: DISCONTINUED | OUTPATIENT
Start: 2022-01-01 | End: 2022-01-01 | Stop reason: HOSPADM

## 2022-01-01 RX ORDER — FENTANYL CITRATE-0.9 % NACL/PF 10 MCG/ML
25-200 PLASTIC BAG, INJECTION (ML) INTRAVENOUS CONTINUOUS
OUTPATIENT
Start: 2022-01-01

## 2022-01-01 RX ORDER — MEGESTROL ACETATE 40 MG/ML
800 SUSPENSION ORAL DAILY
Status: DISCONTINUED | OUTPATIENT
Start: 2022-01-01 | End: 2022-01-01

## 2022-01-01 RX ORDER — INSULIN GLARGINE 100 [IU]/ML
30 INJECTION, SOLUTION SUBCUTANEOUS 2 TIMES DAILY
Status: DISCONTINUED | OUTPATIENT
Start: 2022-01-01 | End: 2022-01-01

## 2022-01-01 RX ORDER — ACETAMINOPHEN 650 MG/1
650 SUPPOSITORY RECTAL EVERY 6 HOURS PRN
Status: DISCONTINUED | OUTPATIENT
Start: 2022-01-01 | End: 2022-01-01

## 2022-01-01 RX ORDER — SODIUM CHLORIDE 9 MG/ML
INJECTION, SOLUTION INTRAVENOUS CONTINUOUS
Status: DISCONTINUED | OUTPATIENT
Start: 2022-01-01 | End: 2022-01-01 | Stop reason: HOSPADM

## 2022-01-01 RX ORDER — METRONIDAZOLE 500 MG/100ML
500 INJECTION, SOLUTION INTRAVENOUS EVERY 8 HOURS
OUTPATIENT
Start: 2022-01-01

## 2022-01-01 RX ORDER — INSULIN LISPRO 100 [IU]/ML
0.05 INJECTION, SOLUTION INTRAVENOUS; SUBCUTANEOUS
Status: CANCELLED | OUTPATIENT
Start: 2022-01-01

## 2022-01-01 RX ORDER — SODIUM CHLORIDE, SODIUM LACTATE, POTASSIUM CHLORIDE, CALCIUM CHLORIDE 600; 310; 30; 20 MG/100ML; MG/100ML; MG/100ML; MG/100ML
INJECTION, SOLUTION INTRAVENOUS CONTINUOUS
Status: DISCONTINUED | OUTPATIENT
Start: 2022-01-01 | End: 2022-01-01 | Stop reason: HOSPADM

## 2022-01-01 RX ORDER — LANOLIN ALCOHOL/MO/W.PET/CERES
800 CREAM (GRAM) TOPICAL DAILY
Status: CANCELLED | OUTPATIENT
Start: 2022-01-01

## 2022-01-01 RX ORDER — PANTOPRAZOLE SODIUM 40 MG/1
40 TABLET, DELAYED RELEASE ORAL
Status: DISCONTINUED | OUTPATIENT
Start: 2022-01-01 | End: 2022-01-01 | Stop reason: HOSPADM

## 2022-01-01 RX ORDER — INSULIN GLARGINE 100 [IU]/ML
35 INJECTION, SOLUTION SUBCUTANEOUS 2 TIMES DAILY
Status: DISCONTINUED | OUTPATIENT
Start: 2022-01-01 | End: 2022-01-01

## 2022-01-01 RX ORDER — INSULIN GLARGINE 100 [IU]/ML
15 INJECTION, SOLUTION SUBCUTANEOUS 2 TIMES DAILY
OUTPATIENT
Start: 2022-01-01

## 2022-01-01 RX ORDER — CIPROFLOXACIN 2 MG/ML
400 INJECTION, SOLUTION INTRAVENOUS EVERY 12 HOURS
Status: CANCELLED | OUTPATIENT
Start: 2022-01-01 | End: 2022-01-01

## 2022-01-01 RX ORDER — FENTANYL CITRATE-0.9 % NACL/PF 10 MCG/ML
25-200 PLASTIC BAG, INJECTION (ML) INTRAVENOUS CONTINUOUS
Status: DISCONTINUED | OUTPATIENT
Start: 2022-01-01 | End: 2022-01-01 | Stop reason: HOSPADM

## 2022-01-01 RX ORDER — MEROPENEM AND SODIUM CHLORIDE 1 G/50ML
1000 INJECTION, SOLUTION INTRAVENOUS EVERY 8 HOURS
Status: DISPENSED | OUTPATIENT
Start: 2022-01-01 | End: 2022-01-01

## 2022-01-01 RX ORDER — CILOSTAZOL 100 MG/1
50 TABLET ORAL
Status: DISCONTINUED | OUTPATIENT
Start: 2022-01-01 | End: 2022-01-01 | Stop reason: HOSPADM

## 2022-01-01 RX ORDER — DEXTROSE MONOHYDRATE 100 MG/ML
INJECTION, SOLUTION INTRAVENOUS CONTINUOUS PRN
Status: CANCELLED | OUTPATIENT
Start: 2022-01-01

## 2022-01-01 RX ORDER — ONDANSETRON 4 MG/1
4 TABLET, ORALLY DISINTEGRATING ORAL EVERY 8 HOURS PRN
Status: CANCELLED | OUTPATIENT
Start: 2022-01-01

## 2022-01-01 RX ORDER — INSULIN GLARGINE 100 [IU]/ML
30 INJECTION, SOLUTION SUBCUTANEOUS NIGHTLY
Status: DISCONTINUED | OUTPATIENT
Start: 2022-01-01 | End: 2022-01-01

## 2022-01-01 RX ORDER — DULOXETIN HYDROCHLORIDE 30 MG/1
30 CAPSULE, DELAYED RELEASE ORAL DAILY
Status: DISCONTINUED | OUTPATIENT
Start: 2022-01-01 | End: 2022-01-01

## 2022-01-01 RX ORDER — DEXTROSE MONOHYDRATE 100 MG/ML
INJECTION, SOLUTION INTRAVENOUS CONTINUOUS PRN
OUTPATIENT
Start: 2022-01-01

## 2022-01-01 RX ORDER — POTASSIUM CHLORIDE 29.8 MG/ML
20 INJECTION INTRAVENOUS PRN
Status: DISCONTINUED | OUTPATIENT
Start: 2022-01-01 | End: 2022-01-01 | Stop reason: HOSPADM

## 2022-01-01 RX ORDER — LISINOPRIL 5 MG/1
5 TABLET ORAL DAILY
Status: DISCONTINUED | OUTPATIENT
Start: 2022-01-01 | End: 2022-01-01 | Stop reason: HOSPADM

## 2022-01-01 RX ORDER — 0.9 % SODIUM CHLORIDE 0.9 %
1000 INTRAVENOUS SOLUTION INTRAVENOUS ONCE
Status: COMPLETED | OUTPATIENT
Start: 2022-01-01 | End: 2022-01-01

## 2022-01-01 RX ORDER — NOREPINEPHRINE BIT/0.9 % NACL 16MG/250ML
INFUSION BOTTLE (ML) INTRAVENOUS
Status: COMPLETED
Start: 2022-01-01 | End: 2022-01-01

## 2022-01-01 RX ORDER — NOREPINEPHRINE BIT/0.9 % NACL 16MG/250ML
1-100 INFUSION BOTTLE (ML) INTRAVENOUS CONTINUOUS
OUTPATIENT
Start: 2022-01-01

## 2022-01-01 RX ORDER — INSULIN LISPRO 100 [IU]/ML
10 INJECTION, SOLUTION INTRAVENOUS; SUBCUTANEOUS ONCE
Status: COMPLETED | OUTPATIENT
Start: 2022-01-01 | End: 2022-01-01

## 2022-01-01 RX ORDER — POTASSIUM CHLORIDE 29.8 MG/ML
20 INJECTION INTRAVENOUS PRN
OUTPATIENT
Start: 2022-01-01

## 2022-01-01 RX ORDER — INSULIN LISPRO 100 [IU]/ML
0-4 INJECTION, SOLUTION INTRAVENOUS; SUBCUTANEOUS
Status: CANCELLED | OUTPATIENT
Start: 2022-01-01

## 2022-01-01 RX ORDER — SODIUM CHLORIDE 9 MG/ML
INJECTION, SOLUTION INTRAVENOUS CONTINUOUS
Status: DISCONTINUED | OUTPATIENT
Start: 2022-01-01 | End: 2022-01-01

## 2022-01-01 RX ORDER — PROPOFOL 10 MG/ML
5-50 INJECTION, EMULSION INTRAVENOUS CONTINUOUS
Status: DISCONTINUED | OUTPATIENT
Start: 2022-01-01 | End: 2022-01-01 | Stop reason: HOSPADM

## 2022-01-01 RX ORDER — MEROPENEM AND SODIUM CHLORIDE 1 G/50ML
1000 INJECTION, SOLUTION INTRAVENOUS ONCE
Status: COMPLETED | OUTPATIENT
Start: 2022-01-01 | End: 2022-01-01

## 2022-01-01 RX ORDER — ACETAMINOPHEN 325 MG/1
650 TABLET ORAL EVERY 4 HOURS PRN
Status: DISCONTINUED | OUTPATIENT
Start: 2022-01-01 | End: 2022-01-01 | Stop reason: HOSPADM

## 2022-01-01 RX ORDER — INSULIN LISPRO 100 [IU]/ML
0-8 INJECTION, SOLUTION INTRAVENOUS; SUBCUTANEOUS
Status: CANCELLED | OUTPATIENT
Start: 2022-01-01

## 2022-01-01 RX ORDER — MODAFINIL 100 MG/1
100 TABLET ORAL DAILY
Status: DISCONTINUED | OUTPATIENT
Start: 2022-01-01 | End: 2022-01-01

## 2022-01-01 RX ORDER — METRONIDAZOLE 500 MG/100ML
500 INJECTION, SOLUTION INTRAVENOUS EVERY 8 HOURS
Status: DISCONTINUED | OUTPATIENT
Start: 2022-01-01 | End: 2022-01-01 | Stop reason: HOSPADM

## 2022-01-01 RX ORDER — INSULIN GLARGINE 100 [IU]/ML
20 INJECTION, SOLUTION SUBCUTANEOUS 2 TIMES DAILY
Status: DISCONTINUED | OUTPATIENT
Start: 2022-01-01 | End: 2022-01-01

## 2022-01-01 RX ORDER — INSULIN GLARGINE 100 [IU]/ML
30 INJECTION, SOLUTION SUBCUTANEOUS 2 TIMES DAILY
Status: CANCELLED | OUTPATIENT
Start: 2022-01-01

## 2022-01-01 RX ORDER — ATORVASTATIN CALCIUM 40 MG/1
40 TABLET, FILM COATED ORAL NIGHTLY
Status: CANCELLED | OUTPATIENT
Start: 2022-01-01

## 2022-01-01 RX ORDER — ATORVASTATIN CALCIUM 40 MG/1
40 TABLET, FILM COATED ORAL NIGHTLY
Status: DISCONTINUED | OUTPATIENT
Start: 2022-01-01 | End: 2022-01-01

## 2022-01-01 RX ORDER — DILTIAZEM HCL IN NACL,ISO-OSM 125 MG/125
2.5-15 PLASTIC BAG, INJECTION (ML) INTRAVENOUS CONTINUOUS
Status: DISCONTINUED | OUTPATIENT
Start: 2022-01-01 | End: 2022-01-01

## 2022-01-01 RX ORDER — ATORVASTATIN CALCIUM 40 MG/1
40 TABLET, FILM COATED ORAL NIGHTLY
OUTPATIENT
Start: 2022-01-01

## 2022-01-01 RX ORDER — ASPIRIN 81 MG/1
81 TABLET ORAL DAILY
OUTPATIENT
Start: 2022-10-22

## 2022-01-01 RX ORDER — POLYETHYLENE GLYCOL 3350 17 G/17G
17 POWDER, FOR SOLUTION ORAL DAILY
Status: DISCONTINUED | OUTPATIENT
Start: 2022-01-01 | End: 2022-01-01

## 2022-01-01 RX ORDER — ASPIRIN 81 MG/1
81 TABLET, CHEWABLE ORAL DAILY
Status: CANCELLED | OUTPATIENT
Start: 2022-01-01

## 2022-01-01 RX ORDER — SODIUM CHLORIDE 9 MG/ML
INJECTION, SOLUTION INTRAVENOUS PRN
Status: DISCONTINUED | OUTPATIENT
Start: 2022-01-01 | End: 2022-01-01 | Stop reason: HOSPADM

## 2022-01-01 RX ORDER — SODIUM CHLORIDE 0.9 % (FLUSH) 0.9 %
5-40 SYRINGE (ML) INJECTION PRN
Status: CANCELLED | OUTPATIENT
Start: 2022-01-01

## 2022-01-01 RX ORDER — DEXTROSE MONOHYDRATE 100 MG/ML
INJECTION, SOLUTION INTRAVENOUS CONTINUOUS PRN
Status: DISCONTINUED | OUTPATIENT
Start: 2022-01-01 | End: 2022-01-01 | Stop reason: HOSPADM

## 2022-01-01 RX ORDER — INSULIN LISPRO 100 [IU]/ML
0-8 INJECTION, SOLUTION INTRAVENOUS; SUBCUTANEOUS
Status: DISCONTINUED | OUTPATIENT
Start: 2022-01-01 | End: 2022-01-01

## 2022-01-01 RX ORDER — LANOLIN ALCOHOL/MO/W.PET/CERES
800 CREAM (GRAM) TOPICAL DAILY
OUTPATIENT
Start: 2022-10-22

## 2022-01-01 RX ORDER — CARVEDILOL 6.25 MG/1
6.25 TABLET ORAL 2 TIMES DAILY WITH MEALS
Status: DISCONTINUED | OUTPATIENT
Start: 2022-01-01 | End: 2022-01-01

## 2022-01-01 RX ORDER — CILOSTAZOL 50 MG/1
100 TABLET ORAL
Status: DISCONTINUED | OUTPATIENT
Start: 2022-01-01 | End: 2022-01-01

## 2022-01-01 RX ORDER — LIDOCAINE HYDROCHLORIDE 20 MG/ML
JELLY TOPICAL PRN
Status: DISCONTINUED | OUTPATIENT
Start: 2022-01-01 | End: 2022-01-01 | Stop reason: HOSPADM

## 2022-01-01 RX ORDER — INSULIN LISPRO 100 [IU]/ML
0-4 INJECTION, SOLUTION INTRAVENOUS; SUBCUTANEOUS NIGHTLY
OUTPATIENT
Start: 2022-01-01

## 2022-01-01 RX ORDER — SODIUM CHLORIDE 0.9 % (FLUSH) 0.9 %
5-40 SYRINGE (ML) INJECTION PRN
Status: DISCONTINUED | OUTPATIENT
Start: 2022-01-01 | End: 2022-01-01 | Stop reason: HOSPADM

## 2022-01-01 RX ORDER — SODIUM CHLORIDE 9 MG/ML
INJECTION, SOLUTION INTRAVENOUS PRN
Status: CANCELLED | OUTPATIENT
Start: 2022-01-01

## 2022-01-01 RX ORDER — INSULIN LISPRO 100 [IU]/ML
0-8 INJECTION, SOLUTION INTRAVENOUS; SUBCUTANEOUS
OUTPATIENT
Start: 2022-01-01

## 2022-01-01 RX ORDER — CALCIUM GLUCONATE 94 MG/ML
1000 INJECTION, SOLUTION INTRAVENOUS ONCE
Status: COMPLETED | OUTPATIENT
Start: 2022-01-01 | End: 2022-01-01

## 2022-01-01 RX ORDER — 0.9 % SODIUM CHLORIDE 0.9 %
30 INTRAVENOUS SOLUTION INTRAVENOUS ONCE
Status: DISCONTINUED | OUTPATIENT
Start: 2022-01-01 | End: 2022-01-01

## 2022-01-01 RX ORDER — INSULIN LISPRO 100 [IU]/ML
0-4 INJECTION, SOLUTION INTRAVENOUS; SUBCUTANEOUS
Status: DISCONTINUED | OUTPATIENT
Start: 2022-01-01 | End: 2022-01-01

## 2022-01-01 RX ORDER — INSULIN LISPRO 100 [IU]/ML
0.05 INJECTION, SOLUTION INTRAVENOUS; SUBCUTANEOUS
Status: DISCONTINUED | OUTPATIENT
Start: 2022-01-01 | End: 2022-01-01 | Stop reason: HOSPADM

## 2022-01-01 RX ORDER — INSULIN GLARGINE 100 [IU]/ML
15 INJECTION, SOLUTION SUBCUTANEOUS 2 TIMES DAILY
Qty: 10 ML | Refills: 3 | Status: SHIPPED | OUTPATIENT
Start: 2022-01-01

## 2022-01-01 RX ORDER — ASPIRIN 81 MG/1
81 TABLET, CHEWABLE ORAL DAILY
Status: DISCONTINUED | OUTPATIENT
Start: 2022-01-01 | End: 2022-01-01 | Stop reason: HOSPADM

## 2022-01-01 RX ORDER — MINERAL OIL AND WHITE PETROLATUM 150; 830 MG/G; MG/G
OINTMENT OPHTHALMIC EVERY 4 HOURS
Status: DISCONTINUED | OUTPATIENT
Start: 2022-01-01 | End: 2022-01-01 | Stop reason: HOSPADM

## 2022-01-01 RX ORDER — SODIUM CHLORIDE, SODIUM LACTATE, POTASSIUM CHLORIDE, CALCIUM CHLORIDE 600; 310; 30; 20 MG/100ML; MG/100ML; MG/100ML; MG/100ML
INJECTION, SOLUTION INTRAVENOUS CONTINUOUS
OUTPATIENT
Start: 2022-01-01

## 2022-01-01 RX ORDER — FUROSEMIDE 10 MG/ML
40 INJECTION INTRAMUSCULAR; INTRAVENOUS ONCE
Status: COMPLETED | OUTPATIENT
Start: 2022-01-01 | End: 2022-01-01

## 2022-01-01 RX ORDER — LANOLIN ALCOHOL/MO/W.PET/CERES
800 CREAM (GRAM) TOPICAL DAILY
Status: DISCONTINUED | OUTPATIENT
Start: 2022-01-01 | End: 2022-01-01

## 2022-01-01 RX ORDER — METOPROLOL TARTRATE 5 MG/5ML
INJECTION INTRAVENOUS
Status: COMPLETED
Start: 2022-01-01 | End: 2022-01-01

## 2022-01-01 RX ORDER — INSULIN GLARGINE 100 [IU]/ML
30 INJECTION, SOLUTION SUBCUTANEOUS 2 TIMES DAILY
Status: DISCONTINUED | OUTPATIENT
Start: 2022-01-01 | End: 2022-01-01 | Stop reason: HOSPADM

## 2022-01-01 RX ORDER — MIDAZOLAM HYDROCHLORIDE 1 MG/ML
1-10 INJECTION, SOLUTION INTRAVENOUS CONTINUOUS
Status: DISCONTINUED | OUTPATIENT
Start: 2022-01-01 | End: 2022-01-01

## 2022-01-01 RX ORDER — CILOSTAZOL 100 MG/1
50 TABLET ORAL
Status: CANCELLED | OUTPATIENT
Start: 2022-01-01

## 2022-01-01 RX ORDER — SENNA AND DOCUSATE SODIUM 50; 8.6 MG/1; MG/1
1 TABLET, FILM COATED ORAL 2 TIMES DAILY
Qty: 60 TABLET | Refills: 0
Start: 2022-01-01

## 2022-01-01 RX ORDER — PROPOFOL 10 MG/ML
INJECTION, EMULSION INTRAVENOUS
Status: DISPENSED
Start: 2022-01-01 | End: 2022-01-01

## 2022-01-01 RX ORDER — SODIUM CHLORIDE, SODIUM LACTATE, POTASSIUM CHLORIDE, CALCIUM CHLORIDE 600; 310; 30; 20 MG/100ML; MG/100ML; MG/100ML; MG/100ML
10 INJECTION, SOLUTION INTRAVENOUS ONCE
Status: COMPLETED | OUTPATIENT
Start: 2022-01-01 | End: 2022-01-01

## 2022-01-01 RX ORDER — INSULIN GLARGINE 100 [IU]/ML
30 INJECTION, SOLUTION SUBCUTANEOUS NIGHTLY
Status: CANCELLED | OUTPATIENT
Start: 2022-01-01

## 2022-01-01 RX ORDER — GUAIFENESIN 600 MG/1
600 TABLET, EXTENDED RELEASE ORAL 2 TIMES DAILY
Qty: 60 TABLET | Refills: 0
Start: 2022-01-01

## 2022-01-01 RX ORDER — INSULIN LISPRO 100 [IU]/ML
0-4 INJECTION, SOLUTION INTRAVENOUS; SUBCUTANEOUS NIGHTLY
Status: DISCONTINUED | OUTPATIENT
Start: 2022-01-01 | End: 2022-01-01 | Stop reason: HOSPADM

## 2022-01-01 RX ORDER — INSULIN LISPRO 100 [IU]/ML
0-4 INJECTION, SOLUTION INTRAVENOUS; SUBCUTANEOUS NIGHTLY
Status: CANCELLED | OUTPATIENT
Start: 2022-01-01

## 2022-01-01 RX ORDER — CHLORHEXIDINE GLUCONATE 0.12 MG/ML
15 RINSE ORAL 2 TIMES DAILY
Status: DISCONTINUED | OUTPATIENT
Start: 2022-01-01 | End: 2022-01-01 | Stop reason: HOSPADM

## 2022-01-01 RX ORDER — INSULIN GLARGINE 100 [IU]/ML
30 INJECTION, SOLUTION SUBCUTANEOUS NIGHTLY
Status: DISCONTINUED | OUTPATIENT
Start: 2022-01-01 | End: 2022-01-01 | Stop reason: HOSPADM

## 2022-01-01 RX ORDER — METOPROLOL TARTRATE 5 MG/5ML
5 INJECTION INTRAVENOUS ONCE
Status: COMPLETED | OUTPATIENT
Start: 2022-01-01 | End: 2022-01-01

## 2022-01-01 RX ORDER — LEVOFLOXACIN 5 MG/ML
750 INJECTION, SOLUTION INTRAVENOUS
Status: DISCONTINUED | OUTPATIENT
Start: 2022-01-01 | End: 2022-01-01 | Stop reason: HOSPADM

## 2022-01-01 RX ORDER — BISACODYL 10 MG
10 SUPPOSITORY, RECTAL RECTAL DAILY PRN
Status: DISCONTINUED | OUTPATIENT
Start: 2022-01-01 | End: 2022-01-01 | Stop reason: HOSPADM

## 2022-01-01 RX ORDER — MEGESTROL ACETATE 40 MG/ML
800 SUSPENSION ORAL DAILY
Status: DISCONTINUED | OUTPATIENT
Start: 2022-01-01 | End: 2022-01-01 | Stop reason: HOSPADM

## 2022-01-01 RX ORDER — CIPROFLOXACIN 2 MG/ML
400 INJECTION, SOLUTION INTRAVENOUS EVERY 12 HOURS
Status: DISCONTINUED | OUTPATIENT
Start: 2022-01-01 | End: 2022-01-01 | Stop reason: HOSPADM

## 2022-01-01 RX ORDER — ONDANSETRON 2 MG/ML
4 INJECTION INTRAMUSCULAR; INTRAVENOUS EVERY 6 HOURS PRN
Status: DISCONTINUED | OUTPATIENT
Start: 2022-01-01 | End: 2022-01-01 | Stop reason: HOSPADM

## 2022-01-01 RX ORDER — INSULIN GLARGINE 100 [IU]/ML
15 INJECTION, SOLUTION SUBCUTANEOUS 2 TIMES DAILY
Status: DISCONTINUED | OUTPATIENT
Start: 2022-01-01 | End: 2022-01-01 | Stop reason: HOSPADM

## 2022-01-01 RX ORDER — LISINOPRIL 10 MG/1
5 TABLET ORAL DAILY
Status: ON HOLD | COMMUNITY
End: 2022-01-01 | Stop reason: HOSPADM

## 2022-01-01 RX ORDER — GUAIFENESIN 600 MG/1
600 TABLET, EXTENDED RELEASE ORAL 2 TIMES DAILY
Status: DISCONTINUED | OUTPATIENT
Start: 2022-01-01 | End: 2022-01-01 | Stop reason: HOSPADM

## 2022-01-01 RX ORDER — ALBUMIN (HUMAN) 12.5 G/50ML
25 SOLUTION INTRAVENOUS EVERY 6 HOURS
Status: DISCONTINUED | OUTPATIENT
Start: 2022-01-01 | End: 2022-01-01 | Stop reason: HOSPADM

## 2022-01-01 RX ORDER — BUMETANIDE 0.25 MG/ML
1 INJECTION, SOLUTION INTRAMUSCULAR; INTRAVENOUS ONCE
Status: COMPLETED | OUTPATIENT
Start: 2022-01-01 | End: 2022-01-01

## 2022-01-01 RX ORDER — ATORVASTATIN CALCIUM 40 MG/1
40 TABLET, FILM COATED ORAL NIGHTLY
Status: DISCONTINUED | OUTPATIENT
Start: 2022-01-01 | End: 2022-01-01 | Stop reason: SDUPTHER

## 2022-01-01 RX ORDER — METOPROLOL TARTRATE 75 MG/1
50 TABLET, FILM COATED ORAL 2 TIMES DAILY
Status: DISCONTINUED | OUTPATIENT
Start: 2022-01-01 | End: 2022-01-01

## 2022-01-01 RX ORDER — INSULIN GLARGINE 100 [IU]/ML
25 INJECTION, SOLUTION SUBCUTANEOUS 2 TIMES DAILY
Status: DISCONTINUED | OUTPATIENT
Start: 2022-01-01 | End: 2022-01-01

## 2022-01-01 RX ORDER — ASPIRIN 81 MG/1
81 TABLET ORAL DAILY
Status: DISCONTINUED | OUTPATIENT
Start: 2022-01-01 | End: 2022-01-01 | Stop reason: HOSPADM

## 2022-01-01 RX ORDER — DULOXETIN HYDROCHLORIDE 60 MG/1
60 CAPSULE, DELAYED RELEASE ORAL DAILY
Status: DISCONTINUED | OUTPATIENT
Start: 2022-01-01 | End: 2022-01-01 | Stop reason: HOSPADM

## 2022-01-01 RX ORDER — SUCRALFATE 1 G/1
1 TABLET ORAL EVERY 6 HOURS SCHEDULED
Status: DISCONTINUED | OUTPATIENT
Start: 2022-01-01 | End: 2022-01-01

## 2022-01-01 RX ORDER — SUCRALFATE 1 G/1
1 TABLET ORAL
Status: DISCONTINUED | OUTPATIENT
Start: 2022-01-01 | End: 2022-01-01

## 2022-01-01 RX ORDER — SODIUM POLYSTYRENE SULFONATE 15 G/60ML
30 SUSPENSION ORAL; RECTAL ONCE
Status: DISCONTINUED | OUTPATIENT
Start: 2022-01-01 | End: 2022-01-01

## 2022-01-01 RX ORDER — CHLORHEXIDINE GLUCONATE 0.12 MG/ML
15 RINSE ORAL 2 TIMES DAILY
OUTPATIENT
Start: 2022-01-01

## 2022-01-01 RX ORDER — MEGESTROL ACETATE 40 MG/ML
800 SUSPENSION ORAL DAILY
Qty: 600 ML | Refills: 0
Start: 2022-01-01

## 2022-01-01 RX ORDER — ONDANSETRON 4 MG/1
4 TABLET, ORALLY DISINTEGRATING ORAL EVERY 8 HOURS PRN
Status: DISCONTINUED | OUTPATIENT
Start: 2022-01-01 | End: 2022-01-01 | Stop reason: HOSPADM

## 2022-01-01 RX ORDER — CIPROFLOXACIN 2 MG/ML
400 INJECTION, SOLUTION INTRAVENOUS EVERY 12 HOURS
Status: COMPLETED | OUTPATIENT
Start: 2022-01-01 | End: 2022-01-01

## 2022-01-01 RX ORDER — SODIUM CHLORIDE 9 MG/ML
INJECTION, SOLUTION INTRAVENOUS CONTINUOUS
Status: CANCELLED | OUTPATIENT
Start: 2022-01-01

## 2022-01-01 RX ORDER — MINERAL OIL AND WHITE PETROLATUM 150; 830 MG/G; MG/G
OINTMENT OPHTHALMIC EVERY 4 HOURS
OUTPATIENT
Start: 2022-01-01

## 2022-01-01 RX ORDER — LIDOCAINE HYDROCHLORIDE 20 MG/ML
JELLY TOPICAL PRN
OUTPATIENT
Start: 2022-01-01

## 2022-01-01 RX ORDER — POLYVINYL ALCOHOL 14 MG/ML
1 SOLUTION/ DROPS OPHTHALMIC EVERY 4 HOURS
Status: DISCONTINUED | OUTPATIENT
Start: 2022-01-01 | End: 2022-01-01 | Stop reason: HOSPADM

## 2022-01-01 RX ORDER — HYOSCYAMINE SULFATE EXTENDED-RELEASE 0.38 MG/1
375 TABLET ORAL DAILY
Status: DISCONTINUED | OUTPATIENT
Start: 2022-01-01 | End: 2022-01-01 | Stop reason: HOSPADM

## 2022-01-01 RX ORDER — SODIUM CHLORIDE 0.9 % (FLUSH) 0.9 %
5-40 SYRINGE (ML) INJECTION EVERY 12 HOURS SCHEDULED
Status: DISCONTINUED | OUTPATIENT
Start: 2022-01-01 | End: 2022-01-01 | Stop reason: HOSPADM

## 2022-01-01 RX ORDER — LISINOPRIL 5 MG/1
5 TABLET ORAL DAILY
Status: CANCELLED | OUTPATIENT
Start: 2022-01-01

## 2022-01-01 RX ORDER — MEGESTROL ACETATE 40 MG/ML
400 SUSPENSION ORAL DAILY
Status: DISCONTINUED | OUTPATIENT
Start: 2022-01-01 | End: 2022-01-01

## 2022-01-01 RX ORDER — ATORVASTATIN CALCIUM 40 MG/1
40 TABLET, FILM COATED ORAL NIGHTLY
COMMUNITY

## 2022-01-01 RX ORDER — POLYVINYL ALCOHOL 14 MG/ML
1 SOLUTION/ DROPS OPHTHALMIC EVERY 4 HOURS
OUTPATIENT
Start: 2022-01-01

## 2022-01-01 RX ORDER — LEVOFLOXACIN 5 MG/ML
750 INJECTION, SOLUTION INTRAVENOUS
OUTPATIENT
Start: 2022-01-01 | End: 2022-10-27

## 2022-01-01 RX ORDER — PANTOPRAZOLE SODIUM 40 MG/1
40 TABLET, DELAYED RELEASE ORAL
Qty: 30 TABLET | Refills: 3
Start: 2022-01-01

## 2022-01-01 RX ORDER — METOPROLOL TARTRATE 50 MG/1
50 TABLET, FILM COATED ORAL 2 TIMES DAILY
Status: DISCONTINUED | OUTPATIENT
Start: 2022-01-01 | End: 2022-01-01

## 2022-01-01 RX ORDER — LEVOFLOXACIN 5 MG/ML
750 INJECTION, SOLUTION INTRAVENOUS EVERY 24 HOURS
Status: DISCONTINUED | OUTPATIENT
Start: 2022-01-01 | End: 2022-01-01 | Stop reason: DRUGHIGH

## 2022-01-01 RX ORDER — PANTOPRAZOLE SODIUM 40 MG/1
40 TABLET, DELAYED RELEASE ORAL
OUTPATIENT
Start: 2022-10-22

## 2022-01-01 RX ORDER — ACETAMINOPHEN 325 MG/1
650 TABLET ORAL EVERY 6 HOURS PRN
Status: DISCONTINUED | OUTPATIENT
Start: 2022-01-01 | End: 2022-01-01

## 2022-01-01 RX ORDER — CILOSTAZOL 50 MG/1
50 TABLET ORAL
Status: DISCONTINUED | OUTPATIENT
Start: 2022-01-01 | End: 2022-01-01 | Stop reason: HOSPADM

## 2022-01-01 RX ORDER — ONDANSETRON 2 MG/ML
4 INJECTION INTRAMUSCULAR; INTRAVENOUS EVERY 6 HOURS PRN
Status: CANCELLED | OUTPATIENT
Start: 2022-01-01

## 2022-01-01 RX ORDER — ACETAMINOPHEN 325 MG/1
650 TABLET ORAL EVERY 4 HOURS PRN
Status: CANCELLED | OUTPATIENT
Start: 2022-01-01

## 2022-01-01 RX ORDER — ACETAMINOPHEN 325 MG/1
650 TABLET ORAL EVERY 4 HOURS PRN
Status: DISCONTINUED | OUTPATIENT
Start: 2022-01-01 | End: 2022-01-01

## 2022-01-01 RX ORDER — SODIUM CHLORIDE 0.9 % (FLUSH) 0.9 %
5-40 SYRINGE (ML) INJECTION EVERY 12 HOURS SCHEDULED
Status: CANCELLED | OUTPATIENT
Start: 2022-01-01

## 2022-01-01 RX ORDER — CILOSTAZOL 50 MG/1
50 TABLET ORAL
Status: CANCELLED | OUTPATIENT
Start: 2022-01-01

## 2022-01-01 RX ORDER — GUAIFENESIN 600 MG/1
600 TABLET, EXTENDED RELEASE ORAL 2 TIMES DAILY
Status: DISCONTINUED | OUTPATIENT
Start: 2022-01-01 | End: 2022-01-01

## 2022-01-01 RX ADMIN — NYSTATIN 500000 UNITS: 100000 SUSPENSION ORAL at 12:36

## 2022-01-01 RX ADMIN — SENNOSIDES AND DOCUSATE SODIUM 1 TABLET: 50; 8.6 TABLET ORAL at 09:02

## 2022-01-01 RX ADMIN — SENNOSIDES AND DOCUSATE SODIUM 1 TABLET: 50; 8.6 TABLET ORAL at 08:25

## 2022-01-01 RX ADMIN — SODIUM CHLORIDE, PRESERVATIVE FREE 10 ML: 5 INJECTION INTRAVENOUS at 08:12

## 2022-01-01 RX ADMIN — METOPROLOL TARTRATE 12.5 MG: 25 TABLET, FILM COATED ORAL at 21:13

## 2022-01-01 RX ADMIN — SODIUM ZIRCONIUM CYCLOSILICATE 5 G: 5 POWDER, FOR SUSPENSION ORAL at 13:22

## 2022-01-01 RX ADMIN — ASPIRIN 81 MG: 81 TABLET, COATED ORAL at 09:21

## 2022-01-01 RX ADMIN — CILOSTAZOL 50 MG: 100 TABLET ORAL at 15:30

## 2022-01-01 RX ADMIN — METOPROLOL TARTRATE 25 MG: 25 TABLET, FILM COATED ORAL at 21:40

## 2022-01-01 RX ADMIN — SODIUM CHLORIDE, PRESERVATIVE FREE 20 MG: 5 INJECTION INTRAVENOUS at 07:32

## 2022-01-01 RX ADMIN — INSULIN LISPRO 4 UNITS: 100 INJECTION, SOLUTION INTRAVENOUS; SUBCUTANEOUS at 17:24

## 2022-01-01 RX ADMIN — SUCRALFATE 1 G: 1 TABLET ORAL at 11:42

## 2022-01-01 RX ADMIN — MEROPENEM AND SODIUM CHLORIDE 1000 MG: 1 INJECTION, SOLUTION INTRAVENOUS at 17:39

## 2022-01-01 RX ADMIN — HYOSCYAMINE SULFATE 375 MCG: 0.38 TABLET, EXTENDED RELEASE ORAL at 08:34

## 2022-01-01 RX ADMIN — INSULIN LISPRO 2 UNITS: 100 INJECTION, SOLUTION INTRAVENOUS; SUBCUTANEOUS at 11:22

## 2022-01-01 RX ADMIN — INSULIN GLARGINE 15 UNITS: 100 INJECTION, SOLUTION SUBCUTANEOUS at 08:29

## 2022-01-01 RX ADMIN — SODIUM CHLORIDE, PRESERVATIVE FREE 10 ML: 5 INJECTION INTRAVENOUS at 20:32

## 2022-01-01 RX ADMIN — VANCOMYCIN HYDROCHLORIDE 1250 MG: 10 INJECTION, POWDER, LYOPHILIZED, FOR SOLUTION INTRAVENOUS at 00:01

## 2022-01-01 RX ADMIN — BENZONATATE 100 MG: 100 CAPSULE ORAL at 21:13

## 2022-01-01 RX ADMIN — SODIUM ZIRCONIUM CYCLOSILICATE 10 G: 10 POWDER, FOR SUSPENSION ORAL at 20:48

## 2022-01-01 RX ADMIN — METOPROLOL TARTRATE 25 MG: 25 TABLET, FILM COATED ORAL at 07:52

## 2022-01-01 RX ADMIN — INSULIN LISPRO 4 UNITS: 100 INJECTION, SOLUTION INTRAVENOUS; SUBCUTANEOUS at 20:43

## 2022-01-01 RX ADMIN — SODIUM ZIRCONIUM CYCLOSILICATE 5 G: 5 POWDER, FOR SUSPENSION ORAL at 20:30

## 2022-01-01 RX ADMIN — ATORVASTATIN CALCIUM 40 MG: 40 TABLET, FILM COATED ORAL at 21:12

## 2022-01-01 RX ADMIN — SODIUM ZIRCONIUM CYCLOSILICATE 10 G: 5 POWDER, FOR SUSPENSION ORAL at 08:41

## 2022-01-01 RX ADMIN — FOLIC ACID TAB 400 MCG 800 MCG: 400 TAB at 07:49

## 2022-01-01 RX ADMIN — Medication 2328 ML: at 14:30

## 2022-01-01 RX ADMIN — MEGESTROL ACETATE 800 MG: 40 SUSPENSION ORAL at 09:29

## 2022-01-01 RX ADMIN — PROPOFOL 20 MCG/KG/MIN: 10 INJECTION, EMULSION INTRAVENOUS at 11:04

## 2022-01-01 RX ADMIN — CILOSTAZOL 50 MG: 100 TABLET ORAL at 05:59

## 2022-01-01 RX ADMIN — POLYVINYL ALCOHOL 1 DROP: 14 SOLUTION/ DROPS OPHTHALMIC at 04:32

## 2022-01-01 RX ADMIN — INSULIN LISPRO 4 UNITS: 100 INJECTION, SOLUTION INTRAVENOUS; SUBCUTANEOUS at 16:38

## 2022-01-01 RX ADMIN — ATORVASTATIN CALCIUM 40 MG: 40 TABLET, FILM COATED ORAL at 21:39

## 2022-01-01 RX ADMIN — CILOSTAZOL 50 MG: 100 TABLET ORAL at 05:57

## 2022-01-01 RX ADMIN — ASPIRIN 81 MG 81 MG: 81 TABLET ORAL at 08:24

## 2022-01-01 RX ADMIN — MEROPENEM AND SODIUM CHLORIDE 1000 MG: 1 INJECTION, SOLUTION INTRAVENOUS at 05:27

## 2022-01-01 RX ADMIN — FOLIC ACID TAB 400 MCG 800 MCG: 400 TAB at 07:21

## 2022-01-01 RX ADMIN — MEROPENEM AND SODIUM CHLORIDE 1000 MG: 1 INJECTION, SOLUTION INTRAVENOUS at 22:50

## 2022-01-01 RX ADMIN — APIXABAN 2.5 MG: 2.5 TABLET, FILM COATED ORAL at 20:35

## 2022-01-01 RX ADMIN — MEROPENEM AND SODIUM CHLORIDE 1000 MG: 1 INJECTION, SOLUTION INTRAVENOUS at 01:56

## 2022-01-01 RX ADMIN — CILOSTAZOL 50 MG: 100 TABLET ORAL at 16:25

## 2022-01-01 RX ADMIN — METOPROLOL TARTRATE 25 MG: 25 TABLET, FILM COATED ORAL at 20:29

## 2022-01-01 RX ADMIN — METOPROLOL TARTRATE 25 MG: 25 TABLET, FILM COATED ORAL at 20:35

## 2022-01-01 RX ADMIN — Medication 25 MCG/HR: at 09:41

## 2022-01-01 RX ADMIN — PANTOPRAZOLE SODIUM 40 MG: 40 TABLET, DELAYED RELEASE ORAL at 05:45

## 2022-01-01 RX ADMIN — APIXABAN 2.5 MG: 2.5 TABLET, FILM COATED ORAL at 20:30

## 2022-01-01 RX ADMIN — METOPROLOL TARTRATE 25 MG: 25 TABLET, FILM COATED ORAL at 08:41

## 2022-01-01 RX ADMIN — ATORVASTATIN CALCIUM 40 MG: 40 TABLET, FILM COATED ORAL at 20:00

## 2022-01-01 RX ADMIN — CIPROFLOXACIN 400 MG: 2 INJECTION, SOLUTION INTRAVENOUS at 05:32

## 2022-01-01 RX ADMIN — INSULIN LISPRO 3 UNITS: 100 INJECTION, SOLUTION INTRAVENOUS; SUBCUTANEOUS at 08:04

## 2022-01-01 RX ADMIN — ACETAMINOPHEN 650 MG: 325 TABLET, FILM COATED ORAL at 21:55

## 2022-01-01 RX ADMIN — SODIUM CHLORIDE, PRESERVATIVE FREE 10 ML: 5 INJECTION INTRAVENOUS at 07:34

## 2022-01-01 RX ADMIN — ASPIRIN 81 MG: 81 TABLET, COATED ORAL at 07:21

## 2022-01-01 RX ADMIN — INSULIN LISPRO 4 UNITS: 100 INJECTION, SOLUTION INTRAVENOUS; SUBCUTANEOUS at 17:50

## 2022-01-01 RX ADMIN — SUCRALFATE 1 G: 1 TABLET ORAL at 05:31

## 2022-01-01 RX ADMIN — NYSTATIN 500000 UNITS: 100000 SUSPENSION ORAL at 08:30

## 2022-01-01 RX ADMIN — Medication 35 MCG/MIN: at 02:19

## 2022-01-01 RX ADMIN — SODIUM ZIRCONIUM CYCLOSILICATE 10 G: 5 POWDER, FOR SUSPENSION ORAL at 13:51

## 2022-01-01 RX ADMIN — PANTOPRAZOLE SODIUM 40 MG: 40 TABLET, DELAYED RELEASE ORAL at 05:59

## 2022-01-01 RX ADMIN — POLYVINYL ALCOHOL 1 DROP: 14 SOLUTION/ DROPS OPHTHALMIC at 15:38

## 2022-01-01 RX ADMIN — MINERAL OIL, PETROLATUM: 425; 573 OINTMENT OPHTHALMIC at 23:46

## 2022-01-01 RX ADMIN — INSULIN LISPRO 2 UNITS: 100 INJECTION, SOLUTION INTRAVENOUS; SUBCUTANEOUS at 12:44

## 2022-01-01 RX ADMIN — PANTOPRAZOLE SODIUM 40 MG: 40 TABLET, DELAYED RELEASE ORAL at 14:12

## 2022-01-01 RX ADMIN — PANTOPRAZOLE SODIUM 40 MG: 40 TABLET, DELAYED RELEASE ORAL at 05:58

## 2022-01-01 RX ADMIN — ACETAZOLAMIDE 500 MG: 500 INJECTION, POWDER, LYOPHILIZED, FOR SOLUTION INTRAVENOUS at 10:49

## 2022-01-01 RX ADMIN — CALCIUM GLUCONATE 1000 MG: 98 INJECTION, SOLUTION INTRAVENOUS at 08:30

## 2022-01-01 RX ADMIN — FOLIC ACID TAB 400 MCG 800 MCG: 400 TAB at 08:35

## 2022-01-01 RX ADMIN — SUCRALFATE 1 G: 1 TABLET ORAL at 05:45

## 2022-01-01 RX ADMIN — INSULIN LISPRO 4 UNITS: 100 INJECTION, SOLUTION INTRAVENOUS; SUBCUTANEOUS at 17:17

## 2022-01-01 RX ADMIN — VANCOMYCIN HYDROCHLORIDE 1250 MG: 10 INJECTION, POWDER, LYOPHILIZED, FOR SOLUTION INTRAVENOUS at 05:46

## 2022-01-01 RX ADMIN — INSULIN GLARGINE 15 UNITS: 100 INJECTION, SOLUTION SUBCUTANEOUS at 07:46

## 2022-01-01 RX ADMIN — GUAIFENESIN 600 MG: 600 TABLET ORAL at 20:28

## 2022-01-01 RX ADMIN — INSULIN LISPRO 4 UNITS: 100 INJECTION, SOLUTION INTRAVENOUS; SUBCUTANEOUS at 11:55

## 2022-01-01 RX ADMIN — ONDANSETRON 4 MG: 4 TABLET, ORALLY DISINTEGRATING ORAL at 20:20

## 2022-01-01 RX ADMIN — METOPROLOL TARTRATE 12.5 MG: 25 TABLET, FILM COATED ORAL at 09:28

## 2022-01-01 RX ADMIN — INSULIN LISPRO 4 UNITS: 100 INJECTION, SOLUTION INTRAVENOUS; SUBCUTANEOUS at 09:01

## 2022-01-01 RX ADMIN — SODIUM CHLORIDE: 9 INJECTION, SOLUTION INTRAVENOUS at 22:01

## 2022-01-01 RX ADMIN — SENNOSIDES AND DOCUSATE SODIUM 1 TABLET: 50; 8.6 TABLET ORAL at 20:32

## 2022-01-01 RX ADMIN — SENNOSIDES AND DOCUSATE SODIUM 1 TABLET: 50; 8.6 TABLET ORAL at 20:30

## 2022-01-01 RX ADMIN — SODIUM CHLORIDE, PRESERVATIVE FREE 10 ML: 5 INJECTION INTRAVENOUS at 21:49

## 2022-01-01 RX ADMIN — SENNOSIDES AND DOCUSATE SODIUM 1 TABLET: 50; 8.6 TABLET ORAL at 21:14

## 2022-01-01 RX ADMIN — SODIUM CHLORIDE, POTASSIUM CHLORIDE, SODIUM LACTATE AND CALCIUM CHLORIDE: 600; 310; 30; 20 INJECTION, SOLUTION INTRAVENOUS at 05:53

## 2022-01-01 RX ADMIN — DILTIAZEM HYDROCHLORIDE 10 MG: 5 INJECTION INTRAVENOUS at 10:12

## 2022-01-01 RX ADMIN — ATORVASTATIN CALCIUM 40 MG: 40 TABLET, FILM COATED ORAL at 21:21

## 2022-01-01 RX ADMIN — SODIUM CHLORIDE, PRESERVATIVE FREE 10 ML: 5 INJECTION INTRAVENOUS at 07:25

## 2022-01-01 RX ADMIN — MEGESTROL ACETATE 400 MG: 40 SUSPENSION ORAL at 08:19

## 2022-01-01 RX ADMIN — INSULIN GLARGINE 35 UNITS: 100 INJECTION, SOLUTION SUBCUTANEOUS at 08:31

## 2022-01-01 RX ADMIN — SODIUM ZIRCONIUM CYCLOSILICATE 10 G: 10 POWDER, FOR SUSPENSION ORAL at 13:44

## 2022-01-01 RX ADMIN — SUCRALFATE 1 G: 1 TABLET ORAL at 11:18

## 2022-01-01 RX ADMIN — INSULIN GLARGINE 20 UNITS: 100 INJECTION, SOLUTION SUBCUTANEOUS at 09:28

## 2022-01-01 RX ADMIN — CILOSTAZOL 50 MG: 100 TABLET ORAL at 05:41

## 2022-01-01 RX ADMIN — CIPROFLOXACIN 400 MG: 2 INJECTION, SOLUTION INTRAVENOUS at 16:44

## 2022-01-01 RX ADMIN — SENNOSIDES AND DOCUSATE SODIUM 1 TABLET: 50; 8.6 TABLET ORAL at 20:43

## 2022-01-01 RX ADMIN — PANTOPRAZOLE SODIUM 40 MG: 40 TABLET, DELAYED RELEASE ORAL at 10:10

## 2022-01-01 RX ADMIN — POTASSIUM CHLORIDE 20 MEQ: 29.8 INJECTION, SOLUTION INTRAVENOUS at 09:48

## 2022-01-01 RX ADMIN — GUAIFENESIN 600 MG: 600 TABLET ORAL at 21:13

## 2022-01-01 RX ADMIN — PANTOPRAZOLE SODIUM 40 MG: 40 TABLET, DELAYED RELEASE ORAL at 05:33

## 2022-01-01 RX ADMIN — DULOXETINE 60 MG: 60 CAPSULE, DELAYED RELEASE ORAL at 09:29

## 2022-01-01 RX ADMIN — MEGESTROL ACETATE 400 MG: 40 SUSPENSION ORAL at 08:33

## 2022-01-01 RX ADMIN — INSULIN LISPRO 4 UNITS: 100 INJECTION, SOLUTION INTRAVENOUS; SUBCUTANEOUS at 17:18

## 2022-01-01 RX ADMIN — APIXABAN 2.5 MG: 2.5 TABLET, FILM COATED ORAL at 20:43

## 2022-01-01 RX ADMIN — METOPROLOL TARTRATE 25 MG: 25 TABLET, FILM COATED ORAL at 21:14

## 2022-01-01 RX ADMIN — Medication 15 MCG/MIN: at 06:50

## 2022-01-01 RX ADMIN — PANTOPRAZOLE SODIUM 40 MG: 40 TABLET, DELAYED RELEASE ORAL at 05:31

## 2022-01-01 RX ADMIN — SUCRALFATE 1 G: 1 TABLET ORAL at 05:58

## 2022-01-01 RX ADMIN — APIXABAN 2.5 MG: 2.5 TABLET, FILM COATED ORAL at 21:13

## 2022-01-01 RX ADMIN — SENNOSIDES AND DOCUSATE SODIUM 1 TABLET: 50; 8.6 TABLET ORAL at 21:13

## 2022-01-01 RX ADMIN — METOPROLOL TARTRATE 25 MG: 25 TABLET, FILM COATED ORAL at 09:04

## 2022-01-01 RX ADMIN — IOPAMIDOL 70 ML: 755 INJECTION, SOLUTION INTRAVENOUS at 06:46

## 2022-01-01 RX ADMIN — INSULIN GLARGINE 15 UNITS: 100 INJECTION, SOLUTION SUBCUTANEOUS at 09:29

## 2022-01-01 RX ADMIN — ATORVASTATIN CALCIUM 40 MG: 40 TABLET, FILM COATED ORAL at 20:32

## 2022-01-01 RX ADMIN — PANTOPRAZOLE SODIUM 40 MG: 40 TABLET, DELAYED RELEASE ORAL at 08:27

## 2022-01-01 RX ADMIN — POLYVINYL ALCOHOL 1 DROP: 14 SOLUTION/ DROPS OPHTHALMIC at 19:34

## 2022-01-01 RX ADMIN — NYSTATIN 500000 UNITS: 100000 SUSPENSION ORAL at 21:14

## 2022-01-01 RX ADMIN — APIXABAN 2.5 MG: 2.5 TABLET, FILM COATED ORAL at 09:01

## 2022-01-01 RX ADMIN — SUCRALFATE 1 G: 1 TABLET ORAL at 16:49

## 2022-01-01 RX ADMIN — FOLIC ACID TAB 400 MCG 800 MCG: 400 TAB at 08:20

## 2022-01-01 RX ADMIN — SODIUM CHLORIDE: 9 INJECTION, SOLUTION INTRAVENOUS at 01:05

## 2022-01-01 RX ADMIN — METOPROLOL TARTRATE 25 MG: 25 TABLET, FILM COATED ORAL at 07:50

## 2022-01-01 RX ADMIN — MEGESTROL ACETATE 800 MG: 40 SUSPENSION ORAL at 09:21

## 2022-01-01 RX ADMIN — PHENYLEPHRINE HYDROCHLORIDE 50 MCG/MIN: 10 INJECTION INTRAVENOUS at 09:19

## 2022-01-01 RX ADMIN — PANTOPRAZOLE SODIUM 40 MG: 40 TABLET, DELAYED RELEASE ORAL at 06:03

## 2022-01-01 RX ADMIN — ATORVASTATIN CALCIUM 40 MG: 40 TABLET, FILM COATED ORAL at 20:30

## 2022-01-01 RX ADMIN — APIXABAN 2.5 MG: 2.5 TABLET, FILM COATED ORAL at 08:03

## 2022-01-01 RX ADMIN — METOPROLOL TARTRATE 25 MG: 25 TABLET, FILM COATED ORAL at 08:34

## 2022-01-01 RX ADMIN — NYSTATIN 500000 UNITS: 100000 SUSPENSION ORAL at 08:34

## 2022-01-01 RX ADMIN — ALBUMIN (HUMAN) 25 G: 0.25 INJECTION, SOLUTION INTRAVENOUS at 23:42

## 2022-01-01 RX ADMIN — APIXABAN 2.5 MG: 2.5 TABLET, FILM COATED ORAL at 08:04

## 2022-01-01 RX ADMIN — INSULIN GLARGINE 30 UNITS: 100 INJECTION, SOLUTION SUBCUTANEOUS at 08:26

## 2022-01-01 RX ADMIN — SUCRALFATE 1 G: 1 TABLET ORAL at 11:25

## 2022-01-01 RX ADMIN — SODIUM CHLORIDE, PRESERVATIVE FREE 10 ML: 5 INJECTION INTRAVENOUS at 20:22

## 2022-01-01 RX ADMIN — APIXABAN 2.5 MG: 2.5 TABLET, FILM COATED ORAL at 20:20

## 2022-01-01 RX ADMIN — INSULIN GLARGINE 35 UNITS: 100 INJECTION, SOLUTION SUBCUTANEOUS at 08:52

## 2022-01-01 RX ADMIN — INSULIN GLARGINE 7 UNITS: 100 INJECTION, SOLUTION SUBCUTANEOUS at 07:30

## 2022-01-01 RX ADMIN — SUCRALFATE 1 G: 1 TABLET ORAL at 17:38

## 2022-01-01 RX ADMIN — CHLORHEXIDINE GLUCONATE 15 ML: 1.2 RINSE ORAL at 22:31

## 2022-01-01 RX ADMIN — METOPROLOL TARTRATE 25 MG: 25 TABLET, FILM COATED ORAL at 07:32

## 2022-01-01 RX ADMIN — VASOPRESSIN 0.03 UNITS/MIN: 20 INJECTION, SOLUTION INTRAMUSCULAR; SUBCUTANEOUS at 07:04

## 2022-01-01 RX ADMIN — INSULIN GLARGINE 35 UNITS: 100 INJECTION, SOLUTION SUBCUTANEOUS at 20:44

## 2022-01-01 RX ADMIN — PERFLUTREN 1.5 ML: 6.52 INJECTION, SUSPENSION INTRAVENOUS at 07:26

## 2022-01-01 RX ADMIN — MEROPENEM AND SODIUM CHLORIDE 1000 MG: 1 INJECTION, SOLUTION INTRAVENOUS at 02:26

## 2022-01-01 RX ADMIN — POLYVINYL ALCOHOL 1 DROP: 14 SOLUTION/ DROPS OPHTHALMIC at 15:21

## 2022-01-01 RX ADMIN — MINERAL OIL, PETROLATUM: 425; 573 OINTMENT OPHTHALMIC at 09:21

## 2022-01-01 RX ADMIN — SENNOSIDES AND DOCUSATE SODIUM 1 TABLET: 50; 8.6 TABLET ORAL at 20:00

## 2022-01-01 RX ADMIN — FOLIC ACID TAB 400 MCG 800 MCG: 400 TAB at 07:52

## 2022-01-01 RX ADMIN — INSULIN GLARGINE 25 UNITS: 100 INJECTION, SOLUTION SUBCUTANEOUS at 20:48

## 2022-01-01 RX ADMIN — SODIUM CHLORIDE, PRESERVATIVE FREE 10 ML: 5 INJECTION INTRAVENOUS at 08:25

## 2022-01-01 RX ADMIN — INSULIN GLARGINE 25 UNITS: 100 INJECTION, SOLUTION SUBCUTANEOUS at 12:23

## 2022-01-01 RX ADMIN — ACETAMINOPHEN 650 MG: 325 TABLET, FILM COATED ORAL at 01:11

## 2022-01-01 RX ADMIN — GUAIFENESIN 600 MG: 600 TABLET ORAL at 21:21

## 2022-01-01 RX ADMIN — METOPROLOL TARTRATE 25 MG: 25 TABLET, FILM COATED ORAL at 09:54

## 2022-01-01 RX ADMIN — INSULIN GLARGINE 15 UNITS: 100 INJECTION, SOLUTION SUBCUTANEOUS at 09:36

## 2022-01-01 RX ADMIN — APIXABAN 2.5 MG: 2.5 TABLET, FILM COATED ORAL at 20:00

## 2022-01-01 RX ADMIN — SUCRALFATE 1 G: 1 TABLET ORAL at 06:05

## 2022-01-01 RX ADMIN — SENNOSIDES AND DOCUSATE SODIUM 1 TABLET: 50; 8.6 TABLET ORAL at 07:21

## 2022-01-01 RX ADMIN — FOLIC ACID TAB 400 MCG 800 MCG: 400 TAB at 08:25

## 2022-01-01 RX ADMIN — METOPROLOL TARTRATE 25 MG: 25 TABLET, FILM COATED ORAL at 08:11

## 2022-01-01 RX ADMIN — METOPROLOL TARTRATE 12.5 MG: 25 TABLET, FILM COATED ORAL at 20:50

## 2022-01-01 RX ADMIN — MEGESTROL ACETATE 400 MG: 40 SUSPENSION ORAL at 08:34

## 2022-01-01 RX ADMIN — DULOXETINE 30 MG: 30 CAPSULE, DELAYED RELEASE ORAL at 11:18

## 2022-01-01 RX ADMIN — HYOSCYAMINE SULFATE 375 MCG: 0.38 TABLET, EXTENDED RELEASE ORAL at 08:31

## 2022-01-01 RX ADMIN — SENNOSIDES AND DOCUSATE SODIUM 1 TABLET: 50; 8.6 TABLET ORAL at 08:23

## 2022-01-01 RX ADMIN — SENNOSIDES AND DOCUSATE SODIUM 1 TABLET: 50; 8.6 TABLET ORAL at 21:21

## 2022-01-01 RX ADMIN — SODIUM CHLORIDE, PRESERVATIVE FREE 20 MG: 5 INJECTION INTRAVENOUS at 22:37

## 2022-01-01 RX ADMIN — SODIUM CHLORIDE, PRESERVATIVE FREE 10 ML: 5 INJECTION INTRAVENOUS at 08:23

## 2022-01-01 RX ADMIN — CARVEDILOL 6.25 MG: 6.25 TABLET, FILM COATED ORAL at 18:45

## 2022-01-01 RX ADMIN — VANCOMYCIN HYDROCHLORIDE 1500 MG: 10 INJECTION, POWDER, LYOPHILIZED, FOR SOLUTION INTRAVENOUS at 12:26

## 2022-01-01 RX ADMIN — DULOXETINE 30 MG: 30 CAPSULE, DELAYED RELEASE ORAL at 08:41

## 2022-01-01 RX ADMIN — POLYVINYL ALCOHOL 1 DROP: 14 SOLUTION/ DROPS OPHTHALMIC at 00:05

## 2022-01-01 RX ADMIN — SENNOSIDES AND DOCUSATE SODIUM 1 TABLET: 50; 8.6 TABLET ORAL at 07:52

## 2022-01-01 RX ADMIN — INSULIN LISPRO 2 UNITS: 100 INJECTION, SOLUTION INTRAVENOUS; SUBCUTANEOUS at 07:28

## 2022-01-01 RX ADMIN — INSULIN LISPRO 4 UNITS: 100 INJECTION, SOLUTION INTRAVENOUS; SUBCUTANEOUS at 17:02

## 2022-01-01 RX ADMIN — INSULIN LISPRO 6 UNITS: 100 INJECTION, SOLUTION INTRAVENOUS; SUBCUTANEOUS at 16:41

## 2022-01-01 RX ADMIN — GUAIFENESIN 600 MG: 600 TABLET ORAL at 07:47

## 2022-01-01 RX ADMIN — BENZONATATE 100 MG: 100 CAPSULE ORAL at 20:32

## 2022-01-01 RX ADMIN — DULOXETINE 60 MG: 60 CAPSULE, DELAYED RELEASE ORAL at 08:31

## 2022-01-01 RX ADMIN — INSULIN LISPRO 2 UNITS: 100 INJECTION, SOLUTION INTRAVENOUS; SUBCUTANEOUS at 17:04

## 2022-01-01 RX ADMIN — MEROPENEM AND SODIUM CHLORIDE 1000 MG: 1 INJECTION, SOLUTION INTRAVENOUS at 09:36

## 2022-01-01 RX ADMIN — MEROPENEM AND SODIUM CHLORIDE 1000 MG: 1 INJECTION, SOLUTION INTRAVENOUS at 10:40

## 2022-01-01 RX ADMIN — PHENYLEPHRINE HYDROCHLORIDE 300 MCG/MIN: 10 INJECTION INTRAVENOUS at 05:06

## 2022-01-01 RX ADMIN — METOPROLOL TARTRATE 25 MG: 25 TABLET, FILM COATED ORAL at 20:32

## 2022-01-01 RX ADMIN — PROPOFOL 20 MCG/KG/MIN: 10 INJECTION, EMULSION INTRAVENOUS at 05:48

## 2022-01-01 RX ADMIN — SENNOSIDES AND DOCUSATE SODIUM 1 TABLET: 50; 8.6 TABLET ORAL at 07:32

## 2022-01-01 RX ADMIN — FOLIC ACID TAB 400 MCG 800 MCG: 400 TAB at 09:28

## 2022-01-01 RX ADMIN — PHENYLEPHRINE HYDROCHLORIDE 300 MCG/MIN: 10 INJECTION INTRAVENOUS at 10:51

## 2022-01-01 RX ADMIN — FOLIC ACID TAB 400 MCG 800 MCG: 400 TAB at 07:47

## 2022-01-01 RX ADMIN — MEROPENEM AND SODIUM CHLORIDE 1000 MG: 1 INJECTION, SOLUTION INTRAVENOUS at 06:02

## 2022-01-01 RX ADMIN — METOPROLOL TARTRATE 12.5 MG: 25 TABLET, FILM COATED ORAL at 20:28

## 2022-01-01 RX ADMIN — CILOSTAZOL 50 MG: 100 TABLET ORAL at 16:04

## 2022-01-01 RX ADMIN — SENNOSIDES AND DOCUSATE SODIUM 1 TABLET: 50; 8.6 TABLET ORAL at 08:27

## 2022-01-01 RX ADMIN — Medication 10 MCG/MIN: at 07:30

## 2022-01-01 RX ADMIN — CARVEDILOL 6.25 MG: 6.25 TABLET, FILM COATED ORAL at 20:00

## 2022-01-01 RX ADMIN — APIXABAN 2.5 MG: 2.5 TABLET, FILM COATED ORAL at 08:11

## 2022-01-01 RX ADMIN — NYSTATIN 500000 UNITS: 100000 SUSPENSION ORAL at 18:03

## 2022-01-01 RX ADMIN — CIPROFLOXACIN 400 MG: 2 INJECTION, SOLUTION INTRAVENOUS at 03:40

## 2022-01-01 RX ADMIN — INSULIN LISPRO 3 UNITS: 100 INJECTION, SOLUTION INTRAVENOUS; SUBCUTANEOUS at 17:06

## 2022-01-01 RX ADMIN — PHENYLEPHRINE HYDROCHLORIDE 300 MCG/MIN: 10 INJECTION INTRAVENOUS at 22:49

## 2022-01-01 RX ADMIN — APIXABAN 2.5 MG: 2.5 TABLET, FILM COATED ORAL at 10:10

## 2022-01-01 RX ADMIN — MINERAL OIL, PETROLATUM: 425; 573 OINTMENT OPHTHALMIC at 19:35

## 2022-01-01 RX ADMIN — MODAFINIL 100 MG: 100 TABLET ORAL at 10:34

## 2022-01-01 RX ADMIN — INSULIN LISPRO 6 UNITS: 100 INJECTION, SOLUTION INTRAVENOUS; SUBCUTANEOUS at 08:29

## 2022-01-01 RX ADMIN — CHLORHEXIDINE GLUCONATE 15 ML: 1.2 RINSE ORAL at 15:34

## 2022-01-01 RX ADMIN — INSULIN LISPRO 2 UNITS: 100 INJECTION, SOLUTION INTRAVENOUS; SUBCUTANEOUS at 11:58

## 2022-01-01 RX ADMIN — MEGESTROL ACETATE 800 MG: 40 SUSPENSION ORAL at 08:28

## 2022-01-01 RX ADMIN — METOPROLOL TARTRATE 25 MG: 25 TABLET, FILM COATED ORAL at 21:39

## 2022-01-01 RX ADMIN — FOLIC ACID TAB 400 MCG 800 MCG: 400 TAB at 08:28

## 2022-01-01 RX ADMIN — PANTOPRAZOLE SODIUM 40 MG: 40 TABLET, DELAYED RELEASE ORAL at 05:48

## 2022-01-01 RX ADMIN — SENNOSIDES AND DOCUSATE SODIUM 1 TABLET: 50; 8.6 TABLET ORAL at 20:35

## 2022-01-01 RX ADMIN — APIXABAN 2.5 MG: 2.5 TABLET, FILM COATED ORAL at 08:27

## 2022-01-01 RX ADMIN — POLYVINYL ALCOHOL 1 DROP: 14 SOLUTION/ DROPS OPHTHALMIC at 22:30

## 2022-01-01 RX ADMIN — ACETAMINOPHEN 650 MG: 325 TABLET, FILM COATED ORAL at 11:36

## 2022-01-01 RX ADMIN — MINERAL OIL, PETROLATUM: 425; 573 OINTMENT OPHTHALMIC at 22:30

## 2022-01-01 RX ADMIN — PANTOPRAZOLE SODIUM 40 MG: 40 TABLET, DELAYED RELEASE ORAL at 05:26

## 2022-01-01 RX ADMIN — APIXABAN 2.5 MG: 2.5 TABLET, FILM COATED ORAL at 21:40

## 2022-01-01 RX ADMIN — INSULIN LISPRO 4 UNITS: 100 INJECTION, SOLUTION INTRAVENOUS; SUBCUTANEOUS at 11:53

## 2022-01-01 RX ADMIN — SENNOSIDES AND DOCUSATE SODIUM 1 TABLET: 50; 8.6 TABLET ORAL at 16:03

## 2022-01-01 RX ADMIN — ATORVASTATIN CALCIUM 40 MG: 40 TABLET, FILM COATED ORAL at 20:28

## 2022-01-01 RX ADMIN — FOLIC ACID TAB 400 MCG 800 MCG: 400 TAB at 09:21

## 2022-01-01 RX ADMIN — BENZONATATE 100 MG: 100 CAPSULE ORAL at 06:05

## 2022-01-01 RX ADMIN — SENNOSIDES AND DOCUSATE SODIUM 1 TABLET: 50; 8.6 TABLET ORAL at 21:40

## 2022-01-01 RX ADMIN — INSULIN GLARGINE 15 UNITS: 100 INJECTION, SOLUTION SUBCUTANEOUS at 09:03

## 2022-01-01 RX ADMIN — ACETAZOLAMIDE 500 MG: 500 INJECTION, POWDER, LYOPHILIZED, FOR SOLUTION INTRAVENOUS at 11:13

## 2022-01-01 RX ADMIN — METOPROLOL TARTRATE 50 MG: 50 TABLET, FILM COATED ORAL at 20:26

## 2022-01-01 RX ADMIN — APIXABAN 2.5 MG: 2.5 TABLET, FILM COATED ORAL at 16:04

## 2022-01-01 RX ADMIN — INSULIN LISPRO 4 UNITS: 100 INJECTION, SOLUTION INTRAVENOUS; SUBCUTANEOUS at 08:52

## 2022-01-01 RX ADMIN — MEROPENEM AND SODIUM CHLORIDE 1000 MG: 1 INJECTION, SOLUTION INTRAVENOUS at 10:10

## 2022-01-01 RX ADMIN — APIXABAN 2.5 MG: 2.5 TABLET, FILM COATED ORAL at 20:50

## 2022-01-01 RX ADMIN — APIXABAN 2.5 MG: 2.5 TABLET, FILM COATED ORAL at 20:34

## 2022-01-01 RX ADMIN — ASPIRIN 81 MG 81 MG: 81 TABLET ORAL at 07:47

## 2022-01-01 RX ADMIN — ASPIRIN 81 MG 81 MG: 81 TABLET ORAL at 08:25

## 2022-01-01 RX ADMIN — METOPROLOL TARTRATE 12.5 MG: 25 TABLET, FILM COATED ORAL at 07:47

## 2022-01-01 RX ADMIN — APIXABAN 2.5 MG: 2.5 TABLET, FILM COATED ORAL at 08:31

## 2022-01-01 RX ADMIN — SENNOSIDES AND DOCUSATE SODIUM 1 TABLET: 50; 8.6 TABLET ORAL at 19:34

## 2022-01-01 RX ADMIN — ATORVASTATIN CALCIUM 40 MG: 40 TABLET, FILM COATED ORAL at 20:16

## 2022-01-01 RX ADMIN — METOPROLOL TARTRATE 25 MG: 25 TABLET, FILM COATED ORAL at 20:31

## 2022-01-01 RX ADMIN — SODIUM CHLORIDE, PRESERVATIVE FREE 10 ML: 5 INJECTION INTRAVENOUS at 21:40

## 2022-01-01 RX ADMIN — INSULIN GLARGINE 15 UNITS: 100 INJECTION, SOLUTION SUBCUTANEOUS at 21:12

## 2022-01-01 RX ADMIN — CHLORHEXIDINE GLUCONATE 15 ML: 1.2 RINSE ORAL at 07:22

## 2022-01-01 RX ADMIN — CILOSTAZOL 50 MG: 100 TABLET ORAL at 05:31

## 2022-01-01 RX ADMIN — SENNOSIDES AND DOCUSATE SODIUM 1 TABLET: 50; 8.6 TABLET ORAL at 08:03

## 2022-01-01 RX ADMIN — ACETAMINOPHEN 650 MG: 325 TABLET, FILM COATED ORAL at 20:32

## 2022-01-01 RX ADMIN — APIXABAN 2.5 MG: 2.5 TABLET, FILM COATED ORAL at 08:22

## 2022-01-01 RX ADMIN — ASPIRIN 81 MG 81 MG: 81 TABLET ORAL at 08:11

## 2022-01-01 RX ADMIN — ATORVASTATIN CALCIUM 40 MG: 40 TABLET, FILM COATED ORAL at 20:35

## 2022-01-01 RX ADMIN — PANTOPRAZOLE SODIUM 40 MG: 40 TABLET, DELAYED RELEASE ORAL at 05:54

## 2022-01-01 RX ADMIN — MEGESTROL ACETATE 400 MG: 40 SUSPENSION ORAL at 08:41

## 2022-01-01 RX ADMIN — FOLIC ACID TAB 400 MCG 800 MCG: 400 TAB at 10:10

## 2022-01-01 RX ADMIN — SODIUM ZIRCONIUM CYCLOSILICATE 5 G: 5 POWDER, FOR SUSPENSION ORAL at 08:23

## 2022-01-01 RX ADMIN — APIXABAN 2.5 MG: 2.5 TABLET, FILM COATED ORAL at 07:32

## 2022-01-01 RX ADMIN — SENNOSIDES AND DOCUSATE SODIUM 1 TABLET: 50; 8.6 TABLET ORAL at 07:47

## 2022-01-01 RX ADMIN — SODIUM CHLORIDE: 9 INJECTION, SOLUTION INTRAVENOUS at 13:45

## 2022-01-01 RX ADMIN — SUCRALFATE 1 G: 1 TABLET ORAL at 00:43

## 2022-01-01 RX ADMIN — SUCRALFATE 1 G: 1 TABLET ORAL at 15:51

## 2022-01-01 RX ADMIN — APIXABAN 2.5 MG: 2.5 TABLET, FILM COATED ORAL at 09:28

## 2022-01-01 RX ADMIN — INSULIN LISPRO 2 UNITS: 100 INJECTION, SOLUTION INTRAVENOUS; SUBCUTANEOUS at 16:46

## 2022-01-01 RX ADMIN — SENNOSIDES AND DOCUSATE SODIUM 1 TABLET: 50; 8.6 TABLET ORAL at 20:29

## 2022-01-01 RX ADMIN — ASPIRIN 81 MG 81 MG: 81 TABLET ORAL at 09:00

## 2022-01-01 RX ADMIN — DULOXETINE 60 MG: 60 CAPSULE, DELAYED RELEASE ORAL at 08:35

## 2022-01-01 RX ADMIN — ASPIRIN 81 MG 81 MG: 81 TABLET ORAL at 08:41

## 2022-01-01 RX ADMIN — APIXABAN 2.5 MG: 2.5 TABLET, FILM COATED ORAL at 09:21

## 2022-01-01 RX ADMIN — METRONIDAZOLE 500 MG: 500 INJECTION, SOLUTION INTRAVENOUS at 10:10

## 2022-01-01 RX ADMIN — SENNOSIDES AND DOCUSATE SODIUM 1 TABLET: 50; 8.6 TABLET ORAL at 09:28

## 2022-01-01 RX ADMIN — MEROPENEM AND SODIUM CHLORIDE 1000 MG: 1 INJECTION, SOLUTION INTRAVENOUS at 17:41

## 2022-01-01 RX ADMIN — INSULIN LISPRO 4 UNITS: 100 INJECTION, SOLUTION INTRAVENOUS; SUBCUTANEOUS at 11:39

## 2022-01-01 RX ADMIN — HYOSCYAMINE SULFATE 375 MCG: 0.38 TABLET, EXTENDED RELEASE ORAL at 09:29

## 2022-01-01 RX ADMIN — SODIUM CHLORIDE: 9 INJECTION, SOLUTION INTRAVENOUS at 00:48

## 2022-01-01 RX ADMIN — FUROSEMIDE 40 MG: 10 INJECTION, SOLUTION INTRAMUSCULAR; INTRAVENOUS at 06:43

## 2022-01-01 RX ADMIN — INSULIN LISPRO 4 UNITS: 100 INJECTION, SOLUTION INTRAVENOUS; SUBCUTANEOUS at 11:56

## 2022-01-01 RX ADMIN — METOPROLOL TARTRATE 5 MG: 1 INJECTION, SOLUTION INTRAVENOUS at 10:24

## 2022-01-01 RX ADMIN — APIXABAN 2.5 MG: 2.5 TABLET, FILM COATED ORAL at 08:33

## 2022-01-01 RX ADMIN — SODIUM CHLORIDE, PRESERVATIVE FREE 10 ML: 5 INJECTION INTRAVENOUS at 09:04

## 2022-01-01 RX ADMIN — INSULIN LISPRO 2 UNITS: 100 INJECTION, SOLUTION INTRAVENOUS; SUBCUTANEOUS at 07:54

## 2022-01-01 RX ADMIN — SENNOSIDES AND DOCUSATE SODIUM 1 TABLET: 50; 8.6 TABLET ORAL at 21:39

## 2022-01-01 RX ADMIN — INSULIN LISPRO 4 UNITS: 100 INJECTION, SOLUTION INTRAVENOUS; SUBCUTANEOUS at 17:49

## 2022-01-01 RX ADMIN — ATORVASTATIN CALCIUM 40 MG: 40 TABLET, FILM COATED ORAL at 19:34

## 2022-01-01 RX ADMIN — INSULIN GLARGINE 30 UNITS: 100 INJECTION, SOLUTION SUBCUTANEOUS at 20:22

## 2022-01-01 RX ADMIN — SENNOSIDES AND DOCUSATE SODIUM 1 TABLET: 50; 8.6 TABLET ORAL at 20:50

## 2022-01-01 RX ADMIN — METOPROLOL TARTRATE 25 MG: 25 TABLET, FILM COATED ORAL at 20:55

## 2022-01-01 RX ADMIN — ASPIRIN 81 MG: 81 TABLET, COATED ORAL at 10:10

## 2022-01-01 RX ADMIN — SODIUM CHLORIDE, PRESERVATIVE FREE 10 ML: 5 INJECTION INTRAVENOUS at 07:56

## 2022-01-01 RX ADMIN — NYSTATIN 500000 UNITS: 100000 SUSPENSION ORAL at 20:16

## 2022-01-01 RX ADMIN — MODAFINIL 100 MG: 100 TABLET ORAL at 08:20

## 2022-01-01 RX ADMIN — HYOSCYAMINE SULFATE 375 MCG: 0.38 TABLET, EXTENDED RELEASE ORAL at 09:00

## 2022-01-01 RX ADMIN — APIXABAN 2.5 MG: 2.5 TABLET, FILM COATED ORAL at 08:19

## 2022-01-01 RX ADMIN — ATORVASTATIN CALCIUM 40 MG: 40 TABLET, FILM COATED ORAL at 20:34

## 2022-01-01 RX ADMIN — SODIUM CHLORIDE: 9 INJECTION, SOLUTION INTRAVENOUS at 16:31

## 2022-01-01 RX ADMIN — INSULIN LISPRO 4 UNITS: 100 INJECTION, SOLUTION INTRAVENOUS; SUBCUTANEOUS at 16:48

## 2022-01-01 RX ADMIN — CHLORHEXIDINE GLUCONATE 15 ML: 1.2 RINSE ORAL at 20:03

## 2022-01-01 RX ADMIN — SODIUM CHLORIDE, POTASSIUM CHLORIDE, SODIUM LACTATE AND CALCIUM CHLORIDE 930 ML: 600; 310; 30; 20 INJECTION, SOLUTION INTRAVENOUS at 17:03

## 2022-01-01 RX ADMIN — BUMETANIDE 0.2 MG/HR: 0.25 INJECTION, SOLUTION INTRAMUSCULAR; INTRAVENOUS at 11:30

## 2022-01-01 RX ADMIN — SUCRALFATE 1 G: 1 TABLET ORAL at 16:20

## 2022-01-01 RX ADMIN — ATORVASTATIN CALCIUM 40 MG: 40 TABLET, FILM COATED ORAL at 22:24

## 2022-01-01 RX ADMIN — SENNOSIDES AND DOCUSATE SODIUM 1 TABLET: 50; 8.6 TABLET ORAL at 20:22

## 2022-01-01 RX ADMIN — LISINOPRIL 5 MG: 5 TABLET ORAL at 08:04

## 2022-01-01 RX ADMIN — PROPOFOL 20 MCG/KG/MIN: 10 INJECTION, EMULSION INTRAVENOUS at 19:17

## 2022-01-01 RX ADMIN — MEROPENEM AND SODIUM CHLORIDE 1000 MG: 1 INJECTION, SOLUTION INTRAVENOUS at 10:03

## 2022-01-01 RX ADMIN — ASPIRIN 81 MG: 81 TABLET, COATED ORAL at 16:03

## 2022-01-01 RX ADMIN — METRONIDAZOLE 500 MG: 500 INJECTION, SOLUTION INTRAVENOUS at 19:36

## 2022-01-01 RX ADMIN — MEROPENEM AND SODIUM CHLORIDE 1000 MG: 1 INJECTION, SOLUTION INTRAVENOUS at 01:46

## 2022-01-01 RX ADMIN — SUCRALFATE 1 G: 1 TABLET ORAL at 17:22

## 2022-01-01 RX ADMIN — APIXABAN 2.5 MG: 2.5 TABLET, FILM COATED ORAL at 21:14

## 2022-01-01 RX ADMIN — INSULIN GLARGINE 30 UNITS: 100 INJECTION, SOLUTION SUBCUTANEOUS at 20:29

## 2022-01-01 RX ADMIN — ATORVASTATIN CALCIUM 40 MG: 40 TABLET, FILM COATED ORAL at 22:30

## 2022-01-01 RX ADMIN — SODIUM CHLORIDE: 9 INJECTION, SOLUTION INTRAVENOUS at 02:24

## 2022-01-01 RX ADMIN — INSULIN LISPRO 4 UNITS: 100 INJECTION, SOLUTION INTRAVENOUS; SUBCUTANEOUS at 08:29

## 2022-01-01 RX ADMIN — APIXABAN 2.5 MG: 2.5 TABLET, FILM COATED ORAL at 07:49

## 2022-01-01 RX ADMIN — METOPROLOL TARTRATE 5 MG: 5 INJECTION INTRAVENOUS at 10:24

## 2022-01-01 RX ADMIN — GUAIFENESIN 600 MG: 600 TABLET ORAL at 09:29

## 2022-01-01 RX ADMIN — CILOSTAZOL 50 MG: 100 TABLET ORAL at 05:40

## 2022-01-01 RX ADMIN — METOPROLOL TARTRATE 25 MG: 25 TABLET, FILM COATED ORAL at 08:03

## 2022-01-01 RX ADMIN — INSULIN GLARGINE 30 UNITS: 100 INJECTION, SOLUTION SUBCUTANEOUS at 21:40

## 2022-01-01 RX ADMIN — APIXABAN 2.5 MG: 2.5 TABLET, FILM COATED ORAL at 07:52

## 2022-01-01 RX ADMIN — INSULIN LISPRO 2 UNITS: 100 INJECTION, SOLUTION INTRAVENOUS; SUBCUTANEOUS at 08:17

## 2022-01-01 RX ADMIN — HYOSCYAMINE SULFATE 375 MCG: 0.38 TABLET, EXTENDED RELEASE ORAL at 07:47

## 2022-01-01 RX ADMIN — SODIUM ZIRCONIUM CYCLOSILICATE 10 G: 5 POWDER, FOR SUSPENSION ORAL at 12:19

## 2022-01-01 RX ADMIN — METRONIDAZOLE 500 MG: 500 INJECTION, SOLUTION INTRAVENOUS at 18:25

## 2022-01-01 RX ADMIN — ATORVASTATIN CALCIUM 40 MG: 40 TABLET, FILM COATED ORAL at 20:43

## 2022-01-01 RX ADMIN — CHLORHEXIDINE GLUCONATE 15 ML: 1.2 RINSE ORAL at 07:34

## 2022-01-01 RX ADMIN — PANTOPRAZOLE SODIUM 40 MG: 40 TABLET, DELAYED RELEASE ORAL at 05:42

## 2022-01-01 RX ADMIN — APIXABAN 2.5 MG: 2.5 TABLET, FILM COATED ORAL at 07:47

## 2022-01-01 RX ADMIN — FOLIC ACID TAB 400 MCG 800 MCG: 400 TAB at 16:03

## 2022-01-01 RX ADMIN — INSULIN LISPRO 4 UNITS: 100 INJECTION, SOLUTION INTRAVENOUS; SUBCUTANEOUS at 12:07

## 2022-01-01 RX ADMIN — SUCRALFATE 1 G: 1 TABLET ORAL at 20:43

## 2022-01-01 RX ADMIN — BENZONATATE 100 MG: 100 CAPSULE ORAL at 15:17

## 2022-01-01 RX ADMIN — SUCRALFATE 1 G: 1 TABLET ORAL at 05:33

## 2022-01-01 RX ADMIN — INSULIN LISPRO 4 UNITS: 100 INJECTION, SOLUTION INTRAVENOUS; SUBCUTANEOUS at 11:52

## 2022-01-01 RX ADMIN — INSULIN LISPRO 4 UNITS: 100 INJECTION, SOLUTION INTRAVENOUS; SUBCUTANEOUS at 11:40

## 2022-01-01 RX ADMIN — BUMETANIDE 1 MG: 0.25 INJECTION INTRAMUSCULAR; INTRAVENOUS at 12:23

## 2022-01-01 RX ADMIN — PANTOPRAZOLE SODIUM 40 MG: 40 TABLET, DELAYED RELEASE ORAL at 05:23

## 2022-01-01 RX ADMIN — GUAIFENESIN 600 MG: 600 TABLET ORAL at 10:10

## 2022-01-01 RX ADMIN — DULOXETINE 30 MG: 30 CAPSULE, DELAYED RELEASE ORAL at 08:19

## 2022-01-01 RX ADMIN — MINERAL OIL, PETROLATUM: 425; 573 OINTMENT OPHTHALMIC at 02:16

## 2022-01-01 RX ADMIN — GUAIFENESIN 600 MG: 600 TABLET ORAL at 16:03

## 2022-01-01 RX ADMIN — FOLIC ACID TAB 400 MCG 800 MCG: 400 TAB at 08:29

## 2022-01-01 RX ADMIN — POLYVINYL ALCOHOL 1 DROP: 14 SOLUTION/ DROPS OPHTHALMIC at 09:21

## 2022-01-01 RX ADMIN — PANTOPRAZOLE SODIUM 40 MG: 40 TABLET, DELAYED RELEASE ORAL at 06:06

## 2022-01-01 RX ADMIN — INSULIN LISPRO 4 UNITS: 100 INJECTION, SOLUTION INTRAVENOUS; SUBCUTANEOUS at 11:19

## 2022-01-01 RX ADMIN — SUCRALFATE 1 G: 1 TABLET ORAL at 17:13

## 2022-01-01 RX ADMIN — FOLIC ACID TAB 400 MCG 800 MCG: 400 TAB at 07:32

## 2022-01-01 RX ADMIN — INSULIN LISPRO 4 UNITS: 100 INJECTION, SOLUTION INTRAVENOUS; SUBCUTANEOUS at 11:42

## 2022-01-01 RX ADMIN — ASPIRIN 81 MG 81 MG: 81 TABLET ORAL at 08:19

## 2022-01-01 RX ADMIN — NYSTATIN 500000 UNITS: 100000 SUSPENSION ORAL at 20:20

## 2022-01-01 RX ADMIN — SUCRALFATE 1 G: 1 TABLET ORAL at 05:54

## 2022-01-01 RX ADMIN — ACETAMINOPHEN 650 MG: 325 TABLET, FILM COATED ORAL at 08:41

## 2022-01-01 RX ADMIN — INSULIN LISPRO 4 UNITS: 100 INJECTION, SOLUTION INTRAVENOUS; SUBCUTANEOUS at 16:56

## 2022-01-01 RX ADMIN — NYSTATIN 500000 UNITS: 100000 SUSPENSION ORAL at 14:10

## 2022-01-01 RX ADMIN — METOPROLOL TARTRATE 25 MG: 25 TABLET, FILM COATED ORAL at 08:22

## 2022-01-01 RX ADMIN — METOPROLOL TARTRATE 25 MG: 25 TABLET, FILM COATED ORAL at 07:24

## 2022-01-01 RX ADMIN — SODIUM BICARBONATE 50 MEQ: 84 INJECTION, SOLUTION INTRAVENOUS at 08:32

## 2022-01-01 RX ADMIN — INSULIN LISPRO 4 UNITS: 100 INJECTION, SOLUTION INTRAVENOUS; SUBCUTANEOUS at 07:34

## 2022-01-01 RX ADMIN — INSULIN GLARGINE 35 UNITS: 100 INJECTION, SOLUTION SUBCUTANEOUS at 20:32

## 2022-01-01 RX ADMIN — SENNOSIDES AND DOCUSATE SODIUM 1 TABLET: 50; 8.6 TABLET ORAL at 08:28

## 2022-01-01 RX ADMIN — SODIUM CHLORIDE: 9 INJECTION, SOLUTION INTRAVENOUS at 22:54

## 2022-01-01 RX ADMIN — INSULIN GLARGINE 35 UNITS: 100 INJECTION, SOLUTION SUBCUTANEOUS at 20:55

## 2022-01-01 RX ADMIN — METOPROLOL TARTRATE 50 MG: 50 TABLET, FILM COATED ORAL at 08:04

## 2022-01-01 RX ADMIN — ACETAMINOPHEN 650 MG: 325 TABLET, FILM COATED ORAL at 11:51

## 2022-01-01 RX ADMIN — FOLIC ACID TAB 400 MCG 800 MCG: 400 TAB at 08:03

## 2022-01-01 RX ADMIN — ATORVASTATIN CALCIUM 40 MG: 40 TABLET, FILM COATED ORAL at 20:29

## 2022-01-01 RX ADMIN — MEROPENEM AND SODIUM CHLORIDE 1000 MG: 1 INJECTION, SOLUTION INTRAVENOUS at 01:09

## 2022-01-01 RX ADMIN — NYSTATIN 500000 UNITS: 100000 SUSPENSION ORAL at 20:50

## 2022-01-01 RX ADMIN — SENNOSIDES AND DOCUSATE SODIUM 1 TABLET: 50; 8.6 TABLET ORAL at 20:28

## 2022-01-01 RX ADMIN — INSULIN LISPRO 4 UNITS: 100 INJECTION, SOLUTION INTRAVENOUS; SUBCUTANEOUS at 12:32

## 2022-01-01 RX ADMIN — SODIUM CHLORIDE: 9 INJECTION, SOLUTION INTRAVENOUS at 18:42

## 2022-01-01 RX ADMIN — MEROPENEM AND SODIUM CHLORIDE 1000 MG: 1 INJECTION, SOLUTION INTRAVENOUS at 14:16

## 2022-01-01 RX ADMIN — CILOSTAZOL 50 MG: 50 TABLET ORAL at 17:23

## 2022-01-01 RX ADMIN — CILOSTAZOL 50 MG: 100 TABLET ORAL at 16:21

## 2022-01-01 RX ADMIN — SUCRALFATE 1 G: 1 TABLET ORAL at 10:27

## 2022-01-01 RX ADMIN — APIXABAN 2.5 MG: 2.5 TABLET, FILM COATED ORAL at 19:34

## 2022-01-01 RX ADMIN — ATORVASTATIN CALCIUM 40 MG: 40 TABLET, FILM COATED ORAL at 20:48

## 2022-01-01 RX ADMIN — MINERAL OIL, PETROLATUM: 425; 573 OINTMENT OPHTHALMIC at 15:38

## 2022-01-01 RX ADMIN — DULOXETINE 60 MG: 60 CAPSULE, DELAYED RELEASE ORAL at 07:47

## 2022-01-01 RX ADMIN — ATORVASTATIN CALCIUM 40 MG: 40 TABLET, FILM COATED ORAL at 20:55

## 2022-01-01 RX ADMIN — INSULIN LISPRO 4 UNITS: 100 INJECTION, SOLUTION INTRAVENOUS; SUBCUTANEOUS at 16:07

## 2022-01-01 RX ADMIN — METOPROLOL TARTRATE 25 MG: 25 TABLET, FILM COATED ORAL at 20:22

## 2022-01-01 RX ADMIN — VASOPRESSIN 0.03 UNITS/MIN: 20 INJECTION, SOLUTION INTRAMUSCULAR; SUBCUTANEOUS at 10:58

## 2022-01-01 RX ADMIN — ACETAZOLAMIDE 500 MG: 500 INJECTION, POWDER, LYOPHILIZED, FOR SOLUTION INTRAVENOUS at 23:12

## 2022-01-01 RX ADMIN — INSULIN LISPRO 4 UNITS: 100 INJECTION, SOLUTION INTRAVENOUS; SUBCUTANEOUS at 16:47

## 2022-01-01 RX ADMIN — ASPIRIN 81 MG: 81 TABLET, COATED ORAL at 07:32

## 2022-01-01 RX ADMIN — SUCRALFATE 1 G: 1 TABLET ORAL at 17:46

## 2022-01-01 RX ADMIN — APIXABAN 2.5 MG: 2.5 TABLET, FILM COATED ORAL at 08:29

## 2022-01-01 RX ADMIN — ASPIRIN 81 MG 81 MG: 81 TABLET ORAL at 08:33

## 2022-01-01 RX ADMIN — ASPIRIN 81 MG 81 MG: 81 TABLET ORAL at 07:50

## 2022-01-01 RX ADMIN — MEGESTROL ACETATE 800 MG: 40 SUSPENSION ORAL at 07:32

## 2022-01-01 RX ADMIN — SODIUM CHLORIDE, PRESERVATIVE FREE 10 ML: 5 INJECTION INTRAVENOUS at 09:03

## 2022-01-01 RX ADMIN — ASPIRIN 81 MG 81 MG: 81 TABLET ORAL at 07:52

## 2022-01-01 RX ADMIN — ASPIRIN 81 MG 81 MG: 81 TABLET ORAL at 09:54

## 2022-01-01 RX ADMIN — POLYVINYL ALCOHOL 1 DROP: 14 SOLUTION/ DROPS OPHTHALMIC at 04:38

## 2022-01-01 RX ADMIN — PHENYLEPHRINE HYDROCHLORIDE 300 MCG/MIN: 10 INJECTION INTRAVENOUS at 08:07

## 2022-01-01 RX ADMIN — INSULIN LISPRO 4 UNITS: 100 INJECTION, SOLUTION INTRAVENOUS; SUBCUTANEOUS at 07:53

## 2022-01-01 RX ADMIN — HYOSCYAMINE SULFATE 375 MCG: 0.38 TABLET, EXTENDED RELEASE ORAL at 16:29

## 2022-01-01 RX ADMIN — CILOSTAZOL 50 MG: 50 TABLET ORAL at 08:11

## 2022-01-01 RX ADMIN — MEROPENEM AND SODIUM CHLORIDE 1000 MG: 1 INJECTION, SOLUTION INTRAVENOUS at 15:34

## 2022-01-01 RX ADMIN — VANCOMYCIN HYDROCHLORIDE 1750 MG: 5 INJECTION, POWDER, LYOPHILIZED, FOR SOLUTION INTRAVENOUS at 16:43

## 2022-01-01 RX ADMIN — ASPIRIN 81 MG 81 MG: 81 TABLET ORAL at 07:32

## 2022-01-01 RX ADMIN — PANTOPRAZOLE SODIUM 40 MG: 40 TABLET, DELAYED RELEASE ORAL at 06:17

## 2022-01-01 RX ADMIN — INSULIN GLARGINE 30 UNITS: 100 INJECTION, SOLUTION SUBCUTANEOUS at 07:35

## 2022-01-01 RX ADMIN — SENNOSIDES AND DOCUSATE SODIUM 1 TABLET: 50; 8.6 TABLET ORAL at 07:24

## 2022-01-01 RX ADMIN — INSULIN LISPRO 4 UNITS: 100 INJECTION, SOLUTION INTRAVENOUS; SUBCUTANEOUS at 16:40

## 2022-01-01 RX ADMIN — ASPIRIN 81 MG: 81 TABLET, COATED ORAL at 08:28

## 2022-01-01 RX ADMIN — SUCRALFATE 1 G: 1 TABLET ORAL at 20:16

## 2022-01-01 RX ADMIN — SENNOSIDES AND DOCUSATE SODIUM 1 TABLET: 50; 8.6 TABLET ORAL at 08:31

## 2022-01-01 RX ADMIN — SODIUM ZIRCONIUM CYCLOSILICATE 10 G: 10 POWDER, FOR SUSPENSION ORAL at 08:27

## 2022-01-01 RX ADMIN — METOPROLOL TARTRATE 25 MG: 25 TABLET, FILM COATED ORAL at 20:48

## 2022-01-01 RX ADMIN — LEVOFLOXACIN 750 MG: 5 INJECTION, SOLUTION INTRAVENOUS at 22:47

## 2022-01-01 RX ADMIN — ALBUMIN (HUMAN) 25 G: 0.25 INJECTION, SOLUTION INTRAVENOUS at 18:37

## 2022-01-01 RX ADMIN — ATORVASTATIN CALCIUM 40 MG: 40 TABLET, FILM COATED ORAL at 20:22

## 2022-01-01 RX ADMIN — FOLIC ACID TAB 400 MCG 800 MCG: 400 TAB at 07:23

## 2022-01-01 RX ADMIN — ATORVASTATIN CALCIUM 40 MG: 40 TABLET, FILM COATED ORAL at 21:40

## 2022-01-01 RX ADMIN — POLYVINYL ALCOHOL 1 DROP: 14 SOLUTION/ DROPS OPHTHALMIC at 07:22

## 2022-01-01 RX ADMIN — MODAFINIL 100 MG: 100 TABLET ORAL at 08:41

## 2022-01-01 RX ADMIN — INSULIN LISPRO 6 UNITS: 100 INJECTION, SOLUTION INTRAVENOUS; SUBCUTANEOUS at 12:00

## 2022-01-01 RX ADMIN — INSULIN GLARGINE 30 UNITS: 100 INJECTION, SOLUTION SUBCUTANEOUS at 20:28

## 2022-01-01 RX ADMIN — METRONIDAZOLE 500 MG: 500 INJECTION, SOLUTION INTRAVENOUS at 03:39

## 2022-01-01 RX ADMIN — ACETAMINOPHEN 650 MG: 325 TABLET, FILM COATED ORAL at 01:01

## 2022-01-01 RX ADMIN — MEGESTROL ACETATE 800 MG: 40 SUSPENSION ORAL at 07:47

## 2022-01-01 RX ADMIN — POLYVINYL ALCOHOL 1 DROP: 14 SOLUTION/ DROPS OPHTHALMIC at 17:01

## 2022-01-01 RX ADMIN — ATORVASTATIN CALCIUM 40 MG: 40 TABLET, FILM COATED ORAL at 21:14

## 2022-01-01 RX ADMIN — SUCRALFATE 1 G: 1 TABLET ORAL at 11:53

## 2022-01-01 RX ADMIN — METOPROLOL TARTRATE 25 MG: 25 TABLET, FILM COATED ORAL at 08:28

## 2022-01-01 RX ADMIN — SENNOSIDES AND DOCUSATE SODIUM 1 TABLET: 50; 8.6 TABLET ORAL at 10:10

## 2022-01-01 RX ADMIN — INSULIN LISPRO 8 UNITS: 100 INJECTION, SOLUTION INTRAVENOUS; SUBCUTANEOUS at 07:45

## 2022-01-01 RX ADMIN — MEGESTROL ACETATE 400 MG: 40 SUSPENSION ORAL at 14:21

## 2022-01-01 RX ADMIN — SODIUM ZIRCONIUM CYCLOSILICATE 10 G: 5 POWDER, FOR SUSPENSION ORAL at 20:34

## 2022-01-01 RX ADMIN — INSULIN GLARGINE 15 UNITS: 100 INJECTION, SOLUTION SUBCUTANEOUS at 19:55

## 2022-01-01 RX ADMIN — ASPIRIN 81 MG 81 MG: 81 TABLET ORAL at 07:24

## 2022-01-01 RX ADMIN — INSULIN LISPRO 4 UNITS: 100 INJECTION, SOLUTION INTRAVENOUS; SUBCUTANEOUS at 17:03

## 2022-01-01 RX ADMIN — ACETAMINOPHEN 650 MG: 325 TABLET, FILM COATED ORAL at 20:19

## 2022-01-01 RX ADMIN — Medication 30 MCG/MIN: at 19:53

## 2022-01-01 RX ADMIN — PANTOPRAZOLE SODIUM 40 MG: 40 TABLET, DELAYED RELEASE ORAL at 05:34

## 2022-01-01 RX ADMIN — SODIUM CHLORIDE: 9 INJECTION, SOLUTION INTRAVENOUS at 03:11

## 2022-01-01 RX ADMIN — INSULIN LISPRO 4 UNITS: 100 INJECTION, SOLUTION INTRAVENOUS; SUBCUTANEOUS at 16:59

## 2022-01-01 RX ADMIN — SUCRALFATE 1 G: 1 TABLET ORAL at 20:34

## 2022-01-01 RX ADMIN — SUCRALFATE 1 G: 1 TABLET ORAL at 20:54

## 2022-01-01 RX ADMIN — INSULIN GLARGINE 20 UNITS: 100 INJECTION, SOLUTION SUBCUTANEOUS at 21:14

## 2022-01-01 RX ADMIN — ATORVASTATIN CALCIUM 40 MG: 40 TABLET, FILM COATED ORAL at 20:20

## 2022-01-01 RX ADMIN — APIXABAN 2.5 MG: 2.5 TABLET, FILM COATED ORAL at 21:21

## 2022-01-01 RX ADMIN — Medication 10 MG/HR: at 12:21

## 2022-01-01 RX ADMIN — METOPROLOL TARTRATE 12.5 MG: 25 TABLET, FILM COATED ORAL at 09:01

## 2022-01-01 RX ADMIN — SODIUM CHLORIDE 1000 ML: 9 INJECTION, SOLUTION INTRAVENOUS at 15:25

## 2022-01-01 RX ADMIN — SENNOSIDES AND DOCUSATE SODIUM 1 TABLET: 50; 8.6 TABLET ORAL at 07:33

## 2022-01-01 RX ADMIN — INSULIN LISPRO 4 UNITS: 100 INJECTION, SOLUTION INTRAVENOUS; SUBCUTANEOUS at 12:36

## 2022-01-01 RX ADMIN — INSULIN GLARGINE 15 UNITS: 100 INJECTION, SOLUTION SUBCUTANEOUS at 20:21

## 2022-01-01 RX ADMIN — APIXABAN 2.5 MG: 2.5 TABLET, FILM COATED ORAL at 22:30

## 2022-01-01 RX ADMIN — CILOSTAZOL 50 MG: 100 TABLET ORAL at 17:06

## 2022-01-01 RX ADMIN — INSULIN LISPRO 4 UNITS: 100 INJECTION, SOLUTION INTRAVENOUS; SUBCUTANEOUS at 16:39

## 2022-01-01 RX ADMIN — ACETAZOLAMIDE 500 MG: 500 INJECTION, POWDER, LYOPHILIZED, FOR SOLUTION INTRAVENOUS at 23:34

## 2022-01-01 RX ADMIN — APIXABAN 2.5 MG: 2.5 TABLET, FILM COATED ORAL at 08:41

## 2022-01-01 RX ADMIN — METOPROLOL TARTRATE 25 MG: 25 TABLET, FILM COATED ORAL at 20:43

## 2022-01-01 RX ADMIN — NYSTATIN 500000 UNITS: 100000 SUSPENSION ORAL at 17:16

## 2022-01-01 RX ADMIN — FOLIC ACID TAB 400 MCG 800 MCG: 400 TAB at 09:00

## 2022-01-01 RX ADMIN — SUCRALFATE 1 G: 1 TABLET ORAL at 05:23

## 2022-01-01 RX ADMIN — SODIUM CHLORIDE, PRESERVATIVE FREE 10 ML: 5 INJECTION INTRAVENOUS at 20:47

## 2022-01-01 RX ADMIN — SUCRALFATE 1 G: 1 TABLET ORAL at 05:57

## 2022-01-01 RX ADMIN — INSULIN GLARGINE 30 UNITS: 100 INJECTION, SOLUTION SUBCUTANEOUS at 08:03

## 2022-01-01 RX ADMIN — ASPIRIN 81 MG 81 MG: 81 TABLET ORAL at 08:04

## 2022-01-01 RX ADMIN — INSULIN GLARGINE 20 UNITS: 100 INJECTION, SOLUTION SUBCUTANEOUS at 08:38

## 2022-01-01 RX ADMIN — SODIUM CHLORIDE, PRESERVATIVE FREE 10 ML: 5 INJECTION INTRAVENOUS at 09:54

## 2022-01-01 RX ADMIN — METOPROLOL TARTRATE 12.5 MG: 25 TABLET, FILM COATED ORAL at 16:03

## 2022-01-01 RX ADMIN — PHENYLEPHRINE HYDROCHLORIDE 300 MCG/MIN: 10 INJECTION INTRAVENOUS at 16:50

## 2022-01-01 RX ADMIN — DULOXETINE 30 MG: 30 CAPSULE, DELAYED RELEASE ORAL at 08:29

## 2022-01-01 RX ADMIN — MEROPENEM AND SODIUM CHLORIDE 1000 MG: 1 INJECTION, SOLUTION INTRAVENOUS at 02:56

## 2022-01-01 RX ADMIN — POLYVINYL ALCOHOL 1 DROP: 14 SOLUTION/ DROPS OPHTHALMIC at 16:07

## 2022-01-01 RX ADMIN — MINERAL OIL, PETROLATUM: 425; 573 OINTMENT OPHTHALMIC at 04:32

## 2022-01-01 RX ADMIN — INSULIN LISPRO 4 UNITS: 100 INJECTION, SOLUTION INTRAVENOUS; SUBCUTANEOUS at 11:18

## 2022-01-01 RX ADMIN — SENNOSIDES AND DOCUSATE SODIUM 1 TABLET: 50; 8.6 TABLET ORAL at 08:19

## 2022-01-01 RX ADMIN — SENNOSIDES AND DOCUSATE SODIUM 1 TABLET: 50; 8.6 TABLET ORAL at 09:21

## 2022-01-01 RX ADMIN — APIXABAN 2.5 MG: 2.5 TABLET, FILM COATED ORAL at 20:26

## 2022-01-01 RX ADMIN — FOLIC ACID TAB 400 MCG 800 MCG: 400 TAB at 09:54

## 2022-01-01 RX ADMIN — SENNOSIDES AND DOCUSATE SODIUM 1 TABLET: 50; 8.6 TABLET ORAL at 20:20

## 2022-01-01 RX ADMIN — APIXABAN 2.5 MG: 2.5 TABLET, FILM COATED ORAL at 20:48

## 2022-01-01 RX ADMIN — APIXABAN 2.5 MG: 2.5 TABLET, FILM COATED ORAL at 20:22

## 2022-01-01 RX ADMIN — PROPOFOL 5 MCG/KG/MIN: 10 INJECTION, EMULSION INTRAVENOUS at 11:52

## 2022-01-01 RX ADMIN — ASPIRIN 81 MG 81 MG: 81 TABLET ORAL at 08:31

## 2022-01-01 RX ADMIN — MEGESTROL ACETATE 800 MG: 40 SUSPENSION ORAL at 10:13

## 2022-01-01 RX ADMIN — ALBUMIN (HUMAN) 25 G: 0.25 INJECTION, SOLUTION INTRAVENOUS at 05:45

## 2022-01-01 RX ADMIN — SODIUM CHLORIDE, PRESERVATIVE FREE 10 ML: 5 INJECTION INTRAVENOUS at 07:49

## 2022-01-01 RX ADMIN — CIPROFLOXACIN 400 MG: 2 INJECTION, SOLUTION INTRAVENOUS at 17:23

## 2022-01-01 RX ADMIN — SODIUM CHLORIDE, PRESERVATIVE FREE 20 MG: 5 INJECTION INTRAVENOUS at 09:21

## 2022-01-01 RX ADMIN — MINERAL OIL, PETROLATUM: 425; 573 OINTMENT OPHTHALMIC at 10:11

## 2022-01-01 RX ADMIN — INSULIN LISPRO 2 UNITS: 100 INJECTION, SOLUTION INTRAVENOUS; SUBCUTANEOUS at 12:27

## 2022-01-01 RX ADMIN — GUAIFENESIN 600 MG: 600 TABLET ORAL at 20:00

## 2022-01-01 RX ADMIN — METOPROLOL TARTRATE 25 MG: 25 TABLET, FILM COATED ORAL at 20:34

## 2022-01-01 RX ADMIN — INSULIN LISPRO 4 UNITS: 100 INJECTION, SOLUTION INTRAVENOUS; SUBCUTANEOUS at 20:38

## 2022-01-01 RX ADMIN — SODIUM CHLORIDE, PRESERVATIVE FREE 10 ML: 5 INJECTION INTRAVENOUS at 20:48

## 2022-01-01 RX ADMIN — INSULIN GLARGINE 15 UNITS: 100 INJECTION, SOLUTION SUBCUTANEOUS at 20:55

## 2022-01-01 RX ADMIN — INSULIN GLARGINE 35 UNITS: 100 INJECTION, SOLUTION SUBCUTANEOUS at 07:34

## 2022-01-01 RX ADMIN — PROPOFOL 20 MCG/KG/MIN: 10 INJECTION, EMULSION INTRAVENOUS at 20:36

## 2022-01-01 RX ADMIN — CILOSTAZOL 50 MG: 100 TABLET ORAL at 05:34

## 2022-01-01 RX ADMIN — MEGESTROL ACETATE 800 MG: 40 SUSPENSION ORAL at 08:30

## 2022-01-01 RX ADMIN — APIXABAN 2.5 MG: 2.5 TABLET, FILM COATED ORAL at 20:55

## 2022-01-01 RX ADMIN — MEROPENEM AND SODIUM CHLORIDE 1000 MG: 1 INJECTION, SOLUTION INTRAVENOUS at 09:49

## 2022-01-01 RX ADMIN — INSULIN LISPRO 10 UNITS: 100 INJECTION, SOLUTION INTRAVENOUS; SUBCUTANEOUS at 12:23

## 2022-01-01 RX ADMIN — GUAIFENESIN 600 MG: 600 TABLET ORAL at 08:28

## 2022-01-01 RX ADMIN — FOLIC ACID TAB 400 MCG 800 MCG: 400 TAB at 07:33

## 2022-01-01 RX ADMIN — METRONIDAZOLE 500 MG: 500 INJECTION, SOLUTION INTRAVENOUS at 02:18

## 2022-01-01 RX ADMIN — SODIUM CHLORIDE, PRESERVATIVE FREE 10 ML: 5 INJECTION INTRAVENOUS at 22:16

## 2022-01-01 RX ADMIN — NYSTATIN 500000 UNITS: 100000 SUSPENSION ORAL at 09:00

## 2022-01-01 RX ADMIN — SUCRALFATE 1 G: 1 TABLET ORAL at 11:16

## 2022-01-01 RX ADMIN — PERFLUTREN 1.5 ML: 6.52 INJECTION, SUSPENSION INTRAVENOUS at 15:56

## 2022-01-01 RX ADMIN — MEROPENEM AND SODIUM CHLORIDE 1000 MG: 1 INJECTION, SOLUTION INTRAVENOUS at 16:54

## 2022-01-01 RX ADMIN — ACETAMINOPHEN 650 MG: 325 TABLET, FILM COATED ORAL at 11:49

## 2022-01-01 RX ADMIN — MINERAL OIL, PETROLATUM: 425; 573 OINTMENT OPHTHALMIC at 15:21

## 2022-01-01 RX ADMIN — Medication 2.5 MG/HR: at 10:16

## 2022-01-01 RX ADMIN — APIXABAN 2.5 MG: 2.5 TABLET, FILM COATED ORAL at 20:18

## 2022-01-01 RX ADMIN — METOPROLOL TARTRATE 12.5 MG: 25 TABLET, FILM COATED ORAL at 20:24

## 2022-01-01 RX ADMIN — FOLIC ACID TAB 400 MCG 800 MCG: 400 TAB at 08:41

## 2022-01-01 RX ADMIN — INSULIN LISPRO 4 UNITS: 100 INJECTION, SOLUTION INTRAVENOUS; SUBCUTANEOUS at 17:07

## 2022-01-01 RX ADMIN — DULOXETINE 30 MG: 30 CAPSULE, DELAYED RELEASE ORAL at 07:23

## 2022-01-01 RX ADMIN — METOPROLOL TARTRATE 12.5 MG: 25 TABLET, FILM COATED ORAL at 20:16

## 2022-01-01 RX ADMIN — SENNOSIDES AND DOCUSATE SODIUM 1 TABLET: 50; 8.6 TABLET ORAL at 08:41

## 2022-01-01 RX ADMIN — INSULIN LISPRO 4 UNITS: 100 INJECTION, SOLUTION INTRAVENOUS; SUBCUTANEOUS at 12:28

## 2022-01-01 RX ADMIN — INSULIN LISPRO 2 UNITS: 100 INJECTION, SOLUTION INTRAVENOUS; SUBCUTANEOUS at 07:45

## 2022-01-01 RX ADMIN — CIPROFLOXACIN 400 MG: 2 INJECTION, SOLUTION INTRAVENOUS at 05:38

## 2022-01-01 RX ADMIN — APIXABAN 2.5 MG: 2.5 TABLET, FILM COATED ORAL at 09:54

## 2022-01-01 RX ADMIN — ATORVASTATIN CALCIUM 40 MG: 40 TABLET, FILM COATED ORAL at 20:50

## 2022-01-01 RX ADMIN — SODIUM CHLORIDE, PRESERVATIVE FREE 5 ML: 5 INJECTION INTRAVENOUS at 14:39

## 2022-01-01 RX ADMIN — SENNOSIDES AND DOCUSATE SODIUM 1 TABLET: 50; 8.6 TABLET ORAL at 22:30

## 2022-01-01 RX ADMIN — INSULIN GLARGINE 30 UNITS: 100 INJECTION, SOLUTION SUBCUTANEOUS at 20:36

## 2022-01-01 RX ADMIN — CIPROFLOXACIN 400 MG: 2 INJECTION, SOLUTION INTRAVENOUS at 20:06

## 2022-01-01 RX ADMIN — SODIUM CHLORIDE: 9 INJECTION, SOLUTION INTRAVENOUS at 14:34

## 2022-01-01 RX ADMIN — SUCRALFATE 1 G: 1 TABLET ORAL at 09:02

## 2022-01-01 RX ADMIN — INSULIN LISPRO 4 UNITS: 100 INJECTION, SOLUTION INTRAVENOUS; SUBCUTANEOUS at 08:25

## 2022-01-01 RX ADMIN — INSULIN LISPRO 4 UNITS: 100 INJECTION, SOLUTION INTRAVENOUS; SUBCUTANEOUS at 09:58

## 2022-01-01 RX ADMIN — Medication 5 MILLICURIE: at 12:05

## 2022-01-01 RX ADMIN — SODIUM CHLORIDE, PRESERVATIVE FREE 5 ML: 5 INJECTION INTRAVENOUS at 22:31

## 2022-01-01 RX ADMIN — CILOSTAZOL 50 MG: 100 TABLET ORAL at 17:23

## 2022-01-01 RX ADMIN — CARVEDILOL 6.25 MG: 6.25 TABLET, FILM COATED ORAL at 08:28

## 2022-01-01 RX ADMIN — POLYVINYL ALCOHOL 1 DROP: 14 SOLUTION/ DROPS OPHTHALMIC at 07:33

## 2022-01-01 RX ADMIN — ASPIRIN 81 MG 81 MG: 81 TABLET ORAL at 09:29

## 2022-01-01 RX ADMIN — INSULIN LISPRO 3 UNITS: 100 INJECTION, SOLUTION INTRAVENOUS; SUBCUTANEOUS at 11:36

## 2022-01-01 RX ADMIN — SODIUM ZIRCONIUM CYCLOSILICATE 10 G: 5 POWDER, FOR SUSPENSION ORAL at 20:55

## 2022-01-01 RX ADMIN — ACETAMINOPHEN 650 MG: 325 TABLET, FILM COATED ORAL at 22:59

## 2022-01-01 RX ADMIN — MEROPENEM AND SODIUM CHLORIDE 1000 MG: 1 INJECTION, SOLUTION INTRAVENOUS at 10:27

## 2022-01-01 RX ADMIN — MINERAL OIL, PETROLATUM: 425; 573 OINTMENT OPHTHALMIC at 16:07

## 2022-01-01 RX ADMIN — SODIUM CHLORIDE: 9 INJECTION, SOLUTION INTRAVENOUS at 22:48

## 2022-01-01 RX ADMIN — ACETAMINOPHEN 650 MG: 325 TABLET, FILM COATED ORAL at 01:50

## 2022-01-01 RX ADMIN — INSULIN GLARGINE 30 UNITS: 100 INJECTION, SOLUTION SUBCUTANEOUS at 22:24

## 2022-01-01 RX ADMIN — APIXABAN 2.5 MG: 2.5 TABLET, FILM COATED ORAL at 21:39

## 2022-01-01 RX ADMIN — INSULIN GLARGINE 30 UNITS: 100 INJECTION, SOLUTION SUBCUTANEOUS at 07:52

## 2022-01-01 RX ADMIN — POLYVINYL ALCOHOL 1 DROP: 14 SOLUTION/ DROPS OPHTHALMIC at 23:46

## 2022-01-01 RX ADMIN — INSULIN LISPRO 2 UNITS: 100 INJECTION, SOLUTION INTRAVENOUS; SUBCUTANEOUS at 07:58

## 2022-01-01 RX ADMIN — FOLIC ACID TAB 400 MCG 800 MCG: 400 TAB at 08:11

## 2022-01-01 RX ADMIN — FOLIC ACID TAB 400 MCG 800 MCG: 400 TAB at 08:22

## 2022-01-01 RX ADMIN — INSULIN LISPRO 2 UNITS: 100 INJECTION, SOLUTION INTRAVENOUS; SUBCUTANEOUS at 12:33

## 2022-01-01 RX ADMIN — INSULIN LISPRO 4 UNITS: 100 INJECTION, SOLUTION INTRAVENOUS; SUBCUTANEOUS at 17:26

## 2022-01-01 RX ADMIN — SENNOSIDES AND DOCUSATE SODIUM 1 TABLET: 50; 8.6 TABLET ORAL at 20:34

## 2022-01-01 RX ADMIN — SUCRALFATE 1 G: 1 TABLET ORAL at 16:22

## 2022-01-01 RX ADMIN — DULOXETINE 30 MG: 30 CAPSULE, DELAYED RELEASE ORAL at 08:24

## 2022-01-01 RX ADMIN — INSULIN GLARGINE 30 UNITS: 100 INJECTION, SOLUTION SUBCUTANEOUS at 08:17

## 2022-01-01 RX ADMIN — SUCRALFATE 1 G: 1 TABLET ORAL at 12:00

## 2022-01-01 RX ADMIN — DULOXETINE 60 MG: 60 CAPSULE, DELAYED RELEASE ORAL at 09:01

## 2022-01-01 RX ADMIN — INSULIN LISPRO 2 UNITS: 100 INJECTION, SOLUTION INTRAVENOUS; SUBCUTANEOUS at 17:15

## 2022-01-01 RX ADMIN — MEROPENEM AND SODIUM CHLORIDE 1000 MG: 1 INJECTION, SOLUTION INTRAVENOUS at 17:47

## 2022-01-01 RX ADMIN — NYSTATIN 500000 UNITS: 100000 SUSPENSION ORAL at 13:03

## 2022-01-01 RX ADMIN — METOPROLOL TARTRATE 12.5 MG: 25 TABLET, FILM COATED ORAL at 08:30

## 2022-01-01 RX ADMIN — NYSTATIN 500000 UNITS: 100000 SUSPENSION ORAL at 16:49

## 2022-01-01 RX ADMIN — MODAFINIL 100 MG: 100 TABLET ORAL at 08:28

## 2022-01-01 RX ADMIN — MEGESTROL ACETATE 800 MG: 40 SUSPENSION ORAL at 09:00

## 2022-01-01 RX ADMIN — INSULIN HUMAN 10 UNITS: 100 INJECTION, SOLUTION PARENTERAL at 08:26

## 2022-01-01 RX ADMIN — INSULIN LISPRO 8 UNITS: 100 INJECTION, SOLUTION INTRAVENOUS; SUBCUTANEOUS at 11:24

## 2022-01-01 RX ADMIN — INSULIN LISPRO 6 UNITS: 100 INJECTION, SOLUTION INTRAVENOUS; SUBCUTANEOUS at 11:43

## 2022-01-01 RX ADMIN — INSULIN GLARGINE 35 UNITS: 100 INJECTION, SOLUTION SUBCUTANEOUS at 20:34

## 2022-01-01 RX ADMIN — ASPIRIN 81 MG 81 MG: 81 TABLET ORAL at 08:03

## 2022-01-01 RX ADMIN — SODIUM CHLORIDE, PRESERVATIVE FREE 20 MG: 5 INJECTION INTRAVENOUS at 07:22

## 2022-01-01 RX ADMIN — ASPIRIN 81 MG 81 MG: 81 TABLET ORAL at 08:28

## 2022-01-01 RX ADMIN — SUCRALFATE 1 G: 1 TABLET ORAL at 12:19

## 2022-01-01 RX ADMIN — APIXABAN 2.5 MG: 2.5 TABLET, FILM COATED ORAL at 07:21

## 2022-01-01 RX ADMIN — INSULIN LISPRO 4 UNITS: 100 INJECTION, SOLUTION INTRAVENOUS; SUBCUTANEOUS at 11:37

## 2022-01-01 RX ADMIN — INSULIN LISPRO 2 UNITS: 100 INJECTION, SOLUTION INTRAVENOUS; SUBCUTANEOUS at 12:05

## 2022-01-01 RX ADMIN — INSULIN LISPRO 4 UNITS: 100 INJECTION, SOLUTION INTRAVENOUS; SUBCUTANEOUS at 07:46

## 2022-01-01 RX ADMIN — ATORVASTATIN CALCIUM 40 MG: 40 TABLET, FILM COATED ORAL at 20:26

## 2022-01-01 RX ADMIN — MEGESTROL ACETATE 800 MG: 40 SUSPENSION ORAL at 16:02

## 2022-01-01 RX ADMIN — SUCRALFATE 1 G: 1 TABLET ORAL at 21:14

## 2022-01-01 RX ADMIN — PANTOPRAZOLE SODIUM 40 MG: 40 TABLET, DELAYED RELEASE ORAL at 05:57

## 2022-01-01 RX ADMIN — SODIUM CHLORIDE: 9 INJECTION, SOLUTION INTRAVENOUS at 05:58

## 2022-01-01 RX ADMIN — SENNOSIDES AND DOCUSATE SODIUM 1 TABLET: 50; 8.6 TABLET ORAL at 08:37

## 2022-01-01 RX ADMIN — INSULIN LISPRO 6 UNITS: 100 INJECTION, SOLUTION INTRAVENOUS; SUBCUTANEOUS at 09:55

## 2022-01-01 RX ADMIN — INSULIN LISPRO 4 UNITS: 100 INJECTION, SOLUTION INTRAVENOUS; SUBCUTANEOUS at 07:54

## 2022-01-01 RX ADMIN — MINERAL OIL, PETROLATUM: 425; 573 OINTMENT OPHTHALMIC at 05:42

## 2022-01-01 RX ADMIN — POTASSIUM CHLORIDE 20 MEQ: 29.8 INJECTION, SOLUTION INTRAVENOUS at 10:36

## 2022-01-01 RX ADMIN — SUCRALFATE 1 G: 1 TABLET ORAL at 11:11

## 2022-01-01 RX ADMIN — METOPROLOL TARTRATE 25 MG: 25 TABLET, FILM COATED ORAL at 08:24

## 2022-01-01 RX ADMIN — INSULIN GLARGINE 15 UNITS: 100 INJECTION, SOLUTION SUBCUTANEOUS at 20:04

## 2022-01-01 ASSESSMENT — ENCOUNTER SYMPTOMS
COLOR CHANGE: 0
TROUBLE SWALLOWING: 0
BACK PAIN: 0
SORE THROAT: 0
CHOKING: 0
SHORTNESS OF BREATH: 0
CHOKING: 0
DIARRHEA: 0
NAUSEA: 0
VOMITING: 0
CONSTIPATION: 0
CONSTIPATION: 0
SORE THROAT: 0
NAUSEA: 0
COUGH: 1
VOMITING: 0
BACK PAIN: 0
COUGH: 1
VOICE CHANGE: 0
CONSTIPATION: 0
SHORTNESS OF BREATH: 1
SORE THROAT: 0
COLOR CHANGE: 0
TROUBLE SWALLOWING: 0
COLOR CHANGE: 0
ABDOMINAL PAIN: 0
COUGH: 1
NAUSEA: 0
DIARRHEA: 0
COLOR CHANGE: 0
DIARRHEA: 0
ABDOMINAL DISTENTION: 0
VOMITING: 0
RHINORRHEA: 0
BLOOD IN STOOL: 0
BACK PAIN: 0
SORE THROAT: 0
COUGH: 1
CHOKING: 0
NAUSEA: 0
COLOR CHANGE: 0
CHEST TIGHTNESS: 0
VOMITING: 0
VOMITING: 0
WHEEZING: 0
VOMITING: 0
SHORTNESS OF BREATH: 0
ABDOMINAL PAIN: 0
SHORTNESS OF BREATH: 1
DIARRHEA: 0
DIARRHEA: 0
COUGH: 1
VOMITING: 0
ABDOMINAL PAIN: 1
DIARRHEA: 0
COLOR CHANGE: 0
BLOOD IN STOOL: 0
BACK PAIN: 0
ABDOMINAL DISTENTION: 0
COLOR CHANGE: 0
NAUSEA: 0
NAUSEA: 0
VOMITING: 0
SHORTNESS OF BREATH: 1
CHEST TIGHTNESS: 0
COUGH: 1
ABDOMINAL PAIN: 0
CONSTIPATION: 0
WHEEZING: 0
CONSTIPATION: 0
SHORTNESS OF BREATH: 0
CONSTIPATION: 0
NAUSEA: 0
WHEEZING: 0
NAUSEA: 0
ABDOMINAL PAIN: 1
BLOOD IN STOOL: 0
SORE THROAT: 0
COUGH: 1
BACK PAIN: 0
VOMITING: 0
TROUBLE SWALLOWING: 0
COLOR CHANGE: 0
CHEST TIGHTNESS: 0
ABDOMINAL DISTENTION: 0
CHEST TIGHTNESS: 0
ABDOMINAL PAIN: 1
CONSTIPATION: 0
COLOR CHANGE: 0
SHORTNESS OF BREATH: 1
SHORTNESS OF BREATH: 1
BACK PAIN: 0
BACK PAIN: 0
VOMITING: 0
NAUSEA: 0
ABDOMINAL PAIN: 0
NAUSEA: 0
TROUBLE SWALLOWING: 0
SHORTNESS OF BREATH: 0
CONSTIPATION: 0
DIARRHEA: 0
ABDOMINAL PAIN: 0
SHORTNESS OF BREATH: 0
VOICE CHANGE: 0

## 2022-01-01 ASSESSMENT — PULMONARY FUNCTION TESTS
PIF_VALUE: 30
PIF_VALUE: 31
PIF_VALUE: 37
PIF_VALUE: 33
PIF_VALUE: 34
PIF_VALUE: 41
PIF_VALUE: 37
PIF_VALUE: 36
PIF_VALUE: 36
PIF_VALUE: 32
PIF_VALUE: 40
PIF_VALUE: 35
PIF_VALUE: 34
PIF_VALUE: 35
PIF_VALUE: 42
PIF_VALUE: 35
PIF_VALUE: 33
PIF_VALUE: 50
PIF_VALUE: 29
PIF_VALUE: 34
PIF_VALUE: 34
PIF_VALUE: 44
PIF_VALUE: 34
PIF_VALUE: 34
PIF_VALUE: 33
PIF_VALUE: 37
PIF_VALUE: 34
PIF_VALUE: 33
PIF_VALUE: 34
PIF_VALUE: 35
PIF_VALUE: 36
PIF_VALUE: 32
PIF_VALUE: 33
PIF_VALUE: 44
PIF_VALUE: 32
PIF_VALUE: 33
PIF_VALUE: 33
PIF_VALUE: 31
PIF_VALUE: 33
PIF_VALUE: 36
PIF_VALUE: 35
PIF_VALUE: 60
PIF_VALUE: 43
PIF_VALUE: 34
PIF_VALUE: 30
PIF_VALUE: 43
PIF_VALUE: 35
PIF_VALUE: 40
PIF_VALUE: 39
PIF_VALUE: 36
PIF_VALUE: 40
PIF_VALUE: 33
PIF_VALUE: 40
PIF_VALUE: 36
PIF_VALUE: 43
PIF_VALUE: 33
PIF_VALUE: 33
PIF_VALUE: 35
PIF_VALUE: 31
PIF_VALUE: 31
PIF_VALUE: 30
PIF_VALUE: 33
PIF_VALUE: 34
PIF_VALUE: 40
PIF_VALUE: 35
PIF_VALUE: 42
PIF_VALUE: 43
PIF_VALUE: 33
PIF_VALUE: 32
PIF_VALUE: 41

## 2022-01-01 ASSESSMENT — PAIN SCALES - GENERAL
PAINLEVEL_OUTOF10: 4
PAINLEVEL_OUTOF10: 0
PAINLEVEL_OUTOF10: 4
PAINLEVEL_OUTOF10: 1
PAINLEVEL_OUTOF10: 5
PAINLEVEL_OUTOF10: 0
PAINLEVEL_OUTOF10: 3
PAINLEVEL_OUTOF10: 2
PAINLEVEL_OUTOF10: 0
PAINLEVEL_OUTOF10: 0
PAINLEVEL_OUTOF10: 5
PAINLEVEL_OUTOF10: 0
PAINLEVEL_OUTOF10: 9
PAINLEVEL_OUTOF10: 8
PAINLEVEL_OUTOF10: 5
PAINLEVEL_OUTOF10: 1
PAINLEVEL_OUTOF10: 0
PAINLEVEL_OUTOF10: 2
PAINLEVEL_OUTOF10: 0
PAINLEVEL_OUTOF10: 5
PAINLEVEL_OUTOF10: 9
PAINLEVEL_OUTOF10: 5
PAINLEVEL_OUTOF10: 3
PAINLEVEL_OUTOF10: 5
PAINLEVEL_OUTOF10: 0
PAINLEVEL_OUTOF10: 6
PAINLEVEL_OUTOF10: 0
PAINLEVEL_OUTOF10: 1
PAINLEVEL_OUTOF10: 0

## 2022-01-01 ASSESSMENT — PAIN DESCRIPTION - LOCATION
LOCATION: GENERALIZED
LOCATION: SACRUM;BACK
LOCATION: GENERALIZED
LOCATION: SACRUM
LOCATION: OTHER (COMMENT)
LOCATION: GENERALIZED
LOCATION: OTHER (COMMENT)
LOCATION: GENERALIZED
LOCATION: BACK

## 2022-01-01 ASSESSMENT — PAIN DESCRIPTION - DESCRIPTORS
DESCRIPTORS: ACHING;DISCOMFORT
DESCRIPTORS: ACHING;DISCOMFORT
DESCRIPTORS: ACHING
DESCRIPTORS: ACHING;DISCOMFORT
DESCRIPTORS: BURNING
DESCRIPTORS: ACHING;DISCOMFORT
DESCRIPTORS: ACHING;DISCOMFORT

## 2022-01-01 ASSESSMENT — PAIN DESCRIPTION - ORIENTATION
ORIENTATION: RIGHT;LEFT
ORIENTATION: LEFT;RIGHT
ORIENTATION: LOWER
ORIENTATION: RIGHT;LEFT
ORIENTATION: RIGHT;LEFT

## 2022-01-01 ASSESSMENT — PAIN - FUNCTIONAL ASSESSMENT
PAIN_FUNCTIONAL_ASSESSMENT: ACTIVITIES ARE NOT PREVENTED
PAIN_FUNCTIONAL_ASSESSMENT: ACTIVITIES ARE NOT PREVENTED
PAIN_FUNCTIONAL_ASSESSMENT: PREVENTS OR INTERFERES SOME ACTIVE ACTIVITIES AND ADLS
PAIN_FUNCTIONAL_ASSESSMENT: ACTIVITIES ARE NOT PREVENTED

## 2022-01-03 RX ORDER — INSULIN GLARGINE 100 [IU]/ML
INJECTION, SOLUTION SUBCUTANEOUS
Qty: 70 ML | Refills: 1 | Status: SHIPPED | OUTPATIENT
Start: 2022-01-03 | End: 2022-05-20

## 2022-01-24 DIAGNOSIS — K29.00 ACUTE SUPERFICIAL GASTRITIS WITHOUT HEMORRHAGE: ICD-10-CM

## 2022-01-24 RX ORDER — PANTOPRAZOLE SODIUM 40 MG/1
TABLET, DELAYED RELEASE ORAL
Qty: 90 TABLET | Refills: 0 | Status: SHIPPED | OUTPATIENT
Start: 2022-01-24 | End: 2022-04-04

## 2022-01-24 NOTE — TELEPHONE ENCOUNTER
Rx Refill Note  Requested Prescriptions     Pending Prescriptions Disp Refills   • pantoprazole (PROTONIX) 40 MG EC tablet [Pharmacy Med Name: PANTOPRAZOLE SODIUM 40 MG Tablet Delayed Release] 90 tablet 0     Sig: TAKE 1 TABLET EVERY DAY      Last office visit with prescribing clinician: Visit date not found      Next office visit with prescribing clinician: Visit date not Shelly Jorge MA  01/24/22, 15:40 CST

## 2022-02-07 NOTE — PROGRESS NOTES
[facility] - PODIATRY    Today's Date: 02/10/22    Patient Name: Darren Shay  MRN: [id number]  CSN: [id number]  PCP: Robin Freedman MD  Referring Provider: No ref. provider found    SUBJECTIVE     Chief Complaint   Patient presents with   • Follow-up     Robin Freedman MD pcp09/03/2021 3  MONTH FU / MUST BE WITH [facility] - pt states has neruopathy, has sharp pains - pt denies pain, sharp pain from neruopathy 8/10 - pt presents walking cane, amputated left toes, callius on the bottom of right foot, and cracking on outside of left foot, diabetic nail/foot care   • Diabetes     118mg/dl this am     HPI: Darrne Shay, a 76 y.o.male, comes to clinic as a(n) established patient presenting for diabetic foot exam and complaining of thickened nails of right foot, and dry, cracked skin of left amputation site. Patient has h/o cataracts, colon CA, CAD, DM, TMA, neuropathy, ileostomy, UC, tobacco use. Patient is IDDM with last stated BG level of 118mg/dl. Patient presents with for thickened, irregular growth of right hallux nail. Has history of TMA of left foot. Noticed cracking and dry skin of the left TMA stump sometime last week. Denies redness or drainage. Uses cane for support.  Also has toe  shoe but does not always use it . Continues Plavix daily. Smokes a pipe daily. Relates previous treatment(s) including care by vascular surgery, toe filler, care by podiatrist. Denies any constitutional symptoms. No other pedal complaints at this time.    Past Medical History:   Diagnosis Date   • Cataracts, bilateral    • Colon cancer (HCC)    • Coronary artery disease    • Diabetes mellitus (HCC)    • Diabetic foot ulcers (HCC)    • Hypertension    • Ileostomy present (HCC)    • Neuropathy    • UC (ulcerative colitis confined to rectum) (HCC)      Past Surgical History:   Procedure Laterality Date   • CHOLECYSTECTOMY     • COLOSTOMY     • CORONARY ARTERY BYPASS GRAFT  1993    x5   • ORIF FOOT  "FRACTURE Right 1/17/2019    Procedure: PARTIAL REMOVAL OF BONE MEDIAL CUNEIFORM AND 1ST METATARSAL - RIGHT FOOT EXCISION OF ULCERATION;  Surgeon: Florin Kearns DPM;  Location: [facility] OR;  Service: Podiatry   • TOE AMPUTATION      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 at      Family History   Problem Relation Age of Onset   • Cancer Mother    • Heart disease Mother    • Diabetes Mother    • Cancer Father    • Diabetes Sister    • No Known Problems Brother    • Diabetes Sister    • No Known Problems Brother    • No Known Problems Brother    • No Known Problems Brother      Social History     Socioeconomic History   • Marital status:    Tobacco Use   • Smoking status: Current Every Day Smoker     Years: 10.00     Types: Pipe   • Smokeless tobacco: Never Used   Vaping Use   • Vaping Use: Never used   Substance and Sexual Activity   • Alcohol use: No   • Drug use: No   • Sexual activity: Not Currently     Partners: Female     Allergies   Allergen Reactions   • Cephalexin Hives     Current Outpatient Medications   Medication Sig Dispense Refill   • Accu-Chek Jessica Plus test strip TEST BLOOD SUGAR THREE TIMES DAILY 300 each 1   • aspirin 81 MG chewable tablet Chew 81 mg Daily.     • cilostazol (PLETAL) 100 MG tablet TAKE 1 TABLET TWICE DAILY 180 tablet 3   • clopidogrel (PLAVIX) 75 MG tablet TAKE 1 TABLET EVERY DAY 30 tablet 0   • Droplet Insulin Syringe 31G X 5/16\" 1 ML misc USE THREE TIMES DAILY AS DIRECTED 300 each 5   • folic acid (FOLVITE) 400 MCG tablet Take 800 mcg by mouth Daily.     • gabapentin (NEURONTIN) 300 MG capsule Take 1 capsule by mouth 2 (two) times a day. 180 capsule 1   • insulin aspart (novoLOG) 100 UNIT/ML injection Inject 30 units 4 times daily plus additional units per sliding scale. (Patient taking differently: Inject 40 units 4 times daily plus additional units per sliding scale.) 300 mL 2   • Lantus 100 UNIT/ML injection INJECT 82 UNITS UNDER THE SKIN IN THE APPROPRIATE AREA AS DIRECTED EVERY NIGHT. 70 " mL 1   • metoprolol tartrate (LOPRESSOR) 50 MG tablet TAKE 1 TABLET TWICE DAILY 180 tablet 1   • pantoprazole (PROTONIX) 40 MG EC tablet TAKE 1 TABLET EVERY DAY 90 tablet 0   • simvastatin (ZOCOR) 20 MG tablet TAKE 1 TABLET AT BEDTIME 90 tablet 1   • sulfamethoxazole-trimethoprim (BACTRIM DS,SEPTRA DS) 800-160 MG per tablet Take 1 tablet by mouth 2 (Two) Times a Day. 20 tablet 0     No current facility-administered medications for this visit.     Review of Systems   Constitutional: Negative for chills and fever.   HENT: Negative for congestion.    Respiratory: Negative for shortness of breath.    Cardiovascular: Negative for chest pain and leg swelling.   Gastrointestinal: Negative for constipation, diarrhea, nausea and vomiting.   Musculoskeletal: Positive for arthralgias and gait problem.        Foot pain   Skin: Positive for wound.   Neurological: Positive for numbness.   Psychiatric/Behavioral: Negative for agitation.       OBJECTIVE     Vitals:    02/10/22 0847   BP: 140/70   Pulse: 73   SpO2: 96%       PHYSICAL EXAM  GEN:   Accompanied by spouse.     Foot/Ankle Exam:       General:   Diabetic Foot Exam Performed    Appearance: appears stated age and healthy    Orientation: AAOx3    Affect: appropriate    Gait: shuffling    Assistance: cane    Shoe Gear:  Casual shoes    VASCULAR      Right Foot Vascularity   Dorsalis pedis:  2+  Posterior tibial:  1+  Skin Temperature: warm    Edema Grading:  None  CFT:  3  Pedal Hair Growth:  Absent  Varicosities: none       Left Foot Vascularity   Dorsalis pedis:  2+  Posterior tibial:  1+  Skin Temperature: warm    Edema Grading:  None  CFT:  3  Pedal Hair Growth:  Absent  Varicosities: none        NEUROLOGIC     Right Foot Neurologic   Light touch sensation:  Absent  Vibratory sensation:  Absent  Hot/Cold sensation: absent    Protective Sensation using Loret[place]-Melva Monofilament:  0     Left Foot Neurologic   Light touch sensation:  Absent  Vibratory sensation:   Absent  Hot/cold sensation: absent    Protective Sensation using [place]-Melva Monofilament:  0     MUSCULOSKELETAL      Right Foot Musculoskeletal   Ecchymosis:  None  Tenderness: none    Arch:  Normal  Mallet toe:  First toe, second toe and third toe     Left Foot Musculoskeletal    Amputation   Trans metatarsal left foot: Yes    Ecchymosis:  None  Tenderness: none    Arch:  Normal     MUSCLE STRENGTH     Right Foot Muscle Strength   Foot dorsiflexion:  5  Foot plantar flexion:  5  Foot inversion:  5  Foot eversion:  5     Left Foot Muscle Strength   Foot dorsiflexion:  5  Foot plantar flexion:  5  Foot inversion:  5  Foot eversion:  5     RANGE OF MOTION      Right Foot Range of Motion   Foot and ankle ROM within normal limits       Left Foot Range of Motion   Foot and ankle ROM within normal limits       DERMATOLOGIC     Right Foot Dermatologic   Skin: corn    Skin comment:  Stucco keratoses  Nails: onychomycosis, abnormally thick, subungual debris and dystrophic nails       Left Foot Dermatologic   Skin: dry skin, fissure and ulcer    Skin: no left foot cellulitis and no left foot redness    Skin comment:  Stucco keratoses     Image:       RADIOLOGY/NUCLEAR:  No results found.    LABORATORY/CULTURE RESULTS:      PATHOLOGY RESULTS:       ASSESSMENT/PLAN     Diagnoses and all orders for this visit:    1. Neuropathic foot ulcer, left, with fat layer exposed (HCC) (Primary)  -     Ambulatory Referral to Wound Clinic    2. Onychomycosis    3. Foot callus    4. Controlled type 2 diabetes mellitus with diabetic polyneuropathy, with long-term current use of insulin ([facility])    5. History of transmetatarsal amputation of left foot ([facility])    6. Encounter for current long term use of antiplatelet drug    7. Tobacco use    8. Encounter for diabetic foot exam (HCC)      Comprehensive lower extremity examination and evaluation was performed.  Discussed findings and treatment plan including risks, benefits, and treatment  options with patient in detail. Patient agreed with treatment plan.  Diabetic foot exam performed.  After verbal consent obtained, nail(s) x5 debrided of length and thickness with nail nipper without incidence  After verbal consent obtained, calluses x1 pared utilizing dermal curette and/or scalpel without incidence  Patient may maintain nails and calluses at home utilizing emery board or pumice stone between visits as needed  Reviewed at home diabetic foot care including daily foot checks  After verbal consent obtained, excisional debridement performed to remove skin, slough, non-viable, and subQ tissue down to level of healthy bleeding tissue with dermal curette and/or sharp instrumentation. Hemostasis achieved with silver nitrate, pressure.  Wound area debrided was entire measurement documented.   Applied abx ointment and light bandage.  Pt to reapply daily.  Tobacco cessation needed.   Continue BG control  Advised to utilize cane or walker routinely with ambulation to assist in fall prevention.   Refer to Outpatient Wound Care for continued care of left TMA stump ulceration.   An After Visit Summary was printed and given to the patient at discharge, including (if requested) any available informative/educational handouts regarding diagnosis, treatment, or medications. All questions were answered to patient/family satisfaction. Should symptoms fail to improve or worsen they agree to call or return to clinic or to go to the Emergency Department. Discussed the importance of following up with any needed screening tests/labs/specialist appointments and any requested follow-up recommended by me today. Importance of maintaining follow-up discussed and patient accepts that missed appointments can delay diagnosis and potentially lead to worsening of conditions.  Return in about 3 months (around 5/10/2022)., or sooner if acute issues arise.        This document has been electronically signed by Manas Claire DPM on  February 10, 2022 09:01 CST

## 2022-02-10 ENCOUNTER — OFFICE VISIT (OUTPATIENT)
Dept: PODIATRY | Facility: CLINIC | Age: 77
End: 2022-02-10

## 2022-02-10 VITALS
HEART RATE: 73 BPM | DIASTOLIC BLOOD PRESSURE: 70 MMHG | HEIGHT: 72 IN | OXYGEN SATURATION: 96 % | SYSTOLIC BLOOD PRESSURE: 140 MMHG | BODY MASS INDEX: 28.96 KG/M2 | WEIGHT: 213.8 LBS

## 2022-02-10 DIAGNOSIS — Z79.4 CONTROLLED TYPE 2 DIABETES MELLITUS WITH DIABETIC POLYNEUROPATHY, WITH LONG-TERM CURRENT USE OF INSULIN: ICD-10-CM

## 2022-02-10 DIAGNOSIS — Z89.432 HISTORY OF TRANSMETATARSAL AMPUTATION OF LEFT FOOT: ICD-10-CM

## 2022-02-10 DIAGNOSIS — E11.9 ENCOUNTER FOR DIABETIC FOOT EXAM: ICD-10-CM

## 2022-02-10 DIAGNOSIS — Z72.0 TOBACCO USE: ICD-10-CM

## 2022-02-10 DIAGNOSIS — Z79.02 ENCOUNTER FOR CURRENT LONG TERM USE OF ANTIPLATELET DRUG: ICD-10-CM

## 2022-02-10 DIAGNOSIS — L97.522 NEUROPATHIC FOOT ULCER, LEFT, WITH FAT LAYER EXPOSED: Primary | ICD-10-CM

## 2022-02-10 DIAGNOSIS — B35.1 ONYCHOMYCOSIS: ICD-10-CM

## 2022-02-10 DIAGNOSIS — L84 FOOT CALLUS: ICD-10-CM

## 2022-02-10 DIAGNOSIS — E11.42 CONTROLLED TYPE 2 DIABETES MELLITUS WITH DIABETIC POLYNEUROPATHY, WITH LONG-TERM CURRENT USE OF INSULIN: ICD-10-CM

## 2022-02-10 PROCEDURE — 11720 DEBRIDE NAIL 1-5: CPT | Performed by: PODIATRIST

## 2022-02-10 PROCEDURE — 99213 OFFICE O/P EST LOW 20 MIN: CPT | Performed by: PODIATRIST

## 2022-02-10 PROCEDURE — 11042 DBRDMT SUBQ TIS 1ST 20SQCM/<: CPT | Performed by: PODIATRIST

## 2022-02-10 PROCEDURE — 11055 PARING/CUTG B9 HYPRKER LES 1: CPT | Performed by: PODIATRIST

## 2022-02-15 ENCOUNTER — OFFICE VISIT (OUTPATIENT)
Dept: WOUND CARE | Facility: HOSPITAL | Age: 77
End: 2022-02-15

## 2022-02-15 ENCOUNTER — LAB REQUISITION (OUTPATIENT)
Dept: LAB | Facility: HOSPITAL | Age: 77
End: 2022-02-15

## 2022-02-15 DIAGNOSIS — Z72.0 TOBACCO USE: ICD-10-CM

## 2022-02-15 DIAGNOSIS — Z00.00 ENCOUNTER FOR GENERAL ADULT MEDICAL EXAMINATION WITHOUT ABNORMAL FINDINGS: ICD-10-CM

## 2022-02-15 DIAGNOSIS — Z89.412 ACQUIRED ABSENCE OF LEFT GREAT TOE: ICD-10-CM

## 2022-02-15 DIAGNOSIS — L97.522 NON-PRESSURE CHRONIC ULCER OF OTHER PART OF LEFT FOOT WITH FAT LAYER EXPOSED: ICD-10-CM

## 2022-02-15 DIAGNOSIS — L97.509 TYPE 2 DIABETES MELLITUS WITH FOOT ULCER, UNSPECIFIED WHETHER LONG TERM INSULIN USE: ICD-10-CM

## 2022-02-15 DIAGNOSIS — E11.621 TYPE 2 DIABETES MELLITUS WITH FOOT ULCER, UNSPECIFIED WHETHER LONG TERM INSULIN USE: ICD-10-CM

## 2022-02-15 DIAGNOSIS — Z89.422 ACQUIRED ABSENCE OF OTHER LEFT TOE(S): ICD-10-CM

## 2022-02-15 PROCEDURE — G0463 HOSPITAL OUTPT CLINIC VISIT: HCPCS

## 2022-02-15 PROCEDURE — 87176 TISSUE HOMOGENIZATION CULTR: CPT | Performed by: NURSE PRACTITIONER

## 2022-02-15 PROCEDURE — 11042 DBRDMT SUBQ TIS 1ST 20SQCM/<: CPT | Performed by: NURSE PRACTITIONER

## 2022-02-15 PROCEDURE — 87205 SMEAR GRAM STAIN: CPT | Performed by: NURSE PRACTITIONER

## 2022-02-15 PROCEDURE — 99214 OFFICE O/P EST MOD 30 MIN: CPT | Performed by: NURSE PRACTITIONER

## 2022-02-15 PROCEDURE — 87070 CULTURE OTHR SPECIMN AEROBIC: CPT | Performed by: NURSE PRACTITIONER

## 2022-02-15 PROCEDURE — 87075 CULTR BACTERIA EXCEPT BLOOD: CPT | Performed by: NURSE PRACTITIONER

## 2022-02-16 ENCOUNTER — OFFICE VISIT (OUTPATIENT)
Dept: FAMILY MEDICINE CLINIC | Facility: CLINIC | Age: 77
End: 2022-02-16

## 2022-02-16 VITALS
SYSTOLIC BLOOD PRESSURE: 130 MMHG | BODY MASS INDEX: 29.04 KG/M2 | HEIGHT: 72 IN | HEART RATE: 76 BPM | TEMPERATURE: 98.1 F | WEIGHT: 214.4 LBS | OXYGEN SATURATION: 96 % | DIASTOLIC BLOOD PRESSURE: 71 MMHG

## 2022-02-16 DIAGNOSIS — E11.42 DIABETIC POLYNEUROPATHY ASSOCIATED WITH TYPE 2 DIABETES MELLITUS: ICD-10-CM

## 2022-02-16 DIAGNOSIS — I10 ESSENTIAL HYPERTENSION: ICD-10-CM

## 2022-02-16 DIAGNOSIS — E78.2 MIXED HYPERLIPIDEMIA: ICD-10-CM

## 2022-02-16 DIAGNOSIS — Z00.00 MEDICARE ANNUAL WELLNESS VISIT, SUBSEQUENT: Primary | ICD-10-CM

## 2022-02-16 DIAGNOSIS — S91.109D OPEN WOUND OF TOE WITH COMPLICATION, SUBSEQUENT ENCOUNTER: ICD-10-CM

## 2022-02-16 DIAGNOSIS — I73.9 PAD (PERIPHERAL ARTERY DISEASE): ICD-10-CM

## 2022-02-16 DIAGNOSIS — E11.52 TYPE 2 DIABETES MELLITUS WITH DIABETIC PERIPHERAL ANGIOPATHY AND GANGRENE, WITH LONG-TERM CURRENT USE OF INSULIN: ICD-10-CM

## 2022-02-16 DIAGNOSIS — Z79.4 TYPE 2 DIABETES MELLITUS WITH DIABETIC PERIPHERAL ANGIOPATHY AND GANGRENE, WITH LONG-TERM CURRENT USE OF INSULIN: ICD-10-CM

## 2022-02-16 PROCEDURE — G0439 PPPS, SUBSEQ VISIT: HCPCS | Performed by: FAMILY MEDICINE

## 2022-02-16 PROCEDURE — 1159F MED LIST DOCD IN RCRD: CPT | Performed by: FAMILY MEDICINE

## 2022-02-16 PROCEDURE — 96160 PT-FOCUSED HLTH RISK ASSMT: CPT | Performed by: FAMILY MEDICINE

## 2022-02-16 PROCEDURE — 1170F FXNL STATUS ASSESSED: CPT | Performed by: FAMILY MEDICINE

## 2022-02-16 PROCEDURE — 1126F AMNT PAIN NOTED NONE PRSNT: CPT | Performed by: FAMILY MEDICINE

## 2022-02-16 NOTE — PROGRESS NOTES
"The ABCs of the Annual Wellness Visit  Subsequent Medicare Wellness Visit    Chief Complaint   Patient presents with   • Medicare Wellness-subsequent      Subjective    History of Present Illness:  Darren Shay is a 76 y.o. male who presents for a Subsequent Medicare Wellness Visit.    Labs reviewed - A1c 6.8% - others great!     The following portions of the patient's history were reviewed and   updated as appropriate: allergies, current medications, past family history, past medical history, past social history, past surgical history and problem list.    Compared to one year ago, the patient feels his physical   health is the same.    Compared to one year ago, the patient feels his mental   health is the same.    Recent Hospitalizations:  He was not admitted to the hospital during the last year.       Current Medical Providers:  Patient Care Team:  Robin Freedman MD as PCP - General  Robin Freedman MD as PCP - Family Medicine  Maty Toscano APRN as Nurse Practitioner (Hospitalist)  Robin Freedman MD as Referring Physician (Family Medicine)  Ash Quiroz MD as Consulting Physician (Otolaryngology)    Outpatient Medications Prior to Visit   Medication Sig Dispense Refill   • Accu-Chek Jessica Plus test strip TEST BLOOD SUGAR THREE TIMES DAILY 300 each 1   • aspirin 81 MG chewable tablet Chew 81 mg Daily.     • cilostazol (PLETAL) 100 MG tablet TAKE 1 TABLET TWICE DAILY 180 tablet 3   • clopidogrel (PLAVIX) 75 MG tablet TAKE 1 TABLET EVERY DAY 30 tablet 0   • Droplet Insulin Syringe 31G X 5/16\" 1 ML misc USE THREE TIMES DAILY AS DIRECTED 300 each 5   • folic acid (FOLVITE) 400 MCG tablet Take 800 mcg by mouth Daily.     • gabapentin (NEURONTIN) 300 MG capsule Take 1 capsule by mouth 2 (two) times a day. 180 capsule 1   • insulin aspart (novoLOG) 100 UNIT/ML injection Inject 30 units 4 times daily plus additional units per sliding scale. (Patient taking differently: Inject 40 units 4 " times daily plus additional units per sliding scale.) 300 mL 2   • Lantus 100 UNIT/ML injection INJECT 82 UNITS UNDER THE SKIN IN THE APPROPRIATE AREA AS DIRECTED EVERY NIGHT. 70 mL 1   • metoprolol tartrate (LOPRESSOR) 50 MG tablet TAKE 1 TABLET TWICE DAILY 180 tablet 1   • pantoprazole (PROTONIX) 40 MG EC tablet TAKE 1 TABLET EVERY DAY 90 tablet 0   • simvastatin (ZOCOR) 20 MG tablet TAKE 1 TABLET AT BEDTIME 90 tablet 1   • sulfamethoxazole-trimethoprim (BACTRIM DS,SEPTRA DS) 800-160 MG per tablet Take 1 tablet by mouth 2 (Two) Times a Day. 20 tablet 0     No facility-administered medications prior to visit.       No opioid medication identified on active medication list. I have reviewed chart for other potential  high risk medication/s and harmful drug interactions in the elderly.          Aspirin is on active medication list. Aspirin use is not indicated based on review of current medical condition/s. Risk of harm outweighs potential benefits. Patient instructed to discontinue this medication.  .      Patient Active Problem List   Diagnosis   • UTI (urinary tract infection)   • Hypertension   • Diabetes mellitus (HCC)   • Coronary artery disease   • Metabolic encephalopathy   • Scrotal infection   • Diabetic foot (HCC)   • Atherosclerosis of native artery of both lower extremities with intermittent claudication (HCC)   • BMI 29.0-29.9,adult   • Mixed hyperlipidemia   • SNHL (sensory-neural hearing loss), asymmetrical   • Diabetic polyneuropathy associated with type 2 diabetes mellitus (HCC)   • Erectile dysfunction   • Gangrene of toe (HCC)   • Open wound of toe with complication   • Type 2 diabetes mellitus with diabetic peripheral angiopathy and gangrene, with long-term current use of insulin (HCC)   • Wound infection     Advance Care Planning  Advance Directive is not on file.  ACP discussion was held with the patient during this visit. Patient has an advance directive (not in EMR), copy requested.    Review  "of Systems   Constitutional: Negative for activity change, appetite change, fatigue and fever.   Respiratory: Negative for cough and shortness of breath.    Cardiovascular: Negative for chest pain.   Gastrointestinal: Negative for abdominal pain.   Genitourinary: Negative for difficulty urinating.   Skin: Negative for rash.   Neurological: Negative for syncope.        Objective    Vitals:    02/16/22 1016   BP: 130/71   BP Location: Left arm   Patient Position: Sitting   Cuff Size: Large Adult   Pulse: 76   Temp: 98.1 °F (36.7 °C)   SpO2: 96%   Weight: 97.3 kg (214 lb 6.4 oz)   Height: 182.9 cm (72\")  Comment: pt reported   PainSc: 0-No pain     BMI Readings from Last 1 Encounters:   02/16/22 29.08 kg/m²   BMI is above normal parameters. Recommendations include: exercise counseling and nutrition counseling    Does the patient have evidence of cognitive impairment? No    Physical Exam  Vitals and nursing note reviewed.   Constitutional:       General: He is not in acute distress.     Appearance: He is well-developed.   HENT:      Head: Normocephalic and atraumatic.      Mouth/Throat:      Mouth: Mucous membranes are moist.      Pharynx: Oropharynx is clear.   Eyes:      Extraocular Movements: Extraocular movements intact.      Pupils: Pupils are equal, round, and reactive to light.   Neck:      Vascular: No JVD.   Cardiovascular:      Rate and Rhythm: Normal rate and regular rhythm.      Pulses: Normal pulses.      Heart sounds: Normal heart sounds.   Pulmonary:      Effort: Pulmonary effort is normal. No respiratory distress.      Breath sounds: Normal breath sounds.   Abdominal:      General: Bowel sounds are normal. There is no distension.      Palpations: Abdomen is soft.      Tenderness: There is no abdominal tenderness.   Musculoskeletal:         General: Normal range of motion.      Cervical back: Normal range of motion and neck supple.   Skin:     General: Skin is warm and dry.      Capillary Refill: " Capillary refill takes less than 2 seconds.      Findings: No rash.   Neurological:      General: No focal deficit present.      Mental Status: He is alert and oriented to person, place, and time.      Cranial Nerves: No cranial nerve deficit.   Psychiatric:         Mood and Affect: Mood normal.         Behavior: Behavior normal.       Lab Results   Component Value Date    CHLPL 92 (L) 02/01/2022    TRIG 79 02/01/2022    HDL 26 (L) 02/01/2022    LDL 50 02/01/2022    VLDL 16 02/01/2022    HGBA1C 6.8 (H) 02/01/2022            HEALTH RISK ASSESSMENT    Smoking Status:  Social History     Tobacco Use   Smoking Status Current Every Day Smoker   • Years: 10.00   • Types: Pipe   Smokeless Tobacco Never Used     Alcohol Consumption:  Social History     Substance and Sexual Activity   Alcohol Use No     Fall Risk Screen:    STEADI Fall Risk Assessment was completed, and patient is at MODERATE risk for falls. Assessment completed on:2/16/2022    Depression Screening:  PHQ-2/PHQ-9 Depression Screening 2/16/2022   Little interest or pleasure in doing things 0   Feeling down, depressed, or hopeless 0   Trouble falling or staying asleep, or sleeping too much -   Feeling tired or having little energy -   Poor appetite or overeating -   Feeling bad about yourself - or that you are a failure or have let yourself or your family down -   Trouble concentrating on things, such as reading the newspaper or watching television -   Moving or speaking so slowly that other people could have noticed. Or the opposite - being so fidgety or restless that you have been moving around a lot more than usual -   Thoughts that you would be better off dead, or of hurting yourself in some way -   Total Score 0   If you checked off any problems, how difficult have these problems made it for you to do your work, take care of things at home, or get along with other people? -       Health Habits and Functional and Cognitive Screening:  Functional & Cognitive  Status 2/16/2022   Do you have difficulty preparing food and eating? No   Do you have difficulty bathing yourself, getting dressed or grooming yourself? No   Do you have difficulty using the toilet? No   Do you have difficulty moving around from place to place? No   Do you have trouble with steps or getting out of a bed or a chair? No   Current Diet Well Balanced Diet   Dental Exam Up to date   Eye Exam Up to date   Exercise (times per week) 0 times per week   Current Exercises Include No Regular Exercise   Current Exercise Activities Include -   Do you need help using the phone?  No   Are you deaf or do you have serious difficulty hearing?  No   Do you need help with transportation? No   Do you need help shopping? No   Do you need help preparing meals?  No   Do you need help with housework?  No   Do you need help with laundry? No   Do you need help taking your medications? No   Do you need help managing money? No   Do you ever drive or ride in a car without wearing a seat belt? No   Have you felt unusual stress, anger or loneliness in the last month? No   Who do you live with? Spouse   If you need help, do you have trouble finding someone available to you? No   Have you been bothered in the last four weeks by sexual problems? No   Do you have difficulty concentrating, remembering or making decisions? No       Age-appropriate Screening Schedule:  Refer to the list below for future screening recommendations based on patient's age, sex and/or medical conditions. Orders for these recommended tests are listed in the plan section. The patient has been provided with a written plan.    Health Maintenance   Topic Date Due   • ZOSTER VACCINE (1 of 2) 02/16/2022 (Originally [birth year]1993)   • TDAP/TD VACCINES (1 - Tdap) 01/01/2025 (Originally 8/3/1964)   • HEMOGLOBIN A1C  08/01/2022   • DIABETIC EYE EXAM  09/07/2022   • LIPID PANEL  02/01/2023   • URINE MICROALBUMIN  02/01/2023   • INFLUENZA VACCINE  Completed               Assessment/Plan   CMS Preventative Services Quick Reference  Risk Factors Identified During Encounter  Cardiovascular Disease  Inactivity/Sedentary  Obesity/Overweight   Polypharmacy  The above risks/problems have been discussed with the patient.  Follow up actions/plans if indicated are seen below in the Assessment/Plan Section.  Pertinent information has been shared with the patient in the After Visit Summary.    Diagnoses and all orders for this visit:    1. Medicare annual wellness visit, subsequent (Primary)    2. Essential hypertension    3. Type 2 diabetes mellitus with diabetic peripheral angiopathy and gangrene, with long-term current use of insulin (HCC)    4. Mixed hyperlipidemia    5. Diabetic polyneuropathy associated with type 2 diabetes mellitus (HCC)    6. PAD (peripheral artery disease) ([facility])    7. BMI 29.0-29.9,adult    8. Open wound of toe with complication, subsequent encounter        Follow Up:   Return in about 3 months (around 5/16/2022) for Recheck, Next scheduled follow up, Medicare Wellness.     An After Visit Summary and PPPS were made available to the patient.

## 2022-02-18 LAB
BACTERIA SPEC AEROBE CULT: NORMAL
GRAM STN SPEC: NORMAL

## 2022-02-20 LAB — BACTERIA SPEC ANAEROBE CULT: NORMAL

## 2022-02-23 ENCOUNTER — OFFICE VISIT (OUTPATIENT)
Dept: WOUND CARE | Facility: HOSPITAL | Age: 77
End: 2022-02-23

## 2022-02-23 DIAGNOSIS — L97.522 NON-PRESSURE CHRONIC ULCER OF OTHER PART OF LEFT FOOT WITH FAT LAYER EXPOSED: ICD-10-CM

## 2022-02-23 DIAGNOSIS — E11.621 TYPE 2 DIABETES MELLITUS WITH FOOT ULCER, UNSPECIFIED WHETHER LONG TERM INSULIN USE: ICD-10-CM

## 2022-02-23 DIAGNOSIS — Z89.422 ACQUIRED ABSENCE OF OTHER LEFT TOE(S): ICD-10-CM

## 2022-02-23 DIAGNOSIS — Z89.412 ACQUIRED ABSENCE OF LEFT GREAT TOE: ICD-10-CM

## 2022-02-23 DIAGNOSIS — L97.509 TYPE 2 DIABETES MELLITUS WITH FOOT ULCER, UNSPECIFIED WHETHER LONG TERM INSULIN USE: ICD-10-CM

## 2022-02-23 PROCEDURE — 97597 DBRDMT OPN WND 1ST 20 CM/<: CPT | Performed by: NURSE PRACTITIONER

## 2022-03-02 ENCOUNTER — OFFICE VISIT (OUTPATIENT)
Dept: WOUND CARE | Facility: HOSPITAL | Age: 77
End: 2022-03-02

## 2022-03-02 DIAGNOSIS — Z89.412 ACQUIRED ABSENCE OF LEFT GREAT TOE: ICD-10-CM

## 2022-03-02 DIAGNOSIS — Z89.422 ACQUIRED ABSENCE OF OTHER LEFT TOE(S): ICD-10-CM

## 2022-03-02 DIAGNOSIS — L97.522 NON-PRESSURE CHRONIC ULCER OF OTHER PART OF LEFT FOOT WITH FAT LAYER EXPOSED: ICD-10-CM

## 2022-03-02 DIAGNOSIS — E11.621 TYPE 2 DIABETES MELLITUS WITH FOOT ULCER, UNSPECIFIED WHETHER LONG TERM INSULIN USE: ICD-10-CM

## 2022-03-02 DIAGNOSIS — L97.509 TYPE 2 DIABETES MELLITUS WITH FOOT ULCER, UNSPECIFIED WHETHER LONG TERM INSULIN USE: ICD-10-CM

## 2022-03-02 PROCEDURE — 97597 DBRDMT OPN WND 1ST 20 CM/<: CPT | Performed by: NURSE PRACTITIONER

## 2022-03-09 ENCOUNTER — OFFICE VISIT (OUTPATIENT)
Dept: WOUND CARE | Facility: HOSPITAL | Age: 77
End: 2022-03-09

## 2022-03-09 DIAGNOSIS — Z89.412 ACQUIRED ABSENCE OF LEFT GREAT TOE: ICD-10-CM

## 2022-03-09 DIAGNOSIS — L97.522 NON-PRESSURE CHRONIC ULCER OF OTHER PART OF LEFT FOOT WITH FAT LAYER EXPOSED: ICD-10-CM

## 2022-03-09 DIAGNOSIS — E11.621 TYPE 2 DIABETES MELLITUS WITH FOOT ULCER, UNSPECIFIED WHETHER LONG TERM INSULIN USE: ICD-10-CM

## 2022-03-09 DIAGNOSIS — L97.509 TYPE 2 DIABETES MELLITUS WITH FOOT ULCER, UNSPECIFIED WHETHER LONG TERM INSULIN USE: ICD-10-CM

## 2022-03-09 DIAGNOSIS — Z89.422 ACQUIRED ABSENCE OF OTHER LEFT TOE(S): ICD-10-CM

## 2022-03-09 PROCEDURE — 97597 DBRDMT OPN WND 1ST 20 CM/<: CPT | Performed by: NURSE PRACTITIONER

## 2022-03-16 ENCOUNTER — OFFICE VISIT (OUTPATIENT)
Dept: WOUND CARE | Facility: HOSPITAL | Age: 77
End: 2022-03-16

## 2022-03-16 DIAGNOSIS — Z89.412 ACQUIRED ABSENCE OF LEFT GREAT TOE: ICD-10-CM

## 2022-03-16 DIAGNOSIS — E11.621 TYPE 2 DIABETES MELLITUS WITH FOOT ULCER, UNSPECIFIED WHETHER LONG TERM INSULIN USE: ICD-10-CM

## 2022-03-16 DIAGNOSIS — L97.522 NON-PRESSURE CHRONIC ULCER OF OTHER PART OF LEFT FOOT WITH FAT LAYER EXPOSED: ICD-10-CM

## 2022-03-16 DIAGNOSIS — Z89.422 ACQUIRED ABSENCE OF OTHER LEFT TOE(S): ICD-10-CM

## 2022-03-16 DIAGNOSIS — L97.509 TYPE 2 DIABETES MELLITUS WITH FOOT ULCER, UNSPECIFIED WHETHER LONG TERM INSULIN USE: ICD-10-CM

## 2022-03-16 PROCEDURE — 97597 DBRDMT OPN WND 1ST 20 CM/<: CPT | Performed by: NURSE PRACTITIONER

## 2022-03-20 DIAGNOSIS — I73.9 PAD (PERIPHERAL ARTERY DISEASE): ICD-10-CM

## 2022-03-21 RX ORDER — CILOSTAZOL 100 MG/1
TABLET ORAL
Qty: 180 TABLET | Refills: 1 | Status: SHIPPED | OUTPATIENT
Start: 2022-03-21 | End: 2022-08-03

## 2022-03-21 NOTE — TELEPHONE ENCOUNTER
Rx Refill Note  Requested Prescriptions     Pending Prescriptions Disp Refills   • cilostazol (PLETAL) 100 MG tablet [Pharmacy Med Name: CILOSTAZOL 100 MG Tablet] 180 tablet 3     Sig: TAKE 1 TABLET TWICE DAILY      Last office visit with prescribing clinician: 02/16/2022     Next office visit with prescribing clinician: 05/16/2022    Ying Iraheta MA  03/21/22, 12:22 CDT

## 2022-03-23 ENCOUNTER — OFFICE VISIT (OUTPATIENT)
Dept: WOUND CARE | Facility: HOSPITAL | Age: 77
End: 2022-03-23

## 2022-03-23 DIAGNOSIS — Z89.422 ACQUIRED ABSENCE OF OTHER LEFT TOE(S): ICD-10-CM

## 2022-03-23 DIAGNOSIS — Z89.412 ACQUIRED ABSENCE OF LEFT GREAT TOE: ICD-10-CM

## 2022-03-23 DIAGNOSIS — E11.621 TYPE 2 DIABETES MELLITUS WITH FOOT ULCER, UNSPECIFIED WHETHER LONG TERM INSULIN USE: ICD-10-CM

## 2022-03-23 DIAGNOSIS — L97.509 TYPE 2 DIABETES MELLITUS WITH FOOT ULCER, UNSPECIFIED WHETHER LONG TERM INSULIN USE: ICD-10-CM

## 2022-03-23 DIAGNOSIS — L97.522 NON-PRESSURE CHRONIC ULCER OF OTHER PART OF LEFT FOOT WITH FAT LAYER EXPOSED: ICD-10-CM

## 2022-03-23 PROCEDURE — 11042 DBRDMT SUBQ TIS 1ST 20SQCM/<: CPT | Performed by: SURGERY

## 2022-03-28 DIAGNOSIS — E11.52 TYPE 2 DIABETES MELLITUS WITH DIABETIC PERIPHERAL ANGIOPATHY AND GANGRENE, WITH LONG-TERM CURRENT USE OF INSULIN: ICD-10-CM

## 2022-03-28 DIAGNOSIS — Z79.4 TYPE 2 DIABETES MELLITUS WITH DIABETIC PERIPHERAL ANGIOPATHY AND GANGRENE, WITH LONG-TERM CURRENT USE OF INSULIN: ICD-10-CM

## 2022-03-28 DIAGNOSIS — I10 ESSENTIAL HYPERTENSION: Primary | ICD-10-CM

## 2022-03-28 RX ORDER — BLOOD SUGAR DIAGNOSTIC
STRIP MISCELLANEOUS
Qty: 300 EACH | Refills: 2 | Status: ON HOLD | OUTPATIENT
Start: 2022-03-28 | End: 2022-09-22

## 2022-03-28 RX ORDER — METOPROLOL TARTRATE 50 MG/1
TABLET, FILM COATED ORAL
Qty: 180 TABLET | Refills: 1 | Status: SHIPPED | OUTPATIENT
Start: 2022-03-28 | End: 2022-08-24

## 2022-03-28 NOTE — TELEPHONE ENCOUNTER
Rx Refill Note  Requested Prescriptions     Pending Prescriptions Disp Refills   • metoprolol tartrate (LOPRESSOR) 50 MG tablet [Pharmacy Med Name: METOPROLOL TARTRATE 50 MG Tablet] 180 tablet 1     Sig: TAKE 1 TABLET TWICE DAILY      Last office visit with prescribing clinician: 02/16/2022     Next office visit with prescribing clinician: 5/16/2022     Ying Iraheta MA  03/28/22, 15:26 CDT

## 2022-03-30 ENCOUNTER — OFFICE VISIT (OUTPATIENT)
Dept: WOUND CARE | Facility: HOSPITAL | Age: 77
End: 2022-03-30

## 2022-03-30 DIAGNOSIS — Z89.422 ACQUIRED ABSENCE OF OTHER LEFT TOE(S): ICD-10-CM

## 2022-03-30 DIAGNOSIS — E11.621 TYPE 2 DIABETES MELLITUS WITH FOOT ULCER, UNSPECIFIED WHETHER LONG TERM INSULIN USE: ICD-10-CM

## 2022-03-30 DIAGNOSIS — Z89.412 ACQUIRED ABSENCE OF LEFT GREAT TOE: ICD-10-CM

## 2022-03-30 DIAGNOSIS — L97.509 TYPE 2 DIABETES MELLITUS WITH FOOT ULCER, UNSPECIFIED WHETHER LONG TERM INSULIN USE: ICD-10-CM

## 2022-03-30 DIAGNOSIS — L97.522 NON-PRESSURE CHRONIC ULCER OF OTHER PART OF LEFT FOOT WITH FAT LAYER EXPOSED: ICD-10-CM

## 2022-03-30 PROCEDURE — 11042 DBRDMT SUBQ TIS 1ST 20SQCM/<: CPT | Performed by: SURGERY

## 2022-04-04 DIAGNOSIS — K29.00 ACUTE SUPERFICIAL GASTRITIS WITHOUT HEMORRHAGE: ICD-10-CM

## 2022-04-04 RX ORDER — PANTOPRAZOLE SODIUM 40 MG/1
TABLET, DELAYED RELEASE ORAL
Qty: 90 TABLET | Refills: 0 | Status: SHIPPED | OUTPATIENT
Start: 2022-04-04 | End: 2022-06-20

## 2022-04-04 NOTE — TELEPHONE ENCOUNTER
Rx Refill Note  Requested Prescriptions     Pending Prescriptions Disp Refills   • pantoprazole (PROTONIX) 40 MG EC tablet [Pharmacy Med Name: PANTOPRAZOLE SODIUM 40 MG Tablet Delayed Release] 90 tablet 0     Sig: TAKE 1 TABLET EVERY DAY      Last office visit with prescribing clinician: 02/16/2022  Next office visit with prescribing clinician: 5/16/2022     Ying Iraheta MA  04/04/22, 14:01 CDT     FAMILY HISTORY:  No pertinent family history in first degree relatives

## 2022-04-06 ENCOUNTER — OFFICE VISIT (OUTPATIENT)
Dept: WOUND CARE | Facility: HOSPITAL | Age: 77
End: 2022-04-06

## 2022-04-06 DIAGNOSIS — E11.621 TYPE 2 DIABETES MELLITUS WITH FOOT ULCER, UNSPECIFIED WHETHER LONG TERM INSULIN USE: ICD-10-CM

## 2022-04-06 DIAGNOSIS — Z89.412 ACQUIRED ABSENCE OF LEFT GREAT TOE: ICD-10-CM

## 2022-04-06 DIAGNOSIS — L97.522 NON-PRESSURE CHRONIC ULCER OF OTHER PART OF LEFT FOOT WITH FAT LAYER EXPOSED: ICD-10-CM

## 2022-04-06 DIAGNOSIS — L97.509 TYPE 2 DIABETES MELLITUS WITH FOOT ULCER, UNSPECIFIED WHETHER LONG TERM INSULIN USE: ICD-10-CM

## 2022-04-06 DIAGNOSIS — Z89.422 ACQUIRED ABSENCE OF OTHER LEFT TOE(S): ICD-10-CM

## 2022-04-06 PROCEDURE — 97597 DBRDMT OPN WND 1ST 20 CM/<: CPT | Performed by: SURGERY

## 2022-04-13 ENCOUNTER — OFFICE VISIT (OUTPATIENT)
Dept: WOUND CARE | Facility: HOSPITAL | Age: 77
End: 2022-04-13

## 2022-04-13 DIAGNOSIS — E11.621 TYPE 2 DIABETES MELLITUS WITH FOOT ULCER, UNSPECIFIED WHETHER LONG TERM INSULIN USE: ICD-10-CM

## 2022-04-13 DIAGNOSIS — L97.522 NON-PRESSURE CHRONIC ULCER OF OTHER PART OF LEFT FOOT WITH FAT LAYER EXPOSED: ICD-10-CM

## 2022-04-13 DIAGNOSIS — Z89.422 ACQUIRED ABSENCE OF OTHER LEFT TOE(S): ICD-10-CM

## 2022-04-13 DIAGNOSIS — L97.509 TYPE 2 DIABETES MELLITUS WITH FOOT ULCER, UNSPECIFIED WHETHER LONG TERM INSULIN USE: ICD-10-CM

## 2022-04-13 DIAGNOSIS — Z89.412 ACQUIRED ABSENCE OF LEFT GREAT TOE: ICD-10-CM

## 2022-04-13 PROCEDURE — 99212 OFFICE O/P EST SF 10 MIN: CPT | Performed by: SURGERY

## 2022-04-13 PROCEDURE — G0463 HOSPITAL OUTPT CLINIC VISIT: HCPCS

## 2022-05-02 RX ORDER — SIMVASTATIN 20 MG
20 TABLET ORAL
Qty: 90 TABLET | Refills: 1 | Status: SHIPPED | OUTPATIENT
Start: 2022-05-02 | End: 2022-09-27 | Stop reason: HOSPADM

## 2022-05-02 NOTE — TELEPHONE ENCOUNTER
Caller: Jaspal Darren L    Relationship: Self    Best call back number: [phone]    Requested Prescriptions:   Requested Prescriptions     Pending Prescriptions Disp Refills   • simvastatin (ZOCOR) 20 MG tablet 90 tablet 1     Sig: Take 1 tablet by mouth every night at bedtime.        Pharmacy where request should be sent: [facility] - [facility] [address] - [phone]  - [phone]         Does the patient have less than a 3 day supply:  [] Yes  [x] No    Ledy Kearns, Chase Rep   05/02/22 13:09 CDT

## 2022-05-02 NOTE — TELEPHONE ENCOUNTER
Rx Refill Note  Requested Prescriptions     Pending Prescriptions Disp Refills   • simvastatin (ZOCOR) 20 MG tablet 90 tablet 1     Sig: Take 1 tablet by mouth every night at bedtime.      Last office visit with prescribing clinician: 2/16/2022      Next office visit with prescribing clinician: 5/16/22        Dina Landeros MA  05/02/22, 13:16 CDT

## 2022-05-09 ENCOUNTER — TELEPHONE (OUTPATIENT)
Dept: FAMILY MEDICINE CLINIC | Facility: CLINIC | Age: 77
End: 2022-05-09

## 2022-05-09 NOTE — TELEPHONE ENCOUNTER
Caller: Darren Shay    Relationship: Self    Best call back number: [phone]    What is the best time to reach you: ANY    Who are you requesting to speak with (clinical staff, provider,  specific staff member): CLINICAL    What was the call regarding: PATIENT WOULD LIKE CALLBACK REGARDING IF BLOOD WORK IS NEEDED BEFORE HIS APPT ON 5/16/22.    Do you require a callback: YES

## 2022-05-16 ENCOUNTER — OFFICE VISIT (OUTPATIENT)
Dept: FAMILY MEDICINE CLINIC | Facility: CLINIC | Age: 77
End: 2022-05-16

## 2022-05-16 VITALS
OXYGEN SATURATION: 98 % | BODY MASS INDEX: 28.99 KG/M2 | HEIGHT: 72 IN | TEMPERATURE: 98.4 F | DIASTOLIC BLOOD PRESSURE: 70 MMHG | WEIGHT: 214 LBS | HEART RATE: 76 BPM | SYSTOLIC BLOOD PRESSURE: 133 MMHG

## 2022-05-16 DIAGNOSIS — E11.9 TYPE 2 DIABETES MELLITUS TREATED WITH INSULIN: Primary | ICD-10-CM

## 2022-05-16 DIAGNOSIS — Z79.4 TYPE 2 DIABETES MELLITUS TREATED WITH INSULIN: Primary | ICD-10-CM

## 2022-05-16 DIAGNOSIS — E11.42 DIABETIC POLYNEUROPATHY ASSOCIATED WITH TYPE 2 DIABETES MELLITUS: ICD-10-CM

## 2022-05-16 LAB
EXPIRATION DATE: NORMAL
HBA1C MFR BLD: 6.1 %
Lab: NORMAL

## 2022-05-16 PROCEDURE — 3044F HG A1C LEVEL LT 7.0%: CPT | Performed by: NURSE PRACTITIONER

## 2022-05-16 PROCEDURE — 99213 OFFICE O/P EST LOW 20 MIN: CPT | Performed by: NURSE PRACTITIONER

## 2022-05-16 PROCEDURE — 83036 HEMOGLOBIN GLYCOSYLATED A1C: CPT | Performed by: NURSE PRACTITIONER

## 2022-05-16 NOTE — PROGRESS NOTES
"Chief Complaint  Follow-up (Three month check up medication)    Subjective          aDrren Shay presents to [facility]  History of Present Illness    DM:  Range of readings high of 160, most readings around 120 or less.  Compliant with current treatment plan.  Rarely has symptoms consistent with his neuropathy; therefore, he only takes his gabapentin on a PRN basis.  It continues to work well when he takes it.  He does not need a refill at this time.  Other than the neuropathy, he denies any symptoms consistent with either hypoglycemia or hyperglycemia.  Objective   Vital Signs:  /70 (BP Location: Right arm, Patient Position: Sitting, Cuff Size: Adult)   Pulse 76   Temp 98.4 °F (36.9 °C)   Ht 182.9 cm (72.01\")   Wt 97.1 kg (214 lb)   SpO2 98%   BMI 29.02 kg/m²           Physical Exam  Vitals and nursing note reviewed.   Constitutional:       General: He is not in acute distress.     Appearance: Normal appearance. He is not ill-appearing.   HENT:      Head: Normocephalic and atraumatic.   Neck:      Vascular: No carotid bruit.   Cardiovascular:      Rate and Rhythm: Normal rate and regular rhythm.      Pulses: Normal pulses.      Heart sounds: Normal heart sounds. No murmur heard.  Pulmonary:      Effort: Pulmonary effort is normal.      Breath sounds: Normal breath sounds.   Musculoskeletal:      Cervical back: Neck supple.      Right lower leg: No edema.      Left lower leg: No edema.   Lymphadenopathy:      Cervical: No cervical adenopathy.   Skin:     General: Skin is warm and dry.   Neurological:      Mental Status: He is alert and oriented to person, place, and time.   Psychiatric:         Thought Content: Thought content normal.        Result Review :     Most Recent A1C    HGBA1C Most Recent 5/16/22   Hemoglobin A1C 6.1                     Assessment and Plan    Diagnoses and all orders for this visit:    1. Type 2 diabetes mellitus treated with insulin (HCC) " (Primary)  -     POCT glycated hemoglobin, total    2. Diabetic polyneuropathy associated with type 2 diabetes mellitus (HCC)    Continue current treatment plan.         Follow Up   Return in about 3 months (around 8/16/2022) for Next scheduled follow up.  Patient was given instructions and counseling regarding his condition or for health maintenance advice. Please see specific information pulled into the AVS if appropriate.

## 2022-05-20 DIAGNOSIS — E11.52 TYPE 2 DIABETES MELLITUS WITH DIABETIC PERIPHERAL ANGIOPATHY AND GANGRENE, WITH LONG-TERM CURRENT USE OF INSULIN: ICD-10-CM

## 2022-05-20 DIAGNOSIS — Z79.4 TYPE 2 DIABETES MELLITUS WITH DIABETIC PERIPHERAL ANGIOPATHY AND GANGRENE, WITH LONG-TERM CURRENT USE OF INSULIN: ICD-10-CM

## 2022-05-20 RX ORDER — INSULIN GLARGINE 100 [IU]/ML
INJECTION, SOLUTION SUBCUTANEOUS
Qty: 70 ML | Refills: 1 | Status: ON HOLD | OUTPATIENT
Start: 2022-05-20 | End: 2022-09-22

## 2022-05-20 NOTE — TELEPHONE ENCOUNTER
Rx Refill Note  Requested Prescriptions     Pending Prescriptions Disp Refills   • Lantus 100 UNIT/ML injection [Pharmacy Med Name: LANTUS 100 UNIT/ML Solution] 70 mL 1     Sig: INJECT 82 UNITS UNDER THE SKIN IN THE APPROPRIATE AREA AS DIRECTED EVERY NIGHT.      Last office visit with prescribing clinician: 5/16/2022      Next office visit with prescribing clinician: Visit date not found     Dina Landeros MA  05/20/22, 15:28 CDT

## 2022-06-05 ENCOUNTER — HOSPITAL ENCOUNTER (EMERGENCY)
Facility: HOSPITAL | Age: 77
Discharge: HOME | End: 2022-06-05
Admitting: EMERGENCY MEDICINE

## 2022-06-05 ENCOUNTER — APPOINTMENT (OUTPATIENT)
Dept: CT IMAGING | Facility: HOSPITAL | Age: 77
End: 2022-06-05

## 2022-06-05 VITALS
DIASTOLIC BLOOD PRESSURE: 65 MMHG | HEIGHT: 72 IN | SYSTOLIC BLOOD PRESSURE: 160 MMHG | OXYGEN SATURATION: 96 % | BODY MASS INDEX: 28.99 KG/M2 | HEART RATE: 101 BPM | RESPIRATION RATE: 18 BRPM | WEIGHT: 214 LBS | TEMPERATURE: 98.4 F

## 2022-06-05 DIAGNOSIS — K76.0 FATTY LIVER: ICD-10-CM

## 2022-06-05 DIAGNOSIS — N17.9 AKI (ACUTE KIDNEY INJURY): ICD-10-CM

## 2022-06-05 DIAGNOSIS — R16.0 LIVER MASS, RIGHT LOBE: Primary | ICD-10-CM

## 2022-06-05 LAB
ALBUMIN SERPL-MCNC: 3.7 G/DL (ref 3.5–5.2)
ALBUMIN/GLOB SERPL: 1.1 G/DL
ALP SERPL-CCNC: 91 U/L (ref 39–117)
ALT SERPL W P-5'-P-CCNC: 39 U/L (ref 1–41)
ANION GAP SERPL CALCULATED.3IONS-SCNC: 11 MMOL/L (ref 5–15)
AST SERPL-CCNC: 63 U/L (ref 1–40)
BACTERIA UR QL AUTO: ABNORMAL /HPF
BASOPHILS # BLD AUTO: 0.09 10*3/MM3 (ref 0–0.2)
BASOPHILS NFR BLD AUTO: 0.8 % (ref 0–1.5)
BILIRUB SERPL-MCNC: 0.7 MG/DL (ref 0–1.2)
BILIRUB UR QL STRIP: NEGATIVE
BUN SERPL-MCNC: 18 MG/DL (ref 8–23)
BUN/CREAT SERPL: 12.9 (ref 7–25)
CALCIUM SPEC-SCNC: 9 MG/DL (ref 8.6–10.5)
CHLORIDE SERPL-SCNC: 104 MMOL/L (ref 98–107)
CLARITY UR: CLEAR
CO2 SERPL-SCNC: 23 MMOL/L (ref 22–29)
COLOR UR: YELLOW
CREAT SERPL-MCNC: 1.4 MG/DL (ref 0.76–1.27)
D-LACTATE SERPL-SCNC: 1.2 MMOL/L (ref 0.5–2)
DEPRECATED RDW RBC AUTO: 49.1 FL (ref 37–54)
EGFRCR SERPLBLD CKD-EPI 2021: 51.4 ML/MIN/1.73
EOSINOPHIL # BLD AUTO: 0.2 10*3/MM3 (ref 0–0.4)
EOSINOPHIL NFR BLD AUTO: 1.8 % (ref 0.3–6.2)
ERYTHROCYTE [DISTWIDTH] IN BLOOD BY AUTOMATED COUNT: 13.3 % (ref 12.3–15.4)
GLOBULIN UR ELPH-MCNC: 3.4 GM/DL
GLUCOSE SERPL-MCNC: 167 MG/DL (ref 65–99)
GLUCOSE UR STRIP-MCNC: NEGATIVE MG/DL
HCT VFR BLD AUTO: 44.1 % (ref 37.5–51)
HGB BLD-MCNC: 14.5 G/DL (ref 13–17.7)
HGB UR QL STRIP.AUTO: NEGATIVE
HYALINE CASTS UR QL AUTO: ABNORMAL /LPF
IMM GRANULOCYTES # BLD AUTO: 0.1 10*3/MM3 (ref 0–0.05)
IMM GRANULOCYTES NFR BLD AUTO: 0.9 % (ref 0–0.5)
KETONES UR QL STRIP: ABNORMAL
LEUKOCYTE ESTERASE UR QL STRIP.AUTO: ABNORMAL
LIPASE SERPL-CCNC: 7 U/L (ref 13–60)
LYMPHOCYTES # BLD AUTO: 1.45 10*3/MM3 (ref 0.7–3.1)
LYMPHOCYTES NFR BLD AUTO: 13.2 % (ref 19.6–45.3)
MCH RBC QN AUTO: 32.9 PG (ref 26.6–33)
MCHC RBC AUTO-ENTMCNC: 32.9 G/DL (ref 31.5–35.7)
MCV RBC AUTO: 100 FL (ref 79–97)
MONOCYTES # BLD AUTO: 1.29 10*3/MM3 (ref 0.1–0.9)
MONOCYTES NFR BLD AUTO: 11.7 % (ref 5–12)
NEUTROPHILS NFR BLD AUTO: 7.89 10*3/MM3 (ref 1.7–7)
NEUTROPHILS NFR BLD AUTO: 71.6 % (ref 42.7–76)
NITRITE UR QL STRIP: NEGATIVE
NRBC BLD AUTO-RTO: 0 /100 WBC (ref 0–0.2)
PH UR STRIP.AUTO: <=5 [PH] (ref 5–8)
PLATELET # BLD AUTO: 159 10*3/MM3 (ref 140–450)
PMV BLD AUTO: 11.6 FL (ref 6–12)
POTASSIUM SERPL-SCNC: 4.6 MMOL/L (ref 3.5–5.2)
PROT SERPL-MCNC: 7.1 G/DL (ref 6–8.5)
PROT UR QL STRIP: NEGATIVE
RBC # BLD AUTO: 4.41 10*6/MM3 (ref 4.14–5.8)
RBC # UR STRIP: ABNORMAL /HPF
REF LAB TEST METHOD: ABNORMAL
SODIUM SERPL-SCNC: 138 MMOL/L (ref 136–145)
SP GR UR STRIP: 1.02 (ref 1–1.03)
SQUAMOUS #/AREA URNS HPF: ABNORMAL /HPF
UROBILINOGEN UR QL STRIP: ABNORMAL
WBC # UR STRIP: ABNORMAL /HPF
WBC NRBC COR # BLD: 11.02 10*3/MM3 (ref 3.4–10.8)

## 2022-06-05 PROCEDURE — 80053 COMPREHEN METABOLIC PANEL: CPT | Performed by: EMERGENCY MEDICINE

## 2022-06-05 PROCEDURE — 96376 TX/PRO/DX INJ SAME DRUG ADON: CPT

## 2022-06-05 PROCEDURE — 87086 URINE CULTURE/COLONY COUNT: CPT | Performed by: EMERGENCY MEDICINE

## 2022-06-05 PROCEDURE — 83690 ASSAY OF LIPASE: CPT | Performed by: EMERGENCY MEDICINE

## 2022-06-05 PROCEDURE — 25010000002 CEFTRIAXONE PER 250 MG: Performed by: PHYSICIAN ASSISTANT

## 2022-06-05 PROCEDURE — 96365 THER/PROPH/DIAG IV INF INIT: CPT

## 2022-06-05 PROCEDURE — 85025 COMPLETE CBC W/AUTO DIFF WBC: CPT | Performed by: EMERGENCY MEDICINE

## 2022-06-05 PROCEDURE — 25010000002 ONDANSETRON PER 1 MG: Performed by: EMERGENCY MEDICINE

## 2022-06-05 PROCEDURE — 81001 URINALYSIS AUTO W/SCOPE: CPT | Performed by: EMERGENCY MEDICINE

## 2022-06-05 PROCEDURE — 96361 HYDRATE IV INFUSION ADD-ON: CPT

## 2022-06-05 PROCEDURE — 99283 EMERGENCY DEPT VISIT LOW MDM: CPT

## 2022-06-05 PROCEDURE — 83605 ASSAY OF LACTIC ACID: CPT | Performed by: EMERGENCY MEDICINE

## 2022-06-05 PROCEDURE — 74176 CT ABD & PELVIS W/O CONTRAST: CPT

## 2022-06-05 PROCEDURE — 0 HYDROMORPHONE 1 MG/ML SOLUTION: Performed by: EMERGENCY MEDICINE

## 2022-06-05 PROCEDURE — 0 HYDROMORPHONE 1 MG/ML SOLUTION: Performed by: FAMILY MEDICINE

## 2022-06-05 PROCEDURE — 96375 TX/PRO/DX INJ NEW DRUG ADDON: CPT

## 2022-06-05 RX ORDER — ONDANSETRON 2 MG/ML
4 INJECTION INTRAMUSCULAR; INTRAVENOUS ONCE
Status: COMPLETED | OUTPATIENT
Start: 2022-06-05 | End: 2022-06-05

## 2022-06-05 RX ORDER — SODIUM CHLORIDE, SODIUM LACTATE, POTASSIUM CHLORIDE, CALCIUM CHLORIDE 600; 310; 30; 20 MG/100ML; MG/100ML; MG/100ML; MG/100ML
75 INJECTION, SOLUTION INTRAVENOUS CONTINUOUS
Status: DISCONTINUED | OUTPATIENT
Start: 2022-06-05 | End: 2022-06-06 | Stop reason: HOSPADM

## 2022-06-05 RX ADMIN — HYDROMORPHONE HYDROCHLORIDE 1 MG: 1 INJECTION, SOLUTION INTRAMUSCULAR; INTRAVENOUS; SUBCUTANEOUS at 19:00

## 2022-06-05 RX ADMIN — SODIUM CHLORIDE 1 G: 9 INJECTION, SOLUTION INTRAVENOUS at 22:35

## 2022-06-05 RX ADMIN — HYDROMORPHONE HYDROCHLORIDE 1 MG: 1 INJECTION, SOLUTION INTRAMUSCULAR; INTRAVENOUS; SUBCUTANEOUS at 22:38

## 2022-06-05 RX ADMIN — SODIUM CHLORIDE, POTASSIUM CHLORIDE, SODIUM LACTATE AND CALCIUM CHLORIDE 75 ML/HR: 600; 310; 30; 20 INJECTION, SOLUTION INTRAVENOUS at 19:01

## 2022-06-05 RX ADMIN — SODIUM CHLORIDE 1000 ML: 9 INJECTION, SOLUTION INTRAVENOUS at 22:35

## 2022-06-05 RX ADMIN — ONDANSETRON 4 MG: 2 INJECTION INTRAMUSCULAR; INTRAVENOUS at 19:00

## 2022-06-06 LAB — BACTERIA SPEC AEROBE CULT: NO GROWTH

## 2022-06-06 NOTE — DISCHARGE INSTRUCTIONS
Today you are noted to have a fatty liver.  Please change her diet by decreasing fatty/greasy/processed foods as this can be reversible.  You also have evidence of dehydration in your kidneys which is important that you increase oral hydration.  You will need to follow with your primary care provider to have repeat testing of your kidney functions.  Today you have evidence of a mass in your right liver for which you will need to follow-up with your primary care provider and have GI referral.  Please follow-up with the primary care provider within the next 24 to 48 hours however should you develop any new or worsening symptoms please return to the ER for further evaluation.

## 2022-06-15 ENCOUNTER — OFFICE VISIT (OUTPATIENT)
Dept: FAMILY MEDICINE CLINIC | Facility: CLINIC | Age: 77
End: 2022-06-15

## 2022-06-15 VITALS
DIASTOLIC BLOOD PRESSURE: 68 MMHG | TEMPERATURE: 97.9 F | HEART RATE: 76 BPM | HEIGHT: 72 IN | OXYGEN SATURATION: 96 % | SYSTOLIC BLOOD PRESSURE: 127 MMHG | BODY MASS INDEX: 28.5 KG/M2 | WEIGHT: 210.4 LBS

## 2022-06-15 DIAGNOSIS — Z79.4 TYPE 2 DIABETES MELLITUS TREATED WITH INSULIN: ICD-10-CM

## 2022-06-15 DIAGNOSIS — E11.9 TYPE 2 DIABETES MELLITUS TREATED WITH INSULIN: ICD-10-CM

## 2022-06-15 DIAGNOSIS — E11.42 DIABETIC POLYNEUROPATHY ASSOCIATED WITH TYPE 2 DIABETES MELLITUS: ICD-10-CM

## 2022-06-15 DIAGNOSIS — K76.9 HEPATIC LESION: Primary | ICD-10-CM

## 2022-06-15 PROCEDURE — 99213 OFFICE O/P EST LOW 20 MIN: CPT | Performed by: NURSE PRACTITIONER

## 2022-06-15 NOTE — PROGRESS NOTES
"Chief Complaint  Abnormal Imaging (Abnormal CT scan at [facility] ER)    Subjective        Darren Shay presents to [facility]  History of Present Illness  Patient was seen in the ER last week and noted to have right sided abdominal pain.  CT scan was obtained demonstrating an ill defined hepatic lesion.  It was recommended that he see GI for follow up of the lesion.  He had some confusion as to why a GI doctor would take care of his liver and therefore presents today to discuss options.  Of note, his pain was with inspiration or cough and he felt that he had pleurisy, which he has had in the past with similar symptoms.    Objective   Vital Signs:  /68 (BP Location: Left arm, Patient Position: Sitting, Cuff Size: Adult)   Pulse 76   Temp 97.9 °F (36.6 °C)   Ht 182.9 cm (72\") Comment: pt reported  Wt 95.4 kg (210 lb 6.4 oz)   SpO2 96%   BMI 28.54 kg/m²   Estimated body mass index is 28.54 kg/m² as calculated from the following:    Height as of this encounter: 182.9 cm (72\").    Weight as of this encounter: 95.4 kg (210 lb 6.4 oz).          Physical Exam  Vitals and nursing note reviewed. Exam conducted with a chaperone present (Wife.).   Constitutional:       General: He is not in acute distress.     Appearance: Normal appearance. He is not ill-appearing.   HENT:      Head: Normocephalic and atraumatic.   Cardiovascular:      Rate and Rhythm: Normal rate and regular rhythm.      Heart sounds: Normal heart sounds.   Pulmonary:      Effort: Pulmonary effort is normal.      Breath sounds: Normal breath sounds.   Abdominal:      General: Bowel sounds are normal.      Palpations: Abdomen is soft.      Tenderness: There is no abdominal tenderness.      Comments: Specifically no RUQ tenderness.   Skin:     General: Skin is warm and dry.   Neurological:      Mental Status: He is alert and oriented to person, place, and time.   Psychiatric:         Thought Content: Thought content " normal.        Result Review :    CMP    CMP 2/1/22 6/5/22   Glucose 162 (A) 167 (A)   BUN 13 18   Creatinine 1.14 1.40 (A)   eGFR Non  Am 61    eGFR  Am 71    Sodium 140 138   Potassium 4.4 4.6   Chloride 105 104   Calcium 9.2 9.0   Total Protein 6.8    Albumin 3.9 3.70   Globulin 2.9    Total Bilirubin 0.7 0.7   Alkaline Phosphatase 87 91   AST (SGOT) 25 63 (A)   ALT (SGPT) 27 39   (A) Abnormal value       Comments are available for some flowsheets but are not being displayed.            CT ABDOMEN AND PELVIS:   Impression: 1. Diffuse fatty infiltration of the liver. There is a 4.7 cm possible mass in the right hepatic lobe of the liver versus sparing of fatty infiltration. A mass is favored. Liver mass protocol CT or MRI is recommended to evaluate further on a nonemergent basis. MRI examination is preferred. 2. There are several small lymph nodes in the jolynn hepatis region. These may be reactive. 3. Prior cholecystectomy. Prior total colectomy. Right lower quadrant ileostomy. 4. Atrophy of the pancreas. 5. 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 Mild cardiomegaly. 6. Other nonacute findings, as discussed above.  The full report of this exam was immediately signed and available to the emergency room. The patient is currently in the emergency room. This report was finalized on 06/05/2022 20:37 by Dr. Yusef Vance MD.         Assessment and Plan   Diagnoses and all orders for this visit:    1. Hepatic lesion (Primary)    2. Type 2 diabetes mellitus treated with insulin (HCC)    3. Diabetic polyneuropathy associated with type 2 diabetes mellitus (HCC)    Patient is agreeable to follow up with GI as previously arranged for next Monday 6/20/22.  Since there is no pain at present, will await recommendations from gastroenterology.         Follow Up   Return if symptoms worsen or fail to improve.  Patient was given instructions and counseling regarding his condition or for  health maintenance advice. Please see specific information pulled into the AVS if appropriate.

## 2022-06-19 DIAGNOSIS — E11.52 TYPE 2 DIABETES MELLITUS WITH DIABETIC PERIPHERAL ANGIOPATHY AND GANGRENE, WITH LONG-TERM CURRENT USE OF INSULIN: ICD-10-CM

## 2022-06-19 DIAGNOSIS — Z79.4 TYPE 2 DIABETES MELLITUS WITH DIABETIC PERIPHERAL ANGIOPATHY AND GANGRENE, WITH LONG-TERM CURRENT USE OF INSULIN: ICD-10-CM

## 2022-06-19 DIAGNOSIS — K29.00 ACUTE SUPERFICIAL GASTRITIS WITHOUT HEMORRHAGE: ICD-10-CM

## 2022-06-20 ENCOUNTER — OFFICE VISIT (OUTPATIENT)
Dept: GASTROENTEROLOGY | Facility: CLINIC | Age: 77
End: 2022-06-20

## 2022-06-20 VITALS
SYSTOLIC BLOOD PRESSURE: 140 MMHG | HEART RATE: 88 BPM | BODY MASS INDEX: 28.33 KG/M2 | OXYGEN SATURATION: 94 % | WEIGHT: 209.2 LBS | TEMPERATURE: 97.1 F | HEIGHT: 72 IN | DIASTOLIC BLOOD PRESSURE: 74 MMHG

## 2022-06-20 DIAGNOSIS — Z71.6 TOBACCO ABUSE COUNSELING: ICD-10-CM

## 2022-06-20 DIAGNOSIS — E66.9 OBESITY, UNSPECIFIED OBESITY SEVERITY, UNSPECIFIED OBESITY TYPE: ICD-10-CM

## 2022-06-20 DIAGNOSIS — R93.5 ABNORMAL CT OF THE ABDOMEN: Primary | ICD-10-CM

## 2022-06-20 PROCEDURE — 99214 OFFICE O/P EST MOD 30 MIN: CPT | Performed by: CLINICAL NURSE SPECIALIST

## 2022-06-20 RX ORDER — INSULIN ASPART 100 [IU]/ML
INJECTION, SOLUTION INTRAVENOUS; SUBCUTANEOUS
Qty: 120 ML | Refills: 2 | Status: SHIPPED | OUTPATIENT
Start: 2022-06-20 | End: 2022-09-27 | Stop reason: HOSPADM

## 2022-06-20 RX ORDER — PANTOPRAZOLE SODIUM 40 MG/1
TABLET, DELAYED RELEASE ORAL
Qty: 90 TABLET | Refills: 1 | Status: SHIPPED | OUTPATIENT
Start: 2022-06-20 | End: 2022-09-22

## 2022-06-20 NOTE — PROGRESS NOTES
Darren Shay  1945    6/20/2022  Chief Complaint   Patient presents with   • GI Problem     Liver mass, right lobe     Subjective   HPI  Darren Shay is a 76 y.o. male who presents with a complaint of abnormal CT scan without contrast. He presented to the ER on 6/5/22 with chief complaint of sudden onset of pian to the right side of his abdomen. He described it as sharp stabbing pain radiating across abdomen and through to his back. No nausea or vomiting. No wt loss. No fever. He has never been here before. No underlying liver disease that he is aware. He has underlying diabetes.    IMPRESSION of CT without contrast:  1. Diffuse fatty infiltration of the liver. There is a 4.7 cm possible  mass in the right hepatic lobe of the liver versus sparing of fatty  infiltration. A mass is favored. Liver mass protocol CT or MRI is  recommended to evaluate further on a nonemergent basis. MRI examination  is preferred.  2. There are several small lymph nodes in the jolynn hepatis region.  These may be reactive.  3. Prior cholecystectomy. Prior total colectomy. Right lower quadrant  ileostomy.  4. Atrophy of the pancreas.  5. 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  Mild cardiomegaly.  6. Other nonacute findings, as discussed above.     The full report of this exam was immediately signed and available to the  emergency room. The patient is currently in the emergency room.  This report was finalized on 06/05/2022 20:37 by Dr. Yusef Vance MD.    Past Medical History:   Diagnosis Date   • Cataracts, bilateral    • Coronary artery disease    • Diabetes mellitus (HCC)    • Diabetic foot ulcers (HCC)    • Hypertension    • Ileostomy present (HCC)    • Neuropathy    • UC (ulcerative colitis confined to rectum) (HCC)      Past Surgical History:   Procedure Laterality Date   • CHOLECYSTECTOMY     • COLOSTOMY     • CORONARY ARTERY BYPASS GRAFT  1993    x5   • ENDOSCOPY  12/01/2008    Grade C  "esophagitis, HH   • ORIF FOOT FRACTURE Right 01/17/2019    Procedure: PARTIAL REMOVAL OF BONE MEDIAL CUNEIFORM AND 1ST METATARSAL - RIGHT FOOT EXCISION OF ULCERATION;  Surgeon: Florin Kearns DPM;  Location: [facility] OR;  Service: Podiatry   • TOE AMPUTATION      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 at        Outpatient Medications Marked as Taking for the 6/20/22 encounter (Office Visit) with Jing Tineo APRN   Medication Sig Dispense Refill   • Accu-Chek Jessica Plus test strip TEST BLOOD SUGAR THREE TIMES DAILY 300 each 2   • aspirin 81 MG chewable tablet Chew 81 mg Daily.     • cilostazol (PLETAL) 100 MG tablet TAKE 1 TABLET TWICE DAILY 180 tablet 1   • clopidogrel (PLAVIX) 75 MG tablet TAKE 1 TABLET EVERY DAY 30 tablet 0   • Droplet Insulin Syringe 31G X 5/16\" 1 ML misc USE THREE TIMES DAILY AS DIRECTED 300 each 5   • folic acid (FOLVITE) 400 MCG tablet Take 800 mcg by mouth Daily.     • gabapentin (NEURONTIN) 300 MG capsule Take 1 capsule by mouth 2 (two) times a day. 180 capsule 1   • Insulin Aspart (NovoLOG) 100 UNIT/ML injection Inject 40 units 4 times daily plus additional units per sliding scale. 120 mL 2   • Lantus 100 UNIT/ML injection INJECT 82 UNITS UNDER THE SKIN IN THE APPROPRIATE AREA AS DIRECTED EVERY NIGHT. 70 mL 1   • metoprolol tartrate (LOPRESSOR) 50 MG tablet TAKE 1 TABLET TWICE DAILY 180 tablet 1   • simvastatin (ZOCOR) 20 MG tablet Take 1 tablet by mouth every night at bedtime. 90 tablet 1     Allergies   Allergen Reactions   • Cephalexin Hives     Social History     Socioeconomic History   • Marital status:    Tobacco Use   • Smoking status: Current Every Day Smoker     Years: 10.00     Types: Pipe   • Smokeless tobacco: Never Used   Vaping Use   • Vaping Use: Never used   Substance and Sexual Activity   • Alcohol use: No   • Drug use: No   • Sexual activity: Not Currently     Partners: Female     Family History   Problem Relation Age of Onset   • Cancer Mother    • Heart disease Mother    • " Diabetes Mother    • Cancer Father    • Diabetes Sister    • Diabetes Sister    • No Known Problems Brother    • No Known Problems Brother    • No Known Problems Brother    • No Known Problems Brother    • Colon cancer Son      Health Maintenance   Topic Date Due   • ZOSTER VACCINE (1 of 2) Never done   • COVID-19 Vaccine (4 - Booster for Moderna series) 02/06/2022   • Pneumococcal Vaccine 65+ (2 - PPSV23 or PCV20) 02/16/2023 (Originally 10/12/2018)   • TDAP/TD VACCINES (1 - Tdap) 01/01/2025 (Originally 8/3/1964)   • DIABETIC EYE EXAM  09/07/2022   • INFLUENZA VACCINE  10/01/2022   • HEMOGLOBIN A1C  11/16/2022   • LIPID PANEL  02/01/2023   • URINE MICROALBUMIN  02/01/2023   • ANNUAL WELLNESS VISIT  02/16/2023   • HEPATITIS C SCREENING  Completed     Review of Systems   Constitutional: Negative for activity change, appetite change, chills, diaphoresis, fatigue, fever and unexpected weight change.   HENT: Negative for ear pain, hearing loss, mouth sores, sore throat, trouble swallowing and voice change.    Eyes: Negative.    Respiratory: Negative for cough, choking, shortness of breath and wheezing.    Cardiovascular: Negative for chest pain and palpitations.   Gastrointestinal: Negative for abdominal pain, blood in stool, constipation, diarrhea, nausea and vomiting.   Endocrine: Negative for cold intolerance and heat intolerance.   Genitourinary: Negative for decreased urine volume, dysuria, frequency, hematuria and urgency.   Musculoskeletal: Negative for back pain, gait problem and myalgias.   Skin: Negative for color change, pallor and rash.   Allergic/Immunologic: Negative for food allergies and immunocompromised state.   Neurological: Negative for dizziness, tremors, seizures, syncope, weakness, light-headedness, numbness and headaches.   Hematological: Negative for adenopathy. Does not bruise/bleed easily.   Psychiatric/Behavioral: Negative for agitation and confusion. The patient is not nervous/anxious.    All  "other systems reviewed and are negative.    Objective   Vitals:    06/20/22 0834   BP: 140/74   Pulse: 88   Temp: 97.1 °F (36.2 °C)   SpO2: 94%   Weight: 94.9 kg (209 lb 3.2 oz)   Height: 182.9 cm (72\")     Body mass index is 28.37 kg/m².  Physical Exam  Constitutional:       Appearance: He is well-developed.   HENT:      Head: Normocephalic and atraumatic.   Eyes:      Pupils: Pupils are equal, round, and reactive to light.   Neck:      Trachea: No tracheal deviation.   Cardiovascular:      Rate and Rhythm: Normal rate and regular rhythm.      Heart sounds: Normal heart sounds. No murmur heard.    No friction rub. No gallop.   Pulmonary:      Effort: Pulmonary effort is normal. No respiratory distress.      Breath sounds: Normal breath sounds. No wheezing or rales.   Chest:      Chest wall: No tenderness.   Abdominal:      General: Bowel sounds are normal. There is no distension.      Palpations: Abdomen is soft. Abdomen is not rigid.      Tenderness: There is no abdominal tenderness. There is no guarding or rebound.   Musculoskeletal:         General: No tenderness or deformity. Normal range of motion.      Cervical back: Normal range of motion and neck supple.   Skin:     General: Skin is warm and dry.      Coloration: Skin is not pale.      Findings: No rash.   Neurological:      Mental Status: He is alert and oriented to person, place, and time.      Deep Tendon Reflexes: Reflexes are normal and symmetric.   Psychiatric:         Behavior: Behavior normal.         Thought Content: Thought content normal.         Judgment: Judgment normal.       Assessment & Plan   Diagnoses and all orders for this visit:    1. Abnormal CT of the abdomen (Primary)  -     MRI Abdomen With & Without Contrast  -     AFP Tumor Marker    2. Tobacco abuse counseling    3. Obesity, unspecified obesity severity, unspecified obesity type        Part of this note may be an electronic transcription/translation of spoken language to printed " text using the Dragon Dictation System.  Body mass index is 28.37 kg/m².  Return in about 2 weeks (around 7/4/2022).    BMI is >= 25 and <30. (Overweight) The following options were offered after discussion;: weight loss educational material (shared in after visit summary)      All risks, benefits, alternatives, and indications of colonoscopy and/or Endoscopy procedure have been discussed with the patient. Risks to include perforation of the colon requiring possible surgery or colostomy, risk of bleeding from biopsies or removal of colon tissue, possibility of missing a colon polyp or cancer, or adverse drug reaction.  Benefits to include the diagnosis and management of disease of the colon and rectum. Alternatives to include barium enema, radiographic evaluation, lab testing or no intervention. Pt verbalizes understanding and agrees.     Jing Tineo, APRN  6/20/2022  09:13 CDT          If you smoke or use tobacco, 4 minutes reading provided  Steps to Quit Smoking  Smoking tobacco can be harmful to your health and can affect almost every organ in your body. Smoking puts you, and those around you, at risk for developing many serious chronic diseases. Quitting smoking is difficult, but it is one of the best things that you can do for your health. It is never too late to quit.  What are the benefits of quitting smoking?  When you quit smoking, you lower your risk of developing serious diseases and conditions, such as:  · Lung cancer or lung disease, such as COPD.  · Heart disease.  · Stroke.  · Heart attack.  · Infertility.  · Osteoporosis and bone fractures.  Additionally, symptoms such as coughing, wheezing, and shortness of breath may get better when you quit. You may also find that you get sick less often because your body is stronger at fighting off colds and infections. If you are pregnant, quitting smoking can help to reduce your chances of having a baby of low birth weight.  How do I get ready to  quit?  When you decide to quit smoking, create a plan to make sure that you are successful. Before you quit:  · Pick a date to quit. Set a date within the next two weeks to give you time to prepare.  · Write down the reasons why you are quitting. Keep this list in places where you will see it often, such as on your bathroom mirror or in your car or wallet.  · Identify the people, places, things, and activities that make you want to smoke (triggers) and avoid them. Make sure to take these actions:  ¨ Throw away all cigarettes at home, at work, and in your car.  ¨ Throw away smoking accessories, such as ashtrays and lighters.  ¨ Clean your car and make sure to empty the ashtray.  ¨ Clean your home, including curtains and carpets.  · Tell your family, friends, and coworkers that you are quitting. Support from your loved ones can make quitting easier.  · Talk with your health care provider about your options for quitting smoking.  · Find out what treatment options are covered by your health insurance.  What strategies can I use to quit smoking?  Talk with your healthcare provider about different strategies to quit smoking. Some strategies include:  · Quitting smoking altogether instead of gradually lessening how much you smoke over a period of time. Research shows that quitting “cold turkey” is more successful than gradually quitting.  · Attending in-person counseling to help you build problem-solving skills. You are more likely to have success in quitting if you attend several counseling sessions. Even short sessions of 10 minutes can be effective.  · Finding resources and support systems that can help you to quit smoking and remain smoke-free after you quit. These resources are most helpful when you use them often. They can include:  ¨ Online chats with a counselor.  ¨ Telephone quitlines.  ¨ Printed self-help materials.  ¨ Support groups or group counseling.  ¨ Text messaging programs.  ¨ Mobile phone  applications.  · Taking medicines to help you quit smoking. (If you are pregnant or breastfeeding, talk with your health care provider first.) Some medicines contain nicotine and some do not. Both types of medicines help with cravings, but the medicines that include nicotine help to relieve withdrawal symptoms. Your health care provider may recommend:  ¨ Nicotine patches, gum, or lozenges.  ¨ Nicotine inhalers or sprays.  ¨ Non-nicotine medicine that is taken by mouth.  Talk with your health care provider about combining strategies, such as taking medicines while you are also receiving in-person counseling. Using these two strategies together makes you more likely to succeed in quitting than if you used either strategy on its own.  If you are pregnant or breastfeeding, talk with your health care provider about finding counseling or other support strategies to quit smoking. Do not take medicine to help you quit smoking unless told to do so by your health care provider.  What things can I do to make it easier to quit?  Quitting smoking might feel overwhelming at first, but there is a lot that you can do to make it easier. Take these important actions:  · Reach out to your family and friends and ask that they support and encourage you during this time. Call telephone quitlines, reach out to support groups, or work with a counselor for support.  · Ask people who smoke to avoid smoking around you.  · Avoid places that trigger you to smoke, such as bars, parties, or smoke-break areas at work.  · Spend time around people who do not smoke.  · Lessen stress in your life, because stress can be a smoking trigger for some people. To lessen stress, try:  ¨ Exercising regularly.  ¨ Deep-breathing exercises.  ¨ Yoga.  ¨ Meditating.  ¨ Performing a body scan. This involves closing your eyes, scanning your body from head to toe, and noticing which parts of your body are particularly tense. Purposefully relax the muscles in those  areas.  · Download or purchase mobile phone or tablet apps (applications) that can help you stick to your quit plan by providing reminders, tips, and encouragement. There are many free apps, such as QuitGuide from the CDC (Centers for Disease Control and Prevention). You can find other support for quitting smoking (smoking cessation) through smokefree.gov and other websites.  How will I feel when I quit smoking?  Within the first 24 hours of quitting smoking, you may start to feel some withdrawal symptoms. These symptoms are usually most noticeable 2-3 days after quitting, but they usually do not last beyond 2-3 weeks. Changes or symptoms that you might experience include:  · Mood swings.  · Restlessness, anxiety, or irritation.  · Difficulty concentrating.  · Dizziness.  · Strong cravings for sugary foods in addition to nicotine.  · Mild weight gain.  · Constipation.  · Nausea.  · Coughing or a sore throat.  · Changes in how your medicines work in your body.  · A depressed mood.  · Difficulty sleeping (insomnia).  After the first 2-3 weeks of quitting, you may start to notice more positive results, such as:  · Improved sense of smell and taste.  · Decreased coughing and sore throat.  · Slower heart rate.  · Lower blood pressure.  · Clearer skin.  · The ability to breathe more easily.  · Fewer sick days.  Quitting smoking is very challenging for most people. Do not get discouraged if you are not successful the first time. Some people need to make many attempts to quit before they achieve long-term success. Do your best to stick to your quit plan, and talk with your health care provider if you have any questions or concerns.  This information is not intended to replace advice given to you by your health care provider. Make sure you discuss any questions you have with your health care provider.  Document Released: 12/12/2002 Document Revised: 08/15/2017 Document Reviewed: 05/03/2016  [employer] Interactive Patient  Education © 2017 [employer]

## 2022-07-07 ENCOUNTER — HOSPITAL ENCOUNTER (OUTPATIENT)
Dept: MRI IMAGING | Facility: HOSPITAL | Age: 77
Discharge: HOME | End: 2022-07-07
Admitting: CLINICAL NURSE SPECIALIST

## 2022-07-07 LAB — CREAT BLDA-MCNC: 1.1 MG/DL (ref 0.6–1.3)

## 2022-07-07 PROCEDURE — A9577 INJ MULTIHANCE: HCPCS | Performed by: CLINICAL NURSE SPECIALIST

## 2022-07-07 PROCEDURE — 82565 ASSAY OF CREATININE: CPT

## 2022-07-07 PROCEDURE — 74183 MRI ABD W/O CNTR FLWD CNTR: CPT

## 2022-07-07 PROCEDURE — 0 GADOBENATE DIMEGLUMINE 529 MG/ML SOLUTION: Performed by: CLINICAL NURSE SPECIALIST

## 2022-07-07 RX ADMIN — GADOBENATE DIMEGLUMINE 20 ML: 529 INJECTION, SOLUTION INTRAVENOUS at 06:56

## 2022-07-08 ENCOUNTER — OFFICE VISIT (OUTPATIENT)
Dept: GASTROENTEROLOGY | Facility: CLINIC | Age: 77
End: 2022-07-08

## 2022-07-08 ENCOUNTER — LAB (OUTPATIENT)
Dept: LAB | Facility: HOSPITAL | Age: 77
End: 2022-07-08

## 2022-07-08 ENCOUNTER — TELEPHONE (OUTPATIENT)
Dept: GASTROENTEROLOGY | Facility: CLINIC | Age: 77
End: 2022-07-08

## 2022-07-08 VITALS
HEART RATE: 75 BPM | SYSTOLIC BLOOD PRESSURE: 138 MMHG | DIASTOLIC BLOOD PRESSURE: 64 MMHG | HEIGHT: 72 IN | BODY MASS INDEX: 28.55 KG/M2 | WEIGHT: 210.8 LBS | OXYGEN SATURATION: 96 % | TEMPERATURE: 97.8 F

## 2022-07-08 DIAGNOSIS — R16.0 LIVER MASS: Primary | ICD-10-CM

## 2022-07-08 DIAGNOSIS — C24.9 MALIGNANT NEOPLASM OF BILIARY TRACT, UNSPECIFIED: ICD-10-CM

## 2022-07-08 LAB
ALBUMIN SERPL-MCNC: 3.9 G/DL (ref 3.5–5.2)
ALBUMIN/GLOB SERPL: 1.1 G/DL
ALP SERPL-CCNC: 82 U/L (ref 39–117)
ALPHA-FETOPROTEIN: 11 NG/ML (ref 0–8.3)
ALT SERPL W P-5'-P-CCNC: 51 U/L (ref 1–41)
ANION GAP SERPL CALCULATED.3IONS-SCNC: 12.4 MMOL/L (ref 5–15)
AST SERPL-CCNC: 76 U/L (ref 1–40)
BASOPHILS # BLD AUTO: 0.11 10*3/MM3 (ref 0–0.2)
BASOPHILS NFR BLD AUTO: 1.2 % (ref 0–1.5)
BILIRUB SERPL-MCNC: 0.8 MG/DL (ref 0–1.2)
BUN SERPL-MCNC: 16 MG/DL (ref 8–23)
BUN/CREAT SERPL: 13.4 (ref 7–25)
CALCIUM SPEC-SCNC: 9.6 MG/DL (ref 8.6–10.5)
CANCER AG19-9 SERPL-ACNC: 45.7 U/ML
CEA SERPL-MCNC: 1.47 NG/ML
CHLORIDE SERPL-SCNC: 103 MMOL/L (ref 98–107)
CO2 SERPL-SCNC: 24.6 MMOL/L (ref 22–29)
CREAT SERPL-MCNC: 1.19 MG/DL (ref 0.76–1.27)
DEPRECATED RDW RBC AUTO: 43.9 FL (ref 37–54)
EGFRCR SERPLBLD CKD-EPI 2021: 62.5 ML/MIN/1.73
EOSINOPHIL # BLD AUTO: 0.28 10*3/MM3 (ref 0–0.4)
EOSINOPHIL NFR BLD AUTO: 3.1 % (ref 0.3–6.2)
ERYTHROCYTE [DISTWIDTH] IN BLOOD BY AUTOMATED COUNT: 12.3 % (ref 12.3–15.4)
GLOBULIN UR ELPH-MCNC: 3.6 GM/DL
GLUCOSE SERPL-MCNC: 233 MG/DL (ref 65–99)
HCT VFR BLD AUTO: 48.4 % (ref 37.5–51)
HGB BLD-MCNC: 16.1 G/DL (ref 13–17.7)
IMM GRANULOCYTES # BLD AUTO: 0.08 10*3/MM3 (ref 0–0.05)
IMM GRANULOCYTES NFR BLD AUTO: 0.9 % (ref 0–0.5)
LYMPHOCYTES # BLD AUTO: 1.44 10*3/MM3 (ref 0.7–3.1)
LYMPHOCYTES NFR BLD AUTO: 15.9 % (ref 19.6–45.3)
MCH RBC QN AUTO: 32.7 PG (ref 26.6–33)
MCHC RBC AUTO-ENTMCNC: 33.3 G/DL (ref 31.5–35.7)
MCV RBC AUTO: 98.4 FL (ref 79–97)
MONOCYTES # BLD AUTO: 0.93 10*3/MM3 (ref 0.1–0.9)
MONOCYTES NFR BLD AUTO: 10.2 % (ref 5–12)
NEUTROPHILS NFR BLD AUTO: 6.24 10*3/MM3 (ref 1.7–7)
NEUTROPHILS NFR BLD AUTO: 68.7 % (ref 42.7–76)
NRBC BLD AUTO-RTO: 0 /100 WBC (ref 0–0.2)
PLATELET # BLD AUTO: 164 10*3/MM3 (ref 140–450)
PMV BLD AUTO: 11.7 FL (ref 6–12)
POTASSIUM SERPL-SCNC: 4.7 MMOL/L (ref 3.5–5.2)
PROT SERPL-MCNC: 7.5 G/DL (ref 6–8.5)
RBC # BLD AUTO: 4.92 10*6/MM3 (ref 4.14–5.8)
SODIUM SERPL-SCNC: 140 MMOL/L (ref 136–145)
WBC NRBC COR # BLD: 9.08 10*3/MM3 (ref 3.4–10.8)

## 2022-07-08 PROCEDURE — 80053 COMPREHEN METABOLIC PANEL: CPT | Performed by: CLINICAL NURSE SPECIALIST

## 2022-07-08 PROCEDURE — 99214 OFFICE O/P EST MOD 30 MIN: CPT | Performed by: CLINICAL NURSE SPECIALIST

## 2022-07-08 PROCEDURE — 86301 IMMUNOASSAY TUMOR CA 19-9: CPT | Performed by: CLINICAL NURSE SPECIALIST

## 2022-07-08 PROCEDURE — 82378 CARCINOEMBRYONIC ANTIGEN: CPT | Performed by: CLINICAL NURSE SPECIALIST

## 2022-07-08 PROCEDURE — 82105 ALPHA-FETOPROTEIN SERUM: CPT | Performed by: CLINICAL NURSE SPECIALIST

## 2022-07-08 PROCEDURE — 36415 COLL VENOUS BLD VENIPUNCTURE: CPT | Performed by: CLINICAL NURSE SPECIALIST

## 2022-07-08 PROCEDURE — 85025 COMPLETE CBC W/AUTO DIFF WBC: CPT | Performed by: CLINICAL NURSE SPECIALIST

## 2022-07-08 NOTE — PROGRESS NOTES
Darren Shay  1945    7/8/2022  Chief Complaint   Patient presents with   • GI Problem     Discuss MRI     Subjective   HPI  Darren Shay is a 76 y.o. male who presents with a complaint of abnormal CT scan showing a liver mass and follow up MRI. I ordered this after seeing him 6/20/22 it was performed yesterday. He is here today with his wife and daughter to discuss these findings.   IMPRESSION:  1. Ill-defined area of mass effect predominantly in the right hepatic  lobe of the liver with size measurements listed above. There is low  signal on T1 precontrast, slightly increased T2 signal and persistent  enhancement characteristics. It also demonstrates abnormal signal on the  diffusion sequence. The findings are suspicious for a neoplastic lesion.  Cholangiocarcinoma is the leading differential. Atypical hepatocellular  carcinoma considered. Hepatocellular carcinoma typically demonstrates  washout enhancement characteristics. The lesion does not have features  of hemangioma or typical FNH.  2. There is a small 1 cm area of similar signal characteristics in the  left hepatic lobe lateral segment. However, it is well-circumscribed. It  is a small indeterminate lesion.  3. Fatty infiltration of the liver.  4. Prior cholecystectomy.  This report was finalized on 07/07/2022 08:55 by Dr. Yusef Vance MD.    He has not had any weight loss he says, however his wife has been concerned because he has not been tolerable of eating as much in the past few months she says. He weighed 214 6/5/22 today he is  210. He did initially present to the ER with abdominal pain to the right side radiating across the abdomen on 6/5/22 leading to CT scan which showed the incidental finding of a mass in the right hepatic lobe. He says he has a family hx of colon cancer in his son. He does not have colon due to a hx of ulcerative colitis removal in 1995 at another facility, he currently has a ileostomy.   He denies any upper GI related  "complaints. The pain has resolved. No change in his osotmy output. No blood or melena. No jaundice.  He has had an endoscopy as noted below in the past.   He is to go to the lab today for tumor markers this was not performed before for unknown reason.   Past Medical History:   Diagnosis Date   • Cataracts, bilateral    • Coronary artery disease    • Diabetes mellitus (HCC)    • Diabetic foot ulcers (HCC)    • Hypertension    • Ileostomy present (HCC)    • Neuropathy    • UC (ulcerative colitis confined to rectum) (HCC)      Past Surgical History:   Procedure Laterality Date   • CHOLECYSTECTOMY     • COLOSTOMY     • CORONARY ARTERY BYPASS GRAFT  1993    x5   • ENDOSCOPY  12/01/2008    Grade C esophagitis, HH   • ORIF FOOT FRACTURE Right 01/17/2019    Procedure: PARTIAL REMOVAL OF BONE MEDIAL CUNEIFORM AND 1ST METATARSAL - RIGHT FOOT EXCISION OF ULCERATION;  Surgeon: Florin Kearns DPM;  Location: [facility] OR;  Service: Podiatry   • TOE AMPUTATION      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 at        Outpatient Medications Marked as Taking for the 7/8/22 encounter (Office Visit) with Jing Tineo APRN   Medication Sig Dispense Refill   • Accu-Chek Jessica Plus test strip TEST BLOOD SUGAR THREE TIMES DAILY 300 each 2   • aspirin 81 MG chewable tablet Chew 81 mg Daily.     • cilostazol (PLETAL) 100 MG tablet TAKE 1 TABLET TWICE DAILY 180 tablet 1   • clopidogrel (PLAVIX) 75 MG tablet TAKE 1 TABLET EVERY DAY 30 tablet 0   • Droplet Insulin Syringe 31G X 5/16\" 1 ML misc USE THREE TIMES DAILY AS DIRECTED 300 each 5   • folic acid (FOLVITE) 400 MCG tablet Take 800 mcg by mouth Daily.     • gabapentin (NEURONTIN) 300 MG capsule Take 1 capsule by mouth 2 (two) times a day. 180 capsule 1   • Insulin Aspart (NovoLOG) 100 UNIT/ML injection Inject 40 units 4 times daily plus additional units per sliding scale. 120 mL 2   • Lantus 100 UNIT/ML injection INJECT 82 UNITS UNDER THE SKIN IN THE APPROPRIATE AREA AS DIRECTED EVERY NIGHT. 70 mL 1   • " metoprolol tartrate (LOPRESSOR) 50 MG tablet TAKE 1 TABLET TWICE DAILY 180 tablet 1   • pantoprazole (PROTONIX) 40 MG EC tablet TAKE 1 TABLET EVERY DAY 90 tablet 1   • simvastatin (ZOCOR) 20 MG tablet Take 1 tablet by mouth every night at bedtime. 90 tablet 1     Allergies   Allergen Reactions   • Cephalexin Hives     Social History     Socioeconomic History   • Marital status:    Tobacco Use   • Smoking status: Current Every Day Smoker     Years: 10.00     Types: Pipe   • Smokeless tobacco: Never Used   Vaping Use   • Vaping Use: Never used   Substance and Sexual Activity   • Alcohol use: No   • Drug use: No   • Sexual activity: Not Currently     Partners: Female     Family History   Problem Relation Age of Onset   • Cancer Mother    • Heart disease Mother    • Diabetes Mother    • Cancer Father    • Diabetes Sister    • Diabetes Sister    • No Known Problems Brother    • No Known Problems Brother    • No Known Problems Brother    • No Known Problems Brother    • Colon cancer Son      Health Maintenance   Topic Date Due   • ZOSTER VACCINE (1 of 2) Never done   • COVID-19 Vaccine (4 - Booster for Moderna series) 02/06/2022   • Pneumococcal Vaccine 65+ (2 - PPSV23 or PCV20) 02/16/2023 (Originally 10/12/2018)   • TDAP/TD VACCINES (1 - Tdap) 01/01/2025 (Originally 8/3/1964)   • DIABETIC EYE EXAM  09/07/2022   • INFLUENZA VACCINE  10/01/2022   • HEMOGLOBIN A1C  11/16/2022   • LIPID PANEL  02/01/2023   • URINE MICROALBUMIN  02/01/2023   • ANNUAL WELLNESS VISIT  02/16/2023   • HEPATITIS C SCREENING  Completed     Review of Systems   Constitutional: Positive for appetite change. Negative for activity change, chills, diaphoresis, fatigue, fever and unexpected weight change.   HENT: Negative for ear pain, hearing loss, mouth sores, sore throat, trouble swallowing and voice change.    Eyes: Negative.    Respiratory: Negative for cough, choking, shortness of breath and wheezing.    Cardiovascular: Negative for chest  "pain and palpitations.   Gastrointestinal: Negative for abdominal pain, blood in stool, constipation, diarrhea, nausea and vomiting.   Endocrine: Negative for cold intolerance and heat intolerance.   Genitourinary: Negative for decreased urine volume, dysuria, frequency, hematuria and urgency.   Musculoskeletal: Negative for back pain, gait problem and myalgias.   Skin: Negative for color change, pallor and rash.   Allergic/Immunologic: Negative for food allergies and immunocompromised state.   Neurological: Negative for dizziness, tremors, seizures, syncope, weakness, light-headedness, numbness and headaches.   Hematological: Negative for adenopathy. Does not bruise/bleed easily.   Psychiatric/Behavioral: Negative for agitation and confusion. The patient is not nervous/anxious.    All other systems reviewed and are negative.    Objective   Vitals:    07/08/22 0854   BP: 138/64   Pulse: 75   Temp: 97.8 °F (36.6 °C)   SpO2: 96%   Weight: 95.6 kg (210 lb 12.8 oz)   Height: 182.9 cm (72.01\")     Body mass index is 28.58 kg/m².  Physical Exam  Constitutional:       Appearance: He is well-developed.   HENT:      Head: Normocephalic and atraumatic.   Eyes:      Pupils: Pupils are equal, round, and reactive to light.   Neck:      Trachea: No tracheal deviation.   Cardiovascular:      Rate and Rhythm: Normal rate and regular rhythm.      Heart sounds: Normal heart sounds. No murmur heard.    No friction rub. No gallop.   Pulmonary:      Effort: Pulmonary effort is normal. No respiratory distress.      Breath sounds: Normal breath sounds. No wheezing or rales.   Chest:      Chest wall: No tenderness.   Abdominal:      General: Bowel sounds are normal. There is no distension.      Palpations: Abdomen is soft. Abdomen is not rigid.      Tenderness: There is no abdominal tenderness. There is no guarding or rebound.   Musculoskeletal:         General: No tenderness or deformity. Normal range of motion.      Cervical back: Normal " range of motion and neck supple.   Skin:     General: Skin is warm and dry.      Coloration: Skin is not pale.      Findings: No rash.   Neurological:      Mental Status: He is alert and oriented to person, place, and time.      Deep Tendon Reflexes: Reflexes are normal and symmetric.   Psychiatric:         Behavior: Behavior normal.         Thought Content: Thought content normal.         Judgment: Judgment normal.       Assessment & Plan   Diagnoses and all orders for this visit:    1. Liver mass (Primary)  -     Ambulatory Referral to Gastroenterology  -     Cancer Antigen 19-9  -     CEA  -     AFP Tumor Marker  -     CBC & Differential  -     Comprehensive Metabolic Panel    2. Malignant neoplasm of biliary tract, unspecified (HCC)   -     Cancer Antigen 19-9    Long discussion today regarding his MRI findings. Cholangiocarcinoma and hepatocellular carcinoma are in the differential. I explained that tumor board would meet and determine the next course of care at this time per the guidance of Dr Oviedo at [facility]. They agree and that is where they desire to go for next phase due to their daughter residing in [place]. I explained results and that without biopsy or tissue diagnosis I cannot confirm these findings however they are suspicious for neoplastic lesion. I answered all questions and they verbalize understanding. Will work on referral. To call me with any questions or concerns.       Part of this note may be an electronic transcription/translation of spoken language to printed text using the Dragon Dictation System.  Body mass index is 28.58 kg/m².  No follow-ups on file.    BMI is >= 25 and <30. (Overweight) The following options were offered after discussion;: weight loss educational material (shared in after visit summary)      All risks, benefits, alternatives, and indications of colonoscopy and/or Endoscopy procedure have been discussed with the patient. Risks to include perforation of the  colon requiring possible surgery or colostomy, risk of bleeding from biopsies or removal of colon tissue, possibility of missing a colon polyp or cancer, or adverse drug reaction.  Benefits to include the diagnosis and management of disease of the colon and rectum. Alternatives to include barium enema, radiographic evaluation, lab testing or no intervention. Pt verbalizes understanding and agrees.     Jing Tineo, APRN  7/8/2022  09:42 CDT          If you smoke or use tobacco, 4 minutes reading provided  Steps to Quit Smoking  Smoking tobacco can be harmful to your health and can affect almost every organ in your body. Smoking puts you, and those around you, at risk for developing many serious chronic diseases. Quitting smoking is difficult, but it is one of the best things that you can do for your health. It is never too late to quit.  What are the benefits of quitting smoking?  When you quit smoking, you lower your risk of developing serious diseases and conditions, such as:  · Lung cancer or lung disease, such as COPD.  · Heart disease.  · Stroke.  · Heart attack.  · Infertility.  · Osteoporosis and bone fractures.  Additionally, symptoms such as coughing, wheezing, and shortness of breath may get better when you quit. You may also find that you get sick less often because your body is stronger at fighting off colds and infections. If you are pregnant, quitting smoking can help to reduce your chances of having a baby of low birth weight.  How do I get ready to quit?  When you decide to quit smoking, create a plan to make sure that you are successful. Before you quit:  · Pick a date to quit. Set a date within the next two weeks to give you time to prepare.  · Write down the reasons why you are quitting. Keep this list in places where you will see it often, such as on your bathroom mirror or in your car or wallet.  · Identify the people, places, things, and activities that make you want to smoke (triggers)  and avoid them. Make sure to take these actions:  ¨ Throw away all cigarettes at home, at work, and in your car.  ¨ Throw away smoking accessories, such as ashtrays and lighters.  ¨ Clean your car and make sure to empty the ashtray.  ¨ Clean your home, including curtains and carpets.  · Tell your family, friends, and coworkers that you are quitting. Support from your loved ones can make quitting easier.  · Talk with your health care provider about your options for quitting smoking.  · Find out what treatment options are covered by your health insurance.  What strategies can I use to quit smoking?  Talk with your healthcare provider about different strategies to quit smoking. Some strategies include:  · Quitting smoking altogether instead of gradually lessening how much you smoke over a period of time. Research shows that quitting “cold turkey” is more successful than gradually quitting.  · Attending in-person counseling to help you build problem-solving skills. You are more likely to have success in quitting if you attend several counseling sessions. Even short sessions of 10 minutes can be effective.  · Finding resources and support systems that can help you to quit smoking and remain smoke-free after you quit. These resources are most helpful when you use them often. They can include:  ¨ Online chats with a counselor.  ¨ Telephone quitlines.  ¨ Printed self-help materials.  ¨ Support groups or group counseling.  ¨ Text messaging programs.  ¨ Mobile phone applications.  · Taking medicines to help you quit smoking. (If you are pregnant or breastfeeding, talk with your health care provider first.) Some medicines contain nicotine and some do not. Both types of medicines help with cravings, but the medicines that include nicotine help to relieve withdrawal symptoms. Your health care provider may recommend:  ¨ Nicotine patches, gum, or lozenges.  ¨ Nicotine inhalers or sprays.  ¨ Non-nicotine medicine that is taken by  mouth.  Talk with your health care provider about combining strategies, such as taking medicines while you are also receiving in-person counseling. Using these two strategies together makes you more likely to succeed in quitting than if you used either strategy on its own.  If you are pregnant or breastfeeding, talk with your health care provider about finding counseling or other support strategies to quit smoking. Do not take medicine to help you quit smoking unless told to do so by your health care provider.  What things can I do to make it easier to quit?  Quitting smoking might feel overwhelming at first, but there is a lot that you can do to make it easier. Take these important actions:  · Reach out to your family and friends and ask that they support and encourage you during this time. Call telephone quitlines, reach out to support groups, or work with a counselor for support.  · Ask people who smoke to avoid smoking around you.  · Avoid places that trigger you to smoke, such as bars, parties, or smoke-break areas at work.  · Spend time around people who do not smoke.  · Lessen stress in your life, because stress can be a smoking trigger for some people. To lessen stress, try:  ¨ Exercising regularly.  ¨ Deep-breathing exercises.  ¨ Yoga.  ¨ Meditating.  ¨ Performing a body scan. This involves closing your eyes, scanning your body from head to toe, and noticing which parts of your body are particularly tense. Purposefully relax the muscles in those areas.  · Download or purchase mobile phone or tablet apps (applications) that can help you stick to your quit plan by providing reminders, tips, and encouragement. There are many free apps, such as QuitGuide from the CDC (Centers for Disease Control and Prevention). You can find other support for quitting smoking (smoking cessation) through smokefree.gov and other websites.  How will I feel when I quit smoking?  Within the first 24 hours of quitting smoking, you  may start to feel some withdrawal symptoms. These symptoms are usually most noticeable 2-3 days after quitting, but they usually do not last beyond 2-3 weeks. Changes or symptoms that you might experience include:  · Mood swings.  · Restlessness, anxiety, or irritation.  · Difficulty concentrating.  · Dizziness.  · Strong cravings for sugary foods in addition to nicotine.  · Mild weight gain.  · Constipation.  · Nausea.  · Coughing or a sore throat.  · Changes in how your medicines work in your body.  · A depressed mood.  · Difficulty sleeping (insomnia).  After the first 2-3 weeks of quitting, you may start to notice more positive results, such as:  · Improved sense of smell and taste.  · Decreased coughing and sore throat.  · Slower heart rate.  · Lower blood pressure.  · Clearer skin.  · The ability to breathe more easily.  · Fewer sick days.  Quitting smoking is very challenging for most people. Do not get discouraged if you are not successful the first time. Some people need to make many attempts to quit before they achieve long-term success. Do your best to stick to your quit plan, and talk with your health care provider if you have any questions or concerns.  This information is not intended to replace advice given to you by your health care provider. Make sure you discuss any questions you have with your health care provider.  Document Released: 12/12/2002 Document Revised: 08/15/2017 Document Reviewed: 05/03/2016  Elsevier Interactive Patient Education © 2017 [employer]vier Inc.

## 2022-07-08 NOTE — TELEPHONE ENCOUNTER
Called [facility] at [phone] and spoke to Lola. She had to transfer me to Hepatology to schedule. No one was available for me to talk to for scheduling. She sent them a message. Gave them my number as well as the back line to get back with me. Faxing records to [phone].

## 2022-07-11 NOTE — TELEPHONE ENCOUNTER
Spoke with Brandon at Dr. Oviedo and she is sending pt to Hepatobiliary Surgery to go ahead and be seen. She said they haven't received my fax yet so she asked me to refax it to [phone]. She says they will not be able to schedule it yet until they have those office notes.

## 2022-07-14 ENCOUNTER — TELEPHONE (OUTPATIENT)
Dept: GASTROENTEROLOGY | Facility: CLINIC | Age: 77
End: 2022-07-14

## 2022-07-14 NOTE — TELEPHONE ENCOUNTER
----- Message from INDER Patel sent at 7/14/2022  3:58 PM CDT -----  Please call the patient regarding his abnormal result. His tumor marker is elevated. I know that he has an appointment with [facility]. Thank you   Jing LOVING

## 2022-08-03 DIAGNOSIS — I73.9 PAD (PERIPHERAL ARTERY DISEASE): ICD-10-CM

## 2022-08-03 RX ORDER — CILOSTAZOL 100 MG/1
TABLET ORAL
Qty: 180 TABLET | Refills: 1 | Status: ON HOLD | OUTPATIENT
Start: 2022-08-03 | End: 2022-09-22

## 2022-08-11 ENCOUNTER — OFFICE VISIT (OUTPATIENT)
Dept: FAMILY MEDICINE CLINIC | Facility: CLINIC | Age: 77
End: 2022-08-11

## 2022-08-11 ENCOUNTER — TRANSCRIBE ORDERS (OUTPATIENT)
Dept: ADMINISTRATIVE | Facility: HOSPITAL | Age: 77
End: 2022-08-11

## 2022-08-11 ENCOUNTER — TELEPHONE (OUTPATIENT)
Dept: FAMILY MEDICINE CLINIC | Facility: CLINIC | Age: 77
End: 2022-08-11

## 2022-08-11 VITALS
WEIGHT: 208 LBS | DIASTOLIC BLOOD PRESSURE: 66 MMHG | HEIGHT: 72 IN | OXYGEN SATURATION: 97 % | HEART RATE: 84 BPM | BODY MASS INDEX: 28.17 KG/M2 | TEMPERATURE: 97.9 F | SYSTOLIC BLOOD PRESSURE: 167 MMHG

## 2022-08-11 DIAGNOSIS — J02.9 SORETHROAT: ICD-10-CM

## 2022-08-11 DIAGNOSIS — R97.8 ELEVATED TUMOR MARKERS: ICD-10-CM

## 2022-08-11 DIAGNOSIS — R93.89 ABNORMAL FINDING OF DIAGNOSTIC IMAGING: ICD-10-CM

## 2022-08-11 DIAGNOSIS — K76.9 LIVER LESION: Primary | ICD-10-CM

## 2022-08-11 DIAGNOSIS — E66.3 OVERWEIGHT (BMI 25.0-29.9): ICD-10-CM

## 2022-08-11 DIAGNOSIS — U07.1 COVID: Primary | ICD-10-CM

## 2022-08-11 DIAGNOSIS — R05.9 COUGH: ICD-10-CM

## 2022-08-11 LAB
EXPIRATION DATE: ABNORMAL
FLUAV AG UPPER RESP QL IA.RAPID: NOT DETECTED
FLUBV AG UPPER RESP QL IA.RAPID: NOT DETECTED
INTERNAL CONTROL: ABNORMAL
Lab: ABNORMAL
SARS-COV-2 AG UPPER RESP QL IA.RAPID: DETECTED

## 2022-08-11 PROCEDURE — 87428 SARSCOV & INF VIR A&B AG IA: CPT | Performed by: NURSE PRACTITIONER

## 2022-08-11 PROCEDURE — 99213 OFFICE O/P EST LOW 20 MIN: CPT | Performed by: NURSE PRACTITIONER

## 2022-08-11 NOTE — PROGRESS NOTES
"Chief Complaint   Patient presents with   • Generalized Body Aches   • Cough   • Sore Throat        Subjective   Darren Shay is a 77 y.o. male who presents today for generalized body aches, cough, and sore throat.     HPI   Started feeling ill on Monday He has progressively been feeling worse with body aches, cough and sore throat. He has not taken anything for it.   He has tested covid positive. He also has liver cancer.    Allergies   Allergen Reactions   • Cephalexin Hives         OBJECTIVE:  Vitals:    08/11/22 1257   BP: 167/66   BP Location: Left arm   Patient Position: Sitting   Cuff Size: Adult   Pulse: 84   Temp: 97.9 °F (36.6 °C)   SpO2: 97%   Weight: 94.3 kg (208 lb)   Height: 182.9 cm (72\")     Physical Exam  Vitals and nursing note reviewed.   Constitutional:       Appearance: Normal appearance.   Cardiovascular:      Rate and Rhythm: Normal rate and regular rhythm.      Pulses: Normal pulses.      Heart sounds: Normal heart sounds.   Pulmonary:      Effort: Pulmonary effort is normal.      Breath sounds: Normal breath sounds.   Neurological:      General: No focal deficit present.      Mental Status: He is alert and oriented to person, place, and time.   Psychiatric:         Mood and Affect: Mood normal.         Behavior: Behavior normal.         Thought Content: Thought content normal.         Judgment: Judgment normal.         BMI is >= 25 and <30. (Overweight) The following options were offered after discussion;: weight loss educational material (shared in after visit summary), exercise counseling/recommendations and nutrition counseling/recommendations        ASSESSMENT/ PLAN:    Diagnoses and all orders for this visit:    1. COVID (Primary)    2. Cough  -     POCT SARS-CoV-2 Antigen MANUELITO + Flu    3. Sorethroat  -     POCT SARS-CoV-2 Antigen MANUELITO + Flu    4. Overweight (BMI 25.0-29.9)    A verbal order was done to [facility] for Paxlovid. Take as directed.      Management Plan:     An After " Visit Summary was printed and given to the patient at discharge.    Follow-up: Return if symptoms worsen or fail to improve.    I spent 20 minutes caring for Darren on this date of service. This time includes time spent by me in the following activities: preparing for the visit, reviewing tests, obtaining and/or reviewing a separately obtained history, performing a medically appropriate examination and/or evaluation, ordering medications, tests, or procedures and documenting information in the medical record     INDER Gonzalez 8/11/2022 13:24 CDT  This note was electronically signed.

## 2022-08-11 NOTE — TELEPHONE ENCOUNTER
Caller: KYLE BRAUN     Relationship: DAUGHTER     Best call back number: [phone]    What is the best time to reach you: ANYTIME    Who are you requesting to speak with (clinical staff, provider,  specific staff member): CLINICAL     Do you know the name of the person who called: REQUESTING CLINICAL     What was the call regarding: STATES HE FELL LAST NIGHT AND THE AMBULANCE CAME TO HELP HIM SHE STATES SHE IS WORRIED ABOUT HIM NOT EATING AND FATIGUED  WOULD LIKE TO BE ADVISED AS SOON AS POSSIBLE     STATES HE HAS LIVER CANCER AND HAS SOME CONCERNS     Do you require a callback: YES

## 2022-08-12 ENCOUNTER — APPOINTMENT (OUTPATIENT)
Dept: CT IMAGING | Facility: HOSPITAL | Age: 77
End: 2022-08-12

## 2022-08-18 ENCOUNTER — HOSPITAL ENCOUNTER (OUTPATIENT)
Dept: CT IMAGING | Facility: HOSPITAL | Age: 77
Discharge: HOME | End: 2022-08-18
Admitting: NURSE PRACTITIONER

## 2022-08-18 DIAGNOSIS — R93.89 ABNORMAL FINDING OF DIAGNOSTIC IMAGING: ICD-10-CM

## 2022-08-18 DIAGNOSIS — R97.8 ELEVATED TUMOR MARKERS: ICD-10-CM

## 2022-08-18 DIAGNOSIS — K76.9 LIVER LESION: ICD-10-CM

## 2022-08-18 PROCEDURE — 71250 CT THORAX DX C-: CPT

## 2022-08-24 DIAGNOSIS — I10 ESSENTIAL HYPERTENSION: ICD-10-CM

## 2022-08-24 RX ORDER — METOPROLOL TARTRATE 50 MG/1
TABLET, FILM COATED ORAL
Qty: 180 TABLET | Refills: 3 | Status: SHIPPED | OUTPATIENT
Start: 2022-08-24 | End: 2022-08-24 | Stop reason: SDUPTHER

## 2022-08-24 NOTE — TELEPHONE ENCOUNTER
Rx Refill Note  Requested Prescriptions     Signed Prescriptions Disp Refills   • metoprolol tartrate (LOPRESSOR) 50 MG tablet 180 tablet 3     Sig: TAKE 1 TABLET TWICE DAILY     Authorizing Provider: VIVIANE BREWSTER      Last office visit with prescribing clinician: 6/15/2022      Next office visit with prescribing clinician:  Not adrianne cote  {Argenis Hunt MA  08/24/22, 16:11 CDT  Refilled already completed

## 2022-08-29 ENCOUNTER — APPOINTMENT (OUTPATIENT)
Dept: CT IMAGING | Facility: HOSPITAL | Age: 77
End: 2022-08-29

## 2022-09-21 ENCOUNTER — TELEPHONE (OUTPATIENT)
Dept: FAMILY MEDICINE CLINIC | Facility: CLINIC | Age: 77
End: 2022-09-21

## 2022-09-21 NOTE — TELEPHONE ENCOUNTER
Pt daughter called wanting to know if it's okay if pt takes low dose baby asprin and also stated she had some questions regarding one of pt medications.

## 2022-09-22 ENCOUNTER — APPOINTMENT (OUTPATIENT)
Dept: GENERAL RADIOLOGY | Facility: HOSPITAL | Age: 77
End: 2022-09-22

## 2022-09-22 ENCOUNTER — APPOINTMENT (OUTPATIENT)
Dept: CT IMAGING | Facility: HOSPITAL | Age: 77
End: 2022-09-22

## 2022-09-22 ENCOUNTER — HOSPITAL ENCOUNTER (INPATIENT)
Facility: HOSPITAL | Age: 77
LOS: 5 days | Discharge: NURSING, REHAB OR LONG-TERM CARE | End: 2022-09-27
Attending: FAMILY MEDICINE | Admitting: INTERNAL MEDICINE

## 2022-09-22 ENCOUNTER — TELEPHONE (OUTPATIENT)
Dept: FAMILY MEDICINE CLINIC | Facility: CLINIC | Age: 77
End: 2022-09-22

## 2022-09-22 ENCOUNTER — APPOINTMENT (OUTPATIENT)
Dept: MRI IMAGING | Facility: HOSPITAL | Age: 77
End: 2022-09-22

## 2022-09-22 DIAGNOSIS — N39.0 URINARY TRACT INFECTION WITHOUT HEMATURIA, SITE UNSPECIFIED: ICD-10-CM

## 2022-09-22 DIAGNOSIS — Z74.09 IMPAIRED MOBILITY: ICD-10-CM

## 2022-09-22 DIAGNOSIS — I63.413 CEREBROVASCULAR ACCIDENT (CVA) DUE TO BILATERAL EMBOLISM OF MIDDLE CEREBRAL ARTERIES: ICD-10-CM

## 2022-09-22 DIAGNOSIS — R13.10 DYSPHAGIA, UNSPECIFIED TYPE: ICD-10-CM

## 2022-09-22 DIAGNOSIS — Z74.09 IMPAIRED MOBILITY AND ADLS: ICD-10-CM

## 2022-09-22 DIAGNOSIS — Z78.9 IMPAIRED MOBILITY AND ADLS: ICD-10-CM

## 2022-09-22 DIAGNOSIS — R41.0 CONFUSION: Primary | ICD-10-CM

## 2022-09-22 PROBLEM — Z79.4 TYPE 2 DIABETES MELLITUS WITH HYPERGLYCEMIA, WITH LONG-TERM CURRENT USE OF INSULIN (HCC): Status: ACTIVE | Noted: 2017-03-22

## 2022-09-22 PROBLEM — C22.0 HEPATOCELLULAR CARCINOMA (HCC): Status: ACTIVE | Noted: 2022-09-22

## 2022-09-22 PROBLEM — R53.1 GENERALIZED WEAKNESS: Status: ACTIVE | Noted: 2022-09-22

## 2022-09-22 PROBLEM — R74.01 TRANSAMINITIS: Status: ACTIVE | Noted: 2022-09-22

## 2022-09-22 PROBLEM — R82.90 ABNORMAL URINALYSIS: Status: ACTIVE | Noted: 2022-09-22

## 2022-09-22 PROBLEM — E87.20 LACTIC ACIDOSIS: Status: ACTIVE | Noted: 2022-09-22

## 2022-09-22 PROBLEM — R91.8 GROUND GLASS OPACITY PRESENT ON IMAGING OF LUNG: Status: ACTIVE | Noted: 2022-09-22

## 2022-09-22 PROBLEM — E11.65 TYPE 2 DIABETES MELLITUS WITH HYPERGLYCEMIA, WITH LONG-TERM CURRENT USE OF INSULIN (HCC): Status: ACTIVE | Noted: 2017-03-22

## 2022-09-22 PROBLEM — E87.1 HYPONATREMIA: Status: ACTIVE | Noted: 2022-09-22

## 2022-09-22 LAB
ALBUMIN SERPL-MCNC: 3.2 G/DL (ref 3.5–5.2)
ALBUMIN/GLOB SERPL: 0.8 G/DL
ALP SERPL-CCNC: 166 U/L (ref 39–117)
ALT SERPL W P-5'-P-CCNC: 95 U/L (ref 1–41)
AMMONIA BLD-SCNC: 23 UMOL/L (ref 16–60)
ANION GAP SERPL CALCULATED.3IONS-SCNC: 10 MMOL/L (ref 5–15)
AST SERPL-CCNC: 249 U/L (ref 1–40)
BACTERIA UR QL AUTO: ABNORMAL /HPF
BASOPHILS # BLD AUTO: 0.1 10*3/MM3 (ref 0–0.2)
BASOPHILS NFR BLD AUTO: 0.9 % (ref 0–1.5)
BILIRUB SERPL-MCNC: 2.6 MG/DL (ref 0–1.2)
BILIRUB UR QL STRIP: ABNORMAL
BUN SERPL-MCNC: 19 MG/DL (ref 8–23)
BUN/CREAT SERPL: 19.6 (ref 7–25)
CALCIUM SPEC-SCNC: 9.3 MG/DL (ref 8.6–10.5)
CHLORIDE SERPL-SCNC: 102 MMOL/L (ref 98–107)
CLARITY UR: CLEAR
CO2 SERPL-SCNC: 21 MMOL/L (ref 22–29)
COLOR UR: ABNORMAL
CREAT SERPL-MCNC: 0.97 MG/DL (ref 0.76–1.27)
D-LACTATE SERPL-SCNC: 2.8 MMOL/L (ref 0.5–2)
DEPRECATED RDW RBC AUTO: 53.8 FL (ref 37–54)
EGFRCR SERPLBLD CKD-EPI 2021: 79.4 ML/MIN/1.73
EOSINOPHIL # BLD AUTO: 0.07 10*3/MM3 (ref 0–0.4)
EOSINOPHIL NFR BLD AUTO: 0.6 % (ref 0.3–6.2)
ERYTHROCYTE [DISTWIDTH] IN BLOOD BY AUTOMATED COUNT: 13.9 % (ref 12.3–15.4)
GLOBULIN UR ELPH-MCNC: 4.1 GM/DL
GLUCOSE BLDC GLUCOMTR-MCNC: 184 MG/DL (ref 70–130)
GLUCOSE BLDC GLUCOMTR-MCNC: 192 MG/DL (ref 70–130)
GLUCOSE BLDC GLUCOMTR-MCNC: 194 MG/DL (ref 70–130)
GLUCOSE SERPL-MCNC: 216 MG/DL (ref 65–99)
GLUCOSE UR STRIP-MCNC: ABNORMAL MG/DL
HCT VFR BLD AUTO: 45.2 % (ref 37.5–51)
HGB BLD-MCNC: 14.5 G/DL (ref 13–17.7)
HGB UR QL STRIP.AUTO: NEGATIVE
HYALINE CASTS UR QL AUTO: ABNORMAL /LPF
IMM GRANULOCYTES # BLD AUTO: 0.11 10*3/MM3 (ref 0–0.05)
IMM GRANULOCYTES NFR BLD AUTO: 1 % (ref 0–0.5)
KETONES UR QL STRIP: ABNORMAL
LEUKOCYTE ESTERASE UR QL STRIP.AUTO: ABNORMAL
LYMPHOCYTES # BLD AUTO: 0.34 10*3/MM3 (ref 0.7–3.1)
LYMPHOCYTES NFR BLD AUTO: 3.1 % (ref 19.6–45.3)
MAGNESIUM SERPL-MCNC: 2.2 MG/DL (ref 1.6–2.4)
MCH RBC QN AUTO: 33.3 PG (ref 26.6–33)
MCHC RBC AUTO-ENTMCNC: 32.1 G/DL (ref 31.5–35.7)
MCV RBC AUTO: 103.9 FL (ref 79–97)
MONOCYTES # BLD AUTO: 1.07 10*3/MM3 (ref 0.1–0.9)
MONOCYTES NFR BLD AUTO: 9.8 % (ref 5–12)
NEUTROPHILS NFR BLD AUTO: 84.6 % (ref 42.7–76)
NEUTROPHILS NFR BLD AUTO: 9.21 10*3/MM3 (ref 1.7–7)
NITRITE UR QL STRIP: POSITIVE
NRBC BLD AUTO-RTO: 0 /100 WBC (ref 0–0.2)
NT-PROBNP SERPL-MCNC: 100.4 PG/ML (ref 0–1800)
PH UR STRIP.AUTO: 5.5 [PH] (ref 5–8)
PLATELET # BLD AUTO: 153 10*3/MM3 (ref 140–450)
PMV BLD AUTO: 11.9 FL (ref 6–12)
POTASSIUM SERPL-SCNC: 5 MMOL/L (ref 3.5–5.2)
PROT SERPL-MCNC: 7.3 G/DL (ref 6–8.5)
PROT UR QL STRIP: ABNORMAL
RBC # BLD AUTO: 4.35 10*6/MM3 (ref 4.14–5.8)
RBC # UR STRIP: ABNORMAL /HPF
REF LAB TEST METHOD: ABNORMAL
SARS-COV-2 RNA PNL SPEC NAA+PROBE: NOT DETECTED
SODIUM SERPL-SCNC: 133 MMOL/L (ref 136–145)
SP GR UR STRIP: 1.02 (ref 1–1.03)
SQUAMOUS #/AREA URNS HPF: ABNORMAL /HPF
TROPONIN T SERPL-MCNC: <0.01 NG/ML (ref 0–0.03)
TROPONIN T SERPL-MCNC: <0.01 NG/ML (ref 0–0.03)
UROBILINOGEN UR QL STRIP: ABNORMAL
WBC # UR STRIP: ABNORMAL /HPF
WBC NRBC COR # BLD: 10.9 10*3/MM3 (ref 3.4–10.8)

## 2022-09-22 PROCEDURE — 83605 ASSAY OF LACTIC ACID: CPT | Performed by: FAMILY MEDICINE

## 2022-09-22 PROCEDURE — 80053 COMPREHEN METABOLIC PANEL: CPT | Performed by: FAMILY MEDICINE

## 2022-09-22 PROCEDURE — 87086 URINE CULTURE/COLONY COUNT: CPT | Performed by: FAMILY MEDICINE

## 2022-09-22 PROCEDURE — 93010 ELECTROCARDIOGRAM REPORT: CPT | Performed by: INTERNAL MEDICINE

## 2022-09-22 PROCEDURE — 83880 ASSAY OF NATRIURETIC PEPTIDE: CPT | Performed by: FAMILY MEDICINE

## 2022-09-22 PROCEDURE — 71045 X-RAY EXAM CHEST 1 VIEW: CPT

## 2022-09-22 PROCEDURE — 25010000002 LEVOFLOXACIN PER 250 MG: Performed by: FAMILY MEDICINE

## 2022-09-22 PROCEDURE — 0 GADOBENATE DIMEGLUMINE 529 MG/ML SOLUTION: Performed by: INTERNAL MEDICINE

## 2022-09-22 PROCEDURE — 63710000001 INSULIN LISPRO (HUMAN) PER 5 UNITS: Performed by: NURSE PRACTITIONER

## 2022-09-22 PROCEDURE — 82962 GLUCOSE BLOOD TEST: CPT

## 2022-09-22 PROCEDURE — 94761 N-INVAS EAR/PLS OXIMETRY MLT: CPT

## 2022-09-22 PROCEDURE — 99285 EMERGENCY DEPT VISIT HI MDM: CPT

## 2022-09-22 PROCEDURE — 94799 UNLISTED PULMONARY SVC/PX: CPT

## 2022-09-22 PROCEDURE — 84484 ASSAY OF TROPONIN QUANT: CPT | Performed by: FAMILY MEDICINE

## 2022-09-22 PROCEDURE — 93005 ELECTROCARDIOGRAM TRACING: CPT | Performed by: FAMILY MEDICINE

## 2022-09-22 PROCEDURE — 63710000001 INSULIN DETEMIR PER 5 UNITS: Performed by: NURSE PRACTITIONER

## 2022-09-22 PROCEDURE — 74176 CT ABD & PELVIS W/O CONTRAST: CPT

## 2022-09-22 PROCEDURE — 83735 ASSAY OF MAGNESIUM: CPT | Performed by: FAMILY MEDICINE

## 2022-09-22 PROCEDURE — 81001 URINALYSIS AUTO W/SCOPE: CPT | Performed by: FAMILY MEDICINE

## 2022-09-22 PROCEDURE — 70553 MRI BRAIN STEM W/O & W/DYE: CPT

## 2022-09-22 PROCEDURE — A9577 INJ MULTIHANCE: HCPCS | Performed by: INTERNAL MEDICINE

## 2022-09-22 PROCEDURE — 87040 BLOOD CULTURE FOR BACTERIA: CPT | Performed by: FAMILY MEDICINE

## 2022-09-22 PROCEDURE — 82140 ASSAY OF AMMONIA: CPT | Performed by: FAMILY MEDICINE

## 2022-09-22 PROCEDURE — 85025 COMPLETE CBC W/AUTO DIFF WBC: CPT | Performed by: FAMILY MEDICINE

## 2022-09-22 PROCEDURE — 87635 SARS-COV-2 COVID-19 AMP PRB: CPT | Performed by: FAMILY MEDICINE

## 2022-09-22 RX ORDER — LANOLIN ALCOHOL/MO/W.PET/CERES
800 CREAM (GRAM) TOPICAL DAILY
Status: DISCONTINUED | OUTPATIENT
Start: 2022-09-23 | End: 2022-09-27 | Stop reason: HOSPADM

## 2022-09-22 RX ORDER — INSULIN GLARGINE 100 [IU]/ML
82 INJECTION, SOLUTION SUBCUTANEOUS NIGHTLY
COMMUNITY
End: 2022-09-27 | Stop reason: HOSPADM

## 2022-09-22 RX ORDER — ONDANSETRON 2 MG/ML
4 INJECTION INTRAMUSCULAR; INTRAVENOUS EVERY 6 HOURS PRN
Status: DISCONTINUED | OUTPATIENT
Start: 2022-09-22 | End: 2022-09-27 | Stop reason: HOSPADM

## 2022-09-22 RX ORDER — SODIUM CHLORIDE 0.9 % (FLUSH) 0.9 %
10 SYRINGE (ML) INJECTION AS NEEDED
Status: DISCONTINUED | OUTPATIENT
Start: 2022-09-22 | End: 2022-09-27 | Stop reason: HOSPADM

## 2022-09-22 RX ORDER — SODIUM CHLORIDE 9 MG/ML
125 INJECTION, SOLUTION INTRAVENOUS CONTINUOUS
Status: DISCONTINUED | OUTPATIENT
Start: 2022-09-22 | End: 2022-09-22

## 2022-09-22 RX ORDER — SODIUM CHLORIDE 9 MG/ML
50 INJECTION, SOLUTION INTRAVENOUS CONTINUOUS
Status: DISCONTINUED | OUTPATIENT
Start: 2022-09-22 | End: 2022-09-22

## 2022-09-22 RX ORDER — DEXTROSE MONOHYDRATE 25 G/50ML
25 INJECTION, SOLUTION INTRAVENOUS
Status: DISCONTINUED | OUTPATIENT
Start: 2022-09-22 | End: 2022-09-27 | Stop reason: HOSPADM

## 2022-09-22 RX ORDER — CILOSTAZOL 100 MG/1
100 TABLET ORAL 2 TIMES DAILY
COMMUNITY

## 2022-09-22 RX ORDER — METOPROLOL TARTRATE 50 MG/1
50 TABLET, FILM COATED ORAL 2 TIMES DAILY
Status: DISCONTINUED | OUTPATIENT
Start: 2022-09-22 | End: 2022-09-27 | Stop reason: HOSPADM

## 2022-09-22 RX ORDER — LEVOFLOXACIN 5 MG/ML
500 INJECTION, SOLUTION INTRAVENOUS ONCE
Status: COMPLETED | OUTPATIENT
Start: 2022-09-22 | End: 2022-09-22

## 2022-09-22 RX ORDER — METOPROLOL TARTRATE 50 MG/1
50 TABLET, FILM COATED ORAL 2 TIMES DAILY
COMMUNITY

## 2022-09-22 RX ORDER — SODIUM CHLORIDE 0.9 % (FLUSH) 0.9 %
10 SYRINGE (ML) INJECTION EVERY 12 HOURS SCHEDULED
Status: DISCONTINUED | OUTPATIENT
Start: 2022-09-22 | End: 2022-09-27 | Stop reason: HOSPADM

## 2022-09-22 RX ORDER — ACETAMINOPHEN 160 MG/5ML
650 SOLUTION ORAL EVERY 4 HOURS PRN
Status: DISCONTINUED | OUTPATIENT
Start: 2022-09-22 | End: 2022-09-27 | Stop reason: HOSPADM

## 2022-09-22 RX ORDER — INSULIN LISPRO 100 [IU]/ML
0-9 INJECTION, SOLUTION INTRAVENOUS; SUBCUTANEOUS EVERY 6 HOURS
Status: DISCONTINUED | OUTPATIENT
Start: 2022-09-22 | End: 2022-09-27 | Stop reason: HOSPADM

## 2022-09-22 RX ORDER — ONDANSETRON 4 MG/1
4 TABLET, FILM COATED ORAL EVERY 6 HOURS PRN
Status: DISCONTINUED | OUTPATIENT
Start: 2022-09-22 | End: 2022-09-27 | Stop reason: HOSPADM

## 2022-09-22 RX ORDER — NICOTINE POLACRILEX 4 MG
15 LOZENGE BUCCAL
Status: DISCONTINUED | OUTPATIENT
Start: 2022-09-22 | End: 2022-09-27 | Stop reason: HOSPADM

## 2022-09-22 RX ORDER — ACETAMINOPHEN 325 MG/1
650 TABLET ORAL EVERY 4 HOURS PRN
Status: DISCONTINUED | OUTPATIENT
Start: 2022-09-22 | End: 2022-09-27 | Stop reason: HOSPADM

## 2022-09-22 RX ORDER — ACETAMINOPHEN 650 MG/1
650 SUPPOSITORY RECTAL EVERY 4 HOURS PRN
Status: DISCONTINUED | OUTPATIENT
Start: 2022-09-22 | End: 2022-09-27 | Stop reason: HOSPADM

## 2022-09-22 RX ORDER — CILOSTAZOL 100 MG/1
100 TABLET ORAL 2 TIMES DAILY
Status: DISCONTINUED | OUTPATIENT
Start: 2022-09-22 | End: 2022-09-27 | Stop reason: HOSPADM

## 2022-09-22 RX ADMIN — INSULIN DETEMIR 30 UNITS: 100 INJECTION, SOLUTION SUBCUTANEOUS at 20:44

## 2022-09-22 RX ADMIN — CILOSTAZOL 100 MG: 100 TABLET ORAL at 20:45

## 2022-09-22 RX ADMIN — GADOBENATE DIMEGLUMINE 20 ML: 529 INJECTION, SOLUTION INTRAVENOUS at 18:01

## 2022-09-22 RX ADMIN — SODIUM CHLORIDE 500 ML: 9 INJECTION, SOLUTION INTRAVENOUS at 13:41

## 2022-09-22 RX ADMIN — LEVOFLOXACIN 500 MG: 500 INJECTION, SOLUTION INTRAVENOUS at 15:01

## 2022-09-22 RX ADMIN — METOPROLOL TARTRATE 50 MG: 50 TABLET, FILM COATED ORAL at 20:45

## 2022-09-22 RX ADMIN — SODIUM CHLORIDE 500 ML: 9 INJECTION, SOLUTION INTRAVENOUS at 18:26

## 2022-09-22 RX ADMIN — INSULIN LISPRO 2 UNITS: 100 INJECTION, SOLUTION INTRAVENOUS; SUBCUTANEOUS at 18:26

## 2022-09-22 NOTE — TELEPHONE ENCOUNTER
It is ok for him to take an enteric-coated aspirin 81 mg. Please see what questions she has regarding her dad.

## 2022-09-22 NOTE — TELEPHONE ENCOUNTER
Caller: SERENA WRIGHT     Relationship: DAUGHTER    Best call back number: [phone]        Who are you requesting to speak with (clinical staff, provider,  specific staff member): CLINICAL STAFF    Do you know the name of the person who called: DAUGHTER    What was the call regarding: [place] RECOMMENDED PATIENT GO TO [facility] EMERGENCY ROOM; PATIENT HAS NOT URINATED IN 24 HOURS. AMBULANCE WAS AT THE HOUSE WHEN I SPOKE TO DAUGHTER.      DAUGHTER IS REQUESTING HOME HEALTH FOR HER DAD ONCE HE'S RELASED FROM [facility]     Do you require a callback: YES

## 2022-09-23 ENCOUNTER — APPOINTMENT (OUTPATIENT)
Dept: CARDIOLOGY | Facility: HOSPITAL | Age: 77
End: 2022-09-23

## 2022-09-23 ENCOUNTER — APPOINTMENT (OUTPATIENT)
Dept: CT IMAGING | Facility: HOSPITAL | Age: 77
End: 2022-09-23

## 2022-09-23 ENCOUNTER — APPOINTMENT (OUTPATIENT)
Dept: ULTRASOUND IMAGING | Facility: HOSPITAL | Age: 77
End: 2022-09-23

## 2022-09-23 PROBLEM — R41.82 ALTERED MENTAL STATUS: Status: ACTIVE | Noted: 2017-03-22

## 2022-09-23 LAB
ALBUMIN SERPL-MCNC: 3 G/DL (ref 3.5–5.2)
ALBUMIN/GLOB SERPL: 1.1 G/DL
ALP SERPL-CCNC: 141 U/L (ref 39–117)
ALT SERPL W P-5'-P-CCNC: 68 U/L (ref 1–41)
ANION GAP SERPL CALCULATED.3IONS-SCNC: 9 MMOL/L (ref 5–15)
AST SERPL-CCNC: 163 U/L (ref 1–40)
BACTERIA SPEC AEROBE CULT: NO GROWTH
BH CV ECHO MEAS - AO MAX PG: 2.7 MMHG
BH CV ECHO MEAS - AO MEAN PG: 2 MMHG
BH CV ECHO MEAS - AO ROOT DIAM: 3.4 CM
BH CV ECHO MEAS - AO V2 MAX: 82.4 CM/SEC
BH CV ECHO MEAS - AO V2 VTI: 15.7 CM
BH CV ECHO MEAS - AVA(I,D): 3.8 CM2
BH CV ECHO MEAS - EDV(CUBED): 91.7 ML
BH CV ECHO MEAS - EDV(MOD-SP2): 63.4 ML
BH CV ECHO MEAS - EDV(MOD-SP4): 87.7 ML
BH CV ECHO MEAS - EF(MOD-BP): 64.3 %
BH CV ECHO MEAS - EF(MOD-SP2): 68.5 %
BH CV ECHO MEAS - EF(MOD-SP4): 58.6 %
BH CV ECHO MEAS - ESV(CUBED): 22.7 ML
BH CV ECHO MEAS - ESV(MOD-SP2): 20 ML
BH CV ECHO MEAS - ESV(MOD-SP4): 36.3 ML
BH CV ECHO MEAS - FS: 37.3 %
BH CV ECHO MEAS - IVS/LVPW: 1.15 CM
BH CV ECHO MEAS - IVSD: 1.32 CM
BH CV ECHO MEAS - LA DIMENSION: 3.4 CM
BH CV ECHO MEAS - LAT PEAK E' VEL: 3.9 CM/SEC
BH CV ECHO MEAS - LV DIASTOLIC VOL/BSA (35-75): 40.4 CM2
BH CV ECHO MEAS - LV MASS(C)D: 207.2 GRAMS
BH CV ECHO MEAS - LV MAX PG: 2.5 MMHG
BH CV ECHO MEAS - LV MEAN PG: 2 MMHG
BH CV ECHO MEAS - LV SYSTOLIC VOL/BSA (12-30): 16.7 CM2
BH CV ECHO MEAS - LV V1 MAX: 79.1 CM/SEC
BH CV ECHO MEAS - LV V1 VTI: 15.7 CM
BH CV ECHO MEAS - LVIDD: 4.5 CM
BH CV ECHO MEAS - LVIDS: 2.8 CM
BH CV ECHO MEAS - LVOT AREA: 3.8 CM2
BH CV ECHO MEAS - LVOT DIAM: 2.2 CM
BH CV ECHO MEAS - LVPWD: 1.15 CM
BH CV ECHO MEAS - MED PEAK E' VEL: 4.8 CM/SEC
BH CV ECHO MEAS - MV A MAX VEL: 77.6 CM/SEC
BH CV ECHO MEAS - MV DEC SLOPE: 579 CM/SEC2
BH CV ECHO MEAS - MV DEC TIME: 0.11 MSEC
BH CV ECHO MEAS - MV E MAX VEL: 61.1 CM/SEC
BH CV ECHO MEAS - MV E/A: 0.79
BH CV ECHO MEAS - MV MAX PG: 1.98 MMHG
BH CV ECHO MEAS - MV MEAN PG: 1 MMHG
BH CV ECHO MEAS - MV V2 VTI: 14.4 CM
BH CV ECHO MEAS - MVA(VTI): 4.1 CM2
BH CV ECHO MEAS - PA V2 MAX: 90.4 CM/SEC
BH CV ECHO MEAS - PI END-D VEL: 85.5 CM/SEC
BH CV ECHO MEAS - RAP SYSTOLE: 5 MMHG
BH CV ECHO MEAS - RV MAX PG: 3.2 MMHG
BH CV ECHO MEAS - RV V1 MAX: 89.4 CM/SEC
BH CV ECHO MEAS - RV V1 VTI: 16.3 CM
BH CV ECHO MEAS - RVDD: 3 CM
BH CV ECHO MEAS - RVSP: 27.3 MMHG
BH CV ECHO MEAS - SI(MOD-SP2): 20 ML/M2
BH CV ECHO MEAS - SI(MOD-SP4): 23.7 ML/M2
BH CV ECHO MEAS - SV(LVOT): 59.7 ML
BH CV ECHO MEAS - SV(MOD-SP2): 43.4 ML
BH CV ECHO MEAS - SV(MOD-SP4): 51.4 ML
BH CV ECHO MEAS - TAPSE (>1.6): 1.53 CM
BH CV ECHO MEAS - TR MAX PG: 22.3 MMHG
BH CV ECHO MEAS - TR MAX VEL: 236 CM/SEC
BH CV ECHO MEASUREMENTS AVERAGE E/E' RATIO: 14.05
BH CV XLRA - TDI S': 8.7 CM/SEC
BILIRUB SERPL-MCNC: 2.1 MG/DL (ref 0–1.2)
BUN SERPL-MCNC: 17 MG/DL (ref 8–23)
BUN/CREAT SERPL: 19.3 (ref 7–25)
CALCIUM SPEC-SCNC: 8.3 MG/DL (ref 8.6–10.5)
CHLORIDE SERPL-SCNC: 104 MMOL/L (ref 98–107)
CHOLEST SERPL-MCNC: 113 MG/DL (ref 0–200)
CO2 SERPL-SCNC: 23 MMOL/L (ref 22–29)
CREAT SERPL-MCNC: 0.88 MG/DL (ref 0.76–1.27)
DEPRECATED RDW RBC AUTO: 51.4 FL (ref 37–54)
EGFRCR SERPLBLD CKD-EPI 2021: 87.5 ML/MIN/1.73
ERYTHROCYTE [DISTWIDTH] IN BLOOD BY AUTOMATED COUNT: 13.5 % (ref 12.3–15.4)
FERRITIN SERPL-MCNC: 1106 NG/ML (ref 30–400)
FOLATE SERPL-MCNC: >20 NG/ML (ref 4.78–24.2)
GLOBULIN UR ELPH-MCNC: 2.8 GM/DL
GLUCOSE BLDC GLUCOMTR-MCNC: 109 MG/DL (ref 70–130)
GLUCOSE BLDC GLUCOMTR-MCNC: 121 MG/DL (ref 70–130)
GLUCOSE BLDC GLUCOMTR-MCNC: 156 MG/DL (ref 70–130)
GLUCOSE BLDC GLUCOMTR-MCNC: 249 MG/DL (ref 70–130)
GLUCOSE BLDC GLUCOMTR-MCNC: 265 MG/DL (ref 70–130)
GLUCOSE BLDC GLUCOMTR-MCNC: 348 MG/DL (ref 70–130)
GLUCOSE SERPL-MCNC: 117 MG/DL (ref 65–99)
HBA1C MFR BLD: 6.3 % (ref 4.8–5.6)
HCT VFR BLD AUTO: 40 % (ref 37.5–51)
HDLC SERPL-MCNC: 19 MG/DL (ref 40–60)
HGB BLD-MCNC: 12.9 G/DL (ref 13–17.7)
IRON 24H UR-MRATE: 72 MCG/DL (ref 59–158)
IRON SATN MFR SERPL: 41 % (ref 20–50)
LDLC SERPL CALC-MCNC: 72 MG/DL (ref 0–100)
LDLC/HDLC SERPL: 3.68 {RATIO}
LEFT ATRIUM VOLUME INDEX: 33.8 ML/M2
LEFT ATRIUM VOLUME: 73.4 ML
MAXIMAL PREDICTED HEART RATE: 141 BPM
MCH RBC QN AUTO: 32.8 PG (ref 26.6–33)
MCHC RBC AUTO-ENTMCNC: 32.3 G/DL (ref 31.5–35.7)
MCV RBC AUTO: 101.8 FL (ref 79–97)
PLATELET # BLD AUTO: 139 10*3/MM3 (ref 140–450)
PMV BLD AUTO: 12.2 FL (ref 6–12)
POTASSIUM SERPL-SCNC: 4.4 MMOL/L (ref 3.5–5.2)
PROT SERPL-MCNC: 5.8 G/DL (ref 6–8.5)
QT INTERVAL: 360 MS
QTC INTERVAL: 428 MS
RBC # BLD AUTO: 3.93 10*6/MM3 (ref 4.14–5.8)
SODIUM SERPL-SCNC: 136 MMOL/L (ref 136–145)
STRESS TARGET HR: 120 BPM
TIBC SERPL-MCNC: 177 MCG/DL (ref 298–536)
TRANSFERRIN SERPL-MCNC: 119 MG/DL (ref 200–360)
TRIGL SERPL-MCNC: 120 MG/DL (ref 0–150)
TSH SERPL DL<=0.05 MIU/L-ACNC: 1.11 UIU/ML (ref 0.27–4.2)
VIT B12 BLD-MCNC: 1088 PG/ML (ref 211–946)
VLDLC SERPL-MCNC: 22 MG/DL (ref 5–40)
WBC NRBC COR # BLD: 9.48 10*3/MM3 (ref 3.4–10.8)

## 2022-09-23 PROCEDURE — 83036 HEMOGLOBIN GLYCOSYLATED A1C: CPT | Performed by: NURSE PRACTITIONER

## 2022-09-23 PROCEDURE — 82728 ASSAY OF FERRITIN: CPT | Performed by: PSYCHIATRY & NEUROLOGY

## 2022-09-23 PROCEDURE — 99222 1ST HOSP IP/OBS MODERATE 55: CPT | Performed by: PSYCHIATRY & NEUROLOGY

## 2022-09-23 PROCEDURE — 63710000001 INSULIN DETEMIR PER 5 UNITS: Performed by: NURSE PRACTITIONER

## 2022-09-23 PROCEDURE — 0 IOPAMIDOL PER 1 ML: Performed by: INTERNAL MEDICINE

## 2022-09-23 PROCEDURE — 93880 EXTRACRANIAL BILAT STUDY: CPT | Performed by: SURGERY

## 2022-09-23 PROCEDURE — 82607 VITAMIN B-12: CPT | Performed by: PSYCHIATRY & NEUROLOGY

## 2022-09-23 PROCEDURE — 82746 ASSAY OF FOLIC ACID SERUM: CPT | Performed by: PSYCHIATRY & NEUROLOGY

## 2022-09-23 PROCEDURE — 93880 EXTRACRANIAL BILAT STUDY: CPT

## 2022-09-23 PROCEDURE — 93306 TTE W/DOPPLER COMPLETE: CPT | Performed by: INTERNAL MEDICINE

## 2022-09-23 PROCEDURE — 80053 COMPREHEN METABOLIC PANEL: CPT | Performed by: NURSE PRACTITIONER

## 2022-09-23 PROCEDURE — 85027 COMPLETE CBC AUTOMATED: CPT | Performed by: NURSE PRACTITIONER

## 2022-09-23 PROCEDURE — 93306 TTE W/DOPPLER COMPLETE: CPT

## 2022-09-23 PROCEDURE — 25010000002 PERFLUTREN 6.52 MG/ML SUSPENSION: Performed by: INTERNAL MEDICINE

## 2022-09-23 PROCEDURE — 80061 LIPID PANEL: CPT | Performed by: CLINICAL NURSE SPECIALIST

## 2022-09-23 PROCEDURE — 84443 ASSAY THYROID STIM HORMONE: CPT | Performed by: NURSE PRACTITIONER

## 2022-09-23 PROCEDURE — 92610 EVALUATE SWALLOWING FUNCTION: CPT

## 2022-09-23 PROCEDURE — 70498 CT ANGIOGRAPHY NECK: CPT

## 2022-09-23 PROCEDURE — 36415 COLL VENOUS BLD VENIPUNCTURE: CPT | Performed by: NURSE PRACTITIONER

## 2022-09-23 PROCEDURE — 82962 GLUCOSE BLOOD TEST: CPT

## 2022-09-23 PROCEDURE — 63710000001 INSULIN LISPRO (HUMAN) PER 5 UNITS: Performed by: NURSE PRACTITIONER

## 2022-09-23 PROCEDURE — 70496 CT ANGIOGRAPHY HEAD: CPT

## 2022-09-23 PROCEDURE — 84466 ASSAY OF TRANSFERRIN: CPT | Performed by: PSYCHIATRY & NEUROLOGY

## 2022-09-23 PROCEDURE — 83540 ASSAY OF IRON: CPT | Performed by: PSYCHIATRY & NEUROLOGY

## 2022-09-23 RX ORDER — ATORVASTATIN CALCIUM 40 MG/1
40 TABLET, FILM COATED ORAL NIGHTLY
Status: DISCONTINUED | OUTPATIENT
Start: 2022-09-23 | End: 2022-09-27 | Stop reason: HOSPADM

## 2022-09-23 RX ORDER — ASPIRIN 81 MG/1
81 TABLET, CHEWABLE ORAL DAILY
Status: DISCONTINUED | OUTPATIENT
Start: 2022-09-23 | End: 2022-09-27 | Stop reason: HOSPADM

## 2022-09-23 RX ORDER — SODIUM CHLORIDE 9 MG/ML
75 INJECTION, SOLUTION INTRAVENOUS CONTINUOUS
Status: DISCONTINUED | OUTPATIENT
Start: 2022-09-23 | End: 2022-09-24

## 2022-09-23 RX ADMIN — INSULIN LISPRO 4 UNITS: 100 INJECTION, SOLUTION INTRAVENOUS; SUBCUTANEOUS at 11:01

## 2022-09-23 RX ADMIN — INSULIN LISPRO 6 UNITS: 100 INJECTION, SOLUTION INTRAVENOUS; SUBCUTANEOUS at 17:13

## 2022-09-23 RX ADMIN — INSULIN LISPRO 2 UNITS: 100 INJECTION, SOLUTION INTRAVENOUS; SUBCUTANEOUS at 01:10

## 2022-09-23 RX ADMIN — METOPROLOL TARTRATE 50 MG: 50 TABLET, FILM COATED ORAL at 21:31

## 2022-09-23 RX ADMIN — IOPAMIDOL 100 ML: 755 INJECTION, SOLUTION INTRAVENOUS at 17:21

## 2022-09-23 RX ADMIN — CILOSTAZOL 100 MG: 100 TABLET ORAL at 21:31

## 2022-09-23 RX ADMIN — Medication 800 MCG: at 10:18

## 2022-09-23 RX ADMIN — PERFLUTREN 13.04 MG: 6.52 INJECTION, SUSPENSION INTRAVENOUS at 14:00

## 2022-09-23 RX ADMIN — Medication 10 ML: at 10:19

## 2022-09-23 RX ADMIN — SODIUM CHLORIDE 75 ML/HR: 9 INJECTION, SOLUTION INTRAVENOUS at 17:13

## 2022-09-23 RX ADMIN — ASPIRIN 81 MG: 81 TABLET, CHEWABLE ORAL at 12:57

## 2022-09-23 RX ADMIN — INSULIN DETEMIR 30 UNITS: 100 INJECTION, SOLUTION SUBCUTANEOUS at 21:30

## 2022-09-23 RX ADMIN — METOPROLOL TARTRATE 50 MG: 50 TABLET, FILM COATED ORAL at 10:18

## 2022-09-23 RX ADMIN — ATORVASTATIN CALCIUM 40 MG: 40 TABLET, FILM COATED ORAL at 21:31

## 2022-09-23 RX ADMIN — CILOSTAZOL 100 MG: 100 TABLET ORAL at 10:18

## 2022-09-23 NOTE — TELEPHONE ENCOUNTER
Called daughter to let her know that [facility]avi will be glad to follow her dad, when he comes home on home health.  DAUGHTERS NAME IS SERENA WRIGHT, HER PHONE NUMBER [phone].Daughter also stated that he will be on oxygen.

## 2022-09-23 NOTE — TELEPHONE ENCOUNTER
Upon hospital discharge, they will likely order the home health and I will be glad to follow him and continue his orders with home health.

## 2022-09-24 LAB
ALBUMIN SERPL-MCNC: 2.8 G/DL (ref 3.5–5.2)
ALBUMIN/GLOB SERPL: 0.8 G/DL
ALP SERPL-CCNC: 134 U/L (ref 39–117)
ALT SERPL W P-5'-P-CCNC: 61 U/L (ref 1–41)
ANION GAP SERPL CALCULATED.3IONS-SCNC: 6 MMOL/L (ref 5–15)
AST SERPL-CCNC: 140 U/L (ref 1–40)
BILIRUB SERPL-MCNC: 1.5 MG/DL (ref 0–1.2)
BUN SERPL-MCNC: 14 MG/DL (ref 8–23)
BUN/CREAT SERPL: 18.9 (ref 7–25)
CALCIUM SPEC-SCNC: 8.8 MG/DL (ref 8.6–10.5)
CHLORIDE SERPL-SCNC: 105 MMOL/L (ref 98–107)
CO2 SERPL-SCNC: 27 MMOL/L (ref 22–29)
CREAT SERPL-MCNC: 0.74 MG/DL (ref 0.76–1.27)
EGFRCR SERPLBLD CKD-EPI 2021: 92.2 ML/MIN/1.73
GLOBULIN UR ELPH-MCNC: 3.6 GM/DL
GLUCOSE BLDC GLUCOMTR-MCNC: 139 MG/DL (ref 70–130)
GLUCOSE BLDC GLUCOMTR-MCNC: 193 MG/DL (ref 70–130)
GLUCOSE BLDC GLUCOMTR-MCNC: 235 MG/DL (ref 70–130)
GLUCOSE BLDC GLUCOMTR-MCNC: 240 MG/DL (ref 70–130)
GLUCOSE BLDC GLUCOMTR-MCNC: 257 MG/DL (ref 70–130)
GLUCOSE BLDC GLUCOMTR-MCNC: 68 MG/DL (ref 70–130)
GLUCOSE BLDC GLUCOMTR-MCNC: 77 MG/DL (ref 70–130)
GLUCOSE SERPL-MCNC: 86 MG/DL (ref 65–99)
MAGNESIUM SERPL-MCNC: 2.2 MG/DL (ref 1.6–2.4)
PHOSPHATE SERPL-MCNC: 2.3 MG/DL (ref 2.5–4.5)
POTASSIUM SERPL-SCNC: 3.7 MMOL/L (ref 3.5–5.2)
PROT SERPL-MCNC: 6.4 G/DL (ref 6–8.5)
SODIUM SERPL-SCNC: 138 MMOL/L (ref 136–145)

## 2022-09-24 PROCEDURE — 84100 ASSAY OF PHOSPHORUS: CPT | Performed by: INTERNAL MEDICINE

## 2022-09-24 PROCEDURE — 80053 COMPREHEN METABOLIC PANEL: CPT | Performed by: INTERNAL MEDICINE

## 2022-09-24 PROCEDURE — 83735 ASSAY OF MAGNESIUM: CPT | Performed by: INTERNAL MEDICINE

## 2022-09-24 PROCEDURE — 63710000001 INSULIN DETEMIR PER 5 UNITS: Performed by: NURSE PRACTITIONER

## 2022-09-24 PROCEDURE — 97162 PT EVAL MOD COMPLEX 30 MIN: CPT | Performed by: PHYSICAL THERAPIST

## 2022-09-24 PROCEDURE — 97530 THERAPEUTIC ACTIVITIES: CPT

## 2022-09-24 PROCEDURE — 99221 1ST HOSP IP/OBS SF/LOW 40: CPT | Performed by: SURGERY

## 2022-09-24 PROCEDURE — 63710000001 INSULIN LISPRO (HUMAN) PER 5 UNITS: Performed by: NURSE PRACTITIONER

## 2022-09-24 PROCEDURE — 99233 SBSQ HOSP IP/OBS HIGH 50: CPT | Performed by: PSYCHIATRY & NEUROLOGY

## 2022-09-24 PROCEDURE — 82962 GLUCOSE BLOOD TEST: CPT

## 2022-09-24 PROCEDURE — 97166 OT EVAL MOD COMPLEX 45 MIN: CPT | Performed by: OCCUPATIONAL THERAPIST

## 2022-09-24 RX ORDER — LISINOPRIL 10 MG/1
10 TABLET ORAL
Status: DISCONTINUED | OUTPATIENT
Start: 2022-09-24 | End: 2022-09-27

## 2022-09-24 RX ADMIN — Medication 800 MCG: at 08:23

## 2022-09-24 RX ADMIN — INSULIN LISPRO 6 UNITS: 100 INJECTION, SOLUTION INTRAVENOUS; SUBCUTANEOUS at 17:10

## 2022-09-24 RX ADMIN — Medication 10 ML: at 21:45

## 2022-09-24 RX ADMIN — Medication 10 ML: at 08:24

## 2022-09-24 RX ADMIN — CILOSTAZOL 100 MG: 100 TABLET ORAL at 21:22

## 2022-09-24 RX ADMIN — SODIUM CHLORIDE 75 ML/HR: 9 INJECTION, SOLUTION INTRAVENOUS at 07:26

## 2022-09-24 RX ADMIN — ASPIRIN 81 MG: 81 TABLET, CHEWABLE ORAL at 08:24

## 2022-09-24 RX ADMIN — METOPROLOL TARTRATE 50 MG: 50 TABLET, FILM COATED ORAL at 21:21

## 2022-09-24 RX ADMIN — CILOSTAZOL 100 MG: 100 TABLET ORAL at 08:24

## 2022-09-24 RX ADMIN — INSULIN DETEMIR 30 UNITS: 100 INJECTION, SOLUTION SUBCUTANEOUS at 21:22

## 2022-09-24 RX ADMIN — INSULIN LISPRO 2 UNITS: 100 INJECTION, SOLUTION INTRAVENOUS; SUBCUTANEOUS at 11:57

## 2022-09-24 RX ADMIN — METOPROLOL TARTRATE 50 MG: 50 TABLET, FILM COATED ORAL at 08:23

## 2022-09-24 RX ADMIN — ATORVASTATIN CALCIUM 40 MG: 40 TABLET, FILM COATED ORAL at 21:22

## 2022-09-24 RX ADMIN — LISINOPRIL 10 MG: 10 TABLET ORAL at 18:48

## 2022-09-24 RX ADMIN — INSULIN LISPRO 4 UNITS: 100 INJECTION, SOLUTION INTRAVENOUS; SUBCUTANEOUS at 00:21

## 2022-09-25 LAB
ALBUMIN SERPL-MCNC: 2.9 G/DL (ref 3.5–5.2)
ALBUMIN/GLOB SERPL: 0.7 G/DL
ALP SERPL-CCNC: 163 U/L (ref 39–117)
ALT SERPL W P-5'-P-CCNC: 64 U/L (ref 1–41)
ANION GAP SERPL CALCULATED.3IONS-SCNC: 8 MMOL/L (ref 5–15)
AST SERPL-CCNC: 124 U/L (ref 1–40)
BILIRUB SERPL-MCNC: 1.6 MG/DL (ref 0–1.2)
BUN SERPL-MCNC: 13 MG/DL (ref 8–23)
BUN/CREAT SERPL: 16 (ref 7–25)
CALCIUM SPEC-SCNC: 8.8 MG/DL (ref 8.6–10.5)
CHLORIDE SERPL-SCNC: 105 MMOL/L (ref 98–107)
CO2 SERPL-SCNC: 25 MMOL/L (ref 22–29)
CREAT SERPL-MCNC: 0.81 MG/DL (ref 0.76–1.27)
EGFRCR SERPLBLD CKD-EPI 2021: 89.7 ML/MIN/1.73
GLOBULIN UR ELPH-MCNC: 3.9 GM/DL
GLUCOSE BLDC GLUCOMTR-MCNC: 108 MG/DL (ref 70–130)
GLUCOSE BLDC GLUCOMTR-MCNC: 199 MG/DL (ref 70–130)
GLUCOSE BLDC GLUCOMTR-MCNC: 214 MG/DL (ref 70–130)
GLUCOSE BLDC GLUCOMTR-MCNC: 217 MG/DL (ref 70–130)
GLUCOSE BLDC GLUCOMTR-MCNC: 236 MG/DL (ref 70–130)
GLUCOSE BLDC GLUCOMTR-MCNC: 99 MG/DL (ref 70–130)
GLUCOSE SERPL-MCNC: 113 MG/DL (ref 65–99)
MAGNESIUM SERPL-MCNC: 2.2 MG/DL (ref 1.6–2.4)
PHOSPHATE SERPL-MCNC: 3.4 MG/DL (ref 2.5–4.5)
POTASSIUM SERPL-SCNC: 3.8 MMOL/L (ref 3.5–5.2)
PROT SERPL-MCNC: 6.8 G/DL (ref 6–8.5)
SODIUM SERPL-SCNC: 138 MMOL/L (ref 136–145)

## 2022-09-25 PROCEDURE — 99233 SBSQ HOSP IP/OBS HIGH 50: CPT | Performed by: CLINICAL NURSE SPECIALIST

## 2022-09-25 PROCEDURE — 84100 ASSAY OF PHOSPHORUS: CPT | Performed by: INTERNAL MEDICINE

## 2022-09-25 PROCEDURE — 63710000001 INSULIN LISPRO (HUMAN) PER 5 UNITS: Performed by: NURSE PRACTITIONER

## 2022-09-25 PROCEDURE — 80053 COMPREHEN METABOLIC PANEL: CPT | Performed by: INTERNAL MEDICINE

## 2022-09-25 PROCEDURE — 82962 GLUCOSE BLOOD TEST: CPT

## 2022-09-25 PROCEDURE — 83735 ASSAY OF MAGNESIUM: CPT | Performed by: INTERNAL MEDICINE

## 2022-09-25 PROCEDURE — 63710000001 INSULIN DETEMIR PER 5 UNITS: Performed by: NURSE PRACTITIONER

## 2022-09-25 RX ADMIN — LISINOPRIL 10 MG: 10 TABLET ORAL at 08:52

## 2022-09-25 RX ADMIN — ASPIRIN 81 MG: 81 TABLET, CHEWABLE ORAL at 08:52

## 2022-09-25 RX ADMIN — INSULIN LISPRO 2 UNITS: 100 INJECTION, SOLUTION INTRAVENOUS; SUBCUTANEOUS at 00:36

## 2022-09-25 RX ADMIN — Medication 10 ML: at 08:52

## 2022-09-25 RX ADMIN — CILOSTAZOL 100 MG: 100 TABLET ORAL at 22:24

## 2022-09-25 RX ADMIN — INSULIN DETEMIR 30 UNITS: 100 INJECTION, SOLUTION SUBCUTANEOUS at 22:11

## 2022-09-25 RX ADMIN — Medication 10 ML: at 22:28

## 2022-09-25 RX ADMIN — INSULIN LISPRO 4 UNITS: 100 INJECTION, SOLUTION INTRAVENOUS; SUBCUTANEOUS at 12:33

## 2022-09-25 RX ADMIN — CILOSTAZOL 100 MG: 100 TABLET ORAL at 08:52

## 2022-09-25 RX ADMIN — INSULIN LISPRO 4 UNITS: 100 INJECTION, SOLUTION INTRAVENOUS; SUBCUTANEOUS at 17:22

## 2022-09-25 RX ADMIN — Medication 800 MCG: at 08:52

## 2022-09-25 RX ADMIN — METOPROLOL TARTRATE 50 MG: 50 TABLET, FILM COATED ORAL at 08:52

## 2022-09-25 RX ADMIN — METOPROLOL TARTRATE 50 MG: 50 TABLET, FILM COATED ORAL at 22:24

## 2022-09-25 RX ADMIN — ATORVASTATIN CALCIUM 40 MG: 40 TABLET, FILM COATED ORAL at 22:24

## 2022-09-26 ENCOUNTER — APPOINTMENT (OUTPATIENT)
Dept: ULTRASOUND IMAGING | Facility: HOSPITAL | Age: 77
End: 2022-09-26

## 2022-09-26 LAB
GLUCOSE BLDC GLUCOMTR-MCNC: 159 MG/DL (ref 70–130)
GLUCOSE BLDC GLUCOMTR-MCNC: 209 MG/DL (ref 70–130)
GLUCOSE BLDC GLUCOMTR-MCNC: 242 MG/DL (ref 70–130)
GLUCOSE BLDC GLUCOMTR-MCNC: 263 MG/DL (ref 70–130)
GLUCOSE BLDC GLUCOMTR-MCNC: 266 MG/DL (ref 70–130)

## 2022-09-26 PROCEDURE — 97116 GAIT TRAINING THERAPY: CPT

## 2022-09-26 PROCEDURE — 97110 THERAPEUTIC EXERCISES: CPT

## 2022-09-26 PROCEDURE — 82962 GLUCOSE BLOOD TEST: CPT

## 2022-09-26 PROCEDURE — 63710000001 INSULIN DETEMIR PER 5 UNITS: Performed by: NURSE PRACTITIONER

## 2022-09-26 PROCEDURE — 99233 SBSQ HOSP IP/OBS HIGH 50: CPT | Performed by: CLINICAL NURSE SPECIALIST

## 2022-09-26 PROCEDURE — 93970 EXTREMITY STUDY: CPT

## 2022-09-26 PROCEDURE — 63710000001 INSULIN LISPRO (HUMAN) PER 5 UNITS: Performed by: NURSE PRACTITIONER

## 2022-09-26 RX ADMIN — INSULIN LISPRO 4 UNITS: 100 INJECTION, SOLUTION INTRAVENOUS; SUBCUTANEOUS at 17:32

## 2022-09-26 RX ADMIN — INSULIN LISPRO 2 UNITS: 100 INJECTION, SOLUTION INTRAVENOUS; SUBCUTANEOUS at 06:45

## 2022-09-26 RX ADMIN — INSULIN DETEMIR 30 UNITS: 100 INJECTION, SOLUTION SUBCUTANEOUS at 20:59

## 2022-09-26 RX ADMIN — ASPIRIN 81 MG: 81 TABLET, CHEWABLE ORAL at 09:12

## 2022-09-26 RX ADMIN — APIXABAN 2.5 MG: 2.5 TABLET, FILM COATED ORAL at 20:59

## 2022-09-26 RX ADMIN — CILOSTAZOL 100 MG: 100 TABLET ORAL at 20:58

## 2022-09-26 RX ADMIN — Medication 10 ML: at 20:59

## 2022-09-26 RX ADMIN — ATORVASTATIN CALCIUM 40 MG: 40 TABLET, FILM COATED ORAL at 20:58

## 2022-09-26 RX ADMIN — LISINOPRIL 10 MG: 10 TABLET ORAL at 09:58

## 2022-09-26 RX ADMIN — METOPROLOL TARTRATE 50 MG: 50 TABLET, FILM COATED ORAL at 09:58

## 2022-09-26 RX ADMIN — METOPROLOL TARTRATE 50 MG: 50 TABLET, FILM COATED ORAL at 20:58

## 2022-09-26 RX ADMIN — ACETAMINOPHEN 650 MG: 325 TABLET, FILM COATED ORAL at 21:45

## 2022-09-26 RX ADMIN — INSULIN LISPRO 4 UNITS: 100 INJECTION, SOLUTION INTRAVENOUS; SUBCUTANEOUS at 01:41

## 2022-09-26 RX ADMIN — Medication 800 MCG: at 09:12

## 2022-09-26 RX ADMIN — INSULIN LISPRO 4 UNITS: 100 INJECTION, SOLUTION INTRAVENOUS; SUBCUTANEOUS at 12:50

## 2022-09-26 RX ADMIN — CILOSTAZOL 100 MG: 100 TABLET ORAL at 09:12

## 2022-09-26 RX ADMIN — Medication 10 ML: at 09:12

## 2022-09-27 ENCOUNTER — APPOINTMENT (OUTPATIENT)
Dept: CARDIOLOGY | Facility: HOSPITAL | Age: 77
End: 2022-09-27

## 2022-09-27 VITALS
RESPIRATION RATE: 16 BRPM | HEART RATE: 83 BPM | OXYGEN SATURATION: 92 % | BODY MASS INDEX: 27.94 KG/M2 | WEIGHT: 206.3 LBS | SYSTOLIC BLOOD PRESSURE: 128 MMHG | HEIGHT: 72 IN | TEMPERATURE: 98.1 F | DIASTOLIC BLOOD PRESSURE: 53 MMHG

## 2022-09-27 PROBLEM — I63.9 EMBOLIC STROKE (HCC): Status: ACTIVE | Noted: 2022-09-27

## 2022-09-27 PROBLEM — I65.23 BILATERAL CAROTID ARTERY STENOSIS: Status: ACTIVE | Noted: 2022-09-27

## 2022-09-27 LAB
ALBUMIN SERPL-MCNC: 3.1 G/DL (ref 3.5–5.2)
ALBUMIN/GLOB SERPL: 0.7 G/DL
ALP SERPL-CCNC: 192 U/L (ref 39–117)
ALT SERPL W P-5'-P-CCNC: 71 U/L (ref 1–41)
AMMONIA BLD-SCNC: 39 UMOL/L (ref 16–60)
ANION GAP SERPL CALCULATED.3IONS-SCNC: 8 MMOL/L (ref 5–15)
AST SERPL-CCNC: 122 U/L (ref 1–40)
BACTERIA SPEC AEROBE CULT: NORMAL
BACTERIA SPEC AEROBE CULT: NORMAL
BILIRUB SERPL-MCNC: 1.3 MG/DL (ref 0–1.2)
BUN SERPL-MCNC: 27 MG/DL (ref 8–23)
BUN/CREAT SERPL: 23.3 (ref 7–25)
CALCIUM SPEC-SCNC: 9.4 MG/DL (ref 8.6–10.5)
CHLORIDE SERPL-SCNC: 103 MMOL/L (ref 98–107)
CO2 SERPL-SCNC: 21 MMOL/L (ref 22–29)
CREAT SERPL-MCNC: 1.16 MG/DL (ref 0.76–1.27)
DEPRECATED RDW RBC AUTO: 51 FL (ref 37–54)
EGFRCR SERPLBLD CKD-EPI 2021: 64.1 ML/MIN/1.73
ERYTHROCYTE [DISTWIDTH] IN BLOOD BY AUTOMATED COUNT: 13.7 % (ref 12.3–15.4)
GLOBULIN UR ELPH-MCNC: 4.3 GM/DL
GLUCOSE BLDC GLUCOMTR-MCNC: 197 MG/DL (ref 70–130)
GLUCOSE BLDC GLUCOMTR-MCNC: 263 MG/DL (ref 70–130)
GLUCOSE BLDC GLUCOMTR-MCNC: 294 MG/DL (ref 70–130)
GLUCOSE BLDC GLUCOMTR-MCNC: 302 MG/DL (ref 70–130)
GLUCOSE SERPL-MCNC: 236 MG/DL (ref 65–99)
HCT VFR BLD AUTO: 42.2 % (ref 37.5–51)
HGB BLD-MCNC: 13.9 G/DL (ref 13–17.7)
MAXIMAL PREDICTED HEART RATE: 141 BPM
MCH RBC QN AUTO: 33.4 PG (ref 26.6–33)
MCHC RBC AUTO-ENTMCNC: 32.9 G/DL (ref 31.5–35.7)
MCV RBC AUTO: 101.4 FL (ref 79–97)
PLATELET # BLD AUTO: 202 10*3/MM3 (ref 140–450)
PMV BLD AUTO: 11.4 FL (ref 6–12)
POTASSIUM SERPL-SCNC: 4.7 MMOL/L (ref 3.5–5.2)
PROT SERPL-MCNC: 7.4 G/DL (ref 6–8.5)
RBC # BLD AUTO: 4.16 10*6/MM3 (ref 4.14–5.8)
SARS-COV-2 RNA PNL SPEC NAA+PROBE: NOT DETECTED
SODIUM SERPL-SCNC: 132 MMOL/L (ref 136–145)
STRESS TARGET HR: 120 BPM
WBC NRBC COR # BLD: 8.96 10*3/MM3 (ref 3.4–10.8)

## 2022-09-27 PROCEDURE — 82140 ASSAY OF AMMONIA: CPT | Performed by: CLINICAL NURSE SPECIALIST

## 2022-09-27 PROCEDURE — 85027 COMPLETE CBC AUTOMATED: CPT | Performed by: INTERNAL MEDICINE

## 2022-09-27 PROCEDURE — 63710000001 INSULIN LISPRO (HUMAN) PER 5 UNITS: Performed by: NURSE PRACTITIONER

## 2022-09-27 PROCEDURE — 82962 GLUCOSE BLOOD TEST: CPT

## 2022-09-27 PROCEDURE — 80053 COMPREHEN METABOLIC PANEL: CPT | Performed by: INTERNAL MEDICINE

## 2022-09-27 PROCEDURE — 97110 THERAPEUTIC EXERCISES: CPT

## 2022-09-27 PROCEDURE — 99232 SBSQ HOSP IP/OBS MODERATE 35: CPT | Performed by: CLINICAL NURSE SPECIALIST

## 2022-09-27 PROCEDURE — 87635 SARS-COV-2 COVID-19 AMP PRB: CPT | Performed by: INTERNAL MEDICINE

## 2022-09-27 PROCEDURE — 97116 GAIT TRAINING THERAPY: CPT

## 2022-09-27 PROCEDURE — 93246 EXT ECG>7D<15D RECORDING: CPT

## 2022-09-27 RX ORDER — LISINOPRIL 5 MG/1
5 TABLET ORAL
Start: 2022-09-28

## 2022-09-27 RX ORDER — LISINOPRIL 5 MG/1
5 TABLET ORAL
Status: DISCONTINUED | OUTPATIENT
Start: 2022-09-28 | End: 2022-09-27 | Stop reason: HOSPADM

## 2022-09-27 RX ORDER — ASPIRIN 81 MG/1
81 TABLET, CHEWABLE ORAL DAILY
Start: 2022-09-28

## 2022-09-27 RX ORDER — INSULIN LISPRO 100 [IU]/ML
0-9 INJECTION, SOLUTION INTRAVENOUS; SUBCUTANEOUS EVERY 6 HOURS
Refills: 12
Start: 2022-09-27

## 2022-09-27 RX ORDER — ATORVASTATIN CALCIUM 40 MG/1
40 TABLET, FILM COATED ORAL NIGHTLY
Qty: 90 TABLET
Start: 2022-09-27

## 2022-09-27 RX ADMIN — CILOSTAZOL 100 MG: 100 TABLET ORAL at 09:40

## 2022-09-27 RX ADMIN — LISINOPRIL 10 MG: 10 TABLET ORAL at 09:40

## 2022-09-27 RX ADMIN — ASPIRIN 81 MG: 81 TABLET, CHEWABLE ORAL at 09:40

## 2022-09-27 RX ADMIN — APIXABAN 2.5 MG: 2.5 TABLET, FILM COATED ORAL at 09:40

## 2022-09-27 RX ADMIN — Medication 800 MCG: at 09:40

## 2022-09-27 RX ADMIN — INSULIN LISPRO 7 UNITS: 100 INJECTION, SOLUTION INTRAVENOUS; SUBCUTANEOUS at 00:09

## 2022-09-27 RX ADMIN — Medication 10 ML: at 09:40

## 2022-09-27 RX ADMIN — INSULIN LISPRO 2 UNITS: 100 INJECTION, SOLUTION INTRAVENOUS; SUBCUTANEOUS at 06:04

## 2022-09-27 RX ADMIN — INSULIN LISPRO 4 UNITS: 100 INJECTION, SOLUTION INTRAVENOUS; SUBCUTANEOUS at 11:48

## 2022-09-27 RX ADMIN — METOPROLOL TARTRATE 50 MG: 50 TABLET, FILM COATED ORAL at 09:40

## 2022-09-27 RX ADMIN — INSULIN LISPRO 6 UNITS: 100 INJECTION, SOLUTION INTRAVENOUS; SUBCUTANEOUS at 18:21

## 2022-09-27 NOTE — TELEPHONE ENCOUNTER
Patient's daughter has been communicating with provider through [employer] messages. Patient is currently in hospital at this time.

## 2022-09-28 PROBLEM — I63.9 ACUTE ISCHEMIC STROKE (HCC): Status: ACTIVE | Noted: 2022-01-01

## 2022-09-28 NOTE — PROGRESS NOTES
Speech Language Pathology  Facility/Department: [facility] 8 REHAB UNIT   CLINICAL BEDSIDE SWALLOW EVALUATION    NAME: Gail Herring  : 1945  MRN: [id number]    ADMISSION DATE: 2022  ADMITTING DIAGNOSIS: has Diabetic polyneuropathy associated with type 2 diabetes mellitus ([address] Coronary artery disease involving native coronary artery without angina pectoris; Erectile dysfunction; Hypercholesterolemia; Essential hypertension; Atherosclerosis of native arteries of the extremities with ulceration ([address] Gangrene of toe ([address] Type 2 diabetes mellitus with diabetic peripheral angiopathy and gangrene, with long-term current use of insulin ([address] Cellulitis of foot; Open wound of toe with complication; Wound infection; and Acute ischemic stroke [facility]) on their problem list.    Date of Eval: 2022  Evaluating Therapist: Rafael Martinez SLP    Clinical Impression  Clinical Bedside Swallow Evaluation completed. Oral prep reveals decreased rotary chewing/prolonged mastication; however WFL. Oral transit is consistently 1-2 seconds with ice chips, puree, regular solid, thin liquid. Minimum-moderate residue noted with regular solids and cleared with a dry swallow and liquid wash. Laryngeal movement observed to be consistently sluggish and mildly decreased. No overt s/s of aspiration observed with ice chips, puree, regular solid, thin liquid. Diet recommendations include Easy to Chew consistencies with Thin liquids. Medications should be administered whole with H2O. SLP will monitor diet recommendation closely.     Recent Chest Xray/CT of Chest: (Date ) No recent imaging at [facility])    Current Diet level:  Current Diet : Minced and Moist    Pain:  Pain Assessment  Pain Assessment: None - Denies Pain    Reason for Referral  Gail Herring was referred for a bedside swallow evaluation to assess the efficiency of his swallow function, identify signs and symptoms of aspiration and make recommendations regarding safe dietary consistencies, effective compensatory strategies, and safe eating environment. Treatment Plan  Requires SLP Intervention: Yes  Duration of Treatment: 2-3 weeks  D/C Recommendations: Ongoing speech therapy is recommended during this hospitalization    Recommended Diet and Intervention  Easy to chew  Thin liquids  Recommended Form of Meds: Whole with water  Therapeutic Interventions: Pharyngeal exercises;Diet tolerance monitoring;Oral motor exercises; Patient/Family education    Compensatory Swallowing Strategies  Compensatory Swallowing Strategies : Alternate solids and liquids;Eat/Feed slowly;Upright as possible for all oral intake;Remain upright for 30-45 minutes after meals;Small bites/sips    Treatment/Goals  Short-term Goals  Goal 1: Pt will tolerate easy to chew consistencies with thin liquids with minimal overt s/s of aspiration/penetration during hospitalization. Goal 2: Pt will participate in swallowing reassessments to ensure safety with oral intake. Goal 3: Pt will complete pharyngeal strengthening exercises. Goal 4: Pt will allow adequate oral care throughout hospitalization. Goal 5: Pt will complete OMEs for lingual and labial strengthening. Goal 6: Pt will demonstrate understanding of general aspiration precautions. Long-term Goals  Timeframe for Long-term Goals: 2-3 weeks  Goal 1: Pt will tolerate the least restrictive diet with minimal overt s/s of aspiration/penetration during hospitalization. General  Chart Reviewed: Yes  Behavior/Cognition: Alert; Cooperative;Pleasant mood  Temperature Spikes Noted: No  Respiratory Status: O2 via nasual cannula  O2 Device: Nasal cannula  Liters of Oxygen: 6 L (Nursing reports lungs diminished this am)  Communication Observation: Dysarthria  Follows Directions: Simple  Dentition: Some missing teeth; Adequate  Patient Positioning: Upright in bed  Baseline Vocal Quality: Weak;Hoarse  Volitional Cough: Strong  Volitional Swallow: Delayed  Prior Dysphagia History: Denies hx dysphagia. Was on modified diet of mechanical soft with thin liquids at previous stay at [address] [place] [address] did recommend MBSS due to outward s/s of aspiration being present during hospitalization at [facility]. WIll monitor current diet closely. Consistencies Administered: Pureed;Regular; Thin - cup; Ice Chips    Vision/Hearing  Vision  Vision: Impaired  Vision Exceptions: Wears glasses for reading  Hearing  Hearing: Exceptions to [facility]  Hearing Exceptions: Bilateral hearing aid    Oral Motor Deficits  Oral/Motor  Oral Hygiene: Clean (Dry mouth)    Oral Mechanism Examination:  Labial retraction: bi laterally decreased  Labial protrusion: bi laterally decreased  Labial compression: decreased   Labial coordination: slow  Lingual extension: decreased  Lingual elevation: decreased  Bilateral lingual movement: decreased  Lingual symmetry: no deviation noted  Lingual strength: weak  Lingual coordination: slow        Swallowing:   Clinical bedside swallow evaluation completed on this date. Pt alert, cooperative, and upright in bed. Wife and daughter present during evaluation. Current diet of minced and moist with thin liquids. Denies hx dysphagia. Congested coughing observed throughout tx. RN reports diminished lung sounds this AM.    Consistencies Administered: ice chips, puree, regular solid, thin liquid via side of cup    Oral Prep: Decreased rotary chewing/prolonged mastication with ice chips and regular solids; however WFL. Oral Transit: Timing is consistently 1-2 seconds with ice chips, puree, regular solid, thin liquid. Minimum-moderate residue noted with regular solids and cleared with a dry swallow and liquid wash. Laryngeal movement: Consistently sluggish and mildly decreased with ice chips, puree, regular solid, thin liquid  S/S of aspiration: No overt s/s of aspiration observed with ice chips, puree, regular solid, thin liquid.         SLP will continue to follow and treat.    Electronically signed by SACHI Osorio on 9/28/2022 at 3:51 PM

## 2022-09-28 NOTE — PROGRESS NOTES
Hafsa Holcomb arrived to room # [phone] [id number]. Presented with: cva  Mental Status: Patient is oriented, alert, coherent, and logical.   There were no vitals filed for this visit. Patient safety contract and falls prevention contract reviewed with patient Yes. Oriented Patient to room. Call light within reach. Yes.   Needs, issues or concerns expressed at this time: no.      Electronically signed by True Bullard RN on 9/27/2022 at 9:56 PM

## 2022-09-28 NOTE — PROGRESS NOTES
[facility] ACUTE INPATIENT REHABILITATION  TEAM CONFERENCE NOTE    Date: 2022  Patient Name: Gail Herring        MRN: [id number]    : 1945  (76 y.o.)  Gender: male             PHYSICAL THERAPY      SPEECH THERAPY      OCCUPATIONAL THERAPY      NUTRITION  Current Wt: 192 lb Johney Belt  / There is no height or weight on file to calculate BMI. Admission Wt:  192 lb 8oz  Oral Diet Orders:   Minced and Moist--3 CHO choices  Oral Nutrition Supplement (ONS) Orders:  None  Please see nutrition note for details. NURSING    Wounds/Incisions/Ulcers: No skin issues identified          Pain: No pain concerns to address    Consultations/Labs/X-rays:     Family Education: Family available and participating in education     Fall Risk:  Lj Tavarez in the last week?no      Other Nursing Issues: Ileostomy [id number]. Eliquis/ASA. Pills whole. Accuchek qid. Mech soft diet. A/O x2. Holter monitor x12 days. Ortho B/P's daily. Left transmet amp. BM 27. Cont bladder.          SOCIAL WORK/CASE MANAGEMENT  Assessment:    Discharge Plan   Estimated Length of Stay: {TIME; DAYS WEEKS VROLNP:55690}  Destination: {Settin}    Pass: {YES/NO:27486}    Services at Discharge: {FOLLOW-UP NLAZYEE:15579}    Equipment at Discharge: {EQUIPMENT:996307278}    Progress made in the prior week:  1.  2.  3.  4.  5.      Goals for following week:  1.  2.   3.   4.   5.     Factors facilitating achievement of predicted outcomes: {Patient Strengths:244228023}    Barriers to the achievement of predicted outcomes: {BARRIERS:283110217}    Team Members Present at Conference:  : Ronit Carlson [address]   Occupational Therapist: {ambiguous abbreviation:8004288::\"Norma Bellamy Officer, Vanna Najjar OTR/L\"}  Physical Therapist: {ambiguous abbreviation:8547256::\"Johnson Yuen PT,DPT\",\"[employer] PT\"}  Speech Therapist: {ambiguous abbreviation:3217820::\"N/A\",\"Yael Morrissey MS, CCC-SLP\",\"Radha Lacy, SLP\"}  Nurse: {ambiguous abbreviation:7076285::\"Meka Okeefe, RN,BSN\",\"Jenny Vale, RN\",\"Etta Combs, [address] [place] [address] Rd,[facility], RN\",\"Manuelito Ruth, RN\",\"Lauryn Gonsalez, RN\",\"Freya Benites, RN\"}   Nurse Manager:  Bisi Cabrera RN, BSN  Dietitian:  {ambiguous abbreviation:1494035::\"Carmen Rutherford, MS, RD, LD\",\"Ruiz Chirinos, MS RD, LD\"}  Rehab Director:        I approve the established interdisciplinary plan of care as documented within the medical record of Nancy Garcia.

## 2022-09-28 NOTE — PLAN OF CARE
[address] TREATMENT PLAN      Carmel Scott    : 1945  [facility]  MRN: [id number]   PHYSICIAN:  Samuel Ku MD  Primary Problem    Patient Active Problem List   Diagnosis    Diabetic polyneuropathy associated with type 2 diabetes mellitus ([facility]    Coronary artery disease involving native coronary artery without angina pectoris    Erectile dysfunction    Hypercholesterolemia    Essential hypertension    Atherosclerosis of native arteries of the extremities with ulceration (HCC)    Gangrene of toe (HCC)    Type 2 diabetes mellitus with diabetic peripheral angiopathy and gangrene, with long-term current use of insulin (HCC)    Cellulitis of foot    Open wound of toe with complication    Wound infection    Acute ischemic stroke [facility])       Rehabilitation Diagnosis:     Acute ischemic stroke ([facility] [I63.9]       ADMIT DATE:2022   CARE PLAN     NURSING:  Carmel Scott while on this unit will:     [x] Be continent of bowel and bladder      [x] Have an adequate number of bowel movements   [x] Urinate with no urinary retention >300ml in bladder   [] Complete bladder protocol with egan removal   [x] Maintain O2 SATs at ___%   [x] Have pain managed while on ARU        [x] Be pain free by discharge    [x] Have no skin breakdown while on ARU   [x] Have improved skin integrity via wound measurements   [] Have no signs/symptoms of infection at the wound site  [x] Be free from injury during hospitalization   [x] Complete education with patient/family with understanding demonstrated for:  [] Adjustment   [] Other:   Nursing interventions may include bowel/bladder training, education for medical assistive devices, medication education, O2 saturation management, energy conservation, stress management techniques, fall prevention, alarms protocol, seating and positioning, skin/wound care, pressure relief instruction,dressing changes,  infection protection, DVT prophylaxis, and/or assistance with in room safety with transfers to bed, toilet, wheelchair, shower as well as bathroom activities and hygiene. Patient/caregiver education for:   [] Disease/sustained injury/management      [] Medication Use   [] Surgical intervention   [] Safety   [] Body mechanics and or joint protection   [] Health maintenance       IRF-VIANEY          PHYSICAL THERAPY:  Goals:                  Short Term Goals  Time Frame for Short term goals: 1 WEEK  Short term goal 1: BED MOBILITY WITH OR WITHOUT RAILS MOD INDEP  Short term goal 2: TRANSFER SURFACE TO SURFACE WITH AD CGA  Short term goal 3: W/C PROPULSION 150FT SBA  Short term goal 4: AMBULATE WITH AD 50 FT CGA  Short term goal 5: UP/DOWN 1 STEP WITH RAILS MIN A            Long Term Goals  Time Frame for Long term goals : 2 WEEKS  Long term goal 1: BED MOBILITY WITH OR WITHOUT RAILS INDEP  Long term goal 2: TRANSFER SURFACE TO SURFACE WITH AD INDEP  Long term goal 3: W/C PROPULSION 150FT INDEP  Long term goal 4: AMBULATE WITH  FT CGA  Long term goal 5: UP/DOWN 4 STEPS WITH RAILS SBA  These goals were reviewed with this patient at the time of assessment and Bita Pereira is in agreement.      Plan of Care: Frequency:  [] 5 days per week, 90 minutes per day                             [x]  5 days per week, 60 minutes per day               Current Treatment Recommendations: Strengthening, ROM, Balance training, Functional mobility training, Transfer training, ADL/Self-care training, Endurance training, Wheelchair mobility training, Gait training, Stair training, Home exercise program, Safety education & training, Patient/Caregiver education & training    IRF-VIANEY  Roll Left and Right  Assistance Needed: Supervision or touching assistance  CARE Score: 4  Discharge Goal: Independent  Sit to Lying  Assistance Needed: Supervision or touching assistance  CARE Score: 4  Discharge Goal: Independent  Lying to Sitting on Side of Bed  Assistance Needed: Supervision or touching assistance  CARE Score: 4  Discharge Goal: Independent  Sit to Stand  Assistance Needed: Supervision or touching assistance  CARE Score: 4  Discharge Goal: Independent  Chair/Bed-to-Chair Transfer  Reason if not Attempted: Not attempted due to medical condition or safety concerns  CARE Score: 88  Discharge Goal: Independent  Car Transfer  Reason if not Attempted: Not attempted due to medical condition or safety concerns  CARE Score: 88  Discharge Goal: Independent  Walk 10 Feet  Reason if not Attempted: Not attempted due to medical condition or safety concerns  CARE Score: 88  Discharge Goal: Supervision or touching assistance  Walk 50 Feet with Two Turns  Reason if not Attempted: Not attempted due to medical condition or safety concerns  CARE Score: 88  Discharge Goal: Supervision or touching assistance  Walk 150 Feet  Reason if not Attempted: Not attempted due to medical condition or safety concerns  CARE Score: 88  Discharge Goal: Not Attempted  Walking 10 Feet on Uneven Surfaces  Reason if not Attempted: Not attempted due to medical condition or safety concerns  CARE Score: 88  Discharge Goal: Not Attempted  1 Step (Curb)  Reason if not Attempted: Not attempted due to medical condition or safety concerns  CARE Score: 88  Discharge Goal: Supervision or touching assistance  4 Steps  Reason if not Attempted: Not attempted due to medical condition or safety concerns  CARE Score: 88  Discharge Goal: Supervision or touching assistance  12 Steps  Reason if not Attempted: Not attempted due to medical condition or safety concerns  CARE Score: 88  Discharge Goal: Not Attempted  Wheel 50 Feet with Two Turns  Reason if not Attempted: Not attempted due to medical condition or safety concerns  CARE Score: 88  Discharge Goal: Independent  Wheel 150 Feet  Reason if not Attempted: Not attempted due to medical condition or safety concerns  CARE Score: 88  Discharge Goal: Independent    OCCUPATIONAL THERAPY:  Goals: Short Term Goals  Time Frame for Short term goals: 1 week  Short Term Goal 1: complete UB dressing with setup  Short Term Goal 2: complete LB dressing with CGA  Short Term Goal 3: complete simple ambulatory home making task with CGA  Short Term Goal 4: complete 1-2 handed standing level task for 3 mins with supervision  Short Term Goal 5: complete overall bathing with CGA  Short Term Goal 6: complete overall toileting with CGA :  Long Term Goals  Time Frame for Long term goals : 3 weeks  Long Term Goal 1: complete overall toileting with modified independence  Long Term Goal 2: complete overall bathing with modified independence  Long Term Goal 3: complete overall dressing with modified independence  Long Term Goal 4: complete simple ambulatory home making task with modified independence  Long Term Goal 5: complete HEP with independence  Additional Goals?: Yes  Long Term Goal 6: pt/family verbalize DME :    These goals were reviewed with this patient at the time of assessment and Bret Cash is in agreement    Plan of Care: Frequency:   [] 5 days per week, 90 minutes per day     [x] 5 days per week, 60 minutes per day                Plan  Specific Instructions for Next Treatment: visual assessment  Current Treatment Recommendations: Strengthening, Cognitive reorientation, Cognitive/Perceptual training, Equipment evaluation, education, & procurement, Patient/Caregiver education & training, Endurance training, Functional mobility training, Safety education & training, Home management training, Self-Care / ADL, Balance training            IRF-VIANEY  Eating  Assistance Needed: Supervision or touching assistance  Comment: vc  CARE Score: 4  Discharge Goal: Independent  Oral Hygiene  Assistance Needed: Supervision or touching assistance  Comment: vc  CARE Score: 4  Discharge Goal: [address] needed: Partial/moderate assistance  Comment: able to empty colostomy bag with increased time  CARE Score: 3  Discharge Goal: Independent  Shower/Bathe Self  Assistance Needed: Partial/moderate assistance  Comment: lower half of B LEs  CARE Score: 3  Discharge Goal: Independent  Upper Body Dressing  Assistance Needed: Partial/moderate assistance  Comment: min A while EOB---difficulty maintaining balance and donning shirt--posterior lean  CARE Score: 3  Discharge Goal: Independent  Lower Body Dressing  Assistance Needed: Substantial/maximal assistance  Comment: supine due to dizziness  CARE Score: 2  Discharge Goal: Independent  Putting On/Taking Off Footwear  Assistance Needed: Substantial/maximal assistance  CARE Score: 2  Discharge Goal: Independent  Toilet Transfer  Assistance needed: Partial/moderate assistance  CARE Score: 3  Discharge Goal: Independent  Picking Up Object  Reason if not Attempted: Not attempted due to medical condition or safety concerns  CARE Score: 88  Discharge Goal: Partial/moderate assistance  Treatments may include IADL retraining, strengthening, safety education and training, patient/caregiver education and training, equipment evaluation/ training/procurement, neuromuscular reeducation, wheelchair mobility training. SPEECH THERAPY:   Plan of Care and Goals:   LTG Goal 1: Pt will participate in skilled speech therapy services to ensure he is able to adequately/effectively communicate his wants and needs and safely complete ADLs. Short-term Goals  Goal 1: Pt will complete the CLQT. Goal 2: Pt will complete recall tasks with 90% accuracy and minimal cues. Goal 3: Pt will complete verbal expression tasks with 80% accuracy and minimal cues. Goal 4: Pt will complete executive functioning/problem solving/safety awareness tasks with 80% accuracy and minimal cues. Goal 5: Pt will complete orientation tasks with 90% accuracy and minimal cues. Goal 6: Pt will complete diaphragmatic breathing exercises to target breath support for speech production.         Short-term Goals  Goal 1: Pt will tolerate easy to chew consistencies with thin liquids with minimal overt s/s of aspiration/penetration during hospitalization. Goal 2: Pt will participate in swallowing reassessments to ensure safety with oral intake. Goal 3: Pt will complete pharyngeal strengthening exercises. Goal 4: Pt will allow adequate oral care throughout hospitalization. Goal 5: Pt will complete OMEs for lingual and labial strengthening. Goal 6: Pt will demonstrate understanding of general aspiration precautions. IRF-VIANEY  Cognitive Patterns  Cognitive Pattern Assessment Used: BIMS  Repetition of Three Words (First Attempt): 3  Temporal Orientation: Year: Missed by > 5 years or no answer  Temporal Orientation: Month: Missed by 6 days to 1 month  Temporal Orientation: Day: Incorrect or no answer  Able to recall \"sock: Yes, no cue required  Able to recall \"blue\": Yes, no cue required  Able to recall \"bed\": Yes, no cue required  BIMS Summary Score: 10     Plan of Care:  Frequency:   [x] 5 days per week, 60 minutes per day                            [] Not appropriate for Speech Therapy  Treatments may include speech/language/communication therapy, cognitive training, group therapy, education, and/or dysphagia therapy based on the above goals. These goals were reviewed with this patient at the time of assessment and Wendy Johnson is in agreement. CASE MANAGEMENT:  Goals:   Assist patient/family with discharge planning, patient/family counseling,  and coordination with insurance during ARU stay. Patient Goals: medical stability to continue care/improvement and return to level that  can return home with assistance               Activities Prior to Admit:                         Wendy Johnson will be seen a minimum of 3 hours of therapy per day/a minimum of 5 out of 7 days per week.     [] In this rare instance due to the nature of this patient's medical involvement, this patient will be seen 15 hours per week (900 minutes within a 7 day period). Treatments may include therapeutic exercises, gait training, neuromuscular re-ed, transfer training, community reintegration, bed mobility, w/c mobility and training, self care, home mgmt, cognitive training, energy conservation,dysphagia tx, speech/language/communication therapy, group therapy, and patient/family education. In addition, dietician/nutritionist may monitor calorie count as well as intake and collaboratively work with SLP on dietary upgrades. Neuropsychology/Psychology may evaluate and provide necessary support. Medical issues being managed closely and that require 24 hour availability of a physician:   [] Swallowing Precautions  [] Bowel/Bladder Fx  [] Weight bearing precautions   [] Wound Care    [] Pain Mgmt   [x] Infection Protection   [x] DVT Prophylaxis   [x] Fall Precautions  [] Fluid/Electrolyte/Nutrition Balance   [] Voice Protection   [] Respiratory  [] Other:    Medical Prognosis: [] Good  [x] Fair    [] Guarded   Total expected IRF days:  17  Anticipated discharge destination:    [] Home Independently   [x] Home with supervision    []SNF     [] Other                                           Physician anticipated functional outcomes:  Ambulate household distances independently with assistive device. IPOC brief synthesis: Acute inpatient rehabilitation with occupational therapy,  physical therapy, and speech therapy, 180 minutes, 5 every 7   days will address basic and advancing mobility with self-care   instruction and adaptive equipment training. Caregiver education will   be offered. Expected length of stay prior to the supervised level of   functional discharge to home with home care is 17 days. Assessment and Plan:     1. Bilateral embolic strokes-PT/OT/SLP. Low-dose Eliquis, aspirin, Pletal  2. Essential hypertension-metoprolol, Zestril  3. Diabetes-continue insulin and monitoring  4. Hyperlipidemia-Lipitor  5.   GI-bowel regimen. Has colostomy  6. DVT prophylaxis-Eliquis  7  History of liver cancer  8.   Abnormal UA-await culture             Case Mgmt: Electronically signed by RADHIKA Cazares on 9/29/2022 at 8:43 AM      OT: Electronically signed by Familia Bishop OT on 9/28/2022 at 5:05 PM     PT:Electronically signed by Mallorie Weldon, PT on 9/29/22 at 7:38 AM CDT     RN: Electronically signed by Ale Marcus RN on 9/27/2022 at 9:57 PM     ST: Electronically signed by SACHI Garcia on 9/28/2022 at 3:00 PM

## 2022-09-28 NOTE — PROGRESS NOTES
Physical Therapy   EVALUATION Note      DATE:  2022  NAME:  Jimmy Jules  :  1945  (77 y.o.,male)  MRN:  [id number]    HEIGHT:  Height: 6' (182.9 cm)  WEIGHT:  Weight: 192 lb 8 oz (87.3 kg)    PATIENT DIAGNOSIS(ES):    Diagnosis: B embolic CVA    Additional Pertinent Hx: Liver cancer, HTN, CAD, PVD, DM with neuropathy, osteomyelitis with L transmetatarsal amputation, ileostomy  RESTRICTIONS/PRECAUTIONS:    Restrictions/Precautions  Restrictions/Precautions: Fall Risk, Aspiration Risk, Weight Bearing, Modified Diet (THIN LIQUIDS)  Position Activity Restriction  Other position/activity restrictions: zio patch, colostomy bag, [email or web address]  OVERALL  ORIENTATION STATUS:     PAIN:                       GROSS ASSESSMENT        POSTURE/BALANCE          ACTIVITY TOLERANCE         BED MOBILITY  Bed mobility  Bridging: Contact guard assistance, Stand by assistance  Rolling to Left: Stand by assistance, Supervision (VC to use rails)  Rolling to Right: Stand by assistance, Supervision (VC to use rails)  Supine to Sit: Minimal assistance, Contact guard assistance (to L side of bed, triplanar using rails, increased time to complete)  Sit to Supine: Contact guard assistance, Stand by assistance (from L side of bed, triplanar with rails)  Scooting: Stand by assistance, Supervision        TRANSFERS  Transfers  Sit to Stand: Minimal Assistance, Contact guard assistance (increased time to complete, VC for hand placement,)  Stand to sit: Minimal Assistance, Contact guard assistance (poor eccentric control)       AMBULATION 1     AMBULATION 2     STAIRS     WHEELCHAIR PROPULSION 1     WHEELCHAIR PROPULSION 2       GOALS:  Short Term Goals  Time Frame for Short term goals: 1 WEEK  Short term goal 1: BED MOBILITY WITH OR WITHOUT RAILS MOD INDEP  Short term goal 2: TRANSFER SURFACE TO SURFACE WITH AD CGA  Short term goal 3: W/C PROPULSION 150FT SBA  Short term goal 4: AMBULATE WITH AD 50 FT CGA  Short term goal 5: UP/DOWN 1 STEP WITH RAILS MIN A    Long Term Goals  Time Frame for Long term goals : 2 WEEKS  Long term goal 1: BED MOBILITY WITH OR WITHOUT RAILS INDEP  Long term goal 2: TRANSFER SURFACE TO SURFACE WITH AD INDEP  Long term goal 3: W/C PROPULSION 150FT INDEP  Long term goal 4: AMBULATE WITH  FT CGA  Long term goal 5: UP/DOWN 4 STEPS WITH RAILS SBA  HOME LIVING:                    ASSESSMENT (IMPAIRMENTS/BARRIERS):     Assessment: Pt. c/o of dizziness in sitting and standing with BP dropping upon stance. With O2 @ 3 L, pt. O2 sats around 88-89%, O2 was increased in 1 L increments with no improvement in O2 sats after 5 minutes at each level. Once O2 increased to 6L sats increased to 92-93% after 5 minutes of rest. Pt. very limited in functional mobility by O2 levels, decreased endurance, and decreased strength. PLAN:  Plan  Plan:  (5-6 times/week, 1-2 times/day)  Plan weeks: 2  Current Treatment Recommendations: Strengthening, ROM, Balance training, Functional mobility training, Transfer training, ADL/Self-care training, Endurance training, Wheelchair mobility training, Gait training, Stair training, Home exercise program, Safety education & training, Patient/Caregiver education & training  Discharge Recommendations: Continue to assess pending progress  PATIENT REQUIRES AND IS REASONABLY EXPECTED TO ACTIVELY PARTICIPATE IN AT LEAST 3 HOURS OF INTENSIVE THERAPY PER DAY AT LEAST 5 DAYS PER WEEK, AND BE EXPECTED TO MAKE MEASURABLE IMPROVEMENT THAT WILL BE OF PRACTICAL VALUE TO Tucker [address]    PATIENT GOAL FOR REHAB:  RETURN TO PRIOR LEVEL OF FUNCTION       IRF/VIANEY  Roll Left and Right  Assistance Needed: Supervision or touching assistance  CARE Score: 4  Discharge Goal: Independent    Sit to Lying  Assistance Needed: Supervision or touching assistance  CARE Score: 4  Discharge Goal: Independent    Lying to Sitting on Side of Bed  Assistance Needed: Supervision or touching assistance  CARE Score: 4  Discharge Goal: Independent    Sit to Stand  Assistance Needed: Supervision or touching assistance  CARE Score: 4  Discharge Goal: Independent    Chair/Bed-to-Chair Transfer  Reason if not Attempted: Not attempted due to medical condition or safety concerns  CARE Score: 88  Discharge Goal: Independent    Car Transfer  Reason if not Attempted: Not attempted due to medical condition or safety concerns  CARE Score: 88  Discharge Goal: Independent    Walk 10 Feet  Reason if not Attempted: Not attempted due to medical condition or safety concerns  CARE Score: 88  Discharge Goal: Supervision or touching assistance    Walk 50 Feet with Two Turns  Reason if not Attempted: Not attempted due to medical condition or safety concerns  CARE Score: 88  Discharge Goal: Supervision or touching assistance    Walk 150 Feet  Reason if not Attempted: Not attempted due to medical condition or safety concerns  CARE Score: 88  Discharge Goal: Not Attempted    Walking 10 Feet on Uneven Surfaces  Reason if not Attempted: Not attempted due to medical condition or safety concerns  CARE Score: 88  Discharge Goal: Not Attempted    1 Step (Curb)  Reason if not Attempted: Not attempted due to medical condition or safety concerns  CARE Score: 88  Discharge Goal: Supervision or touching assistance    4 Steps  Reason if not Attempted: Not attempted due to medical condition or safety concerns  CARE Score: 88  Discharge Goal: Supervision or touching assistance    12 Steps  Reason if not Attempted: Not attempted due to medical condition or safety concerns  CARE Score: 88  Discharge Goal: Not Attempted    Wheel 50 Feet with Two Turns  Reason if not Attempted: Not attempted due to medical condition or safety concerns  CARE Score: 88  Discharge Goal: Independent    Wheel 150 Feet  Reason if not Attempted: Not attempted due to medical condition or safety concerns  CARE Score: 88  Discharge Goal: Independent      LAST TREATMENT TIME  PT Individual Minutes  Time In: 1000  Time Out: 1100  Minutes: 60          Electronically signed by Victor Manuel Moscoso, PT Student, on 9/28/2022 at 12:45 PM

## 2022-09-28 NOTE — H&P
[facility]   History and Physical        Patient:   Jose Elias Reyes  MR#:    [id number]  Account Number:                   [id number]      Room:    [facility]-   YOB: 1945  Date of Progress Note: 9/28/2022  Time of Note                           7:16 AM  Attending Physician:  Radha Posey MD        Admitting diagnosis: Bilateral embolic strokes    Secondary diagnoses:HTN, hypercholesterolemia, DM, CAD, PVD, DM neuropathy, osteomyelitis with transmetatarsal amputation, COVID-19 July 2022 and newly diagnosis liver cancer July of this year with radioembolization to the liver 9/20/22    CHIEF COMPLAINT: Generalized weakness and deconditioning      HISTORY OF PRESENT ILLNESS:   This 76 y.o. male  with history of HTN, hypercholesterolemia, DM, CAD, PVD, DM neuropathy, osteomyelitis with transmetatarsal amputation, COVID-19 July 2022 and newly diagnosis liver cancer July of this year with radioembolization to the liver 9/20/22. He presented to [facility] on 9/22/22 with weakness, AMS and lethargy. He was admitted to the Hospitalist service with metabolic encephalopathy. MRI done revealed Multiple tiny 1 or 2 mm areas of diffusion and increased signal in both cerebral hemispheres that likely represent small areas of acute ischemic change. These are seen in the bilateral frontal and parietal lobes and in the right occipital lobe. Neurology was consulted. He was seen by Dr. Socorro Phillips, who felt embolic stroke related to hypercoaguable state and underlying/undetected atrial fibrllation. Nica Johnson had stopped ASA on his own but continued Pletal. ASA had been started this admission. In normal circumstance low dose anticoagulation would be recommended for ESUS but patient has higher than normal risk for bleed with hepatic cancer.  Plans are for Zio Patch at discharge CTA of neck done shows heavy plaque burden at the bilateral carotid bifurcations with about 80% stenosis of the right ICA and 80 to 90% stenosis on the left. Vascular surgery was consulted. He was seen by Dr. Adrián Casanova, who didn't feel any surgical intervention was needed, he recommended continued conservative therapy with antiplatelet therapy and given the embolic appearance of the infarcts could consider low-dose Eliquis twice daily as well. Has evaluated by SPT for swallow and placed on a mechanical soft diet with thin liquids. SPT will also continue to see for cognition. He is still weak overall and participating in both PT/OT. He is felt to need a stay on Rehab for continued medical management and therapy to work towards his goal of returning home with his wife. He is now felt ready to start the Rehab program.    REVIEW OF SYSTEMS:  Constitutional - No fever or chills. No diaphoresis or significant fatigue. HENT -  No tinnitus or significant hearing loss. Eyes - no sudden vision change or eye pain  Respiratory - no significant shortness of breath or cough  Cardiovascular - no chest pain No palpitations or significant leg swelling  Gastrointestinal - no abdominal swelling or pain. Genitourinary - No difficulty urinating, dysuria  Musculoskeletal - no back pain or myalgia. Skin - no color change or rash  Neurologic - No seizures. No lateralizing weakness. Hematologic - no easy bruising or excessive bleeding. Psychiatric - no severe anxiety or nervousness. All other review of systems are negative.       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 Abisai Paez)    Cataracts, bilateral     Diabetes mellitus ([facility] [address]     Diabetic neuropathy ([facility] [address]     ED (erectile dysfunction)     HTN (hypertension)     Hypercholesterolemia     Open wound of right foot     states this is not the operative foot    Ulcerative colitis [facility])        Past Surgical History:      Procedure Laterality Date    CHOLECYSTECTOMY CORONARY ARTERY BYPASS GRAFT  2003    EYE SURGERY      jason. cat    ILEOSTOMY OR JEJUNOSTOMY  1995    due to surgery from ulcerative colitis    TX AMPUTATION FOOT,TRANSMETATARSAL Left 1/30/2018    TRANSMET AMPUTATION performed by Vanessa Escobedo MD at [address] Left 6/12/2017    S. Left great toe ray amputation through the metatarsal level. VASCULAR SURGERY  8/26/14 S    Percutaneous cannulation, left common femoral artery, with 5-Estonian glidesheath. Suprarenal abdominal aortogram with bilateral iliofemoral arteriogra. Bilateral lower extremity arteriogram.    VASCULAR SURGERY  09/08/14 S    Right femoral to tibioperoneal trunk bypass with in situ right greater saphenous vein. Right common femoral endarterectomy with vascular patch repair. Right tibioperoneal trunk vein patch and endarterectomy. Completion right lower exteremity arteriograms    VASCULAR SURGERY  1/12/2015 S    Percutaneous cannulation left common femoral artery with 5-Estonian and later 6-Estonian pinnacle destination sheath. Suprarenal abdominal aortogram with bilateral iliofemoral arteriograms. Bilateral lower extremity arteriograms. Coil embolization right greater saphenous vein bypass branch with 8 mm x 5 cm coil and seven 5 mm x 5 cm coils. VASCULAR SURGERY  1/12/2015 S     Right posterior tibial artery balloon angioplasty 2.5 mm x 100 mm vascutrak balloon. Right peroneal artery balloon angioplasty with 2.5 x 100 mm vascutrak balloon. Completion right lower extremity arteriograms. VASCULAR SURGERY  06/17/2017    S. Percutaneous cannulation right common femoral artery with ultrasound guidance and 5 Estonian and later 6 Estonian sheath placement. Suprarenal abdominal aorta gram with bilateral lower extremity arteriogram.Left superficial femoral/popliteal/tibial peroneal trunk/posterior tibial artery atherectomy with csi 1.25 solid crown and 2.0 solid crown. Left tibial peroneal trunk and posterior tibial artery     VASCULAR wasting of muscles noted, no bony deformities  Extremities-no clubbing, cyanosis or edema. Left foot transmetatarsal amputation  Skin - warm, dry, and intact. No rash, erythema, or pallor. Psychiatric - mood, affect, and behavior appear normal.      Neurological exam drowsy. Following all simple commands. Mildly confused    Cranial Nerve Exam   CN II- Visual fields grossly unremarkable  CN III, IV,VI-EOMI, No nystagmus, conjugate eye movements, no ptosis  CN VII-slight left facial droop  CN VIII-Hearing intact   CN IX and X- Palate elevates in midline  CN XI-good shoulder shrug  CN XII-Tongue midline with no fasciculations or fibrillations    Motor Exam  V/V throughout upper and lower extremities bilaterally, no cogwheeling, normal tone    Absent throughout    Tremors- no tremors in hands or head noted    Gait  Not tested    Coordination  Finger to nose-unremarkable        CBC: No results for input(s): WBC, HGB, PLT in the last 72 hours. BMP:  No results for input(s): NA, K, CL, CO2, BUN, CREATININE, GLUCOSE in the last 72 hours. Hepatic: No results for input(s): AST, ALT, ALB, BILITOT, ALKPHOS in the last 72 hours. Lipids: No results for input(s): CHOL, HDL in the last 72 hours. Invalid input(s): LDLCALCU    INR: No results for input(s): INR in the last 72 hours. Assessment and Plan     1. Bilateral embolic strokes-PT/OT/SLP. Low-dose Eliquis, aspirin, Pletal  2. Essential hypertension-metoprolol, Zestril  3. Diabetes-continue insulin and monitoring  4. Hyperlipidemia-Lipitor  5. GI-bowel regimen. Has colostomy  6. DVT prophylaxis-Eliquis  7  History of liver cancer  8. Abnormal UA-await culture    Patient's functional assessment  Prior to hospitalization the patient was continent of bowel and bladder    Functional assessment  All notes from reehab data were reviewed regarding the patient's functional status.     Current therapy  Requires PT, OT and/or speech therapy    Anticipated discharge approximately 17 days    Anticipated discharge setting  Home with home care    No clear barriers to discharge    The patient appears to be an appropriate candidate for inpatient rehabilitation    Sufficiently stable: Patient's condition is sufficiently stable at the time of admission to allow the patient to actively participate in an intensive rehabilitation program    Close medical supervision: A rehabilitation physician, or other licensed treating physician with specialized training and experience in inpatient rehabilitation, will conduct face-to-face visits with the patient a minimum of at least 3 days per week throughout the patient's stay    This patient requires close medical supervision for the active management of the ongoing conditions and potential complications stated in the admission note    Intensive rehabilitation nursing: The patient demonstrates the need for 24-hour rehabilitation nursing care for active management of the multiple medical issues documented in the admission note    Appropriate therapy needed: The patient requires the active and ongoing therapeutic intervention of at least 2 therapeutic disciplines, one of which must be physical or occupational therapy and/or speech therapy    Intensive therapy: The patient requires and is reasonably expected to actively participate in at least 3 hours of therapy per day at least 5 days per week, and expected to make measurable improvements that will be of practical value to improve the patient's functional capacity or adaptation to impairments.  In addition, therapy treatments will begin within 36 hours from midnight of the day of the patient's admission to the inpatient rehabilitation facility    Expected duration and frequency therapy: 180 minutes per day, 5 days per week    Interdisciplinary team: The patient demonstrates the need for an interdisciplinary team for active management of the following medical issues including ataxia, motor planning, balance, disease management, elimination, endurance, family training, education, independent ADLs, pain management, precautions, range of motion, safety, strength, and transfers    I have reviewed the preadmission screening documents and concur with the findings. I believe the patient meets criteria and is sufficiently stable to allow participation in the program. This requires an intensive level of therapy, close medical supervision, and an interdisciplinary team approach provided through an individualized plan of care. I approve admitting this patient for an intensive inpatient rehabilitation program.      Beronica King.  Bryan Garcia MD

## 2022-09-28 NOTE — PLAN OF CARE
Problem: Discharge Planning  Goal: Discharge to home or other facility with appropriate resources  Outcome: Progressing  Flowsheets (Taken 9/28/2022 0805)  Discharge to home or other facility with appropriate resources:   Identify barriers to discharge with patient and caregiver   Arrange for needed discharge resources and transportation as appropriate   Identify discharge learning needs (meds, wound care, etc)   Refer to discharge planning if patient needs post-hospital services based on physician order or complex needs related to functional status, cognitive ability or social support system     Problem: Safety - Adult  Goal: Free from fall injury  Outcome: Progressing     Problem: ABCDS Injury Assessment  Goal: Absence of physical injury  Outcome: Progressing     Electronically signed by Weston Fleming on 9/28/2022 at 2:03 PM

## 2022-09-28 NOTE — PROGRESS NOTES
Speech Language Pathology  Facility/Department: [facility] 8 REHAB UNIT  Initial Speech/Language/Cognitive Assessment    NAME: Mimi Snider  : 1945   MRN: [id number]  ADMISSION DATE: 2022  ADMITTING DIAGNOSIS: has Diabetic polyneuropathy associated with type 2 diabetes mellitus ([address] Coronary artery disease involving native coronary artery without angina pectoris; Erectile dysfunction; Hypercholesterolemia; Essential hypertension; Atherosclerosis of native arteries of the extremities with ulceration ([address] Gangrene of toe ([address] Type 2 diabetes mellitus with diabetic peripheral angiopathy and gangrene, with long-term current use of insulin ([address] Cellulitis of foot; Open wound of toe with complication; Wound infection; and Acute ischemic stroke [facility]) on their problem list.    Date of Eval: 2022   Evaluating Therapist: SACHI Kowalski    Clinical Impression    Speech was considered dysarthric as characterized characterized by decreased vocal intensity, decreased breath support for speech production, decreased lingual and labial ROM, strength, and coordination. Patient exhibited delayed processing, decreased attention, decreased orientation, decreased problem solving, decreased recall skills, decreased executive functioning/reasoning, decreased comprehension skills, and delayed verbalizations. Pt would benefit from continued skilled ST services to assist in returning to prior level of functioning. SLP will continue to follow and treat for speech, language, cognition, dysarthria, and dysphagia.     RECENT RESULTS  CT OF HEAD/MRI: No recent imaging completed at [facility] ([facility])    Pain:  Pain Assessment  Pain Assessment: None - Denies Pain    Vision/ Hearing  Vision  Vision: Impaired  Vision Exceptions: Wears glasses for reading  Hearing  Hearing: Exceptions to [facility]  Hearing Exceptions: Bilateral hearing aid    Recommendations:  Recommendations  Requires SLP Intervention: Yes  Patient Education Response: Verbalizes understanding  Duration of Treatment: 2-3 weeks  D/C Recommendations: Ongoing speech therapy is recommended during this hospitalization    Plan:   Speech Therapy Prognosis  Prognosis: Good    Goals:  Long-term Goals  Timeframe for Long-term Goals: 2-3 weeks  Goal 1: Pt will participate in skilled speech therapy services to ensure he is able to adequately/effectively communicate his wants and needs and safely complete ADLs. Short-term Goals  Goal 1: Pt will complete the CLQT. Goal 2: Pt will complete recall tasks with 90% accuracy and minimal cues. Goal 3: Pt will complete verbal expression tasks with 80% accuracy and minimal cues. Goal 4: Pt will complete executive functioning/problem solving/safety awareness tasks with 80% accuracy and minimal cues. Goal 5: Pt will complete orientation tasks with 90% accuracy and minimal cues. Goal 6: Pt will complete diaphragmatic breathing exercises to target breath support for speech production. Subjective:  Previous level of function and limitations:   Pt reports he was living at home with his wife prior to hospitalization. He states that the pharmacy was managing/sorting his medications for him. He states that he completes financial tasks independently through writing checks. He reports he was driving prior to hospitalization. He states that his wife does laundry and cleans; however, she does not cook. They typically eat out for every meal. Pt reports his wife having a CVA in December of 2021. He states that she has had a cognitive decline since her CVA. Daughter reports she will now be taking care of her mother and father. Social/Functional History  Lives With: Spouse  Type of Home: House  Vision  Vision: Impaired  Vision Exceptions: Wears glasses for reading  Hearing  Hearing: Exceptions to [facility]  Hearing Exceptions: Bilateral hearing aid     Objective:  Oral Motor   Labial: Decreased rate; Impaired coordination  Dentition: Natural  Oral Hygiene: Clean (Dry mouth)  Lingual: Decreased rate;Decreased strength    Motor Speech  Dysarthric Characteristics: Decreased breath support;Decreased prosody; Decreased rate  Intelligibility: Impaired  Overall Impairment Severity: Mild-moderate    Auditory Comprehension  Comprehension: Exceptions  Basic Questions: Mild  Two Step Commands: WFL  Multistep Commands: WFL  Common Objects: WFL  Pictures: WFL  L/R Discrimination: WFL  Conversation: Mild    Reading Comprehension  Reading Status: Exceptions to [facility]  Sentence Impairment Severity: Moderate  Paragraph Impairment Severity: Moderate  Interfering Components: Processing time  Effective Techniques: Large print    Expression  Primary Mode of Expression: Verbal    Verbal Expression  Verbal Expression: Exceptions to functional limits  Initiation: WFL  Repetition: WFL  Automatic Speech: WFL  Convergent: WFL  Divergent: WFL  Conversation: Mild    Written Expression  Dominant Hand: Right    Pragmatics/Social Functioning  Pragmatics: Within functional limits    Cognition:   Orientation  Overall Orientation Status: Impaired  Orientation Level: Oriented to person;Disoriented to place; Disoriented to time;Disoriented to situation  Attention  Attention: Exceptions to [facility]  Selective Attention: Mild  Sustained Attention: Mild  Memory  Memory: Exceptions to [facility]  Daily Routines: Mild  Long-term Memory: Moderate  Short-term Memory: Moderate  Working Memory: Moderate  Problem Solving  Problem Solving: Exceptions to [facility]  Simple Functional Tasks: Mild  Verbal Reasoning Skills: Moderate  Sequencing: Mild  Executive Function Skills: Moderate  Managing Finances: Moderate  Managing Medications: Moderate  Performing Discharge Planning: Moderate  Numeric Reasoning  Numeric Reasoning: Exceptions to [facility]   Calculations: Moderate  Money Management: Moderate  Time: Moderate  Safety/Judgment  Safety/Judgment: Exceptions to [facility]  Routine Tasks: Mild  Impulsive: Moderate    Receptive:   Auditory Comprehension:  Complex Yes/No questions- 40% accuracy; however, answers elicited mildly delayed  Simple 2 Step Directions: WNL-100% accuracy; however, timing of completion of direction was mildly delayed      Expressive Skills/ Verbal Expression:  Confrontation Naming/Labeling: WNL-100% accuracy; delayed timing noted  Responsive Speech: WNL-100% accuracy; however, answers elicited mildly delayed   Repetition/Imitation: 100% accuracy  Sentence completion: WNL-100% accuracy; however, answers elicited mildly delayed       Attention/Orientation:  Attention/concentration:  mildly decreased selective and sustained attention  Orientation: 50% oriented  Pt unable to verbalize current age, current location, day of week, month of year, date, current year. Pt able to verbalize name, , address, phone number. Memory:  Immediate Memory: 33% accuracy up to 5 components  Short-term Memory: Pt recalled 3/3 unrelated components given a 5 minute time delay. Retention of a paragraph: 25% accuracy; answers were moderately delayed    Problem Solving:  Pt able to identify current MD, pharmacy, and conditions to take medications. Dysarthria:   Patient exhibits dysarthria characterized by decreased vocal intensity, decreased breath support for speech production, decreased lingual and labial ROM, strength, and coordination. Clinician ranked functional intelligibility of speech for unfamiliar listeners at 60-70%, with context known. Oral Mechanism Examination:  Labial retraction: bi laterally decreased  Labial protrusion: bi laterally decreased  Labial compression: decreased   Labial coordination: slow  Lingual extension: decreased  Lingual elevation: decreased  Bilateral lingual movement: decreased  Lingual symmetry: no deviation noted  Lingual strength: weak  Lingual coordination: slow       SLP will continue to follow and treat.         Electronically signed by SACHI Mcmullen on 2022 at 3:43 PM

## 2022-09-28 NOTE — PROGRESS NOTES
Occupational Therapy  Facility/Department: [facility] 8 REHAB UNIT  Occupational Therapy Initial Assessment    Name: Suzy Da Silva  : 1945  MRN: [id number]  Date of Service: 2022    Discharge Recommendations:  Home with Home health OT, 24 hour supervision or assist          Patient Diagnosis(es): There were no encounter diagnoses. 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 Past Surgical History:  has a past surgical history that includes Coronary artery bypass graft (); Jejunostomy (); vascular surgery (14 SJS); vascular surgery (14 SJS); vascular surgery (2015 SJS); vascular surgery (2015 SJS ); Toe amputation (Left, 2017); vascular surgery (2017); vascular surgery (2017); eye surgery; Cholecystectomy; pr amputation foot,transmetatarsal (Left, 2018); and vascular surgery (2018). Treatment Diagnosis: Bilateral Frontal, Bilateral Parietal, and Right occipital lobes      Assessment      22 0900   Assessment   Performance deficits / Impairments Decreased functional mobility ; Decreased endurance;Decreased ADL status; Decreased balance;Decreased strength;Decreased high-level IADLs;Decreased cognition   Assessment HTN, DM, Covid , recent liver CA diagnosis   Treatment Diagnosis Bilateral Frontal, Bilateral Parietal, and Right occipital lobes   Discharge Recommendations Home with Home health OT;24 hour supervision or assist     Plan   Plan  Specific Instructions for Next Treatment: visual assessment  Current Treatment Recommendations: Strengthening, Cognitive reorientation, Cognitive/Perceptual training, Equipment evaluation, education, & procurement, Patient/Caregiver education & training, Endurance training, Functional mobility training, Safety education & training, Home management training, Self-Care / ADL, Balance training     Restrictions  Restrictions/Precautions  Restrictions/Precautions: Fall Risk, Aspiration Risk, Weight Bearing, Modified Diet (THIN LIQUIDS)  Lower Extremity Weight Bearing Restrictions  Right Lower Extremity Weight Bearing: Weight Bearing As Tolerated  Left Lower Extremity Weight Bearing: Weight Bearing As Tolerated  Position Activity Restriction  Other position/activity restrictions: zio patch, colostomy bag, [email or web address]    Subjective   General  Chart Reviewed: Yes, Orders  Patient assessed for rehabilitation services?: Yes  Additional Pertinent Hx: HTN, DM, Covid 2021, recent liver CA diagnosis  Family / Caregiver Present: No  Diagnosis: Bilateral Frontal, Bilateral Parietal, and Right occipital lobes         Objective      09/28/22 0915   Confusion Assessment Method (CAM)   Is there evidence of an acute change in mental status from the patient's baseline?  Yes   Inattention Behavior present, fluctuates (comes and goes, changes in severity)   Disorganized thinking Behavior present, fluctuates (comes and goes, changes in severity)   Altered level of consciousness Behavior not present      09/28/22 0915   Balance   Sitting Balance Minimal assistance  (posterior lean with dynamic tasks unsupported)   Standing Balance Minimal assistance   Standing Balance   Comment complaints of dizziness     Eating  Assistance Needed: Supervision or touching assistance  Comment: vc  CARE Score: 4  Discharge Goal: Independent    Oral Hygiene  Assistance Needed: Supervision or touching assistance  Comment: vc  CARE Score: 4  Discharge Goal: [address] needed: Partial/moderate assistance  Comment: able to empty colostomy bag with increased time  CARE Score: 3  Discharge Goal: Independent     Shower/Bathe Self  Assistance Needed: Partial/moderate assistance  Comment: lower half of B LEs  CARE Score: 3  Discharge Goal: Independent     Upper Body Dressing  Assistance Needed: Partial/moderate assistance  Comment: min A while EOB---difficulty maintaining balance and donning shirt--posterior lean  CARE Score: 3  Discharge Goal: Independent     Lower Body Dressing  Assistance Needed: Substantial/maximal assistance  Comment: supine due to dizziness  CARE Score: 2  Discharge Goal: Independent     Putting On/Taking Off Footwear  Assistance Needed: Substantial/maximal assistance  CARE Score: 2  Discharge Goal: Independent     Toilet Transfer  Assistance needed: Partial/moderate assistance  CARE Score: 3  Discharge Goal: Independent     Picking Up Object  Reason if not Attempted: Not attempted due to medical condition or safety concerns  CARE Score: 88  Discharge Goal: Partial/moderate assistance         Vision  Vision Exceptions: Wears glasses for reading  Hearing  Hearing: Exceptions to [facility]  Hearing Exceptions: Bilateral hearing aid    Goals  Short Term Goals  Time Frame for Short term goals: 1 week  Short Term Goal 1: complete UB dressing with setup  Short Term Goal 2: complete LB dressing with CGA  Short Term Goal 3: complete simple ambulatory home making task with CGA  Short Term Goal 4: complete 1-2 handed standing level task for 3 mins with supervision  Short Term Goal 5: complete overall bathing with CGA  Short Term Goal 6: complete overall toileting with CGA  Long Term Goals  Time Frame for Long term goals : 3 weeks  Long Term Goal 1: complete overall toileting with modified independence  Long Term Goal 2: complete overall bathing with modified independence  Long Term Goal 3: complete overall dressing with modified independence  Long Term Goal 4: complete simple ambulatory home making task with modified independence  Long Term Goal 5: complete HEP with independence  Additional Goals?: Yes  Long Term Goal 6: pt/family verbalize DME       Therapy Time   Individual Concurrent Group Co-treatment   Time In 0900         Time Out 1000         Minutes 60         Timed Code Treatment Minutes: 39 Minutes       Brendon Oh, OT

## 2022-09-28 NOTE — PROGRESS NOTES
Comprehensive Nutrition Assessment    Type and Reason for Visit:  Initial    Nutrition Recommendations/Plan:   Initiate ONS     Malnutrition Assessment:  Malnutrition Status: At risk for malnutrition (Comment) (09/28/22 4957)        Nutrition Assessment:    Pt nutritionally compromised AEB report of poor appetite and wt loss. Glucose 267-314, on lantus as at home, A1c 6.3. Will order ONS and follow for intake              Current Nutrition Intake & Therapies:    Average Meal Intake: Unable to assess     ADULT DIET; Dysphagia - Minced and Moist; 3 carb choices (45 gm/meal)    Anthropometric Measures:  Height: 6' (182.9 cm)  Ideal Body Weight (IBW): 178 lbs (81 kg)    Admission Body Weight: 192 lb (87.1 kg)  Current Body Weight: 192 lb (87.1 kg),   IBW. Current BMI (kg/m2): 26  Usual Body Weight: 210 lb (95.3 kg) (6/15)  % Weight Change (Calculated): -8.6                    BMI Categories: Overweight (BMI 25.0-29. 9)    Estimated Daily Nutrient Needs:  Energy Requirements Based On: Kcal/kg  Weight Used for Energy Requirements: Admission  Energy (kcal/day): 9880-2698  Weight Used for Protein Requirements: Ideal  Protein (g/day): 105-120  Method Used for Fluid Requirements: 1 ml/kcal  Fluid (ml/day): 4087-1977    Nutrition Diagnosis:   Inadequate oral intake, Unintended weight loss related to early satiety as evidenced by poor intake prior to admission, weight loss 7.5% in 3 months    Nutrition Interventions:   Food and/or Nutrient Delivery: Continue Current Diet, Start Oral Nutrition Supplement  Nutrition Education/Counseling: No recommendation at this time  Coordination of Nutrition Care: No recommendation at this time       Goals:     Goals: Meet at least 75% of estimated needs       Nutrition Monitoring and Evaluation:   Behavioral-Environmental Outcomes: None Identified  Food/Nutrient Intake Outcomes: Food and Nutrient Intake, Supplement Intake  Physical Signs/Symptoms Outcomes: Biochemical Data, Skin, Weight    Discharge Planning:     Too soon to determine     Roberta Jack MS, RD, LD  Contact: [phone]

## 2022-09-29 PROBLEM — E87.5 HYPERKALEMIA: Status: ACTIVE | Noted: 2022-01-01

## 2022-09-29 NOTE — CARE COORDINATION
09/29/22 1118   Readmission Assessment   Number of Days since last admission? 1-7 days   Previous Disposition Acute Rehab  (readmit from [facility])   Who is being Interviewed Patient;Caregiver  (spouse and dtr present at bedside)   Did you visit your Primary Care Physician after you left the hospital, before you returned this time? No   Why weren't you able to visit your PCP? Other (Comment)  (was admitted to [address] [place] [address])   Did you see a specialist, such as Cardiac, Pulmonary, Orthopedic Physician, etc. after you left the hospital? Yes   Who advised the patient to return to the hospital? Acute Rehab   Does the patient report anything that got in the way of taking their medications? No   In our efforts to provide the best possible care to you and others like you, can you think of anything that we could have done to help you after you left the hospital the first time, so that you might not have needed to return so soon? Other (Comment)  (pt/family requestign return to [facility])     Pt is sleeping and spouse with dtr is present at bedside. Requesting return to [address] [place] [address] for continued treatment and completion of services. Pt's insurance will require preauth for return the [address] [place] [address] and SW will assist with referral and preauth.

## 2022-09-29 NOTE — PROGRESS NOTES
At [phone], called and informed patient daughterLuis of patient condition and transfer to room 729 .

## 2022-09-29 NOTE — PLAN OF CARE
Problem: Discharge Planning  Goal: Discharge to home or other facility with appropriate resources  9/28/2022 2315 by Esther Borja RN  Outcome: Progressing  Flowsheets (Taken 9/28/2022 2025)  Discharge to home or other facility with appropriate resources: Identify barriers to discharge with patient and caregiver  9/28/2022 1402 by Pao Coffman  Outcome: Progressing  Flowsheets (Taken 9/28/2022 0805)  Discharge to home or other facility with appropriate resources:   Identify barriers to discharge with patient and caregiver   Arrange for needed discharge resources and transportation as appropriate   Identify discharge learning needs (meds, wound care, etc)   Refer to discharge planning if patient needs post-hospital services based on physician order or complex needs related to functional status, cognitive ability or social support system     Problem: Safety - Adult  Goal: Free from fall injury  9/28/2022 2315 by Esther Borja RN  Outcome: Progressing  9/28/2022 1402 by Pao Coffman  Outcome: Progressing     Problem: ABCDS Injury Assessment  Goal: Absence of physical injury  9/28/2022 2315 by Esther Borja RN  Outcome: Progressing  9/28/2022 1402 by Pao Coffman  Outcome: Progressing

## 2022-09-29 NOTE — H&P
[facility] - History & Physical      PCP: Dr. Marc Guerrero M.D., MD    Date of Admission: 9/29/2022    Date of Service: 9/29/2022    Chief Complaint:  hyperkalemia    History Of Present Illness: The patient is a 76 y.o. male with past medical history of recent stroke, CAD, DM, neuropathy, hypertension, hyperlipidemia, UC, and PAD who presented to [facility] as transfer from acute rehab complaining of hyperkalemia. Patient reports he was recently admitted to [facility] for acute CVA. According to chart review likely caused by undiagnosed A. fib. Patient is currently being treated with aspirin, Pletal, and low-dose Eliquis. Patient was admitted to acute inpatient rehab on 9/28/2022. BMP this a.m. indicated potassium of 6.7 and hospitalist were contacted to admit patient to medical floor. Patient reports overall feeling fatigued and ill today. Patient denies abdominal pain, nausea, and vomiting. Patient endorses fatigue and decreased appetite today. Labs indicated a sodium of 129, potassium 6.7, creatinine 1.3, glucose of 379, WBC of 17. Patient was admitted to the hospitalist service for hyperkalemia and transferred to PCU. Initial treatment started with IV insulin, starting bicarbonate, calcium gluconate, and Lokelma. EKG obtained and indicates sinus tachycardia with no ST changes. ABGs within normal limits.     Past Medical History:        Diagnosis Date    Atherosclerosis of native arteries of the extremities with ulceration(440.23) 8/25/2014    CAD (coronary artery disease)     sees dr at [facility] (dr. Omega Heredia)    Cataracts, bilateral     Diabetes mellitus ([facility] [address]     Diabetic neuropathy ([facility] [address]     ED (erectile dysfunction)     HTN (hypertension)     Hypercholesterolemia     Open wound of right foot     states this is not the operative foot    Ulcerative colitis [facility])        Past Surgical History:        Procedure Laterality Date    CHOLECYSTECTOMY CORONARY ARTERY BYPASS GRAFT  2003    EYE SURGERY      jason. cat    ILEOSTOMY OR JEJUNOSTOMY  1995    due to surgery from ulcerative colitis    IA AMPUTATION FOOT,TRANSMETATARSAL Left 1/30/2018    TRANSMET AMPUTATION performed by Vj Salcido MD at [address] Left 6/12/2017    S. Left great toe ray amputation through the metatarsal level. VASCULAR SURGERY  8/26/14 S    Percutaneous cannulation, left common femoral artery, with 5-Emirati glidesheath. Suprarenal abdominal aortogram with bilateral iliofemoral arteriogra. Bilateral lower extremity arteriogram.    VASCULAR SURGERY  09/08/14 S    Right femoral to tibioperoneal trunk bypass with in situ right greater saphenous vein. Right common femoral endarterectomy with vascular patch repair. Right tibioperoneal trunk vein patch and endarterectomy. Completion right lower exteremity arteriograms    VASCULAR SURGERY  1/12/2015 S    Percutaneous cannulation left common femoral artery with 5-Emirati and later 6-Emirati pinnacle destination sheath. Suprarenal abdominal aortogram with bilateral iliofemoral arteriograms. Bilateral lower extremity arteriograms. Coil embolization right greater saphenous vein bypass branch with 8 mm x 5 cm coil and seven 5 mm x 5 cm coils. VASCULAR SURGERY  1/12/2015 S     Right posterior tibial artery balloon angioplasty 2.5 mm x 100 mm vascutrak balloon. Right peroneal artery balloon angioplasty with 2.5 x 100 mm vascutrak balloon. Completion right lower extremity arteriograms. VASCULAR SURGERY  06/17/2017    S. Percutaneous cannulation right common femoral artery with ultrasound guidance and 5 Emirati and later 6 Emirati sheath placement. Suprarenal abdominal aorta gram with bilateral lower extremity arteriogram.Left superficial femoral/popliteal/tibial peroneal trunk/posterior tibial artery atherectomy with csi 1.25 solid crown and 2.0 solid crown. Left tibial peroneal trunk and posterior tibial artery     VASCULAR SURGERY  06/17/2017    CONT.balloon angioplasty with 3mm x 100 mmand 3.5mm x 300mm vascutrak balloon. Left popliteal artery balloon angioplasty with 5 mmx 100mm lutonix drug coated balloon. #6 left superficial femoral artery balloon angioplasty with 3.6 qds891 mm lutonix grug coated balloons. Completion left lower extremity arteriograms. Mynx closure right common femoral artery puncture site. VASCULAR SURGERY  01/20/2018    SJS. Left transmetetarsal amputation. Home Medications:  Prior to Admission medications    Medication Sig Start Date End Date Taking? Authorizing Provider   apixaban (ELIQUIS) 2.5 MG TABS tablet Take 2.5 mg by mouth 2 times daily    Historical Provider, MD   atorvastatin (LIPITOR) 40 MG tablet Take 40 mg by mouth at bedtime    Historical Provider, MD   lisinopril (PRINIVIL;ZESTRIL) 10 MG tablet Take 5 mg by mouth daily    Historical Provider, MD   cilostazol (PLETAL) 100 MG tablet TAKE 1 TABLET TWICE DAILY 12/19/18   SHAHNAZ Montemayor   Metoprolol Tartrate 75 MG TABS Take 75 mg by mouth 2 times daily  Patient taking differently: Take 50 mg by mouth 2 times daily 6/16/17   Gil Valdivia DO   aspirin 81 MG tablet Take 81 mg by mouth daily. Historical Provider, MD   folic acid (FOLVITE) 903 MCG tablet Take 800 mcg by mouth daily. Historical Provider, MD   insulin lispro (HUMALOG) 100 UNIT/ML injection vial Inject into the skin 4 times daily (after meals and at bedtime)    Historical Provider, MD   insulin glargine (LANTUS) 100 UNIT/ML injection vial Inject 30 Units into the skin nightly    Historical Provider, MD       Allergies:    Keflex [cephalexin]    Social History:    The patient currently lives at home  Tobacco:   reports that he has quit smoking. His smoking use included pipe. He has never used smokeless tobacco.  Alcohol:   reports no history of alcohol use.   Illicit Drugs: denies    Family History:      Problem Relation Age of Onset    Cancer Mother     Cancer Father Review of Systems:   Review of Systems   Constitutional:  Positive for appetite change and diaphoresis. Negative for chills and fever. HENT:  Negative for sore throat and trouble swallowing. Respiratory:  Positive for shortness of breath. Negative for chest tightness. Cardiovascular:  Negative for chest pain, palpitations and leg swelling. Gastrointestinal:  Negative for abdominal pain, diarrhea, nausea and vomiting. Genitourinary:  Negative for difficulty urinating, frequency and urgency. Musculoskeletal:  Negative for back pain and neck pain. Skin:  Negative for color change and wound. Neurological:  Negative for dizziness and light-headedness. Psychiatric/Behavioral:  Negative for agitation and confusion. 14 point review of systems is negative except as specifically addressed above. Physical Examination:  There were no vitals taken for this visit. Physical Exam  Vitals reviewed. Constitutional:       Appearance: He is ill-appearing and diaphoretic. HENT:      Head: Normocephalic. Mouth/Throat:      Mouth: Mucous membranes are moist.      Pharynx: Oropharynx is clear. Eyes:      Pupils: Pupils are equal, round, and reactive to light. Cardiovascular:      Rate and Rhythm: Regular rhythm. Tachycardia present. Pulses: Normal pulses. Heart sounds: Normal heart sounds. Pulmonary:      Effort: Tachypnea present. Breath sounds: No wheezing, rhonchi or rales. Abdominal:      General: Abdomen is flat. Bowel sounds are normal. There is no distension. Palpations: Abdomen is soft. Tenderness: There is no abdominal tenderness. There is no guarding. Musculoskeletal:      Right lower leg: No edema. Left lower leg: No edema. Skin:     General: Skin is warm. Capillary Refill: Capillary refill takes less than 2 seconds. Neurological:      Mental Status: He is alert and oriented to person, place, and time.         Diagnostic Data:  CBC:  Recent Labs     09/28/22  0731 09/29/22  0224 09/29/22  0841   WBC 11.3* 15.4* 17.0*   HGB 15.0 15.2 16.1   HCT 47.2 48.1 50.4    237 246     BMP:  Recent Labs     09/29/22  0224 09/29/22  0410 09/29/22  0841 09/29/22  0907   *  --  127*  --    K 6.8* 6.7*  --  5.9   CL 98  --  98  --    CO2 22  --  17*  --    BUN 41*  --  46*  --    CREATININE 1.3*  --  1.1  --    CALCIUM 9.5  --  9.6  --      Recent Labs     09/29/22  0841   AST 96*   ALT 76*   BILITOT 2.2*   ALKPHOS 206*     Coag Panel: No results for input(s): INR, PROTIME, APTT in the last 72 hours. Cardiac Enzymes: No results for input(s): Da Jose E in the last 72 hours. ABGs:  Lab Results   Component Value Date/Time    PHART 7.370 09/29/2022 09:07 AM    PO2ART 83.0 09/29/2022 09:07 AM    CIZ6WFA 38.0 09/29/2022 09:07 AM     Urinalysis:  Lab Results   Component Value Date/Time    NITRU Negative 09/28/2022 02:12 AM    WBCUA 10-15 09/28/2022 02:12 AM    BACTERIA 2+ 09/28/2022 02:12 AM    BLOODU Negative 09/28/2022 02:12 AM    SPECGRAV 1.024 09/28/2022 02:12 AM    GLUCOSEU 250 09/28/2022 02:12 AM     A1C: No results for input(s): LABA1C in the last 72 hours. ABG:  Recent Labs     09/29/22  0907   PHART 7.370   YBB2SDQ 38.0   PO2ART 83.0   CBF6PXN 22.0   BEART -2.9*   HGBAE 15.5   F6TODIPF 94.0   CARBOXHGBART 2.4       No results found. Assessment/Plan:  Principal Problem:    Hyperkalemia   -admit   -IV insulin, lokelma, calcium gluconate, and sodium bicarb   -tele   -serial EKG, currently ST with no ST changes   -monitor CMP   -ABGs   -hold ace inhibitor    Active Problems:    Acute ischemic stroke (HCC)   -PT/OT. SLP   -continue Eliquis, ASA, and pletal   -glucose and BP control      Diabetic polyneuropathy associated with type 2 diabetes mellitus ([facility] [address]   -fall precautions   -neurovascular checks      Coronary artery disease involving native coronary artery without angina pectoris    Hypercholesterolemia   -continue statin      Essential hypertension   -continue metoprolol   -hold ACE at this time      Type 2 diabetes mellitus with diabetic peripheral angiopathy and gangrene, with long-term current use of insulin (HCC)   -Accu checks   -tight glucose control   -continue lantus   -SSI   -hypoglycemia protocol    Resolved Problems:    * No resolved hospital problems. *       Signed:  SHAHNAZ Kelly - CNP, 9/29/2022 9:16 AM      Attestation Statement     I have independently seen and examined this patient and agree with the asesment and plan by mid level provider        Objective:   Vitals: BP (!) 134/55   Pulse 88   Temp 97.5 °F (36.4 °C) (Oral)   Resp 16   SpO2 96%   General appearance: alert, appears stated age and cooperative  Skin: Skin color, texture, turgor normal.   HEENT: Head: Normocephalic, no lesions, without obvious abnormality.   Neck: no adenopathy, no carotid bruit, no JVD, and supple, symmetrical, trachea midline  Lungs: clear to auscultation bilaterally  Heart: irregularly irregular rhythm  Abdomen: soft, non-tender; bowel sounds normal; no masses,  no organomegaly  Extremities: extremities normal, atraumatic, no cyanosis or edema  Lymphatic: No significant lymph node enlargement papable  Neurologic: Mental status: Alert, oriented, thought content appropriate      Assessment & Plan:    Hyperkalemia - treat - hold meds contributing   Acute ischemic stroke- neurology consult  DM2- insulin   CAD  HTN  UTI- ab  KRYSTLEon blanche Oconnor MD

## 2022-09-29 NOTE — PROGRESS NOTES
Patient:   Singh Velazquez  MR#:    [id number]   Room:    0811/811-01   YOB: 1945  Date of Progress Note: 9/29/2022  Time of Note                           8:13 AM  Consulting Physician:   Desmond Suarez M.D. Attending Physician:  Desmond Suarez MD     CHIEF COMPLAINT: Generalized weakness and deconditioning     Subjective  This 76 y.o. male  with history of HTN, hypercholesterolemia, DM, CAD, PVD, DM neuropathy, osteomyelitis with transmetatarsal amputation, COVID-19 July 2022 and newly diagnosis liver cancer July of this year with radioembolization to the liver 9/20/22. He presented to [facility] on 9/22/22 with weakness, AMS and lethargy. He was admitted to the Hospitalist service with metabolic encephalopathy. MRI done revealed Multiple tiny 1 or 2 mm areas of diffusion and increased signal in both cerebral hemispheres that likely represent small areas of acute ischemic change. These are seen in the bilateral frontal and parietal lobes and in the right occipital lobe. Neurology was consulted. He was seen by Dr. Blaine Swann, who felt embolic stroke related to hypercoaguable state and underlying/undetected atrial fibrllation. Alicia Fontana had stopped ASA on his own but continued Pletal. ASA had been started this admission. In normal circumstance low dose anticoagulation would be recommended for ESUS but patient has higher than normal risk for bleed with hepatic cancer. Plans are for Zio Patch at discharge CTA of neck done shows heavy plaque burden at the bilateral carotid bifurcations with about 80% stenosis of the right ICA and 80 to 90% stenosis on the left. Vascular surgery was consulted. He was seen by Dr. Kenyetta Ryan, who didn't feel any surgical intervention was needed, he recommended continued conservative therapy with antiplatelet therapy and given the embolic appearance of the infarcts could consider low-dose Eliquis twice daily as well.      Has evaluated by SPT for swallow and placed on a mechanical soft diet with thin liquids. SPT will also continue to see for cognition. He is still weak overall and participating in both PT/OT  Complains of being tired. Potassium elevated this morning. REVIEW OF SYSTEMS:  Constitutional: No fevers No chills  Neck:No stiffness  Respiratory: No shortness of breath  Cardiovascular: No chest pain No palpitations  Gastrointestinal: No abdominal pain    Genitourinary: No Dysuria  Neurological: No headache, no confusion      PHYSICAL EXAM:  BP (!) 142/43   Pulse 95   Temp 98.1 °F (36.7 °C) (Oral)   Resp 30   Ht 6' (1.829 m)   Wt 192 lb 8 oz (87.3 kg)   SpO2 91%   BMI 26.11 kg/m²     Constitutional: he appears well-developed and well-nourished. Eyes - conjunctiva normal.  Pupils react to light  Ear, nose, throat -hearing intact to voice. No scars, masses, or lesions over external nose or ears, no atrophy of tongue  Neck-symmetric, no masses noted, no jugular vein distension  Respiration- chest wall appears symmetric, good expansion,   normal effort without use of accessory muscles  Cardiovascular- RRR  Musculoskeletal - no significant wasting of muscles noted, no bony deformities, gait no gross ataxia  Extremities-no clubbing, cyanosis or edema  Skin - warm, dry, and intact. No rash, erythema, or pallor. Psychiatric - mood, affect, and behavior appear normal.      Neurology  NEUROLOGICAL EXAM:      Mental status   Somewhat drowsy.   Complains of being tired     Cranial Nerve Exam   CN II- Visual fields grossly unremarkable  CN III, IV,VI-EOMI, No nystagmus, conjugate eye movements, no ptosis  CN VII-slight left facial droop  CN VIII-Hearing intact   CN IX and X- Palate elevates in midline  CN XI-good shoulder shrug  CN XII-Tongue midline with no fasciculations or fibrillations     Motor Exam  V/V throughout upper and lower extremities bilaterally, no cogwheeling, normal tone     Absent throughout     Tremors- no tremors in hands or head noted  Nursing/pcp notes, imaging,labs and vitals reviewed. Lab Results   Component Value Date    WBC 15.4 (H) 09/29/2022    HGB 15.2 09/29/2022    HCT 48.1 09/29/2022    .3 (H) 09/29/2022     09/29/2022     Lab Results   Component Value Date     (L) 09/29/2022    K 6.7 (HH) 09/29/2022    CL 98 09/29/2022    CO2 22 09/29/2022    BUN 41 (H) 09/29/2022    CREATININE 1.3 (H) 09/29/2022    GLUCOSE 379 (H) 09/29/2022    CALCIUM 9.5 09/29/2022    PROT 7.6 06/07/2017    LABALBU 3.7 06/07/2017    BILITOT 0.9 06/07/2017    ALKPHOS 59 06/07/2017    AST 28 06/07/2017    ALT 29 06/07/2017    LABGLOM 53 (A) 09/29/2022    GFRAA >59 09/29/2022     Lab Results   Component Value Date    INR 1.06 01/23/2018    INR 1.16 (H) 09/08/2014    INR 1.20 (H) 09/04/2014   No results found for: PHENYTOIN, ESR, CRP    PT,OT and/or speech notes reviewed:  Clinical Impression     Speech was considered dysarthric as characterized characterized by decreased vocal intensity, decreased breath support for speech production, decreased lingual and labial ROM, strength, and coordination. Patient exhibited delayed processing, decreased attention, decreased orientation, decreased problem solving, decreased recall skills, decreased executive functioning/reasoning, decreased comprehension skills, and delayed verbalizations. Pt would benefit from continued skilled ST services to assist in returning to prior level of functioning. SLP will continue to follow and treat for speech, language, cognition, dysarthria, and dysphagia.      BED MOBILITY  Bed mobility  Bridging: Contact guard assistance, Stand by assistance  Rolling to Left: Stand by assistance, Supervision (VC to use rails)  Rolling to Right: Stand by assistance, Supervision (VC to use rails)  Supine to Sit: Minimal assistance, Contact guard assistance (to L side of bed, triplanar using rails, increased time to complete)  Sit to Supine: Contact guard assistance, Stand by assistance (from L side of bed, triplanar with rails)  Scooting: Stand by assistance, Supervision        TRANSFERS  Transfers  Sit to Stand: Minimal Assistance, Contact guard assistance (increased time to complete, VC for hand placement,)  Stand to sit: Minimal Assistance, Contact guard assistance (poor eccentric control)               09/28/22 0900   Assessment   Performance deficits / Impairments Decreased functional mobility ; Decreased endurance;Decreased ADL status; Decreased balance;Decreased strength;Decreased high-level IADLs;Decreased cognition   Assessment HTN, DM, Covid 2021, recent liver CA diagnosis   Treatment Diagnosis Bilateral Frontal, Bilateral Parietal, and Right occipital lobes   Discharge Recommendations Home with Home health OT;24 hour supervision or assist      RECORD REVIEW: Previous medical records, medications were reviewed at today's visit    IMPRESSION:   1. Bilateral embolic strokes-PT/OT/SLP. Low-dose Eliquis, aspirin, Pletal  2. Essential hypertension-metoprolol, Zestril. Zestril on hold and metoprolol reduced  3. Diabetes-continue insulin and monitoring. Adjustments per hospitalist  4. Hyperlipidemia-Lipitor  5. GI-bowel regimen. Has colostomy  6. DVT prophylaxis-Eliquis  7  History of liver cancer  8. Abnormal UA-await culture  9. Hyperkalemia-hospitalist following and managing. Help appreciated.   Laura CARRILLO: Staff next week

## 2022-09-29 NOTE — PROGRESS NOTES
Report called to Seth Casas RN on PCU. Patient discharged/readmit to PCU. Transferred to room 729 via bed.

## 2022-09-30 NOTE — PLAN OF CARE
Problem: Discharge Planning  Goal: Discharge to home or other facility with appropriate resources  Outcome: Progressing     Problem: Chronic Conditions and Co-morbidities  Goal: Patient's chronic conditions and co-morbidity symptoms are monitored and maintained or improved  Outcome: Progressing     Problem: Safety - Adult  Goal: Free from fall injury  Outcome: Progressing     Problem: ABCDS Injury Assessment  Goal: Absence of physical injury  Outcome: Progressing     Problem: Skin/Tissue Integrity  Goal: Absence of new skin breakdown  Description: 1. Monitor for areas of redness and/or skin breakdown  2. Assess vascular access sites hourly  3. Every 4-6 hours minimum:  Change oxygen saturation probe site  4. Every 4-6 hours:  If on nasal continuous positive airway pressure, respiratory therapy assess nares and determine need for appliance change or resting period.   Outcome: Progressing   Electronically signed by Licha Acevedo RN on 9/30/2022 at 3:18 AM

## 2022-09-30 NOTE — PROGRESS NOTES
Latest Reference Range & Units 9/29/22 19:44   O2 Therapy  Unknown   Hemoglobin, Art, Extended 14.0 - 18.0 g/dL 14.3   pH, Arterial 7.350 - 7.450  7.370   pCO2, Arterial 35.0 - 45.0 mmHg 39.0   pO2, Arterial 80.0 - 100.0 mmHg 72.0 (L)   HCO3, Arterial 22.0 - 26.0 mmol/L 22.5   Base Excess, Arterial -2.0 - 2.0 mmol/L -2.5 (L)   O2 Sat, Arterial >92 % 92.5   O2 Content, Arterial Not Established mL/dL 18.6   Methemoglobin, Arterial <1.5 % 1.1   Carboxyhgb, Arterial 0.0 - 5.0 % 2.2   (L): Data is abnormally low

## 2022-09-30 NOTE — DISCHARGE SUMMARY
[facility], [place] [address]    DEPARTMENT OF HOSPITALIST MEDICINE      DISCHARGE SUMMARY:      PATIENT NAME:  Flaco Lincoln  :  1945  MRN:  [id number]    Admission Date:   2022  9:02 AM Attending: Alex Chaudhari, *   Discharge Date:   2022              PCP: Dr. Donna Medellin M.D., MD  Length of Stay: 1 days     Chief Complaint on Admission: No chief complaint on file. Consultants:     IP CONSULT TO SOCIAL WORK       Discharge Problem List:   Principal Problem:    Hyperkalemia  Active Problems:    Acute ischemic stroke (HCC)    Diabetic polyneuropathy associated with type 2 diabetes mellitus ([facility] [address]    Coronary artery disease involving native coronary artery without angina pectoris    Hypercholesterolemia    Essential hypertension    Type 2 diabetes mellitus with diabetic peripheral angiopathy and gangrene, with long-term current use of insulin (HCC)  Resolved Problems:    * No resolved hospital problems. *         Last dated Assessment and Plan . .. 2022      CUMULATIVE  HOSPITAL  COURSE  AND  TREATMENT:  Is a 33-year-old male past medical history of a recent stroke, coronary artery disease, diabetes, neuropathy, hypertension, hyperlipidemia, UC and PAD. He presented to [facility] as a transfer from acute rehab complaining of hyperkalemia. Patient was recently admitted at [facility] for acute CVA caused by previously undiagnosed atrial fibs. Patient is currently being treated with aspirin and Pletal and low-dose Eliquis. Patient was admitted acute rehab on 2022 BMP this morning showed a potassium of 6.7 on hospitalist was consulted. Patient complained of feeling ill and fatigued all over. He denied abdominal pain nausea vomiting or fever patient complained of decreased appetite. Sodium was 129 potassium 6.7 creatinine 1.3 glucose 379 white count 17. Patient was admitted to hospitalist service for hyperkalemia and transferred to PCU.   Initial treatment started with IV insulin bicarbonate calcium gluconate and Lokelma EKG was obtained and indicated sinus tachycardia with no ST changes ABGs within normal limits. Chest today patient's sodium is 134 potassium is 5.5 CO2 24 creatinine 1 BUN 30. Is awake alert no complaints less fatigue than yesterday and ready to get back to rehab. OBJECTIVE:  BP (!) 134/55   Pulse 92   Temp 97.5 °F (36.4 °C) (Oral)   Resp 20   SpO2 95%       Heart: RRR   Lungs: Bilateral fair air entry   Abdomen: Soft, non-tender   Extremities: No edema   Neurologic: Alert and oriented   Skin: Warm and dry          Laboratory Data:  Recent Labs     09/29/22  0841 09/29/22 0933 09/30/22 0129   WBC 17.0* 19.2* 12.8*   HGB 16.1 15.0 13.8*    247 204     Recent Labs     09/29/22 1933 09/29/22 1944 09/30/22 0129 09/30/22  1012   *  --  134* 134*   K 4.3 4.0 4.6 5.5*     --  103 102   CO2 20*  --  22 24   BUN 41*  --  36* 30*   CREATININE 1.2  --  1.0 1.0   GLUCOSE 273*  --  237* 332*     Recent Labs     09/29/22 1933 09/30/22 0129 09/30/22  1012   AST 83* 82* 82*   ALT 66* 67* 67*   BILITOT 1.1 1.4* 1.4*   ALKPHOS 176* 178* 194*     Troponin T:   Recent Labs     09/29/22 1932   TROPONINI <0.01     Pro-BNP: No results for input(s): BNP in the last 72 hours. INR: No results for input(s): INR in the last 72 hours. UA:  Recent Labs     09/28/22  0212   COLORU DARK YELLOW*   PHUR 5.5   WBCUA 10-15   MUCUS 2+*   BACTERIA 2+*   CLARITYU CLOUDY*   SPECGRAV 1.024   LEUKOCYTESUR MODERATE*   UROBILINOGEN 0.2   BILIRUBINUR Negative   BLOODU Negative   GLUCOSEU 250*     A1C:   Recent Labs     09/29/22 0933   LABA1C 6.5*     ABG:  Recent Labs     09/29/22 1944   PHART 7.370   BDN9GUU 39.0   PO2ART 72.0*   NYG4OIJ 22.5   BEART -2.5*   HGBAE 14.3   Y3IBYOHN 92.5   CARBOXHGBART 2.2            Impressions of imaging performed in 48 hours before discharge:    CT Head WO Contrast    Result Date: 9/30/2022  1.   Nonspecific but presumed microvascular changes in the periventricular white matter. 2.  Atrophic changes. 3.  No acute hemorrhage or extra-axial collection. Recommendation: Follow up as clinically indicated. All CT scans at this facility utilize dose modulation, iterative reconstruction, and/or weight based dosing when appropriate to reduce radiation dose to as low as reasonably achievable. Electronically Signed by Bright Vazquez MD at 30-Sep-2022 08:40:44 AM             XR CHEST PORTABLE    Result Date: 9/30/2022  Mild central vascular congestion. In addition, cannot exclude mild patchy opacities in the mid and lower left lung, which may be due to atelectasis/scarring, vascular congestion, or early airspace disease. Otherwise, no lobar consolidations. No pneumothorax or pleural effusions. Recommendation: Follow up as clinically indicated. Electronically Signed by Violeta Sanabria MD at 30-Sep-2022 05:13:14 AM                   Medication List        ASK your doctor about these medications      aspirin 81 MG tablet     atorvastatin 40 MG tablet  Commonly known as: LIPITOR     cilostazol 100 MG tablet  Commonly known as: PLETAL  TAKE 1 TABLET TWICE DAILY     Eliquis 2.5 MG Tabs tablet  Generic drug: apixaban     folic acid 884 MCG tablet  Commonly known as: FOLVITE     insulin glargine 100 UNIT/ML injection vial  Commonly known as: LANTUS     insulin lispro 100 UNIT/ML injection vial  Commonly known as: HUMALOG     lisinopril 10 MG tablet  Commonly known as: PRINIVIL;ZESTRIL     Metoprolol Tartrate 75 MG Tabs  Take 75 mg by mouth 2 times daily                ISSUES TO BE ADDRESSED AT HOSPITAL FOLLOW-UP VISIT:    Advised patient to follow-up closely with PCP upon discharge for management of chronic medical issues  Please see the detailed discharge directions delivered from earlier today! Condition on Discharge: stable  Discharge Disposition: Acute Rehab    Recommended Follow Up:  No follow-up provider specified.   Followup Appointments Scheduled at Time of Discharge:  No future appointments. Discharge Instructions:   Please see the discharge paperwork. Patient was seen at bedside today, and the examination shows improvement since yesterday. Detailed discharge directions delivered to the patient by myself and our nursing staff, who verbalizes understanding and is very happy and satisfied with the plan. Patient has been advised to continue all medications as prescribed and advised, and f/u with PCP within 1 week. Patient is stable from medical standpoint to be discharged. Total time spent during patient evaluation and assessment, discussion with the nurse/family, addressing discharge medications/scripts and coordination of care for safe discharge was in excess of 35 minutes. Signed Electronically:    SHAHNAZ Saucedo CNP  1:57 PM 9/30/2022       Attestation Statement     I have independently seen and examined this patient and agree with the asesment and plan by mid level provider        Objective:   Vitals: BP (!) 134/55   Pulse 88   Temp 97.5 °F (36.4 °C) (Oral)   Resp 16   SpO2 96%   General appearance: alert, appears stated age and cooperative  Skin: Skin color, texture, turgor normal.   HEENT: Head: Normocephalic, no lesions, without obvious abnormality.   Neck: no adenopathy, no carotid bruit, no JVD, and supple, symmetrical, trachea midline  Lungs: clear to auscultation bilaterally  Heart: regular rate and rhythm, S1, S2 normal, no murmur, click, rub or gallop  Abdomen: soft, non-tender; bowel sounds normal; no masses,  no organomegaly  Extremities: extremities normal, atraumatic, no cyanosis or edema  Lymphatic: No significant lymph node enlargement papable  Neurologic: Mental status: Alert, oriented, thought content appropriate      Assessment & Plan:    Hyperkalemia - treated and resolved - hold meds contributing   Recent acute ischemic stroke- evaluated by PT/OT/speech- neurology consulted- dc to acute rehab  DM2- insulin   CAD  HTN  UTI- urine cultures not collected ? ???- repeat UA in acute rehab    Gene Foley MD

## 2022-09-30 NOTE — PROGRESS NOTES
Occupational Therapy  Facility/Department: [facility] PROGRESSIVE CARE  Occupational Therapy Initial Assessment    Name: Nancy Garcia  : 1945  MRN: [id number]  Date of Service: 2022    Discharge Recommendations:             Patient Diagnosis(es): There were no encounter diagnoses. 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 Past Surgical History:  has a past surgical history that includes Coronary artery bypass graft (); Jejunostomy (); vascular surgery (14 SJS); vascular surgery (14 SJS); vascular surgery (2015 SJS); vascular surgery (2015 SJS ); Toe amputation (Left, 2017); vascular surgery (2017); vascular surgery (2017); eye surgery; Cholecystectomy; pr amputation foot,transmetatarsal (Left, 2018); and vascular surgery (2018). Treatment Diagnosis: CVA, hyperkalemia      Assessment   Performance deficits / Impairments: Decreased functional mobility ; Decreased endurance;Decreased ADL status; Decreased balance;Decreased strength;Decreased high-level IADLs;Decreased cognition  Assessment: HTN, DM, Covid , recent liver CA diagnosis  Treatment Diagnosis: CVA, hyperkalemia  Prognosis: Good  Decision Making: Low Complexity  REQUIRES OT FOLLOW-UP: Yes  Activity Tolerance  Activity Tolerance: Patient Tolerated treatment well        Plan   Occupational Therapy Plan  Times Per Week: 3-5x/week     Restrictions       Subjective   General  Chart Reviewed: Yes  Patient assessed for rehabilitation services?: Yes  Additional Pertinent Hx: HTN, DM, Covid , recent liver CA diagnosis  Diagnosis: CVA , hyperkalemia     Social/Functional History  Social/Functional History  Lives With: Spouse  Type of Home: House  Home Equipment: Walker, rolling  ADL Assistance: Independent  Ambulation Assistance: Independent  Transfer Assistance: Independent       Objective   Heart Rate: 90  Heart Rate Source: Monitor  BP: (!) 161/58  BP Location: Left upper arm  BP Method: Automatic  Patient Position: Other (Comment)  MAP (Calculated): 92.33  Resp: 20  SpO2: 97 %  O2 Device: Nasal cannula                      AROM: Within functional limits  Strength: Within functional limits  ADL  Feeding: Independent  Grooming: Supervision  UE Bathing: Supervision  LE Bathing: Moderate assistance  UE Dressing: Supervision  LE Dressing: Maximum assistance; Moderate assistance  Toileting: Moderate assistance           Transfers  Stand Step Transfers: Minimal assistance  Sit to stand: Minimal assistance  Vision  Vision: Within Functional Limits  Hearing  Hearing: Exceptions to [facility]  Hearing Exceptions: Bilateral hearing aid  Cognition  Overall Cognitive Status: WFL  Cognition Comment: awake, alert, follows simple commands  Orientation  Overall Orientation Status: Within Functional Limits                                           G-Code     OutComes Score                                                  AM-PAC Score             Tinneti Score       Goals  Short Term Goals  Time Frame for Short Term Goals: 1 week  Short Term Goal 1: Supervision with toilet tfers  Short Term Goal 2: Supervision for seated LB dsg  Short Term Goal 3: Supervision for clothing mgmt.  in standing       Therapy Time   Individual Concurrent Group Co-treatment   Time In           Time Out           Minutes                   Shu Bearden, OT

## 2022-09-30 NOTE — PROGRESS NOTES
Physical Therapy  Facility/Department: [facility] PROGRESSIVE CARE  Physical Therapy Initial Assessment    Name: Singh Velazquez  : 1945  MRN: [id number]  Date of Service: 2022    Discharge Recommendations:  Continue to assess pending progress, Patient would benefit from continued therapy after discharge          Patient Diagnosis(es): There were no encounter diagnoses. 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 Past Surgical History:  has a past surgical history that includes Coronary artery bypass graft (); Jejunostomy (); vascular surgery (14 SJS); vascular surgery (14 SJS); vascular surgery (2015 SJS); vascular surgery (2015 SJS ); Toe amputation (Left, 2017); vascular surgery (2017); vascular surgery (2017); eye surgery; Cholecystectomy; pr amputation foot,transmetatarsal (Left, 2018); and vascular surgery (2018). Assessment   Body Structures, Functions, Activity Limitations Requiring Skilled Therapeutic Intervention: Decreased functional mobility ; Decreased ADL status; Decreased strength;Decreased balance;Decreased endurance  Assessment: Pt SEEMS TO BE DOING WELL TODAY. ABLE TO STAND EOB AND TAKE SMALL SIDE STEPS WITH RW. REPORTS DYSPNEA OVERALL BUT MINIMAL ON O2. Pt WILL BENEFIT FROM CONTINUED THERAPY AT D/C.   Requires PT Follow-Up: Yes  Activity Tolerance  Activity Tolerance: Patient tolerated treatment well  Activity Tolerance Comments: MINIMAL DYSPNEA WITH ACTIVITY ON O2     Plan   Physcial Therapy Plan  General Plan: 5-7 times per week  Current Treatment Recommendations: Strengthening, ROM, Balance training, Functional mobility training, Transfer training, ADL/Self-care training, Endurance training, Gait training, Safety education & training, Patient/Caregiver education & training  Safety Devices  Type of Devices: Bed alarm in place, Call light within reach     Restrictions  Restrictions/Precautions  Restrictions/Precautions: Fall Risk     Subjective   Pain: DENIES  General  Patient assessed for rehabilitation services?: Yes  Diagnosis: HYPERKALEMIA, CVA  General Comment  Comments: LIKELY TRANSFER TO ACUTE REHAB SOON  Subjective  Subjective: Pt WILLING TO PARTICIPATE         Social/Functional History  Social/Functional History  Lives With: Spouse  Type of Home: House  Home Equipment: Eleni Manifold, rolling  ADL Assistance: Independent  Ambulation Assistance: Independent  Transfer Assistance: Independent  Vision/Hearing  Vision  Vision: Within Functional Limits  Vision Exceptions: Wears glasses for reading  Hearing  Hearing Exceptions: Bilateral hearing aid    Cognition   Orientation  Overall Orientation Status: Within Functional Limits  Cognition  Overall Cognitive Status: WFL     Objective   Heart Rate: 90  Heart Rate Source: Monitor  BP: (!) 161/58  BP Location: Left upper arm  BP Method: Automatic  Patient Position: Other (Comment)  MAP (Calculated): 92.33  Resp: 20  SpO2: 97 %  O2 Device: Nasal cannula     Observation/Palpation  Posture: Fair  Gross Assessment  AROM: Generally decreased, functional  Strength: Generally decreased, functional                    Bed mobility  Supine to Sit: Minimal assistance  Sit to Supine: Minimal assistance  Transfers  Sit to Stand: Minimal Assistance  Stand to Sit: Minimal Assistance  Comment: TOOK 3-4 SMALL SIDE STEPS EOB WITH RW, STEADY OVERALL        Balance  Sitting - Dynamic: Good  Standing - Dynamic: Fair           OutComes Score                                                  AM-PAC Score             Tinneti Score       Goals  Short Term Goals  Time Frame for Short Term Goals: 14 DAYS  Short Term Goal 1: BED MOB SUPERVISION  Short Term Goal 2: TRANSFERS SBA  Short Term Goal 3: AMB 48' RW CGA       Education  Patient Education  Education Given To: Patient; Family  Education Provided: Role of Therapy;Plan of Care      Therapy Time   Individual Concurrent Group Co-treatment   Time In           Time Out           Minutes                   Maria Del Carmen Anderson PT

## 2022-10-01 NOTE — PROGRESS NOTES
Patient:   Nadeen Sandoval  MR#:    [id number]   Room:    0811/811-01   YOB: 1945  Date of Progress Note: 10/1/2022  Time of Note                           11:27 AM  Consulting Physician:   David Chi M.D. Attending Physician:  David Chi MD     CHIEF COMPLAINT: Generalized weakness and deconditioning     Subjective  This 76 y.o. male  with history of HTN, hypercholesterolemia, DM, CAD, PVD, DM neuropathy, osteomyelitis with transmetatarsal amputation, COVID-19 July 2022 and newly diagnosis liver cancer July of this year with radioembolization to the liver 9/20/22. He [facility] to [facility] on [facility] with weakness, AMS and lethargy. He was admitted to the Hospitalist service with metabolic encephalopathy. MRI done revealed Multiple tiny 1 or 2 mm areas of diffusion and increased signal in both cerebral hemispheres that likely represent small areas of acute ischemic change. These are seen in the bilateral frontal and parietal lobes and in the right occipital lobe. Neurology was consulted. He was seen by Dr. Santa Tapia, who felt embolic stroke related to hypercoaguable state and underlying/undetected atrial fibrllation. Rosa Figueroa had stopped ASA on his own but continued Pletal. ASA had been started this admission. In normal circumstance low dose anticoagulation would be recommended for ESUS but patient has higher than normal risk for bleed with hepatic cancer. Plans are for Zio Patch at discharge CTA of neck done shows heavy plaque burden at the bilateral carotid bifurcations with about 80% stenosis of the right ICA and 80 to 90% stenosis on the left. Vascular surgery was consulted. He was seen by Dr. Bashir Muhammad, who didn't feel any surgical intervention was needed, he recommended continued conservative therapy with antiplatelet therapy and given the embolic appearance of the infarcts could consider low-dose Eliquis twice daily as well.      Has evaluated by SPT for swallow and placed on a mechanical soft diet with thin liquids. SPT will also continue to see for cognition. He is still weak overall and participating in both PT/OT  Patient returned back to the floor 9/30 with resumption of care following treatment for hyperkalemia. Complains of not feeling well and having no appetite. Some stomach pain. REVIEW OF SYSTEMS:  Constitutional: No fevers No chills  Neck:No stiffness  Respiratory: No shortness of breath  Cardiovascular: No chest pain No palpitations  Gastrointestinal: No diarrhea. Has a colostomy  Genitourinary: No Dysuria  Neurological: No headache, no confusion      PHYSICAL EXAM:  BP (!) 121/104   Pulse (!) 122   Temp (!) 96.6 °F (35.9 °C) (Temporal)   Resp 16   Wt 203 lb 11.2 oz (92.4 kg)   SpO2 91%   BMI 27.63 kg/m²     Constitutional: he appears well-developed and well-nourished. Eyes - conjunctiva normal.  Pupils react to light  Ear, nose, throat -hearing intact to voice. No scars, masses, or lesions over external nose or ears, no atrophy of tongue  Neck-symmetric, no masses noted, no jugular vein distension  Respiration- chest wall appears symmetric, good expansion,   normal effort without use of accessory muscles  Cardiovascular- RRR  Musculoskeletal - no significant wasting of muscles noted, no bony deformities, gait no gross ataxia  Extremities-no clubbing, cyanosis or edema  Skin - warm, dry, and intact. No rash, erythema, or pallor. Psychiatric - mood, affect, and behavior appear normal.      Neurology  NEUROLOGICAL EXAM:      Mental status   Somewhat drowsy.   Complains of being tired     Cranial Nerve Exam   CN II- Visual fields grossly unremarkable  CN III, IV,VI-EOMI, No nystagmus, conjugate eye movements, no ptosis  CN VII-slight left facial droop  CN VIII-Hearing intact   CN IX and X- Palate elevates in midline  CN XI-good shoulder shrug  CN XII-Tongue midline with no fasciculations or fibrillations     Motor Exam  V/V throughout upper and lower extremities bilaterally, no cogwheeling, normal tone     Absent throughout     Tremors- no tremors in hands or head noted  Nursing/pcp notes, imaging,labs and vitals reviewed. Lab Results   Component Value Date    WBC 11.6 (H) 10/01/2022    HGB 13.2 (L) 10/01/2022    HCT 42.2 10/01/2022    .5 (H) 10/01/2022     10/01/2022     Lab Results   Component Value Date     (L) 10/01/2022    K 5.1 (H) 10/01/2022     10/01/2022    CO2 24 10/01/2022    BUN 26 (H) 10/01/2022    CREATININE 0.9 10/01/2022    GLUCOSE 235 (H) 10/01/2022    CALCIUM 8.8 10/01/2022    PROT 6.1 (L) 10/01/2022    LABALBU 3.1 (L) 10/01/2022    BILITOT 1.3 (H) 10/01/2022    ALKPHOS 198 (H) 10/01/2022    AST 89 (H) 10/01/2022    ALT 68 (H) 10/01/2022    LABGLOM >60 10/01/2022    GFRAA >59 10/01/2022     Lab Results   Component Value Date    INR 1.06 01/23/2018    INR 1.16 (H) 09/08/2014    INR 1.20 (H) 09/04/2014   No results found for: PHENYTOIN, ESR, CRP    PT,OT and/or speech notes reviewed:  Clinical Impression     Speech was considered dysarthric as characterized characterized by decreased vocal intensity, decreased breath support for speech production, decreased lingual and labial ROM, strength, and coordination. Patient exhibited delayed processing, decreased attention, decreased orientation, decreased problem solving, decreased recall skills, decreased executive functioning/reasoning, decreased comprehension skills, and delayed verbalizations. Pt would benefit from continued skilled ST services to assist in returning to prior level of functioning. SLP will continue to follow and treat for speech, language, cognition, dysarthria, and dysphagia.      BED MOBILITY  Bed mobility  Bridging: Contact guard assistance, Stand by assistance  Rolling to Left: Stand by assistance, Supervision (VC to use rails)  Rolling to Right: Stand by assistance, Supervision (VC to use rails)  Supine to Sit: Minimal assistance, Contact guard assistance (to L side of bed, triplanar using rails, increased time to complete)  Sit to Supine: Contact guard assistance, Stand by assistance (from L side of bed, triplanar with rails)  Scooting: Stand by assistance, Supervision        TRANSFERS  Transfers  Sit to Stand: Minimal Assistance, Contact guard assistance (increased time to complete, VC for hand placement,)  Stand to sit: Minimal Assistance, Contact guard assistance (poor eccentric control)               09/28/22 0900   Assessment   Performance deficits / Impairments Decreased functional mobility ; Decreased endurance;Decreased ADL status; Decreased balance;Decreased strength;Decreased high-level IADLs;Decreased cognition   Assessment HTN, DM, Covid 2021, recent liver CA diagnosis   Treatment Diagnosis Bilateral Frontal, Bilateral Parietal, and Right occipital lobes   Discharge Recommendations Home with Home health OT;24 hour supervision or assist      RECORD REVIEW: Previous medical records, medications were reviewed at today's visit    IMPRESSION:   1. Bilateral embolic strokes-PT/OT/SLP. Low-dose Eliquis, aspirin, Pletal  2. Essential hypertension-metoprolol, monitoring  3. Diabetes-continue insulin and monitoring. Adjustments per hospitalist  4. Hyperlipidemia-Lipitor  5. GI-bowel regimen. Has colostomy  6. DVT prophylaxis-Eliquis  7  History of liver cancer  8. Abnormal UA-culture growing E. coli. Patient given Cipro. Will repeat study.   9.  Hyperkalemia-resolved    ELOS: Staff next week

## 2022-10-01 NOTE — PROGRESS NOTES
Physical Therapy  Name: Ananth Vazquez  MRN:  [id number]  Date of service:  10/1/2022       10/01/22 0900   General Comment   Comments in bed   Subjective   Subjective pt states he feels poorly (worst he can remember) and not confident in his ability to do any activity, but is willing to try. Vitals   Heart Rate 100   Heart Rate Source Monitor   BP (!) 130/50   BP Location Left upper arm   BP Method Automatic   Patient Position Sitting   MAP (Calculated) 76.67   SpO2 93 %   O2 Device None (Room air)   Subjective   Subjective stomach hurting despite little food intake   Pain 5/10   Bed mobility   Supine to Sit Minimal assistance   Bed Mobility Comments pt comes to long sitting then bedside sitting with help of therapist; reaches to therapist and pulls up to outstretched arm   Transfers   Sit to Stand Contact guard assistance   Stand to Sit Contact guard assistance   Bed to Chair Contact guard assistance  (with RW)   Stand Pivot Transfers Contact guard assistance;Minimal Assistance  (w/o AD)   Ambulation   WB Status wbat   Ambulation   Surface Level tile   Device Rolling Walker   Assistance Contact guard assistance   Quality of Gait walker too far ahead, flexed posture, shuffling steps   Gait Deviations Slow Alexus; Shuffles;Decreased step length;Decreased step height   Distance 5'   Comments pt with poor tolerance for standing; reports feeling dizzy and bad in general and BP elevated as well as heart rate (see flowsheet through 10a)  (bp 121/104 and  after standing)   Balance   Comments pt sat eob for a few minutes prior to transfer to [facility] w/o need for UE support   PT Exercises   Static Standing Balance Exercises standing at walker for bp reading though had to be encouraged to stand duration of reading and quick to sit down when finished (cg@); standing at bedside for urinal use (cg@ and PTA holding urinal)   Assessment   Assessment pt with low energy and generally not feeling well in addition to reports of stomach pains; pt assisted with dressing and personal hygiene as well as emptying colostomy; pt willing to sit up in recliner post session; limited mobility due to feeling weak/dizzy; bp and heart rate elevated (reported to nsg)   Safety Devices   Type of Devices Call light within reach; Chair alarm in place; Left in chair   PT Individual Minutes   Time In 0900   Time Out 1000   Minutes 60       Electronically signed by Chip Taylor PTA on 10/1/2022 at 10:18 AM

## 2022-10-01 NOTE — PLAN OF CARE
Problem: Neurosensory - Adult  Goal: Achieves stable or improved neurological status  Outcome: Progressing     Problem: Respiratory - Adult  Goal: Achieves optimal ventilation and oxygenation  Outcome: Progressing     Problem: Skin/Tissue Integrity - Adult  Goal: Skin integrity remains intact  Outcome: Progressing  Flowsheets (Taken 9/30/2022 1517 by Susanne Jarvis LPN)  Skin Integrity Remains Intact: Monitor for areas of redness and/or skin breakdown     Problem: Musculoskeletal - Adult  Goal: Return mobility to safest level of function  Outcome: Progressing     Problem: Gastrointestinal - Adult  Goal: Establish and maintain optimal ostomy function  Outcome: Progressing     Problem: Discharge Planning  Goal: Discharge to home or other facility with appropriate resources  Outcome: Progressing  Flowsheets (Taken 9/30/2022 1517 by Susanne Jarvis LPN)  Discharge to home or other facility with appropriate resources: Identify barriers to discharge with patient and caregiver     Problem: Skin/Tissue Integrity  Goal: Absence of new skin breakdown  Description: 1. Monitor for areas of redness and/or skin breakdown  2. Assess vascular access sites hourly  3. Every 4-6 hours minimum:  Change oxygen saturation probe site  4. Every 4-6 hours:  If on nasal continuous positive airway pressure, respiratory therapy assess nares and determine need for appliance change or resting period.   Outcome: Progressing

## 2022-10-01 NOTE — PROGRESS NOTES
Occupational Therapy  Facility/Department: [facility] 8 REHAB UNIT      Name: Melvin Richardson  : 1945  MRN: [id number]  Date of Service: 10/1/2022           Patient Diagnosis(es): There were no encounter diagnoses. 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 Past Surgical History:  has a past surgical history that includes Coronary artery bypass graft (); Jejunostomy (); vascular surgery (14 [facility]); vascular surgery (14 [facility]); vascular surgery (2015 [facility]); vascular surgery (2015 [facility] ); Toe amputation (Left, 2017); vascular surgery (2017); vascular surgery (2017); eye surgery; Cholecystectomy; pr amputation foot,transmetatarsal (Left, 2018); and vascular surgery (2018). Assessment   Performance deficits / Impairments: Decreased functional mobility ; Decreased endurance;Decreased ADL status; Decreased balance;Decreased strength;Decreased high-level IADLs;Decreased cognition  Activity Tolerance  Activity Tolerance: Patient limited by pain  Activity Tolerance Comments: limited by stomach discomfort        Plan      10/01/22 1300   Occupational Therapy Plan   Current Treatment Recommendations Strengthening;Cognitive reorientation;Cognitive/Perceptual training;Equipment evaluation, education, & procurement;Patient/Caregiver education & training; Endurance training;Functional mobility training; Safety education & training;Home management training;Self-Care / ADL; Balance training       Subjective      10/01/22 1305   Pre Treatment Pain Screening   Scale Used Numeric Score   Intervention List Patient able to continue with treatment   Comments / Details stomach discomfort     Objective        Safety Devices  Type of Devices: Left in chair;Call light within reach; Chair alarm in place    Transfers  Stand Step Transfers: Minimal assistance  Sit to stand: Minimal assistance  Stand to sit: Minimal assistance       Exercise Treatment: 2# free weights, all planes, 2 sets, 15reps             Goals  Short Term Goals  Time Frame for Short Term Goals: 1 week  Short Term Goal 1: complete UB dressing with setup  Short Term Goal 2: complete LB dressing with CGA  Short Term Goal 3: complete overall toileting with CGA  Short Term Goal 4: complete 1-2 handed standing level task for 3 mins with supervision  Short Term Goal 5: complete overall bathing with CGA  Short Term Goal 6: complete simple ambulatory home making task with CGA  Long Term Goals  Time Frame for Long Term Goals : 3 weeks  Long Term Goal 1: complete overall toileting with modified independence  Long Term Goal 2: complete overall bathing with modified independence  Long Term Goal 3: complete overall dressing with modified independence  Long Term Goal 4: complete simple ambulatory home making task with modified independence  Long Term Goal 5: complete HEP with independence  Long Term Goal 6: pt/family verbalize DME       Therapy Time   Individual Concurrent Group Co-treatment   Time In 1300         Time Out 1400         Minutes 60         Timed Code Treatment Minutes: 60 Minutes       Alix Trejo, OT

## 2022-10-01 NOTE — PLAN OF CARE
Problem: Neurosensory - Adult  Goal: Achieves stable or improved neurological status  10/1/2022 0936 by Hank Vasquez RN  Outcome: Progressing  Flowsheets (Taken 10/1/2022 3083)  Achieves stable or improved neurological status: Assess for and report changes in neurological status  10/1/2022 0217 by Akin Patel LPN  Outcome: Progressing

## 2022-10-01 NOTE — PROGRESS NOTES
TIME  1027  1127    60 MINUTES    Subjective:  Patient in recliner with nasal cannula. Patient cooperative, however, lethargic. Patient reports being tired. Cough at rest. Patient reports feeling worse than yesterday. Objective: Attempted to complete portions of the CLQT this date. Patient became too lethargic and testing was discontinued after a few subtests were completed. Patient oriented to name, , age, building (type and name), city, state. Patient not accurately oriented to year, month (stated September), RANJEET. Patient informed of 3 therapies he receives. Following distracted delay, patient was 2/3 in independently identifying them. This did not increase with initial letter cue and verbal cues. Patient states not receiving therapies and this morning was the first time. Demographic orientation task to identify number of children (3) and their names. Patient produced names in a timely manner. Patient quickly answered location of daughter and living son. Patient stated number of grandchildren with self correction (3-6). This was later changed to 7. Patient tasked to produce grandchildren's names, however, stated children's names and where they live. Patient also presented with confusion of which son had passed away and which one still lives in [place]. Patient oriented to spouse name and nickname. Voice became almost aphonic during longer utterances. Divergent naming task completed to name items in a given category with 1 minute time limit for each trial. Patient verbally produced 7 items in category with 1 repetition. With verbal cue/prompt and initial letter cue, this increased to 8 items. For another category, patient produced 12 items with repetitions of productions. In last category (things that are green), patient named 2 items and 2 additional items with verbal prompts/cues.      Memory task was completed in which patient was given 4 words and then asked to recall target word by given attribute. Patient was 63% independently in this task that increased to 88% with self correction of one trial and multiple attempts of one trial.    Patient complained of pain in the buttocks. Patient demonstrated decreased awareness of remote/call button location (in his lap). Patient demonstrated ability to utilize call button. Patient gave reasons to utilize the call button. Patient stated \"I don't think I'm gonna make it\" and clarified meaning, \"I think I'm gonna die\". Patient reports pain when coughing \"right at the point of the cough\" in his throat when asked location of the pain. Patient expressed wanting to locate cell phone, however this was not found in the room. When asked if it was under the blankets/in his lap/etc, patient demonstrated decreased awareness and did not/minimally attempt(ed) to locate phone around his person. Patient informed of some of his goals. Did not demonstrate understanding. Goal 1: Pt will complete the CLQT. Goal 2: Pt will complete recall tasks with 90% accuracy and minimal cues. Goal 3: Pt will complete verbal expression tasks with 80% accuracy and minimal cues. Goal 4: Pt will complete executive functioning/problem solving/safety awareness tasks with 80% accuracy and minimal cues. Goal 5: Pt will complete orientation tasks with 90% accuracy and minimal cues. Goal 6: Pt will complete diaphragmatic breathing exercises to target breath support for speech production. Continue providing services per plan of care.     Electronically signed by SACHI Snell on 10/1/2022 at 11:51 AM

## 2022-10-01 NOTE — PROGRESS NOTES
Comprehensive Nutrition Assessment    Type and Reason for Visit:  Initial    Nutrition Recommendations/Plan:   Start ONS     Malnutrition Assessment:  Malnutrition Status: At risk for malnutrition (Comment) (10/01/22 1245)    Context:  Acute Illness     Findings of the 6 clinical characteristics of malnutrition:  Energy Intake:  Mild decrease in energy intake (Comment)  Weight Loss:  No significant weight loss     Body Fat Loss:  No significant body fat loss     Muscle Mass Loss:       Fluid Accumulation:  No significant fluid accumulation     Strength:  Not Performed    Nutrition Assessment:    Following patient for new admission to Rehab unit. PO intake is poor. Intake last night was 0-25%, and refused lunch today. \"I just don't feel good and I'm not hungry. \"  Will try Glucerna with meals. Accuchek's 214-261  A1C 6.5    Nutrition Related Findings:      Wound Type: None       Current Nutrition Intake & Therapies:    Average Meal Intake: 1-25%  Average Supplements Intake: None Ordered  ADULT DIET; Easy to Chew; 3 carb choices (45 gm/meal); Low Potassium (Less than 3000 mg/day)  ADULT ORAL NUTRITION SUPPLEMENT; Breakfast, Lunch, Dinner; Diabetic Oral Supplement    Anthropometric Measures:  Height: 6' (182.9 cm)  Ideal Body Weight (IBW): 178 lbs (81 kg)    Admission Body Weight: 203 lb 11.2 oz (92.4 kg)  Current Body Weight: 203 lb 11.2 oz (92.4 kg), 114.4 % IBW. Weight Source: Bed Scale  Current BMI (kg/m2): 27.6  Usual Body Weight: 210 lb (95.3 kg) (6/15/2022)  % Weight Change (Calculated): -3    BMI Categories: Overweight (BMI 25.0-29. 9)    Estimated Daily Nutrient Needs:  Energy Requirements Based On: Kcal/kg  Weight Used for Energy Requirements: Current  Energy (kcal/day): 2596-4796 kcals  (20-25 kcals/kg)  Weight Used for Protein Requirements: Ideal  Protein (g/day): 105-162g  Method Used for Fluid Requirements: 1 ml/kcal  Fluid (ml/day): 3876-3917 ml    Nutrition Diagnosis:   Inadequate oral intake related to acute injury/trauma, early satiety as evidenced by intake 0-25%, poor intake prior to admission, weight loss, weight loss 7.5% in 3 months    Nutrition Interventions:   Food and/or Nutrient Delivery: Continue Current Diet, Start Oral Nutrition Supplement  Nutrition Education/Counseling: No recommendation at this time  Coordination of Nutrition Care: Continue to monitor while inpatient  Plan of Care discussed with: nursing    Goals:     Goals: Meet at least 75% of estimated needs, PO intake 50% or greater       Nutrition Monitoring and Evaluation:   Behavioral-Environmental Outcomes: None Identified  Food/Nutrient Intake Outcomes: Food and Nutrient Intake, Supplement Intake  Physical Signs/Symptoms Outcomes: Biochemical Data, Chewing or Swallowing, Fluid Status or Edema, Weight, Skin    Discharge Planning:     Too soon to determine     Freya Graham MS, RD, LD  Contact: [phone]

## 2022-10-02 NOTE — PROGRESS NOTES
Patient experienced nose bleed from left nostril. Pressure held for 10 minutes, bleeding controlled.  Requested humidity be added to oxygen

## 2022-10-03 NOTE — PROGRESS NOTES
Occupational Therapy  Facility/Department: [facility] 8 REHAB UNIT      Name: Ananth Vazquez  : 1945  MRN: [id number]  Date of Service: 10/3/2022        Patient Diagnosis(es): There were no encounter diagnoses. 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 Past Surgical History:  has a past surgical history that includes Coronary artery bypass graft (); Jejunostomy (); vascular surgery (14 [facility]); vascular surgery (14 [facility]); vascular surgery (2015 [facility]); vascular surgery (2015 [facility] ); Toe amputation (Left, 2017); vascular surgery (2017); vascular surgery (2017); eye surgery; Cholecystectomy; pr amputation foot,transmetatarsal (Left, 2018); and vascular surgery (2018). Assessment   Performance deficits / Impairments: Decreased functional mobility ; Decreased endurance;Decreased ADL status; Decreased balance;Decreased strength;Decreased high-level IADLs;Decreased cognition  Activity Tolerance  Activity Tolerance: Patient Tolerated treatment well        Plan   Occupational Therapy Plan  Current Treatment Recommendations: Strengthening, Cognitive reorientation, Cognitive/Perceptual training, Equipment evaluation, education, & procurement, Patient/Caregiver education & training, Endurance training, Functional mobility training, Safety education & training, Home management training, Self-Care / ADL, Balance training     Restrictions  Restrictions/Precautions  Restrictions/Precautions: Fall Risk    Subjective     General   Chart Reviewed Yes;Orders   Additional Pertinent Hx HTN, DM, Covid , recent liver CA diagnosis   Family / Caregiver Present Yes  (daughter)   Pre Treatment Pain Screening   Scale Used Numeric Score   Intervention List Patient able to continue with treatment   Comments / Details stomach discomfort   Pain at present 4       Objective     Safety Devices  Type of Devices: Left in chair;Call light within reach; Chair alarm in place       Bed mobility  Supine to Sit: Moderate assistance  Transfers  Sit to stand: Minimal assistance  Stand to sit: Minimal assistance  Transfer Comments: vc for tech    Exercise Treatment: 2# free weights        10/03/22 [address] needed Partial/moderate assistance   Comment able to empy colostomy bag---posterior lean   CARE Score 3   Discharge Goal 6   Toilet Transfer   Assistance needed Partial/moderate assistance   CARE Score 3   Discharge Goal 6   Output   Stool (measured)  50 mL      10/03/22 1040   Eating   Assistance Needed Setup or clean-up assistance   CARE Score 5   Discharge Goal 6   Oral Hygiene   Assistance Needed Setup or clean-up assistance   CARE Score 5   Discharge Goal 6   Shower/Bathe Self   Assistance Needed Partial/moderate assistance   CARE Score 3   Discharge Goal 6   Upper Body Dressing   Assistance Needed Partial/moderate assistance   Comment min A----low endurance   CARE Score 3   Discharge Goal 6   Lower Body Dressing   Assistance Needed Partial/moderate assistance   Comment vc for tech, safety, and to correct balance --posterior lean   CARE Score 3   Discharge Goal 6   Putting On/Taking Off Footwear   Assistance Needed Substantial/maximal assistance   Comment socks only, vc for tech, AE   CARE Score 2   Discharge Goal 6       Goals  Short Term Goals  Time Frame for Short Term Goals: 1 week  Short Term Goal 1: complete UB dressing with setup  Short Term Goal 2: complete LB dressing with CGA  Short Term Goal 3: complete overall toileting with CGA  Short Term Goal 4: complete 1-2 handed standing level task for 3 mins with supervision  Short Term Goal 5: complete overall bathing with CGA  Short Term Goal 6: complete simple ambulatory home making task with CGA  Long Term Goals  Time Frame for Long Term Goals : 3 weeks  Long Term Goal 1: complete overall toileting with modified independence  Long Term Goal 2: complete overall bathing with modified independence  Long Term Goal 3: complete overall dressing with modified independence  Long Term Goal 4: complete simple ambulatory home making task with modified independence  Long Term Goal 5: complete HEP with independence  Long Term Goal 6: pt/family verbalize DME       Therapy Time   Individual Concurrent Group Co-treatment   Time In 1000         Time Out 1100         Minutes 60         Timed Code Treatment Minutes: 60 Minutes       Alix Trejo, OT

## 2022-10-03 NOTE — PROGRESS NOTES
Nutrition Assessment     Type and Reason for Visit: Initial    Nutrition Recommendations/Plan:   Continue POC, modify ONS. Malnutrition Assessment:  Malnutrition Status: At risk for malnutrition (Comment)    Nutrition Assessment:  Pt declining from nutritional [place] AEB intake remains poor of meals and ONS. Pt reports nausea. Not drinking ONS, will offer different flavor. Glucose 187-295, on Lantus and SS. Estimated Daily Nutrient Needs:  Energy (kcal):  8943-9777 kcals  (20-25 kcals/kg) Weight Used for Energy Requirements: Current     Protein (g):  105-162g Weight Used for Protein Requirements: Ideal        Fluid (ml/day):  8139-5957 ml Method Used for Fluid Requirements: 1 ml/kcal    Nutrition Related Findings:     Wound Type: None    Current Nutrition Therapies:    ADULT DIET; Easy to Chew; 3 carb choices (45 gm/meal); Low Potassium (Less than 3000 mg/day)  ADULT ORAL NUTRITION SUPPLEMENT; Breakfast, Lunch, Dinner; Diabetic Oral Supplement    Anthropometric Measures:  Height: 6' (182.9 cm)  Current Body Wt: 203 lb 11.2 oz (92.4 kg)   BMI: 27.6    Nutrition Diagnosis:   Inadequate oral intake related to acute injury/trauma, early satiety as evidenced by intake 0-25%, poor intake prior to admission, weight loss    Nutrition Interventions:   Food and/or Nutrient Delivery: Continue Current Diet, Continue Oral Nutrition Supplement  Nutrition Education/Counseling: No recommendation at this time  Coordination of Nutrition Care: Continue to monitor while inpatient  Plan of Care discussed with: nursing    Goals:     Goals: PO intake 50% or greater       Nutrition Monitoring and Evaluation:   Behavioral-Environmental Outcomes: None Identified  Food/Nutrient Intake Outcomes: Food and Nutrient Intake, Supplement Intake  Physical Signs/Symptoms Outcomes: Biochemical Data, Chewing or Swallowing, Fluid Status or Edema, Weight, Skin    Discharge Planning:     Too soon to determine     Kain Sexton MS, RD, LD  Contact: [phone]

## 2022-10-03 NOTE — PATIENT CARE CONFERENCE
[facility] ACUTE INPATIENT REHABILITATION  TEAM CONFERENCE NOTE    Date: 10/5/2022  Patient Name: Kalina Andrews        MRN: [id number]    : 1945  (73 y.o.)  Gender: male             PHYSICAL THERAPY  GROSS ASSESSMENT       BED MOBILITY  Bed mobility  Bridging: Contact guard assistance, Stand by assistance (decreased ROM)  Rolling to Left: Supervision (with rails)  Rolling to Right: Supervision (with rails)  Supine to Sit: Moderate assistance  Sit to Supine: Contact guard assistance, Stand by assistance (scoot turn from L side of bed)  Bed Mobility Comments: long sitting then bedside sitting with help of therapist       TRANSFERS  Transfers  Sit to Stand: Contact guard assistance, Stand by assistance  Stand to Sit: Contact guard assistance, Stand by assistance  Bed to Chair: Contact guard assistance (with RW)  Stand Pivot Transfers: Contact guard assistance, Minimal Assistance (w/o AD)  WHEELCHAIR PROPULSION     AMBULATION  Ambulation  Surface: Level tile  Device: Rolling Walker  Assistance: Contact guard assistance  Quality of Gait: walker too far ahead, flexed posture, shuffling steps  Gait Deviations: Slow Alexus, Shuffles, Decreased step length, Decreased step height  Distance: 5'  Comments: pt with poor tolerance for standing; reports feeling dizzy and bad in general and BP elevated as well as heart rate (see flowsheet through 10a) (bp 121/104 and  after standing)  STAIRS     GOALS:            ASSESSMENT:  Assessment: Pt. reported feeling dizzy when sitting on side of bed, BP reading was elevated as listed above. When standing at EOB with RW, BP dropped over 20 mmHg systolic and over 10 mmHg diastolic indicating orthostatic hypotension, reported dizziness goes away when returning to sitting. PT declined to move from EOB due to decreased endurance, strength, and pt. reported dizziness.       SPEECH THERAPY  Congested unproductive cough at rest. Hoarse vocal quality and poor vocal intensity were both noted. Per patient report, he completed radiation fourteen days ago. Audible wheezing was noted. He is on 2LPM 02 via NC. He exhibits shortness of breath when talking, eating, drinking, and with exertion. Portions of the CLQT were completed. He completed symbol cancellation tasks, mazes and clock drawing. He exhibited difficulty with symbol cancellation (he circled multiple designs. He did [place] four of the correct symbols. During clock drawing, he repeated the 12 three times on the clock face. He did not complete the hour and minute hands. Will complete tasks for telling time and additional clock drawing tasks during a future session. He did attempt a maze but only worked on the left side of the page. He also completed generative naming and produced a few items. Will complete the CLQT over the next few sessions. Weak hyolaryngeal elevation and delayed swallow responses are noted. No overt s/s of aspiration with soft foods or thin liquids via straw. He is afebrile. He has not had a recent elevation in temperature. He reports lingual numbness and tingling and states this has been present for one week. He also reports oral numbness and tingling which has also been present for one week. He stated he does not like the Glucerna shakes. Will discuss with the dietician. He stated he would try a supplement pudding or magic cup. His cough was reported to his nurse. This morning, his lung sounds were reported as clear. On 10/3/22: The patient reports back, buttocks, and bilateral abdominal pain this morning. These were reported to his nurse. He exhibited a nose bleed and his nurse was aware and managed this. He had a difficult time during dressing and rolling in the bed this morning due to significant pain and fatigue. He continues to exhibit shortness of breath when talking, at rest, and with exertion. He continues to exhibit hoarse vocal quality and poor vocal intensity.  He did exhibit immediate coughing and choking when taking a pain pill with water via straw. Productive wet cough with a large amount of sputum and blood. His nurse was present and witnessed this. If he is reclined at all, he has increased difficulty swallowing liquids. With his head fully upright, he did tolerate small/controlled straw sips of thin liquids. He continues to exhibit audible congestion. SLP did not witness wheezing this morning as yesterday. He was alert and able to answer questions appropriately regarding his immediate needs. He is still confused regarding his location, but he has been in three hospitals in a short amount of time. His bed alarm was set and his call light was placed within reach. Discussed the importance of not attempting to get out of bed without assistance. Reviewed how to use the call light and he agreed to call if he needs anything. The remaining portions of the CLQT were not completed this date due to pain and fatigue. He is in significant pain, had great difficulty with simple dressing tasks and rolling in the bed, has poor breath support, exhibits congestion, and has significant fatigue. It is doubtful that he will be able to complete three hours of therapy today. He is begging to be released and to go home. Discussed that any discharge plan would need to be approved by his physician. Short Term Goals:  Goal 1: Pt will complete the CLQT. Goal 2: Pt will complete recall tasks with 90% accuracy and minimal cues. Goal 3: Pt will complete verbal expression tasks with 80% accuracy and minimal cues. Goal 4: Pt will complete executive functioning/problem solving/safety awareness tasks with 80% accuracy and minimal cues. Goal 5: Pt will complete orientation tasks with 90% accuracy and minimal cues. Goal 6: Pt will complete diaphragmatic breathing exercises to target breath support for speech production.          Long Term Goals:  Pt will participate in skilled speech therapy services to ensure he is able to adequately/effectively communicate his wants and needs and safely complete ADLs. Pt will tolerate the least restrictive diet with minimal overt s/s of aspiration/penetration during hospitalization. STGs Met: 0  LTGs Met: 0     ELOS: 2-3 weeks     He is recommended to continue speech therapy services for breath support, dysphagia, cognition, and expressive language.        OCCUPATIONAL THERAPY  Cognitive Patterns:     Cognitive Assessment Method (CAM):  Confusion Assessment Method (CAM)  Is there evidence of an acute change in mental status from the patient's baseline?: No  Inattention: Behavior continuously present, does not fluctuate  Disorganized thinking: Behavior present, fluctuates (comes and goes, changes in severity)  Altered level of consciousness: Behavior not present        CURRENT IRF-VIANEY SCORES  Eating: CARE Score: 5       Oral Hygiene: CARE Score: 5        Toileting: CARE Score: 3  Comment: able to empy colostomy bag---posterior lean      Shower/Bathe: CARE Score: 3           Upper Body Dressing: CARE Score: 3  Comment: min A----low endurance      Lower Body Dressing: CARE Score: 3  Comment: vc for tech, safety, and to correct balance --posterior lean       Footwear: CARE Score: 2  Comment: socks only, vc for tech, AE       Toilet Transfers: CARE Score: 3          Picking Up Object:  CARE Score: 88          UE Functioning:     10/03/22 1000   LUE AROM (degrees)   LUE AROM  WFL   RUE AROM (degrees)   RUE AROM  WFL        10/03/22 1000   LUE Strength   Gross LUE Strength WFL   RUE Strength   Gross RUE Strength WFL       Pain Assessment:     10/03/22 1000   Pre Treatment Pain Screening   Scale Used Numeric Score   Intervention List Patient able to continue with treatment   Comments / Details stomach discomfort   Pain at present 4       STGs:  Short Term Goals  Time Frame for Short Term Goals: 1 week  Short Term Goal 1: complete UB dressing with setup  Short Term Goal 2: complete LB dressing with CGA  Short Term Goal 3: complete overall toileting with CGA  Short Term Goal 4: complete 1-2 handed standing level task for 3 mins with supervision  Short Term Goal 5: complete overall bathing with CGA  Short Term Goal 6: complete simple ambulatory home making task with CGA    LTGs:  Long Term Goals  Time Frame for Long Term Goals : 3 weeks  Long Term Goal 1: complete overall toileting with modified independence  Long Term Goal 2: complete overall bathing with modified independence  Long Term Goal 3: complete overall dressing with modified independence  Long Term Goal 4: complete simple ambulatory home making task with modified independence  Long Term Goal 5: complete HEP with independence  Long Term Goal 6: pt/family verbalize DME    Assessment:  Performance deficits / Impairments: Decreased functional mobility , Decreased endurance, Decreased ADL status, Decreased balance, Decreased strength, Decreased high-level IADLs, Decreased cognition                 NUTRITION  Current Wt: Weight: 191 lb 1.6 oz (86.7 kg) / Body mass index is 25.92 kg/m². Admission Wt: Admission Body Weight: 203 lb 11.2 oz (92.4 kg)  Oral Diet Orders:   Easy to Chew, 3 carb choices, Low Potassium  Oral Nutrition Supplement (ONS) Orders:  Diabetic  PO intake <50% meals and ONS, pt reports nausea  Please see nutrition note for details. NURSING    Wounds/Incisions/Ulcers: reddened and blistered coccyx; mepilex D/I. Bryan Scale Score: 16    Pain: Tylenol 650 mg q 4 hrs for pain    Consultations/Labs/X-rays: 10/04/22 @ 2015     Family Education: Need to make contact with family to initiate education    Fall Risk:  Alyssa García Total Score: 61    Fall in the last week? NONE      Other Nursing Issues: Alert and oriented x4, incont at times of bladder, ilesotomy, wears briefs, meds consumed whole with water, assist x1 or 2 with transfers, shortness of breath with exertion,ilesotomy to RLE, [email or web address] per NC, generalized weakness, Eliquis, ASA and Pletal on hold per Dr. Xenia Landau cheks QID, easy to chew carb control diet, reports poor food intake. SOCIAL WORK/CASE MANAGEMENT  Assessment: Family (daughter, son-in-law, wife, son by phone) all present at this conference and participate in questions. Mrs. Roberta Rush recently diagnosed with significant short term memory impairment-unable to function independently. Their daughter indicates goal is to keep them in their home and will provide care. Discharge Plan   Estimated Length of Stay: [facility] date 10/15/22  Destination:  discharge home with care    Pass: No    Services at Discharge: Other continued therapeutic intervention    Equipment at Discharge: to be determined    Progress made in the prior week:  Progress limited due to interruption in stay   2.  3.  4.  5.      Goals for following week:  Ambulate 25 ft with a.d. min a  2. Complete  lower body dressing with min A  3.   4.   5.     Factors facilitating achievement of predicted outcomes: Motivated, Cooperative, and Pleasant    Barriers to the achievement of predicted outcomes: Decreased endurance, Decreased proprioception, Upper extremity weakness, Lower extremity weakness, and Medication managment    Team Members Present at Conference:  : Ronit Sharpe 23   Occupational Therapist: Nitza Ndiaye, OTR/L  Physical Therapist: Antoni Torres PT,DPT  Speech Therapist: Carmencita Wallace, SLP  Nurse: Stanislav Nguyễn, ANDREW   Nurse Manager:  Fabian Gallardo, RN, BSN  Dietitian:  Lainey Velasquez, MS, RD, LD  Rehab Director:        I approve the established interdisciplinary plan of care as documented within the medical record of Ulises Pires

## 2022-10-03 NOTE — PROGRESS NOTES
[facility]  [facility] 8 REHAB UNIT  TIME   Time In: 1100  Time Out: 1200  Minutes: 60       [x]Daily Note  []Progress Note    Date: 10/3/2022  Patient Name: Ananth Vazquez        MRN: [id number]    Account #: [de-identified]  : 1945  (76 y.o.)  Gender: male   Primary Provider: Galdino Ward MD  Swallowing Status/Diet: Easy to chew diet, thin liquids    Diagnosis:  Bilateral embolic strokes    CXR:      Impression   Mild central vascular congestion. In addition, cannot exclude mild patchy opacities in the mid and lower left lung, which may be due to atelectasis/scarring, vascular congestion, or early airspace disease. Otherwise, no lobar consolidations. No pneumothorax or pleural effusions. Recommendation: Follow up as clinically indicated. Electronically Signed by Patricia Corrales MD at 30-Sep-2022 05:13:14 AM                  CT/Head:  Impression   1. Nonspecific but presumed microvascular changes in the periventricular white matter. 2.  Atrophic changes. 3.  No acute hemorrhage or extra-axial collection. Recommendation: Follow up as clinically indicated. All CT scans at this facility utilize dose modulation, iterative reconstruction, and/or weight based dosing when appropriate to reduce radiation dose to as low as reasonably achievable. Electronically Signed by Olena Long MD at 30-Sep-2022 08:40:44 AM                    Subjective:   He denies pain this date. His daughter was present for today's session. He was very drowsy this date. Objective:  Congested unproductive cough at rest. Hoarse vocal quality and poor vocal intensity were both noted. Per patient report, he completed radiation fourteen days ago. Audible wheezing was noted. He is on 2LPM 02 via NC. He exhibits shortness of breath when talking, eating, drinking, and with exertion. Portions of the CLQT were completed. He completed symbol cancellation tasks, mazes and clock drawing.  He exhibited difficulty with symbol cancellation (he circled multiple designs. He did [place] four of the correct symbols. During clock drawing, he repeated the 12 three times on the clock face. He did not complete the hour and minute hands. Will complete tasks for telling time and additional clock drawing tasks during a future session. He did attempt a maze but only worked on the left side of the page. He also completed generative naming and produced a few items. Will complete the CLQT over the next few sessions. Weak hyolaryngeal elevation and delayed swallow responses are noted. No overt s/s of aspiration with soft foods or thin liquids via straw. He is afebrile. He has not had a recent elevation in temperature. He reports lingual numbness and tingling and states this has been present for one week. He also reports oral numbness and tingling which has also been present for one week. He stated he does not like the Glucerna shakes. Will discuss with the dietician. He stated he would try a supplement pudding or magic cup. His cough was reported to his nurse. This morning, his lung sounds were reported as clear. Short Term Goals:  Goal 1: Pt will complete the CLQT. Goal 2: Pt will complete recall tasks with 90% accuracy and minimal cues. Goal 3: Pt will complete verbal expression tasks with 80% accuracy and minimal cues. Goal 4: Pt will complete executive functioning/problem solving/safety awareness tasks with 80% accuracy and minimal cues. Goal 5: Pt will complete orientation tasks with 90% accuracy and minimal cues. Goal 6: Pt will complete diaphragmatic breathing exercises to target breath support for speech production. Long Term Goals:  Pt will participate in skilled speech therapy services to ensure he is able to adequately/effectively communicate his wants and needs and safely complete ADLs.     Pt will tolerate the least restrictive diet with minimal overt s/s of aspiration/penetration during hospitalization. STGs Met: 0  LTGs Met: 0    ELOS: 2-3 weeks    He is recommended to continue speech therapy services for breath support, dysphagia, cognition, and expressive language. ASSESSMENT:  Assessment: [x]Progressing towards goals          []Not Progressing towards goals    Patient Tolerance of Treatment:   [x]Tolerated well []Tolerated fair []Required rest breaks []Fatigued    Education:  Learner:  [x]Patient          []Significant Other          []Other  Education provided regarding:  [x]Goals and POC   [x]Diet and swallowing precautions    []Home Exercise Program  []Progress and/or discharge information  Method of Education:  [x]Discussion          []Demonstration          []Handout          []Other  Evaluation of Education:   [x]Verbalized understanding   []Demonstrates without assistance  []Demonstrates with assistance  []Needs further instruction     []No evidence of learning                  []No family present      Plan: [x]Continue with current plan of care    []Modify current plan of care as follows:    []Discharge patient    Discharge Location:    Services/Supervision Recommended:      [x]Patient continues to require treatment by a licensed therapist to address functional deficits as outlined in the established plan of care.                Electronically Signed By:  Bella Zuniga  10/3/2022,11:47 AM.

## 2022-10-03 NOTE — PROGRESS NOTES
Patient:   Devante Adam  MR#:    [id number]   Room:    0811/811-01   YOB: 1945  Date of Progress Note: 10/3/2022  Time of Note                           8:17 AM  Consulting Physician:   Krunal Salgado M.D. Attending Physician:  Krunal Salgado MD     CHIEF COMPLAINT: Generalized weakness and deconditioning     Subjective  This 76 y.o. male  with history of HTN, hypercholesterolemia, DM, CAD, PVD, DM neuropathy, osteomyelitis with transmetatarsal amputation, COVID-19 July 2022 and newly diagnosis liver cancer July of this year with radioembolization to the liver 9/20/22. He [facility]/22/22 with weakness, AMS and lethargy. He was admitted to the Hospitalist service with metabolic encephalopathy. MRI done revealed Multiple tiny 1 or 2 mm areas of diffusion and increased signal in both cerebral hemispheres that likely represent small areas of acute ischemic change. These are seen in the bilateral frontal and parietal lobes and in the right occipital lobe. Neurology was consulted. He was seen by Dr. Summer Zavaleta, who felt embolic stroke related to hypercoaguable state and underlying/undetected atrial fibrllation. Beverly Madsen had stopped ASA on his own but continued Pletal. ASA had been started this admission. In normal circumstance low dose anticoagulation would be recommended for ESUS but patient has higher than normal risk for bleed with hepatic cancer. Plans are for Zio Patch at discharge CTA of neck done shows heavy plaque burden at the bilateral carotid bifurcations with about 80% stenosis of the right ICA and 80 to 90% stenosis on the left. Vascular surgery was consulted. He was seen by Dr. Aubrie Dunn, who didn't feel any surgical intervention was needed, he recommended continued conservative therapy with antiplatelet therapy and given the embolic appearance of the infarcts could consider low-dose Eliquis twice daily as well.      Has evaluated by SPT for swallow and placed on a mechanical soft diet with thin liquids. SPT will also continue to see for cognition. He is still weak overall and participating in both PT/OT  Patient returned back to the floor 9/30 with resumption of care following treatment for hyperkalemia. Complains of some stomach pain but otherwise okay. REVIEW OF SYSTEMS:  Constitutional: No fevers No chills  Neck:No stiffness  Respiratory: No shortness of breath  Cardiovascular: No chest pain No palpitations  Gastrointestinal: No diarrhea. Has a colostomy  Genitourinary: No Dysuria  Neurological: No headache, no confusion      PHYSICAL EXAM:  BP (!) 153/64   Pulse (!) 105   Temp 96.9 °F (36.1 °C) (Temporal)   Resp 18   Ht 6' (1.829 m)   Wt 205 lb 1.6 oz (93 kg)   SpO2 90%   BMI 27.82 kg/m²     Constitutional: he appears well-developed and well-nourished. Eyes - conjunctiva normal.  Pupils react to light  Ear, nose, throat -hearing intact to voice. No scars, masses, or lesions over external nose or ears, no atrophy of tongue  Neck-symmetric, no masses noted, no jugular vein distension  Respiration- chest wall appears symmetric, good expansion,   normal effort without use of accessory muscles  Cardiovascular- RRR  Musculoskeletal - no significant wasting of muscles noted, no bony deformities, gait no gross ataxia  Extremities-no clubbing, cyanosis or edema  Skin - warm, dry, and intact. No rash, erythema, or pallor. Psychiatric - mood, affect, and behavior appear normal.      Neurology  NEUROLOGICAL EXAM:      Mental status   Somewhat drowsy.   Complains of being tired     Cranial Nerve Exam   CN II- Visual fields grossly unremarkable  CN III, IV,VI-EOMI, No nystagmus, conjugate eye movements, no ptosis  CN VII-slight left facial droop  CN VIII-Hearing intact   CN IX and X- Palate elevates in midline  CN XI-good shoulder shrug  CN XII-Tongue midline with no fasciculations or fibrillations     Motor Exam  V/V throughout upper and lower extremities bilaterally, no cogwheeling, normal tone     Absent throughout     Tremors- no tremors in hands or head noted  Nursing/pcp notes, imaging,labs and vitals reviewed. Lab Results   Component Value Date    WBC 12.5 (H) 10/03/2022    HGB 13.7 (L) 10/03/2022    HCT 42.2 10/03/2022    .9 (H) 10/03/2022     10/03/2022     Lab Results   Component Value Date     (L) 10/03/2022    K 4.8 10/03/2022     10/03/2022    CO2 22 10/03/2022    BUN 22 10/03/2022    CREATININE 1.0 10/03/2022    GLUCOSE 201 (H) 10/03/2022    CALCIUM 8.7 (L) 10/03/2022    PROT 6.1 (L) 10/02/2022    LABALBU 2.9 (L) 10/02/2022    BILITOT 1.8 (H) 10/02/2022    ALKPHOS 193 (H) 10/02/2022    AST 94 (H) 10/02/2022    ALT 67 (H) 10/02/2022    LABGLOM >60 10/03/2022    GFRAA >59 10/03/2022     Lab Results   Component Value Date    INR 1.06 01/23/2018    INR 1.16 (H) 09/08/2014    INR 1.20 (H) 09/04/2014   No results found for: PHENYTOIN, ESR, CRP    PT,OT and/or speech notes reviewed:  Objective      Safety Devices  Type of Devices: Left in chair;Call light within reach; Chair alarm in place     Transfers  Stand Step Transfers: Minimal assistance  Sit to stand: Minimal assistance  Stand to sit: Minimal assistance     Exercise Treatment: 2# free weights, all planes, 2 sets, 15reps     Physical Therapy  Name: Nancy Garcia  MRN:  [id number]  Date of service:  10/1/2022          10/01/22 0900   General Comment   Comments in bed   Subjective   Subjective pt states he feels poorly (worst he can remember) and not confident in his ability to do any activity, but is willing to try.    Vitals   Heart Rate 100   Heart Rate Source Monitor   BP (!) 130/50   BP Location Left upper arm   BP Method Automatic   Patient Position Sitting   MAP (Calculated) 76.67   SpO2 93 %   O2 Device None (Room air)   Subjective   Subjective stomach hurting despite little food intake   Pain 5/10   Bed mobility   Supine to Sit Minimal assistance   Bed Mobility Comments pt comes to Essential hypertension-metoprolol, monitoring  3. Diabetes-continue insulin and monitoring. Adjustments per hospitalist  4. Hyperlipidemia-Lipitor  5. GI-bowel regimen. Has colostomy. Carafate added  6. DVT prophylaxis-Eliquis  7  History of liver cancer  8. Abnormal UA-culture growing E. coli. Patient given Cipro. Repeat study 10/1 unremarkable  9.   Hyperkalemia-resolved    ELOS: Staff this week

## 2022-10-03 NOTE — PROGRESS NOTES
Name: Ananth Vazquez  MRN:  [id number]  Date of service:  10/3/2022       10/03/22 0900   Vitals   Heart Rate (!) 109   Heart Rate Source Monitor   BP 99/62   BP Location Left upper arm   BP Method Automatic   Patient Position Standing   MAP (Calculated) 74.33   SpO2 92 %   O2 Device Nasal cannula  (3 L)   Comment Seated BP: 122/101; HR: 102; O2: 94%   Subjective   Subjective pt. states he feels dizzy and overall doesn't feel good   Bed mobility   Bridging Contact guard assistance;Stand by assistance  (decreased ROM)   Rolling to Left Supervision  (with rails)   Rolling to Right Supervision  (with rails)   Supine to Sit Moderate assistance;Minimal assistance  (scoot turn to L side of bed, pull up with UE using PT assist, increased time to complete)   Sit to Supine Contact guard assistance;Stand by assistance  (scoot turn from L side of bed)   Bed Mobility Comments long sitting then bedside sitting with help of therapist   Transfers   Sit to Stand Contact guard assistance;Stand by assistance   Stand to Sit Contact guard assistance;Stand by assistance   PT Exercises   Exercise Treatment Supine exercises BLE: quad sets, glute sets, SAQ, SLR, heel slides, ankle pumps x 10 each   Static Standing Balance Exercises Static standing around 2 minutes for BP reading, able to maintain balance with handheld support   Assessment   Assessment Pt. reported feeling dizzy when sitting on side of bed, BP reading was elevated as listed above. When standing at EOB with RW, BP dropped over 20 mmHg systolic and over 10 mmHg diastolic indicating orthostatic hypotension, reported dizziness goes away when returning to sitting. PT declined to move from EOB due to decreased endurance, strength, and pt. reported dizziness.    PT Individual Minutes   Time In 0900   Time Out 1000   Minutes 60       Electronically signed by Maikol Sood, PT Student, on 10/3/2022 at 12:45 PM

## 2022-10-04 NOTE — PROGRESS NOTES
Patient:   Flaco Lincoln  MR#:    [id number]   Room:    0811/811-01   YOB: 1945  Date of Progress Note: 10/4/2022  Time of Note                           8:04 AM  Consulting Physician:   Chacorta Maza M.D. Attending Physician:  Chacorta Maza MD     CHIEF COMPLAINT: Generalized weakness and deconditioning     Subjective  This 76 y.o. male  with history of HTN, hypercholesterolemia, DM, CAD, PVD, DM neuropathy, osteomyelitis with transmetatarsal amputation, COVID-19 July 2022 and newly diagnosis liver cancer July of this year with radioembolization to the liver 9/20/22. He presented to [facility] on 9/22/22 with weakness, AMS and lethargy. He was admitted to the Hospitalist service with metabolic encephalopathy. MRI done revealed Multiple tiny 1 or 2 mm areas of diffusion and increased signal in both cerebral hemispheres that likely represent small areas of acute ischemic change. These are seen in the bilateral frontal and parietal lobes and in the right occipital lobe. Neurology was consulted. He was seen by Dr. Zenia Davis, who felt embolic stroke related to hypercoaguable state and underlying/undetected atrial fibrllation. Cornelius Jovel had stopped ASA on his own but continued Pletal. ASA had been started this admission. In normal circumstance low dose anticoagulation would be recommended for ESUS but patient has higher than normal risk for bleed with hepatic cancer. Plans are for Zio Patch at discharge CTA of neck done shows heavy plaque burden at the bilateral carotid bifurcations with about 80% stenosis of the right ICA and 80 to 90% stenosis on the left. Vascular surgery was consulted. He was seen by Dr. Parish Hooks, who didn't feel any surgical intervention was needed, he recommended continued conservative therapy with antiplatelet therapy and given the embolic appearance of the infarcts could consider low-dose Eliquis twice daily as well.      Has evaluated by SPT for swallow and placed on a mechanical soft diet with thin liquids. SPT will also continue to see for cognition. He is still weak overall and participating in both PT/OT  Patient returned back to the floor 9/30 with resumption of care following treatment for hyperkalemia. Feeling better at this time. REVIEW OF SYSTEMS:  Constitutional: No fevers No chills  Neck:No stiffness  Respiratory: No shortness of breath  Cardiovascular: No chest pain No palpitations  Gastrointestinal: No diarrhea. Has a colostomy  Genitourinary: No Dysuria  Neurological: No headache, no confusion      PHYSICAL EXAM:  BP (!) 151/53   Pulse (!) 102   Temp 97.2 °F (36.2 °C) (Temporal)   Resp 20   Ht 6' (1.829 m)   Wt 196 lb 4.8 oz (89 kg)   SpO2 90%   BMI 26.62 kg/m²     Constitutional: he appears well-developed and well-nourished. Eyes - conjunctiva normal.  Pupils react to light  Ear, nose, throat -hearing intact to voice. No scars, masses, or lesions over external nose or ears, no atrophy of tongue  Neck-symmetric, no masses noted, no jugular vein distension  Respiration- chest wall appears symmetric, good expansion,   normal effort without use of accessory muscles  Cardiovascular- RRR  Musculoskeletal - no significant wasting of muscles noted, no bony deformities, gait no gross ataxia  Extremities-no clubbing, cyanosis or edema  Skin - warm, dry, and intact. No rash, erythema, or pallor. Psychiatric - mood, affect, and behavior appear normal.      Neurology  NEUROLOGICAL EXAM:      Mental status   Somewhat drowsy.   Complains of being tired     Cranial Nerve Exam   CN II- Visual fields grossly unremarkable  CN III, IV,VI-EOMI, No nystagmus, conjugate eye movements, no ptosis  CN VII-slight left facial droop  CN VIII-Hearing intact   CN IX and X- Palate elevates in midline  CN XI-good shoulder shrug  CN XII-Tongue midline with no fasciculations or fibrillations     Motor Exam  V/V throughout upper and lower extremities bilaterally, no cogwheeling, normal tone     Absent throughout     Tremors- no tremors in hands or head noted  Nursing/pcp notes, imaging,labs and vitals reviewed.          Lab Results   Component Value Date    WBC 12.5 (H) 10/03/2022    HGB 13.7 (L) 10/03/2022    HCT 42.2 10/03/2022    .9 (H) 10/03/2022     10/03/2022     Lab Results   Component Value Date     (L) 10/03/2022    K 4.8 10/03/2022     10/03/2022    CO2 22 10/03/2022    BUN 22 10/03/2022    CREATININE 1.0 10/03/2022    GLUCOSE 201 (H) 10/03/2022    CALCIUM 8.7 (L) 10/03/2022    PROT 6.1 (L) 10/02/2022    LABALBU 2.9 (L) 10/02/2022    BILITOT 1.8 (H) 10/02/2022    ALKPHOS 193 (H) 10/02/2022    AST 94 (H) 10/02/2022    ALT 67 (H) 10/02/2022    LABGLOM >60 10/03/2022    GFRAA >59 10/03/2022     Lab Results   Component Value Date    INR 1.06 01/23/2018    INR 1.16 (H) 09/08/2014    INR 1.20 (H) 09/04/2014   No results found for: PHENYTOIN, ESR, CRP    PT,OT and/or speech notes reviewed:    10/03/22 0900   Vitals   Heart Rate (!) 109   Heart Rate Source Monitor   BP 99/62   BP Location Left upper arm   BP Method Automatic   Patient Position Standing   MAP (Calculated) 74.33   SpO2 92 %   O2 Device Nasal cannula  (3 L)   Comment Seated BP: 122/101; HR: 102; O2: 94%   Subjective   Subjective pt. states he feels dizzy and overall doesn't feel good   Bed mobility   Bridging Contact guard assistance;Stand by assistance  (decreased ROM)   Rolling to Left Supervision  (with rails)   Rolling to Right Supervision  (with rails)   Supine to Sit Moderate assistance;Minimal assistance  (scoot turn to L side of bed, pull up with UE using PT assist, increased time to complete)   Sit to Supine Contact guard assistance;Stand by assistance  (scoot turn from L side of bed)   Bed Mobility Comments long sitting then bedside sitting with help of therapist   Transfers   Sit to Stand Contact guard assistance;Stand by assistance   Stand to Sit Contact guard assistance;Stand by assistance   PT Exercises   Exercise Treatment Supine exercises BLE: quad sets, glute sets, SAQ, SLR, heel slides, ankle pumps x 10 each   Static Standing Balance Exercises Static standing around 2 minutes for BP reading, able to maintain balance with handheld support   Assessment   Assessment Pt. reported feeling dizzy when sitting on side of bed, BP reading was elevated as listed above. When standing at EOB with RW, BP dropped over 20 mmHg systolic and over 10 mmHg diastolic indicating orthostatic hypotension, reported dizziness goes away when returning to sitting. PT declined to move from EOB due to decreased endurance, strength, and pt. reported dizziness.    PT Individual Minutes   Time In 0900   Time Out 1000   Minutes 60         Electronically signed by Zohra Cruz, PT Student, on 10/3/2022 at 12:45 PM                10/03/22 1000   [address] needed Partial/moderate assistance   Comment able to empy colostomy bag---posterior lean   CARE Score 3   Discharge Goal 6   Toilet Transfer   Assistance needed Partial/moderate assistance   CARE Score 3   Discharge Goal 6   Output   Stool (measured)  50 mL        10/03/22 1040   Eating   Assistance Needed Setup or clean-up assistance   CARE Score 5   Discharge Goal 6   Oral Hygiene   Assistance Needed Setup or clean-up assistance   CARE Score 5   Discharge Goal 6   Shower/Bathe Self   Assistance Needed Partial/moderate assistance   CARE Score 3   Discharge Goal 6   Upper Body Dressing   Assistance Needed Partial/moderate assistance   Comment min A----low endurance   CARE Score 3   Discharge Goal 6   Lower Body Dressing   Assistance Needed Partial/moderate assistance   Comment vc for tech, safety, and to correct balance --posterior lean   CARE Score 3   Discharge Goal 6   Putting On/Taking Off Footwear   Assistance Needed Substantial/maximal assistance   Comment socks only, vc for tech, AE   CARE Score 2   Discharge Goal 6      RECORD REVIEW: Previous medical records, medications were reviewed at today's visit    IMPRESSION:   1. Bilateral embolic strokes-PT/OT/SLP. Low-dose Eliquis, aspirin, Pletal  2. Essential hypertension-metoprolol, monitoring  3. Diabetes-continue insulin and monitoring. Adjustments per hospitalist  4. Hyperlipidemia-Lipitor  5. GI-bowel regimen. Has colostomy. Carafate added. Improved  6. DVT prophylaxis-Eliquis  7  History of liver cancer  8. Abnormal UA-culture growing E. coli. Patient given Cipro. Repeat study 10/1 unremarkable  9.   Hyperkalemia-resolved    ELOS: Staff tomorrow

## 2022-10-04 NOTE — PROGRESS NOTES
Patient noted to have large nose bleed. Dr. Aleksandra Cee notified and ordered noted. Will continue to monitor.

## 2022-10-04 NOTE — DISCHARGE SUMMARY
Neurology Discharge Summary     Patient Identification:  Tayo Sepulveda is a 76 y.o. male. :  1945  Admit Date:  2022  Discharge date :  2022  Attending Provider: No att. providers found     Account Number: [de-identified]                                   Admission Diagnoses:   Acute ischemic stroke [facility]) [I63.9]    Discharge Diagnoses:  Principal Problem:    Acute ischemic stroke [facility])  Resolved Problems:    * No resolved hospital problems. *      Discharge Medications:    Discharge Medication List as of 2022  9:55 AM             Details   apixaban (ELIQUIS) 2.5 MG TABS tablet Take 2.5 mg by mouth 2 times dailyHistorical Med      atorvastatin (LIPITOR) 40 MG tablet Take 40 mg by mouth at bedtimeHistorical Med      lisinopril (PRINIVIL;ZESTRIL) 10 MG tablet Take 5 mg by mouth dailyHistorical Med      cilostazol (PLETAL) 100 MG tablet TAKE 1 TABLET TWICE DAILY, Disp-180 tablet, R-5Normal      Metoprolol Tartrate 75 MG TABS Take 75 mg by mouth 2 times daily, Disp-60 tablet, R-1Normal      aspirin 81 MG tablet Take 81 mg by mouth daily. folic acid (FOLVITE) 065 MCG tablet Take 800 mcg by mouth daily. insulin lispro (HUMALOG) 100 UNIT/ML injection vial Inject into the skin 4 times daily (after meals and at bedtime)Historical Med      insulin glargine (LANTUS) 100 UNIT/ML injection vial Inject 30 Units into the skin nightlyHistorical Med           Discharge Medication List as of 2022  9:55 AM        STOP taking these medications       clopidogrel (PLAVIX) 75 MG tablet Comments:   Reason for Stopping:         gabapentin (NEURONTIN) 300 MG capsule Comments:   Reason for Stopping:         simvastatin (ZOCOR) 20 MG tablet Comments:   Reason for Stopping:                 Consults: [facility] Course: Patient did fair during his brief stay in the rehab unit. Was found to be significantly hyperkalemic and complaining of fatigue.   Hospitalist was consulted and patient is being transferred off the floor in an ICU setting to help reduce his potassium and more controlled/safe manner.   Disposition upon discharge-stable            Desmond Suarez MD,

## 2022-10-04 NOTE — PROGRESS NOTES
[facility]  INPATIENT SPEECH THERAPY  [facility] [address] [facility] UNIT  TIME   0830   0930      [x]Daily Note  []Progress Note    Date: 10/4/2022  Patient Name: Suzy Da Silva        MRN: [id number]    Account #: [de-identified]  : 1945  (76 y.o.)  Gender: male   Primary Provider: Lavell Cuba MD  Swallowing Status/Diet: Easy to chew diet, thin liquids       Diagnosis:  Bilateral embolic strokes     CXR:       Impression   Mild central vascular congestion. In addition, cannot exclude mild patchy opacities in the mid and lower left lung, which may be due to atelectasis/scarring, vascular congestion, or early airspace disease. Otherwise, no lobar consolidations. No pneumothorax or pleural effusions. Recommendation: Follow up as clinically indicated. Electronically Signed by Letty Moran MD at 30-Sep-2022 05:13:14 AM                   CT/Head:  Impression   1. Nonspecific but presumed microvascular changes in the periventricular white matter. 2.  Atrophic changes. 3.  No acute hemorrhage or extra-axial collection. Recommendation: Follow up as clinically indicated. All CT scans at this facility utilize dose modulation, iterative reconstruction, and/or weight based dosing when appropriate to reduce radiation dose to as low as reasonably achievable. Electronically Signed by Eugenia Pendleton MD at 30-Sep-2022 08:40:44 AM                        Subjective: The patient was in bed. He appears fatigued, short of breath and was wincing. He did confirm pain and requested pain medications. His nurse was notified and pain meds were administered. Objective: The patient reports back, buttocks, and bilateral abdominal pain this morning. These were reported to his nurse. He exhibited a nose bleed and his nurse was aware and managed this. He had a difficult time during dressing and rolling in the bed this morning due to significant pain and fatigue.  He continues to exhibit shortness of breath when talking, at rest, and with exertion. He continues to exhibit hoarse vocal quality and poor vocal intensity. He did exhibit immediate coughing and choking when taking a pain pill with water via straw. Productive wet cough with a large amount of sputum and blood. His nurse was present and witnessed this. If he is reclined at all, he has increased difficulty swallowing liquids. With his head fully upright, he did tolerate small/controlled straw sips of thin liquids. He continues to exhibit audible congestion. SLP did not witness wheezing this morning as yesterday. He was alert and able to answer questions appropriately regarding his immediate needs. He is still confused regarding his location, but he has been in three hospitals in a short amount of time. His bed alarm was set and his call light was placed within reach. Discussed the importance of not attempting to get out of bed without assistance. Reviewed how to use the call light and he agreed to call if he needs anything. The remaining portions of the CLQT were not completed this date due to pain and fatigue. He is in significant pain, had great difficulty with simple dressing tasks and rolling in the bed, has poor breath support, exhibits congestion, and has significant fatigue. It is doubtful that he will be able to complete three hours of therapy today. He is begging to be released and to go home. Discussed that any discharge plan would need to be approved by his physician. Recommended Diet and Intervention  Easy to chew  Thin liquids  Recommended Form of Meds: Whole with water  Therapeutic Interventions: Pharyngeal exercises;Diet tolerance monitoring;Oral motor exercises; Patient/Family education     Compensatory Swallowing Strategies  Compensatory Swallowing Strategies :  Alternate solids and liquids;Eat/Feed slowly;Upright as possible for all oral intake;Remain upright for 30-45 minutes after meals;Small bites/sips                 Short Term Goals:  Goal 1: Pt will complete the CLQT. Goal 2: Pt will complete recall tasks with 90% accuracy and minimal cues. Goal 3: Pt will complete verbal expression tasks with 80% accuracy and minimal cues. Goal 4: Pt will complete executive functioning/problem solving/safety awareness tasks with 80% accuracy and minimal cues. Goal 5: Pt will complete orientation tasks with 90% accuracy and minimal cues. Goal 6: Pt will complete diaphragmatic breathing exercises to target breath support for speech production. Long Term Goals:  Pt will participate in skilled speech therapy services to ensure he is able to adequately/effectively communicate his wants and needs and safely complete ADLs. Pt will tolerate the least restrictive diet with minimal overt s/s of aspiration/penetration during hospitalization. He is recommended to continue speech therapy services for breath support, dysphagia, cognition, and expressive language.                 ASSESSMENT:    Patient Tolerance of Treatment:   []Tolerated well []Tolerated fair []Required rest breaks [x]Fatigued    Education:  Learner:  [x]Patient          []Significant Other          [x]Other  Education provided regarding:  [x]Goals and POC   [x]Diet and swallowing precautions    []Home Exercise Program  []Progress and/or discharge information  Method of Education:  [x]Discussion          []Demonstration          []Handout          []Other  Evaluation of Education:   []Verbalized understanding   []Demonstrates without assistance  []Demonstrates with assistance  [x]Needs further instruction     []No evidence of learning                  []No family present      Plan: [x]Continue with current plan of care    []Modify current plan of care as follows:    []Discharge patient    Discharge Location:    Services/Supervision Recommended:      [x]Patient continues to require treatment by a licensed therapist to address functional deficits as outlined in the established plan of care.      Electronically Signed By:  Steven Sanchez M.S., CCC-SLP  10/4/2022,8:22 AM.

## 2022-10-05 NOTE — PROGRESS NOTES
Comprehensive Nutrition Assessment    Type and Reason for Visit:  Reassess    Nutrition Recommendations/Plan:   Modify ONS orders: Strawberry Glucerna for B. Vanilla Boost Pudding for L. Mike Costain Cup for D.     Malnutrition Assessment:  Malnutrition Status: At risk for malnutrition (Comment) (10/05/22 8247)    Context:  Acute Illness     Findings of the 6 clinical characteristics of malnutrition:  Energy Intake:  50% or less of estimated energy requirements for 5 or more days  Weight Loss:  No significant weight loss     Body Fat Loss:  No significant body fat loss     Muscle Mass Loss:  No significant muscle mass loss    Fluid Accumulation:  No significant fluid accumulation     Strength:  Not Performed    Nutrition Assessment:    Pt continues to decline from a nutritional standpoint AEB poor oral intake. Pt is on an Easy to Chew, CHO 3, Low Potassium diet. Blood glucose levels are elevated and ranging from 259-302. Potassium has corrected and is 4.8. Pt has been drinking the strawberry Glucerna per daughter and MD progress notes, but he does not particularly like them. Will modify ONS orders to provide a mixed variety of supplements. Preferences have been obtained and updated in the diet order. Nutrition Related Findings:      Wound Type: None       Current Nutrition Intake & Therapies:    Average Meal Intake: 0%  Average Supplements Intake: %  ADULT ORAL NUTRITION SUPPLEMENT; Breakfast, Lunch, Dinner; Diabetic Oral Supplement  ADULT DIET; Easy to Chew; 3 carb choices (45 gm/meal); Low Potassium (Less than 3000 mg/day); NO OATMEAL. LIKES SCRAMBLED EGGS, SINGH, & SAUSAGE    Anthropometric Measures:  Height: 6' (182.9 cm)  Ideal Body Weight (IBW): 178 lbs (81 kg)    Admission Body Weight: 203 lb 11.2 oz (92.4 kg)  Current Body Weight: 191 lb (86.6 kg), 114.4 % IBW.  Weight Source: Bed Scale  Current BMI (kg/m2): 25.9  Usual Body Weight: 210 lb (95.3 kg) (6/15/2022)  % Weight Change (Calculated): -3  BMI Categories: Overweight (BMI 25.0-29. 9)    Estimated Daily Nutrient Needs:  Energy Requirements Based On: Kcal/kg (20-25)  Weight Used for Energy Requirements: Current  Energy (kcal/day): 4401-3996 kcals/day  Weight Used for Protein Requirements: Ideal (1.3-2.0)  Protein (g/day): 105-162 g/protein/day  Method Used for Fluid Requirements: 1 ml/kcal  Fluid (ml/day): 5307-4198 mL/day    Nutrition Diagnosis:   Inadequate oral intake related to acute injury/trauma as evidenced by intake 0-25%    Nutrition Interventions:   Food and/or Nutrient Delivery: Continue Current Diet, Modify Oral Nutrition Supplement  Coordination of Nutrition Care: Continue to monitor while inpatient  Plan of Care discussed with: IDT & family    Goals:  Previous Goal Met: No Progress toward Goal(s)  Goals: PO intake 50% or greater    Nutrition Monitoring and Evaluation:   Food/Nutrient Intake Outcomes: Food and Nutrient Intake, Supplement Intake  Physical Signs/Symptoms Outcomes: Biochemical Data, Chewing or Swallowing, Nutrition Focused Physical Findings, Weight     [place] MS, RD, LD  Contact: [phone]

## 2022-10-05 NOTE — PLAN OF CARE
Problem: Neurosensory - Adult  Goal: Achieves stable or improved neurological status  10/5/2022 0020 by Jung Charles LPN  Outcome: Progressing  10/4/2022 1110 by Ad Roy LPN  Outcome: Progressing     Problem: Respiratory - Adult  Goal: Achieves optimal ventilation and oxygenation  10/5/2022 0020 by Jung Charles LPN  Outcome: Not Progressing  10/4/2022 1110 by Ad Roy LPN  Outcome: Progressing     Problem: Skin/Tissue Integrity - Adult  Goal: Skin integrity remains intact  10/5/2022 0020 by Jung Charles LPN  Outcome: Progressing  10/4/2022 1110 by Ad Roy LPN  Outcome: Progressing     Problem: Musculoskeletal - Adult  Goal: Return mobility to safest level of function  10/5/2022 0020 by Jung Charles LPN  Outcome: Progressing  10/4/2022 1110 by Ad Roy LPN  Outcome: Progressing     Problem: Gastrointestinal - Adult  Goal: Establish and maintain optimal ostomy function  10/5/2022 0020 by Jung Charles LPN  Outcome: Progressing  10/4/2022 1110 by Ad Roy LPN  Outcome: Progressing     Problem: Discharge Planning  Goal: Discharge to home or other facility with appropriate resources  10/5/2022 0020 by Jung Charles LPN  Outcome: Progressing  10/4/2022 1110 by Ad Roy LPN  Outcome: Progressing     Problem: Skin/Tissue Integrity  Goal: Absence of new skin breakdown  Description: 1. Monitor for areas of redness and/or skin breakdown  2. Assess vascular access sites hourly  3. Every 4-6 hours minimum:  Change oxygen saturation probe site  4. Every 4-6 hours:  If on nasal continuous positive airway pressure, respiratory therapy assess nares and determine need for appliance change or resting period.   10/5/2022 0020 by Jung Charles LPN  Outcome: Progressing  10/4/2022 1110 by Ad Roy LPN  Outcome: Progressing     Problem: Pain  Goal: Verbalizes/displays adequate comfort level or baseline comfort level  10/5/2022 0020 by Jung Charles LPN  Outcome: Progressing  10/4/2022 1110 by Manuel Alcantara LPN  Outcome: Progressing     Problem: Respiratory - Adult  Goal: Achieves optimal ventilation and oxygenation  10/5/2022 0020 by Silvia Rodriguez LPN  Outcome: Not Progressing  10/4/2022 1110 by Manuel Alcantara LPN  Outcome: Progressing     Problem: Nutrition Deficit:  Goal: Optimize nutritional status  10/5/2022 0020 by Silvia Rodriguez LPN  Outcome: Not Progressing  10/4/2022 1110 by Manuel Alcantara LPN  Outcome: Progressing

## 2022-10-05 NOTE — PROGRESS NOTES
Name: Singh Velazquez  MRN: [id number]  Date of Service:  10/5/2022     10/05/22 1000   Assessment   Assessment Missed tx: Pt declines participation in therapy at this time- Vitals in supine as follows: /50,  bpm and 93% O2 @ 4L O2.  Plan to attempt later this afternoon           Electronically signed by Meagan Min PTA on 10/5/2022 at 10:33 AM

## 2022-10-05 NOTE — PROGRESS NOTES
Patient:   Last Bear  MR#:    [id number]   Room:    0811/811-01   YOB: 1945  Date of Progress Note: 10/5/2022  Time of Note                           7:57 AM  Consulting Physician:   Kam Hightower M.D. Attending Physician:  Kam Hightower MD     CHIEF COMPLAINT: Generalized weakness and deconditioning     Subjective  This 76 y.o. male  with history of HTN, hypercholesterolemia, DM, CAD, PVD, DM neuropathy, osteomyelitis with transmetatarsal amputation, COVID-19 July 2022 and newly diagnosis liver cancer July of this year with radioembolization to the liver 9/20/22. He presented to [facility] on 9/22/22 with weakness, AMS and lethargy. He was admitted to the Hospitalist service with metabolic encephalopathy. MRI done revealed Multiple tiny 1 or 2 mm areas of diffusion and increased signal in both cerebral hemispheres that likely represent small areas of acute ischemic change. These are seen in the bilateral frontal and parietal lobes and in the right occipital lobe. Neurology was consulted. He was seen by Dr. Padma Daley, who felt embolic stroke related to hypercoaguable state and underlying/undetected atrial fibrllation. Ricardo Sanders had stopped ASA on his own but continued Pletal. ASA had been started this admission. In normal circumstance low dose anticoagulation would be recommended for ESUS but patient has higher than normal risk for bleed with hepatic cancer. Plans are for Zio Patch at discharge CTA of neck done shows heavy plaque burden at the bilateral carotid bifurcations with about 80% stenosis of the right ICA and 80 to 90% stenosis on the left. Vascular surgery was consulted. He was seen by Dr. Randee Tom, who didn't feel any surgical intervention was needed, he recommended continued conservative therapy with antiplatelet therapy and given the embolic appearance of the infarcts could consider low-dose Eliquis twice daily as well.      Has evaluated by SPT for swallow and placed on a mechanical soft diet with thin liquids. SPT will also continue to see for cognition. He is still weak overall and participating in both PT/OT  Patient returned back to the floor 9/30 with resumption of care following treatment for hyperkalemia. Had nose bleed yesterday. None currently  REVIEW OF SYSTEMS:  Constitutional: No fevers No chills  Neck:No stiffness  Respiratory: No shortness of breath  Cardiovascular: No chest pain No palpitations  Gastrointestinal: No diarrhea. Has a colostomy  Genitourinary: No Dysuria  Neurological: No headache, no confusion      PHYSICAL EXAM:  BP (!) 140/47   Pulse (!) 102   Temp 97.8 °F (36.6 °C)   Resp 20   Ht 6' (1.829 m)   Wt 191 lb 1.6 oz (86.7 kg)   SpO2 90%   BMI 25.92 kg/m²     Constitutional: he appears well-developed and well-nourished. Eyes - conjunctiva normal.  Pupils react to light  Ear, nose, throat -hearing intact to voice. No scars, masses, or lesions over external nose or ears, no atrophy of tongue  Neck-symmetric, no masses noted, no jugular vein distension  Respiration- chest wall appears symmetric, good expansion,   normal effort without use of accessory muscles  Cardiovascular- RRR  Musculoskeletal - no significant wasting of muscles noted, no bony deformities, gait no gross ataxia  Extremities-no clubbing, cyanosis or edema  Skin - warm, dry, and intact. No rash, erythema, or pallor. Psychiatric - mood, affect, and behavior appear normal.      Neurology  NEUROLOGICAL EXAM:      Mental status   Somewhat drowsy.   Complains of being tired     Cranial Nerve Exam   CN II- Visual fields grossly unremarkable  CN III, IV,VI-EOMI, No nystagmus, conjugate eye movements, no ptosis  CN VII-slight left facial droop  CN VIII-Hearing intact   CN IX and X- Palate elevates in midline  CN XI-good shoulder shrug  CN XII-Tongue midline with no fasciculations or fibrillations     Motor Exam  V/V throughout upper and lower extremities bilaterally, no cogwheeling, normal tone     Absent throughout     Tremors- no tremors in hands or head noted  Nursing/pcp notes, imaging,labs and vitals reviewed.          Lab Results   Component Value Date    WBC 12.5 (H) 10/03/2022    HGB 13.7 (L) 10/03/2022    HCT 42.2 10/03/2022    .9 (H) 10/03/2022     10/03/2022     Lab Results   Component Value Date     (L) 10/03/2022    K 4.8 10/03/2022     10/03/2022    CO2 22 10/03/2022    BUN 22 10/03/2022    CREATININE 1.0 10/03/2022    GLUCOSE 201 (H) 10/03/2022    CALCIUM 8.7 (L) 10/03/2022    PROT 6.1 (L) 10/02/2022    LABALBU 2.9 (L) 10/02/2022    BILITOT 1.8 (H) 10/02/2022    ALKPHOS 193 (H) 10/02/2022    AST 94 (H) 10/02/2022    ALT 67 (H) 10/02/2022    LABGLOM >60 10/03/2022    GFRAA >59 10/03/2022     Lab Results   Component Value Date    INR 1.06 01/23/2018    INR 1.16 (H) 09/08/2014    INR 1.20 (H) 09/04/2014   No results found for: PHENYTOIN, ESR, CRP    PT,OT and/or speech notes reviewed:  PHYSICAL THERAPY  GROSS ASSESSMENT       BED MOBILITY  Bed mobility  Bridging: Contact guard assistance, Stand by assistance (decreased ROM)  Rolling to Left: Supervision (with rails)  Rolling to Right: Supervision (with rails)  Supine to Sit: Moderate assistance  Sit to Supine: Contact guard assistance, Stand by assistance (scoot turn from L side of bed)  Bed Mobility Comments: long sitting then bedside sitting with help of therapist       TRANSFERS  Transfers  Sit to Stand: Contact guard assistance, Stand by assistance  Stand to Sit: Contact guard assistance, Stand by assistance  Bed to Chair: Contact guard assistance (with RW)  Stand Pivot Transfers: Contact guard assistance, Minimal Assistance (w/o AD)  WHEELCHAIR PROPULSION  AMBULATION  Ambulation  Surface: Level tile  Device: Rolling Walker  Assistance: Contact guard assistance  Quality of Gait: walker too far ahead, flexed posture, shuffling steps  Gait Deviations: Slow Alexus, Shuffles, Decreased step length, Decreased step height  Distance: 5'  Comments: pt with poor tolerance for standing; reports feeling dizzy and bad in general and BP elevated as well as heart rate (see flowsheet through 10a) (bp 121/104 and  after standing)  STAIRS  GOALS:        ASSESSMENT:  Assessment: Pt. reported feeling dizzy when sitting on side of bed, BP reading was elevated as listed above. When standing at EOB with RW, BP dropped over 20 mmHg systolic and over 10 mmHg diastolic indicating orthostatic hypotension, reported dizziness goes away when returning to sitting. PT declined to move from EOB due to decreased endurance, strength, and pt. reported dizziness. SPEECH THERAPY  Congested unproductive cough at rest. Hoarse vocal quality and poor vocal intensity were both noted. Per patient report, he completed radiation fourteen days ago. Audible wheezing was noted. He is on 2LPM 02 via NC. He exhibits shortness of breath when talking, eating, drinking, and with exertion. Portions of the CLQT were completed. He completed symbol cancellation tasks, mazes and clock drawing. He exhibited difficulty with symbol cancellation (he circled multiple designs. He did [place] four of the correct symbols. During clock drawing, he repeated the 12 three times on the clock face. He did not complete the hour and minute hands. Will complete tasks for telling time and additional clock drawing tasks during a future session. He did attempt a maze but only worked on the left side of the page. He also completed generative naming and produced a few items. Will complete the CLQT over the next few sessions. Weak hyolaryngeal elevation and delayed swallow responses are noted. No overt s/s of aspiration with soft foods or thin liquids via straw. He is afebrile. He has not had a recent elevation in temperature. He reports lingual numbness and tingling and states this has been present for one week.  He also reports oral numbness and tingling which has also been present for one week. He stated he does not like the Glucerna shakes. Will discuss with the dietician. He stated he would try a supplement pudding or magic cup. His cough was reported to his nurse. This morning, his lung sounds were reported as clear. On 10/3/22: The patient reports back, buttocks, and bilateral abdominal pain this morning. These were reported to his nurse. He exhibited a nose bleed and his nurse was aware and managed this. He had a difficult time during dressing and rolling in the bed this morning due to significant pain and fatigue. He continues to exhibit shortness of breath when talking, at rest, and with exertion. He continues to exhibit hoarse vocal quality and poor vocal intensity. He did exhibit immediate coughing and choking when taking a pain pill with water via straw. Productive wet cough with a large amount of sputum and blood. His nurse was present and witnessed this. If he is reclined at all, he has increased difficulty swallowing liquids. With his head fully upright, he did tolerate small/controlled straw sips of thin liquids. He continues to exhibit audible congestion. SLP did not witness wheezing this morning as yesterday. He was alert and able to answer questions appropriately regarding his immediate needs. He is still confused regarding his location, but he has been in three hospitals in a short amount of time. His bed alarm was set and his call light was placed within reach. Discussed the importance of not attempting to get out of bed without assistance. Reviewed how to use the call light and he agreed to call if he needs anything. The remaining portions of the CLQT were not completed this date due to pain and fatigue. He is in significant pain, had great difficulty with simple dressing tasks and rolling in the bed, has poor breath support, exhibits congestion, and has significant fatigue.  It is doubtful that he will be able to complete three hours of therapy today. He is begging to be released and to go home. Discussed that any discharge plan would need to be approved by his physician. Short Term Goals:  Goal 1: Pt will complete the CLQT. Goal 2: Pt will complete recall tasks with 90% accuracy and minimal cues. Goal 3: Pt will complete verbal expression tasks with 80% accuracy and minimal cues. Goal 4: Pt will complete executive functioning/problem solving/safety awareness tasks with 80% accuracy and minimal cues. Goal 5: Pt will complete orientation tasks with 90% accuracy and minimal cues. Goal 6: Pt will complete diaphragmatic breathing exercises to target breath support for speech production. Long Term Goals:  Pt will participate in skilled speech therapy services to ensure he is able to adequately/effectively communicate his wants and needs and safely complete ADLs. Pt will tolerate the least restrictive diet with minimal overt s/s of aspiration/penetration during hospitalization. STGs Met: 0  LTGs Met: 0     ELOS: 2-3 weeks     He is recommended to continue speech therapy services for breath support, dysphagia, cognition, and expressive language.         OCCUPATIONAL THERAPY  Cognitive Patterns:  Cognitive Assessment Method (CAM):  Confusion Assessment Method (CAM)  Is there evidence of an acute change in mental status from the patient's baseline?: No  Inattention: Behavior continuously present, does not fluctuate  Disorganized thinking: Behavior present, fluctuates (comes and goes, changes in severity)  Altered level of consciousness: Behavior not present           CURRENT IRF-VIANEY SCORES  Eating: CARE Score: 5     Oral Hygiene: CARE Score: 5         Toileting: CARE Score: 3  Comment: able to empy colostomy bag---posterior lean      Shower/Bathe: CARE Score: 3           Upper Body Dressing: CARE Score: 3  Comment: min A----low endurance       Lower Body Dressing: CARE Score: 3  Comment: vc for tech, safety, and to correct balance --posterior lean        Footwear: CARE Score: 2  Comment: socks only, vc for tech, AE        Toilet Transfers: CARE Score: 3           Picking Up Object:  CARE Score: 88           UE Functioning:       10/03/22 1000   LUE AROM (degrees)   LUE AROM  WFL   RUE AROM (degrees)   RUE AROM  WFL           10/03/22 1000   LUE Strength   Gross LUE Strength WFL   RUE Strength   Gross RUE Strength WFL         Pain Assessment:       10/03/22 1000   Pre Treatment Pain Screening   Scale Used Numeric Score   Intervention List Patient able to continue with treatment   Comments / Details stomach discomfort   Pain at present 4         STGs:  Short Term Goals  Time Frame for Short Term Goals: 1 week  Short Term Goal 1: complete UB dressing with setup  Short Term Goal 2: complete LB dressing with CGA  Short Term Goal 3: complete overall toileting with CGA  Short Term Goal 4: complete 1-2 handed standing level task for 3 mins with supervision  Short Term Goal 5: complete overall bathing with CGA  Short Term Goal 6: complete simple ambulatory home making task with CGA     LTGs:  Long Term Goals  Time Frame for Long Term Goals : 3 weeks  Long Term Goal 1: complete overall toileting with modified independence  Long Term Goal 2: complete overall bathing with modified independence  Long Term Goal 3: complete overall dressing with modified independence  Long Term Goal 4: complete simple ambulatory home making task with modified independence  Long Term Goal 5: complete HEP with independence  Long Term Goal 6: pt/family verbalize DME     Assessment:  Performance deficits / Impairments: Decreased functional mobility , Decreased endurance, Decreased ADL status, Decreased balance, Decreased strength, Decreased high-level IADLs, Decreased cognition                     NUTRITION  Current Wt: Weight: 205 lb 1.6 oz (93 kg) / Body mass index is 27.82 kg/m².   Admission Wt: Admission Body Weight: 203 lb 11.2 oz (92.4 kg)  Oral Diet Orders:   Easy to Chew, 3 carb choices, Low Potassium  Oral Nutrition Supplement (ONS) Orders:  Diabetic  PO intake <50% meals and ONS, pt reports nausea  Please see nutrition note for details. RECORD REVIEW: Previous medical records, medications were reviewed at today's visit    IMPRESSION:   1. Bilateral embolic strokes-PT/OT/SLP. Low-dose Eliquis, aspirin, Pletal all on hold for another 24 hours d/t nose bleed  2. Essential hypertension-metoprolol, monitoring. Low at times. Currently adequate. 3.  Diabetes-continue insulin and monitoring. Lantus increased 10/5.  4.  Hyperlipidemia-Lipitor  5. GI-bowel regimen. Has colostomy. Carafate added. Improved  6. DVT prophylaxis-Eliquis on hold a few days d/t nose bleed. Scd's added. 7  History of liver cancer- relatively new dx  8. Abnormal UA-culture growing E. coli. Patient given Cipro. Repeat study 10/1 unremarkable  9. Hyperkalemia-resolved  Therapy held yesterday d/t large nose bleed. Check cxr d/t persistent cough. Repeat labs in am  Staffing this date , mutlidisciplinary with entire team and family members with complex decision making and planning for discharge. More than 35 minutes spent today in total on this patient, with greater than 50% of the time on counseling and coordination of care  ELOS:10/15.   oli

## 2022-10-05 NOTE — PROGRESS NOTES
Name: Tasneem Huffman  MRN: [id number]  Date of Service:  10/5/2022         10/05/22 1310   Lower Extremity Weight Bearing Restrictions   Right Lower Extremity Weight Bearing Weight Bearing As Tolerated   Left Lower Extremity Weight Bearing Weight Bearing As Tolerated   Position Activity Restriction   Other position/activity restrictions zio patch, colostomy bag   General   Chart Reviewed Yes   Patient assessed for rehabilitation services? Yes   Additional Pertinent Hx Liver cancer, HTN, CAD, PVD, DM with neuropathy, osteomyelitis with transmetatarsal amputation, ileostomy   General Comment   Comments CO-TX with MATHEWS   Subjective   Subjective Pt laying in bed agrees to participate in therapy. Hearing   Hearing X   Hearing Exceptions Bilateral hearing aid   Vitals   BP (!) 131/52   Patient Position Sitting   MAP (Calculated) 78.33   SpO2 92 %  (92-97)   O2 Device Nasal cannula   Comment 4L   Subjective   Subjective Back   Pain Verbal: 6/10   Bed mobility   Bridging Minimal assistance  (PTA holding pt B feet flat)   Rolling to Left Minimal assistance   Rolling to Right Moderate assistance;Minimal assistance   Supine to Sit Moderate assistance   Scooting Stand by assistance   Bed Mobility Comments On L side of bed- use of L bedrail   Transfers   Sit to Stand Moderate Assistance  (2X in //, utlized BUE pulling on //)   Stand to Sit Minimal Assistance;Contact guard assistance  (2X in //)   Squat Pivot Transfers Minimal Assistance;2 Person Assistance  (EOB<w/c from pt R w/o AD and w/c armrest removed)   PT Exercises   Static Standing Balance Exercises Pt able to maintain static standing in // requiring CGA for ~10 sec X 2. Activity Tolerance   Activity Tolerance Patient limited by endurance; Patient limited by fatigue;Patient tolerated treatment well   Assessment   Assessment Pt presented with one instance of O2 reading 89%, temporarily bumped up to 6L. On 4L O2 via nasal cannula rest of tx, with sats ranging from 92-97%. Pt leans posteriorly upon first sittig, requires Mod A to recover, then able to sit EOB requiring CGA/close SBA intermittently. Pt requires Mod A initially to stand, then mainly CGA for static standing and Min A X 2 for transfers. Pt also presented with fatigue, intermittently dozing off throughout tx. Discharge Recommendations Continue to assess pending progress;Subacute/Skilled Nursing Facility; Patient would benefit from continued therapy after discharge   Education   Education Given To Patient   Education Provided Safety; Mobility Training;Transfer Training   Education Provided Comments Transfer training w/o AD and standing in //   Education Method Verbal   Barriers to Learning Other (Comment)  (Decreased alertness)   Education Outcome Verbalized understanding;Demonstrated understanding;Continued education needed   Safety Devices   Type of Devices Patient at risk for falls;Gait belt;Left in bed;Bed alarm in place;Call light within reach       Electronically signed by Cordelia Jones PTA on 10/5/2022 at 4:40 PM

## 2022-10-05 NOTE — PROGRESS NOTES
Occupational Therapy     10/05/22 1300   General   Timed Code Treatment Minutes 60 Minutes   Pre Treatment Pain Screening   Pain at present 6   Scale Used Numeric Score   Intervention List Patient able to continue with treatment   Comments / Details Stomach and back. General   Additional Pertinent Hx HTN, DM, Covid 2021, recent liver CA diagnosis   Diagnosis CVA , hyperkalemia   General Comment   Comments Frequent bouts of coughing and difficulty staying alert throughout the tx session. Vital Signs   BP (!) 131/52   BP Location Left Arm   BP Method Automatic   MAP (Calculated) 78.33   Patient Position Sitting   Oxygen Therapy   SpO2 93 %   O2 Device Nasal cannula  (Dropped to 89% while seated EOB, improved to 94% on 5L O2 per nc, and maintained at 93% on 4L of O2 per nc for remainder of tx session.)   Balance   Sitting Balance Moderate assistance  (Min A to Mod A to correct posterior leaning.)   Standing Balance Minimal assistance  (In parallel bars.)   Transfers   Stand Step Transfers Minimal assistance; Moderate assistance   Sit Pivot Transfers Minimal assistance   Sit to stand Moderate assistance;Minimal assistance   Stand to sit Minimal assistance; Moderate assistance   Assessment   Performance deficits / Impairments Decreased functional mobility ; Decreased endurance;Decreased ADL status; Decreased balance;Decreased strength;Decreased high-level IADLs;Decreased cognition   Treatment Diagnosis CVA, hyperkalemia   Activity Tolerance   Activity Tolerance Patient limited by fatigue   Occupational Therapy Plan   Current Treatment Recommendations Strengthening;Cognitive reorientation;Cognitive/Perceptual training;Equipment evaluation, education, & procurement;Patient/Caregiver education & training; Endurance training;Functional mobility training; Safety education & training;Home management training;Self-Care / ADL; Balance training      10/05/22 1300   Oral Hygiene   Assistance Needed Supervision or touching assistance Comment VC. While rinsing mouth, pt. noted to be spitting out Glucerna pocketed in mouth. SLP notified.    CARE Score 4   [address] Lebron Villarreal needed Dependent   CARE Score 1

## 2022-10-05 NOTE — PLAN OF CARE
Problem: Respiratory - Adult  Goal: Achieves optimal ventilation and oxygenation  10/5/2022 1030 by Jing Kate RN  Outcome: Progressing  10/5/2022 0020 by Mari Valentin LPN  Outcome: Not Progressing     Problem: Nutrition Deficit:  Goal: Optimize nutritional status  10/5/2022 1030 by Jing Kate RN  Outcome: Progressing  Flowsheets (Taken 10/5/2022 0940 by Hira De La Paz, MS, RD, LD)  Nutrient intake appropriate for improving, restoring, or maintaining nutritional needs:   Assess nutritional status and recommend course of action   Monitor oral intake, labs, and treatment plans   Recommend appropriate diets, oral nutritional supplements, and vitamin/mineral supplements  10/5/2022 0020 by Mari Valentin LPN  Outcome: Not Progressing

## 2022-10-05 NOTE — PROGRESS NOTES
[facility]  INPATIENT SPEECH THERAPY  [facility] 8 REHAB UNIT  TIME   900  1400  1430  60 MINUTES    [x]Daily Note  []Progress Note    Date: 10/5/2022  Patient Name: Melvin Richardson        MRN: [id number]    Account #: [de-identified]  : 1945  (76 y.o.)  Gender: male   Primary Provider: Nunu Flores MD  Swallowing Status/Diet: Easy to chew with thin liquids      Subjective:   [id number]  No family present for tx session. Pt reports pain in the back, buttocks, and hips. Congested coughing observed throughout tx. Pt does have a chest x ray scheduled on this date. Pt states he is cold. A warm blanket is provided for him. [id number]  Pt is lethargic and upright in bed. He completed an hour of PT/OT prior to tx session. Objective:  [id number]    Pt refused regular solid trials during swallowing reassessment. Consistencies observed during swallowing reassessment includes thin liquids via consecutive sips side of cup. Oral transit is consistently 1-2 seconds with thin liquids via consecutive sips side of cup. Laryngeal movement observed to be consistently sluggish and mildly decreased. No overt s/s of aspiration observed with thin liquids via consecutive sips side of cup. Continue current diet at this time. Will review results from chest x ray. Adequate oral care completed. Pt allowed oral swabbing on 2x occasions throughout tx. Pt began to refuse oral care after 2x attempts. Tongue is observed to be cracked and dry. Attempted to complete CLQT. Pt refused this therapy task. Orientation task completed. Pt is able to state his name and ; however, he is unable to elicit the day/month/year, as well as the current location. He reports that it is currently Saturday and that he is currently in the state of [place]. Functional recall task completed. Pt is required to elicit children's names and their occupations. Pt is able to complete this task independently with 100% accuracy.      Pt observed falling asleep in between therapy tasks. Pt reports that he needs to rest.    After approximately 30 minutes of tx. Pt began to refuse tx. He reports that he does not want to get better and is not required to participate in therapeutic activities. Session discontinued. Will attempt therapy later during the day. [id number]    Pt moaning in pain throughout the session. He reports that his stomach is hurting. Chest x ray completed on this date. Impression   1. Status post median sternotomy   2. NO evidence of airspace consolidation or pulmonary venous congestion. SLP will continue to monitor diet and oral pocketing closely. OT reports oral pocketing with Glucerna. OT states that pt spit out drink during oral care. Check for oral pocketing frequently. Nursing notified. Attempted symbol trails during tx. Pt did complete 2x conditioning symbol trails. He required maximum verbal cues and [place] assistance to complete the two conditioning tasks. Pt fatigued after this task and required a rest break. Pt did not complete scored symbol trail due to fatigue. Voice is considered aphonic on this date. Significantly decreased vocal intensity and breath support for speech production. Minimal lingual and labial coordination, ROM, and strength for speech production. Speech intelligibility is ranked to be approximately 40-50% to an unfamiliar listener on this date. Automatic speech tasks completed. Pt is required to count to 10, state the days of the week, and months of the year. Pt is able to complete this task independently with 100% accuracy. Pt did require maximum verbal cues to remain awake while transitioning between automatic speech tasks. OMEs attempted for lingual and labial strengthening. Pt is able to complete lingual protrusion trial 1x. Pt reports that this is causing pain. OMEs discontinued. Orientation task completed. Pt is able to state name, , age, current location (city/state).  Pt is unable to state day/month/year and name of building. Pt required frequent rest breaks during tx session. He required verbal stimulation to remain awake. He demonstrated agitation when he was woken up. SLP will continue to follow. Recommended Diet and Intervention  Easy to chew  Thin liquids  Recommended Form of Meds: Whole with water  Therapeutic Interventions: Pharyngeal exercises;Diet tolerance monitoring;Oral motor exercises; Patient/Family education     Compensatory Swallowing Strategies  Compensatory Swallowing Strategies : Alternate solids and liquids;Eat/Feed slowly;Upright as possible for all oral intake;Remain upright for 30-45 minutes after meals;Small bites/sips       Short Term Goals:  Goal 1: Pt will complete the CLQT. Goal 2: Pt will complete recall tasks with 90% accuracy and minimal cues. Goal 3: Pt will complete verbal expression tasks with 80% accuracy and minimal cues. Goal 4: Pt will complete executive functioning/problem solving/safety awareness tasks with 80% accuracy and minimal cues. Goal 5: Pt will complete orientation tasks with 90% accuracy and minimal cues. Goal 6: Pt will complete diaphragmatic breathing exercises to target breath support for speech production. Long Term Goals:  Pt will participate in skilled speech therapy services to ensure he is able to adequately/effectively communicate his wants and needs and safely complete ADLs. Pt will tolerate the least restrictive diet with minimal overt s/s of aspiration/penetration during hospitalization.     ASSESSMENT:  Assessment: []Progressing towards goals          []Not Progressing towards goals    Patient Tolerance of Treatment:   []Tolerated well []Tolerated fair [x]Required rest breaks [x]Fatigued    Education:  Learner:  [x]Patient          []Significant Other          []Other  Education provided regarding:  [x]Goals and POC   []Diet and swallowing precautions    []Home Exercise Program  []Progress and/or discharge information  Method of Education:  [x]Discussion          []Demonstration          []Handout          []Other  Evaluation of Education:   []Verbalized understanding   []Demonstrates without assistance  []Demonstrates with assistance  [x]Needs further instruction     [x]No evidence of learning                  [x]No family present      Plan: [x]Continue with current plan of care    []Modify current plan of care as follows:    []Discharge patient    Discharge Location:    Services/Supervision Recommended:      []Patient continues to require treatment by a licensed therapist to address functional deficits as outlined in the established plan of care.     Electronically signed by SACHI Lyons on 10/5/2022 at 2:31 PM

## 2022-10-06 PROBLEM — D72.829 LEUKOCYTOSIS: Status: ACTIVE | Noted: 2022-01-01

## 2022-10-06 NOTE — CONSULTS
[address] - Consult      PCP: SHAHNAZ Blackwell CNP    Date of Admission: 9/30/2022    Date of Service: 10/6/2022    Consult requested by: Cindi Cardenas MD    Reason for consult: Hyperkalemia     Chief Complaint:  recent Stroke     History Of Present Illness: The patient is a 76 y.o. male with past medical history of recent stroke, CAD, diabetes, neuropathy, hyperlipidemia, ulcerative colitis, and PAD who presented to [facility] for Acute rehab. Mr. Cory Dawson is a poor historian and unable to provide HPI, HPI obtained from chart review. Per notes patient was admitted to acute rehab on 9/28/2022 from [facility] for acute CVA, being treated with aspirin, Pletal, and low-dose Eliquis. Patient was then went to have hyperkalemia next morning and was admitted to inpatient hospitalist, treated with IV Insulin, bicarbonate, calcium gluconate, and Lokelma. Was discharged back to inpatient rehab on 9/30/2022. Hospital medicine was consulted for hyperkalemia today with K+ 5.8. [facility] was already initiated. WBC noted to be 15.3, patient also has tachycardia. Procalcitonin, lactic acid and urinalysis ordered.                Past Medical History:        Diagnosis Date    Atherosclerosis of native arteries of the extremities with ulceration(440.23) 8/25/2014    CAD (coronary artery disease)     sees dr at [facility] (dr. Brian Banks)    Cataracts, bilateral     Diabetes mellitus ([facility] [address]     Diabetic neuropathy ([facility] [address]     ED (erectile dysfunction)     HTN (hypertension)     Hypercholesterolemia     Open wound of right foot     states this is not the operative foot    Ulcerative colitis [facility])        Past Surgical History:        Procedure Laterality Date    CHOLECYSTECTOMY      CORONARY ARTERY BYPASS GRAFT  2003    EYE SURGERY      jason. cat    ILEOSTOMY OR JEJUNOSTOMY  1995    due to surgery from ulcerative colitis    PA AMPUTATION FOOT,TRANSMETATARSAL Left 1/30/2018    TRANSMET AMPUTATION performed by Yazmin Montero MD at [facility] Left 6/12/2017    [facility]. Left great toe ray amputation through the metatarsal level. VASCULAR SURGERY  8/26/14 [facility]    Percutaneous cannulation, left common femoral artery, with 5-Syrian glidesheath. Suprarenal abdominal aortogram with bilateral iliofemoral arteriogra. Bilateral lower extremity arteriogram.    VASCULAR SURGERY  09/08/14 [facility]    Right femoral to tibioperoneal trunk bypass with in situ right greater saphenous vein. Right common femoral endarterectomy with vascular patch repair. Right tibioperoneal trunk vein patch and endarterectomy. Completion right lower exteremity arteriograms    VASCULAR SURGERY  1/12/2015 [facility]    Percutaneous cannulation left common femoral artery with 5-Syrian and later 6-Syrian pinnacle destination sheath. Suprarenal abdominal aortogram with bilateral iliofemoral arteriograms. Bilateral lower extremity arteriograms. Coil embolization right greater saphenous vein bypass branch with 8 mm x 5 cm coil and seven 5 mm x 5 cm coils. VASCULAR SURGERY  1/12/2015 [facility]     Right posterior tibial artery balloon angioplasty 2.5 mm x 100 mm vascutrak balloon. Right peroneal artery balloon angioplasty with 2.5 x 100 mm vascutrak balloon. Completion right lower extremity arteriograms. VASCULAR SURGERY  06/17/2017    [facility]. Percutaneous cannulation right common femoral artery with ultrasound guidance and 5 Syrian and later 6 Syrian sheath placement. Suprarenal abdominal aorta gram with bilateral lower extremity arteriogram.Left superficial femoral/popliteal/tibial peroneal trunk/posterior tibial artery atherectomy with csi 1.25 solid crown and 2.0 solid crown. Left tibial peroneal trunk and posterior tibial artery     VASCULAR SURGERY  06/17/2017    CONT.balloon angioplasty with 3mm x 100 mmand 3.5mm x 300mm vascutrak balloon. Left popliteal artery balloon angioplasty with 5 mmx 100mm lutonix drug coated balloon. #6 left superficial femoral artery balloon angioplasty with 3.6 bmo330 mm lutonix grug coated balloons. Completion left lower extremity arteriograms. Mynx closure right common femoral artery puncture site. VASCULAR SURGERY  01/20/2018    [facility]. Left transmetetarsal amputation. Home Medications:  Prior to Admission medications    Medication Sig Start Date End Date Taking? Authorizing Provider   apixaban (ELIQUIS) 2.5 MG TABS tablet Take 2.5 mg by mouth 2 times daily    Historical Provider, MD   atorvastatin (LIPITOR) 40 MG tablet Take 40 mg by mouth at bedtime    Historical Provider, MD   lisinopril (PRINIVIL;ZESTRIL) 10 MG tablet Take 5 mg by mouth daily    Historical Provider, MD   cilostazol (PLETAL) 100 MG tablet TAKE 1 TABLET TWICE DAILY 12/19/18   SHAHNAZ Arnold   Metoprolol Tartrate 75 MG TABS Take 75 mg by mouth 2 times daily  Patient taking differently: Take 50 mg by mouth 2 times daily 6/16/17   Gil Valdivia DO   aspirin 81 MG tablet Take 81 mg by mouth daily. Historical Provider, MD   folic acid (FOLVITE) 841 MCG tablet Take 800 mcg by mouth daily. Historical Provider, MD   insulin lispro (HUMALOG) 100 UNIT/ML injection vial Inject into the skin 4 times daily (after meals and at bedtime)    Historical Provider, MD   insulin glargine (LANTUS) 100 UNIT/ML injection vial Inject 30 Units into the skin nightly    Historical Provider, MD       Allergies:    Keflex [cephalexin]    Social History:  The patient currently lives patient not able to answer at this time  Tobacco:   reports that he has quit smoking. His smoking use included pipe. He has never used smokeless tobacco.  Alcohol:   reports no history of alcohol use.   Illicit Drugs: denies    Family History:      Problem Relation Age of Onset    Cancer Mother     Cancer Father        Review of Systems:   Review of Systems   Unable to perform ROS: Mental status change (Patient lethargic and answering select questions only)      Physical Examination:  Vitals: /60 Pulse 96   Temp 98.2 °F (36.8 °C)   Resp 20   Ht 6' (1.829 m)   Wt 191 lb 1.6 oz (86.7 kg)   SpO2 94%   BMI 25.92 kg/m²     Physical Exam  Constitutional:       General: He is not in acute distress. Appearance: Normal appearance. He is not ill-appearing. HENT:      Head: Normocephalic and atraumatic. Right Ear: External ear normal.      Left Ear: External ear normal.      Nose: Nose normal.      Mouth/Throat:      Mouth: Mucous membranes are moist.   Eyes:      Extraocular Movements: Extraocular movements intact. Conjunctiva/sclera: Conjunctivae normal.      Pupils: Pupils are equal, round, and reactive to light. Cardiovascular:      Rate and Rhythm: Regular rhythm. Tachycardia present. Pulses: Normal pulses. Heart sounds: Normal heart sounds. Pulmonary:      Effort: Pulmonary effort is normal. No respiratory distress. Breath sounds: Rhonchi present. No wheezing or rales. Comments: Coarse breath sounds bilaterally  Abdominal:      General: Bowel sounds are normal. There is no distension. Palpations: Abdomen is soft. Tenderness: There is no abdominal tenderness. Comments: RLQ- colostomy in place    Musculoskeletal:         General: No swelling, tenderness or deformity. Normal range of motion. Cervical back: Normal range of motion and neck supple. No muscular tenderness. Right lower leg: No edema. Left lower leg: No edema. Skin:     General: Skin is warm and dry. Findings: No bruising or lesion. Neurological:      Mental Status: He is alert. He is disoriented. Comments: Somnolent  Oriented to self only          Diagnostic Data:  Imaging:   XR CHEST (2 VW)   Final Result   1. Status post median sternotomy   2. NO evidence of airspace consolidation or pulmonary venous congestion.         CBC:  Recent Labs     10/06/22  0347   WBC 15.3*   HGB 14.3   HCT 44.7        BMP:  Recent Labs     10/06/22  0347   *   K 5.8*   CL 100   CO2 25   BUN 24*   CREATININE 1.0   CALCIUM 9.0         Urinalysis:  Lab Results   Component Value Date/Time    NITRU Negative 10/01/2022 04:00 PM    WBCUA 6 10/01/2022 04:00 PM    BACTERIA NEGATIVE 10/01/2022 04:00 PM    RBCUA 2 10/01/2022 04:00 PM    BLOODU Negative 10/01/2022 04:00 PM    SPECGRAV 1.025 10/01/2022 04:00 PM    GLUCOSEU 500 10/01/2022 04:00 PM       Active Hospital Problems    Diagnosis Date Noted    Leukocytosis [D72.829] 10/06/2022     Priority: Medium    Hyperkalemia [E87.5] 09/29/2022     Priority: Medium    Acute ischemic stroke ([facility] [address] [I63.9] 09/28/2022     Priority: Medium       Assessment and Plan:  Principal Problem:    Acute ischemic stroke [facility])  Active Problems:    Hyperkalemia    Leukocytosis  Resolved Problems:    * No resolved hospital problems. *        Principal Problem:    Acute ischemic stroke ([facility] [address]-    - Continue rehab       Active Problems:    Hyperkalemia-    - Lokelma    - Monitor labs closely    - Avoid offending agents       Leukocytosis-    - Lactic acid    - Urinalysis    - IVFs    - Monitor labs closely            DVT Prophylaxis: Esthela Bear Speaker, SHAHNAZ - CNP  10/6/2022,2:17 PM           EMR Dragon/Transcription disclaimer:   Much of this encounter note is an electronic transcription/translation of spoken language to printed text.  The electronic translation of spoken language may permit erroneous, or at times, nonsensical words or phrases to be inadvertently transcribed; although attempts have made to review the note for such errors, some may still exist.

## 2022-10-06 NOTE — PLAN OF CARE
Problem: Neurosensory - Adult  Goal: Achieves stable or improved neurological status  10/5/2022 2338 by Judith Carroll LPN  Outcome: Progressing  10/5/2022 1030 by James Leo RN  Outcome: Progressing     Problem: Respiratory - Adult  Goal: Achieves optimal ventilation and oxygenation  10/5/2022 2338 by Judith Carroll LPN  Outcome: Progressing  10/5/2022 1030 by James Leo RN  Outcome: Progressing     Problem: Skin/Tissue Integrity - Adult  Goal: Skin integrity remains intact  10/5/2022 2338 by Judith Carroll LPN  Outcome: Progressing  10/5/2022 1030 by James Leo RN  Outcome: Progressing  Flowsheets (Taken 10/5/2022 1029)  Skin Integrity Remains Intact: Monitor for areas of redness and/or skin breakdown     Problem: Musculoskeletal - Adult  Goal: Return mobility to safest level of function  10/5/2022 2338 by Judith Carroll LPN  Outcome: Progressing  10/5/2022 1030 by James Leo RN  Outcome: Progressing     Problem: Gastrointestinal - Adult  Goal: Establish and maintain optimal ostomy function  10/5/2022 2338 by Judith Carroll LPN  Outcome: Progressing  10/5/2022 1030 by James Leo RN  Outcome: Progressing     Problem: Gastrointestinal - Adult  Goal: Establish and maintain optimal ostomy function  10/5/2022 2338 by Judith Carroll LPN  Outcome: Progressing  10/5/2022 1030 by James Leo RN  Outcome: Progressing     Problem: Discharge Planning  Goal: Discharge to home or other facility with appropriate resources  10/5/2022 2338 by Judith Carroll LPN  Outcome: Progressing  10/5/2022 1030 by James Leo RN  Outcome: Progressing     Problem: Skin/Tissue Integrity  Goal: Absence of new skin breakdown  Description: 1. Monitor for areas of redness and/or skin breakdown  2. Assess vascular access sites hourly  3. Every 4-6 hours minimum:  Change oxygen saturation probe site  4.   Every 4-6 hours:  If on nasal continuous positive airway pressure, respiratory therapy assess chasity and determine need for appliance change or resting period.   10/5/2022 2338 by Brent Morgan LPN  Outcome: Progressing  10/5/2022 1030 by Bertha Savage RN  Outcome: Progressing     Problem: Pain  Goal: Verbalizes/displays adequate comfort level or baseline comfort level  10/5/2022 2338 by Brent Morgan LPN  Outcome: Progressing  10/5/2022 1030 by Bertha Savage RN  Outcome: Progressing     Problem: Nutrition Deficit:  Goal: Optimize nutritional status  10/5/2022 2338 by Brent Morgan LPN  Outcome: Progressing  10/5/2022 1030 by Bertha Savage RN  Outcome: Progressing  Flowsheets (Taken 10/5/2022 0940 by Cordie Shone, MS, RD, LD)  Nutrient intake appropriate for improving, restoring, or maintaining nutritional needs:   Assess nutritional status and recommend course of action   Monitor oral intake, labs, and treatment plans   Recommend appropriate diets, oral nutritional supplements, and vitamin/mineral supplements     Problem: Chronic Conditions and Co-morbidities  Goal: Patient's chronic conditions and co-morbidity symptoms are monitored and maintained or improved  10/5/2022 2338 by Brent Morgan LPN  Outcome: Progressing  10/5/2022 1030 by Bertha Savage RN  Outcome: Progressing     Problem: Safety - Adult  Goal: Free from fall injury  10/5/2022 2338 by Brent Morgan LPN  Outcome: Sheela Chapin (Taken 10/5/2022 2336)  Free From Fall Injury: Instruct family/caregiver on patient safety  10/5/2022 1030 by Bertha Savage RN  Outcome: Progressing

## 2022-10-06 NOTE — CARE COORDINATION
Mr. Nguyen jimenez, so distressed with his condition, called his doctor's office (Dr. Marina Alford) at [address] [place] [address], seeking advise and want him to return there. His daughter spoke to Matt Barfield, nurse practitioner-daughter understood they want Mr. Geoffrey Antonio there. I explained there needs to be direct communication MD to MD first, identify if this means in office or if Dr. Jessica Crockett will admit and if so [facility] has to have orders from Dr. Jessica Crockett to admit before anything further can be done. I am assisting family in facilitating that communication, then await directions.

## 2022-10-06 NOTE — PROGRESS NOTES
Patient:   Melvin Richardson  MR#:    [id number]   Room:    0811/811-01   YOB: 1945  Date of Progress Note: 10/6/2022  Time of Note                           10:38 AM  Consulting Physician:   Nunu Flores M.D. Attending Physician:  Nunu Flores MD     CHIEF COMPLAINT: Generalized weakness and deconditioning     Subjective  This 76 y.o. male  with history of HTN, hypercholesterolemia, DM, CAD, PVD, DM neuropathy, osteomyelitis with transmetatarsal amputation, COVID-19 July 2022 and newly diagnosis liver cancer July of this year with radioembolization to the liver 9/20/22. He presented to [facility] on 9/22/22 with weakness, AMS and lethargy. He was admitted to the Hospitalist service with metabolic encephalopathy. MRI done revealed Multiple tiny 1 or 2 mm areas of diffusion and increased signal in both cerebral hemispheres that likely represent small areas of acute ischemic change. These are seen in the bilateral frontal and parietal lobes and in the right occipital lobe. Neurology was consulted. He was seen by Dr. Leland Dunham, who felt embolic stroke related to hypercoaguable state and underlying/undetected atrial fibrllation. Jaylon Albright had stopped ASA on his own but continued Pletal. ASA had been started this admission. In normal circumstance low dose anticoagulation would be recommended for ESUS but patient has higher than normal risk for bleed with hepatic cancer. Plans are for Zio Patch at discharge CTA of neck done shows heavy plaque burden at the bilateral carotid bifurcations with about 80% stenosis of the right ICA and 80 to 90% stenosis on the left. Vascular surgery was consulted. He was seen by Dr. Kam Alvarez, who didn't feel any surgical intervention was needed, he recommended continued conservative therapy with antiplatelet therapy and given the embolic appearance of the infarcts could consider low-dose Eliquis twice daily as well.      Has evaluated by SPT for swallow and placed on a mechanical soft diet with thin liquids. SPT will also continue to see for cognition. He is still weak overall and participating in both PT/OT  Patient returned back to the floor 9/30 with resumption of care following treatment for hyperkalemia. No complaints this morning. Looks a little better  REVIEW OF SYSTEMS:  Constitutional: No fevers No chills  Neck:No stiffness  Respiratory: No shortness of breath  Cardiovascular: No chest pain No palpitations  Gastrointestinal: No diarrhea. Has a colostomy  Genitourinary: No Dysuria  Neurological: No headache, no confusion      PHYSICAL EXAM:  /60   Pulse 96   Temp 98.2 °F (36.8 °C)   Resp 20   Ht 6' (1.829 m)   Wt 191 lb 1.6 oz (86.7 kg)   SpO2 94%   BMI 25.92 kg/m²     Constitutional: he appears well-developed and well-nourished. Eyes - conjunctiva normal.  Pupils react to light  Ear, nose, throat -hearing intact to voice. No scars, masses, or lesions over external nose or ears, no atrophy of tongue  Neck-symmetric, no masses noted, no jugular vein distension  Respiration- chest wall appears symmetric, good expansion,   normal effort without use of accessory muscles  Cardiovascular- RRR  Musculoskeletal - no significant wasting of muscles noted, no bony deformities, gait no gross ataxia  Extremities-no clubbing, cyanosis or edema  Skin - warm, dry, and intact. No rash, erythema, or pallor. Psychiatric - mood, affect, and behavior appear normal.      Neurology  NEUROLOGICAL EXAM:      Mental status   Somewhat drowsy.   Complains of being tired     Cranial Nerve Exam   CN II- Visual fields grossly unremarkable  CN III, IV,VI-EOMI, No nystagmus, conjugate eye movements, no ptosis  CN VII-slight left facial droop  CN VIII-Hearing intact   CN IX and X- Palate elevates in midline  CN XI-good shoulder shrug  CN XII-Tongue midline with no fasciculations or fibrillations     Motor Exam  V/V throughout upper and lower extremities bilaterally, no cogwheeling, normal tone     Absent throughout     Tremors- no tremors in hands or head noted  Nursing/pcp notes, imaging,labs and vitals reviewed.          Lab Results   Component Value Date    WBC 15.3 (H) 10/06/2022    HGB 14.3 10/06/2022    HCT 44.7 10/06/2022    .7 (H) 10/06/2022     10/06/2022     Lab Results   Component Value Date     (L) 10/06/2022    K 5.8 (H) 10/06/2022     10/06/2022    CO2 25 10/06/2022    BUN 24 (H) 10/06/2022    CREATININE 1.0 10/06/2022    GLUCOSE 275 (H) 10/06/2022    CALCIUM 9.0 10/06/2022    PROT 6.1 (L) 10/02/2022    LABALBU 2.9 (L) 10/02/2022    BILITOT 1.8 (H) 10/02/2022    ALKPHOS 193 (H) 10/02/2022    AST 94 (H) 10/02/2022    ALT 67 (H) 10/02/2022    LABGLOM >60 10/06/2022    GFRAA >59 10/06/2022     Lab Results   Component Value Date    INR 1.06 01/23/2018    INR 1.16 (H) 09/08/2014    INR 1.20 (H) 09/04/2014   No results found for: PHENYTOIN, ESR, CRP    PT,OT and/or speech notes reviewed:  PHYSICAL THERAPY  GROSS ASSESSMENT       BED MOBILITY  Bed mobility  Bridging: Contact guard assistance, Stand by assistance (decreased ROM)  Rolling to Left: Supervision (with rails)  Rolling to Right: Supervision (with rails)  Supine to Sit: Moderate assistance  Sit to Supine: Contact guard assistance, Stand by assistance (scoot turn from L side of bed)  Bed Mobility Comments: long sitting then bedside sitting with help of therapist       TRANSFERS  Transfers  Sit to Stand: Contact guard assistance, Stand by assistance  Stand to Sit: Contact guard assistance, Stand by assistance  Bed to Chair: Contact guard assistance (with RW)  Stand Pivot Transfers: Contact guard assistance, Minimal Assistance (w/o AD)  WHEELCHAIR PROPULSION  AMBULATION  Ambulation  Surface: Level tile  Device: Rolling Walker  Assistance: Contact guard assistance  Quality of Gait: walker too far ahead, flexed posture, shuffling steps  Gait Deviations: Slow Alexus, Shuffles, Decreased step length, Decreased step height  Distance: 5'  Comments: pt with poor tolerance for standing; reports feeling dizzy and bad in general and BP elevated as well as heart rate (see flowsheet through 10a) (bp 121/104 and  after standing)  STAIRS  GOALS:        ASSESSMENT:  Assessment: Pt. reported feeling dizzy when sitting on side of bed, BP reading was elevated as listed above. When standing at EOB with RW, BP dropped over 20 mmHg systolic and over 10 mmHg diastolic indicating orthostatic hypotension, reported dizziness goes away when returning to sitting. PT declined to move from EOB due to decreased endurance, strength, and pt. reported dizziness. SPEECH THERAPY  Congested unproductive cough at rest. Hoarse vocal quality and poor vocal intensity were both noted. Per patient report, he completed radiation fourteen days ago. Audible wheezing was noted. He is on 2LPM 02 via NC. He exhibits shortness of breath when talking, eating, drinking, and with exertion. Portions of the CLQT were completed. He completed symbol cancellation tasks, mazes and clock drawing. He exhibited difficulty with symbol cancellation (he circled multiple designs. He did [place] four of the correct symbols. During clock drawing, he repeated the 12 three times on the clock face. He did not complete the hour and minute hands. Will complete tasks for telling time and additional clock drawing tasks during a future session. He did attempt a maze but only worked on the left side of the page. He also completed generative naming and produced a few items. Will complete the CLQT over the next few sessions. Weak hyolaryngeal elevation and delayed swallow responses are noted. No overt s/s of aspiration with soft foods or thin liquids via straw. He is afebrile. He has not had a recent elevation in temperature. He reports lingual numbness and tingling and states this has been present for one week.  He also reports oral numbness and tingling which has also been present for one week. He stated he does not like the Glucerna shakes. Will discuss with the dietician. He stated he would try a supplement pudding or magic cup. His cough was reported to his nurse. This morning, his lung sounds were reported as clear. On 10/3/22: The patient reports back, buttocks, and bilateral abdominal pain this morning. These were reported to his nurse. He exhibited a nose bleed and his nurse was aware and managed this. He had a difficult time during dressing and rolling in the bed this morning due to significant pain and fatigue. He continues to exhibit shortness of breath when talking, at rest, and with exertion. He continues to exhibit hoarse vocal quality and poor vocal intensity. He did exhibit immediate coughing and choking when taking a pain pill with water via straw. Productive wet cough with a large amount of sputum and blood. His nurse was present and witnessed this. If he is reclined at all, he has increased difficulty swallowing liquids. With his head fully upright, he did tolerate small/controlled straw sips of thin liquids. He continues to exhibit audible congestion. SLP did not witness wheezing this morning as yesterday. He was alert and able to answer questions appropriately regarding his immediate needs. He is still confused regarding his location, but he has been in three hospitals in a short amount of time. His bed alarm was set and his call light was placed within reach. Discussed the importance of not attempting to get out of bed without assistance. Reviewed how to use the call light and he agreed to call if he needs anything. The remaining portions of the CLQT were not completed this date due to pain and fatigue. He is in significant pain, had great difficulty with simple dressing tasks and rolling in the bed, has poor breath support, exhibits congestion, and has significant fatigue.  It is doubtful that he will be able to complete three hours of therapy today. He is begging to be released and to go home. Discussed that any discharge plan would need to be approved by his physician. Short Term Goals:  Goal 1: Pt will complete the CLQT. Goal 2: Pt will complete recall tasks with 90% accuracy and minimal cues. Goal 3: Pt will complete verbal expression tasks with 80% accuracy and minimal cues. Goal 4: Pt will complete executive functioning/problem solving/safety awareness tasks with 80% accuracy and minimal cues. Goal 5: Pt will complete orientation tasks with 90% accuracy and minimal cues. Goal 6: Pt will complete diaphragmatic breathing exercises to target breath support for speech production. Long Term Goals:  Pt will participate in skilled speech therapy services to ensure he is able to adequately/effectively communicate his wants and needs and safely complete ADLs. Pt will tolerate the least restrictive diet with minimal overt s/s of aspiration/penetration during hospitalization. STGs Met: 0  LTGs Met: 0     ELOS: 2-3 weeks     He is recommended to continue speech therapy services for breath support, dysphagia, cognition, and expressive language.         OCCUPATIONAL THERAPY  Cognitive Patterns:  Cognitive Assessment Method (CAM):  Confusion Assessment Method (CAM)  Is there evidence of an acute change in mental status from the patient's baseline?: No  Inattention: Behavior continuously present, does not fluctuate  Disorganized thinking: Behavior present, fluctuates (comes and goes, changes in severity)  Altered level of consciousness: Behavior not present           CURRENT IRF-VIANEY SCORES  Eating: CARE Score: 5     Oral Hygiene: CARE Score: 5         Toileting: CARE Score: 3  Comment: able to empy colostomy bag---posterior lean      Shower/Bathe: CARE Score: 3           Upper Body Dressing: CARE Score: 3  Comment: min A----low endurance       Lower Body Dressing: CARE Score: 3  Comment: vc for tech, safety, and to correct balance --posterior lean        Footwear: CARE Score: 2  Comment: socks only, vc for tech, AE        Toilet Transfers: CARE Score: 3           Picking Up Object:  CARE Score: 88           UE Functioning:       10/03/22 1000   LUE AROM (degrees)   LUE AROM  WFL   RUE AROM (degrees)   RUE AROM  WFL           10/03/22 1000   LUE Strength   Gross LUE Strength WFL   RUE Strength   Gross RUE Strength WFL         Pain Assessment:       10/03/22 1000   Pre Treatment Pain Screening   Scale Used Numeric Score   Intervention List Patient able to continue with treatment   Comments / Details stomach discomfort   Pain at present 4         STGs:  Short Term Goals  Time Frame for Short Term Goals: 1 week  Short Term Goal 1: complete UB dressing with setup  Short Term Goal 2: complete LB dressing with CGA  Short Term Goal 3: complete overall toileting with CGA  Short Term Goal 4: complete 1-2 handed standing level task for 3 mins with supervision  Short Term Goal 5: complete overall bathing with CGA  Short Term Goal 6: complete simple ambulatory home making task with CGA     LTGs:  Long Term Goals  Time Frame for Long Term Goals : 3 weeks  Long Term Goal 1: complete overall toileting with modified independence  Long Term Goal 2: complete overall bathing with modified independence  Long Term Goal 3: complete overall dressing with modified independence  Long Term Goal 4: complete simple ambulatory home making task with modified independence  Long Term Goal 5: complete HEP with independence  Long Term Goal 6: pt/family verbalize DME     Assessment:  Performance deficits / Impairments: Decreased functional mobility , Decreased endurance, Decreased ADL status, Decreased balance, Decreased strength, Decreased high-level IADLs, Decreased cognition                     NUTRITION  Current Wt: Weight: 205 lb 1.6 oz (93 kg) / Body mass index is 27.82 kg/m².   Admission Wt: Admission Body Weight: 203 lb 11.2 oz (92.4 kg)  Oral Diet Orders:   Easy to Chew, 3 carb choices, Low Potassium  Oral Nutrition Supplement (ONS) Orders:  Diabetic  PO intake <50% meals and ONS, pt reports nausea  Please see nutrition note for details. RECORD REVIEW: Previous medical records, medications were reviewed at today's visit    IMPRESSION:   1. Bilateral embolic strokes-PT/OT/SLP. Discontinue Pletal due to nosebleed and resume Eliquis with baby aspirin. 2.  Essential hypertension-metoprolol, monitoring. Low at times. Currently adequate. 3.  Diabetes-continue insulin and monitoring. Lantus increased 10/5.  4.  Hyperlipidemia-Lipitor  5. GI-bowel regimen. Has colostomy. Carafate added. Improved  6. DVT prophylaxis-Eliquis resumed today. Scd's added. 7  History of liver cancer- relatively new dx  8. Abnormal UA-culture growing E. coli. Patient given Cipro. Repeat study 10/1 unremarkable  9. Hyperkalemia-recurrent. Monserrat Rolling ordered and hospitalist asked to follow-up. ELOS:10/15.   restaff

## 2022-10-06 NOTE — CARE COORDINATION
Received call from Mr Vicente's daughter, Jose Jensen, whose response from Dr. Prince Austin office was to Have Dr. Bryan Garcia call the transfer center, I explained that means there are no orders from Dr. Nata Tarango that he is admitting her father and she agrees this is not the course of action. Suggested we request palliative care consult for goals of care and review of his needs.

## 2022-10-06 NOTE — PLAN OF CARE
Problem: Neurosensory - Adult  Goal: Achieves stable or improved neurological status  10/6/2022 0900 by Cindi Melton RN  Outcome: Progressing  Flowsheets (Taken 10/6/2022 0827)  Achieves stable or improved neurological status: Assess for and report changes in neurological status  10/5/2022 233 by Eloina Stephens LPN  Outcome: Progressing

## 2022-10-06 NOTE — PROGRESS NOTES
10/06/22 1300   Restrictions/Precautions   Restrictions/Precautions Fall Risk   Lower Extremity Weight Bearing Restrictions   Right Lower Extremity Weight Bearing Weight Bearing As Tolerated   Left Lower Extremity Weight Bearing Weight Bearing As Tolerated   Position Activity Restriction   Other position/activity restrictions zio patch, colostomy bag   General Comment   Comments CO-TX with [facility]   Subjective   Subjective Pt in recliner, agrees to participate with therapy. Vitals   Heart Rate (!) 112   BP (!) 120/56  (after standing exercises)   BP Location Right upper arm   BP Method Automatic   MAP (Calculated) 77.33   SpO2 94 %   O2 Device Nasal cannula   Comment 4 L   Subjective   Subjective Everywhere, especially abdomen and chest   Pain did not rate a nurmerical score   Transfers   Sit to Stand Moderate Assistance;Minimal Assistance  (Mod A to initiate stand; in // bars)   Stand to Sit Minimal Assistance  (in // bars)   Stand Pivot Transfers Moderate Assistance;Maximum Assistance  (L side to w/c; pt would cross leg during transfer)   Squat Pivot Transfers Minimal Assistance  (L side to EOB; pt would cross LEs)   PT Exercises   Resistive Exercises Seated B UEs exercise with 2lb bar: shoulder press, chest press, and bicep curls x 10 each   Standing Open/Closed Kinetic Chain Exercises STS from w/c in // bars x 2 with Mod A to initate stand; pt was able to tolerate stand for ~ 5 sec before needing to sit down. Activity Tolerance   Activity Tolerance Patient limited by endurance; Patient limited by fatigue;Patient tolerated treatment well   Assessment   Assessment Pt presented with O2 reading of 92-97%. Pt required more assistance to perform transfers. Pt was not as alert or aware of his surroundings during this session. Pt had difficulty to keep eyes open throuhout treatment. Pt reported chest was hurting, could be due to his cough. PA was notified.    Performance Deficits/Impairments Decreased functional mobility ; Decreased ADL status; Decreased ROM; Decreased body mechanics; Decreased strength;Decreased endurance;Decreased sensation;Decreased balance   Treatment Diagnosis Interference with activity   Therapy Prognosis Fair   Decision Making Low Complexity   History Liver cancer, HTN, CAD, PVD, DM with neuropathy, osteomyelitis with transmetatarsal amputation, ileostomy   Exam impaired sensation, balance, gait, strength, ROM, functional mobility, endurance   Clinical Presentation Stable   Discharge Recommendations Continue to assess pending progress;Subacute/Skilled Nursing Facility; Patient would benefit from continued therapy after discharge   Education   Education Given To Patient   Education Provided Mobility Training;Transfer Training;ADL Function   Education Method Demonstration;Verbal   Education Outcome Continued education needed;Demonstrated understanding   Safety Devices   Type of Devices Call light within reach; Bed alarm in place   PT Co-Treatment Minutes   Time In 1300   Time Out 1400   Minutes 60   Electronically signed by Angel Cowart, student PT on 10/6/2022 at 3:49 PM

## 2022-10-06 NOTE — PLAN OF CARE
Problem: Neurosensory - Adult  Goal: Achieves stable or improved neurological status  10/6/2022 0901 by Joyce Kenyon RN  Outcome: Progressing  10/6/2022 0900 by Joyce Kenyon RN  Outcome: Progressing  Flowsheets (Taken 10/6/2022 0827)  Achieves stable or improved neurological status: Assess for and report changes in neurological status  10/5/2022 2338 by Annamaria Aden LPN  Outcome: Progressing

## 2022-10-06 NOTE — PROGRESS NOTES
Occupational Therapy     10/06/22 1300   General   Timed Code Treatment Minutes 60 Minutes   Pre Treatment Pain Screening   Pain at present 7   Scale Used Faces   Comments / Details Abdomen and chest.   Restrictions/Precautions   Restrictions/Precautions Fall Risk   Lower Extremity Weight Bearing Restrictions   Right Lower Extremity Weight Bearing Weight Bearing As Tolerated   Left Lower Extremity Weight Bearing Weight Bearing As Tolerated   Position Activity Restriction   Other position/activity restrictions zio patch, colostomy bag   General   Additional Pertinent Hx HTN, DM, Covid 2021, recent liver CA diagnosis   Diagnosis CVA , hyperkalemia   Vital Signs   Heart Rate (!) 112   BP (!) 120/56  (after standing exercises)   BP Location Right upper arm   BP Method Automatic   MAP (Calculated) 77.33   Oxygen Therapy   SpO2 94 %   O2 Device Nasal cannula   Balance   Sitting Balance Minimal assistance   Standing Balance Minimal assistance  (In parallel bars.)   Transfers   Stand Pivot Transfers Maximum assistance; Moderate assistance   Sit to stand Moderate assistance;Minimal assistance  (In parallel bars.)   Stand to sit Moderate assistance;Minimal assistance  (In parallel bars.)   Type of ROM/Therapeutic Exercise   Type of ROM/Therapeutic Exercise Cane/Wand   Comment 2# 1 set x 10 reps. Assessment   Performance deficits / Impairments Decreased functional mobility ; Decreased endurance;Decreased ADL status; Decreased balance;Decreased strength;Decreased high-level IADLs;Decreased cognition   Treatment Diagnosis CVA, hyperkalemia   History HTN, DM, Covid 2021, recent liver CA diagnosis   Activity Tolerance   Activity Tolerance Patient limited by fatigue   Occupational Therapy Plan   Current Treatment Recommendations Strengthening;Cognitive reorientation;Cognitive/Perceptual training;Equipment evaluation, education, & procurement;Patient/Caregiver education & training; Endurance training;Functional mobility training; Safety education & training;Home management training;Self-Care / ADL; Balance training      10/06/22 1300   Oral Hygiene   Assistance Needed Supervision or touching assistance   Comment No pocketing noted this tx session.    CARE Score 4   [address] Lebron De Souza needed Dependent   CARE Score 1

## 2022-10-06 NOTE — PROGRESS NOTES
[facility]  INPATIENT SPEECH THERAPY  [facility] UNIT  TIME   1100  1200  61 MINUTES    [x]Daily Note  []Progress Note    Date: 10/6/2022  Patient Name: Fidel Ruffin        MRN: [id number]    Account #: [de-identified]  : 1945  (76 y.o.)  Gender: male   Primary Provider: Jamila Funez MD  Swallowing Status/Diet: Easy to chew with thin liquids      Subjective: Pt upright in recliner on this date. Pt required rest breaks throughout tx. He is observed falling asleep in between therapy tasks. Approximately 20 minutes of education provided from RNNohemi, therapist, daughter, and wife on adequate participation within therapy. Pt does report that he wants to get better; however, he is too lethargic to complete full therapeutic tasks given verbal and tactile stimulation. Objective:    Pt refused regular solid trials during swallowing reassessment again on this date. PCA reports pt has not taking anything P.O. in two days. Consistencies observed during swallowing reassessment includes thin liquids via consecutive sips side of cup. Oral transit is consistently 1-2 seconds with thin liquids via consecutive sips side of cup. Laryngeal movement observed to be consistently sluggish and mildly decreased. No overt s/s of aspiration observed with thin liquids via consecutive sips side of cup. Weak coughing observed before and after P.O. trials. Pt observed taking medications whole with H2O. 1x immediate coughing observed when taking medications with thin liquids via straw. Pt did take 1x P.O. trials of noodles from noon tray. Pt spit out trial, prior to completing a swallow. Pt required multiple liquid washes to clear residue. Pt did eat approximately 50% of an ice cream cup. Pt did require liquid wash in between trials, as minimal residue was present. Adequate oral care completed via oral swab. Pt allowed oral swabbing on 4x occasions throughout tx. Pt began to refuse oral care after 4x attempts.  Tongue is observed to be cracked and dry. Vocal quality remains hoarse at the conversational level. Pt is able to project his voice and frequently does this during moments of agitation. Orientation task completed. Pt is able to state his name, current location, current city/state, floor number and ; however, he is unable to elicit the day/month/year. Immediate recall task completed. Pt is required to immediately recall 3 daily therapies. Task completed independently with 100% accuracy. Verbal expression task completed to target word finding. Task completed with 0% accuracy given maximum semantic and phonemic cues. Task discontinued, as pt would not remain awake for tasks. Verbal expression task completed, as pt was required to explain his hobbies. He did elicit that he enjoyed golf. When asked to describe the game of golf, pt was observed falling back asleep. Question carryover within therapy. Pt continues to beg to return home. RN reports pt is being evaluated by a hospitalist on this date. SLP will continue to follow. Recommended Diet and Intervention  Easy to chew  Thin liquids  Recommended Form of Meds: Whole with water  Therapeutic Interventions: Pharyngeal exercises;Diet tolerance monitoring;Oral motor exercises; Patient/Family education     Compensatory Swallowing Strategies  Compensatory Swallowing Strategies : Alternate solids and liquids;Eat/Feed slowly;Upright as possible for all oral intake;Remain upright for 30-45 minutes after meals;Small bites/sips        Short Term Goals:  Goal 1: Pt will complete the CLQT. Goal 2: Pt will complete recall tasks with 90% accuracy and minimal cues. Goal 3: Pt will complete verbal expression tasks with 80% accuracy and minimal cues. Goal 4: Pt will complete executive functioning/problem solving/safety awareness tasks with 80% accuracy and minimal cues. Goal 5: Pt will complete orientation tasks with 90% accuracy and minimal cues.   Goal 6: Pt will complete diaphragmatic breathing exercises to target breath support for speech production. Long Term Goals:  Pt will participate in skilled speech therapy services to ensure he is able to adequately/effectively communicate his wants and needs and safely complete ADLs. Pt will tolerate the least restrictive diet with minimal overt s/s of aspiration/penetration during hospitalization. ASSESSMENT:  Assessment: []Progressing towards goals          [x]Not Progressing towards goals    Patient Tolerance of Treatment:   []Tolerated well []Tolerated fair [x]Required rest breaks [x]Fatigued    Education:  Learner:  [x]Patient          []Significant Other          [x]Wife & Daughter  Education provided regarding:  [x]Goals and POC   [x]Diet and swallowing precautions    []Home Exercise Program  []Progress and/or discharge information  Method of Education:  [x]Discussion          []Demonstration          []Handout          []Other  Evaluation of Education:   []Verbalized understanding   []Demonstrates without assistance  []Demonstrates with assistance  [x]Needs further instruction     [x]No evidence of learning                  []No family present      Plan: [x]Continue with current plan of care    []Modify current plan of care as follows:    []Discharge patient    Discharge Location:    Services/Supervision Recommended:      []Patient continues to require treatment by a licensed therapist to address functional deficits as outlined in the established plan of care.     Electronically signed by SACHI Clements on 10/7/2022 at 7:58 AM

## 2022-10-07 PROBLEM — Z51.5 PALLIATIVE CARE PATIENT: Status: ACTIVE | Noted: 2022-01-01

## 2022-10-07 NOTE — PROGRESS NOTES
[facility]  INPATIENT SPEECH THERAPY  [facility] 8 [facility] UNIT  TIME   1100   1200        [x]Daily Note  []Progress Note    Date: 10/7/2022  Patient Name: Marilyn Goldsmith        MRN: [id number]    Account #: [de-identified]  : 1945  (76 y.o.)  Gender: male   Primary Provider: Shanel Ortiz MD  Swallowing Status/Diet: Easy to chew diet, thin liquids        Diagnosis:  Bilateral embolic strokes      Subjective:  He was upright in bed. He is drowsy this date. He did attempt most therapy tasks. Objective:  Did discuss possible appetite stimulant with his nurse. Will also discuss this with his dietician and physician. He continues to state he isn't hungry but will accept bites of ice cream and sips of water. Improvement in vocal intensity is noted this date. Intensity is still weak, but improved since last session with this therapist.    Hoarse vocal quality is noted. Fatigue and shortness of breath are noted. Nasal cannula was replaced as this had shifted. He is currently on 3.5 LPM 02. He is afebrile. Wet congested cough is noted after sips of thin liquids via straw and bites of ice cream.    SLP has reported pocketing and concern for safe oral intake. Some coughing was also noted with thin liquids via straw during yesterday's session with speech therapist.    No oral pocketing was observed this date. Immediate cough was noted following straw sips (large and small straw). No overt s/s of aspiration observed with thin liquids via cup, however, labial spillage was noted. He still reports lingual tingling and numbness. He is drowsy this date and would fall asleep again quickly. He did answer questions appropriately regarding his immediate needs. He did answer questions appropriately regarding his family. He answered open ended questions appropriately. He attempted some simple mental manipulation tasks but would fall asleep quickly.  He also attempted some higher level temporal orientation questions, but he was too drowsy. Recommended Diet and Intervention  Easy to chew  Thin liquids  Recommended Form of Meds: Whole with water  Therapeutic Interventions: Pharyngeal exercises;Diet tolerance monitoring;Oral motor exercises; Patient/Family education     Compensatory Swallowing Strategies  Compensatory Swallowing Strategies : Alternate solids and liquids;Eat/Feed slowly;Upright as possible for all oral intake;Remain upright for 30-45 minutes after meals;Small bites/sips     Recommend he continue speech therapy services for cognition, dysphagia, expressive language, and breath support. ASSESSMENT:  Assessment: [x]Progressing towards goals          []Not Progressing towards goals    Patient Tolerance of Treatment:   []Tolerated well []Tolerated fair []Required rest breaks [x]Fatigued    Education:  Learner:  [x]Patient          []Significant Other          [x]Other  Education provided regarding:  []Goals and POC   []Diet and swallowing precautions    []Home Exercise Program  []Progress and/or discharge information  Method of Education:  [x]Discussion          []Demonstration          []Handout          []Other  Evaluation of Education:   [x]Verbalized understanding   []Demonstrates without assistance  []Demonstrates with assistance  []Needs further instruction     []No evidence of learning                  []No family present      Plan: [x]Continue with current plan of care    []Modify current plan of care as follows:    []Discharge patient    Discharge Location:    Services/Supervision Recommended:      [x]Patient continues to require treatment by a licensed therapist to address functional deficits as outlined in the established plan of care.       Electronically Signed By:  Francoise Dawson M.S., CCC-SLP  10/7/2022,11:09 AM.

## 2022-10-07 NOTE — PROGRESS NOTES
Pharmacy Adjustment per [facility] protocol    Juana Templeton is a 76 y.o. male. Pharmacy has adjusted medications per [facility] protocol. Recent Labs     10/06/22  0347 10/07/22  0341   BUN 24* 20       Recent Labs     10/06/22  0347 10/07/22  0341   CREATININE 1.0 0.9       Estimated Creatinine Clearance: 73 mL/min (based on SCr of 0.9 mg/dL).     Height:   Ht Readings from Last 1 Encounters:   10/01/22 6' (1.829 m)     Weight:  Wt Readings from Last 1 Encounters:   10/07/22 191 lb 7 oz (86.8 kg)         Plan: Adjust the following medications based on [facility] protocol:           Meropenem to 1000 mg IV once over 30 minutes followed by 1000 mg IV every 8 hours extended infusion over 180 minutes     Electronically signed by Sean Rodarte, [facility] on 10/7/2022 at 9:06 AM

## 2022-10-07 NOTE — PROGRESS NOTES
[facility]      Progress Note    Patient:  Nadeen Sandoval  YOB: 1945  Date of Service: 10/7/2022  MRN: [id number]   Acct: [de-identified]   Primary Care Physician: SHAHNAZ Berger CNP  Advance Directive: Full Code  Admit Date: 9/30/2022       Hospital Day: 7    Portions of this note have been copied forward, however, updated to reflect the most current clinical status of this patient. CHIEF COMPLAINT hyperkalemia     SUBJECTIVE:  Mr. Jessica Garcia was resting in bed this morning. Nursing staff reported patient being more alert and talkative today. Denies SOB, chest pain, nausea, vomiting, fever or chills. CUMULATIVE HOSPITAL COURSE:   The patient is a 76 y.o. male with past medical history of recent stroke, CAD, diabetes, neuropathy, hyperlipidemia, ulcerative colitis, and PAD who presented to [facility] for Acute rehab. Mr. Jessica Garcia is a poor historian and unable to provide HPI, HPI obtained from chart review. Per notes patient was admitted to acute rehab on 9/28/2022 from [facility] for acute CVA, being treated with aspirin, Pletal, and low-dose Eliquis. Patient was then went to have hyperkalemia next morning and was admitted to inpatient hospitalist, treated with IV Insulin, bicarbonate, calcium gluconate, and Lokelma. Was discharged back to inpatient rehab on 9/30/2022. Hospital medicine was consulted for hyperkalemia today with K+ 5.8. [address] was already initiated. WBC noted to be 15.3, patient also has tachycardia. Procalcitonin, lactic acid and urinalysis ordered. Procalcitonin was noted to be 56.55. Blood cultures ordered and IV Vancomycin and Merrem were initiated. Hyperkalemia persisted, Lokelma increased to 10mg TID. Monitor labs closely. Review of Systems   Constitutional:  Negative for chills, diaphoresis, fatigue and fever. HENT:  Negative for congestion and ear pain. Eyes:  Negative for visual disturbance. Respiratory:  Positive for cough.  Negative for shortness of breath and wheezing. Denies SOB   Cardiovascular:  Negative for chest pain, palpitations and leg swelling. Denies chest pain    Gastrointestinal:  Negative for abdominal distention, abdominal pain, blood in stool, constipation, diarrhea, nausea and vomiting. Endocrine: Negative for cold intolerance and heat intolerance. Genitourinary:  Negative for difficulty urinating, flank pain, frequency and urgency. Musculoskeletal:  Negative for arthralgias and myalgias. Skin:  Negative for color change and wound. Neurological:  Negative for dizziness, syncope, weakness, light-headedness, numbness and headaches. Hematological:  Does not bruise/bleed easily. Psychiatric/Behavioral:  Negative for agitation, confusion and dysphoric mood. Objective:   VITALS:  BP (!) 154/64   Pulse (!) 103   Temp 97 °F (36.1 °C) (Temporal)   Resp 20   Ht 6' (1.829 m)   Wt 191 lb 7 oz (86.8 kg)   SpO2 92%   BMI 25.96 kg/m²   24HR INTAKE/OUTPUT:    Intake/Output Summary (Last 24 hours) at 10/7/2022 1548  Last data filed at 10/7/2022 1409  Gross per 24 hour   Intake 553 ml   Output 700 ml   Net -147 ml         Physical Exam  Constitutional:       General: He is not in acute distress. Appearance: Normal appearance. He is not ill-appearing. HENT:      Head: Normocephalic and atraumatic. Right Ear: External ear normal.      Left Ear: External ear normal.      Nose: Nose normal.      Mouth/Throat:      Mouth: Mucous membranes are moist.   Eyes:      Extraocular Movements: Extraocular movements intact. Conjunctiva/sclera: Conjunctivae normal.      Pupils: Pupils are equal, round, and reactive to light. Cardiovascular:      Rate and Rhythm: Regular rhythm. Tachycardia present. Pulses: Normal pulses. Heart sounds: Normal heart sounds. Pulmonary:      Effort: Pulmonary effort is normal. No respiratory distress. Breath sounds: Rhonchi present. No wheezing or rales. Comments: Coarse breath sounds throughout bilateral lungs  Abdominal:      General: Bowel sounds are normal. There is no distension. Palpations: Abdomen is soft. Tenderness: There is no abdominal tenderness. Comments: RLQ colostomy in place   Musculoskeletal:         General: No swelling, tenderness or deformity. Normal range of motion. Cervical back: Normal range of motion and neck supple. No muscular tenderness. Right lower leg: No edema. Left lower leg: No edema. Skin:     General: Skin is warm and dry. Findings: No bruising or lesion. Neurological:      Mental Status: He is alert. He is disoriented. Comments: A&O to self only   Disoriented to time and place  Somnolent   Psychiatric:         Mood and Affect: Mood normal.         Behavior: Behavior normal.         Thought Content:  Thought content normal.          Medications:      sodium chloride 75 mL/hr at 10/07/22 0311    sodium chloride      dextrose        modafinil  100 mg Oral Daily    sodium zirconium cyclosilicate  10 g Oral TID    meropenem  1,000 mg IntraVENous Q8H    [START ON 10/8/2022] vancomycin  1,250 mg IntraVENous Q18H    vancomycin (VANCOCIN) intermittent dosing (placeholder)   Other RX Placeholder    megestrol acetate  400 mg Oral Daily    insulin glargine  35 Units SubCUTAneous BID    DULoxetine  30 mg Oral Daily    sucralfate  1 g Oral 4 times per day    pantoprazole  40 mg Oral QAM AC    sodium chloride flush  5-40 mL IntraVENous 2 times per day    atorvastatin  40 mg Oral Nightly    aspirin  81 mg Oral Daily    folic acid  491 mcg Oral Daily    apixaban  2.5 mg Oral BID    metoprolol tartrate  25 mg Oral BID    insulin lispro  0-4 Units SubCUTAneous Nightly    insulin lispro  0.05 Units/kg SubCUTAneous TID WC    insulin lispro  0-8 Units SubCUTAneous TID WC    sennosides-docusate sodium  1 tablet Oral BID     benzonatate, sodium chloride flush, sodium chloride, ondansetron **OR** ondansetron, glucose, dextrose bolus **OR** dextrose bolus, glucagon (rDNA), dextrose, acetaminophen, bisacodyl, magnesium hydroxide  ADULT DIET; Easy to Chew; 3 carb choices (45 gm/meal); Low Potassium (Less than 3000 mg/day); NO OATMEAL. LIKES SCRAMBLED EGGS, SINGH, & SAUSAGE  ADULT ORAL NUTRITION SUPPLEMENT; Breakfast; Diabetic Oral Supplement  ADULT ORAL NUTRITION SUPPLEMENT; Lunch; Fortified Pudding Oral Supplement  ADULT ORAL NUTRITION SUPPLEMENT; Dinner; Frozen Oral Supplement     Lab and other Data:     Recent Labs     10/06/22  0347 10/07/22  0341   WBC 15.3* 17.3*   HGB 14.3 13.9*    201     Recent Labs     10/06/22  0347 10/07/22  0341   * 132*   K 5.8* 5.4*    100   CO2 25 24   BUN 24* 20   CREATININE 1.0 0.9   GLUCOSE 275* 244*       UA:  Recent Labs     10/06/22  1420   COLORU DARK YELLOW*   PHUR 6.0   CLARITYU Clear   SPECGRAV 1.028   LEUKOCYTESUR Negative   UROBILINOGEN 0.2   BILIRUBINUR Negative   BLOODU Negative   GLUCOSEU =>1000       RAD:     NM LUNG SCAN PERFUSION ONLY    Result Date: 10/5/2022  EXAM: NM LUNG PERFUSION SCAN CLINICAL INDICATION: dyspnea TECHNIQUE: Limited perfusion only scan was performed using intravenous 5.5 mCi Technetium 99m MAA. This report was created using [employer] report generation technology. COMPARISON: None FINDINGS: Perfusion: Unremarkable. There is homogeneous perfusion uptake with no bronchopulmonary segment conforming perfusion defects. Normal perfusion involving both lungs. No nuclear medicine findings suggestive of pulmonary embolism. Micro: Follow blood cultures     Assessment/Plan   Principal Problem:    Acute ischemic stroke [facility])  Active Problems:    Hyperkalemia    Leukocytosis  Resolved Problems:    * No resolved hospital problems.  *    Principal Problem:    Acute ischemic stroke ([facility] [address]-               - Continue rehab         Active Problems:    Hyperkalemia-               - Increased Lokelma to 10mg TID               - Monitor labs closely               - Avoid offending agents        Leukocytosis-               - Procalcitonin- 56.55   - Blood cultures ordered    - IV Vancomycin and Merrem initiated   - Lactic acid 1.5               - Urinalysis unremarkable               - Continue IVFs               - Monitor labs closely        Antibiotic: Vancomycin and Merrem      DVT Prophylaxis: Eliquis      GI prophylaxis:  Protonix          Further Orders per Clinical course/attending. Electronically signed by SHAHNAZ Weir CNP on 10/7/2022 at 3:48 PM       EMR Dragon/Transcription disclaimer:   Much of this encounter note is an electronic transcription/translation of spoken language to printed text.  The electronic translation of spoken language may permit erroneous, or at times, nonsensical words or phrases to be inadvertently transcribed; although attempts have made to review the note for such errors, some may still exist.

## 2022-10-07 NOTE — PROGRESS NOTES
[address] [facility] Pharmacokinetic Monitoring Service - Vancomycin     Gretchen Gomez is a 76 y.o. male starting on vancomycin therapy for sepsis. Pharmacy consulted by Dr Shreyas Ray for monitoring and adjustment. Target Concentration: Goal AUC/SREE 400-600 mg*hr/L    Additional Antimicrobials: Merrem    Pertinent Laboratory Values: Wt Readings from Last 1 Encounters:   10/07/22 191 lb 7 oz (86.8 kg)     Temp Readings from Last 1 Encounters:   10/07/22 97 °F (36.1 °C) (Temporal)     Estimated Creatinine Clearance: 73 mL/min (based on SCr of 0.9 mg/dL). Recent Labs     10/06/22  0347 10/07/22  0341   CREATININE 1.0 0.9   WBC 15.3* 17.3*     Procalcitonin: 56.55    Pertinent Cultures:  Culture Date Source Results   10/07/22 Blood x 2 ordered   10/07/22 culture ordered   MRSA Nasal Swab: N/A. Non-respiratory infection.     Plan:  Dosing recommendations based on Bayesian software  Start vancomycin 1500 mg IV x 1 dose followed by 1250 mg IV Q 18 hours  Anticipated AUC of 495 and trough concentration of 15 at steady state  Renal labs as indicated   Vancomycin concentration ordered for 10/09/22 @ 1730   Pharmacy will continue to monitor patient and adjust therapy as indicated    Thank you for the consult,  Lyons Nettle, [facility]  10/7/2022 9:51 AM

## 2022-10-07 NOTE — PROGRESS NOTES
Occupational Therapy Plan   Current Treatment Recommendations Strengthening;Cognitive reorientation;Cognitive/Perceptual training;Equipment evaluation, education, & procurement;Patient/Caregiver education & training; Endurance training;Functional mobility training; Safety education & training;Home management training;Self-Care / ADL; Balance training      10/07/22 1300   Lower Body Dressing   Assistance Needed Substantial/maximal assistance   Comment Completed in supine. CARE Score 2   [address] needed Substantial/maximal assistance   Comment pt. able to assist with clothing management, completed supine.    CARE Score 2

## 2022-10-07 NOTE — PROGRESS NOTES
Patient:   Kenia Love  MR#:    [id number]   Room:    0811/811-01   YOB: 1945  Date of Progress Note: 10/7/2022  Time of Note                           8:16 AM  Consulting Physician:   Lolis Stone M.D. Attending Physician:  Lolis Stone MD     CHIEF COMPLAINT: Generalized weakness and deconditioning     Subjective  This 76 y.o. male  with history of HTN, hypercholesterolemia, DM, CAD, PVD, DM neuropathy, osteomyelitis with transmetatarsal amputation, COVID-19 July 2022 and newly diagnosis liver cancer July of this year with radioembolization to the liver 9/20/22. He presented to [facility] on 9/22/22 with weakness, AMS and lethargy. He was admitted to the Hospitalist service with metabolic encephalopathy. MRI done revealed Multiple tiny 1 or 2 mm areas of diffusion and increased signal in both cerebral hemispheres that likely represent small areas of acute ischemic change. These are seen in the bilateral frontal and parietal lobes and in the right occipital lobe. Neurology was consulted. He was seen by Dr. Ti Sands, who felt embolic stroke related to hypercoaguable state and underlying/undetected atrial fibrllation. Miles Osman had stopped ASA on his own but continued Pletal. ASA had been started this admission. In normal circumstance low dose anticoagulation would be recommended for ESUS but patient has higher than normal risk for bleed with hepatic cancer. Plans are for Zio Patch at discharge CTA of neck done shows heavy plaque burden at the bilateral carotid bifurcations with about 80% stenosis of the right ICA and 80 to 90% stenosis on the left. Vascular surgery was consulted. He was seen by Dr. Isaias Tavera, who didn't feel any surgical intervention was needed, he recommended continued conservative therapy with antiplatelet therapy and given the embolic appearance of the infarcts could consider low-dose Eliquis twice daily as well.      Has evaluated by SPT for swallow and placed on a mechanical soft diet with thin liquids. SPT will also continue to see for cognition. He is still weak overall and participating in both PT/OT  Patient returned back to the floor 9/30 with resumption of care following treatment for hyperkalemia. No complaints today. May be feeling a little better overall but still feels somewhat puny  REVIEW OF SYSTEMS:  Constitutional: No fevers No chills  Neck:No stiffness  Respiratory: No shortness of breath  Cardiovascular: No chest pain No palpitations  Gastrointestinal: No diarrhea. Has a colostomy  Genitourinary: No Dysuria  Neurological: No headache, no confusion      PHYSICAL EXAM:  BP (!) 154/64   Pulse (!) 103   Temp 97 °F (36.1 °C) (Temporal)   Resp 20   Ht 6' (1.829 m)   Wt 191 lb 7 oz (86.8 kg)   SpO2 91%   BMI 25.96 kg/m²     Constitutional: he appears well-developed and well-nourished. Eyes - conjunctiva normal.  Pupils react to light  Ear, nose, throat -hearing intact to voice. No scars, masses, or lesions over external nose or ears, no atrophy of tongue  Neck-symmetric, no masses noted, no jugular vein distension  Respiration- chest wall appears symmetric, good expansion,   normal effort without use of accessory muscles  Cardiovascular- RRR  Musculoskeletal - no significant wasting of muscles noted, no bony deformities, gait no gross ataxia  Extremities-no clubbing, cyanosis or edema  Skin - warm, dry, and intact. No rash, erythema, or pallor. Psychiatric - mood, affect, and behavior appear normal.      Neurology  NEUROLOGICAL EXAM:      Mental status   Somewhat drowsy.   Complains of being tired     Cranial Nerve Exam   CN II- Visual fields grossly unremarkable  CN III, IV,VI-EOMI, No nystagmus, conjugate eye movements, no ptosis  CN VII-slight left facial droop  CN VIII-Hearing intact   CN IX and X- Palate elevates in midline  CN XI-good shoulder shrug  CN XII-Tongue midline with no fasciculations or fibrillations     Motor Exam  V/V throughout upper and lower extremities bilaterally, no cogwheeling, normal tone     Absent throughout     Tremors- no tremors in hands or head noted  Nursing/pcp notes, imaging,labs and vitals reviewed.          Lab Results   Component Value Date    WBC 17.3 (H) 10/07/2022    HGB 13.9 (L) 10/07/2022    HCT 43.2 10/07/2022    .9 (H) 10/07/2022     10/07/2022     Lab Results   Component Value Date     (L) 10/07/2022    K 5.4 (H) 10/07/2022     10/07/2022    CO2 24 10/07/2022    BUN 20 10/07/2022    CREATININE 0.9 10/07/2022    GLUCOSE 244 (H) 10/07/2022    CALCIUM 8.7 (L) 10/07/2022    PROT 6.1 (L) 10/02/2022    LABALBU 2.9 (L) 10/02/2022    BILITOT 1.8 (H) 10/02/2022    ALKPHOS 193 (H) 10/02/2022    AST 94 (H) 10/02/2022    ALT 67 (H) 10/02/2022    LABGLOM >60 10/07/2022    GFRAA >59 10/07/2022     Lab Results   Component Value Date    INR 1.06 01/23/2018    INR 1.16 (H) 09/08/2014    INR 1.20 (H) 09/04/2014   No results found for: PHENYTOIN, ESR, CRP    PT,OT and/or speech notes reviewed:                                             Occupational Therapy       10/06/22 1300   General   Timed Code Treatment Minutes 60 Minutes   Pre Treatment Pain Screening   Pain at present 7   Scale Used Faces   Comments / Details Abdomen and chest.   Restrictions/Precautions   Restrictions/Precautions Fall Risk   Lower Extremity Weight Bearing Restrictions   Right Lower Extremity Weight Bearing Weight Bearing As Tolerated   Left Lower Extremity Weight Bearing Weight Bearing As Tolerated   Position Activity Restriction   Other position/activity restrictions zio patch, colostomy bag   General   Additional Pertinent Hx HTN, DM, Covid 2021, recent liver CA diagnosis   Diagnosis CVA , hyperkalemia   Vital Signs   Heart Rate (!) 112   BP (!) 120/56  (after standing exercises)   BP Location Right upper arm   BP Method Automatic   MAP (Calculated) 77.33   Oxygen Therapy   SpO2 94 %   O2 Device Nasal cannula   Balance   Sitting Balance Minimal assistance   Standing Balance Minimal assistance  (In parallel bars.)   Transfers   Stand Pivot Transfers Maximum assistance; Moderate assistance   Sit to stand Moderate assistance;Minimal assistance  (In parallel bars.)   Stand to sit Moderate assistance;Minimal assistance  (In parallel bars.)   Type of ROM/Therapeutic Exercise   Type of ROM/Therapeutic Exercise Cane/Wand   Comment 2# 1 set x 10 reps. Assessment   Performance deficits / Impairments Decreased functional mobility ; Decreased endurance;Decreased ADL status; Decreased balance;Decreased strength;Decreased high-level IADLs;Decreased cognition   Treatment Diagnosis CVA, hyperkalemia   History HTN, DM, Covid 2021, recent liver CA diagnosis   Activity Tolerance   Activity Tolerance Patient limited by fatigue   Occupational Therapy Plan   Current Treatment Recommendations Strengthening;Cognitive reorientation;Cognitive/Perceptual training;Equipment evaluation, education, & procurement;Patient/Caregiver education & training; Endurance training;Functional mobility training; Safety education & training;Home management training;Self-Care / ADL; Balance training        10/06/22 1300   Oral Hygiene   Assistance Needed Supervision or touching assistance   Comment No pocketing noted this tx session. CARE Score 4   [address] needed Dependent   CARE Score 1                       10/06/22 1300   Restrictions/Precautions   Restrictions/Precautions Fall Risk   Lower Extremity Weight Bearing Restrictions   Right Lower Extremity Weight Bearing Weight Bearing As Tolerated   Left Lower Extremity Weight Bearing Weight Bearing As Tolerated   Position Activity Restriction   Other position/activity restrictions zio patch, colostomy bag   General Comment   Comments CO-TX with MATHEWS   Subjective   Subjective Pt in recliner, agrees to participate with therapy.    Vitals   Heart Rate (!) 112   BP (!) 120/56  (after standing exercises)   BP Location Right upper arm   BP Method Automatic   MAP (Calculated) 77.33   SpO2 94 %   O2 Device Nasal cannula   Comment 4 L   Subjective   Subjective Everywhere, especially abdomen and chest   Pain did not rate a nurmerical score   Transfers   Sit to Stand Moderate Assistance;Minimal Assistance  (Mod A to initiate stand; in // bars)   Stand to Sit Minimal Assistance  (in // bars)   Stand Pivot Transfers Moderate Assistance;Maximum Assistance  (L side to w/c; pt would cross leg during transfer)   Squat Pivot Transfers Minimal Assistance  (L side to EOB; pt would cross LEs)   PT Exercises   Resistive Exercises Seated B UEs exercise with 2lb bar: shoulder press, chest press, and bicep curls x 10 each   Standing Open/Closed Kinetic Chain Exercises STS from w/c in // bars x 2 with Mod A to initate stand; pt was able to tolerate stand for ~ 5 sec before needing to sit down. Activity Tolerance   Activity Tolerance Patient limited by endurance; Patient limited by fatigue;Patient tolerated treatment well   Assessment   Assessment Pt presented with O2 reading of 92-97%. Pt required more assistance to perform transfers. Pt was not as alert or aware of his surroundings during this session. Pt had difficulty to keep eyes open throuhout treatment. Pt reported chest was hurting, could be due to his cough. PA was notified. Performance Deficits/Impairments Decreased functional mobility ; Decreased ADL status; Decreased ROM; Decreased body mechanics; Decreased strength;Decreased endurance;Decreased sensation;Decreased balance   Treatment Diagnosis Interference with activity   Therapy Prognosis Fair   Decision Making Low Complexity   History Liver cancer, HTN, CAD, PVD, DM with neuropathy, osteomyelitis with transmetatarsal amputation, ileostomy   Exam impaired sensation, balance, gait, strength, ROM, functional mobility, endurance   Clinical Presentation Stable   Discharge Recommendations Continue to assess pending progress;Subacute/Skilled Nursing Facility; Patient would benefit from continued therapy after discharge   Education   Education Given To Patient   Education Provided Mobility Training;Transfer Training;ADL Function   Education Method Demonstration;Verbal   Education Outcome Continued education needed;Demonstrated understanding   Safety Devices   Type of Devices Call light within reach; Bed alarm in place   PT Co-Treatment Minutes   Time In 1300   Time Out 1400   Minutes 60   Electronically signed by bulmaro Stiles PT on 10/6/2022 at 3:49 PM               Cosigned by: Mercedes Lauren PT at 10/6/2022  3:57 PM        RECORD REVIEW: Previous medical records, medications were reviewed at today's visit    IMPRESSION:   1. Bilateral embolic strokes-PT/OT/SLP. Discontinue Pletal due to nosebleed and resume Eliquis with baby aspirin. 2.  Essential hypertension-metoprolol, monitoring. Low at times. Currently adequate. 3.  Diabetes-continue insulin and monitoring. Lantus increased 10/5. Stable  4. Hyperlipidemia-Lipitor  5. GI-bowel regimen. Has colostomy. Carafate added. Improved  6. DVT prophylaxis-Eliquis resumed today. Scd's added. 7  History of liver cancer- relatively new dx  8. Abnormal UA-culture growing E. coli. Patient given Cipro. Repeat study 10/1 unremarkable  9. Hyperkalemia-recurrent. Esequiel Pancho ordered and hospitalist asked to follow-up. 10.  Leukocytosis-per hospitalist  Provigil added. Continue Cymbalta. ELOS:10/15.   restaff

## 2022-10-07 NOTE — PLAN OF CARE
Problem: Neurosensory - Adult  Goal: Achieves stable or improved neurological status  Outcome: Progressing  Flowsheets (Taken 10/7/2022 0907)  Achieves stable or improved neurological status: Assess for and report changes in neurological status

## 2022-10-07 NOTE — PROGRESS NOTES
Name: Gretchen Gomez  MRN: [id number]  Date of Service:  10/7/2022       10/07/22 1300   Restrictions/Precautions   Restrictions/Precautions Fall Risk   Lower Extremity Weight Bearing Restrictions   Right Lower Extremity Weight Bearing Weight Bearing As Tolerated   Left Lower Extremity Weight Bearing Weight Bearing As Tolerated   Position Activity Restriction   Other position/activity restrictions zio patch, colostomy bag   General   Chart Reviewed Yes   Patient assessed for rehabilitation services? Yes   Additional Pertinent Hx Liver cancer, HTN, CAD, PVD, DM with neuropathy, osteomyelitis with transmetatarsal amputation, ileostomy   Diagnosis HYPERKALEMIA, CVA   General Comment   Comments CO-TX with MATHEWS   Subjective   Subjective Pt laying in bed asleep, awakes and agrees to participate in therapy. Hearing   Hearing X   Hearing Exceptions Bilateral hearing aid   Vitals   Heart Rate (!) 102   BP (!) 130/59   Patient Position Sitting   MAP (Calculated) 82.67   SpO2 92 %  (92-95)   O2 Device Nasal cannula   Comment 4 L   Subjective   Subjective Stomach   Pain Faces: 6/10   Bed mobility   Bridging Minimal assistance  (Minimally- PTA holding B feet flat and into B hip add)   Rolling to Left Minimal assistance   Rolling to Right Minimal assistance   Supine to Sit Moderate assistance   Scooting Stand by assistance  (On EOB and in supine minimally)   Bed Mobility Comments On L side of bed- use of L bedrail   Transfers   Sit to Stand Minimal Assistance; Moderate Assistance  (In //, with B pullling up on //)   Stand to Sit Minimal Assistance  (In //)   Squat Pivot Transfers Moderate Assistance  (EOB<w/c from pt R and w/c<recliner from pt R)   PT Exercises   Exercise Treatment 3X sit<>stands in // requiring CGA/Min A- no instances of knees buckling, BLE shaky, PTA blocking pt B knees. Pt maintained static standing balance for ~25 sec, ~15 sec, ~10 sec, w/ v/c's for upward gaze and upright posture.    Activity Tolerance Activity Tolerance Patient limited by endurance; Patient limited by fatigue;Patient tolerated treatment well   Assessment   Assessment Pt presents with very low activity tolerance, able to stand 3X in // for ~25 sec, ~15 sec, then ~20 sec. Pt able to perform squat pivot transfers from pt R requiring Mod A. Discharge Recommendations Continue to assess pending progress;Subacute/Skilled Nursing Facility; Patient would benefit from continued therapy after discharge   Education   Education Given To Patient   Education Provided Mobility Training;Transfer Training;ADL Function   Education Method Demonstration;Verbal   Barriers to Learning Other (Comment)   Education Outcome Continued education needed;Demonstrated understanding   Safety Devices   Type of Devices Call light within reach; Chair alarm in place  (With family.)         Electronically signed by Kyler Lou PTA on 10/7/2022 at 4:24 PM

## 2022-10-07 NOTE — CONSULTS
Palliative Care:     Consulted to review goals of care. Review of notes and report from staff. Family is no longer wishing for transfer. Pt's daughter had left to return home. I met with pt and his sister that was present. Pt was up in the chair. Had attempted a few bites of lunch. They report pt had tolerated some therapy. Pt has IVFs running and has started on an appetite stimulant. He denies any pain. Discussion of family expectations and sister report their goal now is to maximize their time here in rehab and then return home. They request home health consult at discharge. 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 Wander Presley)    Cataracts, bilateral     Diabetes mellitus ([facility] [address]     Diabetic neuropathy ([facility] [address]     ED (erectile dysfunction)     HTN (hypertension)     Hypercholesterolemia     Open wound of right foot     states this is not the operative foot    Palliative care patient 10/07/2022    Ulcerative colitis ([facility] [address]        Advance Directives:    full code     Pain/Other Symptoms:    Pt denies pain  Breathing comfortable on oxygen via n/c    Activity:    as tolerated                       Reviewed role of Palliative Care and that we can assist with ongoing review of their goals, as well as discuss other options of care as needed. Will continue to follow POC and provide support.         Electronically signed by Shanti Carolina RN on 10/7/2022 at 2:49 PM

## 2022-10-08 NOTE — PROGRESS NOTES
[facility]      Progress Note    Patient:  Aamir Martin  YOB: 1945  Date of Service: 10/8/2022  MRN: [id number]   Acct: [de-identified]   Primary Care Physician: SHAHNAZ Myers CNP  Advance Directive: Full Code  Admit Date: 9/30/2022       Hospital Day: 8    Portions of this note have been copied forward, however, updated to reflect the most current clinical status of this patient. CHIEF COMPLAINT hyperkalemia     SUBJECTIVE:  Mr. José Miguel Whitmore was resting in bed this morning. Slightly more alert today. Nursing staff reported patient interacting more with family today and eat breakfast this morning. Patient continues to report weakness. Denies SOB or chest pain. CUMULATIVE HOSPITAL COURSE:   The patient is a 76 y.o. male with past medical history of recent stroke, CAD, diabetes, neuropathy, hyperlipidemia, ulcerative colitis, and PAD who presented to [facility] for Acute rehab. Mr. José Miguel Whitmore is a poor historian and unable to provide HPI, HPI obtained from chart review. Per notes patient was admitted to acute rehab on 9/28/2022 from [facility] for acute CVA, being treated with aspirin, Pletal, and low-dose Eliquis. Patient was then went to have hyperkalemia next morning and was admitted to inpatient hospitalist, treated with IV Insulin, bicarbonate, calcium gluconate, and Lokelma. Was discharged back to inpatient rehab on 9/30/2022. Hospital medicine was consulted for hyperkalemia today with K+ 5.8. Justa Waller was already initiated. WBC noted to be 15.3, patient also has tachycardia. Procalcitonin, lactic acid and urinalysis ordered. Procalcitonin was noted to be 56.55. Blood cultures ordered and IV Vancomycin and Merrem were initiated. Hyperkalemia persisted, Lokelma increased to 10mg TID. Monitor labs closely. Hyperkalemia resolved. Repeat chest xray indicated bilateral patchy infiltrates, concerns for possible aspiration. IV antibiotics continued.         Review of Systems Constitutional:  Negative for chills, diaphoresis, fatigue and fever. HENT:  Negative for congestion and ear pain. Eyes:  Negative for visual disturbance. Respiratory:  Positive for cough. Negative for shortness of breath and wheezing. Denies SOB   Cardiovascular:  Negative for chest pain, palpitations and leg swelling. Denies chest pain    Gastrointestinal:  Negative for abdominal distention, abdominal pain, blood in stool, constipation, diarrhea, nausea and vomiting. Endocrine: Negative for cold intolerance and heat intolerance. Genitourinary:  Negative for difficulty urinating, flank pain, frequency and urgency. Musculoskeletal:  Negative for arthralgias and myalgias. Skin:  Negative for color change and wound. Neurological:  Positive for weakness. Negative for dizziness, syncope, light-headedness, numbness and headaches. Hematological:  Does not bruise/bleed easily. Psychiatric/Behavioral:  Negative for agitation, confusion and dysphoric mood. Objective:   VITALS:  BP (!) 161/62   Pulse 91   Temp 97 °F (36.1 °C) (Temporal)   Resp 20   Ht 6' (1.829 m)   Wt 194 lb 3.2 oz (88.1 kg)   SpO2 92%   BMI 26.34 kg/m²   24HR INTAKE/OUTPUT:    Intake/Output Summary (Last 24 hours) at 10/8/2022 1120  Last data filed at 10/8/2022 0947  Gross per 24 hour   Intake 1072 ml   Output 1200 ml   Net -128 ml           Physical Exam  Constitutional:       General: He is not in acute distress. Appearance: Normal appearance. He is not ill-appearing. HENT:      Head: Normocephalic and atraumatic. Right Ear: External ear normal.      Left Ear: External ear normal.      Nose: Nose normal.      Mouth/Throat:      Mouth: Mucous membranes are moist.   Eyes:      Extraocular Movements: Extraocular movements intact. Conjunctiva/sclera: Conjunctivae normal.      Pupils: Pupils are equal, round, and reactive to light. Cardiovascular:      Rate and Rhythm: Regular rhythm. Tachycardia present. Pulses: Normal pulses. Heart sounds: Normal heart sounds. Pulmonary:      Effort: Pulmonary effort is normal. No respiratory distress. Breath sounds: Rhonchi present. No wheezing or rales. Comments: Coarse breath sounds throughout bilateral lungs  Abdominal:      General: Bowel sounds are normal. There is no distension. Palpations: Abdomen is soft. Tenderness: There is no abdominal tenderness. Comments: RLQ colostomy in place   Musculoskeletal:         General: No swelling, tenderness or deformity. Normal range of motion. Cervical back: Normal range of motion and neck supple. No muscular tenderness. Right lower leg: No edema. Left lower leg: No edema. Skin:     General: Skin is warm and dry. Findings: No bruising or lesion. Neurological:      Mental Status: He is alert. He is disoriented. Comments: A&O to self and place (states he's at a hospital)   Disoriented to time   Somnolent but easily awaken    Psychiatric:         Mood and Affect: Mood normal.         Behavior: Behavior normal.         Thought Content:  Thought content normal.          Medications:      sodium chloride 75 mL/hr at 10/08/22 0105    sodium chloride      dextrose        modafinil  100 mg Oral Daily    sodium zirconium cyclosilicate  10 g Oral TID    meropenem  1,000 mg IntraVENous Q8H    vancomycin  1,250 mg IntraVENous Q18H    vancomycin (VANCOCIN) intermittent dosing (placeholder)   Other RX Placeholder    megestrol acetate  400 mg Oral Daily    sucralfate  1 g Oral 4x Daily AC & HS    insulin glargine  35 Units SubCUTAneous BID    DULoxetine  30 mg Oral Daily    pantoprazole  40 mg Oral QAM AC    sodium chloride flush  5-40 mL IntraVENous 2 times per day    atorvastatin  40 mg Oral Nightly    aspirin  81 mg Oral Daily    folic acid  067 mcg Oral Daily    apixaban  2.5 mg Oral BID    metoprolol tartrate  25 mg Oral BID    insulin lispro  0-4 Units SubCUTAneous Nightly    insulin lispro  0.05 Units/kg SubCUTAneous TID     insulin lispro  0-8 Units SubCUTAneous TID     sennosides-docusate sodium  1 tablet Oral BID     benzonatate, sodium chloride flush, sodium chloride, ondansetron **OR** ondansetron, glucose, dextrose bolus **OR** dextrose bolus, glucagon (rDNA), dextrose, acetaminophen, bisacodyl, magnesium hydroxide  ADULT DIET; Easy to Chew; 3 carb choices (45 gm/meal); Low Potassium (Less than 3000 mg/day); NO OATMEAL. LIKES SCRAMBLED EGGS, SINGH, & SAUSAGE  ADULT ORAL NUTRITION SUPPLEMENT; Breakfast; Diabetic Oral Supplement  ADULT ORAL NUTRITION SUPPLEMENT; Lunch; Fortified Pudding Oral Supplement  ADULT ORAL NUTRITION SUPPLEMENT; Dinner; Frozen Oral Supplement     Lab and other Data:     Recent Labs     10/06/22  0347 10/07/22  0341 10/08/22  0631   WBC 15.3* 17.3* 16.4*   HGB 14.3 13.9* 13.6*    201 172       Recent Labs     10/06/22  0347 10/07/22  0341 10/08/22  0631   * 132* 134*   K 5.8* 5.4* 4.4    100 100   CO2 25 24 29   BUN 24* 20 15   CREATININE 1.0 0.9 0.8   GLUCOSE 275* 244* 152*         UA:  Recent Labs     10/06/22  1420   COLORU DARK YELLOW*   PHUR 6.0   CLARITYU Clear   SPECGRAV 1.028   LEUKOCYTESUR Negative   UROBILINOGEN 0.2   BILIRUBINUR Negative   BLOODU Negative   GLUCOSEU =>1000         RAD:     XR CHEST PORTABLE [[id number]] 10/7/2022    Impression: 1. Status post median sternotomy. 2.Bilateral patchy perihilar infiltrate       NM LUNG SCAN PERFUSION ONLY    Result Date: 10/5/2022  EXAM: NM LUNG PERFUSION SCAN CLINICAL INDICATION: dyspnea TECHNIQUE: Limited perfusion only scan was performed using intravenous 5.5 mCi Technetium 99m MAA. This report was created using [employer] report generation technology. COMPARISON: None FINDINGS: Perfusion: Unremarkable. There is homogeneous perfusion uptake with no bronchopulmonary segment conforming perfusion defects. Normal perfusion involving both lungs. No nuclear medicine findings suggestive of pulmonary embolism. Micro:      Culture, Blood 1 [[id number]] Collected: 10/07/22 0942   Order Status: Completed Specimen: Blood Updated: 10/08/22 1114    Blood Culture, Routine No Growth to date. Any change in status will be called. Culture, Blood 2 [[id number]] Collected: 10/07/22 0949   Order Status: Completed Specimen: Blood Updated: 10/08/22 1114    Culture, Blood 2 No Growth to date. Any change in status will be called. Assessment/Plan   Principal Problem:    Acute ischemic stroke [facility])  Active Problems:    Hyperkalemia    Leukocytosis  Resolved Problems:    * No resolved hospital problems. *    Principal Problem:    Acute ischemic stroke ([facility] [address]-               - Continue rehab         Active Problems:    Hyperkalemia-               - Resolved   - Continue Lokelma to 10mg TID for now, may need to adjust dose based on labs in am                - Monitor labs closely               - Avoid offending agents        Leukocytosis secondary to bilateral pneumonia-               - Procalcitonin- 56.55   - Blood cultures- NGTD    - Continue IV Vancomycin and Merrem               - Urinalysis unremarkable               - Continue IVFs               - Monitor labs closely        Antibiotic: Vancomycin and Merrem      DVT Prophylaxis: Eliquis      GI prophylaxis:  Protonix          Further Orders per Clinical course/attending. Electronically signed by Corrinne Cola, APRN - CNP on 10/8/2022 at 11:20 AM       EMR Dragon/Transcription disclaimer:   Much of this encounter note is an electronic transcription/translation of spoken language to printed text.  The electronic translation of spoken language may permit erroneous, or at times, nonsensical words or phrases to be inadvertently transcribed; although attempts have made to review the note for such errors, some may still exist.

## 2022-10-08 NOTE — PROGRESS NOTES
Nutrition Assessment     Type and Reason for Visit: Reassess    Nutrition Recommendations/Plan:   May consider increasing the strength of the appetite stimulant or trialing a different type. Continue to offer multiple types of higher calorie supplements to meet EEN and prevent further weight loss. Malnutrition Assessment:  Malnutrition Status: At risk for malnutrition (Comment)    Nutrition Assessment:  Pt declining from a nutritional standpoint AEB ongoing poor PO intake of less than 50% EEN and inconsistent ONS consumption. May consider increasing appetite stimulant dosage or changing to different type. Estimated Daily Nutrient Needs:  Energy (kcal):  30 kcal/kg to prevent further weight loss Weight Used for Energy Requirements: Current     Protein (g):  105-162 Weight Used for Protein Requirements: Ideal        Fluid (ml/day):  2467 Method Used for Fluid Requirements: 1 ml/kcal    Nutrition Related Findings:     Wound Type: None    Current Nutrition Therapies:    ADULT DIET; Easy to Chew; 3 carb choices (45 gm/meal); Low Potassium (Less than 3000 mg/day); NO OATMEAL. LIKES SCRAMBLED EGGS, SINGH, & SAUSAGE  ADULT ORAL NUTRITION SUPPLEMENT; Lunch;  Fortified Pudding Oral Supplement  ADULT ORAL NUTRITION SUPPLEMENT; Dinner; Frozen Oral Supplement  ADULT ORAL NUTRITION SUPPLEMENT; Breakfast; Diabetic Oral Supplement    Anthropometric Measures:  Height: 6' 0.01\" (182.9 cm)  Current Body Wt: 194 lb 3.6 oz (88.1 kg)   BMI: 26.3    Nutrition Diagnosis:   Inadequate oral intake related to acute injury/trauma, psychological cause or life stress as evidenced by intake 0-25%, intake 26-50%, weight loss    Nutrition Interventions:   Food and/or Nutrient Delivery: Continue Current Diet  Nutrition Education/Counseling: Education not indicated  Coordination of Nutrition Care: Continue to monitor while inpatient  Plan of Care discussed with: IDT & family    Goals:  Previous Goal Met: No Progress toward Goal(s)  Goals: PO intake 50% or greater       Nutrition Monitoring and Evaluation:   Behavioral-Environmental Outcomes: None Identified  Food/Nutrient Intake Outcomes: Supplement Intake, Food and Nutrient Intake  Physical Signs/Symptoms Outcomes: Biochemical Data, Weight    Discharge Planning:     Too soon to determine     Renetta Vanegas MS, RD, LD  Contact: [phone]

## 2022-10-09 NOTE — PROGRESS NOTES
[facility]      Progress Note    Patient:  Andreas Joseph  YOB: 1945  Date of Service: 10/9/2022  MRN: [id number]   Acct: [de-identified]   Primary Care Physician: SHAHNAZ Pierre CNP  Advance Directive: Full Code  Admit Date: 9/30/2022       Hospital Day: 9    Portions of this note have been copied forward, however, updated to reflect the most current clinical status of this patient. CHIEF COMPLAINT hyperkalemia     SUBJECTIVE:  Mr. Kristopher Ponce was resting in bed this morning. Awakes easily, but very sleepy today. Patient continues to report weakness. Denies SOB or chest pain. CUMULATIVE HOSPITAL COURSE:   The patient is a 76 y.o. male with past medical history of recent stroke, CAD, diabetes, neuropathy, hyperlipidemia, ulcerative colitis, and PAD who presented to [facility] for Acute rehab. Mr. Kristopher Ponce is a poor historian and unable to provide HPI, HPI obtained from chart review. Per notes patient was admitted to acute rehab on 9/28/2022 from [facility] for acute CVA, being treated with aspirin, Pletal, and low-dose Eliquis. Patient was then went to have hyperkalemia next morning and was admitted to inpatient hospitalist, treated with IV Insulin, bicarbonate, calcium gluconate, and Lokelma. Was discharged back to inpatient rehab on 9/30/2022. Hospital medicine was consulted for hyperkalemia today with K+ 5.8. [facility] was already initiated. WBC noted to be 15.3, patient also has tachycardia. Procalcitonin, lactic acid and urinalysis ordered. Procalcitonin was noted to be 56.55. Blood cultures ordered and IV Vancomycin and Merrem were initiated. Hyperkalemia persisted, Lokelma increased to 10mg TID. Monitor labs closely. Hyperkalemia resolved. Repeat chest xray indicated bilateral patchy infiltrates, concerns for possible aspiration. IV antibiotics continued. 10/9/2022- WBC-18.0, slight increase, Sodium- 131, Potassium- 4.0- on-going monitoring.  No additional imaging noted this AM. Continue current antibiotic treatment as ordered. Review of Systems   Constitutional:  Negative for chills, diaphoresis, fatigue and fever. HENT:  Negative for congestion and ear pain. Eyes:  Negative for visual disturbance. Respiratory:  Positive for cough. Negative for shortness of breath and wheezing. Denies SOB   Cardiovascular:  Negative for chest pain, palpitations and leg swelling. Denies chest pain    Gastrointestinal:  Negative for abdominal distention, abdominal pain, blood in stool, constipation, diarrhea, nausea and vomiting. Endocrine: Negative for cold intolerance and heat intolerance. Genitourinary:  Negative for difficulty urinating, flank pain, frequency and urgency. Musculoskeletal:  Negative for arthralgias and myalgias. Skin:  Negative for color change and wound. Neurological:  Positive for weakness. Negative for dizziness, syncope, light-headedness, numbness and headaches. Hematological:  Does not bruise/bleed easily. Psychiatric/Behavioral:  Negative for agitation, confusion and dysphoric mood. Objective:   VITALS:  BP (!) 156/60   Pulse 96   Temp 97.7 °F (36.5 °C) (Temporal)   Resp 16   Ht 6' 0.01\" (1.829 m)   Wt 196 lb 14.4 oz (89.3 kg)   SpO2 93%   BMI 26.70 kg/m²   24HR INTAKE/OUTPUT:    Intake/Output Summary (Last 24 hours) at 10/9/2022 1358  Last data filed at 10/9/2022 1333  Gross per 24 hour   Intake 1275 ml   Output 500 ml   Net 775 ml           Physical Exam  Constitutional:       General: He is not in acute distress. Appearance: Normal appearance. He is not ill-appearing. HENT:      Head: Normocephalic and atraumatic. Right Ear: External ear normal.      Left Ear: External ear normal.      Nose: Nose normal.      Mouth/Throat:      Mouth: Mucous membranes are moist.   Eyes:      Extraocular Movements: Extraocular movements intact.       Conjunctiva/sclera: Conjunctivae normal.      Pupils: Pupils are equal, round, and reactive to light. Cardiovascular:      Rate and Rhythm: Regular rhythm. Tachycardia present. Pulses: Normal pulses. Heart sounds: Normal heart sounds. Pulmonary:      Effort: Pulmonary effort is normal. No respiratory distress. Breath sounds: Rhonchi present. No wheezing or rales. Comments: Coarse breath sounds throughout bilateral lungs  Abdominal:      General: Bowel sounds are normal. There is no distension. Palpations: Abdomen is soft. Tenderness: There is no abdominal tenderness. Comments: RLQ colostomy in place   Musculoskeletal:         General: No swelling, tenderness or deformity. Normal range of motion. Cervical back: Normal range of motion and neck supple. No muscular tenderness. Right lower leg: No edema. Left lower leg: No edema. Skin:     General: Skin is warm and dry. Findings: No bruising or lesion. Neurological:      Mental Status: He is alert. He is disoriented. Comments: A&O to self and place (states he's at a hospital)   Disoriented to time   Somnolent but easily awaken    Psychiatric:         Mood and Affect: Mood normal.         Behavior: Behavior normal.         Thought Content:  Thought content normal.          Medications:      sodium chloride 75 mL/hr at 10/09/22 0224    sodium chloride      dextrose        modafinil  100 mg Oral Daily    meropenem  1,000 mg IntraVENous Q8H    megestrol acetate  400 mg Oral Daily    sucralfate  1 g Oral 4x Daily AC & HS    insulin glargine  35 Units SubCUTAneous BID    DULoxetine  30 mg Oral Daily    pantoprazole  40 mg Oral QAM AC    sodium chloride flush  5-40 mL IntraVENous 2 times per day    atorvastatin  40 mg Oral Nightly    aspirin  81 mg Oral Daily    folic acid  872 mcg Oral Daily    apixaban  2.5 mg Oral BID    metoprolol tartrate  25 mg Oral BID    insulin lispro  0-4 Units SubCUTAneous Nightly    insulin lispro  0.05 Units/kg SubCUTAneous TID WC    insulin lispro  0-8 Units SubCUTAneous TID     sennosides-docusate sodium  1 tablet Oral BID     benzonatate, sodium chloride flush, sodium chloride, ondansetron **OR** ondansetron, glucose, dextrose bolus **OR** dextrose bolus, glucagon (rDNA), dextrose, acetaminophen, bisacodyl, magnesium hydroxide  ADULT DIET; Easy to Chew; 3 carb choices (45 gm/meal); Low Potassium (Less than 3000 mg/day); NO OATMEAL. LIKES SCRAMBLED EGGS, SINGH, & SAUSAGE  ADULT ORAL NUTRITION SUPPLEMENT; Lunch; Fortified Pudding Oral Supplement  ADULT ORAL NUTRITION SUPPLEMENT; Dinner; Frozen Oral Supplement  ADULT ORAL NUTRITION SUPPLEMENT; Breakfast; Diabetic Oral Supplement     Lab and other Data:     Recent Labs     10/07/22  0341 10/08/22  0631 10/09/22  0339   WBC 17.3* 16.4* 18.0*   HGB 13.9* 13.6* 14.1    172 166       Recent Labs     10/07/22  0341 10/08/22  0631 10/09/22  0339   * 134* 131*   K 5.4* 4.4 4.0    100 97*   CO2 24 29 28   BUN 20 15 13   CREATININE 0.9 0.8 0.8   GLUCOSE 244* 152* 126*         UA:  Recent Labs     10/06/22  1420   COLORU DARK YELLOW*   PHUR 6.0   CLARITYU Clear   SPECGRAV 1.028   LEUKOCYTESUR Negative   UROBILINOGEN 0.2   BILIRUBINUR Negative   BLOODU Negative   GLUCOSEU =>1000         RAD:     XR CHEST PORTABLE [[id number]] 10/7/2022    Impression: 1. Status post median sternotomy. 2.Bilateral patchy perihilar infiltrate       NM LUNG SCAN PERFUSION ONLY    Result Date: 10/5/2022  EXAM: NM LUNG PERFUSION SCAN CLINICAL INDICATION: dyspnea TECHNIQUE: Limited perfusion only scan was performed using intravenous 5.5 mCi Technetium 99m MAA. This report was created using [employer] report generation technology. COMPARISON: None FINDINGS: Perfusion: Unremarkable. There is homogeneous perfusion uptake with no bronchopulmonary segment conforming perfusion defects. Normal perfusion involving both lungs. No nuclear medicine findings suggestive of pulmonary embolism.           Micro:      Culture, Blood 1 [[id number]] Collected: 10/07/22 0942   Order Status: Completed Specimen: Blood Updated: 10/08/22 1114    Blood Culture, Routine No Growth to date. Any change in status will be called. Culture, Blood 2 [[id number]] Collected: 10/07/22 0949   Order Status: Completed Specimen: Blood Updated: 10/08/22 1114    Culture, Blood 2 No Growth to date. Any change in status will be called. Assessment/Plan   Principal Problem:    Acute ischemic stroke [facility])  Active Problems:    Hyperkalemia    Leukocytosis  Resolved Problems:    * No resolved hospital problems. *    Principal Problem:    Acute ischemic stroke ([facility] [address]-               - Continue rehab         Active Problems:    Hyperkalemia-               - Resolved   - Continue Lokelma to 10mg TID for now, may need to adjust dose based on labs in am                - Monitor labs closely               - Avoid offending agents        Leukocytosis secondary to bilateral pneumonia-               - Procalcitonin- 56.55   - Blood cultures- NGTD    - Continue IV Merrem, Vancomycin completed. - Urinalysis unremarkable               - Continue IVFs               - Monitor labs closely        Antibiotic: Merrem      DVT Prophylaxis: Eliquis      GI prophylaxis:  Protonix          Further Orders per Clinical course/attending. Electronically signed by SHAHNAZ Barkley CNP on 10/9/2022 at 1:58 PM       EMR Dragon/Transcription disclaimer:   Much of this encounter note is an electronic transcription/translation of spoken language to printed text.  The electronic translation of spoken language may permit erroneous, or at times, nonsensical words or phrases to be inadvertently transcribed; although attempts have made to review the note for such errors, some may still exist.

## 2022-10-09 NOTE — PLAN OF CARE
Problem: Gastrointestinal - Adult  Goal: Establish and maintain optimal ostomy function  Outcome: Progressing   Ostomy functioning  Problem: Safety - Adult  Goal: Free from fall injury  Outcome: Progressing   Up with assist

## 2022-10-10 PROBLEM — C22.0 HEPATOCELLULAR CARCINOMA (HCC): Status: ACTIVE | Noted: 2022-01-01

## 2022-10-10 NOTE — PROGRESS NOTES
[facility]  INPATIENT SPEECH THERAPY  [facility] UNIT  TIME   1130  1200  COTX WITH OT    [x]Daily Note  []Progress Note    Date: 10/10/2022  Patient Name: Aamir Martin        MRN: [id number]    Account #: [de-identified]  : 1945  (76 y.o.)  Gender: male   Primary Provider: Lorna Vargas MD  Swallowing Status/Diet: Easy to chew with thin liquids      Subjective: COTX with occupational therapy completed during noon meal. Pt continues to complain of pain. Tylenol administered. Objective:    Swallowing reassessment completed during noon meal. Consistencies presented include regular solids, puree consistencies, and thin liquids via side of cup. Oral prep reveals significantly prolonged, vertical mastication with regular solids. Oral transit ranges from 1-2 seconds with thin liquids via consecutive sips side of cup. Oral transit ranges from 2-4 seconds with regular solids. Moderate oral residue observed on this date. Pt did require cues to clear with lingual sweeping and liquid wash. Laryngeal movement observed to be consistently sluggish and mildly decreased. No overt s/s of aspiration observed with thin liquids and regular solids. Pt continues to benefit from set up assistance. Therapists provided this service. Visuospatial task completed. Pt is required to identify specific items on his tray. Task completed independently with 100% accuracy. Delayed processing noted. Functional recall task completed. Pt is required to elicit 3/3 compensatory swallowing precautions. Pt is unable to independently elicit any swallowing precautions. Education provided on taking small bites, alternating bites and sips, as well as frequent lingual sweeping. SLP will continue to follow. Recommended Diet and Intervention  Easy to chew  Thin liquids  Recommended Form of Meds: Whole with water  Therapeutic Interventions: Pharyngeal exercises;Diet tolerance monitoring;Oral motor exercises; Patient/Family education     Compensatory Swallowing Strategies  Compensatory Swallowing Strategies : Alternate solids and liquids;Eat/Feed slowly;Upright as possible for all oral intake;Remain upright for 30-45 minutes after meals;Small bites/sips    Short Term Goals:  Goal 1: Pt will complete the CLQT. Goal 2: Pt will complete recall tasks with 90% accuracy and minimal cues. Goal 3: Pt will complete verbal expression tasks with 80% accuracy and minimal cues. Goal 4: Pt will complete executive functioning/problem solving/safety awareness tasks with 80% accuracy and minimal cues. Goal 5: Pt will complete orientation tasks with 90% accuracy and minimal cues. Goal 6: Pt will complete diaphragmatic breathing exercises to target breath support for speech production. Long Term Goals:  Pt will participate in skilled speech therapy services to ensure he is able to adequately/effectively communicate his wants and needs and safely complete ADLs.      Pt will tolerate the least restrictive diet with minimal overt s/s of aspiration/penetration during hospitalization    ASSESSMENT:  Assessment: [x]Progressing towards goals          []Not Progressing towards goals    Patient Tolerance of Treatment:   [x]Tolerated well []Tolerated fair []Required rest breaks []Fatigued     Education:  Learner:  [x]Patient          []Significant Other          []Other  Education provided regarding:  []Goals and POC   [x]Diet and swallowing precautions    []Home Exercise Program  []Progress and/or discharge information  Method of Education:  [x]Discussion          []Demonstration          []Handout          []Other  Evaluation of Education:   []Verbalized understanding   []Demonstrates without assistance  []Demonstrates with assistance  [x]Needs further instruction     []No evidence of learning                  []No family present      Plan: [x]Continue with current plan of care    []Modify current plan of care as follows:    []Discharge patient    Discharge Location:    Services/Supervision Recommended:      []Patient continues to require treatment by a licensed therapist to address functional deficits as outlined in the established plan of care.       Electronically signed by SACHI Mg on 10/10/2022 at 2:13 PM

## 2022-10-10 NOTE — PROGRESS NOTES
[facility] Hospitalists      Patient:  Suzy Da Silva  YOB: 1945  Date of Service: 10/10/2022  MRN: [id number]   Acct: [de-identified]   Primary Care Physician: SHAHNAZ Irby CNP  Advance Directive: Full Code  Admit Date: 9/30/2022       Hospital Day: 10  Portions of this note have been copied forward, however, changed to reflect the most current clinical status of this patient. CHIEF COMPLAINT Per kalemia    SUBJECTIVE: Patient reports he has continued to have cough and shortness of breath. Patient continues to have abdominal pain mainly around his ostomy. Patient denies any nausea and vomiting. Patient reports poor appetite and decreased intake. CUMULATIVE HOSPITAL COURSE:  The patient is a 75-year-old male with a past medical history of recent stroke, CAD, DM, neuropathy, hyperlipidemia, ulcerative colitis, prostate cancer, and liver cancer who presented to [facility] for acute rehab. The patient was poor historian and unable to obtain HPI, HPI was obtained from chart review. Per review patient was admitted to acute rehab on 9/28/2022 from [facility] for acute CVA. Morning after admission patient was noted to have severe hyperkalemia and was admitted to the hospitalist service. Patient was treated with IV insulin, bicarbonate, calcium gluconate, and Lokelma. Patient was transitioned back to inpatient rehab on 9/30/2022. Hospitalist was then consulted again for hyperkalemia with potassium of 5.8. [place] had already been initiated. WBC was noted to be 15.3 and patient was noted to be tachycardic. Procalcitonin, lactic acid, and urinalysis were ordered. Procalcitonin was 56.55. Blood cultures ordered and IV vancomycin and Merrem were initiated. Hyperkalemia persisted and Lokelma was increased to 10 mg 3 times daily. Hyperkalemia has resolved with this treatment. Repeat chest x-ray indicated bilateral patchy infiltrates concern for possible aspiration.   Vancomycin was discontinued and patient will need a 7-day course of Merrem. Hypoglycemia noted this morning, protocol in place and has resolved. Review of Systems:   Review of Systems   Constitutional:  Positive for appetite change and fatigue. Negative for chills and fever. HENT:  Negative for sore throat and trouble swallowing. Respiratory:  Positive for cough and shortness of breath. Negative for choking and chest tightness. Daughter at bedside indicates shortness of breath is much worse after eating. Cardiovascular:  Negative for chest pain, palpitations and leg swelling. Gastrointestinal:  Positive for abdominal pain. Negative for constipation, nausea and vomiting. Genitourinary:  Negative for difficulty urinating, frequency and urgency. Musculoskeletal:  Negative for back pain and myalgias. Skin:  Negative for color change and wound. Neurological:  Positive for weakness (improving). 14 point review of systems is negative except as specifically addressed above. Objective:   VITALS:  BP (!) 93/53 Comment: c/o dizziness. Pulse 92   Temp 97 °F (36.1 °C)   Resp 20   Ht 6' 0.01\" (1.829 m)   Wt 196 lb 14.4 oz (89.3 kg)   SpO2 93%   BMI 26.70 kg/m²   24HR INTAKE/OUTPUT:    Intake/Output Summary (Last 24 hours) at 10/10/2022 1711  Last data filed at 10/10/2022 1336  Gross per 24 hour   Intake 620 ml   Output 1375 ml   Net -755 ml       Physical Exam  Vitals reviewed. Constitutional:       Appearance: He is ill-appearing. HENT:      Head: Normocephalic. Mouth/Throat:      Mouth: Mucous membranes are dry. Cardiovascular:      Rate and Rhythm: Normal rate and regular rhythm. Pulses: Normal pulses. Heart sounds: Normal heart sounds. Pulmonary:      Effort: Pulmonary effort is normal.      Breath sounds: Rales present. Abdominal:      General: Abdomen is flat. Bowel sounds are normal. There is no distension. Palpations: Abdomen is soft. Tenderness:  There is no abdominal tenderness. There is no guarding. Comments: Ostomy bag noted. Tenderness with palpation   Skin:     General: Skin is warm and dry. Capillary Refill: Capillary refill takes less than 2 seconds. Neurological:      Mental Status: He is alert and oriented to person, place, and time. Medications:      sodium chloride 75 mL/hr at 10/10/22 0048    sodium chloride      dextrose        nystatin  5 mL Oral 4x Daily    insulin glargine  20 Units SubCUTAneous BID    hyoscyamine  375 mcg Oral Daily    modafinil  100 mg Oral Daily    meropenem  1,000 mg IntraVENous Q8H    megestrol acetate  400 mg Oral Daily    sucralfate  1 g Oral 4x Daily AC & HS    DULoxetine  30 mg Oral Daily    pantoprazole  40 mg Oral QAM AC    sodium chloride flush  5-40 mL IntraVENous 2 times per day    atorvastatin  40 mg Oral Nightly    aspirin  81 mg Oral Daily    folic acid  667 mcg Oral Daily    apixaban  2.5 mg Oral BID    metoprolol tartrate  25 mg Oral BID    insulin lispro  0-4 Units SubCUTAneous Nightly    insulin lispro  0.05 Units/kg SubCUTAneous TID WC    insulin lispro  0-8 Units SubCUTAneous TID WC    sennosides-docusate sodium  1 tablet Oral BID     benzonatate, sodium chloride flush, sodium chloride, ondansetron **OR** ondansetron, glucose, dextrose bolus **OR** dextrose bolus, glucagon (rDNA), dextrose, acetaminophen, bisacodyl, magnesium hydroxide  ADULT DIET; Easy to Chew; 3 carb choices (45 gm/meal); Low Potassium (Less than 3000 mg/day); NO OATMEAL. LIKES SCRAMBLED EGGS, SINGH, & SAUSAGE  ADULT ORAL NUTRITION SUPPLEMENT; Lunch;  Fortified Pudding Oral Supplement  ADULT ORAL NUTRITION SUPPLEMENT; Dinner; Frozen Oral Supplement  ADULT ORAL NUTRITION SUPPLEMENT; Breakfast; Diabetic Oral Supplement     Lab and other Data:     Recent Labs     10/08/22  0631 10/09/22  0339 10/10/22  0355   WBC 16.4* 18.0* 17.8*   HGB 13.6* 14.1 13.7*    166 149     Recent Labs     10/08/22  0631 10/09/22  6336 10/10/22  0355   * 131* 139   K 4.4 4.0 4.3    97* 106   CO2 29 28 26   BUN 15 13 12   CREATININE 0.8 0.8 0.6   GLUCOSE 152* 126* 77     No results for input(s): AST, ALT, ALB, BILITOT, ALKPHOS in the last 72 hours. Troponin T: No results for input(s): TROPONINI in the last 72 hours. Pro-BNP: No results for input(s): BNP in the last 72 hours. INR: No results for input(s): INR in the last 72 hours. UA:No results for input(s): NITRITE, COLORU, PHUR, LABCAST, WBCUA, RBCUA, MUCUS, TRICHOMONAS, YEAST, BACTERIA, CLARITYU, SPECGRAV, LEUKOCYTESUR, UROBILINOGEN, BILIRUBINUR, BLOODU, GLUCOSEU, AMORPHOUS in the last 72 hours. Invalid input(s): Casper Garcia  A1C: No results for input(s): LABA1C in the last 72 hours. ABG:No results for input(s): PHART, NFJ0GRM, PO2ART, YFX4ELC, BEART, HGBAE, O6MHUNKM, CARBOXHGBART in the last 72 hours. RAD:   XR CHEST (2 VW)    Result Date: 10/5/2022  1. Status post median sternotomy 2. NO evidence of airspace consolidation or pulmonary venous congestion. XR CHEST PORTABLE    Result Date: 10/7/2022  1. Status post median sternotomy. 2.Bilateral patchy perihilar infiltrate       Micro:   Component 10/7/22 0942    Blood Culture, Routine No Growth to date. Any change in status will be called     Component 10/7/22 0949    Culture, Blood 2 No Growth to date. Any change in status will be called.      Component 9/28/22 0212    Urine Culture, Routine  Abnormal   >50,000 CFU/ml   Mixed skin bryan present     Organism Escherichia coli Abnormal     Urine Culture, Routine Moderate growth   Second organism same as first    Resulting Agency [address] [facility]        Susceptibility    Escherichia coli (1)    Antibiotic Interpretation Microscan  Method Status    ampicillin Resistant >=32 mcg/mL BACTERIAL SUSCEPTIBILITY PANEL BY SREE     ampicillin-sulbactam Intermediate 16 mcg/mL BACTERIAL SUSCEPTIBILITY PANEL BY SREE     aztreonam Sensitive <=1 mcg/mL BACTERIAL SUSCEPTIBILITY PANEL BY SREE     ceFAZolin Sensitive <=4 mcg/mL BACTERIAL SUSCEPTIBILITY PANEL BY SREE     cefepime Sensitive <=1 mcg/mL BACTERIAL SUSCEPTIBILITY PANEL BY SREE     cefTRIAXone Sensitive <=1 mcg/mL BACTERIAL SUSCEPTIBILITY PANEL BY SREE     ertapenem Sensitive <=0.5 mcg/mL BACTERIAL SUSCEPTIBILITY PANEL BY SREE     gentamicin Sensitive <=1 mcg/mL BACTERIAL SUSCEPTIBILITY PANEL BY SREE     levofloxacin Resistant >=8 mcg/mL BACTERIAL SUSCEPTIBILITY PANEL BY SREE     meropenem Sensitive <=0.25 mcg/mL BACTERIAL SUSCEPTIBILITY PANEL BY SREE     nitrofurantoin Sensitive <=16 mcg/mL BACTERIAL SUSCEPTIBILITY PANEL BY SREE     piperacillin-tazobactam Sensitive <=4 mcg/mL BACTERIAL SUSCEPTIBILITY PANEL BY SREE     trimethoprim-sulfamethoxazole Resistant >=320 mcg/mL BACTERIAL SUSCEPTIBILITY PANEL BY SREE                  Assessment/Plan   Principal Problem:    Acute ischemic stroke (HCC)   -PT/OT/SLP   -Continue aspirin and Eliquis    Active Problems:    Hyperkalemia   -Resolved, continue Lokelma at this time   -Monitor BMP   -Low Potassium diet      Leukocytosis   -Procalcitonin 56.55   -Blood cultures remain no growth to date   -7-day course of Merrem   -UA unremarkable   -Monitor CBC   -Continue SLP    Abdominal pain/liver cancer   -GI consulted   -Patient underwent radioembolization on 9/20 [facility]        Antibiotic: Merrem    DVT Prophylaxis: Eliquis    GI prophylaxis: Protonix    SHAHNAZ Ramirez - CNP, 10/10/2022 5:11 PM

## 2022-10-10 NOTE — CONSULTS
Pt Name: Fidel Ruffin  MRN: [id number]  [id number]  YOB: 1945  Admit Date: 9/30/2022  3:00 PM  Date of evaluation: 10/10/2022  Primary Care Physician: SHAHNAZ Muñoz CNP   0811/811-01       Requesting Provider:  Victor Manuel Donald MD    GI Consult    Indication:  Abdominal pain -lower in a post bilateral embolic stroke patient that has had a total colectomy with prostate cancer, radiation therapy and diabetes. History:  The patient is a 76 y.o. male admitted 9/30/2022  I am seeing for above complaints. He states that it centers around his colostomy bag. He denies diarrhea or constipation. 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 Aura Moran)    Cataracts, bilateral     Diabetes mellitus ([facility] [address]     Diabetic neuropathy [facility])     ED (erectile dysfunction)     HTN (hypertension)     Hypercholesterolemia     Open wound of right foot     states this is not the operative foot    Palliative care patient 10/07/2022    Ulcerative colitis [facility])      Past Surgical History:        Procedure Laterality Date    CHOLECYSTECTOMY      CORONARY ARTERY BYPASS GRAFT  2003    EYE SURGERY      jason. cat    ILEOSTOMY OR JEJUNOSTOMY  1995    due to surgery from ulcerative colitis    NH AMPUTATION FOOT,TRANSMETATARSAL Left 1/30/2018    TRANSMET AMPUTATION performed by Hilario Bryant MD at [address] [facility] Left 6/12/2017    [facility]. Left great toe ray amputation through the metatarsal level.     VASCULAR SURGERY  8/26/14 [facility]    Percutaneous cannulation, left common femoral artery, with 5-Indian glidesheath. Suprarenal abdominal aortogram with bilateral iliofemoral arteriogra. Bilateral lower extremity arteriogram.    VASCULAR SURGERY  09/08/14 [facility]    Right femoral to tibioperoneal trunk bypass with in situ right greater saphenous vein. Right common femoral endarterectomy with vascular patch repair. Right tibioperoneal trunk vein patch and endarterectomy. Completion right lower exteremity arteriograms    VASCULAR SURGERY  1/12/2015 [facility]    Percutaneous cannulation left common femoral artery with 5-Indian and later 6-Indian pinnacle destination sheath. Suprarenal abdominal aortogram with bilateral iliofemoral arteriograms. Bilateral lower extremity arteriograms. Coil embolization right greater saphenous vein bypass branch with 8 mm x 5 cm coil and seven 5 mm x 5 cm coils. VASCULAR SURGERY  1/12/2015 [facility]     Right posterior tibial artery balloon angioplasty 2.5 mm x 100 mm vascutrak balloon. Right peroneal artery balloon angioplasty with 2.5 x 100 mm vascutrak balloon. Completion right lower extremity arteriograms. VASCULAR SURGERY  06/17/2017    [facility]. Percutaneous cannulation right common femoral artery with ultrasound guidance and 5 Indian and later 6 Indian sheath placement. Suprarenal abdominal aorta gram with bilateral lower extremity arteriogram.Left superficial femoral/popliteal/tibial peroneal trunk/posterior tibial artery atherectomy with csi 1.25 solid crown and 2.0 solid crown. Left tibial peroneal trunk and posterior tibial artery     VASCULAR SURGERY  06/17/2017    CONT.balloon angioplasty with 3mm x 100 mmand 3.5mm x 300mm vascutrak balloon. Left popliteal artery balloon angioplasty with 5 mmx 100mm lutonix drug coated balloon. #6 left superficial femoral artery balloon angioplasty with 3.6 dus509 mm lutonix grug coated balloons. Completion left lower extremity arteriograms. Mynx closure right common femoral artery puncture site. VASCULAR SURGERY  01/20/2018    [facility]. Left transmetetarsal amputation. Allergies:  Keflex [cephalexin]  Home Meds:  Prior to Admission medications    Medication Sig Start Date End Date Taking? Authorizing Provider   apixaban (ELIQUIS) 2.5 MG TABS tablet Take 2.5 mg by mouth 2 times daily    Historical Provider, MD   atorvastatin (LIPITOR) 40 MG tablet Take 40 mg by mouth at bedtime    Historical Provider, MD   lisinopril (PRINIVIL;ZESTRIL) 10 MG tablet Take 5 mg by mouth daily    Historical Provider, MD   cilostazol (PLETAL) 100 MG tablet TAKE 1 TABLET TWICE DAILY 12/19/18   SHAHNAZ Yeung   Metoprolol Tartrate 75 MG TABS Take 75 mg by mouth 2 times daily  Patient taking differently: Take 50 mg by mouth 2 times daily 6/16/17   Gil Valdivia DO   aspirin 81 MG tablet Take 81 mg by mouth daily. Historical Provider, MD   folic acid (FOLVITE) 547 MCG tablet Take 800 mcg by mouth daily.     Historical Provider, MD   insulin lispro (HUMALOG) 100 UNIT/ML injection vial Inject into the skin 4 times daily (after meals and at bedtime)    Historical Provider, MD   insulin glargine (LANTUS) 100 UNIT/ML injection vial Inject 30 Units into the skin nightly    Historical Provider, MD        Current Meds:          nystatin  5 mL Oral 4x Daily    insulin glargine  20 Units SubCUTAneous BID    modafinil  100 mg Oral Daily    meropenem  1,000 mg IntraVENous Q8H    megestrol acetate  400 mg Oral Daily    sucralfate  1 g Oral 4x Daily AC & HS    DULoxetine  30 mg Oral Daily    pantoprazole  40 mg Oral QAM AC    sodium chloride flush  5-40 mL IntraVENous 2 times per day    atorvastatin  40 mg Oral Nightly    aspirin  81 mg Oral Daily    folic acid  105 mcg Oral Daily    apixaban  2.5 mg Oral BID    metoprolol tartrate  25 mg Oral BID    insulin lispro  0-4 Units SubCUTAneous Nightly    insulin lispro  0.05 Units/kg SubCUTAneous TID WC    insulin lispro  0-8 Units SubCUTAneous TID WC    sennosides-docusate sodium  1 tablet Oral BID        sodium chloride 75 mL/hr at 10/10/22 0048 sodium chloride      dextrose         PRN Meds:  benzonatate, sodium chloride flush, sodium chloride, ondansetron **OR** ondansetron, glucose, dextrose bolus **OR** dextrose bolus, glucagon (rDNA), dextrose, acetaminophen, bisacodyl, magnesium hydroxide    Social History:   Social History     Socioeconomic History    Marital status:      Spouse name: Not on file    Number of children: Not on file    Years of education: Not on file    Highest education level: Not on file   Occupational History    Not on file   Tobacco Use    Smoking status: Former     Types: Pipe    Smokeless tobacco: Never    Tobacco comments:     pipe on occassion   Substance and Sexual Activity    Alcohol use: No    Drug use: No    Sexual activity: Not on file   Other Topics Concern    Not on file   Social History Narrative    Not on file     Social Determinants of Health     Financial Resource Strain: Not on file   Food Insecurity: Not on file   Transportation Needs: No Transportation Needs    Lack of Transportation (Medical): No    Lack of Transportation (Non-Medical): No   Physical Activity: Not on file   Stress: Not on file   Social Connections: Not on file   Intimate Partner Violence: Not on file   Housing Stability: Not on file       Family History:   Family History   Problem Relation Age of Onset    Cancer Mother     Cancer Father        No GI malignancies yes(prostate) according to patient     ROS:  GENERAL: No fever or chills. NEURO: No headache or seizure. EYES: No diplopia or vision loss. CARDIOVASCULAR: No chest pain or palpitations. RESPIRATORY: No dyspnea or cough. GI: no  melena. No  hematochezia, noted  abdominal pain, without  nausea/vomiting, without  dysphagia  : No dysuria or hematuria. GYN: No discharge or abnormal bleeding. MUSCULOSKELETAL: No new arthralgias or myalgias  DERM: No rash or jaundice. ENDOCRINE: No polyuria or polydipsia. PSYCH: No anxiety or depression.     Physical Exam:  VITALS: Vitals:    10/10/22 0521 10/10/22 1300 10/10/22 1427 10/10/22 1444   BP: (!) 141/54 (!) 93/53     Pulse: 95 97 92    Resp: 20      Temp: 97 °F (36.1 °C)      TempSrc:       SpO2: 94% 93%     Weight:       Height:    6' 0.01\" (1.829 m)       General appearance: alert and cooperative with exam, appears stated age, no acute distress   Head: normal cephalic, atraumatic. EOMI bilaterally, no neck lymphadenopathy appreciated, no carotid bruits noted  Lungs: rales bibasilar and crackles bibasilar  Heart: S1, S2 normal  Abdomen: soft, non-tender; bowel sounds normal; no masses,  no organomegaly  Skin: warm, dry, no obvious rash, non-jaundice  Extremities: extremities normal, atraumatic, no cyanosis or edema  Neurologic: No obvious focal neurologic deficits. CN II-XII grossly intact      Labs:     Recent Labs     10/08/22  0631 10/09/22  0339 10/10/22  0355   WBC 16.4* 18.0* 17.8*   RBC 4.16* 4.24* 4.16*   HGB 13.6* 14.1 13.7*   HCT 43.2 44.2 42.5   .8* 104.2* 102.2*   MCH 32.7* 33.3* 32.9*   MCHC 31.5* 31.9* 32.2*    166 149     Recent Labs     10/08/22  0631 10/09/22  0339 10/10/22  0355   * 131* 139   K 4.4 4.0 4.3   ANIONGAP 5* 6* 7    97* 106   CO2 29 28 26   BUN 15 13 12   CREATININE 0.8 0.8 0.6   GLUCOSE 152* 126* 77   CALCIUM 8.3* 8.2* 8.1*     No results for input(s): MG, PHOS in the last 72 hours. No results for input(s): AST, ALT, ALB, BILITOT, ALKPHOS, ALB in the last 72 hours. HgBA1c:  No components found for: HGBA1C  FLP:  No results found for: TRIG, HDL, LDLCALC, LDLDIRECT, LABVLDL  TSH:  No results found for: TSH  Troponin T: No results for input(s): TROPONINI in the last 72 hours. INR: No results for input(s): INR in the last 72 hours. No results for input(s): LIPASE, AMYLASE in the last 72 hours.     Radiology:  Nothing within past 48 hours     Assessment:  Generalized lower abdominal pain with encircling pain around colostomy bag filled with stool     Plan:  Monitor stooling  Start antispasmodic  Start acid inhibitor  If patient improves we will continue treatment, if not consider UGI with SBFTand CT scan .with contrast.     Thank you for the consultation and allowing me to participate in this patient's care.     DO Herrera Warren Bolds

## 2022-10-10 NOTE — PROGRESS NOTES
Occupational Therapy  Facility/Department: [facility] 8 REHAB UNIT  Occupational Therapy Treatment Note     Name: Emily Stafford  : 1945  MRN: [id number]  Date of Service: 10/10/2022    Discharge Recommendations:  Continue to assess pending progress          Patient Diagnosis(es): There were no encounter diagnoses. Past Medical History:  has a past medical history of Atherosclerosis of native arteries of the extremities with ulceration(440.23), CAD (coronary artery disease), Cataracts, bilateral, Diabetes mellitus ([address] Diabetic neuropathy ([address] ED (erectile dysfunction), HTN (hypertension), Hypercholesterolemia, Open wound of right foot, Palliative care patient, and Ulcerative colitis ([facility] [address] Past Surgical History:  has a past surgical history that includes Coronary artery bypass graft (); Jejunostomy (); vascular surgery (14 SJS); vascular surgery (14 SJS); vascular surgery (2015 SJS); vascular surgery (2015 SJS ); Toe amputation (Left, 2017); vascular surgery (2017); vascular surgery (2017); eye surgery; Cholecystectomy; pr amputation foot,transmetatarsal (Left, 2018); and vascular surgery (2018). Treatment Diagnosis: CVA, hyperkalemia      Assessment   Performance deficits / Impairments: Decreased functional mobility ; Decreased endurance;Decreased ADL status; Decreased balance;Decreased strength;Decreased high-level IADLs;Decreased cognition  Treatment Diagnosis: CVA, hyperkalemia  Prognosis: Good  History: HTN, DM, Covid , recent liver CA diagnosis  Activity Tolerance  Activity Tolerance: Treatment limited secondary to medical complications (free text)  Activity Tolerance Comments: Low BP with dizziness.         Plan   Occupational Therapy Plan  Current Treatment Recommendations: Strengthening, Cognitive reorientation, Cognitive/Perceptual training, Equipment evaluation, education, & procurement, Patient/Caregiver education & training, Endurance training, Functional mobility training, Safety education & training, Home management training, Self-Care / ADL, Balance training     Restrictions  Restrictions/Precautions  Restrictions/Precautions: Fall Risk  Lower Extremity Weight Bearing Restrictions  Right Lower Extremity Weight Bearing: Weight Bearing As Tolerated  Left Lower Extremity Weight Bearing: Weight Bearing As Tolerated  Position Activity Restriction  Other position/activity restrictions: zio patch, colostomy bag    Subjective   General  Chart Reviewed: Yes, Orders  Patient assessed for rehabilitation services?: Yes  Additional Pertinent Hx: HTN, DM, Covid 2021, recent liver CA diagnosis  Family / Caregiver Present: Yes (daughter)  Diagnosis: CVA , hyperkalemia  General Comment  Comments: Frequent bouts of coughing and difficulty staying alert throughout the tx session.      Social/Functional History  Social/Functional History  Has the patient had two or more falls in the past year or any fall with injury in the past year?: Yes       Objective   Heart Rate: 92  Heart Rate Source: Monitor  BP: (!) 93/53 (c/o dizziness.)  BP Location: Left upper arm  BP Method: Automatic  Patient Position: Sitting  MAP (Calculated): 66.33  Resp: 20  SpO2: 93 %  O2 Device: Nasal cannula  Comment: 2 L             Safety Devices  Type of Devices: Left in bed;Bed alarm in place;Call light within reach            10/10/22 1130   Transfers   Stand Pivot Transfers Moderate assistance   Sit to stand Moderate assistance   Stand to sit Minimal assistance          OutComes Score   Eating  Assistance Needed: Supervision or touching assistance  Comment: VC for encourgement, at the end he required assistance to eat due to fatigue  CARE Score: 4  Discharge Goal: Independent           Goals  Short Term Goals  Time Frame for Short Term Goals: 1 week  Short Term Goal 1: complete UB dressing with setup  Short Term Goal 2: complete LB dressing with CGA  Short Term Goal 3: complete overall toileting with CGA  Short Term Goal 4: complete 1-2 handed standing level task for 3 mins with supervision  Short Term Goal 5: complete overall bathing with CGA  Short Term Goal 6: complete simple ambulatory home making task with CGA  Long Term Goals  Time Frame for Long Term Goals : 3 weeks  Long Term Goal 1: complete overall toileting with modified independence  Long Term Goal 2: complete overall bathing with modified independence  Long Term Goal 3: complete overall dressing with modified independence  Long Term Goal 4: complete simple ambulatory home making task with modified independence  Long Term Goal 5: complete HEP with independence  Long Term Goal 6: pt/family verbalize DME       Therapy Time   Individual Concurrent Group Co-treatment   Time In 1130        Time Out 1230        Minutes 60        Timed Code Treatment Minutes: 75 Pavel Bernal, OT

## 2022-10-10 NOTE — PATIENT CARE CONFERENCE
c/o of dizziness with first stand and requested to sit back down. Pt. BP was 96/53, pt. remained sitting until symptoms decreased. Pt. willing to attempt another sit <> stand and was able to stand 45 sec, VC were required to maintain upright posture. Pt. unable to tolerate much more activity. Once back in room, pt. stood long enough to undress LE, sat on EOB for around 2 minutes with minimal PT or UE assist needed. SPEECH THERAPY  No oral residue or buccal cavity pocketing was noted after his noon meal.     He continues to exhibit weak vocal intensity and hoarse vocal quality. Dry oral mucosa was noted. Therapist provided extra support to his neck/head when he accepted sips of thin liquids. He did accept a few sips of water and 7up via straw. Delayed congested cough response was noted. Wet/gurgly vocal quality was noted. Weak hyolaryngeal elevation and delayed swallow responses are noted. Crackers and pudding were offered but he declined these. He attempted orientation tasks but was unable to provide any answers. He also attempted to follow simple commands but was unable to consistently complete the tasks. He tried to answer open ended questions but could not consistently answer these. He was less alert and very fatigued. He did try and verbally express his wants and needs but would eventually give up. Therapist repositioned him and offered additional blankets. Recommended Diet and Intervention  Easy to chew  Thin liquids  Recommended Form of Meds: Whole with water  Therapeutic Interventions: Pharyngeal exercises;Diet tolerance monitoring;Oral motor exercises; Patient/Family education     Compensatory Swallowing Strategies  Compensatory Swallowing Strategies : Alternate solids and liquids;Eat/Feed slowly;Upright as possible for all oral intake;Remain upright for 30-45 minutes after meals;Small bites/sips      Short Term Goals:  Goal 1: Pt will complete the CLQT.   Goal 2: Pt will complete recall tasks with 90% accuracy and minimal cues. Goal 3: Pt will complete verbal expression tasks with 80% accuracy and minimal cues. Goal 4: Pt will complete executive functioning/problem solving/safety awareness tasks with 80% accuracy and minimal cues. Goal 5: Pt will complete orientation tasks with 90% accuracy and minimal cues. Goal 6: Pt will complete diaphragmatic breathing exercises to target breath support for speech production. Long Term Goals:  Pt will participate in skilled speech therapy services to ensure he is able to adequately/effectively communicate his wants and needs and safely complete ADLs. Pt will tolerate the least restrictive diet with minimal overt s/s of aspiration/penetration during hospitalization. STGs Met:0  LTGs Met: 0    ELOS: 2-3 weeks     Recommend he continue speech therapy services for cognition, dysphagia, expressive language, and breath support.        OCCUPATIONAL THERAPY  Cognitive Patterns:   1453   Cognitive Patterns     Cognitive Pattern Assessment Used BIMS   Repetition of Three Words (First Attempt) 3   Temporal Orientation: Year Missed by > 5 years or no answer   Temporal Orientation: Month Missed by 6 days to 1 month   Temporal Orientation: Day Incorrect or no answer   Able to recall \"sock Yes, no cue required   Able to recall \"blue\" Yes, no cue required   Able to recall \"bed\" Yes, no cue required   BIMS Summary Score 10       Cognitive Assessment Method (CAM):  Confusion Assessment Method (CAM)  Is there evidence of an acute change in mental status from the patient's baseline?: No  Inattention: Behavior continuously present, does not fluctuate  Disorganized thinking: Behavior present, fluctuates (comes and goes, changes in severity)  Altered level of consciousness: Behavior not present  Health Literacy:  How often do you need to have someone help you when you read instructions, pamphlets, or other written material from your doctor or pharmacy?: Always        CURRENT IRF-VIANEY SCORES  Eating: CARE Score: 4  Comment: VC for encouragement    Oral Hygiene: CARE Score: 4  Comment: Cues for thoroughness. Toileting: CARE Score: 2  Comment: Total A with colostomy management      Shower/Bathe: CARE Score: 3  Comment: Mod A        Upper Body Dressing: CARE Score: 3  Comment: Min A      Lower Body Dressing: CARE Score: 3  Comment: Able to thread over feet, but assistance with pulling pants up when standing at RW. Footwear: CARE Score: 2  Comment: socks only, vc for tech, AE       Toilet Transfers: CARE Score: 3          Picking Up Object:  CARE Score: 88          UE Functioning:  BUE AROM WFL     Pain Assessment:   6/10 pain in abdomen        STGs:  Short Term Goals  Time Frame for Short Term Goals: 1 week  Short Term Goal 1: complete UB dressing with setup  Short Term Goal 2: complete LB dressing with CGA  Short Term Goal 3: complete overall toileting with CGA  Short Term Goal 4: complete 1-2 handed standing level task for 3 mins with supervision  Short Term Goal 5: complete overall bathing with CGA  Short Term Goal 6: complete simple ambulatory home making task with CGA    LTGs:  Long Term Goals  Time Frame for Long Term Goals : 3 weeks  Long Term Goal 1: complete overall toileting with modified independence  Long Term Goal 2: complete overall bathing with modified independence  Long Term Goal 3: complete overall dressing with modified independence  Long Term Goal 4: complete simple ambulatory home making task with modified independence  Long Term Goal 5: complete HEP with independence  Long Term Goal 6: pt/family verbalize DME    Assessment:  Performance deficits / Impairments: Decreased functional mobility , Decreased endurance, Decreased ADL status, Decreased balance, Decreased strength, Decreased high-level IADLs, Decreased cognition                 NUTRITION  Current Wt: Weight: 196 lb 14.4 oz (89.3 kg) / Body mass index is 26.7 kg/m².   Admission Wt: Admission Body Weight: 203 lb (92.1 kg)  Oral Diet Orders:   Easy to Chew; 3 Carb Choices, Low K+ (<3g/day)  Oral Nutrition Supplement (ONS) Orders:  Glucerna Strawberry with breakfast, Boost Pudding with lunch, Magic Cup with dinner. Wt loss 7 lb during LOS, PO < 50% EEN x 10 days. Would recommend increasing Megace to 800mg/day or consider changing to different type (I.e., Marinol) if medically appropriate. Please see nutrition note for details. NURSING    Wounds/Incisions/Ulcers:   Skin warm and dry with bruising to BUE and redness to coccyx. Bryan Scale Score: 16    Pain: Tylenol 650 mg q4hrs prn for pain control    Consultations/Labs/X-rays:   Routine labs on Monday and Thursdays. Consults Dr. Afsaneh Ordoñez and Hospitalist    Family Education: Family available and participating in education     Fall Risk:  Anju Rey Total Score: 80    Fall in the last week? No falls this hospital stay      Other Nursing Issues:   Patient is Alert and Oriented. Incontinent of bladder and Ileostomy in place. Assist x 1-2 for transfers. Contact Isolation for MDRO. IID with right lower arm and receiving IV Merrem. NS at 75 cc/hr. Patient on Eliquis and Accu Cheks ac & hs. Patient is on Carb control low potassium diet. SOCIAL WORK/CASE MANAGEMENT  Assessment: Family present for team conference and questions - Daughter, son-in-law, son present. Family continues to struggle with decisions. Discharge Plan   Estimated Length of Stay: CMG date 10/15, IV antibiotics through 10/14, Family intense instruction will be required if they commit to discharge to home; if to SNF preauthorization with [facility] necessary    Destination: undetermined at this time    Pass: No    Services at Discharge: continued therapeutic interventions    Equipment at Discharge: Will determine closer to discharge. Progress made in the prior week:  Setup for self feeding.    2. Improved participation with therapy with co-treatments  3.  4.  5.      Goals for following week:  Setup for UB dressing. 2. Be able to tolerate non-cotreatment sessions. 3.   4.   5.     Factors facilitating achievement of predicted outcomes: Pleasant, Family support    Barriers to the achievement of predicted outcomes: Pain, Decreased endurance, Upper extremity weakness, and Lower extremity weakness    Team Members Present at Conference:  : Ronit Faulkner 23   Occupational Therapist: Sera Polanco OTR/L  Physical Therapist: Dieudonne Kyle PT,DPT  Speech Therapist: Madhuri Orozco , Becky Bryant  Nurse: Magali Simpson   Nurse Manager:  Juan Viera, RN, BSN  Dietitian:  Trudy Baugh, MS RD, LD  Rehab Director:        I approve the established interdisciplinary plan of care as documented within the medical record of Bita Pereira.

## 2022-10-10 NOTE — PROGRESS NOTES
[facility]  INPATIENT SPEECH [facility]ERAP[facility]  [facility] 8 REHAB UNIT  TIME   1000  1100  61 MINUTES    [x]Daily Note  []Progress Note    Date: 10/10/2022  Patient Name: Tasneem Huffman        MRN: [id number]    Account #: [de-identified]  : 1945  (76 y.o.)  Gender: male   Primary Provider: Elaine Jeong MD  Swallowing Status/Diet: Easy to chew diet, thin liquids      Subjective: Pt cooperative and upright in bed. Pt is significantly more compliant and talkative on this date. Vocal intensity has improved. Pt states that he is having pain in his buttocks. Pt states that he fell during his previous hospitalization and [place]. Pt is on 3L nasal cannula on this date. Pt continues to require rest breaks in between therapy tasks. Objective:    Pt agreed to regular solid trials during swallowing reassessment. [employer] breakfast placed on bedside tray. Pt agreed to biscuit, gravy, and sausage. Pt took approximately 25% of meal P.O., which demonstrates improvement in oral intake. Consistencies observed during swallowing reassessment includes thin liquids straw and regular solids. Oral transit is consistently 1-2 seconds with thin liquids via consecutive sips side of cup. Oral transit ranges from 2-4 seconds with regular solids. Moderate oral residue observed on this date. Pt did require cues to clear with lingual sweeping and liquid wash. Laryngeal movement observed to be consistently sluggish and mildly decreased. No overt s/s of aspiration observed with thin liquids via straw and regular solids. Pt did demonstrate minimal difficulties with self feeding. He does benefit  from setup assistance. Adequate oral care completed via tooth brush and tooth paste. Pt was able to independently brush teeth and tongue. He did require cues to swish and spit when finished with teeth brushing. Spatial, temporal, biographical orientation task completed.  Pt is able to state his name, current location, current city/state, floor number/room number,  phone number and ; however, he is unable to elicit the day/month/year and address. Task completed with 67% accuracy. Moderate semantic and phonemic cues required to eat this task. Flat affect noted at the conversational level; however, pt was observed opening both eyes during conversation which demonstrates improvement. Education provided on upcoming therapy times on this date. Functional recall task completed. Pt is required to elicit 3 daily therapies. Pt is able to independently elicit 3/3 daily therapies. LPN reports lungs sound coarse on this date. SLP will continue to follow. Recommended Diet and Intervention  Easy to chew  Thin liquids  Recommended Form of Meds: Whole with water  Therapeutic Interventions: Pharyngeal exercises;Diet tolerance monitoring;Oral motor exercises; Patient/Family education     Compensatory Swallowing Strategies  Compensatory Swallowing Strategies : Alternate solids and liquids;Eat/Feed slowly;Upright as possible for all oral intake;Remain upright for 30-45 minutes after meals;Small bites/sips    Short Term Goals:  Goal 1: Pt will complete the CLQT. Goal 2: Pt will complete recall tasks with 90% accuracy and minimal cues. Goal 3: Pt will complete verbal expression tasks with 80% accuracy and minimal cues. Goal 4: Pt will complete executive functioning/problem solving/safety awareness tasks with 80% accuracy and minimal cues. Goal 5: Pt will complete orientation tasks with 90% accuracy and minimal cues. Goal 6: Pt will complete diaphragmatic breathing exercises to target breath support for speech production. Long Term Goals:  Pt will participate in skilled speech therapy services to ensure he is able to adequately/effectively communicate his wants and needs and safely complete ADLs. Pt will tolerate the least restrictive diet with minimal overt s/s of aspiration/penetration during hospitalization. ASSESSMENT:  Assessment: [x]Progressing towards goals          []Not Progressing towards goals    Patient Tolerance of Treatment:   [x]Tolerated well []Tolerated fair []Required rest breaks []Fatigued    Education:  Learner:  [x]Patient          []Significant Other          []Other  Education provided regarding:  [x]Goals and POC   [x]Diet and swallowing precautions    []Home Exercise Program  []Progress and/or discharge information  Method of Education:  [x]Discussion          []Demonstration          []Handout          []Other  Evaluation of Education:   [x]Verbalized understanding   []Demonstrates without assistance  []Demonstrates with assistance  []Needs further instruction     []No evidence of learning                  []No family present      Plan: []Continue with current plan of care    []Modify current plan of care as follows:    []Discharge patient    Discharge Location:    Services/Supervision Recommended:      []Patient continues to require treatment by a licensed therapist to address functional deficits as outlined in the established plan of care.       Electronically signed by SACHI Magallanes on 10/10/2022 at 11:00 AM

## 2022-10-10 NOTE — PROGRESS NOTES
Name: Suzy Da Silva  MRN:  [id number]  Date of service:  10/10/2022       10/10/22 1300   Vitals   Heart Rate 97   Heart Rate Source Monitor   BP (!) 93/53   BP Location Left upper arm   BP Method Automatic   Patient Position Sitting   MAP (Calculated) 66.33   SpO2 93 %   O2 Device Nasal cannula   Comment 2 L   Bed mobility   Rolling to Left Minimal assistance;Contact guard assistance  (VC for sequencing, using rails)   Rolling to Right Minimal assistance;Contact guard assistance  (VC for sequencing, using rails)   Sit to Supine Minimal assistance;Contact guard assistance  (from L side of bed, some assist for LE)   Transfers   Sit to Stand Minimal Assistance;Contact guard assistance   Stand to Sit Minimal Assistance;Contact guard assistance   Bed to Chair Minimal assistance;Contact guard assistance  (stand pivot to L)   Stand Pivot Transfers Minimal Assistance;Contact guard assistance  (to L)   PT Exercises   Exercise Treatment in //: sit <> stand x 2, first stand was about 15 sec, 2nd stand was 45 sec   Assessment   Assessment During sit <>  //, pt. c/o of dizziness with first stand and requested to sit back down. Pt. BP was 96/53, pt. remained sitting until symptoms decreased. Pt. willing to attempt another sit <> stand and was able to stand 45 sec, VC were required to maintain upright posture. Pt. unable to tolerate much more activity. Once back in room, pt. stood long enough to undress LE, sat on EOB for around 2 minutes with minimal PT or UE assist needed.    Safety Devices   Type of Devices Left in bed;Bed alarm in place;Call light within reach       Electronically signed by Mara López PT Student, on 10/10/2022 at 2:27 PM

## 2022-10-10 NOTE — PROGRESS NOTES
Comprehensive Nutrition Assessment    Type and Reason for Visit:  Reassess    Nutrition Recommendations/Plan:   Please consider increasing megace dose to twice daily, or consider changing the medication. Pt is not receiving benefits of megace 400mg at this time as PO has remained poor throughout LOS. Malnutrition Assessment:  Malnutrition Status: At risk for malnutrition (Comment) (10/10/22 6540)    Context:  Acute Illness     Findings of the 6 clinical characteristics of malnutrition:  Energy Intake:  50% or less of estimated energy requirements for 5 or more days  Weight Loss:  1% to 2% over 1 week     Body Fat Loss:  No significant body fat loss     Muscle Mass Loss:  No significant muscle mass loss    Fluid Accumulation:  No significant fluid accumulation     Strength:  Not Performed    Nutrition Assessment:    Pt declining from a nutritional standpoint AEB ongoing poor PO intake of average 0-25% of meals and inconsisntent ONS consumption. Will recommend to increase appetite stimulant dosage or change to a different type. Nutrition Related Findings:     10/7 ostomy Wound Type: None       Current Nutrition Intake & Therapies:    Average Meal Intake: 1-25%  Average Supplements Intake: 1-25%  ADULT DIET; Easy to Chew; 3 carb choices (45 gm/meal); Low Potassium (Less than 3000 mg/day); NO OATMEAL. LIKES SCRAMBLED EGGS, SINGH, & SAUSAGE  ADULT ORAL NUTRITION SUPPLEMENT; Lunch; Fortified Pudding Oral Supplement  ADULT ORAL NUTRITION SUPPLEMENT; Dinner; Frozen Oral Supplement  ADULT ORAL NUTRITION SUPPLEMENT; Breakfast; Diabetic Oral Supplement    Anthropometric Measures:  Height: 6' 0.01\" (182.9 cm)  Ideal Body Weight (IBW): 178 lbs (81 kg)    Admission Body Weight: 203 lb (92.1 kg)  Current Body Weight: 194 lb 3.6 oz (88.1 kg), 109.1 % IBW.  Weight Source: Bed Scale  Current BMI (kg/m2): 26.3  Usual Body Weight: 210 lb (95.3 kg)  % Weight Change (Calculated): -7.5  Weight Adjustment For: No Adjustment BMI Categories: Overweight (BMI 25.0-29. 9)    Estimated Daily Nutrient Needs:  Energy Requirements Based On: Kcal/kg  Weight Used for Energy Requirements: Current  Energy (kcal/day): 1295-0322 kcal/day = 25-30 kcal/kg  Weight Used for Protein Requirements: Ideal  Protein (g/day): 105-162 g/day = 1.2-2g/kg IBW  Method Used for Fluid Requirements: 1 ml/kcal  Fluid (ml/day): 9036-6696    Nutrition Diagnosis:   Inadequate oral intake related to psychological cause or life stress, acute injury/trauma as evidenced by intake 0-25%, intake 26-50%, weight loss, weight loss greater than or equal to 2% in 1 week    Nutrition Interventions:   Food and/or Nutrient Delivery: Continue Current Diet, Continue Oral Nutrition Supplement  Nutrition Education/Counseling: Education not indicated  Coordination of Nutrition Care: Continue to monitor while inpatient, Coordination of Care  Plan of Care discussed with: IDT    Goals:  Previous Goal Met: No Progress toward Goal(s)  Goals: PO intake 50% or greater, PO intake 75% or greater, Meet at least 75% of estimated needs, within 7 days       Nutrition Monitoring and Evaluation:   Behavioral-Environmental Outcomes: None Identified  Food/Nutrient Intake Outcomes: Diet Advancement/Tolerance, Food and Nutrient Intake, Supplement Intake  Physical Signs/Symptoms Outcomes: Biochemical Data, GI Status, Weight, Skin    Discharge Planning:     Too soon to determine     Buzz MS [place], RD, LD  Contact: [phone]

## 2022-10-10 NOTE — PROGRESS NOTES
Enter Query Response Below      Query Response:       unable to clinically determine         If applicable, please update the problem list.      ----- Message -----  From: Yasmin Paulson RN  Sent: 10/3/2022   7:16 AM CDT  To: Man Jolly MD  Subject: CDI Query                                             Thank you for your response to the CDI query. In order to be compliant with regulatory entities, CDI query responses are not valid without the physician’s electronic signature. If your query response is still accurate and clinically significant, please restate and add your electronic signature.   Hint: use .bhesig then HIT ENTER      ----- Message -----  From: Man Jolly MD  Sent: 2022   6:25 PM CDT  To: Sylwia JAMES Rosenbaum  Subject: RE: CDI Query                                         unable to clinically determine      ----- Message -----  From: Sylwia Rosenbaum  Sent: 2022  10:53 AM CDT  To: Man Jolly MD  Subject: CDI Query                                             Patient: Darren Shay        : 1945  Account: [id number]           Admit Date: 2022                                                    Options to Respond to Query:     1. Access the Encounter                a. From the To-Do Side bar, click Respond With Note.                b. Click New Note                c. Answer query within the yellow box.                d. Update the Problem List if applicable.     Dr. Jolyl,     H&P, progress notes, and discharge summary include diagnosis of hyponatremia.  Patient's laboratory results during this encounter include:   - glucose 216, sodium 133   - glucose 117, sodium 136   - glucose   86, sodium 138   - glucose 113, sodium 138   - glucose 236, sodium 132  Patient was treated with IV sodium chloride 0.9%.     Can this be further clarified as:     - clinically significant hyponatremia  - hyponatremia, not clinically  significant  - other ____________________________  - unable to clinically determine     By submitting this query, we are merely seeking further clarification of documentation to accurately reflect all conditions that you are monitoring, evaluating, treating or that extend the hospitalization or utilize additional resources of care. Please utilize your independent clinical judgment when addressing the question(s) above.      This query and your response, once completed, will be entered into the legal medical record.     Sincerely,  Sylwia Rosenbaum RN CCDS [facility]  Clinical Documentation Integrity Program   [email or web address]

## 2022-10-10 NOTE — PLAN OF CARE
Problem: Neurosensory - Adult  Goal: Achieves stable or improved neurological status  10/9/2022 2136 by Grayson Sol RN  Outcome: Progressing  10/9/2022 0853 by Jing Kate RN  Outcome: Progressing     Problem: Respiratory - Adult  Goal: Achieves optimal ventilation and oxygenation  10/9/2022 2136 by Grayson Sol RN  Outcome: Progressing  10/9/2022 0853 by Jing Kate RN  Outcome: Progressing     Problem: Skin/Tissue Integrity - Adult  Goal: Skin integrity remains intact  10/9/2022 2136 by Grayson Sol RN  Outcome: Progressing  Flowsheets  Taken 10/9/2022 2135  Skin Integrity Remains Intact: Monitor for areas of redness and/or skin breakdown  Taken 10/9/2022 2120  Skin Integrity Remains Intact: Monitor for areas of redness and/or skin breakdown  10/9/2022 0853 by Jing Kate RN  Outcome: Progressing  Flowsheets (Taken 10/9/2022 7933)  Skin Integrity Remains Intact: Monitor for areas of redness and/or skin breakdown

## 2022-10-10 NOTE — PROGRESS NOTES
Occupational Therapy     10/10/22 1300   General   Timed Code Treatment Minutes 40 Minutes   Pre Treatment Pain Screening   Pain at present 6   Scale Used Faces   Intervention List Patient able to continue with treatment   Comments / Details Abdomen. General   Additional Pertinent Hx HTN, DM, Covid 2021, recent liver CA diagnosis   Diagnosis CVA , hyperkalemia   Vital Signs   Heart Rate 97   Heart Rate Source Monitor   BP (!) 93/53  (c/o dizziness.)   BP Location Left upper arm   BP Method Automatic   MAP (Calculated) 66.33   Patient Position Sitting   Oxygen Therapy   SpO2 93 %   O2 Device Nasal cannula   Balance   Sitting Balance Minimal assistance  (Min to CGA, posterior leaning. EOB.)   Standing Balance Minimal assistance  (Parallel bars and bedrail.)   Bed mobility   Rolling to Left Minimal assistance;Contact guard assistance  (VC for sequencing, using rails)   Rolling to Right Minimal assistance;Contact guard assistance  (VC for sequencing, using rails)   Sit to Supine Minimal assistance;Contact guard assistance  (from L side of bed, some assist for LE)   Transfers   Stand Pivot Transfers Minimal assistance   Sit to stand Minimal assistance;Contact guard assistance   Stand to sit Minimal assistance;Contact guard assistance   Assessment   Performance deficits / Impairments Decreased functional mobility ; Decreased endurance;Decreased ADL status; Decreased balance;Decreased strength;Decreased high-level IADLs;Decreased cognition   Treatment Diagnosis CVA, hyperkalemia   Prognosis Good   History HTN, DM, Covid 2021, recent liver CA diagnosis   Activity Tolerance   Activity Tolerance Treatment limited secondary to medical complications (free text)   Activity Tolerance Comments Low BP with dizziness.    Occupational Therapy Plan   Current Treatment Recommendations Strengthening;Cognitive reorientation;Cognitive/Perceptual training;Equipment evaluation, education, & procurement;Patient/Caregiver education & training; Endurance training;Functional mobility training; Safety education & training;Home management training;Self-Care / ADL; Balance training      10/10/22 1300   Oral Hygiene   Assistance Needed Supervision or touching assistance   Comment Cues for thoroughness. CARE Score 4   [address] needed Substantial/maximal assistance   Comment Able to stand with Min A to pull clothing down, completed all other aspects sitting or supine. Total assist for colostomy management.    CARE Score 2

## 2022-10-10 NOTE — PROGRESS NOTES
Patient:   Bita Pereira  MR#:    [id number]   Room:    0811/811-01   YOB: 1945  Date of Progress Note: 10/10/2022  Time of Note                           8:11 AM  Consulting Physician:   Rosalinda Tucker M.D. Attending Physician:  Rosalinda Tucker MD     CHIEF COMPLAINT: Generalized weakness and deconditioning     Subjective  This 76 y.o. male  with history of HTN, hypercholesterolemia, DM, CAD, PVD, DM neuropathy, osteomyelitis with transmetatarsal amputation, COVID-19 July 2022 and newly diagnosis liver cancer July of this year with radioembolization to the liver 9/20/22. He presented to [facility] on 9/22/22 with weakness, AMS and lethargy. He was admitted to the Hospitalist service with metabolic encephalopathy. MRI done revealed Multiple tiny 1 or 2 mm areas of diffusion and increased signal in both cerebral hemispheres that likely represent small areas of acute ischemic change. These are seen in the bilateral frontal and parietal lobes and in the right occipital lobe. Neurology was consulted. He was seen by Dr. Adela Mobley, who felt embolic stroke related to hypercoaguable state and underlying/undetected atrial fibrllation. Jacinta Sicard had stopped ASA on his own but continued Pletal. ASA had been started this admission. In normal circumstance low dose anticoagulation would be recommended for ESUS but patient has higher than normal risk for bleed with hepatic cancer. Plans are for Zio Patch at discharge CTA of neck done shows heavy plaque burden at the bilateral carotid bifurcations with about 80% stenosis of the right ICA and 80 to 90% stenosis on the left. Vascular surgery was consulted. He was seen by Dr. Rodolfo Lobo, who didn't feel any surgical intervention was needed, he recommended continued conservative therapy with antiplatelet therapy and given the embolic appearance of the infarcts could consider low-dose Eliquis twice daily as well.      Has evaluated by SPT for swallow and placed on a mechanical soft diet with thin liquids. SPT will also continue to see for cognition. He is still weak overall and participating in both PT/OT  Patient returned back to the floor 9/30 with resumption of care following treatment for hyperkalemia. Complains of a paste in his mouth. Says he has some stomach pain which continues and overall just does not feel well  REVIEW OF SYSTEMS:  Constitutional: No fevers No chills  Neck:No stiffness  Respiratory: No shortness of breath  Cardiovascular: No chest pain No palpitations  Gastrointestinal: No diarrhea. Has a colostomy  Genitourinary: No Dysuria  Neurological: No headache, no confusion      PHYSICAL EXAM:  BP (!) 141/54   Pulse 95   Temp 97 °F (36.1 °C)   Resp 20   Ht 6' 0.01\" (1.829 m)   Wt 196 lb 14.4 oz (89.3 kg)   SpO2 94%   BMI 26.70 kg/m²     Constitutional: he appears well-developed and well-nourished. Eyes - conjunctiva normal.  Pupils react to light  Ear, nose, throat -hearing intact to voice. No scars, masses, or lesions over external nose or ears, no atrophy of tongue  Neck-symmetric, no masses noted, no jugular vein distension  Respiration- chest wall appears symmetric, good expansion,   normal effort without use of accessory muscles  Cardiovascular- RRR  Musculoskeletal - no significant wasting of muscles noted, no bony deformities, gait no gross ataxia  Extremities-no clubbing, cyanosis or edema  Skin - warm, dry, and intact. No rash, erythema, or pallor. Psychiatric - mood, affect, and behavior appear normal.      Neurology  NEUROLOGICAL EXAM:      Mental status   Somewhat drowsy.   Complains of being tired     Cranial Nerve Exam   CN II- Visual fields grossly unremarkable  CN III, IV,VI-EOMI, No nystagmus, conjugate eye movements, no ptosis  CN VII-slight left facial droop  CN VIII-Hearing intact   CN IX and X- Palate elevates in midline  CN XI-good shoulder shrug  CN XII-Tongue midline with no fasciculations or fibrillations     Motor Exam  V/V throughout upper and lower extremities bilaterally, no cogwheeling, normal tone     Absent throughout     Tremors- no tremors in hands or head noted  Nursing/pcp notes, imaging,labs and vitals reviewed. Lab Results   Component Value Date    WBC 17.8 (H) 10/10/2022    HGB 13.7 (L) 10/10/2022    HCT 42.5 10/10/2022    .2 (H) 10/10/2022     10/10/2022     Lab Results   Component Value Date     10/10/2022    K 4.3 10/10/2022     10/10/2022    CO2 26 10/10/2022    BUN 12 10/10/2022    CREATININE 0.6 10/10/2022    GLUCOSE 77 10/10/2022    CALCIUM 8.1 (L) 10/10/2022    PROT 6.1 (L) 10/02/2022    LABALBU 2.9 (L) 10/02/2022    BILITOT 1.8 (H) 10/02/2022    ALKPHOS 193 (H) 10/02/2022    AST 94 (H) 10/02/2022    ALT 67 (H) 10/02/2022    LABGLOM >60 10/10/2022    GFRAA >59 10/10/2022     Lab Results   Component Value Date    INR 1.06 01/23/2018    INR 1.16 (H) 09/08/2014    INR 1.20 (H) 09/04/2014   No results found for: PHENYTOIN, ESR, CRP    PT,OT and/or speech notes reviewed:    10/07/22 1300   Restrictions/Precautions   Restrictions/Precautions Fall Risk   Lower Extremity Weight Bearing Restrictions   Right Lower Extremity Weight Bearing Weight Bearing As Tolerated   Left Lower Extremity Weight Bearing Weight Bearing As Tolerated   Position Activity Restriction   Other position/activity restrictions zio patch, colostomy bag   General   Chart Reviewed Yes   Patient assessed for rehabilitation services? Yes   Additional Pertinent Hx Liver cancer, HTN, CAD, PVD, DM with neuropathy, osteomyelitis with transmetatarsal amputation, ileostomy   Diagnosis HYPERKALEMIA, CVA   General Comment   Comments CO-TX with MATHEWS   Subjective   Subjective Pt laying in bed asleep, awakes and agrees to participate in therapy.    Hearing   Hearing X   Hearing Exceptions Bilateral hearing aid   Vitals   Heart Rate (!) 102   BP (!) 130/59   Patient Position Sitting   MAP (Calculated) 82.67   SpO2 92 %  (92-95)   O2 Device Nasal cannula   Comment 4 L   Subjective   Subjective Stomach   Pain Faces: 6/10   Bed mobility   Bridging Minimal assistance  (Minimally- PTA holding B feet flat and into B hip add)   Rolling to Left Minimal assistance   Rolling to Right Minimal assistance   Supine to Sit Moderate assistance   Scooting Stand by assistance  (On EOB and in supine minimally)   Bed Mobility Comments On L side of bed- use of L bedrail   Transfers   Sit to Stand Minimal Assistance; Moderate Assistance  (In //, with B pullling up on //)   Stand to Sit Minimal Assistance  (In //)   Squat Pivot Transfers Moderate Assistance  (EOB<w/c from pt R and w/c<recliner from pt R)   PT Exercises   Exercise Treatment 3X sit<>stands in // requiring CGA/Min A- no instances of knees buckling, BLE shaky, PTA blocking pt B knees. Pt maintained static standing balance for ~25 sec, ~15 sec, ~10 sec, w/ v/c's for upward gaze and upright posture. Activity Tolerance   Activity Tolerance Patient limited by endurance; Patient limited by fatigue;Patient tolerated treatment well   Assessment   Assessment Pt presents with very low activity tolerance, able to stand 3X in // for ~25 sec, ~15 sec, then ~20 sec. Pt able to perform squat pivot transfers from pt R requiring Mod A. Discharge Recommendations Continue to assess pending progress;Subacute/Skilled Nursing Facility; Patient would benefit from continued therapy after discharge   Education   Education Given To Patient   Education Provided Mobility Training;Transfer Training;ADL Function   Education Method Demonstration;Verbal   Barriers to Learning Other (Comment)   Education Outcome Continued education needed;Demonstrated understanding   Safety Devices   Type of Devices Call light within reach; Chair alarm in place  (With family.)            Electronically signed by Juvencio Cornejo PTA on 10/7/2022 at 4:24 PM                                                          Occupational Therapy 10/07/22 1300   General   Timed Code Treatment Minutes 60 Minutes   Pre Treatment Pain Screening   Pain at present 6   Scale Used Faces   Intervention List Patient able to continue with treatment   Comments / Details Abdomen. Restrictions/Precautions   Restrictions/Precautions Fall Risk   Lower Extremity Weight Bearing Restrictions   Right Lower Extremity Weight Bearing Weight Bearing As Tolerated   Left Lower Extremity Weight Bearing Weight Bearing As Tolerated   Position Activity Restriction   Other position/activity restrictions zio patch, colostomy bag   General   Patient assessed for rehabilitation services? Yes   Additional Pertinent Hx HTN, DM, Covid 2021, recent liver CA diagnosis   Diagnosis CVA , hyperkalemia   Oxygen Therapy   SpO2 92 %   O2 Device Nasal cannula   ADL   Grooming Other (Comment)  (Hair washed.)   Balance   Sitting Balance Contact guard assistance   Standing Balance Minimal assistance  (CGA to Min A. In parallel bars.)   Transfers   Stand Pivot Transfers Minimal assistance; Moderate assistance   Sit to stand Minimal assistance; Moderate assistance   Stand to sit Minimal assistance; Moderate assistance   Assessment   Performance deficits / Impairments Decreased functional mobility ; Decreased endurance;Decreased ADL status; Decreased balance;Decreased strength;Decreased high-level IADLs;Decreased cognition   Assessment Pt. tolerated tx fairly. Demonstrated some improvement in transfer tech this tx session, but continues to have difficulty staying alert and following commands. Pt. is easily fatigued, experiences frequent bouts of coughing, and continues to complain of abdominal pain. Pt. would benefit from continued skilled therapy to address deficits, and increase independence during ADLs/IADLs.    Treatment Diagnosis CVA, hyperkalemia   History HTN, DM, Covid 2021, recent liver CA diagnosis   Activity Tolerance   Activity Tolerance Patient limited by fatigue   Occupational Therapy Plan Current Treatment Recommendations Strengthening;Cognitive reorientation;Cognitive/Perceptual training;Equipment evaluation, education, & procurement;Patient/Caregiver education & training; Endurance training;Functional mobility training; Safety education & training;Home management training;Self-Care / ADL; Balance training        10/07/22 1300   Lower Body Dressing   Assistance Needed Substantial/maximal assistance   Comment Completed in supine. CARE Score 2   [address] needed Substantial/maximal assistance   Comment pt. able to assist with clothing management, completed supine. CARE Score 2                  RECORD REVIEW: Previous medical records, medications were reviewed at today's visit    IMPRESSION:   1. Bilateral embolic strokes-PT/OT/SLP. Discontinue Pletal due to nosebleed and resume Eliquis with baby aspirin. 2.  Essential hypertension-metoprolol, monitoring. Low at times. Currently adequate. 3.  Diabetes-continue insulin and monitoring. Lantus increased 10/5. Stable  4. Hyperlipidemia-Lipitor  5. GI-bowel regimen. Has colostomy. Carafate added. Continues to complain of \"stomach pain\". Asked GI to see. Also nystatin added for thrush. 6.  DVT prophylaxis-Eliquis resumed today. Scd's added. 7  History of liver cancer- relatively new dx  8. Abnormal UA-culture growing E. coli. Patient given Cipro. Repeat study 10/1 unremarkable  9. Hyperkalemia-recurrent. Geetha Morales ordered and hospitalist asked to follow-up. 10.  Leukocytosis-per hospitalist.  On Merrem. Persistent  Provigil added. Continue Cymbalta. ELOS:10/15.   restaff this week

## 2022-10-11 NOTE — PLAN OF CARE
Problem: Neurosensory - Adult  Goal: Achieves stable or improved neurological status  10/10/2022 2225 by Anna Patel LPN  Outcome: Progressing  10/10/2022 1445 by Logan James LPN  Outcome: Progressing     Problem: Respiratory - Adult  Goal: Achieves optimal ventilation and oxygenation  10/10/2022 2225 by Anna Patel LPN  Outcome: Progressing  10/10/2022 1445 by Logan James LPN  Outcome: Progressing     Problem: Skin/Tissue Integrity - Adult  Goal: Skin integrity remains intact  10/10/2022 2225 by Anna Patel LPN  Outcome: Progressing  10/10/2022 1445 by Logan James LPN  Outcome: Progressing     Problem: Musculoskeletal - Adult  Goal: Return mobility to safest level of function  10/10/2022 2225 by Anna Patel LPN  Outcome: Progressing  10/10/2022 1445 by Logan James LPN  Outcome: Progressing     Problem: Gastrointestinal - Adult  Goal: Establish and maintain optimal ostomy function  10/10/2022 2225 by Anna Patel LPN  Outcome: Progressing  10/10/2022 1445 by Logan James LPN  Outcome: Progressing     Problem: Discharge Planning  Goal: Discharge to home or other facility with appropriate resources  10/10/2022 2225 by Anna Patel LPN  Outcome: Progressing  10/10/2022 1445 by Logan James LPN  Outcome: Progressing     Problem: Skin/Tissue Integrity  Goal: Absence of new skin breakdown  Description: 1. Monitor for areas of redness and/or skin breakdown  2. Assess vascular access sites hourly  3. Every 4-6 hours minimum:  Change oxygen saturation probe site  4. Every 4-6 hours:  If on nasal continuous positive airway pressure, respiratory therapy assess nares and determine need for appliance change or resting period.   10/10/2022 2225 by Anna Patel LPN  Outcome: Progressing  10/10/2022 1445 by Logan James LPN  Outcome: Progressing     Problem: Pain  Goal: Verbalizes/displays adequate comfort level or baseline comfort level  10/10/2022 2225 by Anna Patel LPN  Outcome: Progressing  10/10/2022 1445 by Oxana Glasgow LPN  Outcome: Progressing     Problem: Nutrition Deficit:  Goal: Optimize nutritional status  10/10/2022 2225 by Vikas Ryan LPN  Outcome: Progressing  10/10/2022 1445 by Oxana Glasgow LPN  Outcome: Progressing  Flowsheets (Taken 10/10/2022 1444 by Madeline Rolon, MS, RD, LD)  Nutrient intake appropriate for improving, restoring, or maintaining nutritional needs:   Assess nutritional status and recommend course of action   Monitor oral intake, labs, and treatment plans   Recommend appropriate diets, oral nutritional supplements, and vitamin/mineral supplements     Problem: Chronic Conditions and Co-morbidities  Goal: Patient's chronic conditions and co-morbidity symptoms are monitored and maintained or improved  10/10/2022 2225 by Vikas Ryan LPN  Outcome: Progressing  10/10/2022 1445 by Oxana Glasgow LPN  Outcome: Progressing     Problem: Safety - Adult  Goal: Free from fall injury  10/10/2022 2225 by Vikas Ryan LPN  Outcome: Progressing  Flowsheets (Taken 10/10/2022 2223)  Free From Fall Injury: Instruct family/caregiver on patient safety  10/10/2022 1445 by Oxana Glasgow LPN  Outcome: Progressing     Problem: ABCDS Injury Assessment  Goal: Absence of physical injury  10/10/2022 2225 by Vikas Ryan LPN  Outcome: Progressing  10/10/2022 1445 by Oxana Glasgow LPN  Outcome: Progressing

## 2022-10-11 NOTE — PROGRESS NOTES
[facility]      Patient:  Jimmy Jules  YOB: 1945  Date of Service: 10/11/2022  MRN: [id number]   Acct: [de-identified]   Primary Care Physician: SHAHNAZ Simmons CNP  Advance Directive: Full Code  Admit Date: 9/30/2022       Hospital Day: 6  Portions of this note have been copied forward, however, changed to reflect the most current clinical status of this patient. CHIEF COMPLAINT Hyperkalemia    SUBJECTIVE: Patient states he is feeling better today. Continues to be fatigued, have some shortness of breath, and coughing. Reports abdominal pain is about the same today. CUMULATIVE HOSPITAL COURSE:  The patient is a 49-year-old male with a past medical history of recent stroke, CAD, DM, neuropathy, hyperlipidemia, ulcerative colitis, prostate cancer, and liver cancer who presented to [facility] for acute rehab. The patient was poor historian and unable to obtain HPI, HPI was obtained from chart review. Per review patient was admitted to acute rehab on 9/28/2022 from [facility] for acute CVA. Morning after admission patient was noted to have severe hyperkalemia and was admitted to the hospitalist service. Patient was treated with IV insulin, bicarbonate, calcium gluconate, and Lokelma. Patient was transitioned back to inpatient rehab on 9/30/2022. Hospitalist was then consulted again for hyperkalemia with potassium of 5.8. [facility] had already been initiated. WBC was noted to be 15.3 and patient was noted to be tachycardic. Procalcitonin, lactic acid, and urinalysis were ordered. Procalcitonin was 56.55. Blood cultures ordered and IV vancomycin and Merrem were initiated. Hyperkalemia persisted and Lokelma was increased to 10 mg 3 times daily. Hyperkalemia has resolved with this treatment. Repeat chest x-ray indicated bilateral patchy infiltrates concern for possible aspiration. Vancomycin was discontinued and patient will need a 7-day course of Merrem.   Hypoglycemia noted again this morning, decreased Lantus. Leukocytosis is improving, continue with current treatment. Review of Systems:   Review of Systems   Constitutional:  Positive for appetite change and fatigue. Negative for chills and fever. HENT:  Negative for sore throat and trouble swallowing. Respiratory:  Positive for cough and shortness of breath. Negative for choking and chest tightness. Daughter at bedside indicates shortness of breath is much worse after eating. Cardiovascular:  Negative for chest pain, palpitations and leg swelling. Gastrointestinal:  Positive for abdominal pain. Negative for constipation, nausea and vomiting. Genitourinary:  Negative for difficulty urinating, frequency and urgency. Musculoskeletal:  Negative for back pain and myalgias. Skin:  Negative for color change and wound. Neurological:  Positive for weakness (improving). 14 point review of systems is negative except as specifically addressed above. Objective:   VITALS:  BP (!) 76/65   Pulse 93   Temp 96.8 °F (36 °C)   Resp 18   Ht 6' 0.01\" (1.829 m)   Wt 196 lb 14.4 oz (89.3 kg)   SpO2 94%   BMI 26.70 kg/m²   24HR INTAKE/OUTPUT:    Intake/Output Summary (Last 24 hours) at 10/11/2022 1442  Last data filed at 10/11/2022 1308  Gross per 24 hour   Intake 600 ml   Output 875 ml   Net -275 ml         Physical Exam  Vitals reviewed. Constitutional:       Appearance: He is ill-appearing. HENT:      Head: Normocephalic. Mouth/Throat:      Mouth: Mucous membranes are dry. Cardiovascular:      Rate and Rhythm: Normal rate and regular rhythm. Pulses: Normal pulses. Heart sounds: Normal heart sounds. Pulmonary:      Effort: Pulmonary effort is normal.      Breath sounds: Rales present. Abdominal:      General: Abdomen is flat. Bowel sounds are normal. There is no distension. Palpations: Abdomen is soft. Tenderness: There is no abdominal tenderness. There is no guarding. Comments: Ostomy bag noted. Tenderness with palpation   Skin:     General: Skin is warm and dry. Capillary Refill: Capillary refill takes less than 2 seconds. Neurological:      Mental Status: He is alert and oriented to person, place, and time. Medications:      sodium chloride 75 mL/hr at 10/10/22 2254    sodium chloride      dextrose        DULoxetine  60 mg Oral Daily    metoprolol tartrate  12.5 mg Oral BID    insulin glargine  15 Units SubCUTAneous BID    nystatin  5 mL Oral 4x Daily    hyoscyamine  375 mcg Oral Daily    meropenem  1,000 mg IntraVENous Q8H    megestrol acetate  400 mg Oral Daily    sucralfate  1 g Oral 4x Daily AC & HS    pantoprazole  40 mg Oral QAM AC    sodium chloride flush  5-40 mL IntraVENous 2 times per day    atorvastatin  40 mg Oral Nightly    aspirin  81 mg Oral Daily    folic acid  552 mcg Oral Daily    apixaban  2.5 mg Oral BID    insulin lispro  0-4 Units SubCUTAneous Nightly    insulin lispro  0.05 Units/kg SubCUTAneous TID WC    insulin lispro  0-8 Units SubCUTAneous TID WC    sennosides-docusate sodium  1 tablet Oral BID     benzonatate, sodium chloride flush, sodium chloride, ondansetron **OR** ondansetron, glucose, dextrose bolus **OR** dextrose bolus, glucagon (rDNA), dextrose, acetaminophen, bisacodyl, magnesium hydroxide  ADULT DIET; Easy to Chew; 3 carb choices (45 gm/meal); Low Potassium (Less than 3000 mg/day); NO OATMEAL. LIKES SCRAMBLED EGGS, SINGH, & SAUSAGE  ADULT ORAL NUTRITION SUPPLEMENT; Lunch;  Fortified Pudding Oral Supplement  ADULT ORAL NUTRITION SUPPLEMENT; Dinner; Frozen Oral Supplement  ADULT ORAL NUTRITION SUPPLEMENT; Breakfast; Diabetic Oral Supplement     Lab and other Data:     Recent Labs     10/09/22  0339 10/10/22  0355 10/11/22  0424   WBC 18.0* 17.8* 15.8*   HGB 14.1 13.7* 14.5    149 138       Recent Labs     10/09/22  0339 10/10/22  0355 10/11/22  0425   * 139 135*   K 4.0 4.3 4.6   CL 97* 106 101   CO2 28 26 25 BUN 13 12 13   CREATININE 0.8 0.6 0.6   GLUCOSE 126* 77 83       No results for input(s): AST, ALT, ALB, BILITOT, ALKPHOS in the last 72 hours. Troponin T: No results for input(s): TROPONINI in the last 72 hours. Pro-BNP: No results for input(s): BNP in the last 72 hours. INR: No results for input(s): INR in the last 72 hours. UA:No results for input(s): NITRITE, COLORU, PHUR, LABCAST, WBCUA, RBCUA, MUCUS, TRICHOMONAS, YEAST, BACTERIA, CLARITYU, SPECGRAV, LEUKOCYTESUR, UROBILINOGEN, BILIRUBINUR, BLOODU, GLUCOSEU, AMORPHOUS in the last 72 hours. Invalid input(s): Duana Spurling  A1C: No results for input(s): LABA1C in the last 72 hours. ABG:No results for input(s): PHART, CMT2OVA, PO2ART, WCZ5ZFW, BEART, HGBAE, S1KUEOVP, CARBOXHGBART in the last 72 hours. RAD:   XR CHEST (2 VW)    Result Date: 10/5/2022  1. Status post median sternotomy 2. NO evidence of airspace consolidation or pulmonary venous congestion. XR CHEST PORTABLE    Result Date: 10/7/2022  1. Status post median sternotomy. 2.Bilateral patchy perihilar infiltrate       Micro:   Component 10/7/22 0942    Blood Culture, Routine No Growth to date. Any change in status will be called     Component 10/7/22 0949    Culture, Blood 2 No Growth to date. Any change in status will be called.      Component 9/28/22 0212    Urine Culture, Routine  Abnormal   >50,000 CFU/ml   Mixed skin bryan present     Organism Escherichia coli Abnormal     Urine Culture, Routine Moderate growth   Second organism same as first    Resulting Agency [facility]        Susceptibility    Escherichia coli (1)    Antibiotic Interpretation Microscan  Method Status    ampicillin Resistant >=32 mcg/mL BACTERIAL SUSCEPTIBILITY PANEL BY SREE     ampicillin-sulbactam Intermediate 16 mcg/mL BACTERIAL SUSCEPTIBILITY PANEL BY SREE     aztreonam Sensitive <=1 mcg/mL BACTERIAL SUSCEPTIBILITY PANEL BY SREE     ceFAZolin Sensitive <=4 mcg/mL BACTERIAL SUSCEPTIBILITY PANEL BY SREE cefepime Sensitive <=1 mcg/mL BACTERIAL SUSCEPTIBILITY PANEL BY SREE     cefTRIAXone Sensitive <=1 mcg/mL BACTERIAL SUSCEPTIBILITY PANEL BY SREE     ertapenem Sensitive <=0.5 mcg/mL BACTERIAL SUSCEPTIBILITY PANEL BY SREE     gentamicin Sensitive <=1 mcg/mL BACTERIAL SUSCEPTIBILITY PANEL BY SREE     levofloxacin Resistant >=8 mcg/mL BACTERIAL SUSCEPTIBILITY PANEL BY SREE     meropenem Sensitive <=0.25 mcg/mL BACTERIAL SUSCEPTIBILITY PANEL BY SREE     nitrofurantoin Sensitive <=16 mcg/mL BACTERIAL SUSCEPTIBILITY PANEL BY SREE     piperacillin-tazobactam Sensitive <=4 mcg/mL BACTERIAL SUSCEPTIBILITY PANEL BY SREE     trimethoprim-sulfamethoxazole Resistant >=320 mcg/mL BACTERIAL SUSCEPTIBILITY PANEL BY SREE                  Assessment/Plan   Principal Problem:    Acute ischemic stroke (HCC)   -PT/OT/SLP   -Continue aspirin and Eliquis    Active Problems:    Hyperkalemia   -Resolved, continue Lokelma at this time   -Monitor BMP   -Low Potassium diet      Leukocytosis   -Procalcitonin 56.55   -Blood cultures remain no growth to date   -7-day course of Merrem, continue   -UA unremarkable   -Monitor CBC   -Continue with SLP    Abdominal pain/liver cancer   -GI consulted, no further recommendations at this time   -Patient underwent radioembolization on 9/20 [facility]        Antibiotic: Merrem    DVT Prophylaxis: Eliquis    GI prophylaxis: Protonix    Nick Section, APRN - CNP, 10/11/2022 2:42 PM

## 2022-10-11 NOTE — PROGRESS NOTES
GI  - PROGRESS NOTE    Subjective:   Admit Date: 9/30/2022  PCP: SHAHNAZ Ku CNP    Patient being seen for: generalized abdominal pain and pain around the ostomy site    24hr events/Interval History: No major changes in status. Patient says yes to all questions. His nurse is here and we discussed his demeanor. H is more alert and speaks when family is here. He is going to be bedside tested for oropharyngeal dysphagia. I am not sure if a normal swallowing will enhance this patient's alimentation. He appears to have an extreme deficit from the CVA that not only encompasses his desire to eat but even to communicate his condition. ADULT DIET; Easy to Chew; 3 carb choices (45 gm/meal); Low Potassium (Less than 3000 mg/day); NO OATMEAL. LIKES SCRAMBLED EGGS, SINGH, & SAUSAGE  ADULT ORAL NUTRITION SUPPLEMENT; Lunch; Fortified Pudding Oral Supplement  ADULT ORAL NUTRITION SUPPLEMENT; Dinner; Frozen Oral Supplement  ADULT ORAL NUTRITION SUPPLEMENT; Breakfast; Diabetic Oral Supplement     Tolerated                  Pain:Unable to accurately assess. Nurse states patient tells him that he has pain all over.    Nausea:Not reported       Medications:  Scheduled Meds:   DULoxetine  60 mg Oral Daily    metoprolol tartrate  12.5 mg Oral BID    insulin glargine  15 Units SubCUTAneous BID    nystatin  5 mL Oral 4x Daily    hyoscyamine  375 mcg Oral Daily    meropenem  1,000 mg IntraVENous Q8H    megestrol acetate  400 mg Oral Daily    sucralfate  1 g Oral 4x Daily AC & HS    pantoprazole  40 mg Oral QAM AC    sodium chloride flush  5-40 mL IntraVENous 2 times per day    atorvastatin  40 mg Oral Nightly    aspirin  81 mg Oral Daily    folic acid  702 mcg Oral Daily    apixaban  2.5 mg Oral BID    insulin lispro  0-4 Units SubCUTAneous Nightly    insulin lispro  0.05 Units/kg SubCUTAneous TID WC    insulin lispro  0-8 Units SubCUTAneous TID WC    sennosides-docusate sodium  1 tablet Oral BID       Continuous Infusions:   sodium chloride 75 mL/hr at 10/10/22 2254    sodium chloride      dextrose         PRN Meds:.benzonatate, sodium chloride flush, sodium chloride, ondansetron **OR** ondansetron, glucose, dextrose bolus **OR** dextrose bolus, glucagon (rDNA), dextrose, acetaminophen, bisacodyl, magnesium hydroxide      Labs:     Recent Labs     10/09/22  0339 10/10/22  0355 10/11/22  0424   WBC 18.0* 17.8* 15.8*   RBC 4.24* 4.16* 4.39*   HGB 14.1 13.7* 14.5   HCT 44.2 42.5 45.1   .2* 102.2* 102.7*   MCH 33.3* 32.9* 33.0*   MCHC 31.9* 32.2* 32.2*    149 138     Recent Labs     10/09/22  0339 10/10/22  0355 10/11/22  0425   * 139 135*   K 4.0 4.3 4.6   ANIONGAP 6* 7 9   CL 97* 106 101   CO2 28 26 25   BUN 13 12 13   CREATININE 0.8 0.6 0.6   GLUCOSE 126* 77 83   CALCIUM 8.2* 8.1* 8.3*     No results for input(s): MG, PHOS in the last 72 hours. No results for input(s): AST, ALT, ALB, BILITOT, ALKPHOS, ALB in the last 72 hours. HgBA1c:  No components found for: HGBA1C  FLP:  No results found for: TRIG, HDL, LDLCALC, LDLDIRECT, LABVLDL  TSH:  No results found for: TSH  Troponin T: No results for input(s): TROPONINI in the last 72 hours. INR: No results for input(s): INR in the last 72 hours. No results for input(s): LIPASE in the last 72 hours.   -----------------------------------------------------------------  RAD:       Physical Exam:     Vitals:    10/10/22 1444 10/10/22 1748 10/11/22 0443 10/11/22 1300   BP:  (!) 154/59 (!) 137/47 (!) 76/65   Pulse:  98 87 93   Resp:  24 18    Temp:  (!) 96.3 °F (35.7 °C) 96.8 °F (36 °C)    TempSrc:  Temporal     SpO2:  94% 94%    Weight:       Height: 6' 0.01\" (1.829 m)        24HR INTAKE/OUTPUT:    Intake/Output Summary (Last 24 hours) at 10/11/2022 1417  Last data filed at 10/11/2022 1308  Gross per 24 hour   Intake 600 ml   Output 875 ml   Net -275 ml     General appearance: alert and cooperative with exam  Lungs: clear to auscultation bilaterally  Heart: regular rate and rhythm  Abdomen: soft, non-tender; bowel sounds normal; no masses,  no organomegaly and with noted ostomy site with stool . No specific complaint of pairn during palpation of the abdominal wall. No guarding and no rebound tenderness   Extremities:  no significant swelling. He doesn't move very much   Neurologic: No obvious focal neurologic deficits. Impression:   1. Abdominal pain is not clinically a concern from the patient's reaction and physical findings. 2. Status post colon resection secondary to Ulcerative colitis  3. History of acute ischemic stroke - causing both speech and expressive problems. Plan:   Agree with speech pathology/dysphagia exam  Monitor oral intake  Patient may require alternate feeding if dietary planning isn't effective if providing adequate caloric intake  No further GI intervention required at this time. Gladly reevaluate if requested.      DO Noa Warren

## 2022-10-11 NOTE — PROGRESS NOTES
[facility]  INPATIENT SPEECH THERAPY  [facility] 8 REHAB UNIT      [x]Daily Note  []Progress Note    Date: 10/11/2022  Patient Name: Singh Velazquez        MRN: [id number]    Account #: [de-identified]  : 1945  (76 y.o.)  Gender: male   Primary Provider: Desmond Suraez MD  Swallowing Status/Diet: Easy to chew diet, thin liquids        Diagnosis:  Bilateral embolic strokes      Subjective: The patient was upright in his bed. No caregivers were present this afternoon. Objective:  No oral residue or buccal cavity pocketing was noted after his noon meal.    He continues to exhibit weak vocal intensity and hoarse vocal quality. Dry oral mucosa was noted. Therapist provided extra support to his neck/head when he accepted sips of thin liquids. He did accept a few sips of water and 7up via straw. Delayed congested cough response was noted. Wet/gurgly vocal quality was noted. Weak hyolaryngeal elevation and delayed swallow responses are noted. Crackers and pudding were offered but he declined these. He attempted orientation tasks but was unable to provide any answers. He also attempted to follow simple commands but was unable to consistently complete the tasks. He tried to answer open ended questions but could not consistently answer these. He was less alert and very fatigued. He did try and verbally express his wants and needs but would eventually give up. Therapist repositioned him and offered additional blankets.       ASSESSMENT:    Patient Tolerance of Treatment:   []Tolerated well []Tolerated fair []Required rest breaks [x]Fatigued    Education:  Learner:  [x]Patient          []Significant Other          []Other  Education provided regarding:  [x]Goals and POC   []Diet and swallowing precautions    []Home Exercise Program  []Progress and/or discharge information  Method of Education:  [x]Discussion          []Demonstration          []Handout          []Other  Evaluation of Education:   []Verbalized

## 2022-10-11 NOTE — PROGRESS NOTES
[facility]  INPATIENT SPEECH [facility]ERAPY  [facility] UNIT  TIME   3068     [x]Daily Note  []Progress Note     Date: 10/11/2022  Patient Name: Aamir Martin        MRN:   [id number]    Account #: [de-identified]  : 1945  (76 y.o.)  Gender: male   Primary Provider: Lorna Vargas MD     Swallowing Status/Diet: Easy to chew diet, thin liquids     Subjective: Patient asleep in bed. Patient alerted, however did not maintain alertness with open eyes for extended periods of time. Open mouth posture at rest. Breathing appeared short and labored. Objective:   Orientation task completed. Patient oriented to name, , age, [facility], [place], city, state. Patient not accurately oriented to year, month (accurate with initial letter cue). Decreased labial movements noted this date. Attempted diaphragmatic breathing with visual/tactile of hand near stomach and hand on chest. Patient lethargic. Colostomy bag and hard abdomin pressure observed. Attempted controlled breathing exercise to breath in through nose and count on exhalation through mouth. Patient lethargic and did not complete this task. Demographic orientation task completed. Patient identified number of children. Patient became lethargic in naming children and did not complete task. Patient identified spouse's name, however response was unintelligible when stating how long they'd been . Cough at rest.     Patient stated he \"can't keep this up\". With probing, patient clarified \"this routine\". Patient answered affirmatively when asked about the therapies on the rehab floor. Patient independently stated \"all these questions\". Patient given a short break at this junction. Patient produced vocalizations on exhalation sounding in pain. Patient answered affirmatively to being in pain. According to patient, throat hurts and all over. \"I can't talk,I can't understand what's going on, . Sallyanne Chain Sallyanne Chain \"  Nursing alerted to pain.     Patient reported mouth being full of \"crap\". Oral cavity looked clear, however, lingual surface appeared dry. Portion of white noted on top of mouth. Moist toothette placed on lingual surface. Attempted problem solving task. Patient gave appropriate response in the future, however, not a solution for immediately in the moment. With verbal prompting, patient gave appropriate vague response. Immediate cough with thin liquid via carton observed initial trial. Oral transit of thin liquid via side of cup consecutive sips appeared timely. Mastication prolonged and labored with piece of meat trial. Attempted to use liquid to trigger swallow; did not clear meat bolus. Bolus removed from mouth. Pudding thick consistency transit appeared timely. Oral residue observed in oral cavity following pudding not fully cleared with liquid wash. Patient successful in following directive for swish of liquid in oral cavity to clear residue following last trial observed this date. Patient noted to eat only small portion of meal. Patient did not eat main entree with the exception of one bolus trial that was removed from oral cavity. Patient reports not eating/not eating much the past few days. Patient may benefit from supplemental nutrition due to low PO intake. Short Term Goals:  Goal 1: Pt will complete the CLQT. Goal 2: Pt will complete recall tasks with 90% accuracy and minimal cues. Goal 3: Pt will complete verbal expression tasks with 80% accuracy and minimal cues. Goal 4: Pt will complete executive functioning/problem solving/safety awareness tasks with 80% accuracy and minimal cues. Goal 5: Pt will complete orientation tasks with 90% accuracy and minimal cues. Goal 6: Pt will complete diaphragmatic breathing exercises to target breath support for speech production.       Long Term Goals:  Pt will participate in skilled speech therapy services to ensure he is able to adequately/effectively communicate his wants and needs and safely complete ADLs. Pt will tolerate the least restrictive diet with minimal overt s/s of aspiration/penetration during hospitalization    ASSESSMENT:  Assessment: []Progressing towards goals          [x]Not Progressing towards goals     Patient Tolerance of Treatment:       []Tolerated well         []Tolerated fair          [x]Required rest breaks          []Fatigued     Education:  Learner:  [x]Patient          []Significant Other          [x]Other (support group member)  Education provided regarding:  []Goals and POC                              [x]Diet and swallowing precautions                  []Home Exercise Program                []Progress and/or discharge information  Method of Education:  [x]Discussion          []Demonstration          []Handout          []Other  Evaluation of Education:   [x]Verbalized understanding                           []Demonstrates without assistance  []Demonstrates with assistance                   []Needs further instruction                              []No evidence of learning                              []No family present                    Plan:   [x]Continue with current plan of care               []Modify current plan of care as follows:               []Discharge patient                          Discharge Location:                          Services/Supervision Recommended:       [x]Patient continues to require treatment by a licensed therapist to address functional deficits as outlined in the established plan of care.     Electronically signed by SACHI Jeffrey on 10/11/2022 at 2:28 PM

## 2022-10-11 NOTE — PROGRESS NOTES
Physical Therapy  Name: Last Bear  MRN:  [id number]  Date of service:  10/11/2022       10/11/22 1300 10/11/22 1355   Vitals   Heart Rate 93  --    Heart Rate Source Monitor  --    BP (!) 76/65 102/89  (post transfer to bed)   BP Location Left upper arm  --    BP Method Automatic  --    Patient Position Standing  (immediately after stand)  --    MAP (Calculated) 68.67 93.33   O2 Device Nasal cannula  --    Comment 3 L NC, initial BP in sittin/58, couple minutes after stand: 108/58, after 2nd stand: 83/49  --    Bed mobility   Supine to Sit Moderate assistance;Minimal assistance  (to L side of bed, scoot turn, PT assist into long sit)  --    Transfers   Sit to Stand Minimal Assistance;Contact guard assistance  (VC for sequencing)  --    Stand to Sit Minimal Assistance;Contact guard assistance  --    Bed to Chair Minimal assistance; Moderate assistance  (stand step with RW to L side from [facility] to bed)  --    Stand Pivot Transfers Minimal Assistance;Contact guard assistance  (to R, did not get completely turned before sitting)  --    Comment sits prematurely when transferring w/ or w/o walker  --    Ambulation   WB Status wbat  (static standing in // bars with poor tolerance due to low bp and reports of dizziness)  --    PT Exercises   Exercise Treatment in //: sit <> stand x 2, first stand was about 30 sec, 2nd stand was 20 sec with cg@  --    A/AROM Exercises seated B LE ex x 10: laq, hip flex, QS, hip abd and ADD sets with ball  --    Standing Open/Closed Kinetic Chain Exercises STS from  x 5 (75% standing position) pushing from armrests of   --    Assessment   Assessment pt cont to have low BP with standing and reports of dizziness and overall weakness and discomfort; pt gives his best effort and is cooperative despite feeling poorly and did manage more ex this session in total than during yesterday's session  (bp 76/55 at lowest following brief stand see vitals above)  --    Safety Devices   Type of Devices Bed alarm in place;Call light within reach; Left in bed  --    PT Individual Minutes   Time In 1300  --    Time Out 1400  --    Minutes 60  --        Electronically signed by Collins Porter PTA on 10/11/2022 at 3:30 PM

## 2022-10-11 NOTE — PROGRESS NOTES
Patient:   aD Min  MR#:    [id number]   Room:    0811/811-01   YOB: 1945  Date of Progress Note: 10/11/2022  Time of Note                           8:32 AM  Consulting Physician:   Aylin Hale M.D. Attending Physician:  Aylin Hale MD     CHIEF COMPLAINT: Generalized weakness and deconditioning     Subjective  This 76 y.o. male  with history of HTN, hypercholesterolemia, DM, CAD, PVD, DM neuropathy, osteomyelitis with transmetatarsal amputation, COVID-19 July 2022 and newly diagnosis liver cancer July of this year with radioembolization to the liver 9/20/22. He presented to [facility] on 9/22/22 with weakness, AMS and lethargy. He was admitted to the Hospitalist service with metabolic encephalopathy. MRI done revealed Multiple tiny 1 or 2 mm areas of diffusion and increased signal in both cerebral hemispheres that likely represent small areas of acute ischemic change. These are seen in the bilateral frontal and parietal lobes and in the right occipital lobe. Neurology was consulted. He was seen by Dr. Brayden Kennedy, who felt embolic stroke related to hypercoaguable state and underlying/undetected atrial fibrllation. Georgette Arias had stopped ASA on his own but continued Pletal. ASA had been started this admission. In normal circumstance low dose anticoagulation would be recommended for ESUS but patient has higher than normal risk for bleed with hepatic cancer. Plans are for Zio Patch at discharge CTA of neck done shows heavy plaque burden at the bilateral carotid bifurcations with about 80% stenosis of the right ICA and 80 to 90% stenosis on the left. Vascular surgery was consulted. He was seen by Dr. Sharonda Da Silva, who didn't feel any surgical intervention was needed, he recommended continued conservative therapy with antiplatelet therapy and given the embolic appearance of the infarcts could consider low-dose Eliquis twice daily as well.      Has evaluated by SPT for swallow and placed on a mechanical soft diet with thin liquids. SPT will also continue to see for cognition. He is still weak overall and participating in both PT/OT  Patient returned back to the floor 9/30 with resumption of care following treatment for hyperkalemia. Continues to say that he just does not feel well. Some mild hypotension noted yesterday. REVIEW OF SYSTEMS:  Constitutional: No fevers No chills  Neck:No stiffness  Respiratory: No shortness of breath  Cardiovascular: No chest pain No palpitations  Gastrointestinal: No diarrhea. Has a colostomy  Genitourinary: No Dysuria  Neurological: No headache, no confusion      PHYSICAL EXAM:  BP (!) 137/47   Pulse 87   Temp 96.8 °F (36 °C)   Resp 18   Ht 6' 0.01\" (1.829 m)   Wt 196 lb 14.4 oz (89.3 kg)   SpO2 94%   BMI 26.70 kg/m²     Constitutional: he appears well-developed and well-nourished. Eyes - conjunctiva normal.  Pupils react to light  Ear, nose, throat -hearing intact to voice. No scars, masses, or lesions over external nose or ears, no atrophy of tongue  Neck-symmetric, no masses noted, no jugular vein distension  Respiration- chest wall appears symmetric, good expansion,   normal effort without use of accessory muscles  Cardiovascular- RRR  Musculoskeletal - no significant wasting of muscles noted, no bony deformities, gait no gross ataxia  Extremities-no clubbing, cyanosis or edema  Skin - warm, dry, and intact. No rash, erythema, or pallor. Psychiatric - mood, affect, and behavior appear normal.      Neurology  NEUROLOGICAL EXAM:      Mental status   Somewhat drowsy.   Complains of being tired     Cranial Nerve Exam   CN II- Visual fields grossly unremarkable  CN III, IV,VI-EOMI, No nystagmus, conjugate eye movements, no ptosis  CN VII-slight left facial droop  CN VIII-Hearing intact   CN IX and X- Palate elevates in midline  CN XI-good shoulder shrug  CN XII-Tongue midline with no fasciculations or fibrillations     Motor Exam  V/V throughout upper and lower extremities bilaterally, no cogwheeling, normal tone     Absent throughout     Tremors- no tremors in hands or head noted  Nursing/pcp notes, imaging,labs and vitals reviewed.          Lab Results   Component Value Date    WBC 15.8 (H) 10/11/2022    HGB 14.5 10/11/2022    HCT 45.1 10/11/2022    .7 (H) 10/11/2022     10/11/2022     Lab Results   Component Value Date     (L) 10/11/2022    K 4.6 10/11/2022     10/11/2022    CO2 25 10/11/2022    BUN 13 10/11/2022    CREATININE 0.6 10/11/2022    GLUCOSE 83 10/11/2022    CALCIUM 8.3 (L) 10/11/2022    PROT 6.1 (L) 10/02/2022    LABALBU 2.9 (L) 10/02/2022    BILITOT 1.8 (H) 10/02/2022    ALKPHOS 193 (H) 10/02/2022    AST 94 (H) 10/02/2022    ALT 67 (H) 10/02/2022    LABGLOM >60 10/11/2022    GFRAA >59 10/11/2022     Lab Results   Component Value Date    INR 1.06 01/23/2018    INR 1.16 (H) 09/08/2014    INR 1.20 (H) 09/04/2014   No results found for: PHENYTOIN, ESR, CRP    PT,OT and/or speech notes reviewed:  Objective   Heart Rate: 92  Heart Rate Source: Monitor  BP: (!) 93/53 (c/o dizziness.)  BP Location: Left upper arm  BP Method: Automatic  Patient Position: Sitting  MAP (Calculated): 66.33  Resp: 20  SpO2: 93 %  O2 Device: Nasal cannula  Comment: 2 L       Safety Devices  Type of Devices: Left in bed;Bed alarm in place;Call light within reach    10/10/22 1130   Transfers   Stand Pivot Transfers Moderate assistance   Sit to stand Moderate assistance   Stand to sit Minimal assistance        10/10/22 1300   Vitals   Heart Rate 97   Heart Rate Source Monitor   BP (!) 93/53   BP Location Left upper arm   BP Method Automatic   Patient Position Sitting   MAP (Calculated) 66.33   SpO2 93 %   O2 Device Nasal cannula   Comment 2 L   Bed mobility   Rolling to Left Minimal assistance;Contact guard assistance  (VC for sequencing, using rails)   Rolling to Right Minimal assistance;Contact guard assistance  (VC for sequencing, using rails)   Sit to Supine Minimal assistance;Contact guard assistance  (from L side of bed, some assist for LE)   Transfers   Sit to Stand Minimal Assistance;Contact guard assistance   Stand to Sit Minimal Assistance;Contact guard assistance   Bed to Chair Minimal assistance;Contact guard assistance  (stand pivot to L)   Stand Pivot Transfers Minimal Assistance;Contact guard assistance  (to L)   PT Exercises   Exercise Treatment in //: sit <> stand x 2, first stand was about 15 sec, 2nd stand was 45 sec   Assessment   Assessment During sit <>  //, pt. c/o of dizziness with first stand and requested to sit back down. Pt. BP was 96/53, pt. remained sitting until symptoms decreased. Pt. willing to attempt another sit <> stand and was able to stand 45 sec, VC were required to maintain upright posture. Pt. unable to tolerate much more activity. Once back in room, pt. stood long enough to undress LE, sat on EOB for around 2 minutes with minimal PT or UE assist needed. Safety Devices   Type of Devices Left in bed;Bed alarm in place;Call light within reach         Electronically signed by Reyna Bailey, PT Student, on 10/10/2022 at 2:27 PM               RECORD REVIEW: Previous medical records, medications were reviewed at today's visit    IMPRESSION:   1. Bilateral embolic strokes-PT/OT/SLP. Discontinued Pletal due to nosebleed and resumed Eliquis with baby aspirin. 2.  Essential hypertension-metoprolol, monitoring. Low at times. Metoprolol reduced  3. Diabetes-continue insulin and monitoring. Lantus increased 10/5. Stable  4. Hyperlipidemia-Lipitor  5. GI-bowel regimen. Has colostomy. Carafate added. Continues to complain of \"stomach pain\". Appreciate GI assistance. Bentyl added. Also nystatin added for thrush. 6.  DVT prophylaxis-Eliquis resumed today. Scd's added. 7  History of liver cancer- relatively new dx  8. Abnormal UA-culture growing E. coli. Patient given Cipro. Repeat study 10/1 unremarkable  9. Hyperkalemia-recurrent. Olayinka Most ordered and hospitalist asked to follow-up. 10.  Leukocytosis-per hospitalist.  On Merrem. Trending down  Provigil discontinued and Cymbalta increased  ELOS:10/15. restaff in a.m.

## 2022-10-11 NOTE — CARE COORDINATION
Mr. Sebas Snyder daughter and son ask to be present in team conference tomorrow. She is informed of his current condition and continued poor progress and palliative care nurse has been asked to meet with them tomorrow as well. Further discharge planning to be discussed after that.

## 2022-10-11 NOTE — PROGRESS NOTES
Occupational Therapy  Facility/Department: [facility] 8 REHAB UNIT  Occupational Therapy Treatment Note     Name: Andreas Joseph  : 1945  MRN: [id number]  Date of Service: 10/11/2022    Discharge Recommendations:  Continue to assess pending progress          Patient Diagnosis(es): There were no encounter diagnoses. 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 Past Surgical History:  has a past surgical history that includes Coronary artery bypass graft (); Jejunostomy (); vascular surgery (14 [facility]); vascular surgery (14 [facility]); vascular surgery (2015 [facility]); vascular surgery (2015 [facility] ); Toe amputation (Left, 2017); vascular surgery (2017); vascular surgery (2017); eye surgery; Cholecystectomy; pr amputation foot,transmetatarsal (Left, 2018); and vascular surgery (2018). Treatment Diagnosis: CVA, hyperkalemia      Assessment   Performance deficits / Impairments: Decreased functional mobility ; Decreased endurance;Decreased ADL status; Decreased balance;Decreased strength;Decreased high-level IADLs;Decreased cognition  Treatment Diagnosis: CVA, hyperkalemia  Activity Tolerance  Activity Tolerance: Patient Tolerated treatment well        Plan   Occupational Therapy Plan  Current Treatment Recommendations: Strengthening, Cognitive reorientation, Cognitive/Perceptual training, Equipment evaluation, education, & procurement, Patient/Caregiver education & training, Endurance training, Functional mobility training, Safety education & training, Home management training, Self-Care / ADL, Balance training Restrictions  Restrictions/Precautions  Restrictions/Precautions: Fall Risk  Lower Extremity Weight Bearing Restrictions  Right Lower Extremity Weight Bearing: Weight Bearing As Tolerated  Left Lower Extremity Weight Bearing: Weight Bearing As Tolerated  Position Activity Restriction  Other position/activity restrictions: zio patch, colostomy bag    Subjective   General  Chart Reviewed: Yes, Orders  Patient assessed for rehabilitation services?: Yes  Additional Pertinent Hx: HTN, DM, Covid 2021, recent liver CA diagnosis  Family / Caregiver Present: Yes (daughter)  Diagnosis: CVA , hyperkalemia  General Comment  Comments: Frequent bouts of coughing and difficulty staying alert throughout the tx session. Social/Functional History  Social/Functional History  Has the patient had two or more falls in the past year or any fall with injury in the past year?: Yes       Objective   Heart Rate: 87  BP: (!) 137/47  MAP (Calculated): 77  Resp: 18  SpO2: 94 %  O2 Device: Nasal cannula             Safety Devices  Type of Devices: Bed alarm in place;Call light within reach       Transfers  Sit to stand: Moderate assistance  Stand to sit: Minimal assistance  Transfer Comments: to/from  for clothing management          OutComes Score   Eating  Assistance Needed: Supervision or touching assistance  Comment: VC for encouragement  CARE Score: 4  Discharge Goal: Independent    Oral Hygiene  Assistance Needed: Supervision or touching assistance  Comment: Cues for thoroughness. CARE Score: 4  Discharge Goal: [address] needed: Substantial/maximal assistance  Comment: Total A with colostomy management  CARE Score: 2  Discharge Goal: Independent     Shower/Bathe Self  Assistance Needed: Partial/moderate assistance  Comment:  Mod A  CARE Score: 3  Discharge Goal: Independent     Upper Body Dressing  Assistance Needed: Partial/moderate assistance  Comment: Min A  CARE Score: 3  Discharge Goal: Independent     Lower Body Dressing  Assistance Needed: Partial/moderate assistance  Comment: Able to thread over feet, but assistance with pulling pants up when standing at RW.   CARE Score: 3  Discharge Goal: Independent     Putting On/Taking Off Footwear  Assistance Needed: Substantial/maximal assistance  Comment: socks only, vc for tech, AE  CARE Score: 2  Discharge Goal: Independent       Goals  Short Term Goals  Time Frame for Short Term Goals: 1 week  Short Term Goal 1: complete UB dressing with setup  Short Term Goal 2: complete LB dressing with CGA  Short Term Goal 3: complete overall toileting with CGA  Short Term Goal 4: complete 1-2 handed standing level task for 3 mins with supervision  Short Term Goal 5: complete overall bathing with CGA  Short Term Goal 6: complete simple ambulatory home making task with CGA  Long Term Goals  Time Frame for Long Term Goals : 3 weeks  Long Term Goal 1: complete overall toileting with modified independence  Long Term Goal 2: complete overall bathing with modified independence  Long Term Goal 3: complete overall dressing with modified independence  Long Term Goal 4: complete simple ambulatory home making task with modified independence  Long Term Goal 5: complete HEP with independence  Long Term Goal 6: pt/family verbalize DME       Therapy Time   Individual Concurrent Group Co-treatment   Time In 0710         Time Out 0815         Minutes 65         Timed Code Treatment Minutes: Brian 62, OT

## 2022-10-12 NOTE — PROGRESS NOTES
[facility]  INPATIENT SPEECH THERAPY  [facility] 8 REHAB UNIT  TIME   Time In: 1400  Time Out: 9992  Minutes: 15       [x]Daily Note  []Progress Note    Date: 10/12/2022  Patient Name: Fidel Ruffin        MRN: [id number]    Account #: [de-identified]  : 1945  (76 y.o.)  Gender: male   Primary Provider: Jamila Funez MD  Swallowing Status/Diet: Easy to chew diet, thin liquids        Diagnosis:  Bilateral embolic strokes         Subjective: The patient was upright in bed. He appeared drowsy. Objective: Ice water and 7 Up were both offered but the patient declined. He also declined saltines and pudding. Congested cough at rest was observed. Some minimal improvement in vocal intensity was observed today. Hoarser vocal quality was noted. No oral residue or buccal cavity pocketing was noted following his noon meal. Today hs is afebrile without recent temperature elevation. He is currently on 2LPM 02 via NC. His nurse has reported lung crackles in his electronic medical record. Discussion with his family regarding swallowing precautions. Therapy has only been offering oral intake when alert. At times he has exhibited increased confusion, oral pocketing, poor alertness, etc. Family would like for staff to feed the patient/encourage the patient to eat at each meal. Did discuss that the patient has received encouragement to accept oral intake, but at times this has not been safe or medically appropriate. At times the patient is unable to use utensils or hold his cup due to bilateral edema in his hands making gripping a cup very difficult. He did not have the strength to hold the cup during yesterday's session and the therapist held the cup for him. He will remain at risk for aspiration and aspiration pneumonia. Recommend continue daily oral hygiene to prevent aspiration of oral bacteria. SLP will continue to reassess his swallow function. He did answer demographic information questions appropriately this date.  He did have difficulty answering spatial orientation questions. He answered most open ended questions to meet his immediate wants and needs. Recommended Diet and Intervention  Easy to chew  Thin liquids  Recommended Form of Meds: Whole with water  Therapeutic Interventions: Pharyngeal exercises;Diet tolerance monitoring;Oral motor exercises; Patient/Family education     Compensatory Swallowing Strategies  Compensatory Swallowing Strategies : Alternate solids and liquids;Eat/Feed slowly;Upright as possible for all oral intake;Remain upright for 30-45 minutes after meals;Small bites/sips            Short Term Goals:  Goal 1: Pt will complete the CLQT. Discontinue goal  Goal 2: Pt will complete recall tasks with 90% accuracy and minimal cues. Goal 3: Pt will complete verbal expression tasks with 80% accuracy and minimal cues. Goal 4: Pt will complete executive functioning/problem solving/safety awareness tasks with 80% accuracy and minimal cues. Goal 5: Pt will complete orientation tasks with 90% accuracy and minimal cues. Goal 6: Pt will complete diaphragmatic breathing exercises to target breath support for speech production.         ASSESSMENT:      Patient Tolerance of Treatment:   []Tolerated well []Tolerated fair []Required rest breaks [x]Fatigued    Education:  Learner:  [x]Patient          []Significant Other          []Other  Education provided regarding:  [x]Goals and POC   []Diet and swallowing precautions    []Home Exercise Program  []Progress and/or discharge information  Method of Education:  [x]Discussion          []Demonstration          []Handout          []Other  Evaluation of Education:   []Verbalized understanding   []Demonstrates without assistance  []Demonstrates with assistance  [x]Needs further instruction     []No evidence of learning                  []No family present      Plan: [x]Continue with current plan of care    []Modify current plan of care as follows:    []Discharge patient    Discharge Location:    Services/Supervision Recommended:      [x]Patient continues to require treatment by a licensed therapist to address functional deficits as outlined in the established plan of care.       Electronically Signed By:  Kristi Amezquita  10/12/2022,2:21 PM.

## 2022-10-12 NOTE — PROGRESS NOTES
[facility]  INPATIENT SPEECH THERAPY  [facility] UNIT  TIME   1100  1200  61 MINUTES      [x]Daily Note  []Progress Note    Date: 10/12/2022  Patient Name: Juana Templeton        MRN: [id number]    Account #: [de-identified]  : 1945  (76 y.o.)  Gender: male   Primary Provider: Simone Quintero MD  Swallowing Status/Diet: Easy to chew diet, thin liquids      Subjective: Pt is upright in bed during tx. Pt does not report pain on this date. Pt completes majority of the therapy session with his eyes closed. Objective:    Adequate oral care completed via tooth brush and tooth paste prior to noon meal. Pt was able to independently brush teeth and tongue; however, he did fatigue after completing this task. He did require cues to swish and spit when finished with teeth brushing. Swallowing reassessment completed during noon meal. Consistencies presented include easy to chew consistencies, puree consistencies, and thin liquids via side of cup and straw. Congested coughing observed at rest, as well as during and after P.O. trials. Can not rule out penetration/aspiration from bedside. Recommend instrumental assessment (Modified Barium Swallow Study) to r/o. Pt did take 3x bites of mashed potatoes, as well as 1x bite of green beans and meatloaf. Approximately 75% of chocolate ice cream cup was taken P.O. Oral prep reveals prolonged, vertical mastication with easy to chew consistencies. Oral transit ranges from 1-2 seconds with thin liquids via consecutive sips side of cup/straw and puree consistencies. Oral transit ranges from 2-4 seconds with easy to chew and puree consistencies. Moderate oral residue observed on this date. Residue observed to be on the L and R sides of oral cavity with easy to chew consistencies. Pt required cues to complete a liquid swish after each P.O. trial.  Laryngeal movement observed to be consistently sluggish and mildly decreased.  Coughing observed throughout noon meal. Pt did cough up 1x trial of ice cream into emesis bag. Question downgrade diet due to suspected residue stacking and inconsistent level of alertness; however, pt is demonstrating poor apetite with current diet. Pt demonstrates minimal difficulties with swallowing on current diet when level of alertness is appropriate. Will continue to implement compensatory swallowing strategies (small bites, small sips, lingual sweeps, liquid swish) throughout meals. Pt required cues to stay awake during noon meal. Education provided on general aspiration precautions and appropriate level of alertness during meals. Pt does not demonstrate evidence of learning. Over articulation attempted on this date. Pt given verbal cues to utilize lips and tongue more at the conversational level. When given cues, pt would utilize this strategy. Speech intelligibility did improve when pt utilized this strategy. Pt did not consistently/independently utilize this compensatory strategy. Functional recall task completed. Pt is required to elicit 3x daily therapy. Pt is able to elicit 2/3 daily therapies. Spatial, temporal, biographical orientation task completed. Pt is able to state his name, current location, current city/state, floor number/room number,  phone number, address and ; however, he is unable to elicit the day/month/year. Task completed with 75% accuracy. Minimal semantic and phonemic cues required to complete this task. Pt did state his children's names and brother's names on command. During therapy, pt stated \" I did want to get better; however, I am not getting better. I am getting worse\". \"I don't want to do therapy\". \"I am giving up\". Positive reinforcement/motivation provided during times of doubt. Unable to complete consistent structured or unstructured therapeutic tasks due to fatigue. Required rest breaks approximately every 5-10 minutes. SLP will continue to follow.       Recommended Diet and Intervention  Easy to chew  Thin liquids  Recommended Form of Meds: Whole with water  Therapeutic Interventions: Pharyngeal exercises;Diet tolerance monitoring;Oral motor exercises; Patient/Family education     Compensatory Swallowing Strategies  Compensatory Swallowing Strategies : Alternate solids and liquids;Eat/Feed slowly;Upright as possible for all oral intake;Remain upright for 30-45 minutes after meals;Small bites/sips    Short Term Goals:  Goal 1: Pt will complete the CLQT. Goal 2: Pt will complete recall tasks with 90% accuracy and minimal cues. Goal 3: Pt will complete verbal expression tasks with 80% accuracy and minimal cues. Goal 4: Pt will complete executive functioning/problem solving/safety awareness tasks with 80% accuracy and minimal cues. Goal 5: Pt will complete orientation tasks with 90% accuracy and minimal cues. Goal 6: Pt will complete diaphragmatic breathing exercises to target breath support for speech production. Long Term Goals:  Pt will participate in skilled speech therapy services to ensure he is able to adequately/effectively communicate his wants and needs and safely complete ADLs.      Pt will tolerate the least restrictive diet with minimal overt s/s of aspiration/penetration during hospitalization    ASSESSMENT:  Assessment: []Progressing towards goals          [x]Not Progressing towards goals    Patient Tolerance of Treatment:   []Tolerated well [x]Tolerated fair []Required rest breaks []Fatigued    Education:  Learner:  [x]Patient          []Significant Other          []Other  Education provided regarding:  []Goals and POC   [x]Diet and swallowing precautions    []Home Exercise Program  []Progress and/or discharge information  Method of Education:  [x]Discussion          []Demonstration          []Handout          []Other  Evaluation of Education:   []Verbalized understanding   []Demonstrates without assistance  []Demonstrates with assistance  [x]Needs further instruction     []No evidence of learning                  [x]No family present      Plan: [x]Continue with current plan of care    []Modify current plan of care as follows:    []Discharge patient    Discharge Location:    Services/Supervision Recommended:      []Patient continues to require treatment by a licensed therapist to address functional deficits as outlined in the established plan of care.       Electronically signed by SACHI Rojo on 10/12/2022 at 12:46 PM

## 2022-10-12 NOTE — PROGRESS NOTES
10/12/22 1000   Restrictions/Precautions   Restrictions/Precautions Fall Risk   Lower Extremity Weight Bearing Restrictions   Right Lower Extremity Weight Bearing Weight Bearing As Tolerated   Left Lower Extremity Weight Bearing Weight Bearing As Tolerated   Position Activity Restriction   Other position/activity restrictions zio patch, colostomy bag   General   Chart Reviewed Yes   Patient assessed for rehabilitation services? Yes   Additional Pertinent Hx Liver cancer, HTN, CAD, PVD, DM with neuropathy, osteomyelitis with transmetatarsal amputation, ileostomy   Diagnosis HYPERKALEMIA, CVA   Subjective   Subjective Pt laying in bed asleep, awakes and agrees to therapy. Vitals   Heart Rate (!) 102   Heart Rate Source Monitor   BP (!) 153/63  (163/63 shelter through session)   BP Location Left upper arm   BP Method Automatic   Patient Position Semi fowlers   MAP (Calculated) 93   SpO2 92 %   O2 Device Nasal cannula   Comment 3 L   Subjective   Subjective Pt reports no pain   PT Exercises   Exercise Treatment x2 Games of tic ketty toe in effort to keep pt's eyes open; Pt was instructed to point where he would like the \"x's\"   A/AROM Exercises bed exercise B LE: quad sets x 10, heel slides x 10, hip abd/add with assistance x 10, SAQ x 10, hip add with pillow x 10   Activity Tolerance   Activity Tolerance Patient limited by fatigue;Patient limited by endurance;Treatment limited secondary to medical complications   Assessment   Assessment Pt was very fatigued, but continued to cooperate with tx and gives his best effort with exercises. Pt required several rest breaks. prison through the session BP was elevated and remained slightly elevated at end of session. Throughout session pt's eyes were closed, but was able to open them with verbal cues. Performance Deficits/Impairments Decreased functional mobility ; Decreased ADL status; Decreased ROM; Decreased body mechanics; Decreased strength;Decreased endurance;Decreased sensation;Decreased balance   Treatment Diagnosis Interference with activity   Therapy Prognosis Fair   Decision Making Low Complexity   History Liver cancer, HTN, CAD, PVD, DM with neuropathy, osteomyelitis with transmetatarsal amputation, ileostomy   Exam impaired sensation, balance, gait, strength, ROM, functional mobility, endurance   Clinical Presentation Stable   Safety Devices   Type of Devices All fall risk precautions in place; Bed alarm in place;Call light within reach; Patient at risk for falls; Left in bed   PT Individual Minutes   Time In 1000   Time Out 1100   Minutes 60   Electronically signed by Danish Rashid, student PT on 10/12/2022 at 12:40 PM

## 2022-10-12 NOTE — CARE COORDINATION
Long conference with Mrs. Alfonso Stubbs, daughter, son-in-law and son about expected discharge and discharge plan. Responded to all questions about keeping him in hospital/inpatient rehab and why that is not appropriate. Discussed home which would involve equipment and total care- daughter indicates she is ready to provide however at this time Mrs. Alfonso Stubbs was so emotionally distraught she needed to be exit. Options were further discussed as they have to consider care for both MrMarcelo And Mrs Alfonso Stubbs (who is unable to stay alone due to stroke related short term memory impairment). Reviewed other option as skilled nursing facility- closest to their home is [facility], which also has assisted living unit where Ms. Alfonso Stubbs might enter if she chose to stay very close to her . All dependent on bed availability and getting authorization from [facility] for [place] care at [facility]. Mrs. Alfonso Stubbs did return to the conference and does indicate she does not feel comfortable with him at home at this time, thus honoring her, they requested referral to [facility]. Referral initiated.

## 2022-10-12 NOTE — PLAN OF CARE
Problem: Neurosensory - Adult  Goal: Achieves stable or improved neurological status  10/12/2022 1126 by Hank Vasquez RN  Outcome: Progressing  Flowsheets (Taken 10/12/2022 0809)  Achieves stable or improved neurological status: Assess for and report changes in neurological status  10/11/2022 2310 by Nilay Brian RN  Outcome: Progressing

## 2022-10-12 NOTE — PLAN OF CARE
Problem: Neurosensory - Adult  Goal: Achieves stable or improved neurological status  Outcome: Progressing     Problem: Respiratory - Adult  Goal: Achieves optimal ventilation and oxygenation  Outcome: Progressing  Flowsheets (Taken 10/11/2022 2010)  Achieves optimal ventilation and oxygenation: Assess for changes in respiratory status     Problem: Skin/Tissue Integrity - Adult  Goal: Skin integrity remains intact  Outcome: Progressing  Flowsheets (Taken 10/11/2022 2010)  Skin Integrity Remains Intact: Monitor for areas of redness and/or skin breakdown     Problem: Musculoskeletal - Adult  Goal: Return mobility to safest level of function  Outcome: Progressing  Flowsheets (Taken 10/11/2022 2010)  Return Mobility to Safest Level of Function: Assess patient stability and activity tolerance for standing, transferring and ambulating with or without assistive devices     Problem: Gastrointestinal - Adult  Goal: Establish and maintain optimal ostomy function  Outcome: Progressing  Flowsheets (Taken 10/11/2022 2010)  Establish and maintain optimal ostomy function: Monitor output from ostomies     Problem: Discharge Planning  Goal: Discharge to home or other facility with appropriate resources  Outcome: Progressing  Flowsheets (Taken 10/11/2022 2010)  Discharge to home or other facility with appropriate resources: Refer to discharge planning if patient needs post-hospital services based on physician order or complex needs related to functional status, cognitive ability or social support system     Problem: Skin/Tissue Integrity  Goal: Absence of new skin breakdown  Description: 1. Monitor for areas of redness and/or skin breakdown  2. Assess vascular access sites hourly  3. Every 4-6 hours minimum:  Change oxygen saturation probe site  4. Every 4-6 hours:  If on nasal continuous positive airway pressure, respiratory therapy assess nares and determine need for appliance change or resting period.   Outcome: Progressing Problem: Pain  Goal: Verbalizes/displays adequate comfort level or baseline comfort level  Outcome: Progressing     Problem: Nutrition Deficit:  Goal: Optimize nutritional status  Outcome: Progressing     Problem: Chronic Conditions and Co-morbidities  Goal: Patient's chronic conditions and co-morbidity symptoms are monitored and maintained or improved  Outcome: Progressing  Flowsheets (Taken 10/11/2022 2010)  Care Plan - Patient's Chronic Conditions and Co-Morbidity Symptoms are Monitored and Maintained or Improved: Monitor and assess patient's chronic conditions and comorbid symptoms for stability, deterioration, or improvement     Problem: Safety - Adult  Goal: Free from fall injury  Outcome: Progressing     Problem: ABCDS Injury Assessment  Goal: Absence of physical injury  Outcome: Progressing

## 2022-10-12 NOTE — PROGRESS NOTES
Patient:   Rolo Sherman  MR#:    [id number]   Room:    0811/811-01   YOB: 1945  Date of Progress Note: 10/12/2022  Time of Note                           8:31 AM  Consulting Physician:   Ricky Betancourt M.D. Attending Physician:  Ricky Betancourt MD     CHIEF COMPLAINT: Generalized weakness and deconditioning     Subjective  This 76 y.o. male  with history of HTN, hypercholesterolemia, DM, CAD, PVD, DM neuropathy, osteomyelitis with transmetatarsal amputation, COVID-19 July 2022 and newly diagnosis liver cancer July of this year with radioembolization to the liver 9/20/22. He presented to [facility] on 9/22/22 with weakness, AMS and lethargy. He was admitted to the Hospitalist service with metabolic encephalopathy. MRI done revealed Multiple tiny 1 or 2 mm areas of diffusion and increased signal in both cerebral hemispheres that likely represent small areas of acute ischemic change. These are seen in the bilateral frontal and parietal lobes and in the right occipital lobe. Neurology was consulted. He was seen by Dr. Patti Hartman, who felt embolic stroke related to hypercoaguable state and underlying/undetected atrial fibrllation. Evy Chavez had stopped ASA on his own but continued Pletal. ASA had been started this admission. In normal circumstance low dose anticoagulation would be recommended for ESUS but patient has higher than normal risk for bleed with hepatic cancer. Plans are for Zio Patch at discharge CTA of neck done shows heavy plaque burden at the bilateral carotid bifurcations with about 80% stenosis of the right ICA and 80 to 90% stenosis on the left. Vascular surgery was consulted. He was seen by Dr. Anna العلي, who didn't feel any surgical intervention was needed, he recommended continued conservative therapy with antiplatelet therapy and given the embolic appearance of the infarcts could consider low-dose Eliquis twice daily as well.       Patient returned back to the floor 9/30 with resumption of care following treatment for hyperkalemia. Continues to say that he just does not feel well. \"I feel miserable\"  Cannot be more specific. REVIEW OF SYSTEMS:  Constitutional: No fevers No chills  Neck:No stiffness  Respiratory: No shortness of breath  Cardiovascular: No chest pain No palpitations  Gastrointestinal: No diarrhea. Has a colostomy  Genitourinary: No Dysuria  Neurological: No headache, no confusion      PHYSICAL EXAM:  BP (!) 115/41   Pulse 90   Temp (!) 96.4 °F (35.8 °C) (Temporal)   Resp 16   Ht 6' 0.01\" (1.829 m)   Wt 196 lb 14.4 oz (89.3 kg)   SpO2 94%   BMI 26.70 kg/m²     Constitutional: he appears well-developed and well-nourished. Eyes - conjunctiva normal.  Pupils react to light  Ear, nose, throat -hearing intact to voice. No scars, masses, or lesions over external nose or ears, no atrophy of tongue  Neck-symmetric, no masses noted, no jugular vein distension  Respiration- chest wall appears symmetric, good expansion,   normal effort without use of accessory muscles  Cardiovascular- RRR  Musculoskeletal - no significant wasting of muscles noted, no bony deformities, gait no gross ataxia  Extremities-no clubbing, cyanosis or edema  Skin - warm, dry, and intact. No rash, erythema, or pallor. Psychiatric - mood, affect, and behavior appear normal.      Neurology  NEUROLOGICAL EXAM:      Mental status   Somewhat drowsy.   Complains of being tired     Cranial Nerve Exam   CN II- Visual fields grossly unremarkable  CN III, IV,VI-EOMI, No nystagmus, conjugate eye movements, no ptosis  CN VII-slight left facial droop  CN VIII-Hearing intact   CN IX and X- Palate elevates in midline  CN XI-good shoulder shrug  CN XII-Tongue midline with no fasciculations or fibrillations     Motor Exam  V/V throughout upper and lower extremities bilaterally, no cogwheeling, normal tone     Absent throughout     Tremors- no tremors in hands or head noted  Nursing/pcp notes, imaging,labs and vitals reviewed. Lab Results   Component Value Date    WBC 14.8 (H) 10/12/2022    HGB 13.1 (L) 10/12/2022    HCT 40.5 (L) 10/12/2022    .6 (H) 10/12/2022     (L) 10/12/2022     Lab Results   Component Value Date     10/12/2022    K 4.6 10/12/2022     10/12/2022    CO2 24 10/12/2022    BUN 14 10/12/2022    CREATININE 0.5 10/12/2022    GLUCOSE 156 (H) 10/12/2022    CALCIUM 8.2 (L) 10/12/2022    PROT 6.1 (L) 10/02/2022    LABALBU 2.9 (L) 10/02/2022    BILITOT 1.8 (H) 10/02/2022    ALKPHOS 193 (H) 10/02/2022    AST 94 (H) 10/02/2022    ALT 67 (H) 10/02/2022    LABGLOM >60 10/12/2022    GFRAA >59 10/12/2022     Lab Results   Component Value Date    INR 1.06 01/23/2018    INR 1.16 (H) 09/08/2014    INR 1.20 (H) 09/04/2014   No results found for: PHENYTOIN, ESR, CRP    PT,OT and/or speech notes reviewed:  PHYSICAL THERAPY  GROSS ASSESSMENT       BED MOBILITY  Bed mobility  Bridging: Minimal assistance (Minimally- PTA holding B feet flat and into B hip add)  Rolling to Left: Minimal assistance, Contact guard assistance (VC for sequencing, using rails)  Rolling to Right: Minimal assistance, Contact guard assistance (VC for sequencing, using rails)  Supine to Sit: Moderate assistance, Minimal assistance (to L side of bed, scoot turn, PT assist into long sit)  Sit to Supine: Minimal assistance, Contact guard assistance (from L side of bed, some assist for LE)  Scooting: Stand by assistance (On EOB and in supine minimally)  Bed Mobility Comments: On L side of bed- use of L bedrail       TRANSFERS  Transfers  Sit to Stand: Minimal Assistance, Contact guard assistance (VC for sequencing)  Stand to Sit: Minimal Assistance, Contact guard assistance  Bed to Chair: Minimal assistance, Contact guard assistance (stand pivot to R)  Stand Pivot Transfers: Minimal Assistance, Contact guard assistance (to R, did not get completely turned before sitting)  Squat Pivot Transfers:  Moderate Assistance (EOB<w/c from pt R and w/c<recliner from pt R)  WHEELCHAIR PROPULSION  AMBULATION  Ambulation  Surface: Level tile  Device: Rolling Walker  Assistance: Contact guard assistance  Quality of Gait: walker too far ahead, flexed posture, shuffling steps  Gait Deviations: Slow Alexus, Shuffles, Decreased step length, Decreased step height  Distance: 5'  Comments: pt with poor tolerance for standing; reports feeling dizzy and bad in general and BP elevated as well as heart rate (see flowsheet through 10a) (bp 121/104 and  after standing)  STAIRS  GOALS:        ASSESSMENT:  Assessment: During sit <>  //, pt. c/o of dizziness with first stand and requested to sit back down. Pt. BP was 96/53, pt. remained sitting until symptoms decreased. Pt. willing to attempt another sit <> stand and was able to stand 45 sec, VC were required to maintain upright posture. Pt. unable to tolerate much more activity. Once back in room, pt. stood long enough to undress LE, sat on EOB for around 2 minutes with minimal PT or UE assist needed. SPEECH THERAPY  No oral residue or buccal cavity pocketing was noted after his noon meal.     He continues to exhibit weak vocal intensity and hoarse vocal quality. Dry oral mucosa was noted. Therapist provided extra support to his neck/head when he accepted sips of thin liquids. He did accept a few sips of water and 7up via straw. Delayed congested cough response was noted. Wet/gurgly vocal quality was noted. Weak hyolaryngeal elevation and delayed swallow responses are noted. Crackers and pudding were offered but he declined these. He attempted orientation tasks but was unable to provide any answers. He also attempted to follow simple commands but was unable to consistently complete the tasks. He tried to answer open ended questions but could not consistently answer these. He was less alert and very fatigued.  He did try and verbally express his wants and needs but would eventually give up. Therapist repositioned him and offered additional blankets. Recommended Diet and Intervention  Easy to chew  Thin liquids  Recommended Form of Meds: Whole with water  Therapeutic Interventions: Pharyngeal exercises;Diet tolerance monitoring;Oral motor exercises; Patient/Family education     Compensatory Swallowing Strategies  Compensatory Swallowing Strategies : Alternate solids and liquids;Eat/Feed slowly;Upright as possible for all oral intake;Remain upright for 30-45 minutes after meals;Small bites/sips        Short Term Goals:  Goal 1: Pt will complete the CLQT. Goal 2: Pt will complete recall tasks with 90% accuracy and minimal cues. Goal 3: Pt will complete verbal expression tasks with 80% accuracy and minimal cues. Goal 4: Pt will complete executive functioning/problem solving/safety awareness tasks with 80% accuracy and minimal cues. Goal 5: Pt will complete orientation tasks with 90% accuracy and minimal cues. Goal 6: Pt will complete diaphragmatic breathing exercises to target breath support for speech production. Long Term Goals:  Pt will participate in skilled speech therapy services to ensure he is able to adequately/effectively communicate his wants and needs and safely complete ADLs. Pt will tolerate the least restrictive diet with minimal overt s/s of aspiration/penetration during hospitalization. STGs Met:0  LTGs Met: 0     ELOS: 2-3 weeks     Recommend he continue speech therapy services for cognition, dysphagia, expressive language, and breath support.         OCCUPATIONAL THERAPY  Cognitive Patterns:    9425   Cognitive Patterns      Cognitive Pattern Assessment Used BIMS   Repetition of Three Words (First Attempt) 3   Temporal Orientation: Year Missed by > 5 years or no answer   Temporal Orientation: Month Missed by 6 days to 1 month   Temporal Orientation: Day Incorrect or no answer   Able to recall \"sock Yes, no cue required   Able to recall \"blue\" Yes, no cue required   Able to recall \"bed\" Yes, no cue required   BIMS Summary Score 10         Cognitive Assessment Method (CAM):  Confusion Assessment Method (CAM)  Is there evidence of an acute change in mental status from the patient's baseline?: No  Inattention: Behavior continuously present, does not fluctuate  Disorganized thinking: Behavior present, fluctuates (comes and goes, changes in severity)  Altered level of consciousness: Behavior not present  Health Literacy:  How often do you need to have someone help you when you read instructions, pamphlets, or other written material from your doctor or pharmacy?: Always           CURRENT IRF-VIANEY SCORES  Eating: CARE Score: 4  Comment: VC for encouragement     Oral Hygiene: CARE Score: 4  Comment: Cues for thoroughness. Toileting: CARE Score: 2  Comment: Total A with colostomy management      Shower/Bathe: CARE Score: 3  Comment: Mod A        Upper Body Dressing: CARE Score: 3  Comment: Min A       Lower Body Dressing: CARE Score: 3  Comment: Able to thread over feet, but assistance with pulling pants up when standing at RW.         Footwear: CARE Score: 2  Comment: socks only, vc for tech, AE        Toilet Transfers: CARE Score: 3           Picking Up Object:  CARE Score: 88           UE Functioning:  BUE AROM WFL      Pain Assessment:   6/10 pain in abdomen      STGs:  Short Term Goals  Time Frame for Short Term Goals: 1 week  Short Term Goal 1: complete UB dressing with setup  Short Term Goal 2: complete LB dressing with CGA  Short Term Goal 3: complete overall toileting with CGA  Short Term Goal 4: complete 1-2 handed standing level task for 3 mins with supervision  Short Term Goal 5: complete overall bathing with CGA  Short Term Goal 6: complete simple ambulatory home making task with CGA     LTGs:  Long Term Goals  Time Frame for Long Term Goals : 3 weeks  Long Term Goal 1: complete overall toileting with modified independence  Long Term Goal 2: complete overall bathing with modified independence  Long Term Goal 3: complete overall dressing with modified independence  Long Term Goal 4: complete simple ambulatory home making task with modified independence  Long Term Goal 5: complete HEP with independence  Long Term Goal 6: pt/family verbalize DME     Assessment:  Performance deficits / Impairments: Decreased functional mobility , Decreased endurance, Decreased ADL status, Decreased balance, Decreased strength, Decreased high-level IADLs, Decreased cognition                     NUTRITION  Current Wt: Weight: 196 lb 14.4 oz (89.3 kg) / Body mass index is 26.7 kg/m². Admission Wt: Admission Body Weight: 203 lb (92.1 kg)  Oral Diet Orders:   Easy to Chew; 3 Carb Choices, Low K+ (<3g/day)  Oral Nutrition Supplement (ONS) Orders:  Glucerna Strawberry with breakfast, Boost Pudding with lunch, Magic Cup with dinner. Wt loss 7 lb during LOS, PO < 50% EEN x 10 days. Would recommend increasing Megace to 800mg/day or consider changing to different type (I.e., Marinol) if medically appropriate. Please see nutrition note for details. RECORD REVIEW: Previous medical records, medications were reviewed at today's visit    IMPRESSION:   1. Bilateral embolic strokes-PT/OT/SLP. Discontinued Pletal due to nosebleed and resumed Eliquis with baby aspirin. 2.  Essential hypertension-metoprolol, monitoring. Low at times. Metoprolol reduced 10/11. 3.  Diabetes-continue insulin and monitoring. Lantus increased 10/5. Stable  4. Hyperlipidemia-Lipitor  5. GI-bowel regimen. Has colostomy. Carafate d/c'd, not helpful. Continues to complain of \"stomach pain\". Appreciate GI assistance. Argenis added. They signed off. Also nystatin added for thrush. Megace increased 10/12. 6.  DVT prophylaxis-Eliquis   7  History of liver cancer- relatively new dx  8. Abnormal UA-culture growing E. coli. Patient given Cipro. Repeat study 10/1 unremarkable  9. Hyperkalemia-recurrent.   Luís Chi ordered and hospitalist following. 10.  Leukocytosis-per hospitalist.  On Merrem. Trending down  Provigil discontinued and Cymbalta increased 10/11  11. Mild thrombocytopenia.   monitor    Staffing this date , mutlidisciplinary with entire team with complex decision making and planning for discharge. More than 35 minutes spent today in total on this patient, with greater than 50% of the time on counseling and coordination of care  ELOS:TBD. Poor progress noted.

## 2022-10-13 NOTE — PLAN OF CARE
Problem: Neurosensory - Adult  Goal: Achieves stable or improved neurological status  10/12/2022 2330 by Shameka Kaba RN  Outcome: Progressing  Flowsheets (Taken 10/12/2022 2015)  Achieves stable or improved neurological status: Assess for and report changes in neurological status  10/12/2022 1126 by Enrico De Guzman RN  Outcome: Progressing  Flowsheets (Taken 10/12/2022 0809)  Achieves stable or improved neurological status: Assess for and report changes in neurological status     Problem: Respiratory - Adult  Goal: Achieves optimal ventilation and oxygenation  10/12/2022 2330 by Shameka Kaba RN  Outcome: Progressing  Flowsheets (Taken 10/12/2022 2015)  Achieves optimal ventilation and oxygenation: Assess for changes in respiratory status  10/12/2022 1126 by Enrico De Guzman RN  Outcome: Progressing  Flowsheets (Taken 10/12/2022 0809)  Achieves optimal ventilation and oxygenation: Assess for changes in respiratory status     Problem: Skin/Tissue Integrity - Adult  Goal: Skin integrity remains intact  10/12/2022 2330 by Shameka Kaba RN  Outcome: Progressing  10/12/2022 1126 by Enirco De Guzman RN  Outcome: Progressing  Flowsheets (Taken 10/12/2022 0809)  Skin Integrity Remains Intact: Monitor for areas of redness and/or skin breakdown     Problem: Musculoskeletal - Adult  Goal: Return mobility to safest level of function  10/12/2022 2330 by Shameka Kaba RN  Outcome: Progressing  10/12/2022 1126 by Enrico De Guzman RN  Outcome: Progressing  Flowsheets (Taken 10/12/2022 0809)  Return Mobility to Safest Level of Function: Assess patient stability and activity tolerance for standing, transferring and ambulating with or without assistive devices     Problem: Gastrointestinal - Adult  Goal: Establish and maintain optimal ostomy function  10/12/2022 2330 by Shameka Kaba RN  Outcome: Progressing  10/12/2022 1126 by Enrico De Guzman RN  Outcome: Progressing  Flowsheets (Taken 10/12/2022 0809)  Establish and maintain optimal ostomy function: Monitor output from ostomies     Problem: Discharge Planning  Goal: Discharge to home or other facility with appropriate resources  10/12/2022 2330 by Rolo Chery RN  Outcome: Progressing  10/12/2022 1126 by Luann Solis RN  Outcome: Progressing  Flowsheets (Taken 10/12/2022 0809)  Discharge to home or other facility with appropriate resources: Refer to discharge planning if patient needs post-hospital services based on physician order or complex needs related to functional status, cognitive ability or social support system     Problem: Skin/Tissue Integrity  Goal: Absence of new skin breakdown  Description: 1. Monitor for areas of redness and/or skin breakdown  2. Assess vascular access sites hourly  3. Every 4-6 hours minimum:  Change oxygen saturation probe site  4. Every 4-6 hours:  If on nasal continuous positive airway pressure, respiratory therapy assess nares and determine need for appliance change or resting period.   10/12/2022 2330 by Rolo Chery RN  Outcome: Progressing  10/12/2022 1126 by Luann Solis RN  Outcome: Progressing     Problem: Pain  Goal: Verbalizes/displays adequate comfort level or baseline comfort level  10/12/2022 2330 by Rolo Chery RN  Outcome: Progressing  10/12/2022 1126 by Luann Solis RN  Outcome: Progressing     Problem: Nutrition Deficit:  Goal: Optimize nutritional status  10/12/2022 2330 by Rolo Chery RN  Outcome: Progressing  10/12/2022 1126 by Luann Solis RN  Outcome: Progressing     Problem: Chronic Conditions and Co-morbidities  Goal: Patient's chronic conditions and co-morbidity symptoms are monitored and maintained or improved  10/12/2022 2330 by Rolo Chery RN  Outcome: Progressing  10/12/2022 1126 by Luann Solis RN  Outcome: Progressing  Flowsheets (Taken 10/12/2022 0809)  Care Plan - Patient's Chronic Conditions and Co-Morbidity Symptoms are Monitored and Maintained or Improved: Monitor and assess patient's chronic conditions and comorbid symptoms for stability, deterioration, or improvement     Problem: Safety - Adult  Goal: Free from fall injury  10/12/2022 2330 by Shameka Kaba RN  Outcome: Progressing  10/12/2022 1126 by Enrico De Guzman RN  Outcome: Progressing     Problem: ABCDS Injury Assessment  Goal: Absence of physical injury  10/12/2022 2330 by Shameka Kaba RN  Outcome: Progressing  10/12/2022 1126 by Enrico De Guzman RN  Outcome: Progressing

## 2022-10-13 NOTE — CARE COORDINATION
Discharge planning in greater depth- referral initiated to EVERY home health agency serving [place] to seek an agency and identify level of services to expect- was very disappointed- EVERY agency declined either stating they lacked staff or concerned that her required more care than [facility] would approve visits to assit in. This information given to Mr. Pedroza's son and daughter jointly- both agreed now with request for referral to [place] [address] and [facility]. Prior consideration for [facility] no longer- is not in network with [facility] and there are more extended family members in [place] to support, also had family member recently cared for at [facility] successfully. Referral initiated.

## 2022-10-13 NOTE — PROGRESS NOTES
Name: Suzy Da Silva  MRN: [id number]  Date of Service:  10/13/2022     10/13/22 1005   Restrictions/Precautions   Restrictions/Precautions Fall Risk   Lower Extremity Weight Bearing Restrictions   Right Lower Extremity Weight Bearing Weight Bearing As Tolerated   Left Lower Extremity Weight Bearing Weight Bearing As Tolerated   Position Activity Restriction   Other position/activity restrictions zio patch, colostomy bag   General   Chart Reviewed Yes   Patient assessed for rehabilitation services? Yes   Additional Pertinent Hx Liver cancer, HTN, CAD, PVD, DM with neuropathy, osteomyelitis with transmetatarsal amputation, ileostomy   Diagnosis HYPERKALEMIA, CVA   General Comment   Comments CO-TX with MATHEWS   Subjective   Subjective Pt longsitting in recliner, agrees to participate in therapy. Hearing   Hearing X   Hearing Exceptions Bilateral hearing aid   Vitals   Heart Rate (!) 149  (Above partial standing in SS, 106 bpm in sitting)   BP (!) 116/101  (Above partial standing in SS, 137/68 in sitting)   MAP (Calculated) 106   SpO2 93 %   O2 Device Nasal cannula   Comment 3L   Subjective   Pain Verbal: 0/10   Transfers   Sit to Stand Moderate Assistance   Stand to Sit Minimal Assistance   Comment Max v/c's and extended time required, heavy posterior lean upon standing   PT Exercises   Exercise Treatment Longsitting in recliner: BLE IR stretch 2 X 15 sec, B DF/PF X 15 PROM/AAROM (min ankle movement, less on L), B SLR X 10 AAROM   Static Standing Balance Exercises Pt able to partially  SS for ~30 sec while checking BP, requests to sit back in recliner   Activity Tolerance   Activity Tolerance Patient limited by endurance; Patient limited by fatigue;Patient tolerated treatment well   Assessment   Assessment Pt continues to present with very low activity tolerance and requires multiple intermittent rest break throughout tx. Pt able to perform 1X sit<>stand from recliner<>RW and recliner<>SS requiring Mod/Min A.  Pt son/wife educated on requirements/DME for pt safety, etc. if ot d/c home vs SNF. Discharge Recommendations Continue to assess pending progress;Subacute/Skilled Nursing Facility; Patient would benefit from continued therapy after discharge   Education   Education Given To Family   Education Provided Safety;Transfer Training;Family Education;Equipment;DME/Home Modifications   Education Provided Comments Requirements for pt safety if pt d/c home vs SNF   Education Method Verbal;Demonstration   Barriers to Learning None   Education Outcome Verbalized understanding;Demonstrated understanding;Continued education needed   Safety Devices   Type of Devices Patient at risk for falls;Gait belt;Left in chair;Chair alarm in place;Call light within reach           Electronically signed by Layla Farmer PTA on 10/13/2022 at 3:10 PM

## 2022-10-13 NOTE — PROGRESS NOTES
Comprehensive Nutrition Assessment    Type and Reason for Visit:  Reassess    Nutrition Recommendations/Plan:   Continue current nutritional interventions     Malnutrition Assessment:  Malnutrition Status: At risk for malnutrition (Comment) (10/13/22 1028)    Context:  Acute Illness     Findings of the 6 clinical characteristics of malnutrition:  Energy Intake:  50% or less of estimated energy requirements for 5 or more days  Weight Loss:  1% to 2% over 1 week     Body Fat Loss:  No significant body fat loss     Muscle Mass Loss:  No significant muscle mass loss    Fluid Accumulation:  No significant fluid accumulation Extremities   Strength:  Not Performed    Nutrition Assessment:    Pt continues to have decreased po intake. Meals continue to range 0-25%. ONS intake similar 0-25%. Aware alot of times so fatigued unable to eat. Megace dosage has been increased. Nutrition Related Findings:    BM 10/7 ostomy Wound Type: None       Current Nutrition Intake & Therapies:    Average Meal Intake: 1-25%  Average Supplements Intake: 1-25%  ADULT DIET; Easy to Chew; 3 carb choices (45 gm/meal); Low Potassium (Less than 3000 mg/day); NO OATMEAL. LIKES SCRAMBLED EGGS, SINGH, & SAUSAGE  ADULT ORAL NUTRITION SUPPLEMENT; Lunch; Fortified Pudding Oral Supplement  ADULT ORAL NUTRITION SUPPLEMENT; Dinner; Frozen Oral Supplement  ADULT ORAL NUTRITION SUPPLEMENT; Breakfast; Diabetic Oral Supplement    Anthropometric Measures:  Height: 6' 0.01\" (182.9 cm)  Ideal Body Weight (IBW): 178 lbs (81 kg)    Admission Body Weight: 203 lb (92.1 kg)  Current Body Weight: 196 lb 14.4 oz (89.3 kg), 110.6 % IBW. Weight Source: Bed Scale  Current BMI (kg/m2): 26.7  Usual Body Weight: 210 lb (95.3 kg)  % Weight Change (Calculated): -7.5  Weight Adjustment For: No Adjustment  BMI Categories: Overweight (BMI 25.0-29. 9)    Estimated Daily Nutrient Needs:  Energy Requirements Based On: Kcal/kg  Weight Used for Energy Requirements: Current  Energy (kcal/day): 0430-9821 kcal/day = 25-30 kcal/kg  Weight Used for Protein Requirements: Ideal  Protein (g/day): 105-162 g/day = 1.2-2g/kg IBW  Method Used for Fluid Requirements: 1 ml/kcal  Fluid (ml/day): 3123-3030    Nutrition Diagnosis:   Inadequate oral intake related to acute injury/trauma, other (comment), early satiety (fatigue) as evidenced by intake 0-25%, weight loss, weight loss greater than or equal to 2% in 1 week    Nutrition Interventions:   Food and/or Nutrient Delivery: Continue Current Diet, Continue Oral Nutrition Supplement  Nutrition Education/Counseling: Education not indicated  Coordination of Nutrition Care: Continue to monitor while inpatient  Plan of Care discussed with: IDT    Goals:  Previous Goal Met: No Progress toward Goal(s)  Goals: PO intake 50% or greater, PO intake 75% or greater, Meet at least 75% of estimated needs, within 7 days       Nutrition Monitoring and Evaluation:   Behavioral-Environmental Outcomes: None Identified  Food/Nutrient Intake Outcomes: Diet Advancement/Tolerance, Food and Nutrient Intake, Supplement Intake  Physical Signs/Symptoms Outcomes: Biochemical Data, Chewing or Swallowing, Fluid Status or Edema, Weight, Skin    Discharge Planning:     Too soon to determine     Aura Maulik, MS, RD, LD  Contact: [phone]

## 2022-10-13 NOTE — PROGRESS NOTES
[facility]  INPATIENT SPEECH THERAPY  [facility] [address] [facility] UNIT  TIME   0830  0900        [x]Daily Note  []Progress Note    Date: 10/13/2022  Patient Name: Mimi Snider        MRN: [id number]    Account #: [de-identified]  : 1945  (76 y.o.)  Gender: male   Primary Provider: Saadia Houser MD  Swallowing Status/Diet: Easy to chew diet, thin liquids        Diagnosis:  Bilateral embolic strokes           Subjective:  His wife and son were present for today's session. He denied pain this morning. Objective:  He did accept sips of water, bites of chocolate pudding, and bites of Boost pudding. Minimal oral residue was noted this date. Thin liquid wash was effective in helping clear residue. Mildly delayed swallow responses. Decreased hyolaryngeal elevation per palpation. Congested cough at rest is still noted. He is afebrile this date. He is receiving 2LPM 02 via [place]. He was able to state the correct year with cueing. He did state his location and the correct month. He was able to recall all family members who were present for the session. He answered open ended questions appropriately. He was able to help with selecting menu items for his meals. His son states the patient has complained of altered taste. He is receiving a nutritional supplement. Improved vocal intensity was observed this date. Hoarse vocal quality was also noted. He appeared drowsy at times, but was more alert than previous sessions. Recommended Diet and Intervention  Easy to chew  Thin liquids  Recommended Form of Meds: Whole with water  Therapeutic Interventions: Pharyngeal exercises;Diet tolerance monitoring;Oral motor exercises; Patient/Family education     Compensatory Swallowing Strategies  Compensatory Swallowing Strategies :  Alternate solids and liquids;Eat/Feed slowly;Upright as possible for all oral intake;Remain upright for 30-45 minutes after meals;Small bites/sips             ASSESSMENT:  Assessment: []Progressing towards goals          []Not Progressing towards goals    Patient Tolerance of Treatment:   []Tolerated well []Tolerated fair []Required rest breaks [x]Fatigued    Education:  Learner:  [x]Patient          []Significant Other          [x]Other  Education provided regarding:  [x]Goals and POC   [x]Diet and swallowing precautions    []Home Exercise Program  []Progress and/or discharge information  Method of Education:  [x]Discussion          []Demonstration          []Handout          []Other  Evaluation of Education:   []Verbalized understanding   []Demonstrates without assistance  []Demonstrates with assistance  [x]Needs further instruction     []No evidence of learning                  []No family present      Plan: [x]Continue with current plan of care    []Modify current plan of care as follows:    []Discharge patient    Discharge Location:    Services/Supervision Recommended:      [x]Patient continues to require treatment by a licensed therapist to address functional deficits as outlined in the established plan of care.       Electronically Signed By:  Lorie Santacruz M.S., CCC-SLP  10/13/2022,7:01 AM.

## 2022-10-13 NOTE — PROGRESS NOTES
[facility]  INPATIENT SPEECH THERAPY  [facility] [address] [facility] UNIT  TIME    [id number]     74 MINUTES     [x]Daily Note  []Progress Note     Date: 10/13/2022  Patient Name: Matthew Clay        MRN:   [id number]    Account #: [de-identified]  : 1945  (76 y.o.)  Gender: male   Primary Provider: Cindi Cardenas MD  Swallowing Status/Diet: Easy to chew diet, thin liquids        Diagnosis:  Bilateral embolic strokes     Subjective:  Patient reclined in chair. Patient asked for help in moving back to bed. PCA alerted and patient informed this may be in a bit. Patient on nasal cannula. Presents with loud exhalations at times. Objective:  Educated patient on diaphragmatic breathing. Patient in bad positioning. Respiration quick/short. Patient sat upright and repositioned in chair. Attempted to complete breath control exercise to count on exhalation, however, patient did not participate. Patient lethargic and eyes partially closing. Attempted automatic speech task of RANJEET. Patient voice almost fully aphonic. Patient presented with anterior spillage of thin liquid presented via side of cup. Oral transit appeared fast. Throat movement appeared sluggish/decreased. Patient presented with mildly delayed cough 1x following thin liquid. Residue of thin liquid (white milk) visible in mouth; cued to swallow again. Patient exhibited immediate cough following use of thin liquid for liquid wash. Mac and cheese- Mastication was short, prolonged. Patient required multiple cues to swallow and use of liquid wash. Mod-max residue remaining in oral cavity. Patient noted to be lethargic during this bolus. Required multiple swallows and washes to clear bolus/residue. Cough noted prior to initial liquid wash. Patient presented with coughing following consecutive sips of thin liquid. Thin liquid residue observed (white milk) in oral cavity following swallow.      Bite of peach- Mastication was prolonged with observed anterior bolus positioning. Required multiple swallows and liquid wash to clear bolus/residue. Patient observed to present with burp 1x in PO intake. Patient wanted to discontinue meal following approximately 2 bites of food. Patient educated need for intake for nutrition and attempted to ask why he is not eating more. Patient became agitated and stated he did not want to eat any more. Short Term Goals:  Goal 1: Pt will complete the CLQT. Discontinue goal  Goal 2: Pt will complete recall tasks with 90% accuracy and minimal cues. Goal 3: Pt will complete verbal expression tasks with 80% accuracy and minimal cues. Goal 4: Pt will complete executive functioning/problem solving/safety awareness tasks with 80% accuracy and minimal cues. Goal 5: Pt will complete orientation tasks with 90% accuracy and minimal cues. Goal 6: Pt will complete diaphragmatic breathing exercises to target breath support for speech production. Recommended Diet and Intervention  Easy to chew  Thin liquids  Recommended Form of Meds: Whole with water  Therapeutic Interventions: Pharyngeal exercises;Diet tolerance monitoring;Oral motor exercises; Patient/Family education     Compensatory Swallowing Strategies  Compensatory Swallowing Strategies :  Alternate solids and liquids;Eat/Feed slowly;Upright as possible for all oral intake;Remain upright for 30-45 minutes after meals;Small bites/sips     ASSESSMENT:  Assessment: []Progressing towards goals          [x]Not Progressing towards goals     Patient Tolerance of Treatment:       []Tolerated well         []Tolerated fair          []Required rest breaks          [x]Fatigued     Education:  Learner:  [x]Patient          []Significant Other          []Other  Education provided regarding:  [x]Goals and POC                              []Diet and swallowing precautions                  []Home Exercise Program                []Progress and/or discharge information  Method of Education:  [x]Discussion []Demonstration          []Handout          []Other  Evaluation of Education:   [x]Verbalized understanding                           []Demonstrates without assistance  []Demonstrates with assistance                   []Needs further instruction                              []No evidence of learning                              []No family present                    Plan:   [x]Continue with current plan of care               []Modify current plan of care as follows:               []Discharge patient                          Discharge Location:                          Services/Supervision Recommended:       [x]Patient continues to require treatment by a licensed therapist to address functional deficits as outlined in the established plan of care.     Electronically signed by SACHI Chacon on 10/13/2022 at 6:42 PM

## 2022-10-13 NOTE — PROGRESS NOTES
Patient:   Mimi Snider  MR#:    [id number]   Room:    0811/811-01   YOB: 1945  Date of Progress Note: 10/13/2022  Time of Note                           8:04 AM  Consulting Physician:   Saadia Houser M.D. Attending Physician:  Saadia Houser MD     CHIEF COMPLAINT: Generalized weakness and deconditioning     Subjective  This 76 y.o. male  with history of HTN, hypercholesterolemia, DM, CAD, PVD, DM neuropathy, osteomyelitis with transmetatarsal amputation, COVID-19 July 2022 and newly diagnosis liver cancer July of this year with radioembolization to the liver 9/20/22. He presented to [facility] on 9/22/22 with weakness, AMS and lethargy. He was admitted to the Hospitalist service with metabolic encephalopathy. MRI done revealed Multiple tiny 1 or 2 mm areas of diffusion and increased signal in both cerebral hemispheres that likely represent small areas of acute ischemic change. These are seen in the bilateral frontal and parietal lobes and in the right occipital lobe. Neurology was consulted. He was seen by Dr. Toni Hernandez, who felt embolic stroke related to hypercoaguable state and underlying/undetected atrial fibrllation. Ed Shah had stopped ASA on his own but continued Pletal. ASA had been started this admission. In normal circumstance low dose anticoagulation would be recommended for ESUS but patient has higher than normal risk for bleed with hepatic cancer. Plans are for Zio Patch at discharge CTA of neck done shows heavy plaque burden at the bilateral carotid bifurcations with about 80% stenosis of the right ICA and 80 to 90% stenosis on the left. Vascular surgery was consulted. He was seen by Dr. Kermit Bhardwaj, who didn't feel any surgical intervention was needed, he recommended continued conservative therapy with antiplatelet therapy and given the embolic appearance of the infarcts could consider low-dose Eliquis twice daily as well.       Patient returned back to the floor 9/30 with resumption of care following treatment for hyperkalemia. No new complaints today. Still does not feel well  REVIEW OF SYSTEMS:  Constitutional: No fevers No chills  Neck:No stiffness  Respiratory: No shortness of breath  Cardiovascular: No chest pain No palpitations  Gastrointestinal: No diarrhea. Has a colostomy  Genitourinary: No Dysuria  Neurological: No headache, no confusion      PHYSICAL EXAM:  BP (!) 146/50   Pulse (!) 102   Temp (!) 95.7 °F (35.4 °C) (Temporal)   Resp 18   Ht 6' 0.01\" (1.829 m)   Wt 196 lb 14.4 oz (89.3 kg)   SpO2 92%   BMI 26.70 kg/m²     Constitutional: he appears well-developed and well-nourished. Eyes - conjunctiva normal.  Pupils react to light  Ear, nose, throat -hearing intact to voice. No scars, masses, or lesions over external nose or ears, no atrophy of tongue  Neck-symmetric, no masses noted, no jugular vein distension  Respiration- chest wall appears symmetric, good expansion,   normal effort without use of accessory muscles  Cardiovascular- RRR  Musculoskeletal - no significant wasting of muscles noted, no bony deformities, gait no gross ataxia  Extremities-no clubbing, cyanosis or edema  Skin - warm, dry, and intact. No rash, erythema, or pallor. Psychiatric - mood, affect, and behavior appear normal.      Neurology  NEUROLOGICAL EXAM:      Mental status   Somewhat drowsy. Complains of being tired     Cranial Nerve Exam   CN II- Visual fields grossly unremarkable  CN III, IV,VI-EOMI, No nystagmus, conjugate eye movements, no ptosis  CN VII-slight left facial droop  CN VIII-Hearing intact   CN IX and X- Palate elevates in midline  CN XI-good shoulder shrug  CN XII-Tongue midline with no fasciculations or fibrillations     Motor Exam  V/V throughout upper and lower extremities bilaterally, no cogwheeling, normal tone     Absent throughout     Tremors- no tremors in hands or head noted  Nursing/pcp notes, imaging,labs and vitals reviewed.          Lab Results Component Value Date    WBC 17.3 (H) 10/13/2022    HGB 13.3 (L) 10/13/2022    HCT 41.4 (L) 10/13/2022    .2 (H) 10/13/2022     10/13/2022     Lab Results   Component Value Date     10/13/2022    K 4.9 10/13/2022     10/13/2022    CO2 26 10/13/2022    BUN 12 10/13/2022    CREATININE 0.5 10/13/2022    GLUCOSE 140 (H) 10/13/2022    CALCIUM 8.1 (L) 10/13/2022    PROT 6.1 (L) 10/02/2022    LABALBU 2.9 (L) 10/02/2022    BILITOT 1.8 (H) 10/02/2022    ALKPHOS 193 (H) 10/02/2022    AST 94 (H) 10/02/2022    ALT 67 (H) 10/02/2022    LABGLOM >60 10/13/2022    GFRAA >59 10/13/2022     Lab Results   Component Value Date    INR 1.06 01/23/2018    INR 1.16 (H) 09/08/2014    INR 1.20 (H) 09/04/2014   No results found for: PHENYTOIN, ESR, CRP    PT,OT and/or speech notes reviewed:  PHYSICAL THERAPY  GROSS ASSESSMENT       BED MOBILITY  Bed mobility  Bridging: Minimal assistance (Minimally- PTA holding B feet flat and into B hip add)  Rolling to Left: Minimal assistance, Contact guard assistance (VC for sequencing, using rails)  Rolling to Right: Minimal assistance, Contact guard assistance (VC for sequencing, using rails)  Supine to Sit: Moderate assistance, Minimal assistance (to L side of bed, scoot turn, PT assist into long sit)  Sit to Supine: Minimal assistance, Contact guard assistance (from L side of bed, some assist for LE)  Scooting: Stand by assistance (On EOB and in supine minimally)  Bed Mobility Comments: On L side of bed- use of L bedrail       TRANSFERS  Transfers  Sit to Stand: Minimal Assistance, Contact guard assistance (VC for sequencing)  Stand to Sit: Minimal Assistance, Contact guard assistance  Bed to Chair: Minimal assistance, Contact guard assistance (stand pivot to R)  Stand Pivot Transfers: Minimal Assistance, Contact guard assistance (to R, did not get completely turned before sitting)  Squat Pivot Transfers:  Moderate Assistance (EOB<w/c from pt R and w/c<recliner from pt R)  WHEELCHAIR PROPULSION  AMBULATION  Ambulation  Surface: Level tile  Device: Rolling Walker  Assistance: Contact guard assistance  Quality of Gait: walker too far ahead, flexed posture, shuffling steps  Gait Deviations: Slow Alexus, Shuffles, Decreased step length, Decreased step height  Distance: 5'  Comments: pt with poor tolerance for standing; reports feeling dizzy and bad in general and BP elevated as well as heart rate (see flowsheet through 10a) (bp 121/104 and  after standing)  STAIRS  GOALS:        ASSESSMENT:  Assessment: During sit <>  //, pt. c/o of dizziness with first stand and requested to sit back down. Pt. BP was 96/53, pt. remained sitting until symptoms decreased. Pt. willing to attempt another sit <> stand and was able to stand 45 sec, VC were required to maintain upright posture. Pt. unable to tolerate much more activity. Once back in room, pt. stood long enough to undress LE, sat on EOB for around 2 minutes with minimal PT or UE assist needed. SPEECH THERAPY  No oral residue or buccal cavity pocketing was noted after his noon meal.     He continues to exhibit weak vocal intensity and hoarse vocal quality. Dry oral mucosa was noted. Therapist provided extra support to his neck/head when he accepted sips of thin liquids. He did accept a few sips of water and 7up via straw. Delayed congested cough response was noted. Wet/gurgly vocal quality was noted. Weak hyolaryngeal elevation and delayed swallow responses are noted. Crackers and pudding were offered but he declined these. He attempted orientation tasks but was unable to provide any answers. He also attempted to follow simple commands but was unable to consistently complete the tasks. He tried to answer open ended questions but could not consistently answer these. He was less alert and very fatigued. He did try and verbally express his wants and needs but would eventually give up.  Therapist repositioned him and offered additional blankets. Recommended Diet and Intervention  Easy to chew  Thin liquids  Recommended Form of Meds: Whole with water  Therapeutic Interventions: Pharyngeal exercises;Diet tolerance monitoring;Oral motor exercises; Patient/Family education     Compensatory Swallowing Strategies  Compensatory Swallowing Strategies : Alternate solids and liquids;Eat/Feed slowly;Upright as possible for all oral intake;Remain upright for 30-45 minutes after meals;Small bites/sips        Short Term Goals:  Goal 1: Pt will complete the CLQT. Goal 2: Pt will complete recall tasks with 90% accuracy and minimal cues. Goal 3: Pt will complete verbal expression tasks with 80% accuracy and minimal cues. Goal 4: Pt will complete executive functioning/problem solving/safety awareness tasks with 80% accuracy and minimal cues. Goal 5: Pt will complete orientation tasks with 90% accuracy and minimal cues. Goal 6: Pt will complete diaphragmatic breathing exercises to target breath support for speech production. Long Term Goals:  Pt will participate in skilled speech therapy services to ensure he is able to adequately/effectively communicate his wants and needs and safely complete ADLs. Pt will tolerate the least restrictive diet with minimal overt s/s of aspiration/penetration during hospitalization. STGs Met:0  LTGs Met: 0     ELOS: 2-3 weeks     Recommend he continue speech therapy services for cognition, dysphagia, expressive language, and breath support.         OCCUPATIONAL THERAPY  Cognitive Patterns:    1483   Cognitive Patterns      Cognitive Pattern Assessment Used BIMS   Repetition of Three Words (First Attempt) 3   Temporal Orientation: Year Missed by > 5 years or no answer   Temporal Orientation: Month Missed by 6 days to 1 month   Temporal Orientation: Day Incorrect or no answer   Able to recall \"sock Yes, no cue required   Able to recall \"blue\" Yes, no cue required   Able to recall \"bed\" Yes, no cue required   BIMS Summary Score 10         Cognitive Assessment Method (CAM):  Confusion Assessment Method (CAM)  Is there evidence of an acute change in mental status from the patient's baseline?: No  Inattention: Behavior continuously present, does not fluctuate  Disorganized thinking: Behavior present, fluctuates (comes and goes, changes in severity)  Altered level of consciousness: Behavior not present  Health Literacy:  How often do you need to have someone help you when you read instructions, pamphlets, or other written material from your doctor or pharmacy?: Always           CURRENT IRF-VIANEY SCORES  Eating: CARE Score: 4  Comment: VC for encouragement     Oral Hygiene: CARE Score: 4  Comment: Cues for thoroughness. Toileting: CARE Score: 2  Comment: Total A with colostomy management      Shower/Bathe: CARE Score: 3  Comment: Mod A        Upper Body Dressing: CARE Score: 3  Comment: Min A       Lower Body Dressing: CARE Score: 3  Comment: Able to thread over feet, but assistance with pulling pants up when standing at RW.         Footwear: CARE Score: 2  Comment: socks only, vc for tech, AE        Toilet Transfers: CARE Score: 3           Picking Up Object:  CARE Score: 88           UE Functioning:  BUE AROM WFL      Pain Assessment:   6/10 pain in abdomen      STGs:  Short Term Goals  Time Frame for Short Term Goals: 1 week  Short Term Goal 1: complete UB dressing with setup  Short Term Goal 2: complete LB dressing with CGA  Short Term Goal 3: complete overall toileting with CGA  Short Term Goal 4: complete 1-2 handed standing level task for 3 mins with supervision  Short Term Goal 5: complete overall bathing with CGA  Short Term Goal 6: complete simple ambulatory home making task with CGA     LTGs:  Long Term Goals  Time Frame for Long Term Goals : 3 weeks  Long Term Goal 1: complete overall toileting with modified independence  Long Term Goal 2: complete overall bathing with modified independence  Long Term Goal 3: complete overall dressing with modified independence  Long Term Goal 4: complete simple ambulatory home making task with modified independence  Long Term Goal 5: complete HEP with independence  Long Term Goal 6: pt/family verbalize DME     Assessment:  Performance deficits / Impairments: Decreased functional mobility , Decreased endurance, Decreased ADL status, Decreased balance, Decreased strength, Decreased high-level IADLs, Decreased cognition                     NUTRITION  Current Wt: Weight: 196 lb 14.4 oz (89.3 kg) / Body mass index is 26.7 kg/m². Admission Wt: Admission Body Weight: 203 lb (92.1 kg)  Oral Diet Orders:   Easy to Chew; 3 Carb Choices, Low K+ (<3g/day)  Oral Nutrition Supplement (ONS) Orders:  Glucerna Strawberry with breakfast, Boost Pudding with lunch, Magic Cup with dinner. Wt loss 7 lb during LOS, PO < 50% EEN x 10 days. Would recommend increasing Megace to 800mg/day or consider changing to different type (I.e., Marinol) if medically appropriate. Please see nutrition note for details. RECORD REVIEW: Previous medical records, medications were reviewed at today's visit    IMPRESSION:   1. Bilateral embolic strokes-PT/OT/SLP. Discontinued Pletal due to nosebleed and resumed Eliquis with baby aspirin. 2.  Essential hypertension-metoprolol, monitoring. Low at times. Metoprolol reduced 10/11. 3.  Diabetes-continue insulin and monitoring. Lantus increased 10/5. Stable  4. Hyperlipidemia-Lipitor  5. GI-bowel regimen. Has colostomy. Carafate d/c'd, not helpful. Continues to complain of \"stomach pain\". Appreciate GI assistance. Argenis added. They signed off. Also nystatin added for thrush. Megace increased 10/12. 6.  DVT prophylaxis-Eliquis   7  History of liver cancer- relatively new dx  8. Abnormal UA-culture growing E. coli. Patient given Cipro. Repeat study 10/1 unremarkable  9. Hyperkalemia-recurrent. Lynder Cranker ordered and hospitalist following.   10. Leukocytosis-per hospitalist.  On Sparkle Tovar. Persistent  Provigil discontinued and Cymbalta increased 10/11  11. Mild thrombocytopenia.   monitor. Okay today  Continue supportive care and encouragement  LORENA:TBD. Poor progress noted. Anticipate SNF versus home. Recommend early next week to go home if unable to go to SNF before that.

## 2022-10-13 NOTE — CARE COORDINATION
Family remains in discussion and trying to make most difficult decision about discharge destination- want to honor his wishes, son states \"I have to make sure this is a safe situation\". He is present today and observing/participating; daughter who assures him she and  will be full-time caregivers to be here tomorrow. Again stated to expect total care and this will require much equipment ordering- hospital bed, overlay for skin protection, oxygen, device for transfer assistance, wheelchair; there is a lift chair in the home, there is a ramp in to the home. He had given himself and managed his blood sugar previously- asked wife to bring his glucometer to instruct daughter as well.

## 2022-10-14 ENCOUNTER — TELEPHONE (OUTPATIENT)
Dept: FAMILY MEDICINE CLINIC | Facility: CLINIC | Age: 77
End: 2022-10-14

## 2022-10-14 NOTE — PROGRESS NOTES
[facility]  INPATIENT SPEECH THERAPY  [facility] [address] [facility] UNIT  TIME   0830  0900    [x]Daily Note  []Progress Note    Date: 10/14/2022  Patient Name: Jimmy Jules        MRN: [id number]    Account #: [de-identified]  : 1945  (76 y.o.)  Gender: male   Primary Provider: Seema Romero MD  Swallowing Status/Diet: Easy to chew diet, thin liquids        Diagnosis:  Bilateral embolic strokes           Subjective: The patient was upright in his recliner. His wife, son and daughter were all present for today's therapy session. Objective:  Family was present for training regarding oral hygiene as well as dysphagia. Skilled education was provided regarding the importance of oral hygiene. Small particles of egg were found on the tongue after breakfast. Oral swabs were used to remove additional residue from breakfast. Did review how to complete oral care. Recommended that oral hygiene should be completed at least two times a day to prevent aspiration of oral bacteria. He has a very supportive family and they are willing to complete this after discharge from rehab. Skilled education was provided regarding swallowing precautions, appropriate times to complete oral intake and when to hold oral intake. Discussed monitoring him closely for changes in temperature and lung sounds. During feeding, he must be fully alert and upright. Pillows should be placed behind his neck. Small sips and bites should be presented. Family and caregivers should feed him slowly. They will need to check the oral cavity after all meals for oral residue and buccal cavity pocketing. Family/caregivers can use an oral swab or encourage him to use a lingual sweep to remove any residue. While locating oral residue, the therapist did note a very red/irritated soft palate. This was discussed with his nurse. He has been receiving medications for thrush. He is receiving an appetite stimulant. His son stated he did consume more yesterday.     Today he did attempt temporal and spatial orientation questions. He was able to recall the month and year with moderate cues. He was unable to recall his location today. Demographic information recall tasks were attempted. He was able to recall family members names. He was able to recall events that had occurred earlier in the day. These were confirmed by his nurse aide. Family was requesting to take the patient outside of the hospital today. However, he has an IV and required several staff members to transfer him last night. Today the patient is scheduled for family therapy/training day and therapists will need to use his scheduled therapy times for training. When he is medically appropriate and staff can assist, then he may be able to go outside. Recommended Diet and Intervention  Easy to chew  Thin liquids  Recommended Form of Meds: Whole with water  Therapeutic Interventions: Pharyngeal exercises;Diet tolerance monitoring;Oral motor exercises; Patient/Family education     Compensatory Swallowing Strategies  Compensatory Swallowing Strategies :  Alternate solids and liquids;Eat/Feed slowly;Upright as possible for all oral intake;Remain upright for 30-45 minutes after meals;Small bites/sips          ASSESSMENT:  Patient Tolerance of Treatment:   []Tolerated well []Tolerated fair []Required rest breaks [x]Fatigued    Education:  Learner:  [x]Patient          [x]Significant Other          [x]Other  Education provided regarding:  [x]Goals and POC   []Diet and swallowing precautions    []Home Exercise Program  []Progress and/or discharge information  Method of Education:  [x]Discussion          []Demonstration          []Handout          []Other  Evaluation of Education:   [x]Verbalized understanding   []Demonstrates without assistance  []Demonstrates with assistance  []Needs further instruction     []No evidence of learning                  []No family present      Plan: [x]Continue with current plan of care    []Modify current plan of care as follows:    []Discharge patient    Discharge Location:    Services/Supervision Recommended:      [x]Patient continues to require treatment by a licensed therapist to address functional deficits as outlined in the established plan of care.        Electronically Signed By:  James Lopez M.S., CCC-SLP  10/14/2022,1:23 PM.

## 2022-10-14 NOTE — PROGRESS NOTES
Name: Ulises Jordan  MRN: [id number]  Date of Service:  10/14/2022         10/14/22 1000   Restrictions/Precautions   Restrictions/Precautions Fall Risk   Lower Extremity Weight Bearing Restrictions   Right Lower Extremity Weight Bearing Weight Bearing As Tolerated   Left Lower Extremity Weight Bearing Weight Bearing As Tolerated   Position Activity Restriction   Other position/activity restrictions zio patch, colostomy bag   General   Chart Reviewed Yes   Patient assessed for rehabilitation services? Yes   Additional Pertinent Hx Liver cancer, HTN, CAD, PVD, DM with neuropathy, osteomyelitis with transmetatarsal amputation, ileostomy   Diagnosis HYPERKALEMIA, CVA   General Comment   Comments CO-TX with MATHEWS   Subjective   Subjective Pt sitting in recliner, agrees to participate in therapy. Hearing   Hearing X   Hearing Exceptions Bilateral hearing aid   Vitals   SpO2 93 %   O2 Device Nasal cannula   Comment 4L   Subjective   Subjective General   Pain Faces- 2/10   Bed mobility   Bridging Moderate assistance  (PTA holding B feet flat and B hips into add)   Rolling to Left Minimal assistance; Moderate assistance  (Bending opposite knee, use of bedrail)   Rolling to Right Minimal assistance; Moderate assistance  (Bending opposite knee, use of bedrail)   Supine to Sit Stand by assistance  (Very motivated to lay back down)   Sit to Supine Moderate assistance   Scooting Minimal assistance   Bed Mobility Comments On L side of bed- use of L bedrail   Transfers   Sit to Stand Moderate Assistance  (Heavy posterior lean)   Stand to Sit Minimal Assistance   Lateral Transfers Dependent/Total  (Use of SS)   Comment Max v/c's and extended time required, heavy posterior lean upon standing   PT Exercises   Exercise Treatment Supine: Bridging X 5 (minimally)   Assessment   Assessment Pt on 2L upon entering room, O2 sats 88-89%. Pt performed 1X sit<> RW, multiple SRS in SS, pt unable to take step.  Pt requires multiple rest breaks throughout tx, requires rest after each min activity. Discharge Recommendations Continue to assess pending progress;Subacute/Skilled Nursing Facility; Patient would benefit from continued therapy after discharge   Education   Education Given To Family   Education Provided Safety; Mobility Training;Energy Conservation; Family Education;Equipment;DME/Home Modifications   Education Provided Comments Requirements for pt safety if pt d/c home vs SNF   Education Method Verbal   Barriers to Learning None   Education Outcome Verbalized understanding;Demonstrated understanding;Continued education needed   Safety Devices   Type of Devices Patient at risk for falls;Gait belt;Left in chair;Chair alarm in place;Call light within reach; Sitter present  (Family present)       Electronically signed by Nicki Rodriguez PTA on 10/14/2022 at 12:44 PM

## 2022-10-14 NOTE — TELEPHONE ENCOUNTER
DEX FROM [facility] CALLED IN TO LET PROVIDER KNOW THAT PATIENT WILL BE DISCHARGED TO [facility] TOMORROW     GOOD CALL BACK   [phone]

## 2022-10-14 NOTE — PROGRESS NOTES
Occupational Therapy     10/12/22 0900   General   Timed Code Treatment Minutes 61 Minutes   Restrictions/Precautions   Restrictions/Precautions Fall Risk   Position Activity Restriction   Other position/activity restrictions zio patch, colostomy bag   General   Additional Pertinent Hx HTN, DM, Covid 2021, recent liver CA diagnosis   Diagnosis CVA , hyperkalemia   Subjective   Subjective Pt seen bedside this session d/t increased fatigue and c/o SOA. Pt coughing a lot this session. Type of ROM/Therapeutic Exercise   Type of ROM/Therapeutic Exercise Cane/Wand;Self PROM   Assessment   Performance deficits / Impairments Decreased functional mobility ; Decreased endurance;Decreased ADL status; Decreased balance;Decreased strength;Decreased high-level IADLs;Decreased cognition   Assessment Attempted to split sessions from cotx but pt very fatigued and ill-appearing. Would not be able to tolerate 2 OOB txs.    Treatment Diagnosis CVA, hyperkalemia   Prognosis Fair   Activity Tolerance   Activity Tolerance Patient limited by fatigue;Treatment limited secondary to medical complications (free text)

## 2022-10-14 NOTE — PROGRESS NOTES
Occupational Therapy     10/14/22 1005   Restrictions/Precautions   Restrictions/Precautions Fall Risk   Position Activity Restriction   Other position/activity restrictions zio patch, colostomy bag   General   Additional Pertinent Hx HTN, DM, Covid 2021, recent liver CA diagnosis   Family / Caregiver Present Yes  (Daughter)   Diagnosis CVA , hyperkalemia   Subjective   Subjective Daughter present for family and transfer training. See assessment,   ADL   Toileting Dependent/Total  (urinal and colostomy care)   Additional Comments Daughter assisted with clothing up/down in supine and urinal  (states mother will assist with colostomy if pt unable when he d/cs home)   Balance   Sitting Balance Stand by assistance   Standing Balance Minimal assistance  (first attempt)   Standing Balance   Activity static   Functional Mobility   Functional Mobility Comments utilized Kannan Clan for recliner <-> bed   Bed mobility   Rolling to Left Minimal assistance   Rolling to Right Contact guard assistance   Supine to Sit Stand by assistance   Sit to Supine Moderate assistance   Bed Mobility Comments Radha instructed in back protocol and bed mobility techniques   Transfers   Sit to stand Moderate assistance  (Mod to walker, CGA to Kannan Clan 1st attempt and min A 2nd)   Stand to sit Minimal assistance  (to control descent)   Transfer Comments see assessment   Type of ROM/Therapeutic Exercise   Type of ROM/Therapeutic Exercise Cane/Wand;Free weights   Comment 1# Tbar proximal and 2# free weight distal   Assessment   Performance deficits / Impairments Decreased functional mobility ; Decreased endurance;Decreased ADL status; Decreased balance;Decreased strength;Decreased high-level IADLs;Decreased cognition;Decreased posture   Assessment Pt demonstrated increased energy at the beginning of the session and daughter able to t/f with Kannan Clan and max cues for technique.  After completing sit <-> supine and some light rep exercises pt was very fatigued and required staff assist to stand to the [place]. Daughter stated she did not have the strength to get him up the second attempt and is very concerned about car transfers for doctor appointments. Referral has been made to  a skilled nursing facility with hopes that he will be able to return home soon.    Treatment Diagnosis CVA, hyperkalemia   History HTN, DM, Covid 2021, recent liver CA diagnosis   Activity Tolerance   Activity Tolerance Patient limited by fatigue   Activity Tolerance Comments pt placed back in recliner at end of session for SLP and [facility]hab [facility]- not safe to leave up in chair unsupervised

## 2022-10-14 NOTE — PROGRESS NOTES
Occupational Therapy     10/13/22 1000   General   Timed Code Treatment Minutes 61 Minutes   Restrictions/Precautions   Restrictions/Precautions Fall Risk   Position Activity Restriction   Other position/activity restrictions zio patch, colostomy bag   General   Additional Pertinent Hx HTN, DM, Covid 2021, recent liver CA diagnosis   Family / Caregiver Present Yes  (son)   Diagnosis CVA , hyperkalemia   Subjective   Subjective Son and spouse present. Discussed discharge plans and feasibility of care in the home environment. See assessment. ADL   Additional Comments total assist for ADLs   Balance   Standing Balance Moderate assistance  (strong posterior lean and pt unable to transfer weight to forefoot)   Standing Balance   Time very brief   Activity static   Comment son completed one sit -> stand with assist of therapy   Transfers   Sit to stand Moderate assistance   Stand to sit Minimal assistance   Transfer Comments [facility]- completed by son   Type of ROM/Therapeutic Exercise   Type of ROM/Therapeutic Exercise Cane/Wand   Comment 1#   Assessment   Performance deficits / Impairments Decreased functional mobility ; Decreased endurance;Decreased ADL status; Decreased balance;Decreased strength;Decreased high-level IADLs;Decreased cognition   Assessment Very limited endurance, activity tolerance, and strength. Family is supportive and would like to take him home if possible but this will require taxing  24 hour care for his daughter and spouse. Equipment recommendations: hospital bed, wheelchair, stand assist and oxygen. Son (who lives out of state but was present today) will discuss with sister who lives locally.    Treatment Diagnosis CVA, hyperkalemia   Activity Tolerance   Activity Tolerance Patient limited by fatigue

## 2022-10-14 NOTE — PLAN OF CARE
Problem: Neurosensory - Adult  Goal: Achieves stable or improved neurological status  Outcome: Progressing  Flowsheets (Taken 10/14/2022 0928)  Achieves stable or improved neurological status: Assess for and report changes in neurological status     Problem: Respiratory - Adult  Goal: Achieves optimal ventilation and oxygenation  Outcome: Progressing  Flowsheets (Taken 10/14/2022 0928)  Achieves optimal ventilation and oxygenation: Assess for changes in respiratory status     Problem: Skin/Tissue Integrity - Adult  Goal: Skin integrity remains intact  Outcome: Progressing  Flowsheets (Taken 10/14/2022 0928)  Skin Integrity Remains Intact: Monitor for areas of redness and/or skin breakdown     Problem: Musculoskeletal - Adult  Goal: Return mobility to safest level of function  Outcome: Progressing  Flowsheets (Taken 10/14/2022 0928)  Return Mobility to Safest Level of Function:   Assess patient stability and activity tolerance for standing, transferring and ambulating with or without assistive devices   Assist with transfers and ambulation using safe patient handling equipment as needed   Ensure adequate protection for wounds/incisions during mobilization     Problem: Gastrointestinal - Adult  Goal: Establish and maintain optimal ostomy function  Outcome: Progressing  Flowsheets (Taken 10/14/2022 0928)  Establish and maintain optimal ostomy function: Monitor output from ostomies     Problem: Discharge Planning  Goal: Discharge to home or other facility with appropriate resources  Outcome: Progressing  Flowsheets (Taken 10/14/2022 0970)  Discharge to home or other facility with appropriate resources: Refer to discharge planning if patient needs post-hospital services based on physician order or complex needs related to functional status, cognitive ability or social support system     Problem: Skin/Tissue Integrity  Goal: Absence of new skin breakdown  Description: 1.   Monitor for areas of redness and/or skin breakdown  2. Assess vascular access sites hourly  3. Every 4-6 hours minimum:  Change oxygen saturation probe site  4. Every 4-6 hours:  If on nasal continuous positive airway pressure, respiratory therapy assess nares and determine need for appliance change or resting period. Outcome: Progressing     Problem: Pain  Goal: Verbalizes/displays adequate comfort level or baseline comfort level  Outcome: Progressing     Problem: Nutrition Deficit:  Goal: Optimize nutritional status  Outcome: Progressing     Problem: Chronic Conditions and Co-morbidities  Goal: Patient's chronic conditions and co-morbidity symptoms are monitored and maintained or improved  Outcome: Progressing  Flowsheets (Taken 10/14/2022 0928)  Care Plan - Patient's Chronic Conditions and Co-Morbidity Symptoms are Monitored and Maintained or Improved: Monitor and assess patient's chronic conditions and comorbid symptoms for stability, deterioration, or improvement     Problem: Safety - Adult  Goal: Free from fall injury  Outcome: Progressing     Problem: ABCDS Injury Assessment  Goal: Absence of physical injury  Outcome: Progressing       Electronically signed by Basilio Moreno on 10/14/2022 at 2:32 PM

## 2022-10-14 NOTE — PROGRESS NOTES
Patient:   Kenia Love  MR#:    [id number]   Room:    0811/811-01   YOB: 1945  Date of Progress Note: 10/14/2022  Time of Note                           6:43 AM  Consulting Physician:   Lolis Stone M.D. Attending Physician:  Lolis Stone MD     CHIEF COMPLAINT: Generalized weakness and deconditioning     Subjective  This 76 y.o. male  with history of HTN, hypercholesterolemia, DM, CAD, PVD, DM neuropathy, osteomyelitis with transmetatarsal amputation, COVID-19 July 2022 and newly diagnosis liver cancer July of this year with radioembolization to the liver 9/20/22. He presented to [address] [place] [address] on 9/22/22 with weakness, AMS and lethargy. He was admitted to the Hospitalist service with metabolic encephalopathy. MRI done revealed Multiple tiny 1 or 2 mm areas of diffusion and increased signal in both cerebral hemispheres that likely represent small areas of acute ischemic change. These are seen in the bilateral frontal and parietal lobes and in the right occipital lobe. Neurology was consulted. He was seen by Dr. Ti Sands, who felt embolic stroke related to hypercoaguable state and underlying/undetected atrial fibrllation. Miles Osman had stopped ASA on his own but continued Pletal. ASA had been started this admission. In normal circumstance low dose anticoagulation would be recommended for ESUS but patient has higher than normal risk for bleed with hepatic cancer. Plans are for Zio Patch at discharge CTA of neck done shows heavy plaque burden at the bilateral carotid bifurcations with about 80% stenosis of the right ICA and 80 to 90% stenosis on the left. Vascular surgery was consulted. He was seen by Dr. Isaias Tavera, who didn't feel any surgical intervention was needed, he recommended continued conservative therapy with antiplatelet therapy and given the embolic appearance of the infarcts could consider low-dose Eliquis twice daily as well.       Patient returned back to the floor 9/30 with resumption of care following treatment for hyperkalemia. No new complaints this morning. Thick postnasal drip noted. REVIEW OF SYSTEMS:  Constitutional: No fevers No chills  Neck:No stiffness  Respiratory: No shortness of breath  Cardiovascular: No chest pain No palpitations  Gastrointestinal: No diarrhea. Has a colostomy  Genitourinary: No Dysuria  Neurological: No headache, no confusion      PHYSICAL EXAM:  BP (!) 175/69   Pulse (!) 103   Temp 96.8 °F (36 °C) (Temporal)   Resp 20   Ht 6' 0.01\" (1.829 m)   Wt 204 lb 3.2 oz (92.6 kg)   SpO2 95%   BMI 27.69 kg/m²     Constitutional: he appears well-developed and well-nourished. Eyes - conjunctiva normal.  Pupils react to light  Ear, nose, throat -hearing intact to voice. No scars, masses, or lesions over external nose or ears, no atrophy of tongue  Neck-symmetric, no masses noted, no jugular vein distension  Respiration- chest wall appears symmetric, good expansion,   normal effort without use of accessory muscles  Cardiovascular- RRR  Musculoskeletal - no significant wasting of muscles noted, no bony deformities, gait no gross ataxia  Extremities-no clubbing, cyanosis or edema  Skin - warm, dry, and intact. No rash, erythema, or pallor. Psychiatric - mood, affect, and behavior appear normal.      Neurology  NEUROLOGICAL EXAM:      Mental status   Somewhat drowsy. Complains of being tired     Cranial Nerve Exam   CN II- Visual fields grossly unremarkable  CN III, IV,VI-EOMI, No nystagmus, conjugate eye movements, no ptosis  CN VII-slight left facial droop  CN VIII-Hearing intact   CN IX and X- Palate elevates in midline  CN XI-good shoulder shrug  CN XII-Tongue midline with no fasciculations or fibrillations     Motor Exam  V/V throughout upper and lower extremities bilaterally, no cogwheeling, normal tone     Absent throughout     Tremors- no tremors in hands or head noted  Nursing/pcp notes, imaging,labs and vitals reviewed.          Lab Results Component Value Date    WBC 17.0 (H) 10/14/2022    HGB 13.4 (L) 10/14/2022    HCT 42.5 10/14/2022    .2 (H) 10/14/2022     (L) 10/14/2022     Lab Results   Component Value Date     (L) 10/14/2022    K 5.2 (H) 10/14/2022     10/14/2022    CO2 27 10/14/2022    BUN 14 10/14/2022    CREATININE 0.5 10/14/2022    GLUCOSE 220 (H) 10/14/2022    CALCIUM 7.8 (L) 10/14/2022    PROT 6.1 (L) 10/02/2022    LABALBU 2.9 (L) 10/02/2022    BILITOT 1.8 (H) 10/02/2022    ALKPHOS 193 (H) 10/02/2022    AST 94 (H) 10/02/2022    ALT 67 (H) 10/02/2022    LABGLOM >60 10/14/2022    GFRAA >59 10/14/2022     Lab Results   Component Value Date    INR 1.06 01/23/2018    INR 1.16 (H) 09/08/2014    INR 1.20 (H) 09/04/2014   No results found for: PHENYTOIN, ESR, CRP    PT,OT and/or speech notes reviewed:  PHYSICAL THERAPY  GROSS ASSESSMENT       BED MOBILITY  Bed mobility  Bridging: Minimal assistance (Minimally- PTA holding B feet flat and into B hip add)  Rolling to Left: Minimal assistance, Contact guard assistance (VC for sequencing, using rails)  Rolling to Right: Minimal assistance, Contact guard assistance (VC for sequencing, using rails)  Supine to Sit: Moderate assistance, Minimal assistance (to L side of bed, scoot turn, PT assist into long sit)  Sit to Supine: Minimal assistance, Contact guard assistance (from L side of bed, some assist for LE)  Scooting: Stand by assistance (On EOB and in supine minimally)  Bed Mobility Comments: On L side of bed- use of L bedrail       TRANSFERS  Transfers  Sit to Stand: Minimal Assistance, Contact guard assistance (VC for sequencing)  Stand to Sit: Minimal Assistance, Contact guard assistance  Bed to Chair: Minimal assistance, Contact guard assistance (stand pivot to R)  Stand Pivot Transfers: Minimal Assistance, Contact guard assistance (to R, did not get completely turned before sitting)  Squat Pivot Transfers:  Moderate Assistance (EOB<w/c from pt R and w/c<recliner from pt R)  WHEELCHAIR PROPULSION  AMBULATION  Ambulation  Surface: Level tile  Device: Rolling Walker  Assistance: Contact guard assistance  Quality of Gait: walker too far ahead, flexed posture, shuffling steps  Gait Deviations: Slow Alexus, Shuffles, Decreased step length, Decreased step height  Distance: 5'  Comments: pt with poor tolerance for standing; reports feeling dizzy and bad in general and BP elevated as well as heart rate (see flowsheet through 10a) (bp 121/104 and  after standing)  STAIRS  GOALS:        ASSESSMENT:  Assessment: During sit <>  //, pt. c/o of dizziness with first stand and requested to sit back down. Pt. BP was 96/53, pt. remained sitting until symptoms decreased. Pt. willing to attempt another sit <> stand and was able to stand 45 sec, VC were required to maintain upright posture. Pt. unable to tolerate much more activity. Once back in room, pt. stood long enough to undress LE, sat on EOB for around 2 minutes with minimal PT or UE assist needed. SPEECH THERAPY  No oral residue or buccal cavity pocketing was noted after his noon meal.     He continues to exhibit weak vocal intensity and hoarse vocal quality. Dry oral mucosa was noted. Therapist provided extra support to his neck/head when he accepted sips of thin liquids. He did accept a few sips of water and 7up via straw. Delayed congested cough response was noted. Wet/gurgly vocal quality was noted. Weak hyolaryngeal elevation and delayed swallow responses are noted. Crackers and pudding were offered but he declined these. He attempted orientation tasks but was unable to provide any answers. He also attempted to follow simple commands but was unable to consistently complete the tasks. He tried to answer open ended questions but could not consistently answer these. He was less alert and very fatigued. He did try and verbally express his wants and needs but would eventually give up.  Therapist repositioned him and offered additional blankets. Recommended Diet and Intervention  Easy to chew  Thin liquids  Recommended Form of Meds: Whole with water  Therapeutic Interventions: Pharyngeal exercises;Diet tolerance monitoring;Oral motor exercises; Patient/Family education     Compensatory Swallowing Strategies  Compensatory Swallowing Strategies : Alternate solids and liquids;Eat/Feed slowly;Upright as possible for all oral intake;Remain upright for 30-45 minutes after meals;Small bites/sips        Short Term Goals:  Goal 1: Pt will complete the CLQT. Goal 2: Pt will complete recall tasks with 90% accuracy and minimal cues. Goal 3: Pt will complete verbal expression tasks with 80% accuracy and minimal cues. Goal 4: Pt will complete executive functioning/problem solving/safety awareness tasks with 80% accuracy and minimal cues. Goal 5: Pt will complete orientation tasks with 90% accuracy and minimal cues. Goal 6: Pt will complete diaphragmatic breathing exercises to target breath support for speech production. Long Term Goals:  Pt will participate in skilled speech therapy services to ensure he is able to adequately/effectively communicate his wants and needs and safely complete ADLs. Pt will tolerate the least restrictive diet with minimal overt s/s of aspiration/penetration during hospitalization. STGs Met:0  LTGs Met: 0     ELOS: 2-3 weeks     Recommend he continue speech therapy services for cognition, dysphagia, expressive language, and breath support.         OCCUPATIONAL THERAPY  Cognitive Patterns:    5965   Cognitive Patterns      Cognitive Pattern Assessment Used BIMS   Repetition of Three Words (First Attempt) 3   Temporal Orientation: Year Missed by > 5 years or no answer   Temporal Orientation: Month Missed by 6 days to 1 month   Temporal Orientation: Day Incorrect or no answer   Able to recall \"sock Yes, no cue required   Able to recall \"blue\" Yes, no cue required   Able to recall \"bed\" Yes, no cue required   BIMS Summary Score 10         Cognitive Assessment Method (CAM):  Confusion Assessment Method (CAM)  Is there evidence of an acute change in mental status from the patient's baseline?: No  Inattention: Behavior continuously present, does not fluctuate  Disorganized thinking: Behavior present, fluctuates (comes and goes, changes in severity)  Altered level of consciousness: Behavior not present  Health Literacy:  How often do you need to have someone help you when you read instructions, pamphlets, or other written material from your doctor or pharmacy?: Always           CURRENT IRF-VIANEY SCORES  Eating: CARE Score: 4  Comment: VC for encouragement     Oral Hygiene: CARE Score: 4  Comment: Cues for thoroughness. Toileting: CARE Score: 2  Comment: Total A with colostomy management      Shower/Bathe: CARE Score: 3  Comment: Mod A        Upper Body Dressing: CARE Score: 3  Comment: Min A       Lower Body Dressing: CARE Score: 3  Comment: Able to thread over feet, but assistance with pulling pants up when standing at RW.         Footwear: CARE Score: 2  Comment: socks only, vc for tech, AE        Toilet Transfers: CARE Score: 3           Picking Up Object:  CARE Score: 88           UE Functioning:  BUE AROM WFL      Pain Assessment:   6/10 pain in abdomen      STGs:  Short Term Goals  Time Frame for Short Term Goals: 1 week  Short Term Goal 1: complete UB dressing with setup  Short Term Goal 2: complete LB dressing with CGA  Short Term Goal 3: complete overall toileting with CGA  Short Term Goal 4: complete 1-2 handed standing level task for 3 mins with supervision  Short Term Goal 5: complete overall bathing with CGA  Short Term Goal 6: complete simple ambulatory home making task with CGA     LTGs:  Long Term Goals  Time Frame for Long Term Goals : 3 weeks  Long Term Goal 1: complete overall toileting with modified independence  Long Term Goal 2: complete overall bathing with modified independence  Long Term Goal 3: complete overall dressing with modified independence  Long Term Goal 4: complete simple ambulatory home making task with modified independence  Long Term Goal 5: complete HEP with independence  Long Term Goal 6: pt/family verbalize DME     Assessment:  Performance deficits / Impairments: Decreased functional mobility , Decreased endurance, Decreased ADL status, Decreased balance, Decreased strength, Decreased high-level IADLs, Decreased cognition                     NUTRITION  Current Wt: Weight: 196 lb 14.4 oz (89.3 kg) / Body mass index is 26.7 kg/m². Admission Wt: Admission Body Weight: 203 lb (92.1 kg)  Oral Diet Orders:   Easy to Chew; 3 Carb Choices, Low K+ (<3g/day)  Oral Nutrition Supplement (ONS) Orders:  Glucerna Strawberry with breakfast, Boost Pudding with lunch, Magic Cup with dinner. Wt loss 7 lb during LOS, PO < 50% EEN x 10 days. Would recommend increasing Megace to 800mg/day or consider changing to different type (I.e., Marinol) if medically appropriate. Please see nutrition note for details. RECORD REVIEW: Previous medical records, medications were reviewed at today's visit    IMPRESSION:   1. Bilateral embolic strokes-PT/OT/SLP. Discontinued Pletal due to nosebleed and resumed Eliquis with baby aspirin. 2.  Essential hypertension-metoprolol, monitoring. Low at times. Metoprolol reduced 10/11. Improved  3. Diabetes-continue insulin and monitoring. Lantus increased 10/5. Stable  4. Hyperlipidemia-Lipitor  5. GI-bowel regimen. Has colostomy. Carafate d/c'd, not helpful. Continues to complain of \"stomach pain\". Appreciate GI assistance. Argenis added. They signed off. Megace increased 10/12. 6.  DVT prophylaxis-Eliquis   7  History of liver cancer- relatively new dx  8. Abnormal UA-culture growing E. coli. Patient given Cipro. Repeat study 10/1 unremarkable  9. Hyperkalemia-recurrent. STEF MENJIVAR [facility] ordered and hospitalist following.   10. Leukocytosis-per hospitalist.  On Danetta Holiday. Persistent  Provigil discontinued and Cymbalta increased 10/11  11. Mild thrombocytopenia.   monitor. Okay today  Mucinex ordered. Nystatin discontinued  ELOS:TBD. Poor progress noted. Anticipate SNF versus home. Recommend early next week to go home if unable to go to SNF before that.   We are anticipating this

## 2022-10-14 NOTE — PROGRESS NOTES
[facility]  INPATIENT SPEECH THERAPY  [facility] UNIT  TIME   1100  1200  61 MINUTES    [x]Daily Note  []Progress Note    Date: 10/14/2022  Patient Name: Monie Iglesias        MRN: [id number]    Account #: [de-identified]  : 1945  (76 y.o.)  Gender: male   Primary Provider: Magnolia Ellis MD  Swallowing Status/Diet: Easy to chew diet, thin liquids      Subjective: Pt lethargic and upright in recliner. Daughter present on this date. Congested coughing at rest noted. Pt was able to cough mucus up 1x occasion. Pt observed hallucinating throughout the session. He stated \"someone take me to the hotel\". Neologisms noted at the conversational level. Objective:    Swallowing reassessed during noon meal. Easy to chew with thin liquids presented on noon meal tray. Pt did take 1x bite of fish and 3x bites of peaches. Pt refused P.O. trials after 4x trials. Continued decreased appetite noted. Oral prep reveals prolonged, vertical mastication with easy to chew consistencies. Oral transit ranges from 1-2 seconds with thin liquids via consecutive sips side of cup/straw and puree consistencies. Oral transit ranges from 2-4 seconds with easy to chew and puree consistencies. Moderate oral residue observed on this date. Residue observed to be on both sides of oral cavity with easy to chew consistencies. Residue cleared with cued dry swallows and liquid wash. Laryngeal movement observed to be consistently sluggish and mildly decreased. Multiple instances of coughing observed throughout meal.      Congested coughing observed at rest, as well as during and after P.O. trials. Can not rule out penetration/aspiration from bedside. Recommend instrumental assessment (Modified Barium Swallow Study) to r/o. Over articulation attempted on this date. Pt given verbal cues to utilize lips and tongue more at the conversational level. When given cues, pt would utilize this strategy.  Speech intelligibility did improve significantly when pt utilized this strategy. Pt did not consistently/independently utilize this compensatory strategy. Functional recall task completed. Pt is able to recall the name of his high school, his school mascot, sports he played in high school, and positions he was within the sports. Pt did state that he met his wife during highschool; however, his daughter reports this is false. Orientation task completed. Pt required to elicit the day/month/year and day of the week. Pt is able to elicit the month; however, he stated it was 2016. He was unable to elicit the date and day of the week. Pt was able to identify each family member present in the room (daughter, son, wife). Pt did require cues for over articulation strategies during this task; however, he was able to name each individual.     Verbal expression task completed. Pt is required to explain the game of golf. He was unable to independently describe the game of golf; however, he was able to answer open ended questions regarding the topic of golf. Recommended Diet and Intervention  Easy to chew  Thin liquids  Recommended Form of Meds: Whole with water  Therapeutic Interventions: Pharyngeal exercises;Diet tolerance monitoring;Oral motor exercises; Patient/Family education     Compensatory Swallowing Strategies  Compensatory Swallowing Strategies :  Alternate solids and liquids;Eat/Feed slowly;Upright as possible for all oral intake;Remain upright for 30-45 minutes after meals;Small bites/sips    ASSESSMENT:  Assessment: []Progressing towards goals          [x]Not Progressing towards goals    Patient Tolerance of Treatment:   []Tolerated well []Tolerated fair [x]Required rest breaks [x]Fatigued    Education:  Learner:  [x]Patient          []Significant Other          []Other  Education provided regarding:  []Goals and POC   [x]Diet and swallowing precautions    []Home Exercise Program  []Progress and/or discharge information  Method of Education:  [x]Discussion          []Demonstration          []Handout          []Other  Evaluation of Education:   []Verbalized understanding   []Demonstrates without assistance  []Demonstrates with assistance  [x]Needs further instruction     []No evidence of learning                  []No family present      Plan: [x]Continue with current plan of care    []Modify current plan of care as follows:    []Discharge patient    Discharge Location:    Services/Supervision Recommended:      []Patient continues to require treatment by a licensed therapist to address functional deficits as outlined in the established plan of care.       Electronically signed by SACHI Ma on 10/14/2022 at 12:46 PM
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
## 2022-10-14 NOTE — CARE COORDINATION
Received notification that [employer] has received authorization for Mr. Ivelisse Carias' admission to their facility. Notified Dr. Wilbur Gonzales, who is in agreement with discharge, preparing for the transfer 10/15, by ambulance to [facility]. Nursing staff please FAX transfer documents to [phone],  call report to [phone],  ask for wing 2 charge nurse; going to Room 216.     Will transfer via [facility] time of departure 11:00 am.

## 2022-10-14 NOTE — PROGRESS NOTES
Patient is scheduled to discharge to [facility] on 10/15/2022. Notified hospitalist group that WBC was 17.0 this morning and today was his last dose of 7-day antibiotic treatment. Per Dr. Anderson Matias - \"Hospitalist group has signed off. 7 days of meropenem completes course of antibiotic. \"    Electronically signed by Gallo Resendiz.  Ab Murguia on 10/14/2022 at 3:33 PM

## 2022-10-15 NOTE — DISCHARGE SUMMARY
INPATIENT REHAB UNIT DISCHARGE SUMMARY      Patient Name: Flaco Lincoln    :  1945    MRN:  [id number]  Admit date:  2022    Discharge date:  10/15/2022  Admitting Physician:  Nicci Bryant DO    Primary Care Physician:  Oziel Brar APRN - CNP    Discharge Diagnoses:  Stroke    Diagnostic Studies:     NM LUNG SCAN PERFUSION ONLY    Result Date: 10/5/2022  EXAM: NM LUNG PERFUSION SCAN CLINICAL INDICATION: dyspnea TECHNIQUE: Limited perfusion only scan was performed using intravenous 5.5 mCi Technetium 99m MAA. This report was created using [employer] report generation technology. COMPARISON: None FINDINGS: Perfusion: Unremarkable. There is homogeneous perfusion uptake with no bronchopulmonary segment conforming perfusion defects. Normal perfusion involving both lungs. No nuclear medicine findings suggestive of pulmonary embolism. Labs:   Recent Labs     10/13/22  0231 10/14/22  0207 10/15/22  0153   WBC 17.3* 17.0* 14.9*   HGB 13.3* 13.4* 12.9*    116* 131     Recent Labs     10/13/22  0231 10/14/22  0207 10/15/22  0153    134* 136   K 4.9 5.2* 4.7    101 102   CO2 26 27 28   BUN 12 14 12   CREATININE 0.5 0.5 0.5   GLUCOSE 140* 220* 144*         Hospital Course: The patient was admitted to our inpatient rehab unit. The patient has a history of hypertension, hypercholesterolemia, diabetes, coronary artery disease, peripheral vascular disease, diabetes, osteomyelitis with transmetatarsal amputation, COVID-19 in 2022 and newly diagnosed liver cancer in July of this year with radioembolization to the liver back in September. He was admitted to the hospitalist service initially prior to admission here with metabolic encephalopathy. MRI later showed small acute areas of ischemic change in both cerebral hemispheres. They were predominantly frontal and parietal as well as right occipital.  It was felt the strokes were likely embolic or possibly related to a hypercoagulable state. He is on aspirin and low-dose Eliquis at 2.5 mg twice daily. He also has carotid artery disease with 80% stenosis of the right ICA and 80 to 90% stenosis on the left. Vascular surgery was consulted and recommended conservative treatment. Pletal was discontinued given epistaxis. He was continued on low-dose aspirin and Eliquis. Medicine was consulted to follow his blood pressure diabetes hyperlipidemia leukocytosis and thrombocytopenia. This was all felt stable at the time of his discharge he still had a mild leukocytosis but had completed a full course of meropenem. The patient participated in speech therapy, Occupational Therapy and physical therapy 3 hours daily during his rehab stay. It was felt that he reached his maximal improvement and the decision was made to discharge him to skilled nursing facility for continued therapy.     Discharge Medications:        Medication List        START taking these medications      benzonatate 100 MG capsule  Commonly known as: TESSALON  Take 1 capsule by mouth 3 times daily as needed for Cough     guaiFENesin 600 MG extended release tablet  Commonly known as: MUCINEX  Take 1 tablet by mouth 2 times daily     megestrol acetate 400 MG/10ML Susp  Commonly known as: MEGACE  Take 20 mLs by mouth daily     pantoprazole 40 MG tablet  Commonly known as: PROTONIX  Take 1 tablet by mouth every morning (before breakfast)     sennosides-docusate sodium 8.6-50 MG tablet  Commonly known as: SENOKOT-S  Take 1 tablet by mouth 2 times daily            CHANGE how you take these medications      insulin glargine 100 UNIT/ML injection vial  Commonly known as: LANTUS  Inject 15 Units into the skin 2 times daily  What changed:   how much to take  when to take this     metoprolol tartrate 25 MG tablet  Commonly known as: LOPRESSOR  Take 0.5 tablets by mouth 2 times daily  What changed:   medication strength  how much to take            CONTINUE taking these medications      aspirin 81 MG tablet     atorvastatin 40 MG tablet  Commonly known as: LIPITOR     Eliquis 2.5 MG Tabs tablet  Generic drug: apixaban     folic acid 345 MCG tablet  Commonly known as: [place] Osborne            STOP taking these medications      cilostazol 100 MG tablet  Commonly known as: PLETAL     insulin lispro 100 UNIT/ML injection vial  Commonly known as: HUMALOG     lisinopril 10 MG tablet  Commonly known as: PRINIVIL;ZESTRIL               Where to Get Your Medications        These medications were sent to [address] [place] [address] [facility] Rec[facility] [address]  [address] [facility] [place], [address] [place] [address]      Phone: [phone]   insulin glargine 100 UNIT/ML injection vial       Information about where to get these medications is not yet available    Ask your nurse or doctor about these medications  benzonatate 100 MG capsule  guaiFENesin 600 MG extended release tablet  megestrol acetate 400 MG/10ML Susp  metoprolol tartrate 25 MG tablet  pantoprazole 40 MG tablet  sennosides-docusate sodium 8.6-50 MG tablet         Discharge Instructions: Follow up with PCP as directed in 1-2 weeks. Disposition: Patient is medically stable and will be discharged to SNF. Time spent on discharge 25 min.        Yusuf Ladd DO  Board Certified Neurologist

## 2022-10-16 PROBLEM — I21.4 NSTEMI (NON-ST ELEVATED MYOCARDIAL INFARCTION) (HCC): Status: ACTIVE | Noted: 2022-01-01

## 2022-10-16 NOTE — ED PROVIDER NOTES
[facility]  eMERGENCY dEPARTMENT eNCOUnter      Pt Name: Rolo Sherman  MRN: [id number]  [place] 1945  Date of evaluation: 10/16/2022  Provider: Saad Motta MD    CHIEF COMPLAINT       Chief Complaint   Patient presents with    Shortness of Breath     Transferred from [address] [place] [address]  first day there         HISTORY OF PRESENT ILLNESS   (Location/Symptom, Timing/Onset,Context/Setting, Quality, Duration, Modifying Factors, Severity)  Note limiting factors. Rolo Sherman is a 76 y.o. male who presents to the emergency department with shortness of breath. Patient came from [address] [place] [address] 12 her room for increased respiratory distress. Patient sat was in the 80s on his home 4 L of oxygen. He was just DC from here per EMS report. He has been confused but unknown baseline. HPI    NursingNotes were reviewed. REVIEW OF SYSTEMS    (2-9 systems for level 4, 10 or more for level 5)     Review of Systems   Constitutional:  Negative for chills and fever. HENT:  Negative for rhinorrhea and sore throat. Respiratory:  Positive for cough and shortness of breath. Cardiovascular:  Negative for chest pain and leg swelling. Gastrointestinal:  Negative for abdominal pain, diarrhea, nausea and vomiting. Genitourinary:  Negative for difficulty urinating. Musculoskeletal:  Negative for back pain and neck pain. Skin:  Negative for rash. Neurological:  Negative for weakness and headaches. Psychiatric/Behavioral:  Positive for confusion. A complete review of systems was performed and is negative except as noted above in the HPI.        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 Starr Tinoco)    Cataracts, bilateral     Di[facility]     Diabetic neuropathy [facility])     ED (erectile dysfunction)     HTN (hypertension) Hypercholesterolemia     Open wound of right foot     states this is not the operative foot    Palliative care patient 10/07/2022    Ulcerative colitis ([address]          SURGICAL HISTORY       Past Surgical History:   Procedure Laterality Date    CHOLECYSTECTOMY      CORONARY ARTERY BYPASS GRAFT  2003    EYE SURGERY      jason. cat    ILEOSTOMY OR JEJUNOSTOMY  1995    due to surgery from ulcerative colitis    CT AMPUTATION FOOT,TRANSMETATARSAL Left 1/30/2018    TRANSMET AMPUTATION performed by Vidal Moreira MD at [address] [facility] Left 6/12/2017    S. Left great toe ray amputation through the metatarsal level. VASCULAR SURGERY  8/26/14 S    Percutaneous cannulation, left common femoral artery, with 5-Romanian glidesheath. Suprarenal abdominal aortogram with bilateral iliofemoral arteriogra. Bilateral lower extremity arteriogram.    VASCULAR SURGERY  09/08/14 S    Right femoral to tibioperoneal trunk bypass with in situ right greater saphenous vein. Right common femoral endarterectomy with vascular patch repair. Right tibioperoneal trunk vein patch and endarterectomy. Completion right lower exteremity arteriograms    VASCULAR SURGERY  1/12/2015 S    Percutaneous cannulation left common femoral artery with 5-Romanian and later 6-Romanian pinnacle destination sheath. Suprarenal abdominal aortogram with bilateral iliofemoral arteriograms. Bilateral lower extremity arteriograms. Coil embolization right greater saphenous vein bypass branch with 8 mm x 5 cm coil and seven 5 mm x 5 cm coils. VASCULAR SURGERY  1/12/2015 S     Right posterior tibial artery balloon angioplasty 2.5 mm x 100 mm vascutrak balloon. Right peroneal artery balloon angioplasty with 2.5 x 100 mm vascutrak balloon. Completion right lower extremity arteriograms. VASCULAR SURGERY  06/17/2017    S. Percutaneous cannulation right common femoral artery with ultrasound guidance and 5 Romanian and later 6 Romanian sheath placement. Suprarenal abdominal aorta gram with bilateral lower extremity arteriogram.Left superficial femoral/popliteal/tibial peroneal trunk/posterior tibial artery atherectomy with csi 1.25 solid crown and 2.0 solid crown. Left tibial peroneal trunk and posterior tibial artery     VASCULAR SURGERY  06/17/2017    CONT.balloon angioplasty with 3mm x 100 mmand 3.5mm x 300mm vascutrak balloon. Left popliteal artery balloon angioplasty with 5 mmx 100mm lutonix drug coated balloon. #6 left superficial femoral artery balloon angioplasty with 3.6 [id number] mm lutonix grug coated balloons. Completion left lower extremity arteriograms. Mynx closure right common femoral artery puncture site. VASCULAR SURGERY  01/20/2018    SJS. Left transmetetarsal amputation. CURRENT MEDICATIONS       Previous Medications    APIXABAN (ELIQUIS) 2.5 MG TABS TABLET    Take 2.5 mg by mouth 2 times daily    ASPIRIN 81 MG TABLET    Take 81 mg by mouth daily. ATORVASTATIN (LIPITOR) 40 MG TABLET    Take 40 mg by mouth at bedtime    BENZONATATE (TESSALON) 100 MG CAPSULE    Take 1 capsule by mouth 3 times daily as needed for Cough    FOLIC ACID (FOLVITE) 625 MCG TABLET    Take 800 mcg by mouth daily.     GUAIFENESIN (MUCINEX) 600 MG EXTENDED RELEASE TABLET    Take 1 tablet by mouth 2 times daily    INSULIN GLARGINE (LANTUS) 100 UNIT/ML INJECTION VIAL    Inject 15 Units into the skin 2 times daily    MEGESTROL ACETATE (MEGACE) 400 MG/10ML SUSP    Take 20 mLs by mouth daily    METOPROLOL TARTRATE (LOPRESSOR) 25 MG TABLET    Take 0.5 tablets by mouth 2 times daily    PANTOPRAZOLE (PROTONIX) 40 MG TABLET    Take 1 tablet by mouth every morning (before breakfast)    SENNOSIDES-DOCUSATE SODIUM (SENOKOT-S) 8.6-50 MG TABLET    Take 1 tablet by mouth 2 times daily       ALLERGIES     Keflex [cephalexin]    FAMILY HISTORY       Family History   Problem Relation Age of Onset    Cancer Mother     Cancer Father           SOCIAL HISTORY       Social History     Socioeconomic History Emergency Department Physician who either signs or Co-signs this chart in the absence of a cardiologist.    Sinus tachycardia rate 115 artifact present non specific ST waves similar to prior    Sinus tachycardia rate 111 non similar to prior     RADIOLOGY:   Non-plain film images such as CT, Ultrasound and MRI are read by the radiologist. Solomon Gonzalez images are visualized and preliminarily interpreted by the emergency physician with the below findings:        Interpretation per the Radiologist below, if available at the time of this note:    XR CHEST PORTABLE   Final Result   FINDINGS/IMPRESSION:       Interval placement of right-sided IJ central venous line with tip overlying the SVC. No other significant interval change. Continued attention on follow-up is recommended. Electronically signed by Ac Prather MD on    10-16-22 at 4199      XR CHEST PORTABLE   Final Result   FINDINGS/IMPRESSION:       Bilateral patchy consolidative opacities, interval worsening. Findings may represent pulmonary alveolar edema and/or multifocal pneumonia. No other significant interval change. Continued attention on follow-up is    recommended. Electronically signed by Ac Prather MD on 10-16-22 at [address]    (Results Pending)   CTA PULMONARY W CONTRAST    (Results Pending)         ED BEDSIDE ULTRASOUND:   Performed by ED Physician - none    LABS:  Labs Reviewed   BLOOD GAS, ARTERIAL - Abnormal; Notable for the following components:       Result Value    pCO2, Arterial 53.0 (*)     pO2, Arterial 66.0 (*)     HCO3, Arterial 30.6 (*)     Base Excess, Arterial 4.0 (*)     Hemoglobin, Art, Extended 13.6 (*)     All other components within normal limits   APTT - Abnormal; Notable for the following components:    aPTT 40.1 (*)     All other components within normal limits   BRAIN NATRIURETIC PEPTIDE - Abnormal; Notable for the following components:    Pro-BNP 3,301 (*)     All other components within normal limits   CBC WITH AUTO DIFFERENTIAL - Abnormal; Notable for the following components:    WBC 20.0 (*)     RBC 4.47 (*)     .7 (*)     MCH 33.1 (*)     MCHC 31.6 (*)     Neutrophils % 96.0 (*)     Lymphocytes % 0.0 (*)     Neutrophils Absolute 19.4 (*)     Lymphocytes Absolute 0.0 (*)     Macrocytes 1+ (*)     Hypochromia 1+ (*)     All other components within normal limits   COMPREHENSIVE METABOLIC PANEL - Abnormal; Notable for the following components:    Sodium 133 (*)     Potassium 5.1 (*)     Anion Gap 6 (*)     Glucose 140 (*)     Calcium 7.6 (*)     Total Protein 5.3 (*)     Albumin 1.3 (*)     Total Bilirubin 1.3 (*)     Alkaline Phosphatase 378 (*)     ALT 94 (*)      (*)     All other components within normal limits    Narrative:     CALL  [employer] tel. ,  Chemistry results called to and read back by Radha Lawrence in ED, 10/16/2022  06:15, by WILLOW   D-DIMER, QUANTITATIVE - Abnormal; Notable for the following components:    D-Dimer, Quant 1.72 (*)     All other components within normal limits   LACTIC ACID - Abnormal; Notable for the following components:    Lactic Acid 2.2 (*)     All other components within normal limits    Narrative:     CALL  [employer] tel. ,  Chemistry results called to and read back by Janet Jordan in ED, 10/16/2022  04:45, by WILLOW   PROTIME-INR - Abnormal; Notable for the following components:    Protime 20.3 (*)     INR 1.71 (*)     All other components within normal limits   TROPONIN - Abnormal; Notable for the following components:    Troponin 0.30 (*)     All other components within normal limits    Narrative:     [address] tel[address] ,  Chemistry results called to and read back by Fanta Miller RN in ED, 10/16/2022  06:15, by WILLOW   URINALYSIS WITH REFLEX TO CULTURE - Abnormal; Notable for the following components:    Color, UA DARK YELLOW (*)     Clarity, UA CLOUDY (*)     Glucose, Ur 500 (*)     Bilirubin Urine SMALL (*)     Ketones, Urine TRACE (*) Blood, Urine TRACE (*)     Protein,  (*)     Leukocyte Esterase, Urine TRACE (*)     All other components within normal limits   MICROSCOPIC URINALYSIS - Abnormal; Notable for the following components:    Coarse Casts, UA 6-10 (*)     Mucus, UA 2+ (*)     Bacteria, UA Rare (*)     All other components within normal limits   RESPIRATORY PANEL, MOLECULAR, WITH COVID-19   CULTURE, BLOOD 1   CULTURE, BLOOD 2   AMMONIA   TROPONIN       All other labs were within normal range or not returned as of this dictation. EMERGENCY DEPARTMENT COURSE and DIFFERENTIALDIAGNOSIS/MDM:   Vitals:    Vitals:    10/16/22 0351 10/16/22 0457 10/16/22 0558 10/16/22 0647   BP: (!) 116/59 112/78 (!) 111/58 138/65   Pulse: (!) 116 80 (!) 115 (!) 118   Resp: 20 20 20 20   Temp: 97.5 °F (36.4 °C)      SpO2: 98% 92% 98% 98%   Weight: 205 lb (93 kg)      Height: 6' (1.829 m)          MDM  Pt with increased WOB, fluid overload on CXR. Started on bipap. Unable to obtain iv access or labs. Central line placed. D/w Dr Samra Knox for admission. CONSULTS:  None    PROCEDURES:  Unless otherwise notedbelow, none     Central Line    Date/Time: 10/16/2022 5:56 AM  Performed by: Elvira Rosales MD  Authorized by: Elvira Rosales MD     Consent:     Consent obtained:  Verbal    Consent given by:  Healthcare agent    Risks, benefits, and alternatives were discussed: yes      Risks discussed:  Arterial puncture, bleeding, incorrect placement and infection    Alternatives discussed:  No treatment  Universal protocol:     Procedure explained and questions answered to patient or proxy's satisfaction: yes      Relevant documents present and verified: yes      Imaging studies available: yes      Patient identity confirmed:  Arm band  Pre-procedure details:     Indication(s): central venous access      Hand hygiene: Hand hygiene performed prior to insertion      Sterile barrier technique:  All elements of maximal sterile technique followed      Skin preparation:  Chlorhexidine    Skin preparation agent: Skin preparation agent completely dried prior to procedure    Sedation:     Sedation type:  None  Anesthesia:     Anesthesia method:  Local infiltration    Local anesthetic:  Lidocaine 1% w/o epi  Procedure details:     Location:  R internal jugular    Patient position:  Trendelenburg    Procedural supplies:  Triple lumen    Ultrasound guidance: yes      Ultrasound guidance timing: real time      Sterile ultrasound techniques: Sterile gel and sterile probe covers were used      Number of attempts:  1    Successful placement: yes    Post-procedure details:     Post-procedure:  Dressing applied and line sutured    Assessment:  Blood return through all ports, free fluid flow, no pneumothorax on x-ray and placement verified by x-ray    Procedure completion:  Tolerated    FINAL IMPRESSION     1. Acute respiratory failure with hypoxia ([facility]    2. Congestive heart failure, unspecified HF chronicity, unspecified heart failure type ([facility]    3. NSTEMI (non-ST elevated myocardial infarction) ([facility]    4.  Altered mental status, unspecified altered mental status type          DISPOSITION/PLAN   DISPOSITION Decision To Admit 10/16/2022 07:18:32 AM      PATIENT REFERRED TO:  @FUP@    DISCHARGE MEDICATIONS:  New Prescriptions    No medications on file          (Please note that portions of this note were completed with a voice recognition program.  Efforts were made to edit the dictations butoccasionally words are mis-transcribed.)    Med Hercules MD (electronically signed)  AttendingEmergency Physician         Med Hercules MD  10/16/22 7059

## 2022-10-16 NOTE — H&P
Hospital Medicine H&P    Patient:  Kirby Fields  MRN: [id number]    Consulting Physician: Venu Ac DO  Reason for Consult: Medical Management  Primacy Care Physician: SHAHNAZ Hayward CNP    HISTORY OF PRESENT ILLNESS:   The patient is a 76 y.o. male was sent in from local skilled nursing facility for altered mental status and shortness of breath. He was discharged from our inpatient rehabilitation unit yesterday morning. He has a very complex medical history. In July of this year, he was diagnosed with moderately differentiated hepatocellular carcinoma at [facility]. He underwent radioembolization at their facility in September. Since that time he has had ongoing clinical decline. This is in the setting of longstanding diabetes, coronary artery disease, peripheral arterial disease, bilateral cerebrovascular disease with stenosis of 39+%, bilateral embolic stroke. Work-up in the emergency department shows an elevated BNP as well as an elevated troponin. He was hypoxic on intake. This did correct with BiPAP therapy. Central venous access was also obtained by the ED physician because of poor venous access. In discussion with his daughter at bedside he still wishes to pursue aggressive therapy.     Past Medical History:        Diagnosis Date    Atherosclerosis of native arteries of the extremities with ulceration(440.23) 08/25/2014    CAD (coronary artery disease)     sees dr at [facility] (dr. Lux Cuevas)    Cataracts, bilateral     Diabetes mellitus ([address]     Diabetic neuropathy [facility])     ED (erectile dysfunction)     HTN (hypertension)     Hypercholesterolemia     Open wound of right foot     states this is not the operative foot    Palliative care patient 10/07/2022    Ulcerative colitis [facility])        Past Surgical History:        Procedure Laterality Date    CHOLECYSTECTOMY      CORONARY ARTERY BYPASS GRAFT  2003    EYE SURGERY      jason. cat    [facility] due to surgery from ulcerative colitis    ID AMPUTATION FOOT,TRANSMETATARSAL Left 1/30/2018    TRANSMET AMPUTATION performed by Gilmer Naqvi MD at [address] Left 6/12/2017    S. Left great toe ray amputation through the metatarsal level. VASCULAR SURGERY  8/26/14 S    Percutaneous cannulation, left common femoral artery, with 5-Cymro glidesheath. Suprarenal abdominal aortogram with bilateral iliofemoral arteriogra. Bilateral lower extremity arteriogram.    VASCULAR SURGERY  09/08/14 [facility]    Right femoral to tibioperoneal trunk bypass with in situ right greater saphenous vein. Right common femoral endarterectomy with vascular patch repair. Right tibioperoneal trunk vein patch and endarterectomy. Completion right lower exteremity arteriograms    VASCULAR SURGERY  1/12/2015 S    Percutaneous cannulation left common femoral artery with 5-Cymro and later 6-Cymro pinnacle destination sheath. Suprarenal abdominal aortogram with bilateral iliofemoral arteriograms. Bilateral lower extremity arteriograms. Coil embolization right greater saphenous vein bypass branch with 8 mm x 5 cm coil and seven 5 mm x 5 cm coils. VASCULAR SURGERY  1/12/2015 [facility]     Right posterior tibial artery balloon angioplasty 2.5 mm x 100 mm vascutrak balloon. Right peroneal artery balloon angioplasty with 2.5 x 100 mm vascutrak balloon. Completion right lower extremity arteriograms. VASCULAR SURGERY  06/17/2017    [facility]. Percutaneous cannulation right common femoral artery with ultrasound guidance and 5 Cymro and later 6 Cymro sheath placement. Suprarenal abdominal aorta gram with bilateral lower extremity arteriogram.Left superficial femoral/popliteal/tibial peroneal trunk/posterior tibial artery atherectomy with csi 1.25 solid crown and 2.0 solid crown. Left tibial peroneal trunk and posterior tibial artery     VASCULAR SURGERY  06/17/2017    CONT.balloon angioplasty with 3mm x 100 mmand 3.5mm x 300mm vascutrak balloon. Left popliteal artery balloon angioplasty with 5 mmx 100mm lutonix drug coated balloon. #6 left superficial femoral artery balloon angioplasty with 3.6 fbn147 mm lutonix grug coated balloons. Completion left lower extremity arteriograms. Mynx closure right common femoral artery puncture site. VASCULAR SURGERY  01/20/2018    [facility]. Left transmetetarsal amputation. Medications: Scheduled Meds:   aspirin  81 mg Oral Daily    folic acid  190 mcg Oral Daily    apixaban  2.5 mg Oral BID    atorvastatin  40 mg Oral Nightly    insulin glargine  15 Units SubCUTAneous BID    megestrol acetate  800 mg Oral Daily    metoprolol tartrate  12.5 mg Oral BID    guaiFENesin  600 mg Oral BID    sennosides-docusate sodium  1 tablet Oral BID    [START ON 10/17/2022] pantoprazole  40 mg Oral QAM AC     Continuous Infusions:   bumetanide 0.1 mg/mL infusion       PRN Meds:.benzonatate    Allergies:  Keflex [cephalexin]    Social History:   TOBACCO:   reports that he has quit smoking. His smoking use included pipe. He has never used smokeless tobacco.  ETOH:   reports no history of alcohol use. Family History:       Problem Relation Age of Onset    Cancer Mother     Cancer Father        ROS:  Review of Systems   Unable to perform ROS: Severe respiratory distress     Physical Exam:    Vitals: BP (!) 112/57   Pulse (!) 105   Temp 97.5 °F (36.4 °C)   Resp 29   Ht 6' (1.829 m)   Wt 205 lb (93 kg)   SpO2 (!) 86%   BMI 27.80 kg/m²     Physical Exam  Vitals and nursing note reviewed. Constitutional:       General: He is in acute distress. Appearance: He is ill-appearing. HENT:      Head: Normocephalic and atraumatic. Right Ear: External ear normal.      Left Ear: External ear normal.      Nose: Nose normal.      Mouth/Throat:      Mouth: Mucous membranes are moist.   Eyes:      Conjunctiva/sclera: Conjunctivae normal.      Pupils: Pupils are equal, round, and reactive to light.    Cardiovascular:      Rate and Rhythm: Normal rate and regular rhythm. Heart sounds: Normal heart sounds. Pulmonary:      Effort: Respiratory distress present. Breath sounds: Normal breath sounds. Abdominal:      General: Abdomen is flat. Palpations: Abdomen is soft. Musculoskeletal:         General: No swelling. Cervical back: Neck supple. No rigidity. Skin:     General: Skin is warm and dry. Neurological:      Mental Status: He is disoriented. CBC:   Recent Labs     10/14/22  0207 10/15/22  0153 10/16/22  0405   WBC 17.0* 14.9* 20.0*   HGB 13.4* 12.9* 14.8   * 131 159     BMP:    Recent Labs     10/14/22  0207 10/15/22  0153 10/16/22  0403 10/16/22  0545   * 136  --  133*   K 5.2* 4.7 5.5 5.1*    102  --  101   CO2 27 28  --  26   BUN 14 12  --  17   CREATININE 0.5 0.5  --  0.5   GLUCOSE 220* 144*  --  140*     Hepatic:   Recent Labs     10/16/22  0545   *   ALT 94*   BILITOT 1.3*   ALKPHOS 378*     Troponin:   Recent Labs     10/16/22  0545 10/16/22  0721   TROPONINI 0.30* 0.34*     BNP: No results for input(s): BNP in the last 72 hours. Lipids: No results for input(s): CHOL, HDL in the last 72 hours. Invalid input(s): LDLCALCU  ABGs:   Lab Results   Component Value Date/Time    PHART 7.370 10/16/2022 04:03 AM    PO2ART 66.0 10/16/2022 04:03 AM    CCI4JQU 53.0 10/16/2022 04:03 AM     INR:   Recent Labs     10/16/22  0545   INR 1.71*     -----------------------------------------------------------------  XR CHEST (2 VW)    Result Date: 10/5/2022  Clinical history: Persistent cough Comparison: Prior study from 09/29/2022 is available. Finding: The lungs are clear. There is no evidence of pulmonary infiltrate or pleural effusion. The pulmonary vascularity is unremarkable. The cardiac, hilar and mediastinal silhouettes are satisfactory. Patient is status post median sternotomy. The bony thorax demonstrates no gross abnormality. 1.Status post median sternotomy 2. NO evidence of airspace consolidation or pulmonary venous congestion. CT HEAD WO CONTRAST    Result Date: 10/16/2022  Exam: CT OF THE BRAIN WITHOUT CONTRAST Clinical data: Altered mental status. Technique: Contiguous axial images are obtained from the skull base to vertex without intravenous contrast. Reformatted/MPR images were performed. Radiation dose: CTDIvol =73.51 mGy, DLP =1821 mGy x cm. Prior studies: CT scan of the brain dated 09/29/22. Findings:  Patchy hypoattenuation in the periventricular white matter compatible with chronic microvascular ischemic changes. No evidence of acute cortical infarction, hemorrhage, mass or mass effect. Ventricles are slightly more prominent than the sulci, unchanged, and likely due to asymmetric parenchymal volume loss. .  No hydrocephalus or abnormal extra-axial fluid collections are present. The posterior fossa is unremarkable. The skull base and calvarium are intact. The included portions of the paranasal sinuses and mastoid air cells are clear. 1. Chronic microvascular ischemic changes with age-appropriate parenchymal volume loss. 2. No evidence of acute cortical infarction or intracranial hemorrhage. 3. No definitive interval change. Recommendation: Follow up as clinically indicated. All CT scans at this facility utilize dose modulation, iterative reconstruction, and/or weight based dosing when appropriate to reduce radiation dose to as low as reasonably achievable. Electronically Signed by Shameka Regan MD at 16-Oct-2022 08:42:16 AM             CT Head WO Contrast    Result Date: 9/30/2022  NO PRIOR REPORT AVAILABLE Exam: CT OF THE BRAIN WITHOUT CONTRAST Clinical data: Altered mental status. Technique: Contiguous axial images are obtained from the skull base to vertex without intravenous contrast. Reformatted/MPR images were performed. Radiation dose: CTDIvol =42.62 mGy, DLP =806 mGy x cm. Prior studies: No prior studies submitted.  Findings: Confluent low-attenuation in the periventricular white matter is nonspecific but likely on a micro vascular basis. Atrophic changes. No evidence of acute cortical infarction, hemorrhage, mass or mass effect. No hydrocephalus or abnormal extra-axial fluid collections are present. The posterior fossa is unremarkable. The skull base and calvarium are intact. The included portions of the paranasal sinuses and mastoid air cells are clear. 1.  Nonspecific but presumed microvascular changes in the periventricular white matter. 2.  Atrophic changes. 3.  No acute hemorrhage or extra-axial collection. Recommendation: Follow up as clinically indicated. All CT scans at this facility utilize dose modulation, iterative reconstruction, and/or weight based dosing when appropriate to reduce radiation dose to as low as reasonably achievable. Electronically Signed by Roderick Gruber MD at 30-Sep-2022 08:40:44 AM             NM LUNG SCAN PERFUSION ONLY    Result Date: 10/5/2022  EXAM: NM LUNG PERFUSION SCAN CLINICAL INDICATION: dyspnea TECHNIQUE: Limited perfusion only scan was performed using intravenous 5.5 mCi Technetium 99m MAA. This report was created using [employer] report generation technology. COMPARISON: None FINDINGS: Perfusion: Unremarkable. There is homogeneous perfusion uptake with no bronchopulmonary segment conforming perfusion defects. Normal perfusion involving both lungs. No nuclear medicine findings suggestive of pulmonary embolism. XR CHEST PORTABLE    Result Date: 10/16/2022  Patient: Colie Coil  Time Out: 07:02Exam(s): FILM CXR 1 VIEW EXAM:  XR Chest, 1 ViewCLINICAL HISTORY:   Reason for exam: central line. TECHNIQUE:  Frontal view of the chest.COMPARISON:  Earlier the same day. FINDINGS/IMPRESSION:       Interval placement of right-sided IJ central venous line with tip overlying the SVC. No other significant interval change. Continued attention on follow-up is recommended. Electronically signed by Jono Head MD on 10-16-22 at 0781    XR CHEST PORTABLE    Result Date: 10/16/2022  Patient: Jaymie Martin  Time Out: 06:55Exam(s): FILM CXR 1 VIEW EXAM:  XR Chest, 1 ViewCLINICAL HISTORY:   Reason for exam: SOA. TECHNIQUE:  Frontal view of the chest.COMPARISON:  10/7/2022. FINDINGS/IMPRESSION:       Bilateral patchy consolidative opacities, interval worsening. Findings may represent pulmonary alveolar edema and/or multifocal pneumonia. No other significant interval change. Continued attention on follow-up is recommended. Electronically signed by Jazz Gee MD on 10-16-22 at 7520    XR CHEST PORTABLE    Result Date: 10/7/2022  Clinical history: Leukocytosis Comparison: Prior study from 10/5/2022 is available. Finding: Examination of the chest in PA and lateral views shows that both lungs demonstrate bilateral patchy perihilar infiltrate. This was not well seen on prior study. The heart is normal in size and configuration. The patient is status post median sternotomy. The trachea is in the midline. The mediastinum and both hemidiaphragms are normal.The bony thorax is intact. 1.Status post median sternotomy. 2.Bilateral patchy perihilar infiltrate    XR CHEST PORTABLE    Result Date: 9/30/2022  NO PRIOR REPORT AVAILABLE Exam: X-RAY OF THECHEST Clinical data:Dyspnea. Technique:Single view of the chest. Prior studies: No prior studies submitted. Findings: Cardiac silhouette is within normal limits. No acute osseous abnormality is detected. Postoperative changes, with sternotomy and incidental chronic broken superior sternal wire. Electronic device seen projecting over the left chest. Mild central vascular congestion. In addition, cannot exclude mild patchy opacities in the mid and lower left lung, which may be due to atelectasis/scarring, vascular congestion, or early airspace disease. Otherwise, no lobar consolidations. No pneumothorax or pleural effusions. Mild central vascular congestion.  In addition, cannot exclude mild patchy opacities in the mid and lower left lung, which may be due to atelectasis/scarring, vascular congestion, or early airspace disease. Otherwise, no lobar consolidations. No pneumothorax or pleural effusions. Recommendation: Follow up as clinically indicated. Electronically Signed by Dash Morrison MD at 30-Sep-2022 05:13:14 AM             CTA PULMONARY W CONTRAST    Result Date: 10/16/2022  Exam: CTA OF THE CHEST WITH CONTRAST Clinical data: Respiratory distress. Technique: Axial CT angiography images through the lungs were acquired with contrast and imaged using soft tissue and lung algorithms. Coronal, sagittal, and 3D volume reconstructions were performed. Reformatted/3D-MPR images. Radiation dose: CTDIvol =73.51 mGy, DLP =1821 mGy x cm. Prior studies: Radiographs of the chest dated 10/07/22 (report unavailable). Nuclear medicine pulmonary perfusion scan dated 09/30/22. More recent chest x-ray images and reports are not available. Findings: Lungs:  Small bilateral pleural effusions. Dense airspace abnormalities throughout the central upper lower lungs with relative sparing of the lung periphery. No evidence of pneumothorax Soft Tissues: No mediastinal, axillary or hilar adenopathy. Vascular: No filling defect within the pulmonary arteries with no evidence of pulmonary embolism to the segmental branch level. Atherosclerotic calcification of the aortic arch, thoracic aorta, and coronary arteries. Grossly unremarkable sized heart. No  additional abnormality on 3D reformatted images. 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 Median sternotomy wires. No acute or destructive abnormality Upper Abdomen: Limited visualization of the solid upper abdominal organs is grossly unremarkable. Suboptimal evaluation due to streak artifact related to body habitus and exacerbated by scanning with patient's arms by side.       1. Central airspace abnormality small bilateral pleural fluid; consider pulmonary edema, pulmonary hemorrhage, atypical pneumonitis, alveolar proteinosis, aspiration. 2. No evidence of pulmonary embolism to the segmental branch level. Recommendation: Follow up as clinically indicated. All CT scans at this facility utilize dose modulation, iterative reconstruction, and/or weight based dosing when appropriate to reduce radiation dose to as low as reasonably achievable. Electronically Signed by Camille Agee MD at 16-Oct-2022 09:14:40 AM                 Assessment and Plan     NSTEMI (non-ST elevated myocardial infarction). Continue medical management. Cardiology evaluation. Acute on chronic diastolic heart failure. Diuresis. Acute hypoxemic respiratory failure. Continue BiPAP for now. History of moderately differentiated hepatocellular carcinoma. Status post radial embolization. Supportive care. Please document 90 minutes of critical care time for patient assessment, chart review, discussion with staff, .       Denae Mayfield DO

## 2022-10-16 NOTE — ED NOTES
Pt up and trying to walk in room. Pt assisted back to bed.  Pt urinated on self and changed into clean gown     Skylar Stevens RN  10/16/22 [id number]

## 2022-10-16 NOTE — PROGRESS NOTES
4 Eyes Skin Assessment    Aamir Martin is being assessed upon: Admission    I agree that I, Chang Adame RN, along with Wilbur Soni RN (either 2 RN's or 1 LPN and 1 RN) have performed a thorough Head to Toe Skin Assessment on the patient. ALL assessment sites listed below have been assessed. Areas assessed by both nurses:     [x]   Head, Face, and Ears   [x]   Shoulders, Back, and Chest  [x]   Arms, Elbows, and Hands   [x]   Coccyx, Sacrum, and Ischium  [x]   Legs, Feet, and Heels    Does the Patient have Skin Breakdown? Sacral breakdown reddish purple, and Rt great toe wound.      Bryan Prevention initiated: Yes  Wound Care Orders initiated: N/A      WOC nurse consulted for Pressure Injury (Stage 3,4, Unstageable, DTI, NWPT, and Complex wounds) and New or Established Ostomies: Yes        Primary Nurse eSignature: Chang Adame RN on 10/16/2022 at 5:37 PM      Co-Signer eSignature: Electronically signed by Leotha Brittle, RN on 10/16/22 at 6:05 PM CDT

## 2022-10-16 NOTE — ED NOTES
U/S attempted x 4 per Isa Reynolds, unsuccessful  Informed  Dr. Dia Smith.      Emmy Burns RN  10/16/22 0191

## 2022-10-16 NOTE — CONSULTS
[facility]) Cardiology   Consult Note       Requesting MD:  Shirley Bates, DO   Admit Status:         Patient:    Bret Cash  [id number]  1945         Chief Complaint: Change of mental status, shortness of breath, sinus tachycardia and elevated troponin  HPI: Patient is a 76 y.o. male was found to be tachypneic with shortness of breath and changes in mental status at the nursing home. He was transferred back to this hospital only after 24 hours of discharge. He was recently admitted to the hospital for change of mental status and a diagnosis of CVA. His cardiac history dates back to 2003 at that time the patient had 5 grafts bypass. Last time he was seen by his cardiologist at [facility] was a year ago. He was asymptomatic and was able to do anything he wanted to do at that time despite chronic ischemic leg with amputated left toes and gangrene right toe. He is known to have hypertension, diabetes and hyperlipidemia. In September he was admitted for investigation of abdominal pain. Incidental finding of hepatocellular carcinoma of the liver was diagnosed. He underwent radiation therapy at [place]. Since then, he has not been doing well with weakness, shortness of breath intermittently. The troponin was elevated but flat. Kidney function was normal.  EKG showed sinus tachycardia with nonspecific ST-T wave changes. There was some PVCs seen. Chest x-ray showed questionable pulm edema. proBNP was elevated and he got Lasix intravenously. Echocardiogram done in September at [facility] showed normal ejection fraction.     Review of Systems:  HENNT: normal  PULMONARY: See history of present illness  CVS:see history of present illness  ABD: See history of present illness  PERIPHERL: normal  MSK: no swelling of the lower extremities  CNS: Denies any dizziness, syncopal episode or history of stroke  Renal: Denies any history of dysuria or frequency    Cardiac Specific Data:  Specialty Problems Cardiology Problems    Acute ischemic stroke ([facility]        * (Principal) NSTEMI (non-ST elevated myocardial infarction) ([facility]        Atherosclerosis of native arteries of the extremities with ulceration ([facility]        Coronary artery disease involving native coronary artery without angina pectoris        Essential hypertension        Hypercholesterolemia        Type 2 diabetes mellitus with diabetic peripheral angiopathy and gangrene, with long-term current use of insulin ([facility]           Past Medical History:  Past Medical History:   Diagnosis Date    Atherosclerosis of native arteries of the extremities with ulceration(440.23) 08/25/2014    CAD (coronary artery disease)     sees dr at [facility] (dr. Mario Collazo)    Cataracts, bilateral     Diabetes mellitus ([facility]     Diabetic neuropathy [facility])     ED (erectile dysfunction)     HTN (hypertension)     Hypercholesterolemia     Open wound of right foot     states this is not the operative foot    Palliative care patient 10/07/2022    Ulcerative colitis [facility])         Past Surgical History:  Past Surgical History:   Procedure Laterality Date    CHOLECYSTECTOMY      CORONARY ARTERY BYPASS GRAFT  2003    EYE SURGERY      jason. cat    ILEOSTOMY OR JEJUNOSTOMY  1995    due to surgery from ulcerative colitis    [place] AMPUTATION FOOT,TRANSMETATARSAL Left 1/30/2018    TRANSMET AMPUTATION performed by Edilia Flores MD at [address] [place] [address] Left 6/12/2017    S. Left great toe ray amputation through the metatarsal level. VASCULAR SURGERY  8/26/14 [facility]    Percutaneous cannulation, left common femoral artery, with 5-Eritrean glidesheath. Suprarenal abdominal aortogram with bilateral iliofemoral arteriogra. Bilateral lower extremity arteriogram.    VASCULAR SURGERY  09/08/14 [facility]    Right femoral to tibioperoneal trunk bypass with in situ right greater saphenous vein. Right common femoral endarterectomy with vascular patch repair.  Right tibioperoneal trunk vein patch and endarterectomy. Completion right lower exteremity arteriograms    VASCULAR SURGERY  1/12/2015 [facility]    Percutaneous cannulation left common femoral artery with 5-Jordanian and later 6-Jordanian [place] sheath. Suprarenal abdominal aortogram with bilateral iliofemoral arteriograms. Bilateral lower extremity arteriograms. Coil embolization right greater saphenous vein bypass branch with 8 mm x 5 cm coil and seven 5 mm x 5 cm coils. VASCULAR SURGERY  1/12/2015 [facility]     Right posterior tibial artery balloon angioplasty 2.5 mm x 100 mm vascutrak balloon. Right peroneal artery balloon angioplasty with 2.5 x 100 mm vascutrak balloon. Completion right lower extremity arteriograms. VASCULAR SURGERY  06/17/2017    [facility]. Percutaneous cannulation right common femoral artery with ultrasound guidance and 5 Jordanian and later 6 Jordanian sheath placement. Suprarenal abdominal aorta gram with bilateral lower extremity arteriogram.Left superficial femoral/popliteal/tibial peroneal trunk/posterior tibial artery atherectomy with csi 1.25 solid crown and 2.0 solid crown. Left tibial peroneal trunk and posterior tibial artery     VASCULAR SURGERY  06/17/2017    CONT.balloon angioplasty with 3mm x 100 mmand 3.5mm x 300mm vascutrak balloon. Left popliteal artery balloon angioplasty with 5 mmx 100mm lutonix drug coated balloon. #6 left superficial femoral artery balloon angioplasty with 3.6 ogn262 mm lutonix grug coated balloons. Completion left lower extremity arteriograms. Mynx closure right common femoral artery puncture site. VASCULAR SURGERY  01/20/2018    [facility]. Left transmetetarsal amputation.        Past Family History:  Family History   Problem Relation Age of Onset    Cancer Mother     Cancer Father        Past Social History:  Social History     Socioeconomic History    Marital status:      Spouse name: Not on file    Number of children: Not on file    Years of education: Not on file    Highest education level: Not on file Occupational History    Not on file   Tobacco Use    Smoking status: Former     Types: Pipe    Smokeless tobacco: Never    Tobacco comments:     pipe on occassion   Substance and Sexual Activity    Alcohol use: No    Drug use: No    Sexual activity: Not on file   Other Topics Concern    Not on file   Social History Narrative    Not on file     Social Determinants of Health     Financial Resource Strain: Not on file   Food Insecurity: Not on file   Transportation Needs: No Transportation Needs    Lack of Transportation (Medical): No    Lack of Transportation (Non-Medical): No   Physical Activity: Not on file   Stress: Not on file   Social Connections: Not on file   Intimate Partner Violence: Not on file   Housing Stability: Not on file       Allergies: Allergies   Allergen Reactions    Keflex [Cephalexin] Rash       Prior to Admission medications    Medication Sig Start Date End Date Taking?  Authorizing Provider   insulin glargine (LANTUS) 100 UNIT/ML injection vial Inject 15 Units into the skin 2 times daily 10/14/22   David Chi MD   megestrol acetate (MEGACE) 400 MG/10ML SUSP Take 20 mLs by mouth daily 10/15/22   David Chi MD   metoprolol tartrate (LOPRESSOR) 25 MG tablet Take 0.5 tablets by mouth 2 times daily 10/14/22   David Chi MD   benzonatate (TESSALON) 100 MG capsule Take 1 capsule by mouth 3 times daily as needed for Cough 10/14/22 10/21/22  David Chi MD   guaiFENesin [facility]) 600 MG extended release tablet Take 1 tablet by mouth 2 times daily 10/14/22   Dvaid Chi MD   sennosides-docusate sodium (SENOKOT-S) 8.6-50 MG tablet Take 1 tablet by mouth 2 times daily 10/14/22   David Chi MD   pantoprazole (PROTONIX) 40 MG tablet Take 1 tablet by mouth every morning (before breakfast) 10/15/22   David Chi MD   apixaban (ELIQUIS) 2.5 MG TABS tablet Take 2.5 mg by mouth 2 times daily    Historical Provider, MD   atorvastatin (LIPITOR) 40 MG tablet Take 40 mg by mouth at bedtime Historical Provider, MD   aspirin 81 MG tablet Take 81 mg by mouth daily. Historical Provider, MD   folic acid (FOLVITE) 953 MCG tablet Take 800 mcg by mouth daily. Historical Provider, MD        Current Facility-Administered Medications   Medication Dose Route Frequency Provider Last Rate Last Admin    aspirin EC tablet 81 mg  81 mg Oral Daily Eleanora Isaias, DO        folic acid (FOLVITE) tablet 800 mcg  800 mcg Oral Daily Eleanora Isaias, DO        apixaban [place]) tablet 2.5 mg  2.5 mg Oral BID Eleanora Isaias, DO        atorvastatin (LIPITOR) tablet 40 mg  40 mg Oral Nightly Eleanora Isaias, DO        megestrol acetate (MEGACE) 400 MG/10ML suspension 800 mg  800 mg Oral Daily Eleanora Isaias, DO        metoprolol tartrate (LOPRESSOR) tablet 12.5 mg  12.5 mg Oral BID Eleanora Isaias, DO        benzonatate (TESSALON) capsule 100 mg  100 mg Oral TID PRN Eleanora Isaias, DO        guaiFENesin [facility] [place]) extended release tablet 600 mg  600 mg Oral BID Eleanora Isaias, DO        sennosides-docusate sodium (SENOKOT-S) 8.6-50 MG tablet 1 tablet  1 tablet Oral BID Eleanora Isaias, DO        [START ON 10/17/2022] pantoprazole (PROTONIX) tablet 40 mg  40 mg Oral QAM AC Eleanora Isaias, DO        bumetanide (BUMEX) 12.5 mg in sodium chloride 0.9 % 125 mL infusion  0.2 mg/hr IntraVENous Continuous Eleanora Isaias, DO 2 mL/hr at 10/16/22 1130 0.2 mg/hr at 10/16/22 1130    insulin glargine (LANTUS) injection vial 15 Units  15 Units SubCUTAneous BID Eleanora Isaias, DO            Current Infused Meds:   bumetanide 0.1 mg/mL infusion 0.2 mg/hr (10/16/22 1130)       Physical Exam:  Vitals:    10/16/22 1428   BP:    Pulse: (!) 115   Resp: 28   Temp:    SpO2: 92%     No intake or output data in the 24 hours ending 10/16/22 1522  Estimated body mass index is 27.8 kg/m² as calculated from the following:    Height as of this encounter: 6' (1.829 m).     Weight as of this encounter: 205 lb (93 kg). PHYSICAL EXAM:  HENNT: On BiPAP, not communicating  PULMONARY: Tachypneic, no bronchospasm  CVS:Normal neck vein, S1 and S2 normal, no murmur  ABD: liver and spleen not palpable, nontender, bowel sound normal  PERIPHERL: Decreased pedal pulses, amputated left toes, gangrene right great toe  MSK: no swelling of the lower extremities  CNS: Not communicating     Labs:  Recent Labs     10/14/22  0207 10/15/22  0153 10/16/22  0405   WBC 17.0* 14.9* 20.0*   HGB 13.4* 12.9* 14.8   * 131 159       Recent Labs     10/14/22  0207 10/15/22  0153 10/16/22  0403 10/16/22  0545   * 136  --  133*   K 5.2* 4.7 5.5 5.1*    102  --  101   CO2 27 28  --  26   BUN 14 12  --  17   CREATININE 0.5 0.5  --  0.5   CALCIUM 7.8* 7.9*  --  7.6*       CK, CKMB, Troponin:   Recent Labs     10/16/22  0545 10/16/22  0721 10/16/22  1417   TROPONINI 0.30* 0.34* 0.40*       Last 3 BNP:  Recent Labs     10/16/22  0545   PROBNP 3,301*             XR CHEST (2 VW)    Result Date: 10/5/2022  1. Status post median sternotomy 2. NO evidence of airspace consolidation or pulmonary venous congestion. CT HEAD WO CONTRAST    Result Date: 10/16/2022  1. Chronic microvascular ischemic changes with age-appropriate parenchymal volume loss. 2. No evidence of acute cortical infarction or intracranial hemorrhage. 3. No definitive interval change. Recommendation: Follow up as clinically indicated. All CT scans at this facility utilize dose modulation, iterative reconstruction, and/or weight based dosing when appropriate to reduce radiation dose to as low as reasonably achievable. Electronically Signed by Camille Agee MD at 16-Oct-2022 08:42:16 AM             CT Head WO Contrast    Result Date: 9/30/2022  1. Nonspecific but presumed microvascular changes in the periventricular white matter. 2.  Atrophic changes. 3.  No acute hemorrhage or extra-axial collection. Recommendation: Follow up as clinically indicated.  All CT scans at this facility utilize dose modulation, iterative reconstruction, and/or weight based dosing when appropriate to reduce radiation dose to as low as reasonably achievable. Electronically Signed by Francisco Montes MD at 30-Sep-2022 08:40:44 AM             NM LUNG SCAN PERFUSION ONLY    Result Date: 10/5/2022  Normal perfusion involving both lungs. No nuclear medicine findings suggestive of pulmonary embolism. XR CHEST PORTABLE    Result Date: 10/16/2022  FINDINGS/IMPRESSION:       Interval placement of right-sided IJ central venous line with tip overlying the SVC. No other significant interval change. Continued attention on follow-up is recommended. Electronically signed by Alonzo Fernandes MD on 10-16-22 at 0702    XR CHEST PORTABLE    Result Date: 10/16/2022  FINDINGS/IMPRESSION:       Bilateral patchy consolidative opacities, interval worsening. Findings may represent pulmonary alveolar edema and/or multifocal pneumonia. No other significant interval change. Continued attention on follow-up is recommended. Electronically signed by Alonzo Fernandes MD on 10-16-22 at 0655    XR CHEST PORTABLE    Result Date: 10/7/2022  1. Status post median sternotomy. 2.Bilateral patchy perihilar infiltrate    XR CHEST PORTABLE    Result Date: 9/30/2022  Mild central vascular congestion. In addition, cannot exclude mild patchy opacities in the mid and lower left lung, which may be due to atelectasis/scarring, vascular congestion, or early airspace disease. Otherwise, no lobar consolidations. No pneumothorax or pleural effusions. Recommendation: Follow up as clinically indicated. Electronically Signed by Daniel Enrique MD at 30-Sep-2022 05:13:14 AM             CTA PULMONARY W CONTRAST    Result Date: 10/16/2022  1. Central airspace abnormality small bilateral pleural fluid; consider pulmonary edema, pulmonary hemorrhage, atypical pneumonitis, alveolar proteinosis, aspiration.  2. No evidence of pulmonary embolism to the segmental branch level. Recommendation: Follow up as clinically indicated. All CT scans at this facility utilize dose modulation, iterative reconstruction, and/or weight based dosing when appropriate to reduce radiation dose to as low as reasonably achievable. Electronically Signed by Fracisco Rodriguez MD at 16-Oct-2022 09:14:40 AM                Assessment and Plan:  Possibly acute on chronic diastolic dysfunction  Elevated troponin, etiology unclear  Recent CVA with bilateral carotid stenosis  Hepatocellular malignancy with possible metastases, had radiation therapy a month ago  CABG with 5 grafts 2003  Hypertension, diabetes and hyperlipidemia  Leukocytosis    Plan: Patient will be treated conservatively. Diuretic as needed. Echocardiogram will be repeated    I have spent 60 minutes reviewing the chart, taking history, examining the patient, discussion with the patient and the family concerning the finding, diagnoses and treatment plan. This dictation was performed using [address] [place] [place]. Mistake and misspelling may have been created without  realizing them. Please notify me for clarification.   Thank you    Breanna Alcocer MD   10/16/2022, 3:22 PM

## 2022-10-16 NOTE — PROGRESS NOTES
Tereso Colunga from lab reported Troponin on 0.40 to this nurse @1500. Dr. Klarissa Santamaria notified.

## 2022-10-16 NOTE — ED NOTES
Dr. Burr Lab with pt and family regarding  Central line placement  Pt and family agreed     Louise Alvarez, RN  10/16/22 9753

## 2022-10-17 NOTE — ACP (ADVANCE CARE PLANNING)
Advance Care Planning     Advance Care Planning Activator (Inpatient)  Conversation Note      Date of ACP Conversation: 10/17/2022     Conversation Conducted with: Patient's daughter and wife. ACP Activator: Valdemar Delgado      [facility] Decision Maker:     Primary Decision Maker: Jorge Breen [phone]    Secondary Decision Maker: Rupal  - Spouse - [phone]        Care Preferences    Ventilation: \"If you were in your present state of health and suddenly became very ill and were unable to breathe on your own, what would your preference be about the use of a ventilator (breathing machine) if it were available to you? \"      Would the patient desire the use of ventilator (breathing machine)?: Yes    \"If your health worsens and it becomes clear that your chance of recovery is unlikely, what would your preference be about the use of a ventilator (breathing machine) if it were available to you? \"     Would the patient desire the use of ventilator (breathing machine)?: Yes      Resuscitation  \"CPR works best to restart the heart when there is a sudden event, like a heart attack, in someone who is otherwise healthy. Unfortunately, CPR does not typically restart the heart for people who have serious health conditions or who are very sick. \"    \"In the event your heart stopped as a result of an underlying serious health condition, would you want attempts to be made to restart your heart (answer \"yes\" for attempt to resuscitate) or would you prefer a natural death (answer \"no\" for do not attempt to resuscitate)? \" Yes       [] Yes   [x] No   Educated Patient / Dc Montano regarding differences between Advance Directives and portable DNR orders. Conversation Outcomes:  [x] ACP discussion completed  [x] Existing advance directive reviewed with patient; no changes to patient's previously recorded wishes    Pt has a LW and it is in his chart.  POA in pt's chart is his wife's.     Electronically signed by Alix Milligan on 10/17/2022 at 11:00 AM

## 2022-10-17 NOTE — PROGRESS NOTES
Cardiology Progress Note   Yamel Henning MD      Patient:    Wendy Johnson  [id number]  1945    Patient Active Problem List    Diagnosis Date Noted    NSTEMI (non-ST elevated myocardial infarction) ([facility] [address] 10/16/2022     Priority: Medium    Palliative care patient 10/07/2022     Priority: Medium    Leukocytosis 10/06/2022     Priority: Medium    Hyperkalemia 09/29/2022     Priority: Medium    Acute ischemic stroke ([facility] [address] 09/28/2022     Priority: Medium    Hepatocellular carcinoma ([facility] [address] 09/01/2022     Priority: Medium     Overview Note:     Formatting of this note might be different from the original.  Added automatically from request for surgery [id number]      Wound infection 01/19/2018     Priority: Low    Open wound of toe with complication 47/90/8918     Priority: Low    Cellulitis of foot      Priority: Low    Type 2 diabetes mellitus with diabetic peripheral angiopathy and gangrene, with long-term current use of insulin (HCC)      Priority: Low    Gangrene of toe ([facility] [address] 06/08/2017     Priority: Low    Atherosclerosis of native arteries of the extremities with ulceration ([facility] [address] 08/25/2014     Priority: Low    Diabetic polyneuropathy associated with type 2 diabetes mellitus ([facility] [address]      Priority: Low    Coronary artery disease involving native coronary artery without angina pectoris      Priority: Low    Erectile dysfunction      Priority: Low    Hypercholesterolemia      Priority: Low    Essential hypertension      Priority: Low       Admit Date:  10/16/2022    Admission Problem List: Present on Admission:   NSTEMI (non-ST elevated myocardial infarction) [facility])       Cardiac Specific Data:  Specialty Problems          Cardiology Problems    Acute ischemic stroke ([facility] [address]        * (Principal) NSTEMI (non-ST elevated myocardial infarction) ([facility] [address]        Atherosclerosis of native arteries of the extremities with ulceration ([facility] [address]        Coronary artery disease involving native coronary artery without angina pectoris Essential hypertension        Hypercholesterolemia        Type 2 diabetes mellitus with diabetic peripheral angiopathy and gangrene, with long-term current use of insulin (HCC)           Subjective:  Patient feels better sitting up off BIPAP.  ECHO showed normal EF    Objective:   BP (!) 121/45   Pulse (!) 111   Temp 97 °F (36.1 °C) (Temporal)   Resp (!) 35   Ht 6' (1.829 m)   Wt 205 lb (93 kg)   SpO2 94%   BMI 27.80 kg/m²       Intake/Output Summary (Last 24 hours) at 10/17/2022 1849  Last data filed at 10/17/2022 1400  Gross per 24 hour   Intake 7.49 ml   Output 2610 ml   Net -2602.51 ml       Current Facility-Administered Medications   Medication Dose Route Frequency Provider Last Rate Last Admin    perflutren lipid microspheres (DEFINITY) injection 1.65 mg  1.5 mL IntraVENous ONCE PRN Pricila Quiet, DO   1.5 mL at 10/17/22 0726    acetaZOLAMIDE (DIAMOX) 500 mg in dextrose 5 % 100 mL IVPB  500 mg IntraVENous Q12H Pricila Quiet, DO   Stopped at 10/17/22 1119    carvedilol (COREG) tablet 6.25 mg  6.25 mg Oral BID WC Pricila Quiet, DO        aspirin EC tablet 81 mg  81 mg Oral Daily Pricila Quiet, DO   81 mg at 39/02/72 3455    folic acid (FOLVITE) tablet 800 mcg  800 mcg Oral Daily Pricila Quiet, DO   800 mcg at 10/17/22 1010    apixaban (ELIQUIS) tablet 2.5 mg  2.5 mg Oral BID Pricila Quiet, DO   2.5 mg at 10/17/22 1010    atorvastatin (LIPITOR) tablet 40 mg  40 mg Oral Nightly Pricila Quiet, DO   40 mg at 10/16/22 2028    megestrol acetate (MEGACE) 400 MG/10ML suspension 800 mg  800 mg Oral Daily Pricila Quiet, DO   800 mg at 10/17/22 1013    benzonatate (TESSALON) capsule 100 mg  100 mg Oral TID PRN Pricila Quiet, DO        guaiFENesin [facility]) extended release tablet 600 mg  600 mg Oral BID Pricila Quiet, DO   600 mg at 10/17/22 1010    sennosides-docusate sodium (SENOKOT-S) 8.6-50 MG tablet 1 tablet  1 tablet Oral BID Pricila Valenzuela,  1 tablet at 10/17/22 1010    pantoprazole (PROTONIX) tablet 40 mg  40 mg Oral QAM AC Dulce Rodriguez, DO   40 mg at 10/17/22 1010    bumetanide (BUMEX) 12.5 mg in sodium chloride 0.9 % 125 mL infusion  0.1 mg/hr IntraVENous Continuous Dulce Rodriguez, DO 1 mL/hr at 10/17/22 1011 0.1 mg/hr at 10/17/22 1011    insulin glargine (LANTUS) injection vial 15 Units  15 Units SubCUTAneous BID Dulce Rodriguez, DO             acetaZOLAMIDE (DIAMOX) IVPB  500 mg IntraVENous Q12H    carvedilol  6.25 mg Oral BID WC    aspirin  81 mg Oral Daily    folic acid  551 mcg Oral Daily    apixaban  2.5 mg Oral BID    atorvastatin  40 mg Oral Nightly    megestrol acetate  800 mg Oral Daily    guaiFENesin  600 mg Oral BID    sennosides-docusate sodium  1 tablet Oral BID    pantoprazole  40 mg Oral QAM AC    insulin glargine  15 Units SubCUTAneous BID         Physical Exam:  General: NAD, alert, confused  HEENT: normal  CHEST: clear to ascultation  CVS: S1 and S2 normal, no murmur, no JVD  ABD; nontender, bowel sounds normal, liver and spleen not palpable  MSK: normal  CNS: oriented X3, normal affect, normal CN  PVD:  all peripheral pulses normal, no swelling of the ankles      RECENT LABS:    Lab Data:  CBC:   Recent Labs     10/15/22  0153 10/16/22  0405   WBC 14.9* 20.0*   HGB 12.9* 14.8   HCT 40.1* 46.8   .6* 104.7*    159     BMP:   Recent Labs     10/15/22  0153 10/16/22  0403 10/16/22  0545 10/17/22  0145     --  133* 136   K 4.7 5.5 5.1* 3.4*     --  101 94*   CO2 28  --  26 32*   BUN 12  --  17 20   CREATININE 0.5  --  0.5 0.7     LIVER PROFILE:   Recent Labs     10/16/22  0545   *   ALT 94*   BILITOT 1.3*   ALKPHOS 378*     PT/INR:   Recent Labs     10/16/22  0545   PROTIME 20.3*   INR 1.71*     APTT:   Recent Labs     10/16/22  0545   APTT 40.1*       CK, CKMB, Troponin:   Recent Labs     10/16/22  0545 10/16/22  0721 10/16/22  1417   TROPONINI 0.30* 0.34* 0.40*       Last 3 BNP:  Recent Labs     10/16/22  0545 10/17/22  0145   PROBNP 3,301* 7,957*       IMAGING:  CT HEAD WO CONTRAST    Result Date: 10/16/2022  1. Chronic microvascular ischemic changes with age-appropriate parenchymal volume loss. 2. No evidence of acute cortical infarction or intracranial hemorrhage. 3. No definitive interval change. Recommendation: Follow up as clinically indicated. All CT scans at this facility utilize dose modulation, iterative reconstruction, and/or weight based dosing when appropriate to reduce radiation dose to as low as reasonably achievable. Electronically Signed by Allison Wilkes MD at 16-Oct-2022 08:42:16 AM             XR CHEST PORTABLE    Result Date: 10/16/2022  FINDINGS/IMPRESSION:       Interval placement of right-sided IJ central venous line with tip overlying the SVC. No other significant interval change. Continued attention on follow-up is recommended. Electronically signed by Jono Head MD on 10-16-22 at 0702    XR CHEST PORTABLE    Result Date: 10/16/2022  FINDINGS/IMPRESSION:       Bilateral patchy consolidative opacities, interval worsening. Findings may represent pulmonary alveolar edema and/or multifocal pneumonia. No other significant interval change. Continued attention on follow-up is recommended. Electronically signed by Jono Head MD on 10-16-22 at 6870    CTA PULMONARY W CONTRAST    Result Date: 10/16/2022  1. Central airspace abnormality small bilateral pleural fluid; consider pulmonary edema, pulmonary hemorrhage, atypical pneumonitis, alveolar proteinosis, aspiration. 2. No evidence of pulmonary embolism to the segmental branch level. Recommendation: Follow up as clinically indicated. All CT scans at this facility utilize dose modulation, iterative reconstruction, and/or weight based dosing when appropriate to reduce radiation dose to as low as reasonably achievable.  Electronically Signed by Allison Wilkes MD at 16-Oct-2022 09:14:40 AM Assessment and Plan:    Heart failure with preserved ejection fraction, acute on chronic diastolic dysfunction now improving  Sinus tachycardia still present  Recent CVA with bilateral carotid stenosis  Elevated troponin, etiology unclear  Hepatocellular malignancy with possible metastases, had radiation therapy a month ago  Hypertension, diabetes and hyperlipidemia  Leukocytosis    Plan: Beta-blocker preferred in this situation. I have spoken with Dr. Harpal Gonzalez    I have spent 30 minutes reviewing the chart, taking history, examining the patient, discussion with the patient and the family concerning the finding, diagnoses and treatment plan. This dictation was performed using [address]. Mistake and misspelling may have been created without  realizing them. Please notify me for clarification.   Thank you    Meek Benjamin MD  10/17/2022 6:49 PM

## 2022-10-17 NOTE — CONSULTS
CHComprehensive Nutrition Assessment    Type and Reason for Visit:  Initial, Consult    Nutrition Recommendations/Plan:   CHF education when pt improves. Malnutrition Assessment:  Malnutrition Status: At risk for malnutrition (Comment) (10/17/22 1035)        Nutrition Assessment:    Consulted for CHF education. Education not appropriate at this time. Will follow for diet advancement and intake, CHF education. Nutrition Related Findings:    BiPap Wound Type: Surgical Incision       Current Nutrition Intake & Therapies:    Average Meal Intake: Unable to assess     No diet orders on file    Anthropometric Measures:  Height: 6' (182.9 cm)  Ideal Body Weight (IBW): 178 lbs (81 kg)    Admission Body Weight: 205 lb (93 kg)  Current Body Weight: 205 lb (93 kg),   IBW. Current BMI (kg/m2): 27.8  Usual Body Weight: 214 lb (97.1 kg) (7/22)  % Weight Change (Calculated): -4.2                         Estimated Daily Nutrient Needs:  Energy Requirements Based On: Kcal/kg  Weight Used for Energy Requirements: Admission  Energy (kcal/day): 7855-0461  Weight Used for Protein Requirements: Ideal  Protein (g/day): 105-122  Method Used for Fluid Requirements: 1 ml/kcal  Fluid (ml/day): 5692-5309    Nutrition Diagnosis:   Inadequate oral intake related to impaired respiratory function as evidenced by intake 0-25%    Nutrition Interventions:   Food and/or Nutrient Delivery: Start Oral Diet  Nutrition Education/Counseling: Education not appropriate  Coordination of Nutrition Care: No recommendation at this time       Goals:     Goals: Initiate PO diet       Nutrition Monitoring and Evaluation:   Behavioral-Environmental Outcomes: None Identified  Food/Nutrient Intake Outcomes: Diet Advancement/Tolerance  Physical Signs/Symptoms Outcomes: None Identified    Discharge Planning:     Too soon to determine     Jony Mera MS, RD, LD  Contact: [phone]

## 2022-10-17 NOTE — DISCHARGE SUMMARY
Physical Therapy Discharge Note  DATE:  10/17/2022  NAME:  Singh Velazquez  :  1945  (76 y.o.,male)  MRN:  [id number]    HEIGHT:  Height: 6' 0.01\" (182.9 cm)  WEIGHT:  Weight: 205 lb (93 kg)    PATIENT DIAGNOSIS(ES):    Diagnosis: HYPERKALEMIA, CVA    Additional Pertinent Hx: Liver cancer, HTN, CAD, PVD, DM with neuropathy, osteomyelitis with transmetatarsal amputation, ileostomy  RESTRICTIONS/PRECAUTIONS:    Restrictions/Precautions  Restrictions/Precautions: Fall Risk  Position Activity Restriction  Other position/activity restrictions: zio patch, colostomy bag  OVERALL  ORIENTATION STATUS:     PAIN:  Pain Level: 4       Pain Location: Generalized     Pain Orientation: Right, Left      GROSS ASSESSMENT        POSTURE/BALANCE  Balance  Comments: pt sat eob for a few minutes prior to transfer to [facility] w/o need for UE support       ACTIVITY TOLERANCE  Activity Tolerance  Activity Tolerance: Patient limited by endurance, Patient limited by fatigue, Patient tolerated treatment well      BED MOBILITY  Bed mobility  Bridging: Moderate assistance (PTA holding B feet flat and B hips into add)  Rolling to Left: Minimal assistance  Rolling to Right: Contact guard assistance  Supine to Sit: Stand by assistance  Sit to Supine: Moderate assistance  Scooting: Minimal assistance  Bed Mobility Comments: Radha instructed in back protocol and bed mobility techniques        TRANSFERS  Transfers  Sit to Stand: Moderate Assistance (Heavy posterior lean)  Stand to Sit: Minimal Assistance  Bed to Chair: Minimal assistance, Moderate assistance (stand step with RW to L side from WC to bed)  Stand Pivot Transfers: Minimal Assistance, Contact guard assistance (to R, did not get completely turned before sitting)  Squat Pivot Transfers:  Moderate Assistance (EOB<w/c from pt R and w/c<recliner from pt R)  Lateral Transfers: Dependent/Total (Use of SS)  Comment: Max v/c's and extended time required, heavy posterior lean upon standing AMBULATION 1  Ambulation  Surface: Level tile  Device: Rolling Walker  Assistance: Contact guard assistance  Quality of Gait: walker too far ahead, flexed posture, shuffling steps  Gait Deviations: Slow Alexus, Shuffles, Decreased step length, Decreased step height  Distance: 5'  Comments: pt with poor tolerance for standing; reports feeling dizzy and bad in general and BP elevated as well as heart rate (see flowsheet through 10a) (bp 121/104 and  after standing)  STAIRS   Unable to perform  WHEELCHAIR PROPULSION 1   Unable to jperform  GOALS:          HOME LIVING:                    ASSESSMENT (IMPAIRMENTS/BARRIERS):     Assessment: Pt on 2L upon entering room, O2 sats 88-89%. Pt performed 1X sit<> RW, multiple SRS in SS, pt unable to take step. Pt requires multiple rest breaks throughout tx, requires rest after each min activity.   Activity Tolerance: Patient limited by endurance, Patient limited by fatigue, Patient tolerated treatment well     PLAN:     Discharge Recommendations: Subacute/Skilled Nursing Facility    PATIENT GOAL FOR REHAB:  RETURN TO PRIOR LEVEL OF FUNCTION       IRF/VIANEY  Roll Left and Right  Assistance Needed: Partial/moderate assistance  CARE Score: 3    Sit to Lying  Assistance Needed: Partial/moderate assistance  CARE Score: 3    Lying to Sitting on Side of Bed  Assistance Needed: Partial/moderate assistance  CARE Score: 3    Sit to Stand  Assistance Needed: Partial/moderate assistance  CARE Score: 3    Chair/Bed-to-Chair Transfer  Assistance Needed: Partial/moderate assistance  CARE Score: 3    Car Transfer  Reason if not Attempted: Not attempted due to medical condition or safety concerns  CARE Score: 88    Walk 10 Feet  Reason if not Attempted: Not attempted due to medical condition or safety concerns  CARE Score: 88    Walk 50 Feet with Two Turns  Reason if not Attempted: Not attempted due to medical condition or safety concerns  CARE Score: 88    Walk 150 Feet  Reason if not Attempted: Not attempted due to medical condition or safety concerns  CARE Score: 88    Walking 10 Feet on Uneven Surfaces  Reason if not Attempted: Not attempted due to medical condition or safety concerns  CARE Score: 88  Discharge Goal: Partial/moderate assistance    1 Step (Curb)  Reason if not Attempted: Not attempted due to medical condition or safety concerns  CARE Score: 88    4 Steps  Reason if not Attempted: Not attempted due to medical condition or safety concerns  CARE Score: 88    12 Steps  Reason if not Attempted: Not attempted due to medical condition or safety concerns  CARE Score: 88    Wheel 50 Feet with Two Turns  Reason if not Attempted: Not attempted due to medical condition or safety concerns  CARE Score: 88    Wheel 150 Feet  Reason if not Attempted: Not attempted due to medical condition or safety concerns  CARE Score: 88      LAST TREATMENT TIME  PT Individual Minutes  Time In: 1000  Time Out: 1100  Minutes: 60        Electronically signed by Sayra Nolasco PT on 10/17/22 at 8:35 AM CDT

## 2022-10-17 NOTE — PLAN OF CARE
Problem: Discharge Planning  Goal: Discharge to home or other facility with appropriate resources  Outcome: Progressing  Flowsheets (Taken 10/16/2022 2000)  Discharge to home or other facility with appropriate resources:   Refer to discharge planning if patient needs post-hospital services based on physician order or complex needs related to functional status, cognitive ability or social support system   Identify barriers to discharge with patient and caregiver   Arrange for needed discharge resources and transportation as appropriate     Problem: Skin/Tissue Integrity  Goal: Absence of new skin breakdown  Description: 1. Monitor for areas of redness and/or skin breakdown  2. Assess vascular access sites hourly  3. Every 4-6 hours minimum:  Change oxygen saturation probe site  4. Every 4-6 hours:  If on nasal continuous positive airway pressure, respiratory therapy assess nares and determine need for appliance change or resting period.   Outcome: Progressing     Problem: ABCDS Injury Assessment  Goal: Absence of physical injury  Outcome: Progressing     Problem: Safety - Adult  Goal: Free from fall injury  Outcome: Progressing

## 2022-10-17 NOTE — PROGRESS NOTES
Speech Language Pathology  Facility/Department: [facility] ICU   CLINICAL BEDSIDE SWALLOW EVALUATION  SPEECH PRODUCTION EVALUATION     NAME: Juana Templeton  : 1945  MRN: [id number]    ADMISSION DATE: 10/16/2022  ADMITTING DIAGNOSIS: has Diabetic polyneuropathy associated with type 2 diabetes mellitus ([address] Coronary artery disease involving native coronary artery without angina pectoris; Erectile dysfunction; Hypercholesterolemia; Essential hypertension; Atherosclerosis of native arteries of the extremities with ulceration ([address] Gangrene of toe ([address] Type 2 diabetes mellitus with diabetic peripheral angiopathy and gangrene, with long-term current use of insulin ([address] Cellulitis of foot; Open wound of toe with complication; Wound infection; Acute ischemic stroke ([address] Hyperkalemia; Leukocytosis; Palliative care patient; Hepatocellular carcinoma [facility]); and NSTEMI (non-ST elevated myocardial infarction) [facility]) on their problem list.    Date of Eval: 10/17/2022  Evaluating Therapist: SACHI Walls    Current Diet level:  NPO    Reason for Referral  Juana Templeton was referred for a bedside swallow evaluation to assess the efficiency of his swallow function, identify signs and symptoms of aspiration and make recommendations regarding safe dietary consistencies, effective compensatory strategies, and safe eating environment. Impression  Assessed patient's swallowing function. Patient exhibited decreased oral prep of more solid consistencies, fast oral transit and suspected swallow delay with even thickened liquid consistencies, and sluggish, mildly decreased laryngeal elevation for swallow airway protection. No outward S/S penetration/aspiration was noted with any ice chip trial, puree consistency trial, or moderately thick/honey thick liquid trial presented during evaluation this date.  While no outward coughs/throat clears were observed, increased respiratory rate was noted following mildly thick/nectar thick liquid trials. Did not evaluate swallow function with any additional consistency secondary to observed lethargy within short period of time. At this time, would trial full liquid diet with moderately thick/honey thick liquids. Total feed. Recommend meds crushed in pudding/applesauce. If patient receives mouth care prior to intake, okay for ice chips IN BETWEEN MEALS for comfort. Will continue to follow. Thank you for this consult. Treatment Plan  Requires SLP Intervention: Yes     Recommended Diet and Intervention  Liquid Consistency Recommendation: Full liquid diet with moderately thick/honey thick liquids   Recommended Form of Meds: Meds crushed in puree as able  Therapeutic Interventions: Patient/Family education;Oral Care;Diet tolerance monitoring; Therapeutic PO trials with SLP     Compensatory Swallowing Strategies  Compensatory Swallowing Strategies: Upright as possible for all oral intake;TOTAL FEED;Small bites/sips;Eat/Feed slowly; Alternate solids and liquids; Remain upright for 30-45 minutes after meals     Treatment/Goals  Timeframe for Short-term Goals: 1-2x/day for 3 days   Goal 1: Patient will tolerate full liquid diet with moderately thick/honey thick liquids with min S/S penetration/aspiration during PO intake. Goal 2: Patient staff will follow swallow safety recommendations to decrease risk of penetration/aspiration during PO intake. Goal 3: Patient will receive daily oral care, via staff, to decrease bacteria from the oral cavity. Goal 4: Re-assessment of swallow function for potential diet upgrade. Goal 5: Patient will complete breath support, oral motor, lingual, and pharyngeal exercises with provision of mod cues/prompts. Goal 6: Re-assess speech production. Goal 7: Patient will utilize tools/strategies to promote increased clarity of speech at word, phrase, and sentence level with provision of mod cues/prompts.      General  Chart Reviewed: Yes  Behavior/Cognition: Cooperative; Patient appeared lethargic within short period of time. O2 Device: High flow   Communication Observation: (Assessed patient's speech production. Patient appeared primarily aphonic and whispered responses. During utterances, patient exhibited slow, decreased labial and lingual movements. SLP ranked functional intelligibility of speech for unfamiliar listeners at 70-80% with background noise present.)  Follows Directions: Simple   Patient Positioning: Upright in bed  Consistencies Administered: Ice chips;Dysphagia Pureed (Dysphagia I); Honey - straw;Nectar - straw     Oral Motor Examination   Labial ROM: (Decreased, bilaterally, during labial retraction trials and labial protrusion trials.)  Labial Strength: (Adequate during labial compression trials.)  Labial Coordination: (Slowed movements were noted.)  Lingual ROM: (Adequate during lingual extension trials with full point achieved; decreased during lingual elevation trials without use of accessory jaw movement; adequate movements noted bilaterally.)  Lingual Strength: (Decreased)  Lingual Coordination: (Slowed movements were noted.)     Assessed patient's swallowing function with the following observations noted:     Oral Phase  Mastication: Ice chips (Patient exhibited decreased rotary jaw movement during oral prep of ice chip trials presented by SLP.)  Suspected Premature Bolus Loss: Puree;Nectar - straw (Oral transit of puree consistency trials, presented by SLP, varied from 1-3 seconds in length. Patient exhibited fast oral transit of nectar thick liquid trials presented via straw by SLP.)  Oral Phase - Comment: Oral transit of ice chip trials primarily measured 1-2 seconds in length. No puree consistency residue was noted post swallows. Oral transit of honey thick liquid trials, presented via straw by SLP, primarily measured 1-2 seconds in length.       Pharyngeal Phase  Suspected Swallow Delay: Puree;Nectar - straw (Suspect secondary to oral transit times.)  Laryngeal Elevation: (Patient exhibited sluggish, mildly decreased laryngeal elevation for swallow airway protection.)  Pharyngeal Phase - Comment: No outward S/S penetration/aspiration was noted with any ice chip trial, puree consistency trial, or moderately thick/honey thick liquid trial presented during evaluation this date. While no outward coughs/throat clears were observed, increased respiratory rate was noted following mildly thick/nectar thick liquid trials. Did not evaluate swallow function with any additional consistency secondary to observed lethargy within short period of time. At this time, would trial full liquid diet with moderately thick/honey thick liquids. Total feed. Recommend meds crushed in pudding/applesauce. If patient receives mouth care prior to intake, okay for ice chips IN BETWEEN MEALS for comfort. Will continue to follow.      Electronically signed by SACHI Mckeon on 10/17/2022 at 12:16 PM

## 2022-10-17 NOTE — CONSULTS
**Physician Signature**  This document was electronically signed by: Ernesto Bush MD  10/16/2022   10:00 PM    **Consult Cover Page**  FROM: [address], [phone]  Call Back Number: [phone]  SUBJECT: Consult Recommendations  Date and Time of Report: 10/16/2022 10:00 PM CT  Items Contained in this Document: Critical Care [facility]    **Consult Information**  Member Facility: [address] [place] [address] MRN: [id number]  Visit/Encounter Number: [id number]  Consult ID: [id number]  Facility Time Zone: CT  Date and Time of Request: 10/16/2022 09:27 PM  CT  Requesting Clinician: [id number] [facility] [place]  Patient Name: Jim Lloyd  YOB: 1945  Gender: Male  Patient identity was confirmed at the beginning of the consult with the   patient/family/staff using two personal identifiers: Patient name and       **Reason for Consult**  Reason for Consult: [facility]    **Admission**  Admission Date: 10-  Chief reason for ICU admission: CHF    **Core Metrics**  General orienting sentence for patient: 75 yo male with CHF, no lasix gtt and   NIV  Chief physiologic deterioration: None - Stable patient  Is the patient on DVT prophylaxis?: Yes  Is the patient on GI prophylaxis?: Yes  Has this patient reached their nutritional goal?: Yes  Are there current issues with pain management in this patient?:   No  Are there issues with skin integrity?: No  Are there issues with delirium?: No  Has the patient been mobilized?: No  Is this patient currently intubated?: No  Are there ethical or care philosophy or family issues?: No  Do you recommend an in depth evaluation?: No  Disposition Recommendations: Continue ICU level of care    **Inserted/Removed Devices**  Device Name: Central venous catheter  Site of insertion: Interjugular - Left  Date of insertion: 10-  Device Name: Pulido Catheter  Site of insertion: Bladder  Date of insertion: 10-    **Physician Signature**  This document was electronically signed by: Efe Graves MD  10/16/2022   10:00 PM

## 2022-10-17 NOTE — PROGRESS NOTES
Hospitalist Progress Note    Patient:  Tasneem Huffman  YOB: 1945  Date of Service: 10/17/2022  MRN: [id number]   Acct: [de-identified]   Primary Care Physician: SHAHNAZ Viramontes CNP  Advance Directive: Full Code  Admit Date: 10/16/2022       Hospital Day: 1  Referring Provider: Shawn Esparza DO    Patient Seen, Chart, Consults, Notes, Labs, Radiology studies reviewed. Subjective:  Tasneem Huffman is a 76 y.o. male  whom we are following for hypoxemic respiratory failure, elevated troponin, hepatocellular carcinoma, acute on chronic diastolic heart failure. He had an excellent response to the Bumex infusion overnight. He is now in a negative fluid balance. He is off of BiPAP. We were able to get him up in the chair today. Definitely making forward progress.     Allergies:  Keflex [cephalexin]    Medicines:  Current Facility-Administered Medications   Medication Dose Route Frequency Provider Last Rate Last Admin    perflutren lipid microspheres (DEFINITY) injection 1.65 mg  1.5 mL IntraVENous ONCE PRN Shawn Esparza, DO   1.5 mL at 10/17/22 0726    dilTIAZem HCl in sodium chloride 125 mg / 125 mL infusion  2.5-15 mg/hr IntraVENous Continuous Eleanora Isaias, DO 10 mL/hr at 10/17/22 1221 10 mg/hr at 10/17/22 1221    acetaZOLAMIDE (DIAMOX) 500 mg in dextrose 5 % 100 mL IVPB  500 mg IntraVENous Q12H Eleanora Isaias, DO   Stopped at 10/17/22 1119    aspirin EC tablet 81 mg  81 mg Oral Daily Eleanora Isaias, DO   81 mg at 01/11/04 6259    folic acid (FOLVITE) tablet 800 mcg  800 mcg Oral Daily Eleanora Isaias, DO   800 mcg at 10/17/22 1010    apixaban (ELIQUIS) tablet 2.5 mg  2.5 mg Oral BID Eleanora Isaias, DO   2.5 mg at 10/17/22 1010    atorvastatin (LIPITOR) tablet 40 mg  40 mg Oral Nightly Eleanora Isaias, DO   40 mg at 10/16/22 2028    megestrol acetate (MEGACE) 400 MG/10ML suspension 800 mg  800 mg Oral Daily Eleanora Isaias, DO   800 mg at 10/17/22 1013 benzonatate (TESSALON) capsule 100 mg  100 mg Oral TID PRN Jodene Michael, DO        guaiFENesin [facility]) extended release tablet 600 mg  600 mg Oral BID Jodene Bridges, DO   600 mg at 10/17/22 1010    sennosides-docusate sodium (SENOKOT-S) 8.6-50 MG tablet 1 tablet  1 tablet Oral BID Jodene Bridges, DO   1 tablet at 10/17/22 1010    pantoprazole (PROTONIX) tablet 40 mg  40 mg Oral QAM AC Jodene Bridges, DO   40 mg at 10/17/22 1010    bumetanide (BUMEX) 12.5 mg in sodium chloride 0.9 % 125 mL infusion  0.1 mg/hr IntraVENous Continuous Redene Bridges, DO 1 mL/hr at 10/17/22 1011 0.1 mg/hr at 10/17/22 1011    insulin glargine (LANTUS) injection vial 15 Units  15 Units SubCUTAneous BID Jodene Michael, DO           Past Medical History:  Past Medical History:   Diagnosis Date    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 Carmela Walker)    Cataracts, bilateral     Diabetes mellitus ([address]     Diabetic neuropathy [facility])     ED (erectile dysfunction)     HTN (hypertension)     Hypercholesterolemia     Open wound of right foot     states this is not the operative foot    Palliative care patient 10/07/2022    Ulcerative colitis [facility])        Past Surgical History:  Past Surgical History:   Procedure Laterality Date    CHOLECYSTECTOMY      CORONARY ARTERY BYPASS GRAFT  2003    EYE SURGERY      jason. cat    ILEOSTOMY OR JEJUNOSTOMY  1995    due to surgery from ulcerative colitis    VT AMPUTATION FOOT,TRANSMETATARSAL Left 1/30/2018    TRANSMET AMPUTATION performed by David Mares MD at [address] Left 6/12/2017    [facility]. Left great toe ray amputation through the metatarsal level. VASCULAR SURGERY  8/26/14 [facility]    Percutaneous cannulation, left common femoral artery, with 5-Hebrew glidesheath. Suprarenal abdominal aortogram with bilateral iliofemoral arteriogra. Bilateral lower extremity arteriogram.    VASCULAR SURGERY  09/08/14 [facility]    Right femoral to tibioperoneal trunk bypass with in situ right greater saphenous vein. Right common femoral endarterectomy with vascular patch repair. Right tibioperoneal trunk vein patch and endarterectomy. Completion right lower exteremity arteriograms    VASCULAR SURGERY  1/12/2015 [facility]    Percutaneous cannulation left common femoral artery with 5-Norwegian and later 6-Norwegian pinnacle destination sheath. Suprarenal abdominal aortogram with bilateral iliofemoral arteriograms. Bilateral lower extremity arteriograms. Coil embolization right greater saphenous vein bypass branch with 8 mm x 5 cm coil and seven 5 mm x 5 cm coils. VASCULAR SURGERY  1/12/2015 [facility]     Right posterior tibial artery balloon angioplasty 2.5 mm x 100 mm vascutrak balloon. Right peroneal artery balloon angioplasty with 2.5 x 100 mm vascutrak balloon. Completion right lower extremity arteriograms. VASCULAR SURGERY  06/17/2017    [facility]. Percutaneous cannulation right common femoral artery with ultrasound guidance and 5 Norwegian and later 6 Norwegian sheath placement. Suprarenal abdominal aorta gram with bilateral lower extremity arteriogram.Left superficial femoral/popliteal/tibial peroneal trunk/posterior tibial artery atherectomy with csi 1.25 solid crown and 2.0 solid crown. Left tibial peroneal trunk and posterior tibial artery     VASCULAR SURGERY  06/17/2017    CONT.balloon angioplasty with 3mm x 100 mmand 3.5mm x 300mm vascutrak balloon. Left popliteal artery balloon angioplasty with 5 mmx 100mm lutonix drug coated balloon. #6 left superficial femoral artery balloon angioplasty with 3.6 xik456 mm lutonix grug coated balloons. Completion left lower extremity arteriograms. Mynx closure right common femoral artery puncture site. VASCULAR SURGERY  01/20/2018    [facility]. Left transmetetarsal amputation.        Family History  Family History   Problem Relation Age of Onset    Cancer Mother     Cancer Father        Social History  Social History     Socioeconomic History    Marital status:      Spouse name: Not on file    Number of children: Not on file    Years of education: Not on file    Highest education level: Not on file   Occupational History    Not on file   Tobacco Use    Smoking status: Former     Types: Pipe    Smokeless tobacco: Never    Tobacco comments:     pipe on occassion   Substance and Sexual Activity    Alcohol use: No    Drug use: No    Sexual activity: Not on file   Other Topics Concern    Not on file   Social History Narrative    Not on file     Social Determinants of Health     Financial Resource Strain: Not on file   Food Insecurity: Not on file   Transportation Needs: No Transportation Needs    Lack of Transportation (Medical): No    Lack of Transportation (Non-Medical): No   Physical Activity: Not on file   Stress: Not on file   Social Connections: Not on file   Intimate Partner Violence: Not on file   Housing Stability: Not on file         Review of Systems:    Review of Systems   Constitutional:  Positive for activity change, appetite change and fatigue. Negative for fever. HENT:  Negative for congestion and voice change. Eyes:  Negative for visual disturbance. Respiratory:  Negative for shortness of breath. Cardiovascular:  Negative for chest pain and leg swelling. Gastrointestinal:  Negative for constipation, diarrhea, nausea and vomiting. Endocrine: Negative for polyuria. Genitourinary:  Negative for difficulty urinating and dysuria. Musculoskeletal:  Positive for gait problem. Negative for back pain and neck pain. Skin:  Negative for color change. Allergic/Immunologic: Negative for immunocompromised state. Neurological:  Positive for weakness. Negative for dizziness and light-headedness. Psychiatric/Behavioral:  The patient is not nervous/anxious.           Objective:  Blood pressure (!) 121/45, pulse (!) 105, temperature 97 °F (36.1 °C), temperature source Temporal, resp. rate 20, height 6' (1.829 m), weight 205 lb (93 kg), SpO2 94 %. Intake/Output Summary (Last 24 hours) at 10/17/2022 1824  Last data filed at 10/17/2022 1400  Gross per 24 hour   Intake 7.49 ml   Output 2610 ml   Net -2602.51 ml       Physical Exam  Vitals and nursing note reviewed. Constitutional:       Appearance: He is ill-appearing. HENT:      Head: Normocephalic and atraumatic. Right Ear: External ear normal.      Left Ear: External ear normal.      Nose: Nose normal.      Mouth/Throat:      Mouth: Mucous membranes are moist.   Eyes:      Conjunctiva/sclera: Conjunctivae normal.      Pupils: Pupils are equal, round, and reactive to light. Cardiovascular:      Rate and Rhythm: Normal rate and regular rhythm. Heart sounds: Normal heart sounds. Pulmonary:      Effort: Pulmonary effort is normal.      Breath sounds: Normal breath sounds. Abdominal:      General: Abdomen is flat. Palpations: Abdomen is soft. Musculoskeletal:         General: No swelling. Cervical back: Neck supple. No rigidity. Skin:     General: Skin is warm and dry. Coloration: Skin is pale. Neurological:      Mental Status: He is oriented to person, place, and time. Psychiatric:         Mood and Affect: Mood normal.         Thought Content:  Thought content normal.       Labs:  BMP:   Recent Labs     10/15/22  0153 10/16/22  0403 10/16/22  0545 10/17/22  0145     --  133* 136   K 4.7 5.5 5.1* 3.4*     --  101 94*   CO2 28  --  26 32*   BUN 12  --  17 20   CREATININE 0.5  --  0.5 0.7   CALCIUM 7.9*  --  7.6* 7.9*     CBC:   Recent Labs     10/15/22  0153 10/16/22  0405   WBC 14.9* 20.0*   HGB 12.9* 14.8   HCT 40.1* 46.8   .6* 104.7*    159     LIVER PROFILE:   Recent Labs     10/16/22  0545   *   ALT 94*   BILITOT 1.3*   ALKPHOS 378*     PT/INR:   Recent Labs     10/16/22  0545   PROTIME 20.3*   INR 1.71*     APTT:   Recent Labs 10/16/22  0545   APTT 40.1*     BNP:  No results for input(s): BNP in the last 72 hours. Ionized Calcium:No results for input(s): IONCA in the last 72 hours. Magnesium:  Recent Labs     10/17/22  0145   MG 1.8     Phosphorus:No results for input(s): PHOS in the last 72 hours. HgbA1C: No results for input(s): LABA1C in the last 72 hours. Hepatic:   Recent Labs     10/16/22  0545   ALKPHOS 378*   ALT 94*   *   PROT 5.3*   BILITOT 1.3*   LABALBU 1.3*     Lactic Acid:   Recent Labs     10/16/22  0405   LACTA 2.2*     Troponin: No results for input(s): CKTOTAL, CKMB, TROPONINT in the last 72 hours. ABGs: No results for input(s): PH, PCO2, PO2, HCO3, O2SAT in the last 72 hours. CRP:  No results for input(s): CRP in the last 72 hours. Sed Rate:  No results for input(s): SEDRATE in the last 72 hours. Cultures:   No results for input(s): CULTURE in the last 72 hours. Recent Labs     10/16/22  0405 10/16/22  0420   BC No Growth to date. Any change in status will be called. --    BLOODCULT2  --  No Growth to date. Any change in status will be called. No results for input(s): CXSURG in the last 72 hours. Radiology reports as per the Radiologist  Radiology: CT HEAD WO CONTRAST    Result Date: 10/16/2022  Exam: CT OF THE BRAIN WITHOUT CONTRAST Clinical data: Altered mental status. Technique: Contiguous axial images are obtained from the skull base to vertex without intravenous contrast. Reformatted/MPR images were performed. Radiation dose: CTDIvol =73.51 mGy, DLP =1821 mGy x cm. Prior studies: CT scan of the brain dated 09/29/22. Findings:  Patchy hypoattenuation in the periventricular white matter compatible with chronic microvascular ischemic changes. No evidence of acute cortical infarction, hemorrhage, mass or mass effect. Ventricles are slightly more prominent than the sulci, unchanged, and likely due to asymmetric parenchymal volume loss. .  No hydrocephalus or abnormal extra-axial fluid collections are present. The posterior fossa is unremarkable. The skull base and calvarium are intact. The included portions of the paranasal sinuses and mastoid air cells are clear. 1. Chronic microvascular ischemic changes with age-appropriate parenchymal volume loss. 2. No evidence of acute cortical infarction or intracranial hemorrhage. 3. No definitive interval change. Recommendation: Follow up as clinically indicated. All CT scans at this facility utilize dose modulation, iterative reconstruction, and/or weight based dosing when appropriate to reduce radiation dose to as low as reasonably achievable. Electronically Signed by Antonio Gerardo MD at 16-Oct-2022 08:42:16 AM             XR CHEST PORTABLE    Result Date: 10/16/2022  Patient: Tan Gonsales  Time Out: 07:02Exam(s): FILM CXR 1 VIEW EXAM:  XR Chest, 1 ViewCLINICAL HISTORY:   Reason for exam: central line. TECHNIQUE:  Frontal view of the chest.COMPARISON:  Earlier the same day. FINDINGS/IMPRESSION:       Interval placement of right-sided IJ central venous line with tip overlying the SVC. No other significant interval change. Continued attention on follow-up is recommended. Electronically signed by Betzaida Sena MD on 10-16-22 at 0702    XR CHEST PORTABLE    Result Date: 10/16/2022  Patient: Tan Gonsales  Time Out: 06:55Exam(s): FILM CXR 1 VIEW EXAM:  XR Chest, 1 ViewCLINICAL HISTORY:   Reason for exam: SOA. TECHNIQUE:  Frontal view of the chest.COMPARISON:  10/7/2022. FINDINGS/IMPRESSION:       Bilateral patchy consolidative opacities, interval worsening. Findings may represent pulmonary alveolar edema and/or multifocal pneumonia. No other significant interval change. Continued attention on follow-up is recommended. Electronically signed by Betzaida Sena MD on 10-16-22 at 1623    CTA PULMONARY W CONTRAST    Result Date: 10/16/2022  Exam: CTA OF THE CHEST WITH CONTRAST Clinical data: Respiratory distress.  Technique: Axial CT angiography images through the lungs were acquired with contrast and imaged using soft tissue and lung algorithms. Coronal, sagittal, and 3D volume reconstructions were performed. Reformatted/3D-MPR images. Radiation dose: CTDIvol =73.51 mGy, DLP =1821 mGy x cm. Prior studies: Radiographs of the chest dated 10/07/22 (report unavailable). Nuclear medicine pulmonary perfusion scan dated 09/30/22. More recent chest x-ray images and reports are not available. Findings: Lungs:  Small bilateral pleural effusions. Dense airspace abnormalities throughout the central upper lower lungs with relative sparing of the lung periphery. No evidence of pneumothorax Soft Tissues: No mediastinal, axillary or hilar adenopathy. Vascular: No filling defect within the pulmonary arteries with no evidence of pulmonary embolism to the segmental branch level. Atherosclerotic calcification of the aortic arch, thoracic aorta, and coronary arteries. Grossly unremarkable sized heart. No  additional abnormality on 3D reformatted images. 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 Median sternotomy wires. No acute or destructive abnormality Upper Abdomen: Limited visualization of the solid upper abdominal organs is grossly unremarkable. Suboptimal evaluation due to streak artifact related to body habitus and exacerbated by scanning with patient's arms by side. 1. Central airspace abnormality small bilateral pleural fluid; consider pulmonary edema, pulmonary hemorrhage, atypical pneumonitis, alveolar proteinosis, aspiration. 2. No evidence of pulmonary embolism to the segmental branch level. Recommendation: Follow up as clinically indicated. All CT scans at this facility utilize dose modulation, iterative reconstruction, and/or weight based dosing when appropriate to reduce radiation dose to as low as reasonably achievable.  Electronically Signed by Fab Maza MD at 16-Oct-2022 09:14:40 AM Assessment     NSTEMI (non-ST elevated myocardial infarction). Continue medical management. Acute on chronic diastolic heart failure. Diuresis. Acute hypoxemic respiratory failure. Improving with diuresis. History of moderately differentiated hepatocellular carcinoma. Status post radial embolization. Supportive care. Please document 50 minutes of critical care time for patient assessment, chart review, discussion with staff, .       Kareem Grant,

## 2022-10-17 NOTE — PROGRESS NOTES
Palliative Care/Spiritual Care: Met with pt to and family to initiate palliative care. Pt was mostly resting and this 's daughter Paul Grayson and his wife Mikel Tate \"Dutchbrian\" Darrius Berger. Pt is hospitalized with respiratory failure. He asked for water but after checking, pt could only do mouth swabs. Pt has liver cancer and had his first radiation on 9/20. Daughter says he has not been the same since then. Advance Directives: Pt has a LW. He currently is full code and wants CPR and Ventilator. SEE ACP NOTE. Pain/other symptoms: Pt is not complaining of pain but is swelling. Pt/family discussion r/t goals: Pt lives with his wife at home and has a daughter. He began treatment for liver cancer on 9/20. Prior to treatment he lived at home with his wife and was fully independent. He cared for himself and helped care for his wife. Daughter says goal is for him to return home and help care for her mom. Daughter did say if she is told his liver is shot they want to go home with Hospice. Currently, they want everything done. Provided spiritual care with sustaining presence, nurtured hope, and prayer. Pt's daughter and wife expressed gratitude for spiritual care.      Electronically signed by Maxi Osorio on 10/17/2022 at 10:58 AM

## 2022-10-18 NOTE — PLAN OF CARE
Problem: Discharge Planning  Goal: Discharge to home or other facility with appropriate resources  Outcome: Progressing  Flowsheets (Taken 10/17/2022 2000)  Discharge to home or other facility with appropriate resources: Refer to discharge planning if patient needs post-hospital services based on physician order or complex needs related to functional status, cognitive ability or social support system     Problem: Skin/Tissue Integrity  Goal: Absence of new skin breakdown  Description: 1. Monitor for areas of redness and/or skin breakdown  2. Assess vascular access sites hourly  3. Every 4-6 hours minimum:  Change oxygen saturation probe site  4. Every 4-6 hours:  If on nasal continuous positive airway pressure, respiratory therapy assess nares and determine need for appliance change or resting period.   Outcome: Progressing     Problem: ABCDS Injury Assessment  Goal: Absence of physical injury  Outcome: Progressing     Problem: Safety - Adult  Goal: Free from fall injury  Outcome: Progressing     Problem: Chronic Conditions and Co-morbidities  Goal: Patient's chronic conditions and co-morbidity symptoms are monitored and maintained or improved  Outcome: Progressing  Flowsheets (Taken 10/17/2022 2000)  Care Plan - Patient's Chronic Conditions and Co-Morbidity Symptoms are Monitored and Maintained or Improved: Monitor and assess patient's chronic conditions and comorbid symptoms for stability, deterioration, or improvement

## 2022-10-18 NOTE — PROGRESS NOTES
Kirby Fields transferred to  from [facility] via wheelchair. Reason for transfer: Lower level of care   Explained reason for transfer to Patient and Family. Belongings:  clothing/razor  with patient at bedside . Soft chart transferred with patient: Yes. Telemetry box number [id number] transferred with patient: yes. Report given to: [address] [place] [address], via telephone.       Electronically signed by Brittany Garcia RN on 10/18/2022 at 11:24 AM

## 2022-10-18 NOTE — CARE COORDINATION
Pt is from [facility]; not a bedhold; but they will accept back pending precert and bed availability.   [facility]  [phone]  Electronically signed by RADHIKA Mustafa on 10/18/2022 at 11:06 AM

## 2022-10-18 NOTE — PROGRESS NOTES
Speech Language Pathology  Facility/Department: [facility] PROGRESSIVE CARE  SWALLOW THERAPY  SPEECH THERAPY     NAME: Ananth Vazquez  : 1945  MRN: [id number]    ADMISSION DATE: 10/16/2022  ADMITTING DIAGNOSIS: has Diabetic polyneuropathy associated with type 2 diabetes mellitus ([address] Coronary artery disease involving native coronary artery without angina pectoris; Erectile dysfunction; Hypercholesterolemia; Essential hypertension; Atherosclerosis of native arteries of the extremities with ulceration ([address] Gangrene of toe ([address] Type 2 diabetes mellitus with diabetic peripheral angiopathy and gangrene, with long-term current use of insulin ([address] Cellulitis of foot; Open wound of toe with complication; Wound infection; Acute ischemic stroke ([address] Hyperkalemia; Leukocytosis; Palliative care patient; Hepatocellular carcinoma [facility]); and NSTEMI (non-ST elevated myocardial infarction) ([address] on their problem list.    Date of Treat: 10/18/2022  Evaluating Therapist: SACHI St    Current Diet level:  Full liquid diet with moderately thick/honey thick liquids    Reason for Referral  Ananth Vazquez was referred for a bedside swallow evaluation to assess the efficiency of his swallow function, identify signs and symptoms of aspiration and make recommendations regarding safe dietary consistencies, effective compensatory strategies, and safe eating environment. Impression  Re-assessed patient's swallowing function. Patient exhibited decreased oral prep of even blended food consistency, slow oral transit of even the slowest moving food/drink consistencies possible, and sluggish, mild-moderately decreased laryngeal elevation for swallow airway protection. Even so, no outward S/S penetration/aspiration was noted with any ice chip trial, puree consistency presentation, moderately thick/honey thick liquid presentation, or mildly thick/nectar thick liquid trial presented during treatment session this date.  Did not observe swallow function with any additional consistency secondary to noted lethargy within short period of time. At this time, would change to puree consistency. Continue moderately thick/honey thick liquids. Total feed. Recommend meds crushed in pudding/applesauce. If patient receives mouth care prior to intake, okay for ice chips IN BETWEEN MEALS for comfort. Will continue to follow. Treatment Plan  Requires SLP Intervention: Yes     Recommended Diet and Intervention  Solid Consistency Recommendation: Puree    Liquid Consistency Recommendation: Moderately thick (honey)  Recommended Form of Meds: Meds crushed in puree as able  Therapeutic Interventions: Patient/Family education;Oral Care;Diet tolerance monitoring; Therapeutic PO trials with SLP     Compensatory Swallowing Strategies  Compensatory Swallowing Strategies: Upright as possible for all oral intake;TOTAL FEED;Small bites/sips;Eat/Feed slowly; Alternate solids and liquids; Remain upright for 30-45 minutes after meals     Treatment/Goals  Timeframe for Short-term Goals: 1-2x/day for 3 days   Goal 1: Patient will tolerate puree consistency and moderately thick/honey thick liquids with min S/S penetration/aspiration during PO intake. Goal 2: Patient staff will follow swallow safety recommendations to decrease risk of penetration/aspiration during PO intake. Goal 3: Patient will receive daily oral care, via staff, to decrease bacteria from the oral cavity. Goal 4: Re-assessment of swallow function for potential diet upgrade. Goal 5: Patient will complete breath support, oral motor, lingual, and pharyngeal exercises with provision of mod cues/prompts. Goal 6: Re-assess speech production. Goal 7: Patient will utilize tools/strategies to promote increased clarity of speech at word, phrase, and sentence level with provision of mod cues/prompts. General  Chart Reviewed: Yes  Behavior/Cognition: Cooperative; Patient appeared lethargic within short period of time. O2 Device: Nasal Cannula  Communication Observation: (Re-assessed patient's speech production. Patient appeared primarily aphonic and whispered responses. During utterances, patient exhibited slow, decreased labial and lingual movements. SLP ranked functional intelligibility of speech for unfamiliar listeners at 80-90% with background noise present.)  Follows Directions: Simple   Patient Positioning: Upright in bed  Consistencies Administered: Ice chips;Dysphagia Pureed (Dysphagia I); Honey - cup;Nectar - cup     Oral Motor Examination   Labial ROM: (Decreased, bilaterally, during labial retraction trials and labial protrusion trials.)  Labial Strength: (Decreased during labial compression trials.)  Labial Coordination: (Slowed movements were noted.)  Lingual ROM: (Adequate during lingual extension trials with full point achieved; decreased during lingual elevation trials without use of accessory jaw movement; adequate movements noted bilaterally.)  Lingual Strength: (Decreased)  Lingual Coordination: (Slowed movements were noted.)     Re-assessed patient's swallowing function with the following observations noted:     Oral Phase  Mastication: Ice chips;Puree (Patient exhibited slow oral prep with decreased rotary jaw movement noted during ice chip trials and puree consistency presentations administered by SLP.)  Suspected Premature Bolus Loss: All (Patient exhibited slow oral transit with ice chip trials, puree consistency presentations, honey thick liquid presentations, and nectar thick liquid trials all presented by SLP.)  Oral Phase - Comment: Min puree consistency residue was noted post swallows; residue cleared from the mouth with additional dry swallows.       Pharyngeal Phase  Suspected Swallow Delay: All (Suspect secondary to oral transit times.)  Laryngeal Elevation: (Patient exhibited sluggish, mild-moderately decreased laryngeal elevation for swallow airway protection.)  Pharyngeal Phase - Comment: No outward S/S penetration/aspiration was noted with any ice chip trial, puree consistency presentation, moderately thick/honey thick liquid presentation, or mildly thick/nectar thick liquid trial presented during treatment session this date. Did not observe swallow function with any additional consistency secondary to noted lethargy within short period of time. At this time, would change to puree consistency. Continue moderately thick/honey thick liquids. Total feed. Recommend meds crushed in pudding/applesauce. If patient receives mouth care prior to intake, okay for ice chips IN BETWEEN MEALS for comfort. Will continue to follow.     Electronically signed by SACHI Tanner on 10/18/2022 at 12:23 PM

## 2022-10-18 NOTE — PROGRESS NOTES
[facility]  Nurse  Consult Note       NAME:  Monie Iglesias  MEDICAL RECORD NUMBER:  [id number]  AGE: 76 y.o. GENDER: male  : 1945  TODAY'S DATE:  10/18/2022    Subjective   Reason for Wound Nurse Evaluation and Assessment: Right 1st Toe      Monie Iglesias is a 76 y.o. male referred by:   [] Physician  [x] Nursing  [] Other:     Wound Identification:  Wound Type: diabetic  Contributing Factors: diabetes and arterial insufficiency    Wound History: Patient admitted this admission with wound  Current Wound Care Treatment:  betadine    Patient Goal of Care:  [x] Wound Healing  [] Odor Control  [] Palliative Care  [] Pain Control   [x] Other: prevent infection        PAST MEDICAL HISTORY        Diagnosis Date    Atherosclerosis of native arteries of the extremities with ulceration(440.23) 2014    CAD (coronary artery disease)     sees dr at [facility] (dr. Soledad Murillo)    Cataracts, bilateral     Diabetes mellitus ([address]     Diabetic neuropathy ([facility]     ED (erectile dysfunction)     HTN (hypertension)     Hypercholesterolemia     Open wound of right foot     states this is not the operative foot    Palliative care patient 10/07/2022    Ulcerative colitis ([address]        PAST SURGICAL HISTORY    Past Surgical History:   Procedure Laterality Date    CHOLECYSTECTOMY      CORONARY ARTERY BYPASS GRAFT      EYE SURGERY      jason. cat    ILEOSTOMY OR JEJUNOSTOMY      due to surgery from ulcerative colitis    MS AMPUTATION FOOT,TRANSMETATARSAL Left 2018    TRANSMET AMPUTATION performed by Gilmer Naqvi MD at [place] [id number] Left 2017    [facility]. Left great toe ray amputation through the metatarsal level. VASCULAR SURGERY  14 [facility]    Percutaneous cannulation, left common femoral artery, with 5-Guamanian glidesheath. Suprarenal abdominal aortogram with bilateral iliofemoral arteriogra. Bilateral lower extremity arteriogram.    VASCULAR SURGERY  14 [facility]    Right femoral to tibioperoneal trunk bypass with in situ right greater saphenous vein. Right common femoral endarterectomy with vascular patch repair. Right tibioperoneal trunk vein patch and endarterectomy. Completion right lower exteremity arteriograms    VASCULAR SURGERY  1/12/2015 [facility]    Percutaneous cannulation left common femoral artery with 5-Montserratian and later 6-Montserratian pinnacle destination sheath. Suprarenal abdominal aortogram with bilateral iliofemoral arteriograms. Bilateral lower extremity arteriograms. Coil embolization right greater saphenous vein bypass branch with 8 mm x 5 cm coil and seven 5 mm x 5 cm coils. VASCULAR SURGERY  1/12/2015 [facility]     Right posterior tibial artery balloon angioplasty 2.5 mm x 100 mm vascutrak balloon. Right peroneal artery balloon angioplasty with 2.5 x 100 mm vascutrak balloon. Completion right lower extremity arteriograms. VASCULAR SURGERY  06/17/2017    [facility]. Percutaneous cannulation right common femoral artery with ultrasound guidance and 5 Montserratian and later 6 Montserratian sheath placement. Suprarenal abdominal aorta gram with bilateral lower extremity arteriogram.Left superficial femoral/popliteal/tibial peroneal trunk/posterior tibial artery atherectomy with csi 1.25 solid crown and 2.0 solid crown. Left tibial peroneal trunk and posterior tibial artery     VASCULAR SURGERY  06/17/2017    CONT.balloon angioplasty with 3mm x 100 mmand 3.5mm x 300mm vascutrak balloon. Left popliteal artery balloon angioplasty with 5 mmx 100mm lutonix drug coated balloon. #6 left superficial femoral artery balloon angioplasty with 3.6 [id number] mm lutonix grug coated balloons. Completion left lower extremity arteriograms. Mynx closure right common femoral artery puncture site. VASCULAR SURGERY  01/20/2018    [facility]. Left transmetetarsal amputation.        FAMILY HISTORY    Family History   Problem Relation Age of Onset    Cancer Mother     Cancer Father        SOCIAL HISTORY    Social History     Tobacco Use    Smoking status: Former     Types: Pipe    Smokeless tobacco: Never    Tobacco comments:     pipe on occassion   Substance Use Topics    Alcohol use: No    Drug use: No       ALLERGIES    Allergies   Allergen Reactions    Keflex [Cephalexin] Rash       MEDICATIONS    No current facility-administered medications on file prior to encounter. Current Outpatient Medications on File Prior to Encounter   Medication Sig Dispense Refill    insulin glargine (LANTUS) 100 UNIT/ML injection vial Inject 15 Units into the skin 2 times daily 10 mL 3    megestrol acetate (MEGACE) 400 MG/10ML SUSP Take 20 mLs by mouth daily 600 mL 0    metoprolol tartrate (LOPRESSOR) 25 MG tablet Take 0.5 tablets by mouth 2 times daily 60 tablet 3    benzonatate (TESSALON) 100 MG capsule Take 1 capsule by mouth 3 times daily as needed for Cough 30 capsule 0    guaiFENesin (MUCINEX) 600 MG extended release tablet Take 1 tablet by mouth 2 times daily 60 tablet 0    sennosides-docusate sodium (SENOKOT-S) 8.6-50 MG tablet Take 1 tablet by mouth 2 times daily 60 tablet 0    pantoprazole (PROTONIX) 40 MG tablet Take 1 tablet by mouth every morning (before breakfast) 30 tablet 3    apixaban (ELIQUIS) 2.5 MG TABS tablet Take 2.5 mg by mouth 2 times daily      atorvastatin (LIPITOR) 40 MG tablet Take 40 mg by mouth at bedtime      aspirin 81 MG tablet Take 81 mg by mouth daily. folic acid (FOLVITE) 242 MCG tablet Take 800 mcg by mouth daily.          Objective    BP (!) 120/40   Pulse 81   Temp 96.8 °F (36 °C) (Temporal)   Resp 29   Ht 6' (1.829 m)   Wt 205 lb (93 kg)   SpO2 97%   BMI 27.80 kg/m²     LABS:  WBC:    Lab Results   Component Value Date/Time    WBC 20.0 10/16/2022 04:05 AM     H/H:    Lab Results   Component Value Date/Time    HGB 14.8 10/16/2022 04:05 AM    HCT 46.8 10/16/2022 04:05 AM     PTT:    Lab Results   Component Value Date/Time    APTT 40.1 10/16/2022 05:45 AM    PTT 24.4 09/04/2014 12:00 PM   [APTT}  PT/INR:    Lab Results Component Value Date/Time    PROTIME 20.3 10/16/2022 05:45 AM    INR 1.71 10/16/2022 05:45 AM     HgBA1c:    Lab Results   Component Value Date/Time    LABA1C 6.5 09/29/2022 09:33 AM       Assessment   Bryan Risk Score: Bryan Scale Score: 14    Patient Active Problem List   Diagnosis Code    Diabetic polyneuropathy associated with type 2 diabetes mellitus (HCC) E11.42    Coronary artery disease involving native coronary artery without angina pectoris I25.10    Erectile dysfunction N52.9    Hypercholesterolemia E78.00    Essential hypertension I10    Atherosclerosis of native arteries of the extremities with ulceration (HCC) I70.25    Gangrene of toe (HCC) I96    Type 2 diabetes mellitus with diabetic peripheral angiopathy and gangrene, with long-term current use of insulin (HCC) E11.52, Z79.4    Cellulitis of foot L03.119    Open wound of toe with complication F92.931O    Wound infection T14. 8XXA, L08.9    Acute ischemic stroke (HCC) I63.9    Hyperkalemia E87.5    Leukocytosis D72.829    Palliative care patient Z51.5    Hepatocellular carcinoma (HCC) C22.0    NSTEMI (non-ST elevated myocardial infarction) ([facility] I21.4       Measurements:  Wound 10/16/22 Toe (Comment  which one) Outer (Active)   Wound Image   10/18/22 1052   Wound Etiology Diabetic 10/18/22 1052   Dressing Status New dressing applied 10/18/22 1052   Wound Cleansed Soap and water 10/18/22 1052   Dressing/Treatment Betadine swabs/povidone iodine 10/18/22 1052   Dressing Change Due 10/18/22 10/18/22 1052   Wound Length (cm) 1.5 cm 10/18/22 1052   Wound Width (cm) 2.5 cm 10/18/22 1052   Wound Surface Area (cm^2) 3.75 cm^2 10/18/22 1052   Wound Assessment Eschar dry 10/18/22 1052   Drainage Amount None 10/18/22 1052   Odor None 10/18/22 1052   Catie-wound Assessment Hyperkeratosis (callous) 10/18/22 1052   Margins Defined edges; Attached edges 10/18/22 1052   Wound Thickness Description not for Pressure Injury Full thickness 10/18/22 1052   Number of days: 2            Response to treatment:  Well tolerated by patient. Pain Assessment:  Severity:  0 / 10  Quality of pain: N/A  Wound Pain Timing/Severity: none  Premedicated: No    Plan   Plan of Care: Wound 10/16/22 Toe (Comment  which one) Outer-Dressing/Treatment: Betadine swabs/povidone iodine    Specialty Bed Required : Yes   [x] Low Air Loss (Dolphin mattress ordered for patient)  [] Pressure Redistribution  [] Fluid Immersion  [] Bariatric  [] Total Pressure Relief  [] Other:     Current Diet: ADULT DIET; Dysphagia - Pureed; Moderately Thick (Honey)  Dietician consult:  Yes    Discharge Plan:  Placement for patient upon discharge: skilled nursing    Patient appropriate for Outpatient [address]: Yes    Referrals:  []   [] [address] [employer] [facility]  [] Supplies  [] Other    Patient/Caregiver Teaching:  Level of patient/caregiver understanding able to:   [x] Indicates understanding       [] Needs reinforcement  [] Unsuccessful      [x] Verbal Understanding  [] Demonstrated understanding       [] No evidence of learning  [] Refused teaching         [] N/A       Patient's daughter states that patient was in-patient rehab and discharged to SNF. When patient got to SNF they removed hospital sock and discovered the wound to the right 1st toe. Patient has only been wearing socks and no shoes per family. Family denies knowledge of any injury or precipitating event that caused the wound. DP/PT pulses dopplered by primary nurse. Unable to palpate pulses. Patient has history of arterial disease and DM. Wound is 100% dry stable eschar with no local s/s of infection. Family and patient would like to see Dr. Lila Pantoaj if vascular consult needed. Recommendations:    CHELSEA. Vascular consult if needed. Follow up with wound care center after discharge. RIGHT 1ST TOE WOUND: Clean with soap and water. Paint eschar and surrounding skin/toenail with betadine 2 times daily.  Cover with sock after betadine is dry. Do not wear tight fitting shoes.     Electronically signed by   Prateek Stevens RN, [facility] 10/18/2022

## 2022-10-18 NOTE — PROGRESS NOTES
Occupational Therapy  Facility/Department: [facility] PROGRESSIVE CARE  Occupational Therapy Initial Assessment    Name: Shun Roque  : 1945  MRN: [id number]  Date of Service: 10/18/2022    Discharge Recommendations:             Patient Diagnosis(es): The primary encounter diagnosis was Acute respiratory failure with hypoxia ([facility] [address] Diagnoses of Congestive heart failure, unspecified HF chronicity, unspecified heart failure type ([address] NSTEMI (non-ST elevated myocardial infarction) ([facility] [address] and Altered mental status, unspecified altered mental status type were also pertinent to this visit. Past Medical History:  has a past medical history of Atherosclerosis of native arteries of the extremities with ulceration(440.23), CAD (coronary artery disease), Cataracts, bilateral, Diabetes mellitus ([facility] [address] [facility] Diabetic neuropathy ([facility] [address] ED (erectile dysfunction), HTN (hypertension), Hypercholesterolemia, Open wound of right foot, Palliative care patient, and Ulcerative colitis ([facility] [address] Past Surgical History:  has a past surgical history that includes Coronary artery bypass graft (); Jejunostomy (); vascular surgery (14 [facility]); vascular surgery (14 [facility]); vascular surgery (2015 [facility]); vascular surgery (2015 [facility] ); Toe amputation (Left, 2017); vascular surgery (2017); vascular surgery (2017); eye surgery; Cholecystectomy; pr amputation foot,transmetatarsal (Left, 2018); and vascular surgery (2018). Treatment Diagnosis: NSTEMI      Assessment   Performance deficits / Impairments: Decreased ADL status; Decreased functional mobility ; Decreased balance;Decreased endurance;Decreased cognition;Decreased strength  Assessment: Will progress as tolerated  Treatment Diagnosis: NSTEMI  Prognosis: Good  Decision Making: Low Complexity  REQUIRES OT FOLLOW-UP: Yes  Activity Tolerance  Activity Tolerance: Patient limited by fatigue;Treatment limited secondary to medical complications (free text) Plan   Occupational Therapy Plan  Times Per Week: 3-5x/week  Times Per Day: Once a day     Restrictions  Restrictions/Precautions  Restrictions/Precautions: Fall Risk    Subjective   General  Chart Reviewed: Yes  Patient assessed for rehabilitation services?: Yes  Family / Caregiver Present: Yes  Diagnosis: NSTEMI  General Comment  Comments: Pt. lethargic but willing to work with therapy     Social/Functional History  Social/Functional History  Lives With: Spouse  Type of Home: Facility (Had been at [facility] for 1 day before readmission)  ADL Assistance: Needs assistance  Ambulation Assistance: Needs assistance  Transfer Assistance: Needs assistance       Objective   Heart Rate: 82  Heart Rate Source: Monitor  BP: (!) 121/50  BP Location: Left upper arm  BP Method: Automatic  Patient Position: Supine  MAP (Calculated): 73.67  Resp: 18  SpO2: 92 %  O2 Device: Nasal cannula             Safety Devices  Type of Devices: Call light within reach; Chair alarm in place;Gait belt;Left in chair;Nurse notified        AROM: Within functional limits  Strength: Within functional limits  ADL  Feeding: Maximum assistance  Grooming: Maximum assistance  UE Bathing: Maximum assistance  LE Bathing: Dependent/Total  UE Dressing: Maximum assistance  LE Dressing: Dependent/Total  Toileting: Dependent/Total     Activity Tolerance  Activity Tolerance: Patient limited by endurance; Patient limited by fatigue  Bed mobility  Supine to Sit: Moderate assistance  Sit to Supine: Maximum assistance  Transfers  Stand Step Transfers: Unable to assess  Sit to stand:  Moderate assistance;Maximum assistance  Transfer Comments: Mod/Max A of 1 to stand bedside then returned to sitting  Vision  Vision: Within Functional Limits  Hearing  Hearing: Within functional limits  Cognition  Overall Cognitive Status: WFL  Orientation  Overall Orientation Status: Within Functional Limits                                           G-Code     OutComes Score AM-PAC Score             Tinneti Score       Goals  Short Term Goals  Time Frame for Short Term Goals: 1 week  Short Term Goal 1: James with sit-stand up to walker  Short Term Goal 2: James with BSC tfers with walker  Short Term Goal 3: Supervision with sitting balance EOB 2 minutes  Short Term Goal 4: Tolerate 15 minutes of BUE exercises  Long Term Goals  Long Term Goal 1: Return to PLOF       Therapy Time   Individual Concurrent Group Co-treatment   Time In           Time Out           Minutes                   Jose Manuel Gutiérrez, OT

## 2022-10-18 NOTE — PROGRESS NOTES
Physical Therapy  Facility/Department: [facility] ICU  Physical Therapy Initial Assessment    Name: Last Bear  : 1945  MRN: [id number]  Date of Service: 10/18/2022    Discharge Recommendations:  [address] [facility], [address] [facility] with PT, IP Rehab, Patient would benefit from continued therapy after discharge          Patient Diagnosis(es): The primary encounter diagnosis was Acute respiratory failure with hypoxia ([address] Diagnoses of Congestive heart failure, unspecified HF chronicity, unspecified heart failure type ([address] NSTEMI (non-ST elevated myocardial infarction) ([address] and Altered mental status, unspecified altered mental status type were also pertinent to this visit. 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 Past Surgical History:  has a past surgical history that includes Coronary artery bypass graft (); Jejunostomy (); vascular surgery (14 [facility]); vascular surgery (14 [facility]); vascular surgery (2015 [facility]); vascular surgery (2015 [facility] ); Toe amputation (Left, 2017); vascular surgery (2017); vascular surgery (2017); eye surgery; Cholecystectomy; pr amputation foot,transmetatarsal (Left, 2018); and vascular surgery (2018).     Assessment   Body Structures, Functions, Activity Limitations Requiring Skilled Therapeutic Intervention: Decreased functional mobility   Assessment: Patient will benefit from skilled physical therapy to improve mobility and safety  Treatment Diagnosis: Dependent for mobility  Therapy Prognosis: Good  Decision Making: Low Complexity  Requires PT Follow-Up: Yes  Activity Tolerance  Activity Tolerance: Patient limited by endurance; Patient limited by fatigue     Plan   Physcial Therapy Plan  Days Per Week: 7 Days  Therapy Duration: 2 Weeks  Current Treatment Recommendations: Strengthening, Balance training, Functional mobility training, Transfer training, Gait training, Neuromuscular re-education  Safety Devices  Type of Devices: Call light within reach, Chair alarm in place, Gait belt, Left in chair, Nurse notified     Restrictions        Subjective   Pain: No c/o pain  General  Chart Reviewed: Yes  Patient assessed for rehabilitation services?: Yes  Diagnosis: NSTEMI  Follows Commands: Within Functional Limits  Subjective  Subjective: Agrees to work with therapy         Social/Functional History     Vision/Hearing       Cognition   Orientation  Overall Orientation Status: Within Functional Limits  Cognition  Overall Cognitive Status: WFL     Objective   Heart Rate: 88  Heart Rate Source: Monitor  BP: (!) 133/40  MAP (Calculated): 71  Resp: 17  SpO2: 99 %              PROM RLE (degrees)  RLE PROM: WFL  PROM LLE (degrees)  LLE PROM: WFL  Strength RLE  Comment: Grossly 3+/5  Strength LLE  Comment: Grossly 3+/5           Bed mobility  Supine to Sit: Moderate assistance  Sit to Supine: Moderate assistance  Scooting: Maximal assistance  Transfers  Sit to Stand: Moderate Assistance  Stand to Sit: Moderate Assistance  Bed to Chair: Moderate assistance  Ambulation  WB Status: WBAT  Ambulation  Device: No Device (Patient will need an assistive device but one was not used today to allow improved positioning of therapist and assure safety during out of bed activities)  Assistance:  Moderate assistance  Quality of Gait: Fair to poor - unsafe  Gait Deviations: Decreased step length;Decreased step height;Slow Alexus  Distance: 4 feet to chair     Balance  Posture: Fair  Sitting - Static: Fair;-  Sitting - Dynamic: Poor  Standing - Static: Fair;-  Standing - Dynamic: Poor    Goals  Short Term Goals  Time Frame for Short Term Goals: 2 weeks  Short Term Goal 1: Patient will transfer supine to edge of bed with CGA to minimal assist of 1  Short Term Goal 2: Patient will stand at edge of bed and maintain standing balance with supervision for 3 minutes  Short Term Goal 3: Transfer bed to chair with CGA of 1  Short Term Goal 4: Ambulate 200 feet with assistive device and CGA of 1       Education         Therapy Time   Individual Concurrent Group Co-treatment   Time In           Time Out           Minutes                   Lakhwinder Mcdowell PT       Electronically signed by Lakhwinder Mcdowell PT on 10/18/2022 at 8:58 AM

## 2022-10-18 NOTE — CARE COORDINATION
10/18/22 1424   Readmission Assessment   Number of Days since last admission? 1-7 days   Previous Disposition Acute Rehab  (previous dc from [facility] to [facility])   Who is being Olinda Greenberg   (chart review)   What was the patient's/caregiver's perception as to why they think they needed to return back to the hospital? Other (Comment)  (sent from [facility] within 24hrs of dc from [facility] for SOB- resp distress)   Did you visit your Primary Care Physician after you left the hospital, before you returned this time? No   Why weren't you able to visit your PCP? Other (Comment)  (dc'd to [facility])   Did you see a specialist, such as Cardiac, Pulmonary, Orthopedic Physician, etc. after you left the hospital? Yes  (followed by consulted physicians during [address] [place] [address] admission)   Who advised the patient to return to the hospital? Skilled Unit  (admitted from [facility])   Does the patient report anything that got in the way of taking their medications? No   In our efforts to provide the best possible care to you and others like you, can you think of anything that we could have done to help you after you left the hospital the first time, so that you might not have needed to return so soon? Other (Comment)  (expected return to [facility] for continued SNF rehab services)     Pt was dc'd from [address] [place] [address] to [facility] for continued therapy rehab services and expected to return to [facility] when medically stable for completion of therapy rehab. Pt insurance will require precert for return when medically stable.      Elkin [address]: [phone] E:[phone]

## 2022-10-18 NOTE — PROGRESS NOTES
Vascular lab preliminary. Bilateral CHELSEA's completed. Right CHELSEA:  non compressible  Good arterial flow to right leg. Left CHELSEA:  0.72 falsely elevated. Severely diminished flow to left leg. Final report pending.

## 2022-10-18 NOTE — DISCHARGE SUMMARY
Occupational Therapy Discharge Summary         Date: 10/18/2022  Patient Name: Aamir Martin        MRN: [id number]    : 1945  (76 y.o.)  Gender: male      Diagnosis: HYPERKALEMIA, CVA  Restrictions/Precautions  Restrictions/Precautions: Fall Risk      Discharge Date:10/15/22      UE Functioning:  WFLs    Home Management:  Functional Mobility  Functional Mobility Comments: utilized Kaitlin Stakes for recliner <-> bed    Adaptive Equipment/DME Status: To be determined at next level of therapy        Remaining Problems:  Decreased functional mobility ; Decreased endurance; Decreased ADL status; Decreased balance; Decreased strength; Decreased high-level IADLs; Decreased cognition; Decreased posture  Pt demonstrated increased energy at the beginning of the session and daughter able to t/f with Kaitlin Stakes and max cues for technique. After completing sit <-> supine and some light rep exercises pt was very fatigued and required staff assist to stand to the Kaitlin Stakes. Daughter stated she did not have the strength to get him up the second attempt and is very concerned about car transfers for doctor appointments. Referral has been made to a skilled nursing facility with hopes that he will be able to return home soon.   STGs:  Short Term Goals  Time Frame for Short Term Goals: 1 week  Short Term Goal 1: complete UB dressing with setup  Short Term Goal 2: complete LB dressing with CGA  Short Term Goal 3: complete overall toileting with CGA  Short Term Goal 4: complete 1-2 handed standing level task for 3 mins with supervision  Short Term Goal 5: complete overall bathing with CGA  Short Term Goal 6: complete simple ambulatory home making task with CGA    LTGs:  Long Term Goals  Time Frame for Long Term Goals : 3 weeks  Long Term Goal 1: complete overall toileting with modified independence  Long Term Goal 2: complete overall bathing with modified independence  Long Term Goal 3: complete overall dressing with modified independence  Long Term Goal 4: complete simple ambulatory home making task with modified independence  Long Term Goal 5: complete HEP with independence  Long Term Goal 6: pt/family verbalize DME      Discharge Setting and Recommendations:  SNF    Discharge Care Scores    Eating: CARE Score: 4  Oral Hygiene: CARE Score: 4  Toileting: CARE Score:  2  Shower/Bathe: CARE Score: 3  Upper Body Dressing: CARE Score: 3  Lower Body Dressing: CARE Score: 3  Footwear: CARE Score: 2  Toilet Transfers: CARE Score: 3  Picking Up Object:  CARE Score: 88  Cognitive Patterns:  Cognitive Patterns  Cognitive Pattern Assessment Used: (P) BIMS  Repetition of Three Words (First Attempt): (P) 3  Temporal Orientation: Year: (P) Missed by > 5 years or no answer  Temporal Orientation: Month: (P) Missed by 6 days to 1 month  Temporal Orientation: Day: (P) Correct  Able to recall \"sock: (P) Yes, no cue required  Able to recall \"blue\": (P) Yes, no cue required  Able to recall \"bed\": (P) Yes, no cue required  BIMS Summary Score: (P) 11                 Confusion Assessment Method (CAM):  Confusion Assessment Method (CAM)  Is there evidence of an acute change in mental status from the patient's baseline?: No  Inattention: Behavior continuously present, does not fluctuate  Disorganized thinking: Behavior present, fluctuates (comes and goes, changes in severity)  Altered level of consciousness: Behavior not present  Health Literacy:  How often do you need to have someone help you when you read instructions, pamphlets, or other written material from your doctor or pharmacy?: Always     10/14/22 1124   Cognitive Patterns   Cognitive Pattern Assessment Used BIMS   Repetition of Three Words (First Attempt) 3   Temporal Orientation: Year Missed by > 5 years or no answer   Temporal Orientation: Month Missed by 6 days to 1 month   Temporal Orientation: Day Correct   Able to recall \"sock Yes, no cue required   Able to recall \"blue\" Yes, no cue required Able to recall \"bed\" Yes, no cue required   BIMS Summary Score 11         Electronically signed by Aster Sparks OT on 10/18/22 at 11:21 AM CDT

## 2022-10-18 NOTE — PROGRESS NOTES
PHYSICAL THERAPY  Daily Treatment Note    DATE:  10/18/2022  NAME:  Shun Roque  :  1945  (77 y.o.,male)  MRN:  [id number]      Subjective  General  Chart Reviewed: Yes  Patient assessed for rehabilitation services?: Yes  Diagnosis: NSTEMI  Follows Commands: Within Functional Limits  Subjective  Subjective: Agrees to work with therapy          HOME LIVING:       RESTRICTIONS/PRECAUTIONS:         OVERALL  ORIENTATION STATUS:  Overall Orientation Status: Within Functional Limits    STRENGTH  Strength RLE  Comment: Grossly 3+/5  Strength LLE  Comment: Grossly 3+/5    ROM   PROM RLE (degrees)  RLE PROM: WFL  PROM LLE (degrees)  LLE PROM: WFL     BALANCE  Balance  Posture: Fair  Sitting - Static: Fair, -  Sitting - Dynamic: Poor  Standing - Static: Fair, -  Standing - Dynamic: Poor    BED MOBILITY  Bed Mobility  Scooting: Maximal assistance    TRANSFERS  Transfers  Sit to Stand: Moderate Assistance  Stand to Sit: Moderate Assistance  Bed to Chair: Moderate assistance    AMBULATION  Device: No Device (Patient will need an assistive device but one was not used today to allow improved positioning of therapist and assure safety during out of bed activities)  Assistance:  Moderate assistance  Distance: 4 feet to chair    STAIRS             COMMENTS:  Returned to chair at patient request  Call light within reach  Chair alarm activated  Patient instructed to request assistance before getting up  Nursing updated        Electronically signed by Bela Yadav PT on 10/18/2022 at 8:59 AM

## 2022-10-18 NOTE — PROGRESS NOTES
mg  81 mg Oral Daily Jannice Alva, DO   81 mg at 50/27/70 4762    folic acid (FOLVITE) tablet 800 mcg  800 mcg Oral Daily Jannice Alva, DO   800 mcg at 10/18/22 1378    apixaban (ELIQUIS) tablet 2.5 mg  2.5 mg Oral BID Jannice Alva, DO   2.5 mg at 10/18/22 0827    atorvastatin (LIPITOR) tablet 40 mg  40 mg Oral Nightly Jannice Woodland, DO   40 mg at 10/17/22 2000    megestrol acetate (MEGACE) 400 MG/10ML suspension 800 mg  800 mg Oral Daily Jannice Woodland, DO   800 mg at 10/18/22 1749    benzonatate (TESSALON) capsule 100 mg  100 mg Oral TID PRN Jannice Woodland, DO        guaiFENesin [facility]) extended release tablet 600 mg  600 mg Oral BID Jannice Woodland, DO   600 mg at 10/18/22 2277    sennosides-docusate sodium (SENOKOT-S) 8.6-50 MG tablet 1 tablet  1 tablet Oral BID Jannice Woodland, DO   1 tablet at 10/18/22 0827    pantoprazole (PROTONIX) tablet 40 mg  40 mg Oral QAM AC Jannice Alva, DO   40 mg at 10/18/22 0827    insulin glargine (LANTUS) injection vial 15 Units  15 Units SubCUTAneous BID Jannice Woodland, DO   15 Units at 10/18/22 2997       Past Medical History:  Past Medical History:   Diagnosis Date    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 Soledad Murillo)    Cataracts, bilateral     Diabetes mellitus ([facility] [address]     Diabetic neuropathy [facility])     ED (erectile dysfunction)     HTN (hypertension)     Hypercholesterolemia     Open wound of right foot     states this is not the operative foot    Palliative care patient 10/07/2022    Ulcerative colitis [facility])        Past Surgical History:  Past Surgical History:   Procedure Laterality Date    CHOLECYSTECTOMY      CORONARY ARTERY BYPASS GRAFT  2003    EYE SURGERY      jason. cat    ILEOSTOMY OR JEJUNOSTOMY  1995    due to surgery from ulcerative colitis    [place] AMPUTATION FOOT,TRANSMETATARSAL Left 1/30/2018 TRANSMET AMPUTATION performed by Lila Rey MD at [address] [facility] Left 6/12/2017    [facility]. Left great toe ray amputation through the metatarsal level. VASCULAR SURGERY  8/26/14 [facility]    Percutaneous cannulation, left common femoral artery, with 5-Pakistani glidesheath. Suprarenal abdominal aortogram with bilateral iliofemoral arteriogra. Bilateral lower extremity arteriogram.    VASCULAR SURGERY  09/08/14 [facility]    Right femoral to tibioperoneal trunk bypass with in situ right greater saphenous vein. Right common femoral endarterectomy with vascular patch repair. Right tibioperoneal trunk vein patch and endarterectomy. Completion right lower exteremity arteriograms    VASCULAR SURGERY  1/12/2015 [facility]    Percutaneous cannulation left common femoral artery with 5-Pakistani and later 6-Pakistani pinnacle destination sheath. Suprarenal abdominal aortogram with bilateral iliofemoral arteriograms. Bilateral lower extremity arteriograms. Coil embolization right greater saphenous vein bypass branch with 8 mm x 5 cm coil and seven 5 mm x 5 cm coils. VASCULAR SURGERY  1/12/2015 [facility]     Right posterior tibial artery balloon angioplasty 2.5 mm x 100 mm vascutrak balloon. Right peroneal artery balloon angioplasty with 2.5 x 100 mm vascutrak balloon. Completion right lower extremity arteriograms. VASCULAR SURGERY  06/17/2017    [facility]. Percutaneous cannulation right common femoral artery with ultrasound guidance and 5 Pakistani and later 6 Pakistani sheath placement. Suprarenal abdominal aorta gram with bilateral lower extremity arteriogram.Left superficial femoral/popliteal/tibial peroneal trunk/posterior tibial artery atherectomy with csi 1.25 solid crown and 2.0 solid crown. Left tibial peroneal trunk and posterior tibial artery     VASCULAR SURGERY  06/17/2017    CONT.balloon angioplasty with 3mm x 100 mmand 3.5mm x 300mm vascutrak balloon. Left popliteal artery balloon angioplasty with 5 mmx 100mm lutonix drug coated balloon. #6 left superficial femoral artery balloon angioplasty with 3.6 amw083 mm lutonix grug coated balloons. Completion left lower extremity arteriograms. Mynx closure right common femoral artery puncture site. VASCULAR SURGERY  01/20/2018    [facility]. Left transmetetarsal amputation. Family History  Family History   Problem Relation Age of Onset    Cancer Mother     Cancer Father        Social History  Social History     Socioeconomic History    Marital status:      Spouse name: Not on file    Number of children: Not on file    Years of education: Not on file    Highest education level: Not on file   Occupational History    Not on file   Tobacco Use    Smoking status: Former     Types: Pipe    Smokeless tobacco: Never    Tobacco comments:     pipe on occassion   Substance and Sexual Activity    Alcohol use: No    Drug use: No    Sexual activity: Not on file   Other Topics Concern    Not on file   Social History Narrative    Not on file     Social Determinants of Health     Financial Resource Strain: Not on file   Food Insecurity: Not on file   Transportation Needs: No Transportation Needs    Lack of Transportation (Medical): No    Lack of Transportation (Non-Medical): No   Physical Activity: Not on file   Stress: Not on file   Social Connections: Not on file   Intimate Partner Violence: Not on file   Housing Stability: Not on file         Review of Systems:    Review of Systems   Constitutional:  Positive for activity change. Negative for fever. HENT:  Negative for congestion and voice change. Eyes:  Negative for visual disturbance. Respiratory:  Negative for shortness of breath. Cardiovascular:  Negative for chest pain and leg swelling. Gastrointestinal:  Negative for constipation, diarrhea, nausea and vomiting. Endocrine: Negative for polyuria. Genitourinary:  Negative for difficulty urinating and dysuria. Musculoskeletal:  Positive for gait problem. Negative for back pain and neck pain.    Skin: Negative for color change. Allergic/Immunologic: Negative for immunocompromised state. Neurological:  Positive for weakness. Negative for dizziness and light-headedness. Psychiatric/Behavioral:  The patient is not nervous/anxious. Objective:  Blood pressure (!) 121/50, pulse 82, temperature 97.2 °F (36.2 °C), temperature source Axillary, resp. rate 18, height 6' (1.829 m), weight 205 lb (93 kg), SpO2 92 %. Intake/Output Summary (Last 24 hours) at 10/18/2022 1502  Last data filed at 10/18/2022 1000  Gross per 24 hour   Intake 577.49 ml   Output 1090 ml   Net -512.51 ml       Physical Exam  Vitals and nursing note reviewed. Constitutional:       Appearance: He is ill-appearing. HENT:      Head: Normocephalic and atraumatic. Right Ear: External ear normal.      Left Ear: External ear normal.      Nose: Nose normal.      Mouth/Throat:      Mouth: Mucous membranes are moist.   Eyes:      Conjunctiva/sclera: Conjunctivae normal.      Pupils: Pupils are equal, round, and reactive to light. Cardiovascular:      Rate and Rhythm: Normal rate and regular rhythm. Heart sounds: Normal heart sounds. Pulmonary:      Effort: Pulmonary effort is normal.      Breath sounds: Normal breath sounds. Abdominal:      General: Abdomen is flat. Palpations: Abdomen is soft. Musculoskeletal:         General: Swelling present. Cervical back: Neck supple. No rigidity. Skin:     General: Skin is warm and dry. Neurological:      Mental Status: He is oriented to person, place, and time. Psychiatric:         Mood and Affect: Mood normal.         Thought Content:  Thought content normal.       Labs:  BMP:   Recent Labs     10/16/22  0545 10/17/22  0145 10/18/22  0127   * 136 138   K 5.1* 3.4* 3.1*    94* 97*   CO2 26 32* 35*   BUN 17 20 30*   CREATININE 0.5 0.7 0.8   CALCIUM 7.6* 7.9* 7.5*     CBC:   Recent Labs     10/16/22  0405   WBC 20.0*   HGB 14.8   HCT 46.8   .7*   PLT 159     LIVER PROFILE:   Recent Labs     10/16/22  0545   *   ALT 94*   BILITOT 1.3*   ALKPHOS 378*     PT/INR:   Recent Labs     10/16/22  0545   PROTIME 20.3*   INR 1.71*     APTT:   Recent Labs     10/16/22  0545   APTT 40.1*     BNP:  No results for input(s): BNP in the last 72 hours. Ionized Calcium:No results for input(s): IONCA in the last 72 hours. Magnesium:  Recent Labs     10/17/22  0145 10/18/22  0127   MG 1.8 1.9     Phosphorus:No results for input(s): PHOS in the last 72 hours. HgbA1C: No results for input(s): LABA1C in the last 72 hours. Hepatic:   Recent Labs     10/16/22  0545   ALKPHOS 378*   ALT 94*   *   PROT 5.3*   BILITOT 1.3*   LABALBU 1.3*     Lactic Acid:   Recent Labs     10/16/22  0405   LACTA 2.2*     Troponin: No results for input(s): CKTOTAL, CKMB, TROPONINT in the last 72 hours. ABGs: No results for input(s): PH, PCO2, PO2, HCO3, O2SAT in the last 72 hours. CRP:  No results for input(s): CRP in the last 72 hours. Sed Rate:  No results for input(s): SEDRATE in the last 72 hours. Cultures:   No results for input(s): CULTURE in the last 72 hours. Recent Labs     10/16/22  0405 10/16/22  0420   BC No Growth to date. Any change in status will be called. --    BLOODCULT2  --  No Growth to date. Any change in status will be called. No results for input(s): CXSURG in the last 72 hours. Radiology reports as per the Radiologist  Radiology: CT HEAD WO CONTRAST    Result Date: 10/16/2022  Exam: CT OF THE BRAIN WITHOUT CONTRAST Clinical data: Altered mental status. Technique: Contiguous axial images are obtained from the skull base to vertex without intravenous contrast. Reformatted/MPR images were performed. Radiation dose: CTDIvol =73.51 mGy, DLP =1821 mGy x cm. Prior studies: CT scan of the brain dated 09/29/22. Findings:  Patchy hypoattenuation in the periventricular white matter compatible with chronic microvascular ischemic changes.  No evidence of acute cortical infarction, hemorrhage, mass or mass effect. Ventricles are slightly more prominent than the sulci, unchanged, and likely due to asymmetric parenchymal volume loss. .  No hydrocephalus or abnormal extra-axial fluid collections are present. The posterior fossa is unremarkable. The skull base and calvarium are intact. The included portions of the paranasal sinuses and mastoid air cells are clear. 1. Chronic microvascular ischemic changes with age-appropriate parenchymal volume loss. 2. No evidence of acute cortical infarction or intracranial hemorrhage. 3. No definitive interval change. Recommendation: Follow up as clinically indicated. All CT scans at this facility utilize dose modulation, iterative reconstruction, and/or weight based dosing when appropriate to reduce radiation dose to as low as reasonably achievable. Electronically Signed by Fracisco Rodriguez MD at 16-Oct-2022 08:42:16 AM             XR CHEST PORTABLE    Result Date: 10/16/2022  Patient: Natacha Edward  Time Out: 07:02Exam(s): FILM CXR 1 VIEW EXAM:  XR Chest, 1 ViewCLINICAL HISTORY:   Reason for exam: central line. TECHNIQUE:  Frontal view of the chest.COMPARISON:  Earlier the same day. FINDINGS/IMPRESSION:       Interval placement of right-sided IJ central venous line with tip overlying the SVC. No other significant interval change. Continued attention on follow-up is recommended. Electronically signed by Silvana Mays MD on 10-16-22 at 0702    XR CHEST PORTABLE    Result Date: 10/16/2022  Patient: Natacha Edward  Time Out: 06:55Exam(s): FILM CXR 1 VIEW EXAM:  XR Chest, 1 ViewCLINICAL HISTORY:   Reason for exam: SOA. TECHNIQUE:  Frontal view of the chest.COMPARISON:  10/7/2022. FINDINGS/IMPRESSION:       Bilateral patchy consolidative opacities, interval worsening. Findings may represent pulmonary alveolar edema and/or multifocal pneumonia. No other significant interval change.   Continued attention on follow-up is recommended. Electronically signed by Mary George MD on 10-16-22 at 8127    CTA PULMONARY W CONTRAST    Result Date: 10/16/2022  Exam: CTA OF THE CHEST WITH CONTRAST Clinical data: Respiratory distress. Technique: Axial CT angiography images through the lungs were acquired with contrast and imaged using soft tissue and lung algorithms. Coronal, sagittal, and 3D volume reconstructions were performed. Reformatted/3D-MPR images. Radiation dose: CTDIvol =73.51 mGy, DLP =1821 mGy x cm. Prior studies: Radiographs of the chest dated 10/07/22 (report unavailable). Nuclear medicine pulmonary perfusion scan dated 09/30/22. More recent chest x-ray images and reports are not available. Findings: Lungs:  Small bilateral pleural effusions. Dense airspace abnormalities throughout the central upper lower lungs with relative sparing of the lung periphery. No evidence of pneumothorax Soft Tissues: No mediastinal, axillary or hilar adenopathy. Vascular: No filling defect within the pulmonary arteries with no evidence of pulmonary embolism to the segmental branch level. Atherosclerotic calcification of the aortic arch, thoracic aorta, and coronary arteries. Grossly unremarkable sized heart. No  additional abnormality on 3D reformatted images. 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 Median sternotomy wires. No acute or destructive abnormality Upper Abdomen: Limited visualization of the solid upper abdominal organs is grossly unremarkable. Suboptimal evaluation due to streak artifact related to body habitus and exacerbated by scanning with patient's arms by side. 1. Central airspace abnormality small bilateral pleural fluid; consider pulmonary edema, pulmonary hemorrhage, atypical pneumonitis, alveolar proteinosis, aspiration. 2. No evidence of pulmonary embolism to the segmental branch level. Recommendation: Follow up as clinically indicated.  All CT scans at this facility utilize dose modulation, iterative reconstruction, and/or weight based dosing when appropriate to reduce radiation dose to as low as reasonably achievable. Electronically Signed by Camille Agee MD at 16-Oct-2022 09:14:40 AM                Assessment     NSTEMI (non-ST elevated myocardial infarction). Continue medical management. Acute on chronic diastolic heart failure. Diuresis. Acute hypoxemic respiratory failure. Improving with diuresis. History of moderately differentiated hepatocellular carcinoma. Status post radial embolization. Supportive care. Transfer out of ICU. Please document 40 minutes of critical care time for patient assessment, chart review, discussion with staff, .       Denae Mayfield DO

## 2022-10-18 NOTE — CARE COORDINATION
Spoke with pt's dtr and MATT present at bedside who confirm intentions of pt returning to [facility] when medically stable for dc. [facility]: [phone]    Dtr reports pt's spouse suffered a stroke and unable to manage the pt's care needs at home. Dtr is considering AL/[place] facilities for the pt's spouse and expectantly the pt when appropriate after SNF services.

## 2022-10-19 PROBLEM — J96.01 ACUTE RESPIRATORY FAILURE WITH HYPOXIA (HCC): Status: ACTIVE | Noted: 2022-01-01

## 2022-10-19 NOTE — DISCHARGE INSTRUCTIONS
Wound Care:    RIGHT 1ST TOE WOUND: Clean with soap and water. Paint eschar and surrounding skin/toenail with betadine 2 times daily. Cover with sock after betadine is dry. Do not wear tight fitting shoes.

## 2022-10-19 NOTE — PROGRESS NOTES
Patient arrived from 715 to room 145 after a rapid response was called and patient was intubated due to respiratory distress. BP was 47/29 and levophed was ordered and started and BP responded by increasing. Patient was non-responsive at this time. Patient temp temporal was 86.1, unable to do rectal due to having past surgery and a colostomy, tried axillary and temperature would not register. Bear hugger applied at this time. Pulido catheter placed at this time.   Electronically signed by Ivy Diaz RN on 10/19/2022 at 6:54 AM'

## 2022-10-19 NOTE — PROGRESS NOTES
Occupational Therapy    Pt. With medical decline, now intubated. OT will sign off at this time.    Electronically signed by Marybeth Aj OT on 10/19/2022 at 1:59 PM

## 2022-10-19 NOTE — PROGRESS NOTES
This nurse and PCA entered patients room to obtain vitals and give morning medications. Patient non responsive to voice or sternal rub at this time. Patient pulses palpated, but weak. Blood glucose obtained 138. Bipap applied to patient. Rapid response called at this time. Patient intubated and transferred to ICU.

## 2022-10-19 NOTE — CONSULTS
Comprehensive Nutrition Assessment    Type and Reason for Visit:  Consult    Nutrition Recommendations/Plan:   Start Glucerna 1.5 at 25ml/hr and advance by 5 ml every 6 hours until goal rate of 58ml is reached. Malnutrition Assessment:  Malnutrition Status: At risk for malnutrition (Comment) (10/19/22 4249)    Context:  Acute Illness     Findings of the 6 clinical characteristics of malnutrition:  Energy Intake:  Mild decrease in energy intake (Comment)  Weight Loss:  No significant weight loss     Body Fat Loss:  No significant body fat loss     Muscle Mass Loss:  No significant muscle mass loss    Fluid Accumulation:  Mild Extremities   Strength:       Nutrition Assessment:    Received consult for TF orders and management. Aware pt now on vent. No Propofol at this time. Suggest starting Glucerna 1.5 goal rate 58ml/hr with 30ml free water flush hourly. New wt NA    Nutrition Related Findings:    on vent Wound Type: Surgical Incision, Diabetic Ulcer       Current Nutrition Intake & Therapies:    Average Meal Intake: NPO  Average Supplements Intake: NPO  Current Tube Feeding (TF) Orders:  Feeding Route: Orogastric  Formula: Diabetic  Schedule: Continuous  Feeding Regimen: Glucerna 1.5 goal rate 58ml/hr with 30ml free water flush  Additives/Modulars:    Water Flushes: 30ml hourly  Current TF & Flush Orders Provides: 0  Goal TF & Flush Orders Provides: Glucerna 1.2  @ 58ml /hr with 30ml free water flush = 2088 kcals with 114g protein, 185g CHO, 1056ml free water from formula and 720ml free water from flush    Anthropometric Measures:  Height: 6' (182.9 cm)  Ideal Body Weight (IBW): 178 lbs (81 kg)    Admission Body Weight: 205 lb (93 kg)  Current Body Weight: 205 lb (93 kg),   IBW. Current BMI (kg/m2): 27.8  Usual Body Weight: 214 lb (97.1 kg) (7/22)  % Weight Change (Calculated): -4.2  Weight Adjustment For: No Adjustment                 BMI Categories: Overweight (BMI 25.0-29. 9)    Estimated Daily Nutrient Needs:  Energy Requirements Based On: Kcal/kg  Weight Used for Energy Requirements: Admission  Energy (kcal/day): 1190-7812 kcals (20-25 kcals/kg)  Weight Used for Protein Requirements: Ideal  Protein (g/day): 97-162g  Method Used for Fluid Requirements: 1 ml/kcal  Fluid (ml/day): 5340-9737 ml    Nutrition Diagnosis:   Inadequate oral intake related to acute injury/trauma, impaired respiratory function as evidenced by NPO or clear liquid status due to medical condition, intubation, nutrition support - enteral nutrition    Nutrition Interventions:   Food and/or Nutrient Delivery: Start Tube Feeding  Nutrition Education/Counseling: Education not indicated, No recommendation at this time  Coordination of Nutrition Care: Continue to monitor while inpatient       Goals:  Previous Goal Met: No Progress toward Goal(s)  Goals: Meet at least 75% of estimated needs       Nutrition Monitoring and Evaluation:   Behavioral-Environmental Outcomes: None Identified  Food/Nutrient Intake Outcomes: Enteral Nutrition Intake/Tolerance  Physical Signs/Symptoms Outcomes: None Identified    Discharge Planning:     Too soon to determine     Crow Valerio, MS, RD, LD  Contact: [phone]

## 2022-10-19 NOTE — PROGRESS NOTES
Pharmacy Adjustment per [address] protocol    Wendy Johnson is a 76 y.o. male. Pharmacy has adjusted medications per [address] protocol. Recent Labs     10/19/22  0207 10/19/22  0910   BUN 32* 39*       Recent Labs     10/19/22  0207 10/19/22  0910   CREATININE 0.8 1.0       Estimated Creatinine Clearance: 66 mL/min (based on SCr of 1 mg/dL). Height:   Ht Readings from Last 1 Encounters:   10/16/22 6' (1.829 m)     Weight:  Wt Readings from Last 1 Encounters:   10/16/22 205 lb (93 kg)         Plan: Adjust the following medications based on [address] protocol:  Change Levaquin to 750mg IV q 24 hours for 7 days.     ANNA KASPER, PHARM D, 10/19/2022, 5:56 PM

## 2022-10-19 NOTE — PROGRESS NOTES
Speech Language Pathology  Facility/Department: [facility] ICU    NAME: Kalina Andrews  : 1945  MRN: [id number]    Patient with change in medical status and is currently intubated. Speech therapy to sign-off at this time.      Electronically signed by SACHI Lane on 10/19/2022 at 7:50 AM

## 2022-10-19 NOTE — CONSULTS
MEDICAL ONCOLOGY CONSULTATION    Pt Name: Fidel Ruffin  MRN: [id number]  YOB: 1945  Date of evaluation: 10/19/2022    REASON FOR CONSULTATION: Hepatocellular carcinoma  REQUESTING PHYSICIAN: Hospitalist    History Obtained From: patient's daughter, electronic medical record    HISTORY OF PRESENT ILLNESS:  The patient is a 76years old male with a complex history of  UC s/p colectomy with ileostomy 27 years ago, hypertension, diabetes mellitus type 2, hyperlipidemia, CAD s/p CABG x5 , PVD, diabetic neuropathy, history of osteomyelitis with transmetatarsal amputation, history of COVID-19 in July 2022 and a history of hepatocellular carcinoma status post radioembolization on 9/20/2022. Initially presented to [facility] 9/22/2022 with weakness, altered mental status. Patient was found to have embolic stroke related to hypercoagulable state in malignancy and underlying/undetected atrial fibrillation. He has had a very complicated hospital course of the last few weeks. He is currently in the ICU with acute respiratory failure on mechanical ventilation. He is hypotensive and on pressors. He has elevated troponin. History of acute on chronic diastolic heart failure. I was consulted due to the history of hepatocellular carcinoma. He is status post radio-embolization.      Past Medical History:    Past Medical History:   Diagnosis Date    Atherosclerosis of native arteries of the extremities with ulceration(440.23) 08/25/2014    CAD (coronary artery disease)     sees dr at [facility] (dr. Aura Moran)    Cataracts, bilateral     Diabetes mellitus ([address]     Diabetic neuropathy [facility])     ED (erectile dysfunction)     HTN (hypertension)     Hypercholesterolemia     Open wound of right foot     states this is not the operative foot    Palliative care patient 10/07/2022    Ulcerative colitis [facility])        Past Surgical History:    Past Surgical History:   Procedure Laterality Date    CHOLECYSTECTOMY CORONARY ARTERY BYPASS GRAFT  2003    EYE SURGERY      jason. cat    ILEOSTOMY OR JEJUNOSTOMY  1995    due to surgery from ulcerative colitis    SD AMPUTATION FOOT,TRANSMETATARSAL Left 1/30/2018    TRANSMET AMPUTATION performed by Fidel Leon MD at [address] [place] [address] Left 6/12/2017    S. Left great toe ray amputation through the metatarsal level. VASCULAR SURGERY  8/26/14 S    Percutaneous cannulation, left common femoral artery, with 5-Swazi glidesheath. Suprarenal abdominal aortogram with bilateral iliofemoral arteriogra. Bilateral lower extremity arteriogram.    VASCULAR SURGERY  09/08/14 S    Right femoral to tibioperoneal trunk bypass with in situ right greater saphenous vein. Right common femoral endarterectomy with vascular patch repair. Right tibioperoneal trunk vein patch and endarterectomy. Completion right lower exteremity arteriograms    VASCULAR SURGERY  1/12/2015 S    Percutaneous cannulation left common femoral artery with 5-Swazi and later 6-Swazi pinnacle destination sheath. Suprarenal abdominal aortogram with bilateral iliofemoral arteriograms. Bilateral lower extremity arteriograms. Coil embolization right greater saphenous vein bypass branch with 8 mm x 5 cm coil and seven 5 mm x 5 cm coils. VASCULAR SURGERY  1/12/2015 S     Right posterior tibial artery balloon angioplasty 2.5 mm x 100 mm vascutrak balloon. Right peroneal artery balloon angioplasty with 2.5 x 100 mm vascutrak balloon. Completion right lower extremity arteriograms. VASCULAR SURGERY  06/17/2017    S. Percutaneous cannulation right common femoral artery with ultrasound guidance and 5 Swazi and later 6 Swazi sheath placement. Suprarenal abdominal aorta gram with bilateral lower extremity arteriogram.Left superficial femoral/popliteal/tibial peroneal trunk/posterior tibial artery atherectomy with csi 1.25 solid crown and 2.0 solid crown. Left tibial peroneal trunk and posterior tibial artery     VASCULAR SURGERY  06/17/2017    CONT.balloon angioplasty with 3mm x 100 mmand 3.5mm x 300mm vascutrak balloon. Left popliteal artery balloon angioplasty with 5 mmx 100mm lutonix drug coated balloon. #6 left superficial femoral artery balloon angioplasty with 3.6 mmi080 mm lutonix grug coated balloons. Completion left lower extremity arteriograms. Mynx closure right common femoral artery puncture site. VASCULAR SURGERY  01/20/2018    SJS. Left transmetetarsal amputation. Social History:    Smoked cigarettes for 15 years, quit a couple of weeks ago. Denies alcohol or other substances. [facility] [address] [address]    Family History:   Family History   Problem Relation Age of Onset    Cancer Mother     Cancer Father        Current Hospital Medications:    Current Facility-Administered Medications   Medication Dose Route Frequency Provider Last Rate Last Admin    norepinephrine (LEVOPHED) 16 mg in sodium chloride 0.9 % 250 mL infusion  1-100 mcg/min IntraVENous Continuous Ct Bell MD 18.8 mL/hr at 10/19/22 1230 20 mcg/min at 10/19/22 1230    lidocaine (XYLOCAINE) 2 % uro-jet   Topical PRN Ct Bell MD        chlorhexidine (PERIDEX) 0.12 % solution 15 mL  15 mL Mouth/Throat BID Ct Bell MD        famotidine (PEPCID) 20 mg in sodium chloride (PF) 10 mL injection  20 mg IntraVENous BID Ct Bell MD   20 mg at 10/19/22 6628    polyvinyl alcohol (LIQUIFILM TEARS) 1.4 % ophthalmic solution 1 drop  1 drop Both Eyes Q4H Ct Bell MD   1 drop at 10/19/22 6930    And    lubrifresh P.M. (artificial tears) ophthalmic ointment   Both Eyes Q4H Ct Bell MD   Given at 10/19/22 0921    fentaNYL (SUBLIMAZE) 2500 mcg in sodium chloride 0.9% 250 mL premix   mcg/hr IntraVENous Continuous Ct Bell MD 2.5 mL/hr at 10/19/22 0941 25 mcg/hr at 10/19/22 0941    guaiFENesin (ROBITUSSIN) 100 MG/5ML liquid 200 mg  200 mg Oral Q4H PRN Arnita Skiff, DO        propofol injection  5-50 mcg/kg/min IntraVENous Continuous Lesly Pulliam Katherineoletkamilla Gertrude, DO 2.8 mL/hr at 10/19/22 1152 5 mcg/kg/min at 10/19/22 1152    propofol 1000 MG/100ML injection             insulin lispro (HUMALOG) injection vial 0-8 Units  0-8 Units SubCUTAneous TID WC Bry Slocumb, DO   2 Units at 10/18/22 1233    insulin lispro (HUMALOG) injection vial 0-4 Units  0-4 Units SubCUTAneous Nightly Bry Slocumb, DO        glucose chewable tablet 16 g  4 tablet Oral PRN Bry Slocumb, DO        dextrose bolus 10% 125 mL  125 mL IntraVENous PRN Bry Slocumb, DO        Or    dextrose bolus 10% 250 mL  250 mL IntraVENous PRN Bry Slocumb, DO        glucagon (rDNA) injection 1 mg  1 mg SubCUTAneous PRN Bry Slocumb, DO        dextrose 10 % infusion   IntraVENous Continuous PRN Bry Slocumb, DO        potassium chloride 20 mEq/50 mL IVPB (Central Line)  20 mEq IntraVENous PRN Bry Slocumb, DO 50 mL/hr at 10/18/22 1036 20 mEq at 10/18/22 1036    carvedilol (COREG) tablet 6.25 mg  6.25 mg Oral BID  Bry Slocumb, DO   6.25 mg at 10/18/22 1845    aspirin EC tablet 81 mg  81 mg Oral Daily Bry Slocumb, DO   81 mg at 76/84/59 0073    folic acid (FOLVITE) tablet 800 mcg  800 mcg Oral Daily Bry Slocumb, DO   800 mcg at 10/19/22 2077    apixaban (ELIQUIS) tablet 2.5 mg  2.5 mg Oral BID Bry Slocumb, DO   2.5 mg at 10/19/22 7761    atorvastatin (LIPITOR) tablet 40 mg  40 mg Oral Nightly Bry Slocumb, DO   40 mg at 10/18/22 2121    megestrol acetate (MEGACE) 400 MG/10ML suspension 800 mg  800 mg Oral Daily Bry Slocumb, DO   800 mg at 10/19/22 8317    benzonatate (TESSALON) capsule 100 mg  100 mg Oral TID PRN Bry Slocumb, DO        sennosides-docusate sodium (SENOKOT-S) 8.6-50 MG tablet 1 tablet  1 tablet Oral BID Bry Slocumb, DO   1 tablet at 10/19/22 3307    pantoprazole (PROTONIX) tablet 40 mg  40 mg Oral QAM CATARINO Lake DO   40 mg at 10/18/22 0862    insulin glargine (LANTUS) injection vial 15 Units  15 Units SubCUTAneous BID Favio العراقي DO   15 Units at 10/19/22 5306       Allergies: Allergies   Allergen Reactions    Keflex [Cephalexin] Rash         Subjective   REVIEW OF SYSTEMS:   Cannot be collected due to altered mental status, critically ill. Intubated    Objective   BP (!) 114/48   Pulse (!) 103   Temp 96.9 °F (36.1 °C) (Temporal)   Resp 22   Ht 6' (1.829 m)   Wt 205 lb (93 kg)   SpO2 92%   BMI 27.80 kg/m²     PHYSICAL EXAM:  CONSTITUTIONAL: sedated, intubated, mechanically  EYES: Non icteric,  pupils equal round   ENT: Orotracheal intubation  NECK: Supple, no masses. No palpable thyroid mass  CHEST/LUNGS: CTA bilaterally, normal respiratory effort   CARDIOVASCULAR:tachycardic, no murmurs. No lower extremity edema  ABDOMEN: soft , active bowel sounds, no HSM.   No palpable masses  EXTREMITIES: warm  SKIN: warm, dry with no rashes or lesions  LYMPH: No cervical, clavicular, axillary, or inguinal lymphadenopathy  NEUROLOGIC: Sedated      LABORATORY RESULTS REVIEWED/ANALYZED BY ME:  Recent Labs     10/19/22  1333 10/16/22  0405 10/15/22  0153   WBC 27.1* 20.0* 14.9*   HGB 13.2* 14.8 12.9*   HCT 42.4 46.8 40.1*   .7* 104.7* 102.6*    159 131       Lab Results   Component Value Date     10/19/2022    K 3.2 10/19/2022    CL 97 (L) 10/19/2022    CO2 36 (H) 10/19/2022    BUN 39 (H) 10/19/2022    CREATININE 1.0 10/19/2022    GLUCOSE 122 (H) 10/19/2022    CALCIUM 8.3 (L) 10/19/2022    PROT 6.1 (L) 10/19/2022    LABALBU 1.8 (L) 10/19/2022    BILITOT 1.6 (H) 10/19/2022    ALKPHOS 334 (H) 10/19/2022     (H) 10/19/2022    ALT 91 (H) 10/19/2022    LABGLOM >60 10/19/2022    GFRAA >59 10/17/2022       Lab Results   Component Value Date    INR 1.71 (H) 10/16/2022    INR 1.06 01/23/2018    INR 1.16 (H) 09/08/2014    PROTIME 20.3 (H) 10/16/2022    PROTIME 13.7 01/23/2018    PROTIME 14.4 (H) 09/08/2014       RADIOLOGY STUDIES REPORT/REVIEWED AND INTERPRETED BY ME:  XR CHEST (2 VW)    Result Date: 10/5/2022  1. Status post median sternotomy 2. NO evidence of airspace consolidation or pulmonary venous congestion. CT HEAD WO CONTRAST    Result Date: 10/16/2022  1. Chronic microvascular ischemic changes with age-appropriate parenchymal volume loss. 2. No evidence of acute cortical infarction or intracranial hemorrhage. 3. No definitive interval change. Recommendation: Follow up as clinically indicated. All CT scans at this facility utilize dose modulation, iterative reconstruction, and/or weight based dosing when appropriate to reduce radiation dose to as low as reasonably achievable. Electronically Signed by Fracisco Rodriguez MD at 16-Oct-2022 08:42:16 AM             CT Head WO Contrast    Result Date: 9/30/2022  1. Nonspecific but presumed microvascular changes in the periventricular white matter. 2.  Atrophic changes. 3.  No acute hemorrhage or extra-axial collection. Recommendation: Follow up as clinically indicated. All CT scans at this facility utilize dose modulation, iterative reconstruction, and/or weight based dosing when appropriate to reduce radiation dose to as low as reasonably achievable. Electronically Signed by Mitchell Trent MD at 30-Sep-2022 08:40:44 AM             NM LUNG SCAN PERFUSION ONLY    Result Date: 10/5/2022  Normal perfusion involving both lungs. No nuclear medicine findings suggestive of pulmonary embolism. XR CHEST PORTABLE    Result Date: 10/19/2022  1. Interval intubation with endotracheal tube terminating 3.0 cm above the level of the leif. 2. Nasogastric tube terminates in the proximal stomach with sidehole in the distal esophagus. Consider advancing 3-5 cm. 3. Extensive bilateral  lung opacities, unchanged from prior study. Electronically signed by Kayce Lacey M.D. on 10-19-22 at 1033    XR CHEST PORTABLE    Result Date: 10/16/2022  FINDINGS/IMPRESSION:       Interval placement of right-sided IJ central venous line with tip overlying the SVC. No other significant interval change. Continued attention on follow-up is recommended. Electronically signed by Otto Leary MD on 10-16-22 at 0702    XR CHEST PORTABLE    Result Date: 10/16/2022  FINDINGS/IMPRESSION:       Bilateral patchy consolidative opacities, interval worsening. Findings may represent pulmonary alveolar edema and/or multifocal pneumonia. No other significant interval change. Continued attention on follow-up is recommended. Electronically signed by Otto Leary MD on 10-16-22 at 0655    XR CHEST PORTABLE    Result Date: 10/7/2022  1. Status post median sternotomy. 2.Bilateral patchy perihilar infiltrate    XR CHEST PORTABLE    Result Date: 9/30/2022  Mild central vascular congestion. In addition, cannot exclude mild patchy opacities in the mid and lower left lung, which may be due to atelectasis/scarring, vascular congestion, or early airspace disease. Otherwise, no lobar consolidations. No pneumothorax or pleural effusions. Recommendation: Follow up as clinically indicated. Electronically Signed by Leonidas Charles MD at 30-Sep-2022 05:13:14 AM             CTA PULMONARY W CONTRAST    Result Date: 10/16/2022  1. Central airspace abnormality small bilateral pleural fluid; consider pulmonary edema, pulmonary hemorrhage, atypical pneumonitis, alveolar proteinosis, aspiration. 2. No evidence of pulmonary embolism to the segmental branch level. Recommendation: Follow up as clinically indicated. All CT scans at this facility utilize dose modulation, iterative reconstruction, and/or weight based dosing when appropriate to reduce radiation dose to as low as reasonably achievable.  Electronically Signed by Araceli Mello MD at 16-Oct-2022 09:14:40 AM              ASSESSMENT:  Hepatocellular Carcinoma  -S/p Radio-embolization Y-90 on 9/20/2022 at [facility]  -No intervention from oncology standpoint  - CT chest staging: Subcentimeter pulmonary nodules     Aspiration pneumonia/acute respiratory failure  -Antibiotics as per primary team  -Started on Levaquin/metronidazole on 3/19/2022    Septic shock  -Continue antibiotics  -Continue vasopressors    Neutrophil leukocytosis  -Likely reactive  -Continue to monitor    Embolic stroke  -Apixaban 2.5 mg p.o. twice daily  -Low-dose aspirin    PLAN:  No oncologic intervention here  Continue current supportive care  Will discuss with Dr. Dawn Edmond of prior records from [place]    I have seen, examined and reviewed this patient medication list, appropriate labs and imaging studies. I reviewed relevant medical records and others physicians notes. I discussed the plans of care with the patient. I answered all the questions to the patients satisfaction. I have also reviewed the chief complaint (CC) and part of the history (History of Present Illness (HPI), Past Family Social History [facility]), or Review of Systems (ROS) and made changes when appropriated.        (Please note that portions of this note were completed with a voice recognition program. Efforts were made to edit the dictations but occasionally words are mis-transcribed.)      Sandy Flores MD    10/19/22  3:17 PM

## 2022-10-19 NOTE — PROGRESS NOTES
Hospitalist Progress Note    Patient:  Singh Velazquez  YOB: 1945  Date of Service: 10/19/2022  MRN: [id number]   Acct: [de-identified]   Primary Care Physician: SHAHNAZ Conley CNP  Advance Directive: Full Code  Admit Date: 10/16/2022       Hospital Day: 3  Referring Provider: Denae Mayfield DO    Patient Seen, Chart, Consults, Notes, Labs, Radiology studies reviewed. Subjective:  Singh Velazquez is a 76 y.o. male  whom we are following for  hypoxemic respiratory failure, elevated troponin, hepatocellular carcinoma, acute on chronic diastolic heart failure. He deteriorated from a pulmonary standpoint requiring intubation. He developed significant hypercarbia. Neurologically he is now improved.     Allergies:  Keflex [cephalexin]    Medicines:  Current Facility-Administered Medications   Medication Dose Route Frequency Provider Last Rate Last Admin    norepinephrine (LEVOPHED) 16 mg in sodium chloride 0.9 % 250 mL infusion  1-100 mcg/min IntraVENous Continuous Elizabeth Caldwell MD 18.8 mL/hr at 10/19/22 1230 20 mcg/min at 10/19/22 1230    lidocaine (XYLOCAINE) 2 % uro-jet   Topical PRN Elizabeth Caldwell MD        chlorhexidine (PERIDEX) 0.12 % solution 15 mL  15 mL Mouth/Throat BID Elizabeth Caldwell MD        famotidine (PEPCID) 20 mg in sodium chloride (PF) 10 mL injection  20 mg IntraVENous BID Elizabeth Caldwell MD   20 mg at 10/19/22 5085    polyvinyl alcohol (LIQUIFILM TEARS) 1.4 % ophthalmic solution 1 drop  1 drop Both Eyes Q4H Elizabeth Caldwell MD   1 drop at 10/19/22 1039    And    lubrifresh P.M. (artificial tears) ophthalmic ointment   Both Eyes Q4H Elizabeth Caldwell MD   Given at 10/19/22 0921    fentaNYL (SUBLIMAZE) 2500 mcg in sodium chloride 0.9% 250 mL premix   mcg/hr IntraVENous Continuous Elizabeth Caldwell MD 2.5 mL/hr at 10/19/22 0941 25 mcg/hr at 10/19/22 0941    guaiFENesin (ROBITUSSIN) 100 MG/5ML liquid 200 mg  200 mg Oral Q4H PRN Denae Mayfield DO        propofol injection  5-50 mcg/kg/min IntraVENous Continuous Unknown Millers, DO 2.8 mL/hr at 10/19/22 1152 5 mcg/kg/min at 10/19/22 1152    propofol 1000 MG/100ML injection             insulin lispro (HUMALOG) injection vial 0-8 Units  0-8 Units SubCUTAneous TID WC Unknown Millers, DO   2 Units at 10/18/22 1233    insulin lispro (HUMALOG) injection vial 0-4 Units  0-4 Units SubCUTAneous Nightly Unknown Millers, DO        glucose chewable tablet 16 g  4 tablet Oral PRN Unknown Millers, DO        dextrose bolus 10% 125 mL  125 mL IntraVENous PRN Unknown Millers, DO        Or    dextrose bolus 10% 250 mL  250 mL IntraVENous PRN Unknown Millers, DO        glucagon (rDNA) injection 1 mg  1 mg SubCUTAneous PRN Unknown Millers, DO        dextrose 10 % infusion   IntraVENous Continuous PRN Unknown Millers, DO        potassium chloride 20 mEq/50 mL IVPB (Central Line)  20 mEq IntraVENous PRN Unknown Millers, DO 50 mL/hr at 10/18/22 1036 20 mEq at 10/18/22 1036    carvedilol (COREG) tablet 6.25 mg  6.25 mg Oral BID WC Unknown Millers, DO   6.25 mg at 10/18/22 1845    aspirin EC tablet 81 mg  81 mg Oral Daily Unknown Millers, DO   81 mg at 98/03/42 7474    folic acid (FOLVITE) tablet 800 mcg  800 mcg Oral Daily Unknown Millers, DO   800 mcg at 10/19/22 0109    apixaban (ELIQUIS) tablet 2.5 mg  2.5 mg Oral BID Unknown Millers, DO   2.5 mg at 10/19/22 0318    atorvastatin (LIPITOR) tablet 40 mg  40 mg Oral Nightly Unknown Millers, DO   40 mg at 10/18/22 2121    megestrol acetate (MEGACE) 400 MG/10ML suspension 800 mg  800 mg Oral Daily Unknown Millers, DO   800 mg at 10/19/22 0595    benzonatate (TESSALON) capsule 100 mg  100 mg Oral TID PRN Unknown Millers, DO        sennosides-docusate sodium (SENOKOT-S) 8.6-50 MG tablet 1 tablet  1 tablet Oral BID Unknown Millers, DO   1 tablet at 10/19/22 1872    pantoprazole (PROTONIX) tablet 40 mg  40 mg Oral QAM AC Unknown Millers, DO   40 mg at 10/18/22 0827    insulin glargine (LANTUS) injection vial 15 Units  15 Units SubCUTAneous BID Harriett Santos DO   15 Units at 10/19/22 5216       Past Medical History:  Past Medical History:   Diagnosis Date    Atherosclerosis of native arteries of the extremities with ulceration(440.23) 08/25/2014    CAD (coronary artery disease)     sees dr at [facility] (dr. Yoli Bone)    Cataracts, bilateral     Diabetes mellitus ([address]     Diabetic neuropathy [facility])     ED (erectile dysfunction)     HTN (hypertension)     Hypercholesterolemia     Open wound of right foot     states this is not the operative foot    Palliative care patient 10/07/2022    Ulcerative colitis [facility])        Past Surgical History:  Past Surgical History:   Procedure Laterality Date    CHOLECYSTECTOMY      CORONARY ARTERY BYPASS GRAFT  2003    EYE SURGERY      jason. cat    ILEOSTOMY OR JEJUNOSTOMY  1995    due to surgery from ulcerative colitis    MA AMPUTATION FOOT,TRANSMETATARSAL Left 1/30/2018    TRANSMET AMPUTATION performed by Fidel Leon MD at [address] [facility] Left 6/12/2017    [facility]. Left great toe ray amputation through the metatarsal level. VASCULAR SURGERY  8/26/14 [facility]    Percutaneous cannulation, left common femoral artery, with 5-Lao glidesheath. Suprarenal abdominal aortogram with bilateral iliofemoral arteriogra. Bilateral lower extremity arteriogram.    VASCULAR SURGERY  09/08/14 [facility]    Right femoral to tibioperoneal trunk bypass with in situ right greater saphenous vein. Right common femoral endarterectomy with vascular patch repair. Right tibioperoneal trunk vein patch and endarterectomy. Completion right lower exteremity arteriograms    VASCULAR SURGERY  1/12/2015 [facility]    Percutaneous cannulation left common femoral artery with 5-Lao and later 6-Lao pinnacle destination sheath. Suprarenal abdominal aortogram with bilateral iliofemoral arteriograms. Bilateral lower extremity arteriograms. Coil embolization right greater saphenous vein bypass branch with 8 mm x 5 cm coil and seven 5 mm x 5 cm coils. VASCULAR SURGERY  1/12/2015 [facility]     Right posterior tibial artery balloon angioplasty 2.5 mm x 100 mm vascutrak balloon. Right peroneal artery balloon angioplasty with 2.5 x 100 mm vascutrak balloon. Completion right lower extremity arteriograms. VASCULAR SURGERY  06/17/2017    [facility]. Percutaneous cannulation right common femoral artery with ultrasound guidance and 5 Danish and later 6 Danish sheath placement. Suprarenal abdominal aorta gram with bilateral lower extremity arteriogram.Left superficial femoral/popliteal/tibial peroneal trunk/posterior tibial artery atherectomy with csi 1.25 solid crown and 2.0 solid crown. Left tibial peroneal trunk and posterior tibial artery     VASCULAR SURGERY  06/17/2017    CONT.balloon angioplasty with 3mm x 100 mmand 3.5mm x 300mm vascutrak balloon. Left popliteal artery balloon angioplasty with 5 mmx 100mm lutonix drug coated balloon. #6 left superficial femoral artery balloon angioplasty with 3.6 mvy038 mm lutonix grug coated balloons. Completion left lower extremity arteriograms. Mynx closure right common femoral artery puncture site. VASCULAR SURGERY  01/20/2018    [facility]. Left transmetetarsal amputation.        Family History  Family History   Problem Relation Age of Onset    Cancer Mother     Cancer Father        Social History  Social History     Socioeconomic History    Marital status:      Spouse name: Not on file    Number of children: Not on file    Years of education: Not on file    Highest education level: Not on file   Occupational History    Not on file   Tobacco Use    Smoking status: Former     Types: Pipe    Smokeless tobacco: Never    Tobacco comments:     pipe on occassion   Substance and Sexual Activity    Alcohol use: No    Drug use: No    Sexual activity: Not on file   Other Topics Concern    Not on file   Social History Narrative    Not on file     Social Determinants of Health     Financial Resource Strain: Not on file   Food Insecurity: Not on file   Transportation Needs: No Transportation Needs    Lack of Transportation (Medical): No    Lack of Transportation (Non-Medical): No   Physical Activity: Not on file   Stress: Not on file   Social Connections: Not on file   Intimate Partner Violence: Not on file   Housing Stability: Not on file         Review of Systems:    Review of Systems   Unable to perform ROS: Intubated         Objective:  Blood pressure (!) 114/48, pulse (!) 103, temperature 96.9 °F (36.1 °C), temperature source Temporal, resp. rate 22, height 6' (1.829 m), weight 205 lb (93 kg), SpO2 92 %. Intake/Output Summary (Last 24 hours) at 10/19/2022 1437  Last data filed at 10/19/2022 1000  Gross per 24 hour   Intake 60 ml   Output 775 ml   Net -715 ml       Physical Exam  Vitals and nursing note reviewed. Constitutional:       Appearance: He is ill-appearing. Interventions: He is sedated and intubated. HENT:      Head: Normocephalic and atraumatic. Right Ear: External ear normal.      Left Ear: External ear normal.      Nose: Nose normal.      Mouth/Throat:      Mouth: Mucous membranes are moist.   Eyes:      Conjunctiva/sclera: Conjunctivae normal.      Pupils: Pupils are equal, round, and reactive to light. Cardiovascular:      Rate and Rhythm: Normal rate and regular rhythm. Heart sounds: Normal heart sounds. Pulmonary:      Effort: Pulmonary effort is normal. He is intubated. Breath sounds: Normal breath sounds. Abdominal:      General: Abdomen is flat. Palpations: Abdomen is soft. Musculoskeletal:         General: No swelling. Cervical back: Neck supple. No rigidity. Skin:     General: Skin is warm and dry.        Labs:  BMP:   Recent Labs     10/18/22  0127 10/19/22  0207 10/19/22  0736 10/19/22  0859 10/19/22  0910 10/19/22  1333    138  --   --  141  --    K 3.1* 3.7   < > 3.3 3.6 3.2   CL 97* 99  --   -- studies: CT scan of the brain dated 09/29/22. Findings:  Patchy hypoattenuation in the periventricular white matter compatible with chronic microvascular ischemic changes. No evidence of acute cortical infarction, hemorrhage, mass or mass effect. Ventricles are slightly more prominent than the sulci, unchanged, and likely due to asymmetric parenchymal volume loss. .  No hydrocephalus or abnormal extra-axial fluid collections are present. The posterior fossa is unremarkable. The skull base and calvarium are intact. The included portions of the paranasal sinuses and mastoid air cells are clear. 1. Chronic microvascular ischemic changes with age-appropriate parenchymal volume loss. 2. No evidence of acute cortical infarction or intracranial hemorrhage. 3. No definitive interval change. Recommendation: Follow up as clinically indicated. All CT scans at this facility utilize dose modulation, iterative reconstruction, and/or weight based dosing when appropriate to reduce radiation dose to as low as reasonably achievable. Electronically Signed by Hai Barclay MD at 16-Oct-2022 08:42:16 AM             XR CHEST PORTABLE    Result Date: 10/16/2022  Patient: Adrian Hodges  Time Out: 07:02Exam(s): FILM CXR 1 VIEW EXAM:  XR Chest, 1 ViewCLINICAL HISTORY:   Reason for exam: central line. TECHNIQUE:  Frontal view of the chest.COMPARISON:  Earlier the same day. FINDINGS/IMPRESSION:       Interval placement of right-sided IJ central venous line with tip overlying the SVC. No other significant interval change. Continued attention on follow-up is recommended. Electronically signed by Prashant Kruger MD on 10-16-22 at 0702    XR CHEST PORTABLE    Result Date: 10/16/2022  Patient: Adrian Salazars  Time Out: 06:55Exam(s): FILM CXR 1 VIEW EXAM:  XR Chest, 1 ViewCLINICAL HISTORY:   Reason for exam: SOA. TECHNIQUE:  Frontal view of the chest.COMPARISON:  10/7/2022.     FINDINGS/IMPRESSION:       Bilateral patchy consolidative opacities, interval worsening. Findings may represent pulmonary alveolar edema and/or multifocal pneumonia. No other significant interval change. Continued attention on follow-up is recommended. Electronically signed by Silvana Mays MD on 10-16-22 at 4050    CTA PULMONARY W CONTRAST    Result Date: 10/16/2022  Exam: CTA OF THE CHEST WITH CONTRAST Clinical data: Respiratory distress. Technique: Axial CT angiography images through the lungs were acquired with contrast and imaged using soft tissue and lung algorithms. Coronal, sagittal, and 3D volume reconstructions were performed. Reformatted/3D-MPR images. Radiation dose: CTDIvol =73.51 mGy, DLP =1821 mGy x cm. Prior studies: Radiographs of the chest dated 10/07/22 (report unavailable). Nuclear medicine pulmonary perfusion scan dated 09/30/22. More recent chest x-ray images and reports are not available. Findings: Lungs:  Small bilateral pleural effusions. Dense airspace abnormalities throughout the central upper lower lungs with relative sparing of the lung periphery. No evidence of pneumothorax Soft Tissues: No mediastinal, axillary or hilar adenopathy. Vascular: No filling defect within the pulmonary arteries with no evidence of pulmonary embolism to the segmental branch level. Atherosclerotic calcification of the aortic arch, thoracic aorta, and coronary arteries. Grossly unremarkable sized heart. No  additional abnormality on 3D reformatted images. 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 Median sternotomy wires. No acute or destructive abnormality Upper Abdomen: Limited visualization of the solid upper abdominal organs is grossly unremarkable. Suboptimal evaluation due to streak artifact related to body habitus and exacerbated by scanning with patient's arms by side.       1. Central airspace abnormality small bilateral pleural fluid; consider pulmonary edema, pulmonary hemorrhage, atypical pneumonitis, alveolar proteinosis, aspiration. 2. No evidence of pulmonary embolism to the segmental branch level. Recommendation: Follow up as clinically indicated. All CT scans at this facility utilize dose modulation, iterative reconstruction, and/or weight based dosing when appropriate to reduce radiation dose to as low as reasonably achievable. Electronically Signed by Araceli Mello MD at 16-Oct-2022 09:14:40 AM                Assessment     NSTEMI (non-ST elevated myocardial infarction). Continue medical management. Acute on chronic diastolic heart failure. Diuresis. Acute hypoxemic respiratory failure. Improving with diuresis. History of moderately differentiated hepatocellular carcinoma. Status post radial embolization. Supportive care. Acute hypercapnic respiratory failure. Weaning as tolerated. Vent support. Please document 45 minutes of critical care time for patient assessment, chart review, discussion with staff, .       Ari Calderon DO

## 2022-10-19 NOTE — PROCEDURES
COMBINED PROCEDURAL NOTE:    Tube advanced easily, misting in tube, excellent colorimter change, and good bilateral breath sounds with nil audible over abdomen     Patient provided:  20mg etomidate for sedation  Amp of vecuronium for paralysis  Direct laryngosicopy with a mac used and ET tube size 7.5 placed to 24cm at the tooth    Famotidine 20,g IV BID will be provided for GI Ppx, would consider to d/c the PPI he had  Propofol GGT and Versed GGT will be provided for general Sedation and Comfort while on the vent  added Eye drops, Peridex, head of bed elevation 30-45 degrees & elevaion of legs orders along with [facility] vent orders  Pulido Car  Pulido for accurate Is and Os  Daily weights  Strict Is and Os  PRVC 7cc/kg of IBW (540cc as 6 foot tall male), RR 20, FiO2 of 60%, and PEEP 5cm, RT to titrate as needed please with Insp Time 1.2 seconds     CXR obtained, and ET tube appeared to be in optimal  position half way beween leif and clivicular heads, and will have sent to radiology for radiologist interpretation      This procedure and it's associated time not billed as a portion of any other charge or procedure  Initial Ventilator Management and it's associated time not  billed as a portion of any other charge or procedure

## 2022-10-19 NOTE — PROGRESS NOTES
10/19/22 1400   Subjective   Subjective Patient intubated this AM.   PT Whiteboard Notes   Therapy Whiteboard Patient intubated 10-16           Electronically signed by Hema Arteaga PTA on 10/19/2022 at 2:06 PM

## 2022-10-19 NOTE — SIGNIFICANT EVENT
Called to RR  Patient seen and examined on the del rosario      SUBJECTIVE:  Patient not wearing his BiPAP x 2 nights as per PCU RN    OBJECTIVE:  Pale  Minimal Respiratory drive rate with negligible respirations  Patient not responsive to vigorous sternal rub  ABG was dark colored blood like a dark venous color  Pulses were barely palpable'  CO2 > 150 on ABG    ASSESSMENTS:  Acute Hypercarbic and Hypoxemic Respiratory Failure  CO2 Narcosis  Non-compliance with BiPAP    PLANS:  Barely palpable pulses improved after a half amp of atropine  A half Amp atropine given  Patient intubated  A second half amp given       Care time (Excluding procedures)  20 minutes

## 2022-10-19 NOTE — PROGRESS NOTES
Follow up:  Met with family at the bedside. Pt in ICU and now intubated. Pt's daughter and wife are present with their . aHrish Foss and myself provided a supportive visit. Family waiting for attending to round and gather more information. Report from pt's nurse. Non responsive and on Levophed and fentanyl. Bear hugger on to maintain temp. Palliative Care knows pt from recent visit and rehab stay. We will continue to follow , support and review goals of care.     Electronically signed by Miguel Herman RN on 10/19/2022 at 10:52 AM

## 2022-10-19 NOTE — CONSULTS
Pulmonary and Critical Care Consult Note    [facility]    MRN# [id number]    Acct# [de-identified]  10/19/2022   5:38 PM CDT    Referring Nicholas Aburto DO      Chief Complaint: Respiratory failure on mechanical ventilation    Requesting physician: Dr. Joselo Solorzano    Reason for consult: Respiratory failure on mechanical ventilation      HPI: We have been consulted to see this 76y.o. year old male born on 1945. The patient is currently intubated on mechanical ventilation. Family is at the bedside. He apparently has not been wearing his BiPAP. Found on the floor with agonal breathing. He was intubated due to being unresponsive. His PCO2 was over 150 on ABG. I was asked to see him regarding the above. The patient has a complicated past medical history including hepatocellular carcinoma status postradiation therapy in September. According to family since then he has been very weak. He has a history of diabetes and coronary artery disease peripheral arterial disease and bilateral CVA and embolic strokes. He apparently was at a nursing home and became less responsive. In the ER at the time of admission he was hypoxic and was placed on BiPAP. He apparently improved initially on BiPAP. At this time he continues to be hypotensive on pressors. He does have bilateral opacities on chest x-ray. He does have leukocytosis.       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 Yoli Bone)    Cataracts, bilateral     Diabetes mellitus ([facility] [address]     Diabetic neuropathy ([facility] [address]     ED (erectile dysfunction)     HTN (hypertension)     Hypercholesterolemia     Open wound of right foot     states this is not the operative foot    Palliative care patient 10/07/2022    Ulcerative colitis ([address]      SurgicalHistory  Past Surgical History:   Procedure Laterality Date    CHOLECYSTECTOMY      CORONARY ARTERY BYPASS GRAFT  2003    EYE SURGERY      jason. cat    ILEOSTOMY OR JEJUNOSTOMY  1995    due to surgery from ulcerative colitis    AR AMPUTATION FOOT,TRANSMETATARSAL Left 1/30/2018    TRANSMET AMPUTATION performed by Vidal Moreira MD at [facility] Left 6/12/2017    [facility]. Left great toe ray amputation through the metatarsal level. VASCULAR SURGERY  8/26/14 [facility]    Percutaneous cannulation, left common femoral artery, with 5-Angolan glidesheath. Suprarenal abdominal aortogram with bilateral iliofemoral arteriogra. Bilateral lower extremity arteriogram.    VASCULAR SURGERY  09/08/14 [facility]    Right femoral to tibioperoneal trunk bypass with in situ right greater saphenous vein. Right common femoral endarterectomy with vascular patch repair. Right tibioperoneal trunk vein patch and endarterectomy. Completion right lower exteremity arteriograms    VASCULAR SURGERY  1/12/2015 [facility]    Percutaneous cannulation left common femoral artery with 5-Angolan and later 6-Angolan pinnacle destination sheath. Suprarenal abdominal aortogram with bilateral iliofemoral arteriograms. Bilateral lower extremity arteriograms. Coil embolization right greater saphenous vein bypass branch with 8 mm x 5 cm coil and seven 5 mm x 5 cm coils. VASCULAR SURGERY  1/12/2015 [facility]     Right posterior tibial artery balloon angioplasty 2.5 mm x 100 mm vascutrak balloon. Right peroneal artery balloon angioplasty with 2.5 x 100 mm vascutrak balloon. Completion right lower extremity arteriograms. VASCULAR SURGERY  06/17/2017    [facility]. Percutaneous cannulation right common femoral artery with ultrasound guidance and 5 Angolan and later 6 Angolan sheath placement. Suprarenal abdominal aorta gram with bilateral lower extremity arteriogram.Left superficial femoral/popliteal/tibial peroneal trunk/posterior tibial artery atherectomy with csi 1.25 solid crown and 2.0 solid crown. Left tibial peroneal trunk and posterior tibial artery     VASCULAR SURGERY  06/17/2017    CONT.balloon angioplasty with 3mm x 100 mmand 3.5mm x 300mm vascutrak balloon. Left popliteal artery balloon angioplasty with 5 mmx 100mm lutonix drug coated balloon. #6 left superficial femoral artery balloon angioplasty with 3.6 [id number] mm lutonix grug coated balloons. Completion left lower extremity arteriograms. Mynx closure right common femoral artery puncture site. VASCULAR SURGERY  01/20/2018    [facility]. Left transmetetarsal amputation. Allergies  Allergies   Allergen Reactions    Keflex [Cephalexin] Rash     Medications    chlorhexidine, 15 mL, Mouth/Throat, BID    famotidine (PEPCID) injection, 20 mg, IntraVENous, BID    polyvinyl alcohol, 1 drop, Both Eyes, Q4H **AND** artificial tears, , Both Eyes, Q4H    insulin lispro, 0-8 Units, SubCUTAneous, TID WC    insulin lispro, 0-4 Units, SubCUTAneous, Nightly    carvedilol, 6.25 mg, Oral, BID WC    aspirin, 81 mg, Oral, Daily    folic acid, 889 mcg, Oral, Daily    apixaban, 2.5 mg, Oral, BID    atorvastatin, 40 mg, Oral, Nightly    megestrol acetate, 800 mg, Oral, Daily    sennosides-docusate sodium, 1 tablet, Oral, BID    pantoprazole, 40 mg, Oral, QAM AC    insulin glargine, 15 Units, SubCUTAneous, BID   Social History   reports that he has quit smoking. His smoking use included pipe. He has never used smokeless tobacco. He reports that he does not drink alcohol and does not use drugs. Family History  family history includes Cancer in his father and mother.     Review of Systems:  12 point review of systems is negative except as below:  Review of systems not obtained due to patient factors - intubation    Physical Exam:  BP (!) 113/47   Pulse (!) 103   Temp 97.4 °F (36.3 °C) (Temporal)   Resp (!) 33   Ht 6' (1.829 m)   Wt 205 lb (93 kg)   SpO2 90% intracranial hemorrhage. 3. No definitive interval change. Recommendation: Follow up as clinically indicated. All CT scans at this facility utilize dose modulation, iterative reconstruction, and/or weight based dosing when appropriate to reduce radiation dose to as low as reasonably achievable. Electronically Signed by Brendan Calderon MD at 16-Oct-2022 08:42:16 AM             XR CHEST PORTABLE    Result Date: 10/19/2022  1. Interval intubation with endotracheal tube terminating 3.0 cm above the level of the leif. 2. Nasogastric tube terminates in the proximal stomach with sidehole in the distal esophagus. Consider advancing 3-5 cm. 3. Extensive bilateral  lung opacities, unchanged from prior study. Electronically signed by Cassy Leyva M.D. on 10-19-22 at [id number]    XR CHEST PORTABLE    Result Date: 10/16/2022  FINDINGS/IMPRESSION:       Interval placement of right-sided IJ central venous line with tip overlying the SVC. No other significant interval change. Continued attention on follow-up is recommended. Electronically signed by Tab Jarrett MD on 10-16-22 at 0702    XR CHEST PORTABLE    Result Date: 10/16/2022  FINDINGS/IMPRESSION:       Bilateral patchy consolidative opacities, interval worsening. Findings may represent pulmonary alveolar edema and/or multifocal pneumonia. No other significant interval change. Continued attention on follow-up is recommended. Electronically signed by Tab Jarrett MD on 10-16-22 at 0811    CTA PULMONARY W CONTRAST    Result Date: 10/16/2022  1. Central airspace abnormality small bilateral pleural fluid; consider pulmonary edema, pulmonary hemorrhage, atypical pneumonitis, alveolar proteinosis, aspiration. 2. No evidence of pulmonary embolism to the segmental branch level. Recommendation: Follow up as clinically indicated.  All CT scans at this facility utilize dose modulation, iterative reconstruction, and/or weight based dosing when appropriate to reduce radiation dose to as low as reasonably achievable. Electronically Signed by Camille Agee MD at 16-Oct-2022 09:14:40 AM                My radiograph interpretation/independent review of imaging: Reviewed    Problem list generated by [facility] Problems             Last Modified POA    * (Principal) NSTEMI (non-ST elevated myocardial infarction) ([facility] [address] 10/16/2022 Yes          Pulmonary Assessment/plan:    Acute hypoxic hypercapnic respiratory failure. Now intubated on mechanical ventilation. Continue mechanical ventilation as necessary to assure adequate gas exchange. Weaning as feasible. Possible sepsis due to aspiration. Empirical antibiotic therapy. Sputum cultures. Non-ST elevation MI. Cardiology following. Transaminitis likely secondary to the above and hepatocellular carcinoma with recent radiation. DVT and GI prophylaxis. Discussed with the nursing staff and family at the bedside. Critical care time 36 min       Mera Rocha MD, [facility], [facility]    The above note was generated using voice recognition software. Inadvertent typographical errors in transcription may have occurred.     Electronically signed by Jacob Trevizo MD on 10/19/22 at 5:38 PM

## 2022-10-20 PROBLEM — K51.90 ULCERATIVE COLITIS (HCC): Status: ACTIVE | Noted: 2022-01-01

## 2022-10-20 PROBLEM — Z86.73 HISTORY OF STROKE: Status: ACTIVE | Noted: 2022-01-01

## 2022-10-20 PROBLEM — I65.23 BILATERAL CAROTID ARTERY STENOSIS: Status: ACTIVE | Noted: 2022-01-01

## 2022-10-20 PROBLEM — H90.3 SNHL (SENSORY-NEURAL HEARING LOSS), ASYMMETRICAL: Status: ACTIVE | Noted: 2019-04-08

## 2022-10-20 PROBLEM — R57.9 SHOCK (HCC): Status: ACTIVE | Noted: 2022-01-01

## 2022-10-20 PROBLEM — I70.213 ATHEROSCLEROSIS OF NATIVE ARTERY OF BOTH LOWER EXTREMITIES WITH INTERMITTENT CLAUDICATION (HCC): Status: ACTIVE | Noted: 2019-01-09

## 2022-10-20 NOTE — PROGRESS NOTES
Pharmacy Renal Adjustment    Gail Herring is a 76 y.o. male. Pharmacy has renally adjusted medications per protocol. Recent Labs     10/19/22  0910 10/20/22  0208   BUN 39* 50*       Recent Labs     10/19/22  0910 10/20/22  0208   CREATININE 1.0 1.6*       Estimated Creatinine Clearance: 39 mL/min (A) (based on SCr of 1.6 mg/dL (H)). Height:   Ht Readings from Last 1 Encounters:   10/16/22 6' (1.829 m)     Weight:  Wt Readings from Last 1 Encounters:   10/20/22 162 lb (73.5 kg)       CKD stage: LEIGH ANN         Baseline SCr: LEIGH ANN    Plan: Adjust the following medications based on renal function:           Famotidine 20 mg IV twice daily adjusted to Famotidine 20 mg IV daily. Levofloxacin 750 mg IV daily adjusted to Levofloxacin 750 mg IV every 48 hours.      Electronically signed by Jona Luong, [address] [place] [address] on 10/20/2022 at 10:54 AM

## 2022-10-20 NOTE — CONSULTS
Palliative Care Consult Note    10/20/2022 4:50 PM  Subjective:  Admit Date: 10/16/2022  PCP: SHAHNAZ Simmons CNP    Date of Service: 10/20/2022    Reason for Consultation:  Goals of Care, Code Status, Family Support     History Obtained From: EMR/Patient and their Family    History Of Present Illness: The patient is a 76 y.o. male with PMH moderately differentiated hepatocellular carcinoma w/p radioembolization 54/42/54, bilateral embolic stroke 59/5014, CAD s/p CABG, diastolic CHF, DM w/ peripheral neuropathy, HTN, HLD, Ulcerative colitis s/p colectomy w/ ileostomy 27 years ago, who presented to [facility] ED from [facility] on 10/16/22 complaining of shortness of breath and confusion worsening from baseline. CXR reported bilateral patchy consolidative opacities. Lab work up was concerning for elevated BNP and troponin. He was hypoxic on arrival and was placed on BiPAP. He was admitted to Hospitalist service for NSTEMI, acute on chronic diastolic CHF, acute hypoxemic respiratory failure. He was continued on IV diuretic therapy, Cardiology was consulted with recommendation for medical management. Initially, Mr. Syed Power responded well and was moved from ICU to PCU. On 10/19/22 he was found non-responsive with weak pulses. Rapid response called, patient was intubated and transferred back to ICU. According to documentation he developed significant hypercarbia.      Past Medical History:        Diagnosis Date    Atherosclerosis of native arteries of the extremities with ulceration(440.23) 08/25/2014    CAD (coronary artery disease)     sees dr at [facility] (dr. Mario Collazo)    Cataracts, bilateral     Diabetes mellitus ([facility] [address]     Diabetic neuropathy [facility])     ED (erectile dysfunction)     HTN (hypertension)     Hypercholesterolemia     Open wound of right foot     states this is not the operative foot    Palliative care patient 10/07/2022    Ulcerative colitis [facility])        Past Surgical History:        Procedure Laterality Date    CHOLECYSTECTOMY      CORONARY ARTERY BYPASS GRAFT  2003    EYE SURGERY      jason. cat    ILEOSTOMY OR JEJUNOSTOMY  1995    due to surgery from ulcerative colitis    AL AMPUTATION FOOT,TRANSMETATARSAL Left 1/30/2018    TRANSMET AMPUTATION performed by Ayde Harvey MD at [address] [place] [address] Left 6/12/2017    S. Left great toe ray amputation through the metatarsal level. VASCULAR SURGERY  8/26/14 S    Percutaneous cannulation, left common femoral artery, with 5-Irish glidesheath. Suprarenal abdominal aortogram with bilateral iliofemoral arteriogra. Bilateral lower extremity arteriogram.    VASCULAR SURGERY  09/08/14 S    Right femoral to tibioperoneal trunk bypass with in situ right greater saphenous vein. Right common femoral endarterectomy with vascular patch repair. Right tibioperoneal trunk vein patch and endarterectomy. Completion right lower exteremity arteriograms    VASCULAR SURGERY  1/12/2015 S    Percutaneous cannulation left common femoral artery with 5-Irish and later 6-Irish [place] sheath. Suprarenal abdominal aortogram with bilateral iliofemoral arteriograms. Bilateral lower extremity arteriograms. Coil embolization right greater saphenous vein bypass branch with 8 mm x 5 cm coil and seven 5 mm x 5 cm coils. VASCULAR SURGERY  1/12/2015 S     Right posterior tibial artery balloon angioplasty 2.5 mm x 100 mm vascutrak balloon. Right peroneal artery balloon angioplasty with 2.5 x 100 mm vascutrak balloon. Completion right lower extremity arteriograms. VASCULAR SURGERY  06/17/2017    SJS. Percutaneous cannulation right common femoral artery with ultrasound guidance and 5 Irish and later 6 Irish sheath placement. Suprarenal abdominal aorta gram with bilateral lower extremity arteriogram.Left superficial femoral/popliteal/tibial peroneal trunk/posterior tibial artery atherectomy with csi 1.25 solid crown and 2.0 solid crown. Left tibial peroneal trunk and posterior tibial artery     VASCULAR SURGERY  06/17/2017    CONT.balloon angioplasty with 3mm x 100 mmand 3.5mm x 300mm vascutrak balloon. Left popliteal artery balloon angioplasty with 5 mmx 100mm lutonix drug coated balloon. #6 left superficial femoral artery balloon angioplasty with 3.6 vzt047 mm lutonix grug coated balloons. Completion left lower extremity arteriograms. Mynx closure right common femoral artery puncture site. VASCULAR SURGERY  01/20/2018    SJS. Left transmetetarsal amputation. Home Medications:  Prior to Admission medications    Medication Sig Start Date End Date Taking? Authorizing Provider   insulin glargine (LANTUS) 100 UNIT/ML injection vial Inject 15 Units into the skin 2 times daily 10/14/22   Lavell Cuba MD   megestrol acetate (MEGACE) 400 MG/10ML SUSP Take 20 mLs by mouth daily 10/15/22   Lavell Cuba MD   metoprolol tartrate (LOPRESSOR) 25 MG tablet Take 0.5 tablets by mouth 2 times daily 10/14/22   Lavell Cuba MD   benzonatate (TESSALON) 100 MG capsule Take 1 capsule by mouth 3 times daily as needed for Cough 10/14/22 10/21/22  Lavell Cuba MD   guaiFENesin [facility]) 600 MG extended release tablet Take 1 tablet by mouth 2 times daily 10/14/22   Lavell Cuba MD   sennosides-docusate sodium (SENOKOT-S) 8.6-50 MG tablet Take 1 tablet by mouth 2 times daily 10/14/22   Lavell Cuba MD   pantoprazole (PROTONIX) 40 MG tablet Take 1 tablet by mouth every morning (before breakfast) 10/15/22   Lavell Cuba MD   apixaban (ELIQUIS) 2.5 MG TABS tablet Take 2.5 mg by mouth 2 times daily    Historical Provider, MD   atorvastatin (LIPITOR) 40 MG tablet Take 40 mg by mouth at bedtime    Historical Provider, MD   aspirin 81 MG tablet Take 81 mg by mouth daily. Historical Provider, MD   folic acid (FOLVITE) 078 MCG tablet Take 800 mcg by mouth daily.     Historical Provider, MD       Allergies:    Keflex [cephalexin]    Social History:    The patient currently lives at home though was recently in [facility]  Tobacco:   reports that he has quit smoking. His smoking use included pipe. He has never used smokeless tobacco.  Alcohol:   reports no history of alcohol use. Illicit Drugs: None known     Family History:      Problem Relation Age of Onset    Cancer Mother     Cancer Father      Review of Systems:   Unable to obtain full ROS 2/2 intubation/sedation    Physical Examination:  BP (!) 99/43   Pulse 89   Temp 98.1 °F (36.7 °C) (Temporal)   Resp 20   Ht 6' (1.829 m)   Wt 162 lb (73.5 kg)   SpO2 97%   BMI 21.97 kg/m²   General appearance: 77 yo male, ill appearing, no acute distress, intubated   Head: Normocephalic, without obvious abnormality, atraumatic  Eyes: conjunctivae/corneas clear. PERRL  Ears: normal external ears and nose  Neck: no JVD, supple, symmetrical, trachea midline   Lungs: mechanically ventilated no rhonchi or wheezing noted   Heart: distant HT's regular rate and rhythm, S1, S2 normal, no murmur  Abdomen:soft, non-tender; non-distended, normal bowel sounds   Extremities:trace peripheral edema,  No erythema, no tenderness to palpation  Skin: Warm, dry  Neurologic: Intubated, sedated     Diagnostic Data:  CBC:  Recent Labs     10/19/22  1333 10/20/22  1325   WBC 27.1* 25.1*   HGB 13.2* 10.8*   HCT 42.4 35.2*    186     BMP:  Recent Labs     10/19/22  0207 10/19/22  0736 10/19/22  0910 10/19/22  1333 10/19/22  2224 10/20/22  0208 10/20/22  0439     --  141  --   --  139  --    K 3.7   < > 3.6   < > 3.5 3.9 3.8   CL 99  --  97*  --   --  101  --    CO2 38*  --  36*  --   --  31*  --    BUN 32*  --  39*  --   --  50*  --    CREATININE 0.8  --  1.0  --   --  1.6*  --    CALCIUM 8.3*  --  8.3*  --   --  7.5*  --     < > = values in this interval not displayed. Recent Labs     10/19/22  0910   *   ALT 91*   BILITOT 1.6*   ALKPHOS 334*       CT HEAD WO CONTRAST    Result Date: 10/16/2022  Exam: CT OF THE BRAIN WITHOUT CONTRAST Clinical data:  Altered mental status. Technique: Contiguous axial images are obtained from the skull base to vertex without intravenous contrast. Reformatted/MPR images were performed. Radiation dose: CTDIvol =73.51 mGy, DLP =1821 mGy x cm. Prior studies: CT scan of the brain dated 09/29/22. Findings:  Patchy hypoattenuation in the periventricular white matter compatible with chronic microvascular ischemic changes. No evidence of acute cortical infarction, hemorrhage, mass or mass effect. Ventricles are slightly more prominent than the sulci, unchanged, and likely due to asymmetric parenchymal volume loss. .  No hydrocephalus or abnormal extra-axial fluid collections are present. The posterior fossa is unremarkable. The skull base and calvarium are intact. The included portions of the paranasal sinuses and mastoid air cells are clear. 1. Chronic microvascular ischemic changes with age-appropriate parenchymal volume loss. 2. No evidence of acute cortical infarction or intracranial hemorrhage. 3. No definitive interval change. Recommendation: Follow up as clinically indicated. All CT scans at this facility utilize dose modulation, iterative reconstruction, and/or weight based dosing when appropriate to reduce radiation dose to as low as reasonably achievable. Electronically Signed by Jeralene Lombard MD at 16-Oct-2022 08:42:16 AM             XR CHEST PORTABLE    Result Date: 10/16/2022  Patient: Queta Goodwin  Time Out: 07:02Exam(s): FILM CXR 1 VIEW EXAM:  XR Chest, 1 ViewCLINICAL HISTORY:   Reason for exam: central line. TECHNIQUE:  Frontal view of the chest.COMPARISON:  Earlier the same day. FINDINGS/IMPRESSION:       Interval placement of right-sided IJ central venous line with tip overlying the SVC. No other significant interval change. Continued attention on follow-up is recommended. Electronically signed by Miguel Conley MD on 10-16-22 at [id number]    XR CHEST PORTABLE    Result Date: 10/16/2022  Patient: Queta Goodwin  Time Out: 06: 55Exam(s): FILM CXR 1 VIEW EXAM:  XR Chest, 1 ViewCLINICAL HISTORY:   Reason for exam: SOA. TECHNIQUE:  Frontal view of the chest.COMPARISON:  10/7/2022. FINDINGS/IMPRESSION:       Bilateral patchy consolidative opacities, interval worsening. Findings may represent pulmonary alveolar edema and/or multifocal pneumonia. No other significant interval change. Continued attention on follow-up is recommended. Electronically signed by Kehinde Mayes MD on 10-16-22 at [id number]    CTA PULMONARY W CONTRAST    Result Date: 10/16/2022  Exam: CTA OF THE CHEST WITH CONTRAST Clinical data: Respiratory distress. Technique: Axial CT angiography images through the lungs were acquired with contrast and imaged using soft tissue and lung algorithms. Coronal, sagittal, and 3D volume reconstructions were performed. Reformatted/3D-MPR images. Radiation dose: CTDIvol =73.51 mGy, DLP =1821 mGy x cm. Prior studies: Radiographs of the chest dated 10/07/22 (report unavailable). Nuclear medicine pulmonary perfusion scan dated 09/30/22. More recent chest x-ray images and reports are not available. Findings: Lungs:  Small bilateral pleural effusions. Dense airspace abnormalities throughout the central upper lower lungs with relative sparing of the lung periphery. No evidence of pneumothorax Soft Tissues: No mediastinal, axillary or hilar adenopathy. Vascular: No filling defect within the pulmonary arteries with no evidence of pulmonary embolism to the segmental branch level. Atherosclerotic calcification of the aortic arch, thoracic aorta, and coronary arteries. Grossly unremarkable sized heart. No  additional abnormality on 3D reformatted images. 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 Median sternotomy wires. No acute or destructive abnormality Upper Abdomen: Limited visualization of the solid upper abdominal organs is grossly unremarkable.   Suboptimal evaluation due to streak artifact related to body habitus and exacerbated by scanning with patient's arms by side. 1. Central airspace abnormality small bilateral pleural fluid; consider pulmonary edema, pulmonary hemorrhage, atypical pneumonitis, alveolar proteinosis, aspiration. 2. No evidence of pulmonary embolism to the segmental branch level. Recommendation: Follow up as clinically indicated. All CT scans at this facility utilize dose modulation, iterative reconstruction, and/or weight based dosing when appropriate to reduce radiation dose to as low as reasonably achievable. Electronically Signed by Seth Rivera MD at 16-Oct-2022 09:14:40 AM               Palliative Performance Scale:  [x] 10% Bed Bound  Extensive disease  Total care  Mouth care only  Drowsy/coma    Palliative Review of Advance Directives:     Surrogate Decision Maker:Grady Pearce [facility] Ivan of :No    Advanced Directives/Living Boothe: Yes on file and reviewed with family    Out of hospital medical orders in place to reflect resuscitation status (MOLST/POLST): Yes on file and reviewed with family    Information Sharing:  Patient's awareness of illness:  [] Terminal [] Life-Threatening [] Serious [] Non life-threatening [] Not serious   [x] Not discussed    Family awareness of illness:   [] Terminal [x] Life-Threatening [] Serious [] Nonlife-threatening [] Not serious   [] Not discussed    Assessment/Plan:  Principal Problem:    NSTEMI (non-ST elevated myocardial infarction) [facility])  Active Problems:    Palliative care patient    Hepatocellular carcinoma ([facility] [address]    Acute respiratory failure with hypoxia ([facility] [address]    Ulcerative colitis ([facility] [address]    History of stroke    Coronary artery disease involving native coronary artery without angina pectoris    Type 2 diabetes mellitus with diabetic peripheral angiopathy and gangrene, with long-term current use of insulin ([facility] [address]  Resolved Problems:    * No resolved hospital problems.  * Visit Summary:  Chart reviewed, patient discussed with consulting service and nursing staff. Reviewed health issues, work up and treatment plan as well as factors that lead to hospitalization. I saw Mr. Jayna Jain at his bedside with RN present in the room. RN states that with lightened sedation he becomes tachycardic, hypoxic. He is currently receiving maximum support from Phenylephrine and Vasopressin. BP 80's/20's while I was present in the room. She tells me Norepinephrine was discontinued due to worsening tachycardia. He is requiring 70% FiO2. On chart review, Mr. Jayna Jain has a DNR on file, however, upon questioning on 10/17/22 he confirmed with Palliative  that he would like to be a full code. He also has a living will on file. I met with his children and two other family members this afternoon and introduced myself, the role of inpatient palliative care and the reason for consultation. We went over his current clinical concerns and hospital stay to include recent stay in rehab. They state he was doing well in ICU and up to chair, eating on his own and then he rapidly declined after he was moved to 7th floor. We reviewed his DNR and living will. His children state he has been through so much and has expressed that he wanted to fight when he was diagnosed with hepatocellular carcinoma. They feel he likely would not want aggressive measures in the setting of cardiac arrest, however, they want to involve his spouse in this decision. She had gone home for the day to rest. They do plan on having a conversation with her later today regarding his code status. They are not yet ready to discuss withdrawal of care but are receptive to further conversations regarding goals of care based on improvement/decline. I will follow up with them tomorrow. Opportunity for questions and emotional support offered.      Candidate for SCOP:To be determined based on Hospital course    Recommendations:     Palliative Care-GOC continue current treatment plan in hopes for improvement. Code status-Full code-family to further discuss this with one another this evening. If a decision is made tonight I have instructed them to let patient's RN know. I will follow up with them tomorrow. Acute Respiratory failure with hypoxia/hypercapnia -dependent on mechanical ventilation, Pulmonology following  Concern for septic shock 2/2 aspiration-empiric antibiotic therapy, requiring pressor support x 2 w/ maximum therapy per RN, mgmt per Hospitalist   LEIGH ANN-IVF's judiciously   NSTEMI-Cardiology has seen, medical management recommended   Recent embolic stroke-Eliquis/Aspirin continued   Hepatocellular carcinoma s/p radio-embolization 09/20/22 at [facility]  Ulcerative colitis s/p colectomy w/ ileostomy formation remotely -noted    Thank you for consulting palliative care and allowing us to participate in the care of the patient.     CounselingTopics: Goals of care, Code Status, Disease process education, pt/family support    Time Spent Counseling > 50%:  YES                                   Total Time Spent with patient/family/RN counseling, workup/treatment review, discussion w/ medical team and placement of orders/preparation of this note: 72 minutes    Electronically signed by Migue Ott PA-C on 10/20/2022 at 4:50 PM    (Please note that portions of this note were completed with a voice recognition program.  Efforts were made to edit the dictations but occasionally words are mis-transcribed.)

## 2022-10-20 NOTE — PROGRESS NOTES
Hospitalist Progress Note    Patient:  Andreas Joseph  YOB: 1945  Date of Service: 10/20/2022  MRN: [id number]   Acct: [de-identified]   Primary Care Physician: SHAHNAZ Pierre CNP  Advance Directive: Full Code  Admit Date: 10/16/2022       Hospital Day: 4  Referring Provider: Ari Calderon DO    Patient Seen, Chart, Consults, Notes, Labs, Radiology studies reviewed. Subjective:  Andreas Joseph is a 76 y.o. male  whom we are following for hypoxemic respiratory failure, elevated troponin, hepatocellular carcinoma, acute on chronic diastolic heart failure. He remains on the ventilator. His urine volume is decreasing. We will judiciously hydrate him. He is also requiring 2 pressors for blood pressure support. His creatinine has also bumped as well.     Allergies:  Keflex [cephalexin]    Medicines:  Current Facility-Administered Medications   Medication Dose Route Frequency Provider Last Rate Last Admin    phenylephrine (MARLI-SYNEPHRINE) 50 mg in dextrose 5 % 250 mL infusion   mcg/min IntraVENous Continuous Ari Yumiko, DO 90 mL/hr at 10/20/22 1245 300 mcg/min at 10/20/22 1245    vasopressin 20 Units in dextrose 5 % 100 mL infusion  0.01-0.03 Units/min IntraVENous Continuous Ari Yumiko, DO 9 mL/hr at 10/20/22 1058 0.03 Units/min at 10/20/22 1058    lactated ringers infusion   IntraVENous Continuous Ari Yumiko,  mL/hr at 10/20/22 1012 Restarted at 10/20/22 1012    [START ON 10/21/2022] famotidine (PEPCID) 20 mg in sodium chloride (PF) 10 mL injection  20 mg IntraVENous Daily Tyson Good MD        [place] ON 10/21/2022] levoFLOXacin (LEVAQUIN) 750 MG/150ML infusion 750 mg  750 mg IntraVENous Q48H Mera Rocha MD        norepinephrine (LEVOPHED) 16 mg in sodium chloride 0.9 % 250 mL infusion  1-100 mcg/min IntraVENous Continuous Tyson Good MD 32.8 mL/hr at 10/20/22 0556 35 mcg/min at 10/20/22 0556    lidocaine (XYLOCAINE) 2 % uro-jet   Topical PRN Ravin Miller MD        chlorhexidine (PERIDEX) 0.12 % solution 15 mL  15 mL Mouth/Throat BID Ravin Miller MD   15 mL at 10/20/22 0734    polyvinyl alcohol (LIQUIFILM TEARS) 1.4 % ophthalmic solution 1 drop  1 drop Both Eyes Q4H Ravin Miller MD   1 drop at 10/20/22 4824    And    lubrifresh P.M. (artificial tears) ophthalmic ointment   Both Eyes Q4H Ravin Miller MD   Given at 10/20/22 1011    fentaNYL (SUBLIMAZE) 2500 mcg in sodium chloride 0.9% 250 mL premix   mcg/hr IntraVENous Continuous Ravin Miller MD 2.5 mL/hr at 10/20/22 0556 25 mcg/hr at 10/20/22 0556    guaiFENesin (ROBITUSSIN) 100 MG/5ML liquid 200 mg  200 mg Oral Q4H PRN Ananth Nearing, DO        propofol injection  5-50 mcg/kg/min IntraVENous Continuous Ananth Nearing, DO 11.2 mL/hr at 10/20/22 1104 20 mcg/kg/min at 10/20/22 1104    metronidazole (FLAGYL) 500 mg in 0.9% NaCl 100 mL IVPB premix  500 mg IntraVENous Q8H Mera Rocha MD   Stopped at 10/20/22 1105    insulin lispro (HUMALOG) injection vial 0-8 Units  0-8 Units SubCUTAneous TID WC Ananth Nearing, DO   2 Units at 10/18/22 1233    insulin lispro (HUMALOG) injection vial 0-4 Units  0-4 Units SubCUTAneous Nightly Ananth Nearing, DO        glucose chewable tablet 16 g  4 tablet Oral PRN Ananth Nearing, DO        dextrose bolus 10% 125 mL  125 mL IntraVENous PRN Ananth Nearing, DO        Or    dextrose bolus 10% 250 mL  250 mL IntraVENous PRN Ananth Nearing, DO        glucagon (rDNA) injection 1 mg  1 mg SubCUTAneous PRN Ananth Nearing, DO        dextrose 10 % infusion   IntraVENous Continuous PRN Ananth Nearing, DO        potassium chloride 20 mEq/50 mL IVPB (Central Line)  20 mEq IntraVENous PRN Aannth Nearing, DO   Stopped at 10/18/22 1139    aspirin EC tablet 81 mg  81 mg Oral Daily Ananth Nearing, DO   81 mg at 13/03/17 6687    folic acid (FOLVITE) tablet 800 mcg  800 mcg Oral Daily Ananth Nearing, DO 800 mcg at 10/20/22 0732    apixaban (ELIQUIS) tablet 2.5 mg  2.5 mg Oral BID Marzette Ricky, DO   2.5 mg at 10/20/22 0732    atorvastatin (LIPITOR) tablet 40 mg  40 mg Oral Nightly Marzette Ricky, DO   40 mg at 10/19/22 2230    benzonatate (TESSALON) capsule 100 mg  100 mg Oral TID PRN Marzette [place], DO        sennosides-docusate sodium (SENOKOT-S) 8.6-50 MG tablet 1 tablet  1 tablet Oral BID Marzette [place], DO   1 tablet at 10/20/22 0732    pantoprazole (PROTONIX) tablet 40 mg  40 mg Oral QAM AC Marzette [place], DO   40 mg at 10/20/22 0542    insulin glargine (LANTUS) injection vial 15 Units  15 Units SubCUTAneous BID Marzette Ricky, DO   15 Units at 10/19/22 9980       Past Medical History:  Past Medical History:   Diagnosis Date    Atherosclerosis of native arteries of the extremities with ulceration(440.23) 08/25/2014    CAD (coronary artery disease)     sees dr at [facility] (dr. Tucker Bailey)    Cataracts, bilateral     Diabetes mellitus ([facility] [address]     Diabetic neuropathy [facility])     ED (erectile dysfunction)     HTN (hypertension)     Hypercholesterolemia     Open wound of right foot     states this is not the operative foot    Palliative care patient 10/07/2022    Ulcerative colitis [facility])        Past Surgical History:  Past Surgical History:   Procedure Laterality Date    CHOLECYSTECTOMY      CORONARY ARTERY BYPASS GRAFT  2003    EYE SURGERY      jason. cat    ILEOSTOMY OR JEJUNOSTOMY  1995    due to surgery from ulcerative colitis    [place] AMPUTATION FOOT,TRANSMETATARSAL Left 1/30/2018    TRANSMET AMPUTATION performed by Devyn Fraser MD at [address] Left 6/12/2017    [facility]. Left great toe ray amputation through the metatarsal level. VASCULAR SURGERY  8/26/14 [facility]    Percutaneous cannulation, left common femoral artery, with 5-Emirati glidesheath. Suprarenal abdominal aortogram with bilateral iliofemoral arteriogra. Bilateral lower extremity arteriogram.    VASCULAR SURGERY 09/08/14 [facility]    Right femoral to tibioperoneal trunk bypass with in situ right greater saphenous vein. Right common femoral endarterectomy with vascular patch repair. Right tibioperoneal trunk vein patch and endarterectomy. Completion right lower exteremity arteriograms    VASCULAR SURGERY  1/12/2015 [facility]    Percutaneous cannulation left common femoral artery with 5-Gabonese and later 6-Gabonese pinnacle destination sheath. Suprarenal abdominal aortogram with bilateral iliofemoral arteriograms. Bilateral lower extremity arteriograms. Coil embolization right greater saphenous vein bypass branch with 8 mm x 5 cm coil and seven 5 mm x 5 cm coils. VASCULAR SURGERY  1/12/2015 [facility]     Right posterior tibial artery balloon angioplasty 2.5 mm x 100 mm vascutrak balloon. Right peroneal artery balloon angioplasty with 2.5 x 100 mm vascutrak balloon. Completion right lower extremity arteriograms. VASCULAR SURGERY  06/17/2017    [facility]. Percutaneous cannulation right common femoral artery with ultrasound guidance and 5 Gabonese and later 6 Gabonese sheath placement. Suprarenal abdominal aorta gram with bilateral lower extremity arteriogram.Left superficial femoral/popliteal/tibial peroneal trunk/posterior tibial artery atherectomy with csi 1.25 solid crown and 2.0 solid crown. Left tibial peroneal trunk and posterior tibial artery     VASCULAR SURGERY  06/17/2017    CONT.balloon angioplasty with 3mm x 100 mmand 3.5mm x 300mm vascutrak balloon. Left popliteal artery balloon angioplasty with 5 mmx 100mm lutonix drug coated balloon. #6 left superficial femoral artery balloon angioplasty with 3.6 nor093 mm lutonix grug coated balloons. Completion left lower extremity arteriograms. Mynx closure right common femoral artery puncture site. VASCULAR SURGERY  01/20/2018    [facility]. Left transmetetarsal amputation.        Family History  Family History   Problem Relation Age of Onset    Cancer Mother     Cancer Father        Social History  Social History sounds: Normal heart sounds. Pulmonary:      Effort: Pulmonary effort is normal. He is intubated. Breath sounds: Normal breath sounds. Abdominal:      General: Abdomen is flat. Palpations: Abdomen is soft. Musculoskeletal:         General: No swelling. Cervical back: Neck supple. No rigidity. Skin:     General: Skin is warm and dry. Labs:  BMP:   Recent Labs     10/19/22  0207 10/19/22  0736 10/19/22  0910 10/19/22  1333 10/19/22  2224 10/20/22  0208 10/20/22  0439     --  141  --   --  139  --    K 3.7   < > 3.6   < > 3.5 3.9 3.8   CL 99  --  97*  --   --  101  --    CO2 38*  --  36*  --   --  31*  --    BUN 32*  --  39*  --   --  50*  --    CREATININE 0.8  --  1.0  --   --  1.6*  --    CALCIUM 8.3*  --  8.3*  --   --  7.5*  --     < > = values in this interval not displayed. CBC:   Recent Labs     10/19/22  1333   WBC 27.1*   HGB 13.2*   HCT 42.4   .7*        LIVER PROFILE:   Recent Labs     10/19/22  0910   *   ALT 91*   BILITOT 1.6*   ALKPHOS 334*     PT/INR: No results for input(s): PROTIME, INR in the last 72 hours. APTT: No results for input(s): APTT in the last 72 hours. BNP:  No results for input(s): BNP in the last 72 hours. Ionized Calcium:No results for input(s): IONCA in the last 72 hours. Magnesium:  Recent Labs     10/18/22  0127 10/19/22  0207 10/20/22  0208   MG 1.9 2.3 2.2     Phosphorus:No results for input(s): PHOS in the last 72 hours. HgbA1C: No results for input(s): LABA1C in the last 72 hours. Hepatic:   Recent Labs     10/19/22  0910   ALKPHOS 334*   ALT 91*   *   PROT 6.1*   BILITOT 1.6*   LABALBU 1.8*     Lactic Acid: No results for input(s): LACTA in the last 72 hours. Troponin: No results for input(s): CKTOTAL, CKMB, TROPONINT in the last 72 hours. ABGs: No results for input(s): PH, PCO2, PO2, HCO3, O2SAT in the last 72 hours. CRP:  No results for input(s): CRP in the last 72 hours.   Sed Rate:  No results for input(s): SEDRATE in the last 72 hours. Cultures:   No results for input(s): CULTURE in the last 72 hours. No results for input(s): BC, Huel Gip in the last 72 hours. No results for input(s): CXSURG in the last 72 hours. Radiology reports as per the Radiologist  Radiology: CT HEAD WO CONTRAST    Result Date: 10/16/2022  Exam: CT OF THE BRAIN WITHOUT CONTRAST Clinical data: Altered mental status. Technique: Contiguous axial images are obtained from the skull base to vertex without intravenous contrast. Reformatted/MPR images were performed. Radiation dose: CTDIvol =73.51 mGy, DLP =1821 mGy x cm. Prior studies: CT scan of the brain dated 09/29/22. Findings:  Patchy hypoattenuation in the periventricular white matter compatible with chronic microvascular ischemic changes. No evidence of acute cortical infarction, hemorrhage, mass or mass effect. Ventricles are slightly more prominent than the sulci, unchanged, and likely due to asymmetric parenchymal volume loss. .  No hydrocephalus or abnormal extra-axial fluid collections are present. The posterior fossa is unremarkable. The skull base and calvarium are intact. The included portions of the paranasal sinuses and mastoid air cells are clear. 1. Chronic microvascular ischemic changes with age-appropriate parenchymal volume loss. 2. No evidence of acute cortical infarction or intracranial hemorrhage. 3. No definitive interval change. Recommendation: Follow up as clinically indicated. All CT scans at this facility utilize dose modulation, iterative reconstruction, and/or weight based dosing when appropriate to reduce radiation dose to as low as reasonably achievable. Electronically Signed by Emi Glasgow MD at 16-Oct-2022 08:42:16 AM             XR CHEST PORTABLE    Result Date: 10/16/2022  Patient: Jennifer Amato  Time Out: 07:02Exam(s): FILM CXR 1 VIEW EXAM:  XR Chest, 1 ViewCLINICAL HISTORY:   Reason for exam: central line. TECHNIQUE:  Frontal view of the chest.COMPARISON:  Earlier the same day. FINDINGS/IMPRESSION:       Interval placement of right-sided IJ central venous line with tip overlying the SVC. No other significant interval change. Continued attention on follow-up is recommended. Electronically signed by Lilly Gaffney MD on 10-16-22 at 0702    XR CHEST PORTABLE    Result Date: 10/16/2022  Patient: Brown Leiva  Time Out: 06:55Exam(s): FILM CXR 1 VIEW EXAM:  XR Chest, 1 ViewCLINICAL HISTORY:   Reason for exam: SOA. TECHNIQUE:  Frontal view of the chest.COMPARISON:  10/7/2022. FINDINGS/IMPRESSION:       Bilateral patchy consolidative opacities, interval worsening. Findings may represent pulmonary alveolar edema and/or multifocal pneumonia. No other significant interval change. Continued attention on follow-up is recommended. Electronically signed by Lilly Gaffney MD on 10-16-22 at 2605    CTA PULMONARY W CONTRAST    Result Date: 10/16/2022  Exam: CTA OF THE CHEST WITH CONTRAST Clinical data: Respiratory distress. Technique: Axial CT angiography images through the lungs were acquired with contrast and imaged using soft tissue and lung algorithms. Coronal, sagittal, and 3D volume reconstructions were performed. Reformatted/3D-MPR images. Radiation dose: CTDIvol =73.51 mGy, DLP =1821 mGy x cm. Prior studies: Radiographs of the chest dated 10/07/22 (report unavailable). Nuclear medicine pulmonary perfusion scan dated 09/30/22. More recent chest x-ray images and reports are not available. Findings: Lungs:  Small bilateral pleural effusions. Dense airspace abnormalities throughout the central upper lower lungs with relative sparing of the lung periphery. No evidence of pneumothorax Soft Tissues: No mediastinal, axillary or hilar adenopathy. Vascular: No filling defect within the pulmonary arteries with no evidence of pulmonary embolism to the segmental branch level.   Atherosclerotic calcification of the aortic arch, thoracic aorta, and coronary arteries. Grossly unremarkable sized heart. No  additional abnormality on 3D reformatted images. 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 Median sternotomy wires. No acute or destructive abnormality Upper Abdomen: Limited visualization of the solid upper abdominal organs is grossly unremarkable. Suboptimal evaluation due to streak artifact related to body habitus and exacerbated by scanning with patient's arms by side. 1. Central airspace abnormality small bilateral pleural fluid; consider pulmonary edema, pulmonary hemorrhage, atypical pneumonitis, alveolar proteinosis, aspiration. 2. No evidence of pulmonary embolism to the segmental branch level. Recommendation: Follow up as clinically indicated. All CT scans at this facility utilize dose modulation, iterative reconstruction, and/or weight based dosing when appropriate to reduce radiation dose to as low as reasonably achievable. Electronically Signed by Cisco Koenig MD at 16-Oct-2022 09:14:40 AM                Assessment     NSTEMI (non-ST elevated myocardial infarction). Continue medical management. Acute on chronic diastolic heart failure. Resolved. Acute kidney injury. Cautious hydration. Acute hypoxemic respiratory failure. Improving with diuresis. History of moderately differentiated hepatocellular carcinoma. Status post radio-embolization. Supportive care. Acute hypercapnic respiratory failure. Weaning as tolerated. Vent support. Please document 47 minutes of critical care time for patient assessment, chart review, discussion with staff, .       Dain Boast, DO

## 2022-10-20 NOTE — PROGRESS NOTES
Comprehensive Nutrition Assessment    Type and Reason for Visit:  Reassess    Nutrition Recommendations/Plan:   Modify current EN d/t Propofol being started. Change to   Vital High Protein goal rate 69ml/hr. Start at 30 and increase by 5 every 6 hours until goal rate is reached. Decrease free water flush to 10ml     Malnutrition Assessment:  Malnutrition Status: At risk for malnutrition (Comment) (10/20/22 1442)    Context:  Acute Illness     Findings of the 6 clinical characteristics of malnutrition:  Energy Intake:  Mild decrease in energy intake (Comment)  Weight Loss:  Unable to assess     Body Fat Loss:  No significant body fat loss     Muscle Mass Loss:  No significant muscle mass loss    Fluid Accumulation:  Mild Extremities   Strength:  Not Performed    Nutrition Assessment:    Pt now receiving Propofol 25.11ml/hr. Modifying current tf orders to Vital High Protein goal rate 69ml. Aware pt has residual of 195ml. Has had BM. Questioning current weight. Nursing to reassess    Nutrition Related Findings:    on vent Wound Type: Surgical Incision, Diabetic Ulcer       Current Nutrition Intake & Therapies:    Average Meal Intake: NPO  Average Supplements Intake: NPO  Current Tube Feeding (TF) Orders:  Feeding Route: Orogastric  Formula: Peptide Based High Protein  Schedule: Continuous  Feeding Regimen: Glucerna 1.2 goal rate 69ml/hr with 10ml free water flush  Additives/Modulars: None  Water Flushes: 10ml hourly  Current TF & Flush Orders Provides: Glucerna 1.5 @ 30ml/hr = 1080 kcals with 59g protein, 95g CHO and 546ml free water   720ml free water from flush  Goal TF & Flush Orders Provides: Vital High Protein 69ml/hr with 10ml free water flush = 1656 kcals with 144g protein, 183g CHO and 1389ml free water from formula.   Flush adds another 240ml of water    Anthropometric Measures:  Height: 6' (182.9 cm)  Ideal Body Weight (IBW): 178 lbs (81 kg)    Admission Body Weight: 205 lb (93 kg)  Current Body Weight: 205 lb (93 kg),   IBW. Current BMI (kg/m2): 27.8  Usual Body Weight: 214 lb (97.1 kg) (7/22)  % Weight Change (Calculated): -4.2  Weight Adjustment For: No Adjustment    BMI Categories: Overweight (BMI 25.0-29. 9)    Estimated Daily Nutrient Needs:  Energy Requirements Based On: Kcal/kg  Weight Used for Energy Requirements: Admission  Energy (kcal/day): 5403-1989 kcals (20-25 kcals/kg)  Weight Used for Protein Requirements: Ideal  Protein (g/day): 97-162g  Method Used for Fluid Requirements: 1 ml/kcal  Fluid (ml/day): 1743-8858 ml    Nutrition Diagnosis:   Inadequate oral intake related to acute injury/trauma, impaired respiratory function as evidenced by NPO or clear liquid status due to medical condition, intubation, nutrition support - enteral nutrition    Nutrition Interventions:   Food and/or Nutrient Delivery: Modify Tube Feeding  Nutrition Education/Counseling: No recommendation at this time, Education not indicated  Coordination of Nutrition Care: Continue to monitor while inpatient  Plan of Care discussed with: nursing    Goals:  Previous Goal Met: Progressing toward Goal(s)  Goals: Meet at least 75% of estimated needs       Nutrition Monitoring and Evaluation:   Behavioral-Environmental Outcomes: None Identified  Food/Nutrient Intake Outcomes: Enteral Nutrition Intake/Tolerance  Physical Signs/Symptoms Outcomes: Biochemical Data, Fluid Status or Edema, Weight, Skin    Discharge Planning:     Too soon to determine     Ronald Ibarra MS, RD, LD  Contact: [phone]

## 2022-10-20 NOTE — PROGRESS NOTES
MEDICAL ONCOLOGY PROGRESS NOTE    Pt Name: Deandre Huerta  MRN: [id number]  YOB: 1945  Date of evaluation: 10/20/2022    Subjective: Continue to be mechanically ventilated. Discussed bedside RN. Discussed with Dr. Priyanka Poon:  The patient is a 76years old male with a complex history of  UC s/p colectomy with ileostomy 27 years ago, hypertension, diabetes mellitus type 2, hyperlipidemia, CAD s/p CABG x5 , PVD, diabetic neuropathy, history of osteomyelitis with transmetatarsal amputation, history of COVID-19 in July 2022 and a history of hepatocellular carcinoma status post radioembolization on 9/20/2022 at [facility]. Initially presented to [facility] 9/22/2022 with weakness, altered mental status. Patient was found to have embolic stroke related to hypercoagulable state in malignancy and underlying/undetected atrial fibrillation. After these events the patient has had several hospitalization in the complicated hospital course over the last few weeks. He is currently in the ICU with acute respiratory failure on mechanical ventilation due to aspiration pneumonia. He is critically ill. He is hypotensive and therefore on pressors. He has elevated troponin. History of acute on chronic diastolic heart failure. I was consulted due to the history of hepatocellular carcinoma. He is status post radio-embolization at [facility].      Past Medical History:    Past Medical History:   Diagnosis Date    Atherosclerosis of native arteries of the extremities with ulceration(440.23) 08/25/2014    CAD (coronary artery disease)     sees dr at [facility] (dr. Donna Resendiz)    Cataracts, bilateral     Diabetes mellitus ([facility]     Diabetic neuropathy [facility])     ED (erectile dysfunction)     HTN (hypertension)     Hypercholesterolemia     Open wound of right foot     states this is not the operative foot    Palliative care patient 10/07/2022    Ulcerative colitis ([facility] Past Surgical History:    Past Surgical History:   Procedure Laterality Date    CHOLECYSTECTOMY      CORONARY ARTERY BYPASS GRAFT  2003    EYE SURGERY      jason. cat    ILEOSTOMY OR JEJUNOSTOMY  1995    due to surgery from ulcerative colitis    [place] AMPUTATION FOOT,TRANSMETATARSAL Left 1/30/2018    TRANSMET AMPUTATION performed by Noy Alvarez MD at [address] [place] [address] Left 6/12/2017    S. Left great toe ray amputation through the metatarsal level. VASCULAR SURGERY  8/26/14 S    Percutaneous cannulation, left common femoral artery, with 5-Mozambican glidesheath. Suprarenal abdominal aortogram with bilateral iliofemoral arteriogra. Bilateral lower extremity arteriogram.    VASCULAR SURGERY  09/08/14 S    Right femoral to tibioperoneal trunk bypass with in situ right greater saphenous vein. Right common femoral endarterectomy with vascular patch repair. Right tibioperoneal trunk vein patch and endarterectomy. Completion right lower exteremity arteriograms    VASCULAR SURGERY  1/12/2015 S    Percutaneous cannulation left common femoral artery with 5-Mozambican and later 6-Mozambican pinnacle destination sheath. Suprarenal abdominal aortogram with bilateral iliofemoral arteriograms. Bilateral lower extremity arteriograms. Coil embolization right greater saphenous vein bypass branch with 8 mm x 5 cm coil and seven 5 mm x 5 cm coils. VASCULAR SURGERY  1/12/2015 S     Right posterior tibial artery balloon angioplasty 2.5 mm x 100 mm vascutrak balloon. Right peroneal artery balloon angioplasty with 2.5 x 100 mm vascutrak balloon. Completion right lower extremity arteriograms. VASCULAR SURGERY  06/17/2017    SJS. Percutaneous cannulation right common femoral artery with ultrasound guidance and 5 Mozambican and later 6 Mozambican sheath placement. Suprarenal abdominal aorta gram with bilateral lower extremity arteriogram.Left superficial femoral/popliteal/tibial peroneal trunk/posterior tibial artery atherectomy with csi 1.25 solid crown and 2.0 solid crown. Left tibial peroneal trunk and posterior tibial artery     VASCULAR SURGERY  06/17/2017    CONT.balloon angioplasty with 3mm x 100 mmand 3.5mm x 300mm vascutrak balloon. Left popliteal artery balloon angioplasty with 5 mmx 100mm lutonix drug coated balloon. #6 left superficial femoral artery balloon angioplasty with 3.6 hpm182 mm lutonix grug coated balloons. Completion left lower extremity arteriograms. Mynx closure right common femoral artery puncture site. VASCULAR SURGERY  01/20/2018    SJS. Left transmetetarsal amputation. Social History:    Smoked cigarettes for 15 years, quit a couple of weeks ago. Denies alcohol or other substances. Aly Lin    Family History:   Family History   Problem Relation Age of Onset    Cancer Mother     Cancer Father        Current Hospital Medications:    Current Facility-Administered Medications   Medication Dose Route Frequency Provider Last Rate Last Admin    norepinephrine (LEVOPHED) 16 mg in sodium chloride 0.9 % 250 mL infusion  1-100 mcg/min IntraVENous Continuous Ct Bell MD 32.8 mL/hr at 10/20/22 0340 35 mcg/min at 10/20/22 0340    lidocaine (XYLOCAINE) 2 % uro-jet   Topical PRN Ct Bell MD        chlorhexidine (PERIDEX) 0.12 % solution 15 mL  15 mL Mouth/Throat BID Ct Bell MD   15 mL at 10/19/22 2231    famotidine (PEPCID) 20 mg in sodium chloride (PF) 10 mL injection  20 mg IntraVENous BID Ct Bell MD   20 mg at 10/19/22 2237    polyvinyl alcohol (LIQUIFILM TEARS) 1.4 % ophthalmic solution 1 drop  1 drop Both Eyes Q4H Ct Bell MD   1 drop at 10/20/22 7167    And    lubrifresh P.M. (artificial tears) ophthalmic ointment   Both Eyes Q4H Ct Bell MD   Given at 10/20/22 0542    fentaNYL (SUBLIMAZE) 2500 mcg in sodium chloride 0.9% 250 mL premix   mcg/hr IntraVENous Continuous Ct Bell MD 2.5 mL/hr at 10/20/22 0340 25 mcg/hr at 10/20/22 0340    guaiFENesin (ROBITUSSIN) 100 MG/5ML liquid 200 mg  200 mg Oral Q4H PRN Carmencita Ro, DO        propofol injection  5-50 mcg/kg/min IntraVENous Continuous Carmencita Ro, DO 11.2 mL/hr at 10/20/22 0340 20 mcg/kg/min at 10/20/22 0340    metronidazole (FLAGYL) 500 mg in 0.9% NaCl 100 mL IVPB premix  500 mg IntraVENous Q8H Mera Rocha MD   Stopped at 10/20/22 0318    levoFLOXacin (LEVAQUIN) 750 MG/150ML infusion 750 mg  750 mg IntraVENous Q24H Mera Rocha MD   Stopped at 10/20/22 0018    insulin lispro (HUMALOG) injection vial 0-8 Units  0-8 Units SubCUTAneous TID WC Carmencita Ro, DO   2 Units at 10/18/22 1233    insulin lispro (HUMALOG) injection vial 0-4 Units  0-4 Units SubCUTAneous Nightly Carmencita Ro, DO        glucose chewable tablet 16 g  4 tablet Oral PRN Carmencita Ro, DO        dextrose bolus 10% 125 mL  125 mL IntraVENous PRN Carmencita Ro, DO        Or    dextrose bolus 10% 250 mL  250 mL IntraVENous PRN Carmencita Ro, DO        glucagon (rDNA) injection 1 mg  1 mg SubCUTAneous PRN Carmencita Ro, DO        dextrose 10 % infusion   IntraVENous Continuous PRN Carmencita Ro, DO        potassium chloride 20 mEq/50 mL IVPB (Central Line)  20 mEq IntraVENous PRN Carmencita Ro, DO   Stopped at 10/18/22 1139    carvedilol (COREG) tablet 6.25 mg  6.25 mg Oral BID  Carmencita Ro, DO   6.25 mg at 10/18/22 1845    aspirin EC tablet 81 mg  81 mg Oral Daily Carmencita Ro, DO   81 mg at 06/33/60 7891    folic acid (FOLVITE) tablet 800 mcg  800 mcg Oral Daily Carmencita Ro, DO   800 mcg at 10/19/22 7961    apixaban (ELIQUIS) tablet 2.5 mg  2.5 mg Oral BID Carmencita Ro, DO   2.5 mg at 10/19/22 2230    atorvastatin (LIPITOR) tablet 40 mg  40 mg Oral Nightly Carmencita Ro, DO   40 mg at 10/19/22 2230    megestrol acetate (MEGACE) 400 MG/10ML suspension 800 mg  800 mg Oral Daily Carmencita Ro DO   800 mg at 10/19/22 5064    benzonatate (TESSALON) capsule 100 mg  100 mg Oral TID PRN Chula Chhaya, DO        sennosides-docusate sodium (SENOKOT-S) 8.6-50 MG tablet 1 tablet  1 tablet Oral BID Chula Shaver, DO   1 tablet at 10/19/22 2230    pantoprazole (PROTONIX) tablet 40 mg  40 mg Oral QAM AC Chula Alto, DO   40 mg at 10/20/22 0542    insulin glargine (LANTUS) injection vial 15 Units  15 Units SubCUTAneous BID Chula Evangeline, DO   15 Units at 10/19/22 9502       Allergies: Allergies   Allergen Reactions    Keflex [Cephalexin] Rash           Objective   BP (!) 108/38   Pulse (!) 101   Temp 98 °F (36.7 °C) (Temporal)   Resp 20   Ht 6' (1.829 m)   Wt 162 lb (73.5 kg)   SpO2 93%   BMI 21.97 kg/m²     PHYSICAL EXAM:  CONSTITUTIONAL: sedated, intubated, mechanically  EYES: Non icteric,  pupils equal round   ENT: Orotracheal intubation  NECK: Supple, no masses. No palpable thyroid mass  CHEST/LUNGS: Mechanically ventilated  CARDIOVASCULAR:tachycardic, no murmurs. No lower extremity edema  ABDOMEN: soft , active bowel sounds, no HSM.   No palpable masses  EXTREMITIES: warm  SKIN: warm, dry with no rashes or lesions  NEUROLOGIC: Sedated      LABORATORY RESULTS REVIEWED/ANALYZED BY ME:  Recent Labs     10/19/22  1333 10/16/22  0405 10/15/22  0153   WBC 27.1* 20.0* 14.9*   HGB 13.2* 14.8 12.9*   HCT 42.4 46.8 40.1*   .7* 104.7* 102.6*    159 131       Lab Results   Component Value Date     10/20/2022    K 3.8 10/20/2022     10/20/2022    CO2 31 (H) 10/20/2022    BUN 50 (H) 10/20/2022    CREATININE 1.6 (H) 10/20/2022    GLUCOSE 126 (H) 10/20/2022    CALCIUM 7.5 (L) 10/20/2022    PROT 6.1 (L) 10/19/2022    LABALBU 1.8 (L) 10/19/2022    BILITOT 1.6 (H) 10/19/2022    ALKPHOS 334 (H) 10/19/2022     (H) 10/19/2022    ALT 91 (H) 10/19/2022    LABGLOM 43 (A) 10/20/2022    GFRAA >59 10/17/2022       Lab Results   Component Value Date    INR 1.71 (H) 10/16/2022    INR 1.06 01/23/2018    INR 1.16 (H) 09/08/2014    PROTIME 20.3 (H) 10/16/2022    PROTIME 13.7 01/23/2018    PROTIME 14.4 (H) 09/08/2014       RADIOLOGY STUDIES REPORT/REVIEWED AND INTERPRETED BY ME:  XR CHEST (2 VW)    Result Date: 10/5/2022  1. Status post median sternotomy 2. NO evidence of airspace consolidation or pulmonary venous congestion. CT HEAD WO CONTRAST    Result Date: 10/16/2022  1. Chronic microvascular ischemic changes with age-appropriate parenchymal volume loss. 2. No evidence of acute cortical infarction or intracranial hemorrhage. 3. No definitive interval change. Recommendation: Follow up as clinically indicated. All CT scans at this facility utilize dose modulation, iterative reconstruction, and/or weight based dosing when appropriate to reduce radiation dose to as low as reasonably achievable. Electronically Signed by Miguel A Clemente MD at 16-Oct-2022 08:42:16 AM             CT Head WO Contrast    Result Date: 9/30/2022  1. Nonspecific but presumed microvascular changes in the periventricular white matter. 2.  Atrophic changes. 3.  No acute hemorrhage or extra-axial collection. Recommendation: Follow up as clinically indicated. All CT scans at this facility utilize dose modulation, iterative reconstruction, and/or weight based dosing when appropriate to reduce radiation dose to as low as reasonably achievable. Electronically Signed by Duc Guadarrama MD at 30-Sep-2022 08:40:44 AM             NM LUNG SCAN PERFUSION ONLY    Result Date: 10/5/2022  Normal perfusion involving both lungs. No nuclear medicine findings suggestive of pulmonary embolism. XR CHEST PORTABLE    Result Date: 10/19/2022  1. Interval intubation with endotracheal tube terminating 3.0 cm above the level of the leif. 2. Nasogastric tube terminates in the proximal stomach with sidehole in the distal esophagus. Consider advancing 3-5 cm. 3. Extensive bilateral  lung opacities, unchanged from prior study. Electronically signed by Betti Cabot, M.D. on 10-19-22 at 0194    XR CHEST PORTABLE    Result Date: 10/16/2022  FINDINGS/IMPRESSION:       Interval placement of right-sided IJ central venous line with tip overlying the SVC. No other significant interval change. Continued attention on follow-up is recommended. Electronically signed by Ac Prather MD on 10-16-22 at 0702    XR CHEST PORTABLE    Result Date: 10/16/2022  FINDINGS/IMPRESSION:       Bilateral patchy consolidative opacities, interval worsening. Findings may represent pulmonary alveolar edema and/or multifocal pneumonia. No other significant interval change. Continued attention on follow-up is recommended. Electronically signed by Ac Prather MD on 10-16-22 at 0655    XR CHEST PORTABLE    Result Date: 10/7/2022  1. Status post median sternotomy. 2.Bilateral patchy perihilar infiltrate    XR CHEST PORTABLE    Result Date: 9/30/2022  Mild central vascular congestion. In addition, cannot exclude mild patchy opacities in the mid and lower left lung, which may be due to atelectasis/scarring, vascular congestion, or early airspace disease. Otherwise, no lobar consolidations. No pneumothorax or pleural effusions. Recommendation: Follow up as clinically indicated. Electronically Signed by Marian Saini MD at 30-Sep-2022 05:13:14 AM             CTA PULMONARY W CONTRAST    Result Date: 10/16/2022  1. Central airspace abnormality small bilateral pleural fluid; consider pulmonary edema, pulmonary hemorrhage, atypical pneumonitis, alveolar proteinosis, aspiration. 2. No evidence of pulmonary embolism to the segmental branch level. Recommendation: Follow up as clinically indicated. All CT scans at this facility utilize dose modulation, iterative reconstruction, and/or weight based dosing when appropriate to reduce radiation dose to as low as reasonably achievable.  Electronically Signed by Aguilar Feng MD at 16-Oct-2022 09:14:40 AM              ASSESSMENT:  Hepatocellular Carcinoma  -S/p Radio-embolization Y-90 on 9/20/2022 at [facility]  -AFP = 11 on 7/8/2022  -No intervention from oncology standpoint  - CT chest staging: Subcentimeter pulmonary nodules   -He had a small tumor and was treated aggressively with Y 90 radioembolization. It was unclear whether or not he had pulmonary metastasis. Plan was to repeat CT scan in the near future. Aspiration pneumonia/acute respiratory failure  -Antibiotics as per primary team  -Started on Levaquin/metronidazole on 10/19/2022    Septic shock  -Continue antibiotics  -Continue vasopressors    Acute kidney injury  Labs Renal Latest Ref Rng & Units 10/20/2022 10/19/2022 10/19/2022 10/18/2022 10/17/2022   BUN 8 - 23 mg/dL 50(H) 39(H) 32(H) 30(H) 20   Cr 0.5 - 1.2 mg/dL 1.6(H) 1.0 0.8 0.8 0.7   K 3.5 - 5.0 mmol/L 3.9 3.6 3.7 3. 1(L) 3.4(L)   Na 136 - 145 mmol/L 139 141 138 138 136   -As per primary team      Neutrophil leukocytosis  -Likely reactive  -Continue to monitor  Lab Results   Component Value Date    WBC 27.1 (H) 10/19/2022    HGB 13.2 (L) 10/19/2022    HCT 42.4 10/19/2022    .7 (H) 10/19/2022     17/25/2612     Embolic stroke  -Apixaban 2.5 mg p.o. twice daily  -Low-dose aspirin    PLAN:  No oncologic intervention here  Continue current supportive care  Will discuss with Dr. Carlos Eduardo Gómez       (Please note that portions of this note were completed with a voice recognition program. Efforts were made to edit the dictations but occasionally words are mis-transcribed.)      Steven Lozano MD    10/20/22  5:46 AM

## 2022-10-20 NOTE — CONSULTS
INFECTIOUS DISEASES CONSULT NOTE    Patient:  Nadeen Sandoval 76 y.o. male  ROOM # [unfilled]  YOB: 1945  MRN: [id number]  CSN:  [id number]  Admit date: 10/16/2022   Admitting Physician: Maximilian Flores DO  Primary Care Physician: Andrew Aviles APRN - CNP  REFERRING PROVIDER: No ref. provider found    Reason for Consultation: Aspiration pneumonia    History of Present Illness/Chief Complaint: 75-year-old man. Currently intubated in ICU. History is obtained from daughter at bedside along with chart review. His daughter indicates he has had a cancer involving the liver. She indicates he radiation treatment initiated at [address] [place] [address] on September 20. He had been released home. He was taken to [facility] per her report on September 22. He was diagnosed with embolic strokes. After treatment at [facility] she indicates he was transferred to eighth floor rehab at [facility]. She indicates while in rehab unit he required transfer to acute care seventh floor due to hyperkalemia. She indicates he was then transferred back to rehab. She indicates she remained there until about 5 days ago when he was transferred to [address] [place] [address]. On Sunday he was brought back to [facility] after having unresponsiveness at the nursing home. He was placed on a vent. He has been admitted to ICU. He is currently on 70% FiO2 and 10 of PEEP. He has been on antibiotic treatment with vancomycin, metronidazole, and levofloxacin. Infectious disease asked to evaluate and offer recommendations.     Current Scheduled Medications:    [START ON 10/21/2022] famotidine (PEPCID) injection  20 mg IntraVENous Daily    [START ON 10/21/2022] levofloxacin  750 mg IntraVENous Q48H    vancomycin (VANCOCIN) intermittent dosing (placeholder)   Other RX Placeholder    vancomycin  1,750 mg IntraVENous Once    chlorhexidine  15 mL Mouth/Throat BID    polyvinyl alcohol  1 drop Both Eyes Q4H    And    artificial tears   Both Eyes Q4H    metroNIDAZOLE  500 mg IntraVENous Q8H    insulin lispro  0-8 Units SubCUTAneous TID WC    insulin lispro  0-4 Units SubCUTAneous Nightly    aspirin  81 mg Oral Daily    folic acid  366 mcg Oral Daily    apixaban  2.5 mg Oral BID    atorvastatin  40 mg Oral Nightly    sennosides-docusate sodium  1 tablet Oral BID    pantoprazole  40 mg Oral QAM AC    insulin glargine  15 Units SubCUTAneous BID     Current PRN Medications:  perflutren lipid microspheres, lidocaine, guaiFENesin, glucose, dextrose bolus **OR** dextrose bolus, glucagon (rDNA), dextrose, potassium chloride, benzonatate    Allergies: Allergies   Allergen Reactions    Keflex [Cephalexin] Rash     Past Medical History: Coronary artery disease. Diabetes mellitus. Hypertension. Hypercholesterolemia. Ulcerative colitis. Past Surgical History: Cholecystectomy. Coronary artery bypass grafting. Eye surgery. Ileostomy. Toe amputation. Social History: Quit tobacco use. No alcohol use. Family History: Cancer    Exposure History: No close contacts of been    Review of Systems: Unobtainable from patient due to mental status, sedation, mechanical ventilation. Vital Signs:  BP (!) 99/43   Pulse 89   Temp 98.1 °F (36.7 °C) (Temporal)   Resp 20   Ht 6' (1.829 m)   Wt 162 lb (73.5 kg)   SpO2 97%   BMI 21.97 kg/m²  Temp (24hrs), Av.9 °F (36.6 °C), Min:97.5 °F (36.4 °C), Max:98.2 °F (36.8 °C)    Physical Exam:   Vital signs reviewed. Sclera nonicteric  Lungs a few scattered coarse crackles  Heart without murmur  Abdomen with bowel sounds present. Mild distention.   Skin without rash    Lab Results:  CBC:   Recent Labs     10/19/22  1333 10/20/22  1325   WBC 27.1* 25.1*   HGB 13.2* 10.8*   HCT 42.4 35.2*    186   LYMPHOPCT 0.5*  --    MONOPCT 3.7  --      CMP:   Recent Labs     10/19/22  0207 10/19/22  0736 10/19/22  0910 10/19/22  1333 10/19/22  2224 10/20/22  0208 10/20/22  0439     --  141  --   -- 139  --    K 3.7   < > 3.6   < > 3.5 3.9 3.8   CL 99  --  97*  --   --  101  --    CO2 38*  --  36*  --   --  31*  --    BUN 32*  --  39*  --   --  50*  --    CREATININE 0.8  --  1.0  --   --  1.6*  --    CALCIUM 8.3*  --  8.3*  --   --  7.5*  --    BILITOT  --   --  1.6*  --   --   --   --    ALKPHOS  --   --  334*  --   --   --   --    ALT  --   --  91*  --   --   --   --    AST  --   --  125*  --   --   --   --    GLUCOSE 117*  --  122*  --   --  126*  --     < > = values in this interval not displayed. Culture:   Blood cultures October 16, 2022-no growth  Blood cultures October 16, 2022-no growth    Radiology:     Chest x-ray October 19, 2022:  Impression   Extensive pulmonary parenchymal opacification bilaterally greater on the right. Endotracheal tube, nasogastric tube, right IJ line and sternal wires. Recommendation: Follow up as clinically indicated.     Electronically Signed by Jasbir Romo MD at 20-Oct-2022 10:45:45 AM EST      Additional Studies Reviewed:     Impression:   Non-ST elevation myocardial infarction  Aspiration pneumonia  Moderately differentiated hepatocellular carcinoma  History embolic stroke  Cephalosporin allergy listed    Recommendations:    Continue current antibiotic treatment vancomycin, levofloxacin, and Flagyl  Await sputum culture results  Try to get clarification on antibiotic allergy history  Continue supportive care  Continue to follow    Olivia Stone MD  10/20/22  4:46 PM

## 2022-10-20 NOTE — PLAN OF CARE
Problem: Nutrition Deficit:  Goal: Optimize nutritional status  Outcome: Progressing  Flowsheets (Taken 10/20/2022 6866)  Nutrient intake appropriate for improving, restoring, or maintaining nutritional needs:   Assess nutritional status and recommend course of action   Recommend, monitor, and adjust tube feedings and TPN/PPN based on assessed needs

## 2022-10-20 NOTE — PROGRESS NOTES
[facility] Pharmacokinetic Monitoring Service - Vancomycin     Kalina Andrews is a 76 y.o. male starting on vancomycin therapy for  Aspiration PNA. Pharmacy consulted by  Dr. Jaleesa Garcia for monitoring and adjustment. Target Concentration: Goal AUC/SREE 400-600 mg*hr/L    Additional Antimicrobials: Levaquin, Flagyl. Pertinent Laboratory Values: Wt Readings from Last 1 Encounters:   10/20/22 162 lb (73.5 kg)     Temp Readings from Last 1 Encounters:   10/20/22 98.1 °F (36.7 °C) (Temporal)     Estimated Creatinine Clearance: 39 mL/min (A) (based on SCr of 1.6 mg/dL (H)). Recent Labs     10/19/22  0910 10/19/22  1333 10/20/22  0208 10/20/22  1325   CREATININE 1.0  --  1.6*  --    WBC  --  27.1*  --  25.1*     Procalcitonin: > 100 on 10/20    Pertinent Cultures:  Culture Date Source Results   10/16/22 blood No growth   10/20/22 blood sent   10/20/22 sputum sent   MRSA Nasal Swab: not ordered. Order placed by pharmacy. Plan:  Concentration-guided dosing due to renal impairment/insufficiency  Start vancomycin with 1750mg IV X1 loading dose.   Renal labs as indicated   Vancomycin concentration ordered for 10/21 @ 1100   Pharmacy will continue to monitor patient and adjust therapy as indicated    Thank you for the consult,  Miesha Strickland, [address] [place] [address]  10/20/2022 3:51 PM

## 2022-10-20 NOTE — PROGRESS NOTES
Pulmonary and Critical Care Progress note. [facility]    MRN# [id number]    Acct# [de-identified]  10/20/2022   5:36 PM CDT    Referring Georgie Avila DO      Chief Complaint: resp failure on the vent. HPI: The patient is intubated on the vent. Medications    [START ON 10/21/2022] famotidine (PEPCID) injection, 20 mg, IntraVENous, Daily    [START ON 10/21/2022] levofloxacin, 750 mg, IntraVENous, Q48H    vancomycin (VANCOCIN) intermittent dosing (placeholder), , Other, RX Placeholder    vancomycin, 1,750 mg, IntraVENous, Once    chlorhexidine, 15 mL, Mouth/Throat, BID    polyvinyl alcohol, 1 drop, Both Eyes, Q4H **AND** artificial tears, , Both Eyes, Q4H    metroNIDAZOLE, 500 mg, IntraVENous, Q8H    insulin lispro, 0-8 Units, SubCUTAneous, TID WC    insulin lispro, 0-4 Units, SubCUTAneous, Nightly    aspirin, 81 mg, Oral, Daily    folic acid, 251 mcg, Oral, Daily    apixaban, 2.5 mg, Oral, BID    atorvastatin, 40 mg, Oral, Nightly    sennosides-docusate sodium, 1 tablet, Oral, BID    pantoprazole, 40 mg, Oral, QAM AC    insulin glargine, 15 Units, SubCUTAneous, BID     Review of Systems:  Unable to obtain due to mental status     Physical Exam:  BP (!) 99/43   Pulse 89   Temp 98.1 °F (36.7 °C) (Temporal)   Resp 20   Ht 6' (1.829 m)   Wt 162 lb (73.5 kg)   SpO2 97%   BMI 21.97 kg/m²   Intake/Output Summary (Last 24 hours) at 10/20/2022 1736  Last data filed at 10/20/2022 1500  Gross per 24 hour   Intake 4133.4 ml   Output 1795 ml   Net 2338.4 ml       General appearance:elderly male on the vent . HEENT:normocephalic atraumatic, ETT in place.    Heart:S1S2 no murmurs  Lungs:diminished bilaterally no rubs or tenderness or dullness to percussion  Abdomen:Soft non tender no organomegaly  Extremities:no clubbing cyanosis or edema  Neuro:no focal findings  Skin:intact    Recent Labs     10/19/22  1333 10/20/22  1325   WBC 27.1* 25.1*   RBC 4.05* 3.29*   HGB 13.2* 10.8*   HCT 42.4 35.2*    186   .7* 107.0*   MCH 32.6* 32.8*   MCHC 31.1* 30.7*   RDW 13.8 14.6*      Recent Labs     10/18/22  0127 10/19/22  0207 10/19/22  0736 10/19/22  0910 10/19/22  1333 10/19/22  2224 10/20/22  0208 10/20/22  0439    138  --  141  --   --  139  --    K 3.1* 3.7   < > 3.6 3.2 3.5 3.9 3.8   CL 97* 99  --  97*  --   --  101  --    CO2 35* 38*  --  36*  --   --  31*  --    BUN 30* 32*  --  39*  --   --  50*  --    CREATININE 0.8 0.8  --  1.0  --   --  1.6*  --    CALCIUM 7.5* 8.3*  --  8.3*  --   --  7.5*  --    GLUCOSE 369* 117*  --  122*  --   --  126*  --     < > = values in this interval not displayed. Recent Labs     10/19/22  0849 10/19/22  0859 10/19/22  1333 10/19/22  2224 10/20/22  0439   PHART 7.040* 7.360 7.440 7.310* 7.330*   STH1MZH 150.0* 64.0* 51.0* 64.0* 60.0*   PO2ART 47.0* 57.0* 57.0* 101.0* 78.0*   JXO8LFC na* 36.2* 34.6* 32.2* 31.6*   F3LVIIHI 70.1* 89.6* 90.8 96.3 94.1   BEART na* 8.4* 8.8* 4.1* 4.2*     Recent Labs     10/19/22  0207 10/19/22  0207 10/19/22  0910 10/20/22  0208 10/20/22  1324   AST  --   --  125*  --   --    ALT  --   --  91*  --   --    ALKPHOS  --   --  334*  --   --    BILITOT  --   --  1.6*  --   --    MG 2.3  --   --  2.2  --    CALCIUM 8.3*  --  8.3* 7.5*  --    PROBNP 1,448  --   --   --   --    LACTA  --   --   --   --  4.3*   PROCAL  --    < > 63.12* >100.00*  --     < > = values in this interval not displayed. No results for input(s): BC, LABGRAM, CULTRESP, BFCX in the last 72 hours. Radiograph: CT HEAD WO CONTRAST    Result Date: 10/16/2022  1. Chronic microvascular ischemic changes with age-appropriate parenchymal volume loss. 2. No evidence of acute cortical infarction or intracranial hemorrhage. 3. No definitive interval change. Recommendation: Follow up as clinically indicated.  All CT scans at this facility utilize dose modulation, iterative reconstruction, and/or weight based dosing when appropriate to reduce radiation dose to as low as reasonably achievable. Electronically Signed by Alliosn Wilkes MD at 16-Oct-2022 08:42:16 AM             XR CHEST PORTABLE    Result Date: 10/20/2022  Extensive pulmonary parenchymal opacification bilaterally greater on the right. Endotracheal tube, nasogastric tube, right IJ line and sternal wires. Recommendation: Follow up as clinically indicated. Electronically Signed by Ramon Manley MD at 20-Oct-2022 10:45:45 AM EST             XR CHEST PORTABLE    Result Date: 10/19/2022  Severe multilobar consolidation Recommendation: Follow up as clinically indicated. Electronically Signed by Roderick Gruber MD at 19-Oct-2022 11:20:15 PM EST             XR CHEST PORTABLE    Result Date: 10/19/2022  1. Interval intubation with endotracheal tube terminating 3.0 cm above the level of the leif. 2. Nasogastric tube terminates in the proximal stomach with sidehole in the distal esophagus. Consider advancing 3-5 cm. 3. Extensive bilateral  lung opacities, unchanged from prior study. Electronically signed by Jose Elias Bar M.D. on 10-19-22 at 1133    XR CHEST PORTABLE    Result Date: 10/16/2022  FINDINGS/IMPRESSION:       Interval placement of right-sided IJ central venous line with tip overlying the SVC. No other significant interval change. Continued attention on follow-up is recommended. Electronically signed by Jono Head MD on 10-16-22 at 0702    XR CHEST PORTABLE    Result Date: 10/16/2022  FINDINGS/IMPRESSION:       Bilateral patchy consolidative opacities, interval worsening. Findings may represent pulmonary alveolar edema and/or multifocal pneumonia. No other significant interval change. Continued attention on follow-up is recommended. Electronically signed by Jono Head MD on 10-16-22 at 3286    CTA PULMONARY W CONTRAST    Result Date: 10/16/2022  1.  Central airspace abnormality small bilateral pleural fluid; consider pulmonary edema, pulmonary hemorrhage, atypical pneumonitis, alveolar proteinosis, aspiration. 2. No evidence of pulmonary embolism to the segmental branch level. Recommendation: Follow up as clinically indicated. All CT scans at this facility utilize dose modulation, iterative reconstruction, and/or weight based dosing when appropriate to reduce radiation dose to as low as reasonably achievable. Electronically Signed by Fab Maza MD at 16-Oct-2022 09:14:40 AM                My radiograph interpretation/independent review of imaging: reviewed. Problem list generated by [facility] Problems             Last Modified POA    * (Principal) NSTEMI (non-ST elevated myocardial infarction) ([address] 10/16/2022 Yes    Palliative care patient 10/20/2022 Yes    Hepatocellular carcinoma ([address] 10/20/2022 Yes    Overview Signed 10/10/2022  9:03 AM by SHAHNAZ Ellison CNP     Formatting of this note might be different from the original.  Added automatically from request for surgery [id number]         Acute respiratory failure with hypoxia ([address] 10/19/2022 Yes    Ulcerative colitis ([address] 10/20/2022 Yes    History of stroke 10/20/2022 Yes    Shock ([address] 10/20/2022 Yes    Coronary artery disease involving native coronary artery without angina pectoris (Chronic) 10/20/2022 Yes    Type 2 diabetes mellitus with diabetic peripheral angiopathy and gangrene, with long-term current use of insulin ([address] (Chronic) 10/20/2022 Yes        Pulmonary Assessment/Plan:     Acute hypoxic hypercapnic respiratory failure. intubated on mechanical ventilation. Continue mechanical ventilation as necessary to assure adequate gas exchange. Weaning as feasible. sepsis due to aspiration and UTI. Empirical antibiotic therapy expand coverage, ID consult, add vanc. Sputum cultures. Non-ST elevation MI. Cardiology following. Transaminitis likely secondary to the above and hepatocellular carcinoma with recent radiation. DVT and GI prophylaxis.   Discussed with the nursing staff and family at the bedside. Poor prognosis. Critical care time 36 min        Mera Rocha MD, [facility]    The above note was generated using voice recognition software. Inadvertent typographical errors in transcription may have occurred.       Electronically signed by Fernando Chaves MD on 10/20/22 at 5:36 PM

## 2022-10-21 PROBLEM — Z86.73 HISTORY OF STROKE: Status: RESOLVED | Noted: 2022-01-01 | Resolved: 2022-01-01

## 2022-10-21 PROBLEM — I21.4 NSTEMI (NON-ST ELEVATED MYOCARDIAL INFARCTION) (HCC): Status: RESOLVED | Noted: 2022-01-01 | Resolved: 2022-01-01

## 2022-10-21 PROBLEM — R57.9 SHOCK (HCC): Status: RESOLVED | Noted: 2022-01-01 | Resolved: 2022-01-01

## 2022-10-21 PROBLEM — K51.90 ULCERATIVE COLITIS (HCC): Status: RESOLVED | Noted: 2022-01-01 | Resolved: 2022-01-01

## 2022-10-21 NOTE — PROGRESS NOTES
MEDICAL ONCOLOGY PROGRESS NOTE    Pt Name: Chrissy Johnson  MRN: [id number]  YOB: 1945  Date of evaluation: 10/21/2022    Subjective: Continues to be mechanically ventilated, sedated. FiO2 70%, PEEP 10. Unfortunately, he has been on 2 pressors. Worsening kidney function. Daughter was at bedside. They have decided to proceed with hospice care at this point. HISTORY OF PRESENT ILLNESS:  The patient is a 76years old male with a complex history of  UC s/p colectomy with ileostomy 27 years ago, hypertension, diabetes mellitus type 2, hyperlipidemia, CAD s/p CABG x5 , PVD, diabetic neuropathy, history of osteomyelitis with transmetatarsal amputation, history of COVID-19 in July 2022 and a history of hepatocellular carcinoma status post radioembolization on 9/20/2022 at [facility]. Initially presented to [facility] 9/22/2022 with weakness, altered mental status. Patient was found to have embolic stroke related to hypercoagulable state in malignancy and underlying/undetected atrial fibrillation. After these events the patient has had several hospitalization in the complicated hospital course over the last few weeks. He is currently in the ICU with acute respiratory failure on mechanical ventilation due to aspiration pneumonia. He is critically ill. He is hypotensive and therefore on pressors. He has elevated troponin. History of acute on chronic diastolic heart failure. I was consulted due to the history of hepatocellular carcinoma. He is status post radio-embolization at [facility].      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 Leroy Rossi)    Cataracts, bilateral     Diabetes mellitus ([facility]     Diabetic neuropathy ([facility]     ED (erectile dysfunction)     HTN (hypertension)     Hypercholesterolemia     Open wound of right foot     states this is not the operative foot    Palliative care patient 10/07/2022    Ulcerative colitis [facility])        Past Surgical History:    Past Surgical History:   Procedure Laterality Date    CHOLECYSTECTOMY      CORONARY ARTERY BYPASS GRAFT  2003    EYE SURGERY      jason. cat    ILEOSTOMY OR JEJUNOSTOMY  1995    due to surgery from ulcerative colitis    MO AMPUTATION FOOT,TRANSMETATARSAL Left 1/30/2018    TRANSMET AMPUTATION performed by Gilmer Naqvi MD at [facility] Left 6/12/2017    S. Left great toe ray amputation through the metatarsal level. VASCULAR SURGERY  8/26/14 S    Percutaneous cannulation, left common femoral artery, with 5-Luxembourger glidesheath. Suprarenal abdominal aortogram with bilateral iliofemoral arteriogra. Bilateral lower extremity arteriogram.    VASCULAR SURGERY  09/08/14 S    Right femoral to tibioperoneal trunk bypass with in situ right greater saphenous vein. Right common femoral endarterectomy with vascular patch repair. Right tibioperoneal trunk vein patch and endarterectomy. Completion right lower exteremity arteriograms    VASCULAR SURGERY  1/12/2015 S    Percutaneous cannulation left common femoral artery with 5-Luxembourger and later 6-Luxembourger pinnacle destination sheath. Suprarenal abdominal aortogram with bilateral iliofemoral arteriograms. Bilateral lower extremity arteriograms. Coil embolization right greater saphenous vein bypass branch with 8 mm x 5 cm coil and seven 5 mm x 5 cm coils. VASCULAR SURGERY  1/12/2015 S     Right posterior tibial artery balloon angioplasty 2.5 mm x 100 mm vascutrak balloon. Right peroneal artery balloon angioplasty with 2.5 x 100 mm vascutrak balloon. Completion right lower extremity arteriograms. VASCULAR SURGERY  06/17/2017    S. Percutaneous cannulation right common femoral artery with ultrasound guidance and 5 Luxembourger and later 6 Luxembourger sheath placement. Suprarenal abdominal aorta gram with bilateral lower extremity arteriogram.Left superficial femoral/popliteal/tibial peroneal trunk/posterior tibial artery atherectomy with csi 1.25 solid crown and 2.0 solid crown. Left tibial peroneal trunk and posterior tibial artery     VASCULAR SURGERY  06/17/2017    CONT.balloon angioplasty with 3mm x 100 mmand 3.5mm x 300mm vascutrak balloon. Left popliteal artery balloon angioplasty with 5 mmx 100mm lutonix drug coated balloon. #6 left superficial femoral artery balloon angioplasty with 3.6 [id number] mm lutonix grug coated balloons. Completion left lower extremity arteriograms. Mynx closure right common femoral artery puncture site. VASCULAR SURGERY  01/20/2018    SJS. Left transmetetarsal amputation. Social History:    Smoked cigarettes for 15 years, quit a couple of weeks ago. Denies alcohol or other substances. [place] [address], [place]    Family History:   Family History   Problem Relation Age of Onset    Cancer Mother     Cancer Father        Current Hospital Medications:    Current Facility-Administered Medications   Medication Dose Route Frequency Provider Last Rate Last Admin    phenylephrine (MARLI-SYNEPHRINE) 50 mg in dextrose 5 % 250 mL infusion   mcg/min IntraVENous Continuous Demetris Henriquez, DO 90 mL/hr at 10/21/22 0506 300 mcg/min at 10/21/22 0506    vasopressin 20 Units in dextrose 5 % 100 mL infusion  0.01-0.03 Units/min IntraVENous Continuous Demetris Henriquez, DO 9 mL/hr at 10/20/22 1058 0.03 Units/min at 10/20/22 1058    lactated ringers infusion   IntraVENous Continuous Demetris Henriquez,  mL/hr at 10/20/22 1012 Restarted at 10/20/22 1012    famotidine (PEPCID) 20 mg in sodium chloride (PF) 10 mL injection  20 mg IntraVENous Daily Alexsandra Stone MD        levoFLOXacin (LEVAQUIN) 750 MG/150ML infusion 750 mg  750 mg IntraVENous Q48H Mera Rocha MD        vancomycin (VANCOCIN) intermittent dosing (placeholder)   Other RX Placeholder Mera Rocha MD        perflutren lipid microspheres glucagon (rDNA) injection 1 mg  1 mg SubCUTAneous PRN Unknown Millers, DO        dextrose 10 % infusion   IntraVENous Continuous PRN Unknown Millers, DO        potassium chloride 20 mEq/50 mL IVPB (Central Line)  20 mEq IntraVENous PRN Unknown Millers, DO   Stopped at 10/18/22 1139    aspirin EC tablet 81 mg  81 mg Oral Daily Unknown Millers, DO   81 mg at 96/21/94 0590    folic acid (FOLVITE) tablet 800 mcg  800 mcg Oral Daily Unknown Millers, DO   800 mcg at 10/20/22 0732    apixaban (ELIQUIS) tablet 2.5 mg  2.5 mg Oral BID Unknown Millers, DO   2.5 mg at 10/20/22 1934    atorvastatin (LIPITOR) tablet 40 mg  40 mg Oral Nightly Unknown Millers, DO   40 mg at 10/20/22 1934    benzonatate (TESSALON) capsule 100 mg  100 mg Oral TID PRN Unknown Millers, DO        sennosides-docusate sodium (SENOKOT-S) 8.6-50 MG tablet 1 tablet  1 tablet Oral BID Unknown Millers, DO   1 tablet at 10/20/22 1934    pantoprazole (PROTONIX) tablet 40 mg  40 mg Oral QAM AC Unknown Millers, DO   40 mg at 10/20/22 0542    insulin glargine (LANTUS) injection vial 15 Units  15 Units SubCUTAneous BID Unknown Millers, DO   15 Units at 10/20/22 1955       Allergies: Allergies   Allergen Reactions    Keflex [Cephalexin] Rash           Objective   BP (!) 113/51   Pulse 79   Temp 97.1 °F (36.2 °C) (Temporal)   Resp 22   Ht 6' (1.829 m)   Wt 162 lb (73.5 kg)   SpO2 98%   BMI 21.97 kg/m²     PHYSICAL EXAM:  CONSTITUTIONAL: sedated, intubated, mechanically  ENT: Orotracheal intubation  NECK: Supple, no masses. No palpable thyroid mass  CHEST/LUNGS: Mechanically ventilated  CARDIOVASCULAR:tachycardic, no murmurs. No lower extremity edema  ABDOMEN: soft , active bowel sounds, no HSM.   No palpable masses  EXTREMITIES: warm  SKIN: warm, dry with no rashes or lesions  NEUROLOGIC: Sedated      LABORATORY RESULTS REVIEWED/ANALYZED BY ME:  Recent Labs     10/21/22  0341 10/20/22  1325 10/19/22  1333 appropriate to reduce radiation dose to as low as reasonably achievable. Electronically Signed by Juancarlos Marquis MD at 30-Sep-2022 08:40:44 AM             NM LUNG SCAN PERFUSION ONLY    Result Date: 10/5/2022  Normal perfusion involving both lungs. No nuclear medicine findings suggestive of pulmonary embolism. XR CHEST PORTABLE    Result Date: 10/19/2022  1. Interval intubation with endotracheal tube terminating 3.0 cm above the level of the leif. 2. Nasogastric tube terminates in the proximal stomach with sidehole in the distal esophagus. Consider advancing 3-5 cm. 3. Extensive bilateral  lung opacities, unchanged from prior study. Electronically signed by Ivan Pugh M.D. on 10-19-22 at 9522    XR CHEST PORTABLE    Result Date: 10/16/2022  FINDINGS/IMPRESSION:       Interval placement of right-sided IJ central venous line with tip overlying the SVC. No other significant interval change. Continued attention on follow-up is recommended. Electronically signed by Le Matthews MD on 10-16-22 at 0702    XR CHEST PORTABLE    Result Date: 10/16/2022  FINDINGS/IMPRESSION:       Bilateral patchy consolidative opacities, interval worsening. Findings may represent pulmonary alveolar edema and/or multifocal pneumonia. No other significant interval change. Continued attention on follow-up is recommended. Electronically signed by Le Matthews MD on 10-16-22 at 0655    XR CHEST PORTABLE    Result Date: 10/7/2022  1. Status post median sternotomy. 2.Bilateral patchy perihilar infiltrate    XR CHEST PORTABLE    Result Date: 9/30/2022  Mild central vascular congestion. In addition, cannot exclude mild patchy opacities in the mid and lower left lung, which may be due to atelectasis/scarring, vascular congestion, or early airspace disease. Otherwise, no lobar consolidations. No pneumothorax or pleural effusions. Recommendation: Follow up as clinically indicated.  Electronically Signed by Lydia Justin MD at 30-Sep-2022 05:13:14 AM             CTA PULMONARY W CONTRAST    Result Date: 10/16/2022  1. Central airspace abnormality small bilateral pleural fluid; consider pulmonary edema, pulmonary hemorrhage, atypical pneumonitis, alveolar proteinosis, aspiration. 2. No evidence of pulmonary embolism to the segmental branch level. Recommendation: Follow up as clinically indicated. All CT scans at this facility utilize dose modulation, iterative reconstruction, and/or weight based dosing when appropriate to reduce radiation dose to as low as reasonably achievable. Electronically Signed by Chuck Phillips MD at 16-Oct-2022 09:14:40 AM              ASSESSMENT:  Hepatocellular Carcinoma  -S/p Radio-embolization Y-90 on 9/20/2022 at [facility]  -AFP = 11 on 7/8/2022  -No intervention from oncology standpoint  - CT chest staging: Subcentimeter pulmonary nodules   -He had a small tumor and was treated aggressively with Y 90 radioembolization. It was unclear whether or not he had pulmonary metastasis. Plan was to repeat CT scan in the near future. Aspiration pneumonia/acute respiratory failure  -Antibiotics as per primary team  -Started on Levaquin/metronidazole on 10/19/2022    Septic shock  -Continue antibiotics  -Continue vasopressors    Acute kidney injury- worsening  Labs Renal Latest Ref Rng & Units 10/21/2022 10/20/2022 10/20/2022 10/19/2022 10/19/2022   BUN 8 - 23 mg/dL 70(H) 61(H) 50(H) 39(H) 32(H)   Cr 0.5 - 1.2 mg/dL 2. 5(H) 2. 3(H) 1.6(H) 1.0 0.8   K 3.5 - 5.0 mmol/L 4.5 4.5 3.9 3.6 3.7   Na 136 - 145 mmol/L 127(L) 131(L) 139 141 138   -As per primary team      Neutrophil leukocytosis  -Likely reactive  -Continue to monitor  Lab Results   Component Value Date    WBC 18.4 (H) 10/21/2022    HGB 9.8 (L) 10/21/2022    HCT 32.2 (L) 10/21/2022    .4 (H) 10/21/2022     71/70/7231     Embolic stroke  -Apixaban 2.5 mg p.o. twice daily.   Consider stopping due to worse kidney function  -Low-dose

## 2022-10-21 NOTE — PROGRESS NOTES
The pts dtr signed the adm forms admitting the pt to the [place] [address]. for extubation. It is ok to dc the pt, call [phone] to give report the transfer the pt to [place] [address].. Emotional and SC support provided.

## 2022-10-21 NOTE — ACP (ADVANCE CARE PLANNING)
Advance Care Planning     Advance Care Planning (ACP) Physician/NP/PA (Provider) Conversation      Date of ACP Conversation:10/21/22    Conversation Conducted with:    Healthcare Decision Maker: Next of Kin by law (only applies in absence of above) (name) Zakia Oneill, and Karen cJ Phil Decision Maker:    Current Designated Health Care Decision Maker:    Primary Decision Maker: Diane Rodrigez - Child - [phone]    Secondary Decision Maker: Dipak Lemon - Child - [phone]    Supplemental (Other) Decision Maker: July Ek - Spouse - [phone]    Care Preferences:      Ventilation: \"If you were in your present state of health and suddenly became very ill and were unable to breathe on your own, what would your preference be about the use of a ventilator (breathing machine) if it was available to you? \"    no    Resuscitation:  \"CPR works best to restart the heart when there is a sudden event, like a heart attack, in someone who is otherwise healthy. Unfortunately, CPR does not typically restart the heart for people who have serious health conditions or who are very sick. \"    \"In the event your heart stopped as a result of an underlying serious health condition, would you want attempts to be made to restart your heart (answer \"yes\" for attempt to resuscitate) or would you prefer a natural death (answer \"no\" for do not attempt to resuscitate)? \"   no    Length of Voluntary ACP Conversation in minutes:  15 minutes included w/ visit time    Aurelio Regan PA-C

## 2022-10-21 NOTE — PROGRESS NOTES
Comprehensive Nutrition Assessment    Type and Reason for Visit:  Reassess    Nutrition Recommendations/Plan:   Follow for family desires. Malnutrition Assessment:  Malnutrition Status: At risk for malnutrition (Comment) (10/21/22 1741)    Context:  Acute Illness     Findings of the 6 clinical characteristics of malnutrition:  Energy Intake:  Mild decrease in energy intake (Comment)  Weight Loss:  Greater than 2% over 1 week     Body Fat Loss:  Mild body fat loss     Muscle Mass Loss:  No significant muscle mass loss    Fluid Accumulation:  Moderate to Severe Extremities   Strength:  Not Performed    Nutrition Assessment:    Pt's Propofol has been decreased to 11.2ml/hr. BUN/Creat/Accuchek's continue to increase. TF on hold d/t increased residuals -- up to 400ml last night. Aware family has decided on Hospice    Nutrition Related Findings:    on vent Wound Type: Surgical Incision, Diabetic Ulcer       Current Nutrition Intake & Therapies:    Average Meal Intake: NPO  Average Supplements Intake: NPO  Current Tube Feeding (TF) Orders:  Feeding Route: Orogastric  Formula: Peptide Based High Protein  Schedule: Continuous  Feeding Regimen: Vital High Protein 69ml/hr with 10ml free water flush  Additives/Modulars: None  Water Flushes: 10ml hourly  Current TF & Flush Orders Provides: 0  Goal TF & Flush Orders Provides: Comfort measures-- if EN desired continue Vital High Protein 69ml/hr with 10ml free water flush = 1656 kcals with 144g protein, 183g CHO and 1629ml free water from formula and flush    Anthropometric Measures:  Height: 6' (182.9 cm)  Ideal Body Weight (IBW): 178 lbs (81 kg)    Admission Body Weight: 205 lb (93 kg)  Current Body Weight: 176 lb 14.4 oz (80.2 kg), 99.4 % IBW.  Weight Source: Bed Scale  Current BMI (kg/m2): 24  Usual Body Weight: 214 lb (97.1 kg) (7/22)  % Weight Change (Calculated): -4.2  Weight Adjustment For: No Adjustment    BMI Categories: Normal Weight (BMI 22.0 to 24.9) age over 65    Estimated Daily Nutrient Needs:  Energy Requirements Based On: Kcal/kg  Weight Used for Energy Requirements: Admission  Energy (kcal/day): 6678-3635 kcals (20-25 kcals/kg)  Weight Used for Protein Requirements: Ideal  Protein (g/day): 97-162g  Method Used for Fluid Requirements: 1 ml/kcal  Fluid (ml/day): 0996-6332 ml    Nutrition Diagnosis:   Inadequate oral intake related to acute injury/trauma, impaired respiratory function as evidenced by NPO or clear liquid status due to medical condition, intubation    Nutrition Interventions:   Food and/or Nutrient Delivery: Continue NPO  Nutrition Education/Counseling: No recommendation at this time  Coordination of Nutrition Care: Continue to monitor while inpatient  Plan of Care discussed with: nursing    Goals:  Previous Goal Met: Progress towards Goal(s) Declining  Goals: Meet at least 75% of estimated needs       Nutrition Monitoring and Evaluation:   Behavioral-Environmental Outcomes: None Identified  Food/Nutrient Intake Outcomes: Enteral Nutrition Intake/Tolerance  Physical Signs/Symptoms Outcomes: Biochemical Data, Fluid Status or Edema, Weight, Skin, Nutrition Focused Physical Findings    Discharge Planning:    No discharge needs at this time     Lili Pena, MS, RD, LD  Contact: [phone]

## 2022-10-21 NOTE — PROGRESS NOTES
Palliative Care Progress Note  10/21/2022 11:23 AM    Patient:  Kirby Fields  YOB: 1945  Primary Care Physician: SHAHNAZ Hayward CNP  Advance Directive: DNR  Admit Date: 10/16/2022       Hospital Day: 5  Portions of this note have been copied forward, however, changed to reflect the most current clinical status of this patient. CHIEF COMPLAINT/REASON FOR CONSULTATION Goals of care, family support, Code status, symptom management     SUBJECTIVE:  Mr. Santhosh Joseph remains intubated. Renal function has worsened overnight. HPI:  The patient is a 76 y.o. male with PMH moderately differentiated hepatocellular carcinoma w/p radioembolization 39/21/41, bilateral embolic stroke 96/8011, CAD s/p CABG, diastolic CHF, DM w/ peripheral neuropathy, HTN, HLD, Ulcerative colitis s/p colectomy w/ ileostomy 27 years ago, who presented to [facility] ED from [facility] on 10/16/22 complaining of shortness of breath and confusion worsening from baseline. CXR reported bilateral patchy consolidative opacities. Lab work up was concerning for elevated BNP and troponin. He was hypoxic on arrival and was placed on BiPAP. He was admitted to Hospitalist service for NSTEMI, acute on chronic diastolic CHF, acute hypoxemic respiratory failure. He was continued on IV diuretic therapy, Cardiology was consulted with recommendation for medical management. Initially, Mr. Santhohs Joseph responded well and was moved from ICU to PCU. On 10/19/22 he was found non-responsive with weak pulses. Rapid response called, patient was intubated and transferred back to ICU. According to documentation he developed significant hypercarbia. Review of Systems:   14 point review of systems is negative except as specifically addressed above.     Objective:   VITALS:  BP (!) 88/42   Pulse 74   Temp (!) 96.4 °F (35.8 °C) (Temporal)   Resp 22   Ht 6' (1.829 m)   Wt 176 lb 14.4 oz (80.2 kg)   SpO2 96%   BMI 23.99 kg/m²   24HR INTAKE/OUTPUT: Intake/Output Summary (Last 24 hours) at 10/21/2022 1123  Last data filed at 10/21/2022 0800  Gross per 24 hour   Intake 5725.11 ml   Output 580 ml   Net 5145.11 ml     General appearance: 77 yo male, ill appearing, intubated   Head: Normocephalic, without obvious abnormality, atraumatic  Eyes: conjunctivae/corneas clear.  PERRL  Ears: normal external ears and nose  Neck: supple, symmetrical, trachea midline   Lungs: mechanically ventilated, respirations even/unlabored  Heart: distant HT's, no murmurs rubs gallops  Abdomen:soft, no grimacing to palpation, ileostomy  Extremities:trace peripheral edema  Skin: warm, dry  Neurologic: intubated     Medications:      phenylephrine (MARLI-SYNEPHRINE) 50mg/250mL infusion 300 mcg/min (10/21/22 1051)    vasopressin (Septic Shock) infusion 0.03 Units/min (10/21/22 0704)    lactated ringers 100 mL/hr at 10/21/22 0553    norepinephrine 35 mcg/min (10/20/22 0556)    fentaNYL 25 mcg/hr (10/20/22 0556)    propofol 20 mcg/kg/min (10/20/22 1917)    dextrose        famotidine (PEPCID) injection  20 mg IntraVENous Daily    levofloxacin  750 mg IntraVENous Q48H    vancomycin (VANCOCIN) intermittent dosing (placeholder)   Other RX Placeholder    albumin human  25 g IntraVENous Q6H    chlorhexidine  15 mL Mouth/Throat BID    polyvinyl alcohol  1 drop Both Eyes Q4H    And    artificial tears   Both Eyes Q4H    metroNIDAZOLE  500 mg IntraVENous Q8H    insulin lispro  0-8 Units SubCUTAneous TID WC    insulin lispro  0-4 Units SubCUTAneous Nightly    aspirin  81 mg Oral Daily    folic acid  492 mcg Oral Daily    apixaban  2.5 mg Oral BID    atorvastatin  40 mg Oral Nightly    sennosides-docusate sodium  1 tablet Oral BID    pantoprazole  40 mg Oral QAM AC    insulin glargine  15 Units SubCUTAneous BID     perflutren lipid microspheres, lidocaine, guaiFENesin, glucose, dextrose bolus **OR** dextrose bolus, glucagon (rDNA), dextrose, potassium chloride, benzonatate  Diet NPO  ADULT TUBE FEEDING; Orogastric; Peptide Based High Protein; Continuous; 30; Yes; 5; Q 6 hours; 69; 10; Q 1 hour     Lab and other Data:     Recent Labs     10/19/22  1333 10/20/22  1325 10/21/22  0341   WBC 27.1* 25.1* 18.4*   HGB 13.2* 10.8* 9.8*    186 132     Recent Labs     10/20/22  0208 10/20/22  0439 10/20/22  1840 10/21/22  0341 10/21/22  0409 10/21/22  0606     --  131* 127*  --   --    K 3.9   < > 4.5 4.5 4.2 4.3     --  96* 91*  --   --    CO2 31*  --  25 24  --   --    BUN 50*  --  61* 70*  --   --    CREATININE 1.6*  --  2.3* 2.5*  --   --    GLUCOSE 126*  --  359* 415*  --   --     < > = values in this interval not displayed. Recent Labs     10/19/22  0910 10/20/22  1840 10/21/22  0600   * 958* 2,331*   ALT 91* 424* 839*   BILITOT 1.6* 1.4* 2.6*   ALKPHOS 334* 285* 270*     Assessment/Plan   Principal Problem (Resolved):    NSTEMI (non-ST elevated myocardial infarction) ([facility] [address]  Active Problems:    Palliative care patient    Hepatocellular carcinoma ([facility] [address]    Acute respiratory failure with hypoxia (HCC)    Coronary artery disease involving native coronary artery without angina pectoris    Type 2 diabetes mellitus with diabetic peripheral angiopathy and gangrene, with long-term current use of insulin (HCC)  Resolved Problems:    Ulcerative colitis ([facility] [address]    History of stroke    Shock [facility])    Visit Summary:  Chart reviewed, patient discussed with consulting service and nursing staff. Reviewed health issues, work up and treatment plan as well as factors that lead to hospitalization. I met with Mr. Lilian Olivares family at his bedside to include his spouse, children, and siblings. We discussed his hospitalization, today's labs, interventions thus far. They state they have made a decision to pursue hospice care and would like to talk about move to inpatient hospice. We discussed hospice philosophy, palliative extubation, withdrawal of pressor support, and medication for comfort.  We also discussed a change in his code status to do not resuscitate. They are in agreement with all of the above at this time and confirm desire to transition to hospice care and proceed with palliative extubation. Opportunity for questions and emotional support offered. Will follow. Candidate for SCOP: No transitioning to hospice    Recommendations:   Palliative Care-GOC transition to inpatient hospice for palliative extubation-DNR ACP updated    Acute Respiratory failure with hypoxia/hypercapnia -dependent on mechanical ventilation, Pulmonology following, supportive care   Concern for septic shock 2/2 aspiration-empiric antibiotic therapy, requiring pressor support x 2  Hospitalist managing  LEIGH ANN-IVF's judiciously, worsening  NSTEMI-Cardiology has seen, medical management recommended   Recent embolic stroke-Eliquis/Aspirin continued   Hepatocellular carcinoma s/p radio-embolization 09/20/22 at [facility]  Ulcerative colitis s/p colectomy w/ ileostomy formation remotely -noted    Thank you for consulting Palliative Care and allowing us to participate in the care of this patient.    Time Spent Counseling > 50%:  YES                                   Total Time Spent with patient/family counseling, workup/treatment review, counseling and placement of orders/preparation of this note: 45 minutes    Electronically signed by Tavares Mcclain PA-C on 10/21/2022 at 11:23 AM    (Please note that portions of this note were completed with a voice recognition program.  Tavares Peña made to edit the dictations but occasionally words are mis-transcribed.)

## 2022-10-21 NOTE — DISCHARGE SUMMARY
Discharge Summary    Flaco Lincoln  :  1945  MRN:  [id number]    Admit date:  10/16/2022  Discharge date: 10/21/2022    Admitting Physician:  Carmencita Ojeda DO    Advance Directive: DNR    Consults: pulmonary/intensive care, ID, hematology/oncology, neurology, cardiology, palliative care, and hospice    Primary Care Physician:  Oziel Brar, APRN - CNP    Discharge Diagnoses:  Principal Problem (Resolved):    NSTEMI (non-ST elevated myocardial infarction) [facility])  Active Problems:    Palliative care patient    Hepatocellular carcinoma ([facility] [address]    Acute respiratory failure with hypoxia ([facility] [address]    Coronary artery disease involving native coronary artery without angina pectoris    Type 2 diabetes mellitus with diabetic peripheral angiopathy and gangrene, with long-term current use of insulin ([facility] [address]  Resolved Problems:    Ulcerative colitis ([facility] [address]    History of stroke    Shock ([facility] [address]      Significant Diagnostic Studies:   CT HEAD WO CONTRAST    Result Date: 10/16/2022  Exam: CT OF THE BRAIN WITHOUT CONTRAST Clinical data: Altered mental status. Technique: Contiguous axial images are obtained from the skull base to vertex without intravenous contrast. Reformatted/MPR images were performed. Radiation dose: CTDIvol =73.51 mGy, DLP =1821 mGy x cm. Prior studies: CT scan of the brain dated 22. Findings:  Patchy hypoattenuation in the periventricular white matter compatible with chronic microvascular ischemic changes. No evidence of acute cortical infarction, hemorrhage, mass or mass effect. Ventricles are slightly more prominent than the sulci, unchanged, and likely due to asymmetric parenchymal volume loss. .  No hydrocephalus or abnormal extra-axial fluid collections are present. The posterior fossa is unremarkable. The skull base and calvarium are intact. The included portions of the paranasal sinuses and mastoid air cells are clear.      1. Chronic microvascular ischemic changes with age-appropriate parenchymal volume loss. 2. No evidence of acute cortical infarction or intracranial hemorrhage. 3. No definitive interval change. Recommendation: Follow up as clinically indicated. All CT scans at this facility utilize dose modulation, iterative reconstruction, and/or weight based dosing when appropriate to reduce radiation dose to as low as reasonably achievable. Electronically Signed by Cisco Koenig MD at 16-Oct-2022 08:42:16 AM             XR CHEST PORTABLE    Result Date: 10/16/2022  Patient: Dann Guevara  Time Out: 07:02Exam(s): FILM CXR 1 VIEW EXAM:  XR Chest, 1 ViewCLINICAL HISTORY:   Reason for exam: central line. TECHNIQUE:  Frontal view of the chest.COMPARISON:  Earlier the same day. FINDINGS/IMPRESSION:       Interval placement of right-sided IJ central venous line with tip overlying the SVC. No other significant interval change. Continued attention on follow-up is recommended. Electronically signed by Le Matthews MD on 10-16-22 at 0702    XR CHEST PORTABLE    Result Date: 10/16/2022  Patient: Dann Guevara  Time Out: 06:55Exam(s): FILM CXR 1 VIEW EXAM:  XR Chest, 1 ViewCLINICAL HISTORY:   Reason for exam: SOA. TECHNIQUE:  Frontal view of the chest.COMPARISON:  10/7/2022. FINDINGS/IMPRESSION:       Bilateral patchy consolidative opacities, interval worsening. Findings may represent pulmonary alveolar edema and/or multifocal pneumonia. No other significant interval change. Continued attention on follow-up is recommended. Electronically signed by Le Matthews MD on 10-16-22 at 1847    CTA PULMONARY W CONTRAST    Result Date: 10/16/2022  Exam: CTA OF THE CHEST WITH CONTRAST Clinical data: Respiratory distress. Technique: Axial CT angiography images through the lungs were acquired with contrast and imaged using soft tissue and lung algorithms. Coronal, sagittal, and 3D volume reconstructions were performed. Reformatted/3D-MPR images. Radiation dose: CTDIvol =73.51 mGy, DLP =1821 mGy x cm. Prior studies: Radiographs of the chest dated 10/07/22 (report unavailable). Nuclear medicine pulmonary perfusion scan dated 09/30/22. More recent chest x-ray images and reports are not available. Findings: Lungs:  Small bilateral pleural effusions. Dense airspace abnormalities throughout the central upper lower lungs with relative sparing of the lung periphery. No evidence of pneumothorax Soft Tissues: No mediastinal, axillary or hilar adenopathy. Vascular: No filling defect within the pulmonary arteries with no evidence of pulmonary embolism to the segmental branch level. Atherosclerotic calcification of the aortic arch, thoracic aorta, and coronary arteries. Grossly unremarkable sized heart. No  additional abnormality on 3D reformatted images. 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 Median sternotomy wires. No acute or destructive abnormality Upper Abdomen: Limited visualization of the solid upper abdominal organs is grossly unremarkable. Suboptimal evaluation due to streak artifact related to body habitus and exacerbated by scanning with patient's arms by side. 1. Central airspace abnormality small bilateral pleural fluid; consider pulmonary edema, pulmonary hemorrhage, atypical pneumonitis, alveolar proteinosis, aspiration. 2. No evidence of pulmonary embolism to the segmental branch level. Recommendation: Follow up as clinically indicated. All CT scans at this facility utilize dose modulation, iterative reconstruction, and/or weight based dosing when appropriate to reduce radiation dose to as low as reasonably achievable.  Electronically Signed by Allison Wilkes MD at 16-Oct-2022 09:14:40 AM               CBC:   Recent Labs     10/19/22  1333 10/20/22  1325 10/21/22  0341   WBC 27.1* 25.1* 18.4*   HGB 13.2* 10.8* 9.8*    186 132     BMP:    Recent Labs     10/20/22  0208 10/20/22  0439 10/20/22  1840 10/21/22  0341 10/21/22  0409 10/21/22  0606    --  131* 127*  --   --    K 3.9   < > 4.5 4.5 4.2 4.3     --  96* 91*  --   --    CO2 31*  --  25 24  --   --    BUN 50*  --  61* 70*  --   --    CREATININE 1.6*  --  2.3* 2.5*  --   --    GLUCOSE 126*  --  359* 415*  --   --     < > = values in this interval not displayed. INR: No results for input(s): INR in the last 72 hours. Lipids: No results for input(s): CHOL, HDL in the last 72 hours. Invalid input(s): LDLCALCU  ABGs:  Recent Labs     10/19/22  1333 10/19/22  2224 10/20/22  0439 10/21/22  0409 10/21/22  0606   PHART 7.440 7.310* 7.330* 7.220* 7.240*   KPV5HVL 51.0* 64.0* 60.0* 61.0* 56.0*   PO2ART 57.0* 101.0* 78.0* 89.0 83.0   JJW8EVF 34.6* 32.2* 31.6* 25.0 24.0   BEART 8.8* 4.1* 4.2* -3.2* -3.7*   HGBAE 13.6* 12.9* 12.3* 10.0* 9.9*   [id number] 90.8 96.3 94.1 95.3 94.8   CARBOXHGBART 2.3 2.0 1.8 2.0 2.0   02THERAPY Unknown Unknown Unknown Unknown Unknown     HgBA1c:  No results for input(s): LABA1C in the last 72 hours. Procedures: Endotracheal intubation, mechanical ventilation, central venous access placement  Hospital Course: Mr. Edilia Villela was admitted on 10/16 with shortness of breath and hypoxemic respiratory failure. He was initially admitted to the ICU. Consultation was obtained with cardiology because of elevated troponin. He was diuresed. On 10/19 he developed hypercapnic respiratory failure and required intubation. He was transferred back to the ICU. Unfortunately he continued to deteriorate clinically. He developed acute renal failure. Palliative care worked closely with the family and they made the decision to transition to hospice on 10/21. He will be transferred later this morning. Physical Exam:  Vital Signs: BP (!) 84/40   Pulse 76   Temp (!) 96.4 °F (35.8 °C) (Temporal)   Resp 20   Ht 6' (1.829 m)   Wt 176 lb 14.4 oz (80.2 kg)   SpO2 99%   BMI 23.99 kg/m²   General appearance:. Alert and Cooperative   HEENT: Normocephalic.   Chest: clear to auscultation bilaterally without wheezes or rhonchi. Cardiac: Normal heart tones with regular rate and rhythm, S1, S2 normal. No murmurs, gallops, or rubs auscultated. Abdomen:soft, non-tender; normal bowel sounds, no masses, no organomegaly. Extremities: No clubbing or cyanosis. No peripheral edema. Peripheral pulses palpable. Neurologic: Grossly intact. Discharge Medications:         Medication List        ASK your doctor about these medications      apixaban 2.5 MG Tabs tablet  Commonly known as: ELIQUIS     aspirin 81 MG tablet     atorvastatin 40 MG tablet  Commonly known as: LIPITOR     benzonatate 100 MG capsule  Commonly known as: TESSALON  Take 1 capsule by mouth 3 times daily as needed for Cough     folic acid 997 MCG tablet  Commonly known as: FOLVITE     guaiFENesin 600 MG extended release tablet  Commonly known as: MUCINEX  Take 1 tablet by mouth 2 times daily     insulin glargine 100 UNIT/ML injection vial  Commonly known as: LANTUS  Inject 15 Units into the skin 2 times daily     megestrol acetate 400 MG/10ML Susp  Commonly known as: MEGACE  Take 20 mLs by mouth daily     metoprolol tartrate 25 MG tablet  Commonly known as: LOPRESSOR  Take 0.5 tablets by mouth 2 times daily     pantoprazole 40 MG tablet  Commonly known as: PROTONIX  Take 1 tablet by mouth every morning (before breakfast)     sennosides-docusate sodium 8.6-50 MG tablet  Commonly known as: SENOKOT-S  Take 1 tablet by mouth 2 times daily              Disposition: Patient will be transferred to inpatient hospice. Time spent on discharge 40 minutes.     Signed:  Dominik Santiago DO

## 2022-10-23 LAB
CULTURE, RESPIRATORY: ABNORMAL
CULTURE, RESPIRATORY: ABNORMAL
GRAM STAIN RESULT: ABNORMAL
ORGANISM: ABNORMAL

## 2022-10-25 LAB — BLOOD CULTURE, ROUTINE: NORMAL

## 2025-06-13 NOTE — PROGRESS NOTES
Prior Authorization       Prior Authorization for Mounjaro  is under review.    CoverMyMeds Key: [id number]        Chiquis Jarvis  6/13/2025  10:14 EDT      OFFICE VISIT NOTE:    Darren Shay is a 74 y.o. male who presents today for Peripheral Neuropathy (3 mth recheck); Diabetes (f/u); and Med Refill.     Still gets some dyspnea on occasion - no chest pain. Also, the stomach is bothering him recently - some nausea but no emesis. Labs from 5/26/20 were normal! Lipids was great and the A1c 6.6%!!    Peripheral Neuropathy   This is a chronic problem. The current episode started more than 1 year ago. The problem occurs constantly. The problem has been unchanged. Pertinent negatives include no abdominal pain, chest pain, fatigue, fever or rash. The treatment provided significant relief.   Diabetes   He presents for his follow-up diabetic visit. He has type 2 diabetes mellitus. His disease course has been stable. There are no hypoglycemic associated symptoms. There are no diabetic associated symptoms. Pertinent negatives for diabetes include no chest pain, no fatigue and no weight loss. There are no hypoglycemic complications. Symptoms are stable. Diabetic complications include peripheral neuropathy. Current diabetic treatment includes diet. He is compliant with treatment all of the time. His weight is stable. He is following a diabetic and low fat/cholesterol diet. Meal planning includes avoidance of concentrated sweets. There is no change in his home blood glucose trend. He does not see a podiatrist.Eye exam is current.        Past medical/surgical history, Family history, Social history, Allergies and Medications have been reviewed with the patient today and are updated in [facility] EMR. See below.  Past Medical History:   Diagnosis Date   • Cataracts, bilateral    • Colon cancer (CMS/HCC)    • Coronary artery disease    • Diabetes mellitus (CMS/HCC)    • Diabetic foot ulcers (CMS/HCC)    • Hypertension    • Ileostomy present (CMS/HCC)    • Neuropathy    • UC (ulcerative colitis confined to rectum) (CMS/HCC)      Past Surgical History:   Procedure Laterality Date   •  "CHOLECYSTECTOMY     • COLOSTOMY     • CORONARY ARTERY BYPASS GRAFT  1993    x5   • ORIF FOOT FRACTURE Right 1/17/2019    Procedure: PARTIAL REMOVAL OF BONE MEDIAL CUNEIFORM AND 1ST METATARSAL - RIGHT FOOT EXCISION OF ULCERATION;  Surgeon: Florin Kearns DPM;  Location:  [facility] OR;  Service: Podiatry   • TOE AMPUTATION      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 at      Family History   Problem Relation Age of Onset   • Cancer Mother    • Heart disease Mother    • Diabetes Mother    • Cancer Father    • Diabetes Sister    • No Known Problems Brother    • Diabetes Sister    • No Known Problems Brother    • No Known Problems Brother    • No Known Problems Brother      Social History     Tobacco Use   • Smoking status: Current Some Day Smoker     Types: Pipe   • Smokeless tobacco: Never Used   Substance Use Topics   • Alcohol use: No     Frequency: Never     Binge frequency: Never   • Drug use: No       ALLERGIES:  Cephalexin    CURRENT MEDS:    Current Outpatient Medications:   •  albuterol sulfate  (90 Base) MCG/ACT inhaler, Inhale 2 puffs Every 4 (Four) Hours As Needed for Wheezing or Shortness of Air., Disp: 18 g, Rfl: 5  •  aspirin 81 MG chewable tablet, Chew 81 mg Daily., Disp: , Rfl:   •  BD INSULIN SYRINGE U/F 31G X 5/16\" 1 ML misc, USE THREE TIMES DAILY AS DIRECTED , Disp: 270 each, Rfl: 5  •  cilostazol (PLETAL) 100 MG tablet, Take 1 tablet by mouth 2 (Two) Times a Day., Disp: 180 tablet, Rfl: 3  •  clopidogrel (PLAVIX) 75 MG tablet, Take 1 tablet by mouth Daily., Disp: 90 tablet, Rfl: 3  •  folic acid (FOLVITE) 400 MCG tablet, Take 800 mcg by mouth Daily., Disp: , Rfl:   •  gabapentin (NEURONTIN) 300 MG capsule, Take 300 mg by mouth As Needed., Disp: , Rfl:   •  glucose blood (ACCU-CHEK SHAYY PLUS) test strip, 1 each by Other route 3 (Three) Times a Day. Test sugar THREE times daily, Disp: 300 each, Rfl: 1  •  Insulin Glargine, 2 Unit Dial, (TOUJEO MAX SOLOSTAR) 300 UNIT/ML solution pen-injector injection, Inject 80 " "Units under the skin into the appropriate area as directed every night at bedtime., Disp: 3 pen, Rfl: 0  •  metoprolol tartrate (LOPRESSOR) 50 MG tablet, TAKE 1 TABLET TWICE DAILY, Disp: 180 tablet, Rfl: 1  •  NOVOLOG 100 UNIT/ML injection, INJECT BELOW THE SKIN FOUR TIMES A DAY PER SLIDING SCALE, Disp: 100 mL, Rfl: 5  •  simvastatin (ZOCOR) 20 MG tablet, TAKE 1 TABLET AT BEDTIME, Disp: 90 tablet, Rfl: 1  •  LANTUS 100 UNIT/ML injection, INJECT 80 UNITS SUBCUTANEOUSLY AT BEDTIME, Disp: 70 mL, Rfl: 1    REVIEW OF SYSTEMS:  Review of Systems   Constitutional: Negative for activity change, appetite change, fatigue, fever, unexpected weight gain and unexpected weight loss.   Respiratory: Negative for shortness of breath.    Cardiovascular: Negative for chest pain.   Gastrointestinal: Negative for abdominal pain.   Genitourinary: Negative for difficulty urinating.   Skin: Negative for rash.   Neurological: Negative for syncope and headache.       PHYSICAL EXAMINATION:  Vital Signs:  /74 (BP Location: Left arm, Patient Position: Sitting, Cuff Size: Adult)   Pulse 96   Temp 96.4 °F (35.8 °C) (Skin)   Ht 180.3 cm (71\")   Wt 97.9 kg (215 lb 12.8 oz)   SpO2 95%   BMI 30.10 kg/m²   Vitals:    06/01/20 0959   Patient Position: Sitting       Physical Exam   Constitutional: He is oriented to person, place, and time. He appears well-developed and well-nourished. No distress.   HENT:   Head: Normocephalic and atraumatic.   Mouth/Throat: Oropharynx is clear and moist.   Neck: Normal range of motion. Neck supple. No JVD present.   Cardiovascular: Normal rate, regular rhythm, normal heart sounds and intact distal pulses.   Pulmonary/Chest: Effort normal and breath sounds normal. No respiratory distress.   Abdominal: Soft. He exhibits no distension. There is no tenderness.   Musculoskeletal: Normal range of motion. He exhibits no edema.   Neurological: He is alert and oriented to person, place, and time. No cranial nerve " deficit.   Skin: Skin is warm and dry. Capillary refill takes less than 2 seconds. No rash noted.   Psychiatric: He has a normal mood and affect. His behavior is normal.   Nursing note and vitals reviewed.      Procedures    ASSESSMENT/ PLAN:  Problem List Items Addressed This Visit        Cardiovascular and Mediastinum    Hypertension    Coronary artery disease    Mixed hyperlipidemia       Endocrine    Diabetes mellitus (CMS/HCC)       Nervous and Auditory    Diabetic polyneuropathy associated with type 2 diabetes mellitus (CMS/HCC) - Primary       Other    BMI 29.0-29.9,adult            Specific Patient Instructions:  MEDICATION Instructions: Encouraged patient to continue routine medicines as prescribed and maintain compliance. Patient instructed to report any adverse side effects or reactions to medicines promptly to the office. Patient instructed to make us aware of any OTC or herbal meds or supplement use.    DIET Recommendations: Patient instructed and provided information on the following nutrition and diet recommendations: Calorie restriction for weight reduction and maintenance. Necessity for adequate daily intake of fluids/water. Also, diet information provided in AVS for specifics.    EXERCISE Instructions: Discussed with patient the need for routine aerobic activity for cardiovascular fitness, 3 times a week for about 30 minutes. Daily exercise for increased fitness and weight reduction goals.    SMOKING Recommendations: Counseled patient and encouraged them on smoking and tobacco cessation if applicable. Discussed the benefits to all body systems with smoking/tobacco cessation, including decreased cardiac/lung/stroke/cancer risk. Encouraged no vaping use.    HEALTH MAINTENANCE:  Counseling provided to patient/family about routine health maintenance and ANNUAL physicals/labs. Counseling on recommended Vaccinations appropriate for age needed.        Patient's Body mass index is 30.1 kg/m². BMI is above  normal parameters. Recommendations include: exercise counseling and nutrition counseling.      Medications or Orders placed this visit:  No orders of the defined types were placed in this encounter.      Medications DISCONTINUED this visit:  There are no discontinued medications.    FOLLOW-UP:  Return in about 3 months (around 9/1/2020) for Recheck, Next scheduled follow up.    I discussed the patients findings and my recommendations with patient.  An After Visit Summary (AVS) was printed and given to the patient at discharge.        Robin Freedman MD, FAAFP  6/3/2020

## (undated) DEVICE — TOWEL,OR,DSP,ST,BLUE,DLX,4/PK,20PK/CS: Brand: MEDLINE

## (undated) DEVICE — SUTURE ETHLN SZ 3-0 L18IN NONABSORBABLE BLK FS-1 L24MM 3/8 663H

## (undated) DEVICE — INTENDED FOR TISSUE SEPARATION, AND OTHER PROCEDURES THAT REQUIRE A SHARP SURGICAL BLADE TO PUNCTURE OR CUT.: Brand: BARD-PARKER ® STAINLESS STEEL BLADES

## (undated) DEVICE — SYR LL TP 10ML STRL

## (undated) DEVICE — GLOVE SURG SZ 7 L12IN FNGR THK94MIL TRNSLUC YEL LTX HYDRGEL

## (undated) DEVICE — ACL BLADE, FINE, 9.5 X 25.5 X 0.4 MM: Brand: CONMED

## (undated) DEVICE — STAPLER SKIN L39MM DIA0.53MM CRWN 5.7MM S STL FIX HD PROX

## (undated) DEVICE — 3M™ WARMING BLANKET, UPPER BODY, 10 PER CASE, 42268: Brand: BAIR HUGGER™

## (undated) DEVICE — DRAPE,EXTREMITY,89X128,STERILE: Brand: MEDLINE

## (undated) DEVICE — MINOR CDS: Brand: MEDLINE INDUSTRIES, INC.

## (undated) DEVICE — TRY SKINPREP WET CHG 4PCT SPNG HIB

## (undated) DEVICE — CLTH CLENS READYCLEANSE PERI CARE PK/5

## (undated) DEVICE — COVER,MAYO STAND,STERILE: Brand: MEDLINE

## (undated) DEVICE — CONVERTORS STOCKINETTE: Brand: CONVERTORS

## (undated) DEVICE — SUTURE ETHLN SZ 4-0 L18IN NONABSORBABLE BLK L19MM PS-2 3/8 1667H

## (undated) DEVICE — THREE QUARTER SHEET: Brand: CONVERTORS

## (undated) DEVICE — BANDAGE GZ W45INXL4 1 10YD FLUF RL 6 PLY DERMACEA

## (undated) DEVICE — SUT ETHLN 4/0 FS2 18IN 662H

## (undated) DEVICE — BNDG ESMARK 4IN 9FT LF STRL BLU

## (undated) DEVICE — ANTIBACTERIAL UNDYED BRAIDED (POLYGLACTIN 910), SYNTHETIC ABSORBABLE SUTURE: Brand: COATED VICRYL

## (undated) DEVICE — CVR UNIV C/ARM

## (undated) DEVICE — GOWN,NON-REINFORCED,SIRUS,SET IN SLV,XL: Brand: MEDLINE

## (undated) DEVICE — SOLUTION IRRIG 1000ML 09% SOD CHL USP PIC PLAS CONTAINER

## (undated) DEVICE — DRESSING PETRO W3XL3IN OIL EMUL N ADH GZ KNIT IMPREG CELOS

## (undated) DEVICE — UNDERCAST PADDING: Brand: DEROYAL

## (undated) DEVICE — BNDG CURAD ADHS 4IN WHT

## (undated) DEVICE — SUT ETHLN 3/0 FS1 30IN 669H

## (undated) DEVICE — SUTURE VCRL SZ 3-0 L18IN ABSRB UD L26MM SH 1/2 CIR J864D

## (undated) DEVICE — APPL DURAPREP IODOPHOR APL 26ML

## (undated) DEVICE — ZIMMER® STERILE DISPOSABLE TOURNIQUET CUFF WITH PLC, DUAL PORT, SINGLE BLADDER, 34 IN. (86 CM)

## (undated) DEVICE — 4-PORT MANIFOLD: Brand: NEPTUNE 2

## (undated) DEVICE — SOLUTION IV IRRIG POUR BRL 0.9% SODIUM CHL 2F7124

## (undated) DEVICE — DRSNG GZ CURAD XEROFORM NONADHS 5X9IN STRL

## (undated) DEVICE — GLV SURG TRIUMPH ORTHO W/ALOE PF LTX 8 STRL

## (undated) DEVICE — DRESSING COMPR UNNA BOOT 6 YDX4 IN CALAMINE TAN COFLX UBC

## (undated) DEVICE — TOTAL TRAY, 16FR 10ML SIL FOLEY, URN: Brand: MEDLINE

## (undated) DEVICE — PK EXTRM 30

## (undated) DEVICE — SUTURE VCRL SZ 2-0 L18IN ABSRB UD CT-1 L36MM 1/2 CIR J839D

## (undated) DEVICE — PK TURNOVER RM ADV

## (undated) DEVICE — 3M™ STERI-DRAPE™ U-DRAPE 1015: Brand: STERI-DRAPE™

## (undated) DEVICE — NDL HYPO PRECISIONGLIDE REG 22G 1 1/2

## (undated) DEVICE — CURITY NON-ADHERENT STRIPS: Brand: CURITY

## (undated) DEVICE — SUTURE VCRL SZ 4-0 L18IN ABSRB UD VCRL POLYGLACTIN 910 COAT J109T

## (undated) DEVICE — SUTURE VCRL SZ 3-0 L27IN ABSRB UD L17MM RB-1 1/2 CIR J215H

## (undated) DEVICE — PACK,UNIVERSAL,NO GOWNS: Brand: MEDLINE

## (undated) DEVICE — Z INACTIVE USE 2535480 CLIP LIG M BLU TI HRT SHP WIRE HORZ 180 PER BX

## (undated) DEVICE — DISPOSABLE TOURNIQUET CUFF SINGLE BLADDER, SINGLE PORT AND QUICK CONNECT CONNECTOR: Brand: COLOR CUFF

## (undated) DEVICE — CLIP INT SM WIDE RED TI TRNSVRS GRV CHEVRON SHP W/ PRECIS

## (undated) DEVICE — BANDAGE COMPR W6XL80IN COT E 1 LAYR CLP CLSR PREM GRD CONTEX